# Patient Record
Sex: MALE | Race: BLACK OR AFRICAN AMERICAN | NOT HISPANIC OR LATINO | Employment: OTHER | URBAN - METROPOLITAN AREA
[De-identification: names, ages, dates, MRNs, and addresses within clinical notes are randomized per-mention and may not be internally consistent; named-entity substitution may affect disease eponyms.]

---

## 2017-11-30 ENCOUNTER — AMBULATORY - HEALTHEAST (OUTPATIENT)
Dept: CARDIOLOGY | Facility: CLINIC | Age: 47
End: 2017-11-30

## 2018-01-09 ENCOUNTER — OFFICE VISIT - HEALTHEAST (OUTPATIENT)
Dept: CARDIOLOGY | Facility: CLINIC | Age: 48
End: 2018-01-09

## 2018-01-09 DIAGNOSIS — R94.39 CARDIOVASCULAR STRESS TEST ABNORMAL: ICD-10-CM

## 2018-01-09 DIAGNOSIS — I25.10 CORONARY ARTERY DISEASE INVOLVING NATIVE CORONARY ARTERY WITHOUT ANGINA PECTORIS: ICD-10-CM

## 2018-01-09 ASSESSMENT — MIFFLIN-ST. JEOR: SCORE: 1718.99

## 2018-11-29 ENCOUNTER — COMMUNICATION - HEALTHEAST (OUTPATIENT)
Dept: ADMINISTRATIVE | Facility: CLINIC | Age: 48
End: 2018-11-29

## 2019-01-01 ENCOUNTER — TRANSFERRED RECORDS (OUTPATIENT)
Dept: MULTI SPECIALTY CLINIC | Facility: CLINIC | Age: 49
End: 2019-01-01

## 2019-07-31 ENCOUNTER — OFFICE VISIT - HEALTHEAST (OUTPATIENT)
Dept: FAMILY MEDICINE | Facility: CLINIC | Age: 49
End: 2019-07-31

## 2019-07-31 DIAGNOSIS — R80.9 PROTEINURIA, UNSPECIFIED TYPE: ICD-10-CM

## 2019-07-31 DIAGNOSIS — M54.9 BACK PAIN: ICD-10-CM

## 2019-07-31 LAB
ALBUMIN UR-MCNC: ABNORMAL MG/DL
ANION GAP SERPL CALCULATED.3IONS-SCNC: 11 MMOL/L (ref 5–18)
APPEARANCE UR: CLEAR
BACTERIA #/AREA URNS HPF: ABNORMAL HPF
BILIRUB UR QL STRIP: NEGATIVE
BUN SERPL-MCNC: 14 MG/DL (ref 8–22)
CHLORIDE BLD-SCNC: 101 MMOL/L (ref 98–107)
CO2 SERPL-SCNC: 26 MMOL/L (ref 22–31)
COLOR UR AUTO: YELLOW
CREAT SERPL-MCNC: 0.9 MG/DL (ref 0.8–1.5)
GLUCOSE BLD-MCNC: 206 MG/DL (ref 70–125)
GLUCOSE UR STRIP-MCNC: NEGATIVE MG/DL
HGB UR QL STRIP: NEGATIVE
KETONES UR STRIP-MCNC: NEGATIVE MG/DL
LEUKOCYTE ESTERASE UR QL STRIP: NEGATIVE
NITRATE UR QL: NEGATIVE
PH UR STRIP: 5 [PH] (ref 5–8)
POTASSIUM BLD-SCNC: 4.3 MMOL/L (ref 3.5–5.5)
RBC #/AREA URNS AUTO: ABNORMAL HPF
SODIUM SERPL-SCNC: 138 MMOL/L (ref 136–145)
SP GR UR STRIP: 1.02 (ref 1–1.03)
SQUAMOUS #/AREA URNS AUTO: ABNORMAL LPF
UROBILINOGEN UR STRIP-ACNC: ABNORMAL
WBC #/AREA URNS AUTO: ABNORMAL HPF

## 2019-07-31 RX ORDER — CYCLOBENZAPRINE HCL 5 MG
5-10 TABLET ORAL 3 TIMES DAILY PRN
Qty: 30 TABLET | Refills: 0 | Status: ON HOLD | OUTPATIENT
Start: 2019-07-31 | End: 2021-09-02

## 2019-07-31 RX ORDER — GLUCOSAMINE HCL/CHONDROITIN SU 500-400 MG
CAPSULE ORAL
Status: ON HOLD | COMMUNITY
Start: 2019-07-25 | End: 2021-09-02

## 2019-07-31 RX ORDER — ALLOPURINOL 100 MG/1
100 TABLET ORAL DAILY
Refills: 3 | Status: ON HOLD | COMMUNITY
Start: 2019-07-13 | End: 2021-09-22

## 2019-07-31 RX ORDER — AMLODIPINE BESYLATE 5 MG/1
5 TABLET ORAL
Status: ON HOLD | COMMUNITY
Start: 2019-01-18 | End: 2021-09-22

## 2020-09-24 ENCOUNTER — AMBULATORY - HEALTHEAST (OUTPATIENT)
Dept: NURSING | Facility: CLINIC | Age: 50
End: 2020-09-24

## 2021-01-01 ENCOUNTER — TRANSFERRED RECORDS (OUTPATIENT)
Dept: MULTI SPECIALTY CLINIC | Facility: CLINIC | Age: 51
End: 2021-01-01
Payer: COMMERCIAL

## 2021-01-01 LAB — RETINOPATHY: NORMAL

## 2021-02-06 ENCOUNTER — TRANSFERRED RECORDS (OUTPATIENT)
Dept: HEALTH INFORMATION MANAGEMENT | Facility: CLINIC | Age: 51
End: 2021-02-06
Payer: COMMERCIAL

## 2021-02-06 LAB — RETINOPATHY: NEGATIVE

## 2021-05-31 VITALS — HEIGHT: 69 IN | BODY MASS INDEX: 28.85 KG/M2 | WEIGHT: 194.8 LBS

## 2021-05-31 NOTE — PROGRESS NOTES
Assessment/Plan:         Jerry was seen today for back pain.    Diagnoses and all orders for this visit:    Back pain: His pain is throughout his back, but appears to be musculoskeletal (positional, exam showing diffuse spasm). No red flag symptoms. Will start with a muscle relaxer and close follow-up with his PCP. Discussed concerning symptoms for which to return. He may also take tylenol (no ibuprofen).  -     Urinalysis-UC if Indicated  -     ISTAT CHEM 7 ([facility] CLINIC ONLY)  -     cyclobenzaprine (FLEXERIL) 5 MG tablet; Take 1-2 tablets (5-10 mg total) by mouth 3 (three) times a day as needed for muscle spasms.    Proteinuria, unspecified type: the level of proteinuria he has is significant but appears to be chronic for him. He will follow-up with his PCP for this.  -     ISTAT CHEM 7 ([facility] CLINIC ONLY)                Plan of care was discussed with the patient and/or guardian. They verbalize understanding of the treatment options and plan of care.    Brooklynn Chang       Subjective:        Jerry Bustamante is a 49 y.o. male who presents for back/flank pain.  Has been present for 1 week. States the pain is in his kidneys - radiates into his back. He is very worried about his kidneys. He has chronic protein in his urine and diabetes and has been told his kidneys could start causing him problems. This is the first time he has had pain that feels like it's in his kidneys.  He is also having low back pain - has had that intermittently before. Thinks that is from the pain in the kidneys as well (hunching).   Hurts when he stands for too long. Better when he lays down.  He is not taking any medications for this.   No fever, chills, hematuria, urinary urgency or frequency.   His diabetes has been well controlled.     He does not smoke.            Objective:       Blood pressure 142/89, pulse 76, temperature 98  F (36.7  C), temperature source Oral, resp. rate 16, weight 195 lb (88.5 kg), SpO2 95 %.   Gen: well  appearing, no distress.  MSK: he has pain throughout the paraspinous muscles of the spine. - significant spasm. Negative straight leg raise. No CVA tenderness.   Abdomen: soft, nontender, not distended, no guarding/rebound, normal bowel sounds.     Results for orders placed or performed in visit on 07/31/19   Urinalysis-UC if Indicated   Result Value Ref Range    Color, UA Yellow Colorless, Yellow, Straw, Light Yellow    Clarity, UA Clear Clear    Glucose, UA Negative Negative    Bilirubin, UA Negative Negative    Ketones, UA Negative Negative    Specific Gravity, UA 1.020 1.005 - 1.030    Blood, UA Negative Negative    pH, UA 5.0 5.0 - 8.0    Protein,  mg/dL (!) Negative mg/dL    Urobilinogen, UA 0.2 E.U./dL 0.2 E.U./dL, 1.0 E.U./dL    Nitrite, UA Negative Negative    Leukocytes, UA Negative Negative    Bacteria, UA None Seen None Seen hpf    RBC, UA None Seen None Seen, 0-2 hpf    WBC, UA None Seen None Seen, 0-5 hpf    Squam Epithel, UA None Seen None Seen, 0-5 lpf   ISTAT CHEM 7 ([facility] CLINIC ONLY)   Result Value Ref Range    Sodium 138 136 - 145 mmol/L    Potassium 4.3 3.5 - 5.5 mmol/L    CO2 26 22 - 31 mmol/L    Chloride 101 98 - 107 mmol/L    Anion Gap, Calculation 11 5 - 18 mmol/L    Glucose 206 (H) 70 - 125 mg/dL    BUN 14 8 - 22 mg/dL    Creatinine 0.90 0.80 - 1.50 mg/dL

## 2021-06-03 VITALS — WEIGHT: 195 LBS | BODY MASS INDEX: 28.8 KG/M2

## 2021-06-16 PROBLEM — I25.10 CORONARY ARTERY DISEASE INVOLVING NATIVE CORONARY ARTERY WITHOUT ANGINA PECTORIS: Status: ACTIVE | Noted: 2018-01-09

## 2021-06-17 NOTE — PATIENT INSTRUCTIONS - HE
Patient Instructions by Brooklynn Chang MD at 7/31/2019  6:00 PM     Author: Brooklynn Chang MD Service: -- Author Type: Physician    Filed: 7/31/2019  7:22 PM Encounter Date: 7/31/2019 Status: Signed    : Brooklynn Chang MD (Physician)         Patient Education     Back Pain (Acute or Chronic)    Back pain is one of the most common problems. The good news is that most people feel better in 1 to 2 weeks, and most of the rest in 1 to 2 months. Most people can remain active.  People who have pain describe it differently--not everyone is the same.    The pain can be sharp, stabbing, shooting, aching, cramping or burning.    Movement, standing, bending, lifting, sitting, or walking may worsen pain.    It can be localized to one spot or area, or it can be more generalized.    It can spread or radiate upwards, to the front, or go down your arms or legs (sciatica).    It can cause muscle spasm.  Most of the time, mechanical problems with the muscles or spine cause the pain. Mechanical problems are usually caused by an injury to the muscles or ligaments. While illness can cause back pain, it is usually not caused by a serious illness. Mechanical problems include:     Physical activity such as sports, exercise, work, or normal activity    Overexertion, lifting, pushing, pulling incorrectly or too aggressively    Sudden twisting, bending, or stretching from an accident, or accidental movement    Poor posture    Stretching or moving wrong, without noticing pain at the time    Poor coordination, lack of regular exercise (check with your doctor about this)    Spinal disc disease or arthritis    Stress  Pain can also be related to pregnancy, or illness like appendicitis, bladder or kidney infections, pelvic infections, and many other things.  Acute back pain usually gets better in 1 to 2 weeks. Back pain related to disk disease, arthritis in the spinal joints or spinal stenosis (narrowing of the spinal canal)  can become chronic and last for months or years.  Unless you had a physical injury (for example, a car accident or fall) X-rays are usually not needed for the initial evaluation of back pain. If pain continues and does not respond to medical treatment, X-rays and other tests may be needed.  Home care  Try these home care recommendations:    When in bed, try to find a position of comfort. A firm mattress is best. Try lying flat on your back with pillows under your knees. You can also try lying on your side with your knees bent up towards your chest and a pillow between your knees.    At first, do not try to stretch out the sore spots. If there is a strain, it is not like the good soreness you get after exercising without an injury. In this case, stretching may make it worse.    Don't sit for long periods, as in a long car ride or during other travel. This puts more stress on the lower back than standing or walking.    During the first 24 to 72 hours after an acute injury or flare up of chronic back pain, apply an ice pack to the painful area for 20 minutes and then remove it for 20 minutes. Do this over a period of 60 to 90 minutes or several times a day. This will reduce swelling and pain. Wrap the ice pack in a thin towel or plastic to protect your skin.    You can start with ice, then switch to heat. Heat (hot shower, hot bath, or heating pad) reduces pain and works well for muscle spasms. Heat can be applied to the painful area for 20 minutes then remove it for 20 minutes. Do this over a period of 60 to 90 minutes or several times a day. Do not sleep on a heating pad. It can lead to skin burns or tissue damage.    You can alternate ice and heat therapy. Talk with your doctor about the best treatment for your back pain.    Therapeutic massage can help relax the back muscles without stretching them.    Be aware of safe lifting methods and do not lift anything without stretching first.  Medicines  Talk to your  doctor before using medicine, especially if you have other medical problems or are taking other medicines.    You may use over-the-counter medicine as directed on the bottle to control pain, unless another pain medicine was prescribed. If you have chronic conditions like diabetes, liver or kidney disease, stomach ulcers, or gastrointestinal bleeding, or are taking blood thinners, talk to your doctor before taking any medicine.    Be careful if you are given a prescription medicines, narcotics, or medicine for muscle spasms. They can cause drowsiness, affect your coordination, reflexes, and judgement. Do not drive or operate heavy machinery.  Follow-up care  Follow up with your healthcare provider, or as advised.   A radiologist will review any X-rays that were taken. Your provide will notify you of any new findings that may affect your care.  Call 911  Call 911 if any of the following occur:    Trouble breathing    Confusion    Very drowsy or trouble awakening    Fainting or loss of consciousness    Rapid or very slow heart rate    Loss of bowel or bladder control  When to seek medical advice  Call your healthcare provider right away if any of these occur:     Pain becomes worse or spreads to your legs    Weakness or numbness in one or both legs    Numbness in the groin or genital area  Date Last Reviewed: 7/1/2016 2000-2017 [employer] [address] [place] [address], [place] [address]. All rights reserved. This information is not intended as a substitute for professional medical care. Always follow your healthcare professional's instructions.

## 2021-06-26 NOTE — PROGRESS NOTES
Progress Notes by Edgar Jarrett MD at 1/9/2018  4:10 PM     Author: Edgar Jarrett MD Service: -- Author Type: Physician    Filed: 1/9/2018  4:35 PM Encounter Date: 1/9/2018 Status: Signed    : Edgar Jarrett MD (Physician)           Click to link to [facility] HEART CARE NOTE    Thank you, Dr. Villalpando ref. provider found, for asking us to see Jerry Bustamante at the [facility].      Assessment/Recommendations   Patient with known coronary artery disease and distant history of distal septal apical infarct.  He is feeling well, enjoys a good functional capacity, and repeat blood pressure is excellent.  He should recheck his fasting lipid panel in 3 months which would be March of this year.  I offered to do it here but he would prefer to get it done in his primary care clinic which is absolutely fine.  I have encouraged him to maintain an active lifestyle and exercise 5 times each week.    I have also asked him to see me back in 1 year but of course would see him sooner if questions or problems arise.    Thank you for allowing us to participate in his care.         History of Present Illness    Mr. Jerry Bustamante is a 48 y.o. male with known coronary artery disease who several years ago suffered myocardial infarction while in [place].  We did get the records while he was hospitalized at [facility] for atypical chest pain and he did have a discrete wall motion abnormality in 2010 which was mimicked on the stress echocardiogram that he had in the hospital at [facility] in November 2017.  He is felt well since his hospitalization and has not had any further chest discomfort.  He does get a squeezing sensation after he eats a big meal and lays down in bed at night but if he sits up it goes right away.  He works out intermittently at the [facility] and can go for a couple of hours and does not get any chest discomfort or unusual shortness of breath.  He denies  orthopnea, paroxysmal nocturnal dyspnea, peripheral edema, syncope, near syncope or palpitations.  He has quite significant hyperlipidemia and has been started on Lipitor for about 1 month at a dose of 40 mg each day.  He previously was intolerant to Crestor because it causes nausea.  He is also trying to work on his diet, some weight loss and exercise more vigorously.             Physical Examination Review of Systems   Vitals:    01/09/18 1633   BP: 128/84   Pulse:    Resp:      Body mass index is 28.77 kg/(m^2).  Wt Readings from Last 3 Encounters:   01/09/18 194 lb 12.8 oz (88.4 kg)   11/25/17 190 lb 6.4 oz (86.4 kg)     General Appearance:   Alert, cooperative and in no acute distress.   ENT/Mouth: Oral mucuos membranes pink and moist .      EYES:  No scleral icterus. No Xanthelasma.    Neck: JVP normal. No Hepatojugular reflux. Thyroid not visualized   Chest/Lungs:   Lungs are clear to auscultation, equal chest wall expansion    Cardiovascular:   S1, S2 without murmur ,clicks or rubs. Brachial, radial and posterior tibial pulses are intact and symetric. No carotid bruits noted   Abdomen:  Nontender. BS+. No bruits.      Extremities: No cyanosis, clubbing or edema   Skin: no xanthelasma, warm.    Psych: Appropriate affect.   Neurologic: normal gait, normal  bilateral, no tremors        General: WNL  Eyes: WNL  Ears/Nose/Throat: WNL  Lungs: Snoring  Heart: WNL  Stomach: WNL  Bladder: WNL  Muscle/Joints: WNL  Skin: WNL  Nervous System: WNL  Mental Health: WNL     Blood: WNL     Medical History  Surgical History Family History Social History   Past Medical History:   Diagnosis Date   ? Coronary artery disease    ? Diabetes mellitus, type II    ? Gout    ? High cholesterol    ? HLD (hyperlipidemia)    ? Hypertension     Past Surgical History:   Procedure Laterality Date   ? CORONARY ANGIOPLASTY WITH STENT PLACEMENT      Family History   Problem Relation Age of Onset   ? Hypertension Mother    ? Diabetes  Maternal Aunt    ? Diabetes Maternal Uncle    ? Cancer Neg Hx    ? Stroke Neg Hx     Social History     Social History   ? Marital status:      Spouse name: N/A   ? Number of children: N/A   ? Years of education: N/A     Occupational History   ? Not on file.     Social History Main Topics   ? Smoking status: Former Smoker     Packs/day: 1.50     Years: 13.00     Types: Cigarettes     Quit date: 11/23/2003   ? Smokeless tobacco: Never Used   ? Alcohol use No   ? Drug use: No   ? Sexual activity: Yes     Partners: Female     Other Topics Concern   ? Not on file     Social History Narrative          Medications  Allergies   Current Outpatient Prescriptions   Medication Sig Dispense Refill   ? aspirin 81 MG EC tablet Take 1 tablet (81 mg total) by mouth daily.  0   ? atorvastatin (LIPITOR) 40 MG tablet   0   ? glipiZIDE (GLUCOTROL XL) 10 MG 24 hr tablet Take 10 mg by mouth daily.     ? hydroCHLOROthiazide (HYDRODIURIL) 12.5 MG tablet Take 12.5 mg by mouth every evening.     ? insulin glargine (LANTUS) 100 unit/mL injection Inject 20 Units under the skin at bedtime.     ? lisinopril (PRINIVIL,ZESTRIL) 20 MG tablet Take 20 mg by mouth daily.     ? metFORMIN (GLUCOPHAGE) 500 MG tablet Take 1,000 mg by mouth 2 (two) times a day with meals.     ? metoprolol tartrate (LOPRESSOR) 50 MG tablet Take 50 mg by mouth 2 (two) times a day.     ? nitroglycerin (NITROSTAT) 0.3 MG SL tablet Place 1 tablet (0.3 mg total) under the tongue every 5 (five) minutes as needed for chest pain (Chest pressure). 20 tablet 0   ? atorvastatin (LIPITOR) 40 MG tablet Take 1 tablet (40 mg total) by mouth every evening. 30 tablet 0     No current facility-administered medications for this visit.       Allergies   Allergen Reactions   ? Crestor [Rosuvastatin] Nausea Only         Lab Results    Chemistry/lipid CBC Cardiac Enzymes/BNP/TSH/INR   Lab Results   Component Value Date    CHOL 259 (H) 11/23/2017    HDL 33 (L) 11/23/2017    LDLCALC 146 (H)  11/23/2017    TRIG 398 (H) 11/23/2017    CREATININE 0.99 11/23/2017    BUN 14 11/23/2017    K 4.1 11/23/2017     11/23/2017    CL 99 11/23/2017    CO2 26 11/23/2017    Lab Results   Component Value Date    WBC 8.6 11/23/2017    HGB 15.1 11/23/2017    HCT 42.7 11/23/2017    MCV 78 (L) 11/23/2017     11/25/2017    Lab Results   Component Value Date    TROPONINI 0.01 11/24/2017

## 2021-09-02 ENCOUNTER — HOSPITAL ENCOUNTER (INPATIENT)
Facility: CLINIC | Age: 51
LOS: 20 days | Discharge: HOME | End: 2021-09-22
Attending: INTERNAL MEDICINE | Admitting: INTERNAL MEDICINE
Payer: COMMERCIAL

## 2021-09-02 ENCOUNTER — APPOINTMENT (OUTPATIENT)
Dept: RADIOLOGY | Facility: HOSPITAL | Age: 51
End: 2021-09-02
Attending: EMERGENCY MEDICINE
Payer: COMMERCIAL

## 2021-09-02 ENCOUNTER — HOSPITAL ENCOUNTER (EMERGENCY)
Facility: HOSPITAL | Age: 51
Discharge: ANOTHER HOSPITAL | End: 2021-09-02
Attending: EMERGENCY MEDICINE | Admitting: EMERGENCY MEDICINE
Payer: COMMERCIAL

## 2021-09-02 VITALS
TEMPERATURE: 98 F | DIASTOLIC BLOOD PRESSURE: 75 MMHG | OXYGEN SATURATION: 95 % | HEART RATE: 67 BPM | WEIGHT: 191 LBS | RESPIRATION RATE: 16 BRPM | BODY MASS INDEX: 27.41 KG/M2 | SYSTOLIC BLOOD PRESSURE: 114 MMHG

## 2021-09-02 DIAGNOSIS — I25.10 CORONARY ARTERY DISEASE INVOLVING NATIVE CORONARY ARTERY WITHOUT ANGINA PECTORIS: ICD-10-CM

## 2021-09-02 DIAGNOSIS — I21.4 NSTEMI (NON-ST ELEVATED MYOCARDIAL INFARCTION) (H): Primary | ICD-10-CM

## 2021-09-02 DIAGNOSIS — I25.10 CAD (CORONARY ARTERY DISEASE): ICD-10-CM

## 2021-09-02 DIAGNOSIS — I48.3 TYPICAL ATRIAL FLUTTER (H): ICD-10-CM

## 2021-09-02 DIAGNOSIS — I25.10 CORONARY ARTERY DISEASE INVOLVING NATIVE CORONARY ARTERY OF NATIVE HEART WITHOUT ANGINA PECTORIS: ICD-10-CM

## 2021-09-02 DIAGNOSIS — I48.91 ATRIAL FIBRILLATION WITH RVR (H): ICD-10-CM

## 2021-09-02 DIAGNOSIS — I21.4 NSTEMI (NON-ST ELEVATED MYOCARDIAL INFARCTION) (H): ICD-10-CM

## 2021-09-02 DIAGNOSIS — Z95.1 S/P CORONARY ARTERY BYPASS GRAFT X 3: ICD-10-CM

## 2021-09-02 LAB
ACT BLD: 199 SECONDS (ref 74–150)
ALBUMIN SERPL-MCNC: 3.6 G/DL (ref 3.5–5)
ALBUMIN UR-MCNC: 30 MG/DL
ALP SERPL-CCNC: 118 U/L (ref 45–120)
ALT SERPL W P-5'-P-CCNC: 23 U/L (ref 0–45)
ANION GAP SERPL CALCULATED.3IONS-SCNC: 12 MMOL/L (ref 5–18)
APPEARANCE UR: CLEAR
AST SERPL W P-5'-P-CCNC: 29 U/L (ref 0–40)
ATRIAL RATE - MUSE: 68 BPM
BILIRUB DIRECT SERPL-MCNC: 0.1 MG/DL
BILIRUB SERPL-MCNC: 0.4 MG/DL (ref 0–1)
BILIRUB UR QL STRIP: NEGATIVE
BUN SERPL-MCNC: 17 MG/DL (ref 8–22)
CALCIUM SERPL-MCNC: 9.8 MG/DL (ref 8.5–10.5)
CHLORIDE BLD-SCNC: 98 MMOL/L (ref 98–107)
CO2 SERPL-SCNC: 25 MMOL/L (ref 22–31)
COLOR UR AUTO: ABNORMAL
CREAT SERPL-MCNC: 1.26 MG/DL (ref 0.7–1.3)
DIASTOLIC BLOOD PRESSURE - MUSE: NORMAL MMHG
ERYTHROCYTE [DISTWIDTH] IN BLOOD BY AUTOMATED COUNT: 13.8 % (ref 10–15)
ERYTHROCYTE [DISTWIDTH] IN BLOOD BY AUTOMATED COUNT: 13.9 % (ref 10–15)
GFR SERPL CREATININE-BSD FRML MDRD: 65 ML/MIN/1.73M2
GLUCOSE BLD-MCNC: 485 MG/DL (ref 70–125)
GLUCOSE BLDC GLUCOMTR-MCNC: 207 MG/DL (ref 70–99)
GLUCOSE BLDC GLUCOMTR-MCNC: 251 MG/DL (ref 70–99)
GLUCOSE BLDC GLUCOMTR-MCNC: 252 MG/DL (ref 70–99)
GLUCOSE BLDC GLUCOMTR-MCNC: 276 MG/DL (ref 70–99)
GLUCOSE BLDC GLUCOMTR-MCNC: 287 MG/DL (ref 70–99)
GLUCOSE UR STRIP-MCNC: 1000 MG/DL
HBA1C MFR BLD: 10.2 % (ref 0–5.6)
HCT VFR BLD AUTO: 37.5 % (ref 40–53)
HCT VFR BLD AUTO: 38.3 % (ref 40–53)
HGB BLD-MCNC: 12.7 G/DL (ref 13.3–17.7)
HGB BLD-MCNC: 12.8 G/DL (ref 13.3–17.7)
HGB UR QL STRIP: NEGATIVE
HOLD SPECIMEN: NORMAL
HYALINE CASTS: 1 /LPF
INTERPRETATION ECG - MUSE: NORMAL
KETONES UR STRIP-MCNC: NEGATIVE MG/DL
LEUKOCYTE ESTERASE UR QL STRIP: NEGATIVE
LIPASE SERPL-CCNC: 159 U/L (ref 0–52)
MCH RBC QN AUTO: 26 PG (ref 26.5–33)
MCH RBC QN AUTO: 26.6 PG (ref 26.5–33)
MCHC RBC AUTO-ENTMCNC: 33.2 G/DL (ref 31.5–36.5)
MCHC RBC AUTO-ENTMCNC: 34.1 G/DL (ref 31.5–36.5)
MCV RBC AUTO: 78 FL (ref 78–100)
MCV RBC AUTO: 78 FL (ref 78–100)
NITRATE UR QL: NEGATIVE
P AXIS - MUSE: 52 DEGREES
PH UR STRIP: 5.5 [PH] (ref 5–7)
PLATELET # BLD AUTO: 179 10E3/UL (ref 150–450)
PLATELET # BLD AUTO: 193 10E3/UL (ref 150–450)
POTASSIUM BLD-SCNC: 4.2 MMOL/L (ref 3.5–5)
PR INTERVAL - MUSE: 190 MS
PROT SERPL-MCNC: 7.8 G/DL (ref 6–8)
QRS DURATION - MUSE: 88 MS
QT - MUSE: 370 MS
QTC - MUSE: 393 MS
R AXIS - MUSE: -5 DEGREES
RBC # BLD AUTO: 4.81 10E6/UL (ref 4.4–5.9)
RBC # BLD AUTO: 4.89 10E6/UL (ref 4.4–5.9)
RBC URINE: <1 /HPF
SARS-COV-2 RNA RESP QL NAA+PROBE: NEGATIVE
SODIUM SERPL-SCNC: 135 MMOL/L (ref 136–145)
SP GR UR STRIP: 1.01 (ref 1–1.03)
SYSTOLIC BLOOD PRESSURE - MUSE: NORMAL MMHG
T AXIS - MUSE: 186 DEGREES
TROPONIN I SERPL-MCNC: 10.3 UG/L (ref 0–0.04)
TROPONIN I SERPL-MCNC: 12.29 UG/L (ref 0–0.04)
TROPONIN I SERPL-MCNC: 12.45 UG/L (ref 0–0.04)
TROPONIN I SERPL-MCNC: 2.15 NG/ML (ref 0–0.29)
UFH PPP CHRO-ACNC: 0.2 IU/ML
UFH PPP CHRO-ACNC: 0.6 IU/ML
UROBILINOGEN UR STRIP-MCNC: NORMAL MG/DL
VENTRICULAR RATE- MUSE: 68 BPM
WBC # BLD AUTO: 10.7 10E3/UL (ref 4–11)
WBC # BLD AUTO: 11.9 10E3/UL (ref 4–11)
WBC URINE: <1 /HPF

## 2021-09-02 PROCEDURE — 82310 ASSAY OF CALCIUM: CPT | Performed by: EMERGENCY MEDICINE

## 2021-09-02 PROCEDURE — 93005 ELECTROCARDIOGRAM TRACING: CPT | Performed by: EMERGENCY MEDICINE

## 2021-09-02 PROCEDURE — C9803 HOPD COVID-19 SPEC COLLECT: HCPCS

## 2021-09-02 PROCEDURE — 93005 ELECTROCARDIOGRAM TRACING: CPT

## 2021-09-02 PROCEDURE — 36415 COLL VENOUS BLD VENIPUNCTURE: CPT | Performed by: EMERGENCY MEDICINE

## 2021-09-02 PROCEDURE — 36415 COLL VENOUS BLD VENIPUNCTURE: CPT | Performed by: PHYSICIAN ASSISTANT

## 2021-09-02 PROCEDURE — 210N000002 HC R&B HEART CARE

## 2021-09-02 PROCEDURE — B2151ZZ FLUOROSCOPY OF LEFT HEART USING LOW OSMOLAR CONTRAST: ICD-10-PCS | Performed by: INTERNAL MEDICINE

## 2021-09-02 PROCEDURE — 250N000011 HC RX IP 250 OP 636: Performed by: INTERNAL MEDICINE

## 2021-09-02 PROCEDURE — 96376 TX/PRO/DX INJ SAME DRUG ADON: CPT

## 2021-09-02 PROCEDURE — 96365 THER/PROPH/DIAG IV INF INIT: CPT

## 2021-09-02 PROCEDURE — 96366 THER/PROPH/DIAG IV INF ADDON: CPT

## 2021-09-02 PROCEDURE — 83036 HEMOGLOBIN GLYCOSYLATED A1C: CPT | Performed by: PHYSICIAN ASSISTANT

## 2021-09-02 PROCEDURE — 250N000011 HC RX IP 250 OP 636: Performed by: EMERGENCY MEDICINE

## 2021-09-02 PROCEDURE — 83690 ASSAY OF LIPASE: CPT | Performed by: EMERGENCY MEDICINE

## 2021-09-02 PROCEDURE — 258N000003 HC RX IP 258 OP 636: Performed by: EMERGENCY MEDICINE

## 2021-09-02 PROCEDURE — 250N000013 HC RX MED GY IP 250 OP 250 PS 637: Performed by: PHYSICIAN ASSISTANT

## 2021-09-02 PROCEDURE — 250N000011 HC RX IP 250 OP 636: Performed by: STUDENT IN AN ORGANIZED HEALTH CARE EDUCATION/TRAINING PROGRAM

## 2021-09-02 PROCEDURE — 85520 HEPARIN ASSAY: CPT | Performed by: PHYSICIAN ASSISTANT

## 2021-09-02 PROCEDURE — 99221 1ST HOSP IP/OBS SF/LOW 40: CPT | Mod: 25 | Performed by: INTERNAL MEDICINE

## 2021-09-02 PROCEDURE — 250N000009 HC RX 250: Performed by: EMERGENCY MEDICINE

## 2021-09-02 PROCEDURE — 71045 X-RAY EXAM CHEST 1 VIEW: CPT

## 2021-09-02 PROCEDURE — 81003 URINALYSIS AUTO W/O SCOPE: CPT | Performed by: PHYSICIAN ASSISTANT

## 2021-09-02 PROCEDURE — 84484 ASSAY OF TROPONIN QUANT: CPT | Performed by: PHYSICIAN ASSISTANT

## 2021-09-02 PROCEDURE — 85027 COMPLETE CBC AUTOMATED: CPT | Performed by: PHYSICIAN ASSISTANT

## 2021-09-02 PROCEDURE — 250N000009 HC RX 250: Performed by: INTERNAL MEDICINE

## 2021-09-02 PROCEDURE — 272N000001 HC OR GENERAL SUPPLY STERILE: Performed by: INTERNAL MEDICINE

## 2021-09-02 PROCEDURE — 99223 1ST HOSP IP/OBS HIGH 75: CPT | Mod: AI | Performed by: PHYSICIAN ASSISTANT

## 2021-09-02 PROCEDURE — 99152 MOD SED SAME PHYS/QHP 5/>YRS: CPT | Performed by: INTERNAL MEDICINE

## 2021-09-02 PROCEDURE — 82248 BILIRUBIN DIRECT: CPT | Performed by: EMERGENCY MEDICINE

## 2021-09-02 PROCEDURE — B2111ZZ FLUOROSCOPY OF MULTIPLE CORONARY ARTERIES USING LOW OSMOLAR CONTRAST: ICD-10-PCS | Performed by: INTERNAL MEDICINE

## 2021-09-02 PROCEDURE — 93458 L HRT ARTERY/VENTRICLE ANGIO: CPT | Performed by: INTERNAL MEDICINE

## 2021-09-02 PROCEDURE — 85347 COAGULATION TIME ACTIVATED: CPT

## 2021-09-02 PROCEDURE — 250N000013 HC RX MED GY IP 250 OP 250 PS 637: Performed by: EMERGENCY MEDICINE

## 2021-09-02 PROCEDURE — 99152 MOD SED SAME PHYS/QHP 5/>YRS: CPT | Mod: GC | Performed by: INTERNAL MEDICINE

## 2021-09-02 PROCEDURE — 4A023N7 MEASUREMENT OF CARDIAC SAMPLING AND PRESSURE, LEFT HEART, PERCUTANEOUS APPROACH: ICD-10-PCS | Performed by: INTERNAL MEDICINE

## 2021-09-02 PROCEDURE — 250N000013 HC RX MED GY IP 250 OP 250 PS 637: Performed by: INTERNAL MEDICINE

## 2021-09-02 PROCEDURE — 85520 HEPARIN ASSAY: CPT | Performed by: INTERNAL MEDICINE

## 2021-09-02 PROCEDURE — 250N000012 HC RX MED GY IP 250 OP 636 PS 637: Performed by: PHYSICIAN ASSISTANT

## 2021-09-02 PROCEDURE — 84484 ASSAY OF TROPONIN QUANT: CPT | Performed by: EMERGENCY MEDICINE

## 2021-09-02 PROCEDURE — 85014 HEMATOCRIT: CPT | Performed by: EMERGENCY MEDICINE

## 2021-09-02 PROCEDURE — 93458 L HRT ARTERY/VENTRICLE ANGIO: CPT | Mod: 26 | Performed by: INTERNAL MEDICINE

## 2021-09-02 PROCEDURE — 99291 CRITICAL CARE FIRST HOUR: CPT | Mod: 25

## 2021-09-02 PROCEDURE — 87635 SARS-COV-2 COVID-19 AMP PRB: CPT | Performed by: EMERGENCY MEDICINE

## 2021-09-02 PROCEDURE — 99153 MOD SED SAME PHYS/QHP EA: CPT | Performed by: INTERNAL MEDICINE

## 2021-09-02 RX ORDER — ATROPINE SULFATE 0.1 MG/ML
0.5 INJECTION INTRAVENOUS
Status: ACTIVE | OUTPATIENT
Start: 2021-09-02 | End: 2021-09-03

## 2021-09-02 RX ORDER — FENTANYL CITRATE 50 UG/ML
INJECTION, SOLUTION INTRAMUSCULAR; INTRAVENOUS
Status: DISCONTINUED | OUTPATIENT
Start: 2021-09-02 | End: 2021-09-02 | Stop reason: HOSPADM

## 2021-09-02 RX ORDER — IOPAMIDOL 755 MG/ML
INJECTION, SOLUTION INTRAVASCULAR
Status: DISCONTINUED | OUTPATIENT
Start: 2021-09-02 | End: 2021-09-02 | Stop reason: HOSPADM

## 2021-09-02 RX ORDER — NALOXONE HYDROCHLORIDE 0.4 MG/ML
0.2 INJECTION, SOLUTION INTRAMUSCULAR; INTRAVENOUS; SUBCUTANEOUS
Status: DISCONTINUED | OUTPATIENT
Start: 2021-09-02 | End: 2021-09-08

## 2021-09-02 RX ORDER — LORAZEPAM 2 MG/ML
0.5 INJECTION INTRAMUSCULAR
Status: DISCONTINUED | OUTPATIENT
Start: 2021-09-02 | End: 2021-09-02 | Stop reason: HOSPADM

## 2021-09-02 RX ORDER — MAGNESIUM HYDROXIDE/ALUMINUM HYDROXICE/SIMETHICONE 120; 1200; 1200 MG/30ML; MG/30ML; MG/30ML
30 SUSPENSION ORAL EVERY 4 HOURS PRN
Status: DISCONTINUED | OUTPATIENT
Start: 2021-09-02 | End: 2021-09-08

## 2021-09-02 RX ORDER — HEPARIN SODIUM 10000 [USP'U]/100ML
0-5000 INJECTION, SOLUTION INTRAVENOUS CONTINUOUS
Status: DISCONTINUED | OUTPATIENT
Start: 2021-09-02 | End: 2021-09-08

## 2021-09-02 RX ORDER — NITROGLYCERIN 0.4 MG/1
0.4 TABLET SUBLINGUAL EVERY 5 MIN PRN
Status: DISCONTINUED | OUTPATIENT
Start: 2021-09-02 | End: 2021-09-08

## 2021-09-02 RX ORDER — AMLODIPINE BESYLATE 5 MG/1
5 TABLET ORAL DAILY
Status: DISCONTINUED | OUTPATIENT
Start: 2021-09-02 | End: 2021-09-07

## 2021-09-02 RX ORDER — ATORVASTATIN CALCIUM 40 MG/1
40 TABLET, FILM COATED ORAL EVERY EVENING
Status: DISCONTINUED | OUTPATIENT
Start: 2021-09-02 | End: 2021-09-02

## 2021-09-02 RX ORDER — ASPIRIN 81 MG/1
162 TABLET, CHEWABLE ORAL ONCE
Status: COMPLETED | OUTPATIENT
Start: 2021-09-02 | End: 2021-09-02

## 2021-09-02 RX ORDER — OXYCODONE HYDROCHLORIDE 5 MG/1
5 TABLET ORAL EVERY 4 HOURS PRN
Status: DISCONTINUED | OUTPATIENT
Start: 2021-09-02 | End: 2021-09-02

## 2021-09-02 RX ORDER — NALOXONE HYDROCHLORIDE 0.4 MG/ML
0.4 INJECTION, SOLUTION INTRAMUSCULAR; INTRAVENOUS; SUBCUTANEOUS
Status: DISCONTINUED | OUTPATIENT
Start: 2021-09-02 | End: 2021-09-08

## 2021-09-02 RX ORDER — ACETAMINOPHEN 325 MG/1
650 TABLET ORAL EVERY 4 HOURS PRN
Status: DISCONTINUED | OUTPATIENT
Start: 2021-09-02 | End: 2021-09-02

## 2021-09-02 RX ORDER — NALOXONE HYDROCHLORIDE 0.4 MG/ML
0.4 INJECTION, SOLUTION INTRAMUSCULAR; INTRAVENOUS; SUBCUTANEOUS
Status: DISCONTINUED | OUTPATIENT
Start: 2021-09-02 | End: 2021-09-02

## 2021-09-02 RX ORDER — POLYETHYLENE GLYCOL 3350 17 G/17G
17 POWDER, FOR SOLUTION ORAL DAILY PRN
Status: DISCONTINUED | OUTPATIENT
Start: 2021-09-02 | End: 2021-09-08

## 2021-09-02 RX ORDER — ACETAMINOPHEN 325 MG/1
650 TABLET ORAL EVERY 4 HOURS PRN
Status: DISCONTINUED | OUTPATIENT
Start: 2021-09-02 | End: 2021-09-08

## 2021-09-02 RX ORDER — FLUMAZENIL 0.1 MG/ML
0.2 INJECTION, SOLUTION INTRAVENOUS
Status: ACTIVE | OUTPATIENT
Start: 2021-09-02 | End: 2021-09-03

## 2021-09-02 RX ORDER — NALOXONE HYDROCHLORIDE 0.4 MG/ML
0.2 INJECTION, SOLUTION INTRAMUSCULAR; INTRAVENOUS; SUBCUTANEOUS
Status: DISCONTINUED | OUTPATIENT
Start: 2021-09-02 | End: 2021-09-02

## 2021-09-02 RX ORDER — HEPARIN SODIUM 10000 [USP'U]/100ML
0-5000 INJECTION, SOLUTION INTRAVENOUS CONTINUOUS
Status: DISCONTINUED | OUTPATIENT
Start: 2021-09-02 | End: 2021-09-02

## 2021-09-02 RX ORDER — LORAZEPAM 0.5 MG/1
0.5 TABLET ORAL
Status: DISCONTINUED | OUTPATIENT
Start: 2021-09-02 | End: 2021-09-02 | Stop reason: HOSPADM

## 2021-09-02 RX ORDER — NITROGLYCERIN 80 MG/1
1 PATCH TRANSDERMAL DAILY
Status: DISCONTINUED | OUTPATIENT
Start: 2021-09-02 | End: 2021-09-08

## 2021-09-02 RX ORDER — SODIUM CHLORIDE 9 MG/ML
75 INJECTION, SOLUTION INTRAVENOUS CONTINUOUS
Status: ACTIVE | OUTPATIENT
Start: 2021-09-02 | End: 2021-09-02

## 2021-09-02 RX ORDER — ROSUVASTATIN CALCIUM 10 MG/1
10 TABLET, COATED ORAL DAILY
Status: ON HOLD | COMMUNITY
End: 2021-09-22

## 2021-09-02 RX ORDER — LOSARTAN POTASSIUM 50 MG/1
50 TABLET ORAL 2 TIMES DAILY
Status: DISCONTINUED | OUTPATIENT
Start: 2021-09-02 | End: 2021-09-03

## 2021-09-02 RX ORDER — LIDOCAINE 40 MG/G
CREAM TOPICAL
Status: DISCONTINUED | OUTPATIENT
Start: 2021-09-02 | End: 2021-09-02

## 2021-09-02 RX ORDER — NICOTINE POLACRILEX 4 MG
15-30 LOZENGE BUCCAL
Status: DISCONTINUED | OUTPATIENT
Start: 2021-09-02 | End: 2021-09-08

## 2021-09-02 RX ORDER — LOSARTAN POTASSIUM 50 MG/1
50 TABLET ORAL 2 TIMES DAILY
Status: ON HOLD | COMMUNITY
End: 2021-09-22

## 2021-09-02 RX ORDER — METOPROLOL TARTRATE 50 MG
50 TABLET ORAL 2 TIMES DAILY
Status: DISCONTINUED | OUTPATIENT
Start: 2021-09-02 | End: 2021-09-08

## 2021-09-02 RX ORDER — OXYCODONE HYDROCHLORIDE 5 MG/1
5 TABLET ORAL EVERY 4 HOURS PRN
Status: DISCONTINUED | OUTPATIENT
Start: 2021-09-02 | End: 2021-09-08

## 2021-09-02 RX ORDER — ALLOPURINOL 100 MG/1
100 TABLET ORAL DAILY
Status: DISCONTINUED | OUTPATIENT
Start: 2021-09-02 | End: 2021-09-22 | Stop reason: HOSPADM

## 2021-09-02 RX ORDER — LIDOCAINE 40 MG/G
CREAM TOPICAL
Status: DISCONTINUED | OUTPATIENT
Start: 2021-09-02 | End: 2021-09-08

## 2021-09-02 RX ORDER — ROSUVASTATIN CALCIUM 10 MG/1
10 TABLET, COATED ORAL DAILY
Status: DISCONTINUED | OUTPATIENT
Start: 2021-09-02 | End: 2021-09-08

## 2021-09-02 RX ORDER — ACETAMINOPHEN 650 MG/1
650 SUPPOSITORY RECTAL EVERY 4 HOURS PRN
Status: DISCONTINUED | OUTPATIENT
Start: 2021-09-02 | End: 2021-09-08

## 2021-09-02 RX ORDER — HEPARIN SODIUM 10000 [USP'U]/100ML
0-5000 INJECTION, SOLUTION INTRAVENOUS CONTINUOUS
Status: DISCONTINUED | OUTPATIENT
Start: 2021-09-02 | End: 2021-09-02 | Stop reason: HOSPADM

## 2021-09-02 RX ORDER — ASPIRIN 325 MG
325 TABLET ORAL ONCE
Status: DISCONTINUED | OUTPATIENT
Start: 2021-09-02 | End: 2021-09-02 | Stop reason: HOSPADM

## 2021-09-02 RX ORDER — ONDANSETRON 2 MG/ML
4 INJECTION INTRAMUSCULAR; INTRAVENOUS EVERY 6 HOURS PRN
Status: DISCONTINUED | OUTPATIENT
Start: 2021-09-02 | End: 2021-09-08

## 2021-09-02 RX ORDER — BISACODYL 10 MG
10 SUPPOSITORY, RECTAL RECTAL DAILY PRN
Status: DISCONTINUED | OUTPATIENT
Start: 2021-09-02 | End: 2021-09-08

## 2021-09-02 RX ORDER — HYDROMORPHONE HCL IN WATER/PF 6 MG/30 ML
0.2 PATIENT CONTROLLED ANALGESIA SYRINGE INTRAVENOUS
Status: DISCONTINUED | OUTPATIENT
Start: 2021-09-02 | End: 2021-09-08

## 2021-09-02 RX ORDER — SODIUM CHLORIDE 9 MG/ML
INJECTION, SOLUTION INTRAVENOUS CONTINUOUS
Status: DISCONTINUED | OUTPATIENT
Start: 2021-09-02 | End: 2021-09-02 | Stop reason: HOSPADM

## 2021-09-02 RX ORDER — ASPIRIN 81 MG/1
243 TABLET, CHEWABLE ORAL ONCE
Status: DISCONTINUED | OUTPATIENT
Start: 2021-09-02 | End: 2021-09-02 | Stop reason: HOSPADM

## 2021-09-02 RX ORDER — OXYCODONE HYDROCHLORIDE 5 MG/1
10 TABLET ORAL EVERY 4 HOURS PRN
Status: DISCONTINUED | OUTPATIENT
Start: 2021-09-02 | End: 2021-09-08

## 2021-09-02 RX ORDER — NITROGLYCERIN 5 MG/ML
VIAL (ML) INTRAVENOUS
Status: DISCONTINUED | OUTPATIENT
Start: 2021-09-02 | End: 2021-09-02 | Stop reason: HOSPADM

## 2021-09-02 RX ORDER — AMOXICILLIN 250 MG
1 CAPSULE ORAL 2 TIMES DAILY PRN
Status: DISCONTINUED | OUTPATIENT
Start: 2021-09-02 | End: 2021-09-08

## 2021-09-02 RX ORDER — ASPIRIN 81 MG/1
81 TABLET ORAL DAILY
Status: DISCONTINUED | OUTPATIENT
Start: 2021-09-03 | End: 2021-09-08

## 2021-09-02 RX ORDER — AMOXICILLIN 250 MG
2 CAPSULE ORAL 2 TIMES DAILY PRN
Status: DISCONTINUED | OUTPATIENT
Start: 2021-09-02 | End: 2021-09-08

## 2021-09-02 RX ORDER — LISINOPRIL 20 MG/1
20 TABLET ORAL DAILY
Status: DISCONTINUED | OUTPATIENT
Start: 2021-09-02 | End: 2021-09-02

## 2021-09-02 RX ORDER — POTASSIUM CHLORIDE 1500 MG/1
20 TABLET, EXTENDED RELEASE ORAL
Status: DISCONTINUED | OUTPATIENT
Start: 2021-09-02 | End: 2021-09-02 | Stop reason: HOSPADM

## 2021-09-02 RX ORDER — FENTANYL CITRATE 50 UG/ML
25 INJECTION, SOLUTION INTRAMUSCULAR; INTRAVENOUS
Status: DISCONTINUED | OUTPATIENT
Start: 2021-09-02 | End: 2021-09-03

## 2021-09-02 RX ORDER — ONDANSETRON 4 MG/1
4 TABLET, ORALLY DISINTEGRATING ORAL EVERY 6 HOURS PRN
Status: DISCONTINUED | OUTPATIENT
Start: 2021-09-02 | End: 2021-09-08

## 2021-09-02 RX ORDER — DEXTROSE MONOHYDRATE 25 G/50ML
25-50 INJECTION, SOLUTION INTRAVENOUS
Status: DISCONTINUED | OUTPATIENT
Start: 2021-09-02 | End: 2021-09-08

## 2021-09-02 RX ADMIN — LIDOCAINE HYDROCHLORIDE 30 ML: 20 SOLUTION ORAL; TOPICAL at 01:42

## 2021-09-02 RX ADMIN — INSULIN GLARGINE 20 UNITS: 100 INJECTION, SOLUTION SUBCUTANEOUS at 22:15

## 2021-09-02 RX ADMIN — SODIUM CHLORIDE 1000 ML: 9 INJECTION, SOLUTION INTRAVENOUS at 03:34

## 2021-09-02 RX ADMIN — AMLODIPINE BESYLATE 5 MG: 5 TABLET ORAL at 11:22

## 2021-09-02 RX ADMIN — METOPROLOL TARTRATE 50 MG: 50 TABLET, FILM COATED ORAL at 20:14

## 2021-09-02 RX ADMIN — INSULIN ASPART 3 UNITS: 100 INJECTION, SOLUTION INTRAVENOUS; SUBCUTANEOUS at 19:02

## 2021-09-02 RX ADMIN — HEPARIN SODIUM 1200 UNITS/HR: 10000 INJECTION, SOLUTION INTRAVENOUS at 18:53

## 2021-09-02 RX ADMIN — LOSARTAN POTASSIUM 50 MG: 50 TABLET, FILM COATED ORAL at 11:53

## 2021-09-02 RX ADMIN — METOPROLOL TARTRATE 50 MG: 50 TABLET, FILM COATED ORAL at 11:22

## 2021-09-02 RX ADMIN — LOSARTAN POTASSIUM 50 MG: 50 TABLET, FILM COATED ORAL at 20:14

## 2021-09-02 RX ADMIN — HEPARIN SODIUM 1050 UNITS/HR: 10000 INJECTION, SOLUTION INTRAVENOUS at 03:36

## 2021-09-02 RX ADMIN — NITROGLYCERIN 0.4 MG: 0.4 TABLET SUBLINGUAL at 19:11

## 2021-09-02 RX ADMIN — ALLOPURINOL 100 MG: 100 TABLET ORAL at 11:22

## 2021-09-02 RX ADMIN — INSULIN ASPART 2 UNITS: 100 INJECTION, SOLUTION INTRAVENOUS; SUBCUTANEOUS at 16:50

## 2021-09-02 RX ADMIN — ASPIRIN 162 MG: 81 TABLET, CHEWABLE ORAL at 01:42

## 2021-09-02 RX ADMIN — ROSUVASTATIN CALCIUM 10 MG: 10 TABLET, FILM COATED ORAL at 20:14

## 2021-09-02 RX ADMIN — NITROGLYCERIN 1 PATCH: 0.4 PATCH TRANSDERMAL at 20:14

## 2021-09-02 RX ADMIN — INSULIN ASPART 3 UNITS: 100 INJECTION, SOLUTION INTRAVENOUS; SUBCUTANEOUS at 22:15

## 2021-09-02 ASSESSMENT — ACTIVITIES OF DAILY LIVING (ADL)
ADLS_ACUITY_SCORE: 14
ADLS_ACUITY_SCORE: 16
ADLS_ACUITY_SCORE: 14

## 2021-09-02 NOTE — CONSULTS
[facility]  Cardiology Consultation     Date of Admission:  9/2/2021    Assessment & Plan   Jerry Bustamante is a 51 year old male with    1.  NSTEMI  2.  History of CAD s/p PCI of mid LAD in 2011 also noted to have 50 to 60% stenosis of RCA.  3.  Mild ischemic cardiomyopathy with ejection fraction of 45%-known anterolateral and apical septal hypokinesis  4.  Diabetes mellitus.     Recommendations:  1.  We will perform coronary angiogram.  Suspect that his RCA lesion has progressed and is likely the culprit vessel. Consent obtained.  2.  Echocardiogram is pending and will follow up on the results.  3. Continue all current meds.     Waylon Herrjan    Code Status    Full Code    Reason for Consult   Reason for consult: Chest pain    Primary Care Physician   [facility]    Chief Complaint   Chest pain    History of Present Illness   Jerry Bustamante is a 51 year old male with past medical history of coronary artery disease status stenting of mid LAD in 2011 who presents to the hospital after being transferred for management of NSTEMI.  The patient had an abnormal stress test in 2011 and subsequently underwent PCI of calcified mid LAD with 1 stent.  RCA was noted to have 50 to 60% stenosis.  A transthoracic echocardiogram performed around that time reports distal septal hypokinesis which was also noted on the stress test performed in 2017.    The patient now presents with complaints of substernal, pressure-like chest pain and was noted to have elevated troponin and T wave inversion in lateral leads.  Creatinine was 1.26 and hemoglobin was 12.7.    Patient Active Problem List   Diagnosis     Cardiovascular stress test abnormal     Coronary artery disease involving native coronary artery without angina pectoris     NSTEMI (non-ST elevated myocardial infarction) (H)       Past Medical History   I have reviewed this patient's medical history and updated it with pertinent information if  needed.   Past Medical History:   Diagnosis Date     Coronary artery disease      Diabetes mellitus, type II (H)      Gout      High cholesterol      HLD (hyperlipidemia)      Hypertension        Past Surgical History   I have reviewed this patient's surgical history and updated it with pertinent information if needed.  Past Surgical History:   Procedure Laterality Date     ANGIOPLASTY         Prior to Admission Medications   Prior to Admission Medications   Prescriptions Last Dose Informant Patient Reported? Taking?   allopurinol (ZYLOPRIM) 100 MG tablet   Yes No   Sig: [ALLOPURINOL (ZYLOPRIM) 100 MG TABLET] Take 100 mg by mouth daily.   amLODIPine (NORVASC) 5 MG tablet   Yes No   Sig: [AMLODIPINE (NORVASC) 5 MG TABLET] Take 5 mg by mouth.   aspirin 81 MG EC tablet   No No   Sig: [ASPIRIN 81 MG EC TABLET] Take 1 tablet (81 mg total) by mouth daily.   atorvastatin (LIPITOR) 40 MG tablet   No No   Sig: [ATORVASTATIN (LIPITOR) 40 MG TABLET] Take 1 tablet (40 mg total) by mouth every evening.   blood glucose meter (GLUCOMETER)   Yes No   Sig: [BLOOD GLUCOSE METER (GLUCOMETER)] Dispense meter, test strips, lancets covered by pt ins. E11.65 NIDDM type II, uncontrolled - Test 1 time/day   blood glucose test (CONTOUR NEXT TEST STRIPS) strips   Yes No   Sig: [BLOOD GLUCOSE TEST (CONTOUR NEXT TEST STRIPS) STRIPS] USE 1 STRIP TO CHECK GLUCOSE ONCE DAILY.  Send brand covered by insurance.   cyclobenzaprine (FLEXERIL) 5 MG tablet   No No   Sig: [CYCLOBENZAPRINE (FLEXERIL) 5 MG TABLET] Take 1-2 tablets (5-10 mg total) by mouth 3 (three) times a day as needed for muscle spasms.   glipiZIDE (GLUCOTROL XL) 10 MG 24 hr tablet   Yes No   Sig: [GLIPIZIDE (GLUCOTROL XL) 10 MG 24 HR TABLET] Take 10 mg by mouth daily.   hydroCHLOROthiazide (HYDRODIURIL) 12.5 MG tablet   Yes No   Sig: [HYDROCHLOROTHIAZIDE (HYDRODIURIL) 12.5 MG TABLET] Take 12.5 mg by mouth every evening.   insulin glargine (LANTUS) 100 unit/mL injection   Yes No   Sig:  "Inject 30 Units Subcutaneous At Bedtime    lisinopril (PRINIVIL,ZESTRIL) 20 MG tablet   Yes No   Sig: [LISINOPRIL (PRINIVIL,ZESTRIL) 20 MG TABLET] Take 20 mg by mouth daily.   metFORMIN (GLUCOPHAGE) 500 MG tablet   Yes No   Sig: [METFORMIN (GLUCOPHAGE) 500 MG TABLET] Take 1,000 mg by mouth 2 (two) times a day with meals.   metoprolol tartrate (LOPRESSOR) 50 MG tablet   Yes No   Sig: [METOPROLOL TARTRATE (LOPRESSOR) 50 MG TABLET] Take 50 mg by mouth 2 (two) times a day.   nitroglycerin (NITROSTAT) 0.3 MG SL tablet   No No   Sig: [NITROGLYCERIN (NITROSTAT) 0.3 MG SL TABLET] Place 1 tablet (0.3 mg total) under the tongue every 5 (five) minutes as needed for chest pain (Chest pressure).   pen needle, diabetic (BD ULTRA-FINE MICHELE PEN NEEDLE) 32 gauge x 5/32\" Ndle   Yes No   Sig: [PEN NEEDLE, DIABETIC (BD ULTRA-FINE MICHELE PEN NEEDLE) 32 GAUGE X 5/32\" NDLE] Use to inject insulin at home once daily.      Facility-Administered Medications: None     Current Facility-Administered Medications   Medication Dose Route Frequency     allopurinol  100 mg Oral Daily     amLODIPine  5 mg Oral Daily     [START ON 9/3/2021] aspirin  81 mg Oral Daily     atorvastatin  40 mg Oral QPM     famotidine  20 mg Intravenous Q12H     insulin aspart  1-6 Units Subcutaneous Q4H     insulin glargine  20 Units Subcutaneous At Bedtime     lisinopril  20 mg Oral Daily     metoprolol tartrate  50 mg Oral BID     sodium chloride (PF)  3 mL Intracatheter Q8H     sodium chloride (PF)  3 mL Intracatheter Q8H     Current Facility-Administered Medications   Medication Last Rate     heparin       - MEDICATION INSTRUCTIONS -       - MEDICATION INSTRUCTIONS -       Allergies   Allergies   Allergen Reactions     Crestor [Rosuvastatin] Nausea     Simvastatin Itching       Social History    reports that he quit smoking about 17 years ago. His smoking use included cigarettes. He has a 19.50 pack-year smoking history. He has never used smokeless tobacco. He reports " that he does not drink alcohol and does not use drugs.    Family History   Family History   Problem Relation Age of Onset     Hypertension Mother      Diabetes Maternal Aunt      Diabetes Maternal Uncle      Cancer No family hx of      Cerebrovascular Disease No family hx of        Review of Systems   The comprehensive 10 point Review of Systems is negative other than noted in the HPI or here.     Physical Exam                      Vital Signs with Ranges  Temp:  [98  F (36.7  C)] 98  F (36.7  C)  Pulse:  [66-73] 67  Resp:  [16] 16  BP: (108-132)/(64-86) 114/75  SpO2:  [95 %-98 %] 95 %  0 lbs 0 oz    Constitutional: Alert, no apparent distress,    Lungs: Normal bilateral breath sounds   Cardiovascular: Regular rate and rhythm, normal S1 and S2, and no murmur   Lymphm node  Neck No enlargement  No jugular vein extension or carotid bruit   Skin: Normal   Extremity: No edema       Data   Results for orders placed or performed during the hospital encounter of 09/02/21 (from the past 24 hour(s))   CBC with platelets   Result Value Ref Range    WBC Count 10.7 4.0 - 11.0 10e3/uL    RBC Count 4.81 4.40 - 5.90 10e6/uL    Hemoglobin 12.8 (L) 13.3 - 17.7 g/dL    Hematocrit 37.5 (L) 40.0 - 53.0 %    MCV 78 78 - 100 fL    MCH 26.6 26.5 - 33.0 pg    MCHC 34.1 31.5 - 36.5 g/dL    RDW 13.9 10.0 - 15.0 %    Platelet Count 179 150 - 450 10e3/uL

## 2021-09-02 NOTE — PHARMACY-ADMISSION MEDICATION HISTORY
Pharmacy Medication History  Admission medication history interview status for the 9/2/2021 admission is complete. See EPIC admission navigator for prior to admission medications     Location of Interview: Patient room  Medication history sources: Patient and Surescripts    Significant changes made to the medication list:  Removed: Aspirin, atorvastatin, cyclobenzaprine, hydrochlorothiazide, lisinopril, nitroglycerin  Added: Losartan, rosuvastatin    In the past week, patient estimated taking medication this percent of the time: greater than 90%    Additional medication history information:   Patient confirms using Lantus 30 Units every evening    Medication reconciliation completed by provider prior to medication history? Yes    Time spent in this activity: 15 minutes    Prior to Admission medications    Medication Sig Last Dose Taking? Auth Provider   allopurinol (ZYLOPRIM) 100 MG tablet [ALLOPURINOL (ZYLOPRIM) 100 MG TABLET] Take 100 mg by mouth daily. 9/1/2021 at PM Yes Provider, Historical   amLODIPine (NORVASC) 5 MG tablet [AMLODIPINE (NORVASC) 5 MG TABLET] Take 5 mg by mouth. 9/1/2021 at PM Yes Provider, Historical   glipiZIDE (GLUCOTROL XL) 10 MG 24 hr tablet [GLIPIZIDE (GLUCOTROL XL) 10 MG 24 HR TABLET] Take 10 mg by mouth daily. 9/1/2021 at AM Yes Provider, Historical   insulin glargine (LANTUS) 100 unit/mL injection Inject 30 Units Subcutaneous At Bedtime  9/1/2021 at PM Yes Provider, Historical   losartan (COZAAR) 50 MG tablet Take 50 mg by mouth 2 times daily 9/1/2021 at PM Yes Unknown, Entered By History   metFORMIN (GLUCOPHAGE) 500 MG tablet [METFORMIN (GLUCOPHAGE) 500 MG TABLET] Take 1,000 mg by mouth 2 (two) times a day with meals. 9/1/2021 at PM Yes Provider, Historical   metoprolol tartrate (LOPRESSOR) 50 MG tablet [METOPROLOL TARTRATE (LOPRESSOR) 50 MG TABLET] Take 50 mg by mouth 2 (two) times a day. 9/1/2021 at PM Yes Provider, Historical   rosuvastatin (CRESTOR) 10 MG tablet Take 10 mg by mouth  daily 9/1/2021 at PM Yes Unknown, Entered By History       The information provided in this note is only as accurate as the sources available at the time of update(s)

## 2021-09-02 NOTE — H&P
[facility]    History and Physical - Hospitalist Service       Date of Admission:  9/2/2021  PRIMARY CARE PROVIDER:    Medicine, [facility]    Assessment & Plan   Jerry Bustamante is a 51 year old male admitted on 9/2/2021.    Past medical history significant for Past medical history significant for CAD s/p stenting, HTN, HLP, DM2, Gout, and back pain who was directly admitted to [facility] NSTEMI.       Patient presented to [facility] ED with a 1 week history of left sided chest pain.  He described it as burning and pressure like discomfort that is provoked after eating.  At first it was intermittent but has since become more constant.       Work-up in the ED included an EKG that showed a sinus rhythm with nonspecific findings (diffuse T-wave inversions in the anterolateral precordial leads) which is unchanged from previous EKG.  CMP revealed a sodium of 135 and glucose of 485 otherwise within normal limits.  Lipase was mildly elevated at 159.  Troponin I was elevated at 2.15.  CBC with platelets showed a WBC of 11.9, HGB of 12.7, Hematocrit of 38.3, MCH of 26 otherwise unremarkable.  COVID-19 PCR negative.  1 view chest Xray negative.  Patient received IV Fluids, GI Cocktail,  mg.  Heparin infusion started.       NSTEMI  - Cardiology consult requested.    - Heparin infusion.    - Telemetry.    - Echo.    - Trend troponin.    - Resumed on ASA 81 mg/d.    - PRN SL Nitroglycerin available.    *ADDENDUM: Patient no longer taking ASA PTA.  Will continue at this time and should continue at discharge.       Epigastric pain/burning  - IV Pepcid BID.       CAD s/p stenting ([id number] in [place])  HTN  HLP  - Continue management of NSTEMI as above.    - Resumed on PTA atorvastatin 40 mg/d.    - Resumed on PTA amlodipine 5 mg/d, lisinopril 20 mg/d, metoprolol 50 mg BID.  Hold parameters in place.    - Hold PTA hydrochlorothiazide 12.5 mg at bedtime.  Resume in 24 hours or at  discharge.    - Check Lipid panel.    *ADDENDUM: Per pharmacy reconciliation; patient is no longer on lisinopril or atorvastatin.  Instead he is taking Losartan 50 mg BID and rosuvastatin 10 mg at bedtime.  Will resume these and discontinue lisinopril and atorvastatin.        Uncontrolled DM2  Hyperglycemia  A1c of 9.6% from 8/2021.    - Hold PTA metformin 1000 mg BID and Glipizide 10 mg/d.     - Resumed on PTA Lantus but reduced dose (PTA 30 units) 20 units at bedtime.    - Medium intensity sliding scale insulin.    - Glucose checks every 4 hours while NPO and once diet advance start QID and at 2AM.    - Hypoglycemic protocol in place.       Pseudohyponatremia  Corrected sodium level (due to hyperglycemia) is 144.  No interventions.       Gout  - Resumed on PTA allopurinol 100 mg/d.       Back pain  - PRN analgesics available.       Dysuria  Patient described burning pain with urination that improves if he drinks more water.  IT sounds like it has been present for months.    - UA.      History of latent TB  Patient has completed 9 month treatment in 2015.     COVID-19 screening  Negative PCR.      Clinically Significant Risk Factors Present on Admission              # Platelet Defect: home medication list includes an antiplatelet medication           Diet: NPO per Anesthesia Guidelines for Procedure/Surgery Except for: Meds    DVT Prophylaxis: Heparin drip.    Christina Catheter: Not present  Code Status: Full Code         Disposition Plan   Expected discharge: 2-3 days; recommended to prior living arrangement once cardiac work-up/interventions completed.  Entered: Bentley Christie PA-C 09/02/2021, 9:52 AM     The patient's care was discussed with the Bedside Nurse and Patient.    The patient has been discussed with Dr. Brown, who agrees with the assessment and plan at this time.    Bentley Christie PA-C  [facility]  Securely message with the Vocera Web Console (learn more  here)  Text page via [facility] Paging/Directory    ______________________________________________________________________    Chief Complaint   Direct admit due to NSTEMI.      History is obtained from the patient and EMR.      History of Present Illness   Jerry Bustamante is a 51 year old male with a past medical history significant for Past medical history significant for CAD s/p stenting, HTN, HLP, DM2, Gout, and back pain who was directly admitted to [facility].       Patient presented to [facility] ED with a 1 week history of left sided chest pain.  He described it as burning and pressure like discomfort that is provoked after eating.  At first it was intermittent but has since become more constant.       Work-up in the ED included an EKG that showed a sinus rhythm with nonspecific findings (diffuse T-wave inversions in the anterolateral precordial leads) which is unchanged from previous EKG.  CMP revealed a sodium of 135 and glucose of 485 otherwise within normal limits.  Lipase was mildly elevated at 159.  Troponin I was elevated at 2.15.  CBC with platelets showed a WBC of 11.9, HGB of 12.7, Hematocrit of 38.3, MCH of 26 otherwise unremarkable.  COVID-19 PCR negative. 1 view chest Xray negative.  Patient received IV Fluids, GI Cocktail,  mg. Heparin infusion started.      Patient was seen in his hospital room.  We reviewed his medical and surgical history as well as some of his medications.  He indicated that his Lantus dose is 30 units at bedtime and that changed this last month.      He indicated he has had some increased fatigue and weakness recently.  He has aches and pains and stated he has occasional gout flares and arthritis.  He mentioned he use to workout on a treadmill every other day but stopped this last month due to knee pain.  He has had intermittent discomfort with urinating and said it burns sometimes but seems to resolve if he increases the amount of water he is drinking.       When talking about his chest pain.  He indicted it is a pressure like pain throughout his chest with some burning sensation.  It began earlier this week and was intermittent.  It occasionally radiated into his back and lateral ribs.  Yesterday, when pulling/pushing a cart the pain began but never resolved.  It remained present all day yesterday and worsened during the night to the point he could not sleep.  Because of the constant pain he presented to the ED.      We discussed his elevated troponin and elevated glucose levels.  We reviewed overall plan for the day.       Review of Systems    The 10 point Review of Systems is negative other than noted in the HPI.      Past Medical History    I have reviewed this patient's medical history and updated it with pertinent information if needed.   Past Medical History:   Diagnosis Date     Coronary artery disease      Diabetes mellitus, type II (H)      Gout      High cholesterol      HLD (hyperlipidemia)      Hypertension    CAD s/p stenting, HTN, HLP, DM2, Gout, and back pain    Past Surgical History   I have reviewed this patient's surgical history and updated it with pertinent information if needed.  Past Surgical History:   Procedure Laterality Date     ANGIOPLASTY         Social History   I have reviewed this patient's social history and updated it with pertinent information if needed.  Patient resides in an apartment with his wife and 6 kids.  He is a former smoker who quit in .  He does not consume alcohol or use illicit drugs.  He does not use a cane/walker or CPAP.  Patient indicated that he was previously running/walking on a treadmill every other day but then stopped this last month due to knee pain with activity.    Social History     Tobacco Use     Smoking status: Former Smoker     Packs/day: 1.50     Years: 13.00     Pack years: 19.50     Types: Cigarettes     Quit date: 2003     Years since quittin.7     Smokeless tobacco: Never Used    Substance Use Topics     Alcohol use: No     Drug use: No        Family History   I have reviewed this patient's family history and updated it with pertinent information if needed.   Family History   Problem Relation Age of Onset     Hypertension Mother      Diabetes Maternal Aunt      Diabetes Maternal Uncle      Cancer No family hx of      Cerebrovascular Disease No family hx of        Prior to Admission Medications   Prior to Admission Medications   Prescriptions Last Dose Informant Patient Reported? Taking?   allopurinol (ZYLOPRIM) 100 MG tablet   Yes No   Sig: [ALLOPURINOL (ZYLOPRIM) 100 MG TABLET] Take 100 mg by mouth daily.   amLODIPine (NORVASC) 5 MG tablet   Yes No   Sig: [AMLODIPINE (NORVASC) 5 MG TABLET] Take 5 mg by mouth.   aspirin 81 MG EC tablet   No No   Sig: [ASPIRIN 81 MG EC TABLET] Take 1 tablet (81 mg total) by mouth daily.   atorvastatin (LIPITOR) 40 MG tablet   No No   Sig: [ATORVASTATIN (LIPITOR) 40 MG TABLET] Take 1 tablet (40 mg total) by mouth every evening.   blood glucose meter (GLUCOMETER)   Yes No   Sig: [BLOOD GLUCOSE METER (GLUCOMETER)] Dispense meter, test strips, lancets covered by pt ins. E11.65 NIDDM type II, uncontrolled - Test 1 time/day   blood glucose test (CONTOUR NEXT TEST STRIPS) strips   Yes No   Sig: [BLOOD GLUCOSE TEST (CONTOUR NEXT TEST STRIPS) STRIPS] USE 1 STRIP TO CHECK GLUCOSE ONCE DAILY.  Send brand covered by insurance.   cyclobenzaprine (FLEXERIL) 5 MG tablet   No No   Sig: [CYCLOBENZAPRINE (FLEXERIL) 5 MG TABLET] Take 1-2 tablets (5-10 mg total) by mouth 3 (three) times a day as needed for muscle spasms.   glipiZIDE (GLUCOTROL XL) 10 MG 24 hr tablet   Yes No   Sig: [GLIPIZIDE (GLUCOTROL XL) 10 MG 24 HR TABLET] Take 10 mg by mouth daily.   hydroCHLOROthiazide (HYDRODIURIL) 12.5 MG tablet   Yes No   Sig: [HYDROCHLOROTHIAZIDE (HYDRODIURIL) 12.5 MG TABLET] Take 12.5 mg by mouth every evening.   insulin glargine (LANTUS) 100 unit/mL injection   Yes No   Sig: Inject  "30 Units Subcutaneous At Bedtime    lisinopril (PRINIVIL,ZESTRIL) 20 MG tablet   Yes No   Sig: [LISINOPRIL (PRINIVIL,ZESTRIL) 20 MG TABLET] Take 20 mg by mouth daily.   metFORMIN (GLUCOPHAGE) 500 MG tablet   Yes No   Sig: [METFORMIN (GLUCOPHAGE) 500 MG TABLET] Take 1,000 mg by mouth 2 (two) times a day with meals.   metoprolol tartrate (LOPRESSOR) 50 MG tablet   Yes No   Sig: [METOPROLOL TARTRATE (LOPRESSOR) 50 MG TABLET] Take 50 mg by mouth 2 (two) times a day.   nitroglycerin (NITROSTAT) 0.3 MG SL tablet   No No   Sig: [NITROGLYCERIN (NITROSTAT) 0.3 MG SL TABLET] Place 1 tablet (0.3 mg total) under the tongue every 5 (five) minutes as needed for chest pain (Chest pressure).   pen needle, diabetic (BD ULTRA-FINE MICHELE PEN NEEDLE) 32 gauge x 5/32\" Ndle   Yes No   Sig: [PEN NEEDLE, DIABETIC (BD ULTRA-FINE MICHELE PEN NEEDLE) 32 GAUGE X 5/32\" NDLE] Use to inject insulin at home once daily.      Facility-Administered Medications: None     Allergies   Allergies   Allergen Reactions     Crestor [Rosuvastatin] Nausea     Simvastatin Itching       Physical Exam   Temp: 98  F (36.7  C) Temp src: Oral BP: 119/76 Pulse: 64   Resp: 16 SpO2: 96 % O2 Device: None (Room air)    Weight: 192 lbs 12.8 oz    Constitutional: Awake, alert, cooperative, no apparent distress.    ENT: Normocephalic, without obvious abnormality, atraumatic, oral pharynx with moist mucus membranes, tonsils without erythema or exudates.  Eyes pupils are equal, round and reactive to light; extra occular movements intact.  Normal sclera.    Neck: Supple, symmetrical, trachea midline, no adenopathy.  Pulmonary: No increased work of breathing, good air exchange, clear to auscultation bilaterally, no crackles or wheezing.  Cardiovascular: Regular rate and rhythm, normal S1 and S2, no S3 or S4, and no murmur noted.  GI: Normal bowel sounds, soft, non-distended, non-tender.    Skin/Integumen: Clear.  Neuro: CN II-XII grossly intact.  Upper and lower extremities " strength, coordination and sensation intact bilaterally.    Psych:  Alert and oriented x 3. Normal affect.  Extremities: No lower extremity edema noted, and calves are non-tender to palpation bilaterally. Dorsal pedal pulses palpable.        Data   Data reviewed today: I reviewed all medications, new labs and imaging results over the last 24 hours. I personally reviewed no images or EKG's today.    Recent Labs   Lab 09/02/21  1000 09/02/21  0952 09/02/21  0134   WBC 10.7  --  11.9*   HGB 12.8*  --  12.7*   MCV 78  --  78     --  193   NA  --   --  135*   POTASSIUM  --   --  4.2   CHLORIDE  --   --  98   CO2  --   --  25   BUN  --   --  17   CR  --   --  1.26   ANIONGAP  --   --  12   TORRES  --   --  9.8   GLC  --  252* 485*   ALBUMIN  --   --  3.6   PROTTOTAL  --   --  7.8   BILITOTAL  --   --  0.4   ALKPHOS  --   --  118   ALT  --   --  23   AST  --   --  29   LIPASE  --   --  159*     Recent Results (from the past 24 hour(s))   XR Chest Port 1 View    Narrative    EXAM: XR CHEST PORT 1 VIEW  LOCATION: [facility]  DATE/TIME: 9/2/2021 1:34 AM    INDICATION: pain  COMPARISON: 01/17/2019      Impression    IMPRESSION: Stable cardiomediastinal silhouette. Slight interstitial prominence. No focal consolidation or pleural effusion.

## 2021-09-02 NOTE — ED TRIAGE NOTES
Pt comes in by self. Left sided chest pain for a week. HX of stints. States that earlier in the week the pain was just coming and going but now it is constant.

## 2021-09-02 NOTE — ED PROVIDER NOTES
EMERGENCY DEPARTMENT ENCOUNTER      FINAL IMPRESSION    1. NSTEMI (non-ST elevated myocardial infarction) (H)        MEDICAL DECISION MAKING    51-year-old gentleman presenting to the ED with a burning chest discomfort in the setting of known cardiac disease but unable to specify similarity to prior cardiac events.  Vitals reviewed and arrival are reassuring.  Examination without any cardiopulmonary finding though some mild epigastric and right upper quadrant discomfort to palpation.  Etiology more concerning for GERD gastritis dyspepsia peptic ulcers or hepatobiliary pancreatic etiology.  Additional the lesser suspicion for acute cardiogenic cause.  Do not suspect dissection or aneurysm nor PE.     EKG on arrival is unchanged and reassuring.     Lab studies severe hyperglycemia without anion gap to suggest DKA.  Given fluids and will give insulin supplementation.  Did receive a call from lab notifying of critical troponin elevated.  Given the patient's known CAD and with troponin elevation concern for an acute NSTEMI.  Heparin commenced.     Chest x-ray noted unremarkable.     Patient given aspirin, GI cocktail with symptom resolution.  Nitro was held due to soft blood pressure.     Patient will require hospitalization. Pt was updated with all results and discussed plan and amenable as above. Pt discussed with the admitting provider and is transferred by medics in stable condition.     Due to contagious pathogen concern full protective equipment was utilized through the duration of the interaction including N95  mask, eye shield, hair cover, gown and gloves.     MEDICATIONS GIVEN IN THE EMERGENCY:  Medications   0.9% sodium chloride BOLUS (has no administration in time range)   aspirin (ASA) chewable tablet 162 mg (162 mg Oral Given 9/2/21 0142)   lidocaine (XYLOCAINE) 2 % 15 mL, alum & mag hydroxide-simethicone (MAALOX) 15 mL GI Cocktail (30 mLs Oral Given 9/2/21 0142)       NEW PRESCRIPTIONS STARTED AT TODAY'S ER  VISIT     Review of your medicines      UNREVIEWED medicines. Ask your doctor about these medicines      Dose / Directions   allopurinol 100 MG tablet  Commonly known as: ZYLOPRIM      Dose: 100 mg  [ALLOPURINOL (ZYLOPRIM) 100 MG TABLET] Take 100 mg by mouth daily.  Refills: 3     amLODIPine 5 MG tablet  Commonly known as: NORVASC      Dose: 5 mg  [AMLODIPINE (NORVASC) 5 MG TABLET] Take 5 mg by mouth.  Refills: 0     aspirin 81 MG EC tablet  Commonly known as: ASA  Used for: Chest pain      Dose: 81 mg  [ASPIRIN 81 MG EC TABLET] Take 1 tablet (81 mg total) by mouth daily.  Refills: 0     atorvastatin 40 MG tablet  Commonly known as: LIPITOR  Used for: Chest pain      Dose: 40 mg  [ATORVASTATIN (LIPITOR) 40 MG TABLET] Take 1 tablet (40 mg total) by mouth every evening.  Quantity: 30 tablet  Refills: 0     cyclobenzaprine 5 MG tablet  Commonly known as: FLEXERIL  Used for: Back pain      Dose: 5-10 mg  [CYCLOBENZAPRINE (FLEXERIL) 5 MG TABLET] Take 1-2 tablets (5-10 mg total) by mouth 3 (three) times a day as needed for muscle spasms.  Quantity: 30 tablet  Refills: 0     glipiZIDE 10 MG 24 hr tablet  Commonly known as: GLUCOTROL XL      Dose: 10 mg  [GLIPIZIDE (GLUCOTROL XL) 10 MG 24 HR TABLET] Take 10 mg by mouth daily.  Refills: 0     hydrochlorothiazide 12.5 MG tablet  Commonly known as: HYDRODIURIL      Dose: 12.5 mg  [HYDROCHLOROTHIAZIDE (HYDRODIURIL) 12.5 MG TABLET] Take 12.5 mg by mouth every evening.  Refills: 0     insulin glargine 100 UNIT/ML vial  Commonly known as: LANTUS VIAL      Dose: 20 Units  [INSULIN GLARGINE (LANTUS) 100 UNIT/ML INJECTION] Inject 20 Units under the skin at bedtime.  Refills: 0     lisinopril 20 MG tablet  Commonly known as: ZESTRIL      Dose: 20 mg  [LISINOPRIL (PRINIVIL,ZESTRIL) 20 MG TABLET] Take 20 mg by mouth daily.  Refills: 0     metFORMIN 500 MG tablet  Commonly known as: GLUCOPHAGE      Dose: 1,000 mg  [METFORMIN (GLUCOPHAGE) 500 MG TABLET] Take 1,000 mg by mouth 2 (two)  "times a day with meals.  Refills: 0     metoprolol tartrate 50 MG tablet  Commonly known as: LOPRESSOR      Dose: 50 mg  [METOPROLOL TARTRATE (LOPRESSOR) 50 MG TABLET] Take 50 mg by mouth 2 (two) times a day.  Refills: 0     nitroGLYcerin 0.3 MG sublingual tablet  Commonly known as: NITROSTAT  Used for: Chest pain      Dose: 0.3 mg  [NITROGLYCERIN (NITROSTAT) 0.3 MG SL TABLET] Place 1 tablet (0.3 mg total) under the tongue every 5 (five) minutes as needed for chest pain (Chest pressure).  Quantity: 20 tablet  Refills: 0        CONTINUE these medicines which have NOT CHANGED      Dose / Directions   blood glucose monitoring meter device kit  Commonly known as: NO BRAND SPECIFIED      [BLOOD GLUCOSE METER (GLUCOMETER)] Dispense meter, test strips, lancets covered by pt ins. E11.65 NIDDM type II, uncontrolled - Test 1 time/day  Refills: 0     BLOOD GLUCOSE TEST STRIPS Strp      [BLOOD GLUCOSE TEST (CONTOUR NEXT TEST STRIPS) STRIPS] USE 1 STRIP TO CHECK GLUCOSE ONCE DAILY.  Send brand covered by insurance.  Refills: 0     insulin pen needle 32G X 4 MM miscellaneous  Commonly known as: 32G X 4 MM      [PEN NEEDLE, DIABETIC (BD ULTRA-FINE MICHELE PEN NEEDLE) 32 GAUGE X 5/32\" NDLE] Use to inject insulin at home once daily.  Refills: 0                CHIEF COMPLAINT    Chief Complaint   Patient presents with     Chest Pain         HPI    Jerry Bustamante is a 51 year old male with pertinent past medical history for CAD s/p stenting, hyperlipidemia, hypertension, and DM2 who presents to this Emergency Department for evaluation of chest pain.    Patient reports 1 week of intermittent left-sided chest pain while pointing to the epigastric region, which has since become constant. Pain feels like burning and pressure and is provoked by eating. Currently has the epigastric pain, although is unsure if it feels similar to previous cardiac pains. Took tylenol at home prior to arrival. Denies frequent ibuprofen use but does eat spicy " meals very often. No alcohol use. Denies associated shortness of breath, nausea, or diaphoresis. Also reports myalgias but otherwise denies nausea, vomiting, diarrhea, constipation, cough, congestion, fever, chills, or any other complaints at this time.    This document serves as a record of services performed by Dr. Juan Miller. It was created on his behalf by Vickie Dc, trained medical scribe. The creation of this record is based on the scribes personal observations and the providers statements to him/her. This document has been checked and approved by the attending provider.    RELEVANT HISTORY, MEDICATIONS, & ALLERGIES   Past medical history, surgical history, family history, medications, and allergies reviewed and pertinent noted in HPI. See end of note for comprehensive list.    REVIEW OF SYSTEMS    Constitutional:  No fever or chills, no weakness  Respiratory: No shortness of breath, no wheeze, no cough  Cardiovascular:  No palpitations, no syncope. Reports chest pain (located in the epigastric region).  GI:  No nausea, no vomiting, no diarrhea, no constipation. Positive for epigastric abdominal pain (reported as chest pain).  : No dysuria, No hematuria, No frequency.  Musculoskeletal:  No new swelling, no loss of function. Reports myalgias.  Skin:  No rash.  Neurologic:  No headache, no focal weakness , no sensory changes    All other systems reviewed and are negative.    PHYSICAL EXAM    VITALS SIGNS: /70   Pulse 71   Temp 98  F (36.7  C) (Oral)   Resp 16   Wt 86.6 kg (191 lb)   SpO2 96%   BMI 27.41 kg/m    Constitutional:  Awake, Alert, Cooperative.  HENT: Normocephalic, Atraumatic, Bilateral external ears normal, Oropharynx moist, Nose normal.   Neck: Normal range of motion, No tenderness, Supple, No meningismus, No stridor.   Eyes: PERRL, EOMI, Conjunctiva normal, No discharge.   Respiratory: Normal breath sounds, No respiratory distress, No wheezing, No chest tenderness.    Cardiovascular: Normal heart rate, Normal rhythm, No murmurs, No rubs, No gallops.   GI: Soft, minimal epigastric and RUQ tenderness, No guarding, No CVA tenderness.   Musculoskeletal: Neurovascularly intact distally, No edema, No tenderness  Integument: Warm, Dry, No erythema, No rash.   Neurologic: Alert & oriented x 3, Normal motor function, Normal sensory function, No focal deficits noted.     LABS  Labs Ordered and Resulted from Time of ED Arrival Up to the Time of Departure from the ED   CBC WITH PLATELETS - Abnormal; Notable for the following components:       Result Value    WBC Count 11.9 (*)     Hemoglobin 12.7 (*)     Hematocrit 38.3 (*)     MCH 26.0 (*)     All other components within normal limits   BASIC METABOLIC PANEL - Abnormal; Notable for the following components:    Sodium 135 (*)     Glucose 485 (*)     All other components within normal limits   LIPASE - Abnormal; Notable for the following components:    Lipase 159 (*)     All other components within normal limits   TROPONIN I - Abnormal; Notable for the following components:    Troponin I 2.15 (*)     All other components within normal limits   HEPATIC FUNCTION PANEL - Normal   EXTRA BLUE TOP TUBE   EXTRA RED TOP TUBE   EXTRA PURPLE TOP TUBE   COVID-19 VIRUS (CORONAVIRUS) BY PCR   EXTRA TUBE    Narrative:     The following orders were created for panel order [place] [facility].                  Procedure                               Abnormality         Status                                     ---------                               -----------         ------                                     Extra Blue Top Tube[[id number]]                              Final result                                                 Please view results for these tests on the individual orders.   EXTRA TUBE    Narrative:     The following orders were created for panel order [place] [facility].                  Procedure                               Abnormality          Status                                     ---------                               -----------         ------                                     Extra Red Top Tube[[id number]]                                                                          Extra Purple Top Tube[[id number]]                                                                                         Please view results for these tests on the individual orders.         EKG    Sinus rhythm rate of 68, QRS 88, QTc 393.  Nonspecific findings no active acute ischemic evidence.  Diffuse T wave inversions with anterolateral precordial leads unchanged by comparative EKG from November 2017    RADIOLOGY    Please see official radiology report.  XR Chest Port 1 View   Final Result   IMPRESSION: Stable cardiomediastinal silhouette. Slight interstitial prominence. No focal consolidation or pleural effusion.            All laboratories, imaging and EKG's were personally reviewed and interpreted by myself prior to disposition decision.     PROCEDURES    None    Comprehensive outline of EPIC chart Hx  PAST MEDICAL HISTORY    Past Medical History:   Diagnosis Date     Coronary artery disease      Diabetes mellitus, type II (H)      Gout      High cholesterol      HLD (hyperlipidemia)      Hypertension      Past Surgical History:   Procedure Laterality Date     ANGIOPLASTY         CURRENT MEDICATIONS      Current Facility-Administered Medications:      0.9% sodium chloride BOLUS, 1,000 mL, Intravenous, Once, Juan Miller MD    Current Outpatient Medications:      allopurinol (ZYLOPRIM) 100 MG tablet, [ALLOPURINOL (ZYLOPRIM) 100 MG TABLET] Take 100 mg by mouth daily., Disp: , Rfl: 3     amLODIPine (NORVASC) 5 MG tablet, [AMLODIPINE (NORVASC) 5 MG TABLET] Take 5 mg by mouth., Disp: , Rfl:      aspirin 81 MG EC tablet, [ASPIRIN 81 MG EC TABLET] Take 1 tablet (81 mg total) by mouth daily., Disp: , Rfl: 0     atorvastatin (LIPITOR) 40 MG tablet, [ATORVASTATIN  "(LIPITOR) 40 MG TABLET] Take 1 tablet (40 mg total) by mouth every evening., Disp: 30 tablet, Rfl: 0     blood glucose meter (GLUCOMETER), [BLOOD GLUCOSE METER (GLUCOMETER)] Dispense meter, test strips, lancets covered by pt ins. E11.65 NIDDM type II, uncontrolled - Test 1 time/day, Disp: , Rfl:      blood glucose test (CONTOUR NEXT TEST STRIPS) strips, [BLOOD GLUCOSE TEST (CONTOUR NEXT TEST STRIPS) STRIPS] USE 1 STRIP TO CHECK GLUCOSE ONCE DAILY.  Send brand covered by insurance., Disp: , Rfl:      cyclobenzaprine (FLEXERIL) 5 MG tablet, [CYCLOBENZAPRINE (FLEXERIL) 5 MG TABLET] Take 1-2 tablets (5-10 mg total) by mouth 3 (three) times a day as needed for muscle spasms., Disp: 30 tablet, Rfl: 0     glipiZIDE (GLUCOTROL XL) 10 MG 24 hr tablet, [GLIPIZIDE (GLUCOTROL XL) 10 MG 24 HR TABLET] Take 10 mg by mouth daily., Disp: , Rfl:      hydroCHLOROthiazide (HYDRODIURIL) 12.5 MG tablet, [HYDROCHLOROTHIAZIDE (HYDRODIURIL) 12.5 MG TABLET] Take 12.5 mg by mouth every evening., Disp: , Rfl:      insulin glargine (LANTUS) 100 unit/mL injection, [INSULIN GLARGINE (LANTUS) 100 UNIT/ML INJECTION] Inject 20 Units under the skin at bedtime., Disp: , Rfl:      lisinopril (PRINIVIL,ZESTRIL) 20 MG tablet, [LISINOPRIL (PRINIVIL,ZESTRIL) 20 MG TABLET] Take 20 mg by mouth daily., Disp: , Rfl:      metFORMIN (GLUCOPHAGE) 500 MG tablet, [METFORMIN (GLUCOPHAGE) 500 MG TABLET] Take 1,000 mg by mouth 2 (two) times a day with meals., Disp: , Rfl:      metoprolol tartrate (LOPRESSOR) 50 MG tablet, [METOPROLOL TARTRATE (LOPRESSOR) 50 MG TABLET] Take 50 mg by mouth 2 (two) times a day., Disp: , Rfl:      nitroglycerin (NITROSTAT) 0.3 MG SL tablet, [NITROGLYCERIN (NITROSTAT) 0.3 MG SL TABLET] Place 1 tablet (0.3 mg total) under the tongue every 5 (five) minutes as needed for chest pain (Chest pressure)., Disp: 20 tablet, Rfl: 0     pen needle, diabetic (BD ULTRA-FINE MICHELE PEN NEEDLE) 32 gauge x 5/32\" Ndle, [PEN NEEDLE, DIABETIC (BD ULTRA-FINE " "MICHELE PEN NEEDLE) 32 GAUGE X \" NDLE] Use to inject insulin at home once daily., Disp: , Rfl:     ALLERGIES    Allergies   Allergen Reactions     Crestor [Rosuvastatin] Nausea     Simvastatin Itching       FAMILY HISTORY    Family History   Problem Relation Age of Onset     Hypertension Mother      Diabetes Maternal Aunt      Diabetes Maternal Uncle      Cancer No family hx of      Cerebrovascular Disease No family hx of        SOCIAL HISTORY    Social History     Socioeconomic History     Marital status:      Spouse name: Not on file     Number of children: Not on file     Years of education: Not on file     Highest education level: Not on file   Occupational History     Not on file   Tobacco Use     Smoking status: Former Smoker     Packs/day: 1.50     Years: 13.00     Pack years: 19.50     Types: Cigarettes     Quit date: 2003     Years since quittin.7     Smokeless tobacco: Never Used   Substance and Sexual Activity     Alcohol use: No     Drug use: No     Sexual activity: Yes     Partners: Female   Other Topics Concern     Not on file   Social History Narrative     Not on file     Social Determinants of Health     Financial Resource Strain:      Difficulty of Paying Living Expenses:    Food Insecurity:      Worried About Running Out of Food in the Last Year:      Ran Out of Food in the Last Year:    Transportation Needs:      Lack of Transportation (Medical):      Lack of Transportation (Non-Medical):    Physical Activity:      Days of Exercise per Week:      Minutes of Exercise per Session:    Stress:      Feeling of Stress :    Social Connections:      Frequency of Communication with Friends and Family:      Frequency of Social Gatherings with Friends and Family:      Attends Mu-ism Services:      Active Member of Clubs or Organizations:      Attends Club or Organization Meetings:      Marital Status:    Intimate Partner Violence:      Fear of Current or Ex-Partner:      Emotionally " Abused:      Physically Abused:      Sexually Abused:        This document serves as a record of services performed by Dr. Juan Miller. It was created on his behalf by Vickie Dc, trained medical scribe. The creation of this record is based on the scribes personal observations and the providers statements to him/her.     I Dr. Juan Miller, personally performed the services described in this documentation as scribed by the above named individual in my presence, and it is both accurate and complete.      Juan Miller MD  09/02/21 0306

## 2021-09-02 NOTE — CONSULTS
Consult Date: 09/02/2021    REFERRING PHYSICIAN:  Cardiologists are Dr. Waylon Castro and Dr. Yfn Warner.    REASON FOR CONSULTATION:  Evaluation for coronary artery bypass grafting.    HISTORY OF PRESENT ILLNESS:  Mr. Bustamante is a very pleasant 51-year-old gentleman with past medical history significant for coronary artery disease status post PCI with stent to the mid LAD in 2011, who was admitted to the hospital with chest pain and was diagnosed with a non-ST elevation MI.  He has a history of mild cardiomyopathy with EF of around 45-50% from old echocardiogram.  He underwent a coronary angiogram today by Dr. Warner that demonstrated severe 3-vessel coronary artery disease.  We were asked to evaluate patient for coronary artery bypass grafting.    PAST MEDICAL HISTORY:  Significant for coronary artery disease, type 2 diabetes, uncontrolled with a hemoglobin A1c greater than 10, gout, hyperlipidemia and hypertension.    PAST SURGICAL HISTORY:  Unremarkable.    ALLERGIES:  SIMVASTATIN, and CRESTOR.    MEDICATIONS:  Reviewed in Epic chart.    FAMILY HISTORY:  Significant for premature coronary artery disease in his uncle, diabetes and a stroke and hypertension.    SOCIAL HISTORY:  He quit smoking about 17 years ago.  He does not drink alcohol.    REVIEW OF SYSTEMS:  As per HPI.  All other 10-point review of systems are completed and were otherwise negative unless stated above.    PHYSICAL EXAMINATION:    VITAL SIGNS:  Stable.  GENERAL:  He appears well, in no acute distress.  HEENT:  Within normal limits.  NECK:  Supple, no lymphadenopathy.  CARDIOVASCULAR:  Regular rate and rhythm, normal S1, S2.  No murmurs.  LUNGS:  Clear bilaterally.  ABDOMEN:  Soft, nontender, nondistended.  EXTREMITIES:  Negative for edema, cyanosis or clubbing.  NEUROLOGIC:  A and O x3.  No focal deficits.    PERTINENT LABORATORY STUDIES:  Coronary angiogram was performed earlier today by Dr. Warner demonstrates severe 3-vessel coronary  artery disease with 85% proximal RCA disease, 80% ostial to mid LAD disease and 80% distal circumflex disease, EF around 35-40% on the LV gram.  He also has a 70% distal LAD lesion as well.  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    ASSESSMENT AND PLAN:  Mr. Iqbal is a very pleasant 51-year-old gentleman with severe 3-vessel coronary artery disease with ischemic cardiomyopathy with EF of 30-40%.  We will await the echocardiogram that is ordered for today.  My recommendation is, coronary artery bypass grafting.  Given his age, I would like to consider using arterial grafts, as many arterial grafts as possible.  We will get a left radial ultrasound to see if the radial artery would be a good conduit to use.  We will also get lower extremity vein mapping study.  I went over the angiographic findings with the patient and the reason for recommending coronary artery bypass grafting.  I went over the risks and benefits of the operation and the potential complications associated with the surgery.  These complications include but are not strictly limited to infection, bleeding, stroke, postop, MI, postop arrhythmias, pulmonary or renal complications.  I think overall he is an excellent surgical candidate and I quoted an operative mortality risk of around 1%.  The patient understands and agrees to proceed.  We will tentatively plan for coronary bypass grafting with endoscopic vein harvest, possible endoscopic left radial artery harvest early next week.  Exact OR date will be dependent on the OR availability.  Thank you very much for this referral.    Antelmo Mccullough MD        D: 2021   T: 2021   MT: [id number]    Name:     ALLY IQBAL  MRN:      [id number]        Account:      [id number]   :      1970           Consult Date: 2021     Document: [id number]

## 2021-09-02 NOTE — PLAN OF CARE
Pt remains NPO in case he has an Angiogram today.Heparin drip @1200u/hr. UA sent. Denies chest pain.Trop peak 10.3 so far. To Angiogram.

## 2021-09-03 ENCOUNTER — APPOINTMENT (OUTPATIENT)
Dept: CT IMAGING | Facility: CLINIC | Age: 51
End: 2021-09-03
Attending: SURGERY
Payer: COMMERCIAL

## 2021-09-03 ENCOUNTER — APPOINTMENT (OUTPATIENT)
Dept: ULTRASOUND IMAGING | Facility: CLINIC | Age: 51
End: 2021-09-03
Attending: SURGERY
Payer: COMMERCIAL

## 2021-09-03 ENCOUNTER — PREP FOR PROCEDURE (OUTPATIENT)
Dept: CARDIOLOGY | Facility: CLINIC | Age: 51
End: 2021-09-03

## 2021-09-03 ENCOUNTER — APPOINTMENT (OUTPATIENT)
Dept: CARDIOLOGY | Facility: CLINIC | Age: 51
End: 2021-09-03
Attending: PHYSICIAN ASSISTANT
Payer: COMMERCIAL

## 2021-09-03 DIAGNOSIS — I25.10 CAD (CORONARY ARTERY DISEASE): Primary | ICD-10-CM

## 2021-09-03 LAB
ABO/RH(D): NORMAL
ANION GAP SERPL CALCULATED.3IONS-SCNC: 5 MMOL/L (ref 3–14)
ANTIBODY SCREEN: NEGATIVE
BUN SERPL-MCNC: 14 MG/DL (ref 7–30)
CALCIUM SERPL-MCNC: 9.2 MG/DL (ref 8.5–10.1)
CATH EF ESTIMATED: 35 %
CHLORIDE BLD-SCNC: 104 MMOL/L (ref 94–109)
CHOLEST SERPL-MCNC: 129 MG/DL
CO2 SERPL-SCNC: 28 MMOL/L (ref 20–32)
CREAT SERPL-MCNC: 0.94 MG/DL (ref 0.66–1.25)
ERYTHROCYTE [DISTWIDTH] IN BLOOD BY AUTOMATED COUNT: 14 % (ref 10–15)
FASTING STATUS PATIENT QL REPORTED: NO
GFR SERPL CREATININE-BSD FRML MDRD: >90 ML/MIN/1.73M2
GLUCOSE BLD-MCNC: 159 MG/DL (ref 70–99)
GLUCOSE BLDC GLUCOMTR-MCNC: 163 MG/DL (ref 70–99)
GLUCOSE BLDC GLUCOMTR-MCNC: 238 MG/DL (ref 70–99)
GLUCOSE BLDC GLUCOMTR-MCNC: 271 MG/DL (ref 70–99)
GLUCOSE BLDC GLUCOMTR-MCNC: 280 MG/DL (ref 70–99)
GLUCOSE BLDC GLUCOMTR-MCNC: 296 MG/DL (ref 70–99)
HCT VFR BLD AUTO: 37.9 % (ref 40–53)
HDLC SERPL-MCNC: 50 MG/DL
HGB BLD-MCNC: 12.4 G/DL (ref 13.3–17.7)
LDLC SERPL CALC-MCNC: 46 MG/DL
LVEF ECHO: NORMAL
MCH RBC QN AUTO: 25.9 PG (ref 26.5–33)
MCHC RBC AUTO-ENTMCNC: 32.7 G/DL (ref 31.5–36.5)
MCV RBC AUTO: 79 FL (ref 78–100)
NONHDLC SERPL-MCNC: 79 MG/DL
PLATELET # BLD AUTO: 193 10E3/UL (ref 150–450)
POTASSIUM BLD-SCNC: 3.6 MMOL/L (ref 3.4–5.3)
RBC # BLD AUTO: 4.78 10E6/UL (ref 4.4–5.9)
SODIUM SERPL-SCNC: 137 MMOL/L (ref 133–144)
SPECIMEN EXPIRATION DATE: NORMAL
TRIGL SERPL-MCNC: 166 MG/DL
UFH PPP CHRO-ACNC: 0.19 IU/ML
UFH PPP CHRO-ACNC: 0.21 IU/ML
UFH PPP CHRO-ACNC: 0.45 IU/ML
WBC # BLD AUTO: 10.4 10E3/UL (ref 4–11)

## 2021-09-03 PROCEDURE — 93880 EXTRACRANIAL BILAT STUDY: CPT

## 2021-09-03 PROCEDURE — 999N000208 ECHOCARDIOGRAM COMPLETE

## 2021-09-03 PROCEDURE — 87640 STAPH A DNA AMP PROBE: CPT | Performed by: SURGERY

## 2021-09-03 PROCEDURE — 85027 COMPLETE CBC AUTOMATED: CPT | Performed by: PHYSICIAN ASSISTANT

## 2021-09-03 PROCEDURE — 36415 COLL VENOUS BLD VENIPUNCTURE: CPT | Performed by: PHYSICIAN ASSISTANT

## 2021-09-03 PROCEDURE — 85520 HEPARIN ASSAY: CPT | Performed by: SURGERY

## 2021-09-03 PROCEDURE — 250N000013 HC RX MED GY IP 250 OP 250 PS 637: Performed by: PHYSICIAN ASSISTANT

## 2021-09-03 PROCEDURE — 250N000009 HC RX 250: Performed by: PHYSICIAN ASSISTANT

## 2021-09-03 PROCEDURE — 93306 TTE W/DOPPLER COMPLETE: CPT | Mod: 26 | Performed by: INTERNAL MEDICINE

## 2021-09-03 PROCEDURE — 250N000012 HC RX MED GY IP 250 OP 636 PS 637: Performed by: PHYSICIAN ASSISTANT

## 2021-09-03 PROCEDURE — 85520 HEPARIN ASSAY: CPT | Performed by: PHYSICIAN ASSISTANT

## 2021-09-03 PROCEDURE — 255N000002 HC RX 255 OP 636: Performed by: INTERNAL MEDICINE

## 2021-09-03 PROCEDURE — 36415 COLL VENOUS BLD VENIPUNCTURE: CPT | Performed by: SURGERY

## 2021-09-03 PROCEDURE — C8929 TTE W OR WO FOL WCON,DOPPLER: HCPCS

## 2021-09-03 PROCEDURE — 99232 SBSQ HOSP IP/OBS MODERATE 35: CPT | Performed by: PHYSICIAN ASSISTANT

## 2021-09-03 PROCEDURE — 80061 LIPID PANEL: CPT | Performed by: PHYSICIAN ASSISTANT

## 2021-09-03 PROCEDURE — 36415 COLL VENOUS BLD VENIPUNCTURE: CPT | Performed by: INTERNAL MEDICINE

## 2021-09-03 PROCEDURE — 250N000011 HC RX IP 250 OP 636: Performed by: STUDENT IN AN ORGANIZED HEALTH CARE EDUCATION/TRAINING PROGRAM

## 2021-09-03 PROCEDURE — 80048 BASIC METABOLIC PNL TOTAL CA: CPT | Performed by: PHYSICIAN ASSISTANT

## 2021-09-03 PROCEDURE — 93970 EXTREMITY STUDY: CPT

## 2021-09-03 PROCEDURE — 85520 HEPARIN ASSAY: CPT | Performed by: INTERNAL MEDICINE

## 2021-09-03 PROCEDURE — 99233 SBSQ HOSP IP/OBS HIGH 50: CPT | Mod: 25 | Performed by: INTERNAL MEDICINE

## 2021-09-03 PROCEDURE — 93931 UPPER EXTREMITY STUDY: CPT | Mod: LT

## 2021-09-03 PROCEDURE — 86900 BLOOD TYPING SEROLOGIC ABO: CPT | Performed by: SURGERY

## 2021-09-03 PROCEDURE — 210N000002 HC R&B HEART CARE

## 2021-09-03 PROCEDURE — 71250 CT THORAX DX C-: CPT

## 2021-09-03 PROCEDURE — 87641 MR-STAPH DNA AMP PROBE: CPT | Performed by: SURGERY

## 2021-09-03 RX ORDER — LOSARTAN POTASSIUM 25 MG/1
25 TABLET ORAL 2 TIMES DAILY
Status: DISCONTINUED | OUTPATIENT
Start: 2021-09-03 | End: 2021-09-08

## 2021-09-03 RX ADMIN — ALLOPURINOL 100 MG: 100 TABLET ORAL at 08:58

## 2021-09-03 RX ADMIN — ACETAMINOPHEN 650 MG: 325 TABLET, FILM COATED ORAL at 20:09

## 2021-09-03 RX ADMIN — FAMOTIDINE 20 MG: 10 INJECTION, SOLUTION INTRAVENOUS at 22:48

## 2021-09-03 RX ADMIN — ACETAMINOPHEN 650 MG: 325 TABLET, FILM COATED ORAL at 00:52

## 2021-09-03 RX ADMIN — ROSUVASTATIN CALCIUM 10 MG: 10 TABLET, FILM COATED ORAL at 20:09

## 2021-09-03 RX ADMIN — INSULIN ASPART 1 UNITS: 100 INJECTION, SOLUTION INTRAVENOUS; SUBCUTANEOUS at 08:58

## 2021-09-03 RX ADMIN — INSULIN ASPART 2 UNITS: 100 INJECTION, SOLUTION INTRAVENOUS; SUBCUTANEOUS at 02:38

## 2021-09-03 RX ADMIN — HEPARIN SODIUM 1350 UNITS/HR: 10000 INJECTION, SOLUTION INTRAVENOUS at 11:25

## 2021-09-03 RX ADMIN — METOPROLOL TARTRATE 50 MG: 50 TABLET, FILM COATED ORAL at 20:09

## 2021-09-03 RX ADMIN — LOSARTAN POTASSIUM 25 MG: 25 TABLET, FILM COATED ORAL at 20:09

## 2021-09-03 RX ADMIN — HUMAN ALBUMIN MICROSPHERES AND PERFLUTREN 9 ML: 10; .22 INJECTION, SOLUTION INTRAVENOUS at 10:19

## 2021-09-03 RX ADMIN — INSULIN GLARGINE 25 UNITS: 100 INJECTION, SOLUTION SUBCUTANEOUS at 22:37

## 2021-09-03 RX ADMIN — ASPIRIN 81 MG: 81 TABLET, COATED ORAL at 08:58

## 2021-09-03 RX ADMIN — METOPROLOL TARTRATE 50 MG: 50 TABLET, FILM COATED ORAL at 09:03

## 2021-09-03 ASSESSMENT — ACTIVITIES OF DAILY LIVING (ADL)
ADLS_ACUITY_SCORE: 16
ADLS_ACUITY_SCORE: 16
ADLS_ACUITY_SCORE: 14
ADLS_ACUITY_SCORE: 16
ADLS_ACUITY_SCORE: 14
ADLS_ACUITY_SCORE: 14

## 2021-09-03 NOTE — PLAN OF CARE
Jerry was admitted for NSTEMI. A&OX4,calm and cooperative. VSS on RA. Tmax: 99.5.  Reported a headache around 0000. Tylenol given x1 for head.  Up IND in the room.  R Groin site: WDL, CDI. CMS intact.  Heparin Gtt: increased to 1350 unit(s)/hr. Bolus of 2650u  given per protocol. Up IND. Tele: NSR.  BGM: 238, sliding scale insulin as ordered given. Plans for CABG next week.

## 2021-09-03 NOTE — PROVIDER NOTIFICATION
MD Notification    Notified Person: MD    Notified Person Name: Carlos    Notification Date/Time: 9/2/2021 19:26    Notification Interaction: Page     Purpose of Notification: 350-4 FA...JONATAN MERCEDES. Pt c/o CP at 19:05. He stated it was the same type of pain that brought him in. Rated 4/10. EKG done. 2L O2 applied. Hep gtt @ 1200. SL NTG given x1. Pt's pain relieved with 1 SL Nitro. Maricruz *[id number]    Orders Received: Nitroglycerin patch ordered     Comments:

## 2021-09-03 NOTE — PLAN OF CARE
[id number]: A&Ox4. VSS on RA. Reports headache intermittently; declines want for Tylenol. Up independently. R Groin site: WDL and CDI. CMS intact. Heparin infusing at 1500 units/hr; received bolus for subtherapeutic level [0.21]. Next redraw at 2315. Tele: SR. Cardiac diet; , 280 and 271. Nitroglycerin patch free period; new application scheduled for tonight. Denies CP.  Echo done; see results. Pre-op imaging [US and CT] done. MRSA swab sent. Tentative plan for CABG on Wednesday, 9/8.

## 2021-09-03 NOTE — PROGRESS NOTES
[facility]  Cardiology Progress Note  Date of Service: 09/03/2021  Primary Cardiologist: Dr. Edgar Jarrett [facility]    Assessment & Plan    Jerry Bustamante is a 51 year old male with past medical history significant for CAD s/p stenting, HTN, HLP, DM2, Gout, and back pain who was directly admitted to [facility] that began 1 wk prior with initial neg w/u at ongoing pain and found to have NSTEMI here.      Assessment:  1.  NSTEMI, trop peak 12.4, echo pending, previously EF was normal- but LV gram appears to be 30-40%     - angiogram 9/2 shows 85% prox RCA, 85% OM2, 70% D3, 80% ostial to mid LAD, 80% distal circ   - on heparin gtt, some pain 9/2, nitro patch placed none overnight or this a.m.    2.  Hypertension, well controlled.  Some meds held due to parameters.     3.  Hyperlipidemia,on Crestor 10 with HDL 50, LDL 46, total cholesterol 129.    4.  Type 2 diabetes.  Would be a good candidate for SGLT2 inhibitors.     Plan:   1. Echo pending.    2. Decrease losartan to 25 mg twice daily.  3. Continue Nitropatch, if patient is having breakthrough pain, please let us know will likely need emergent cath versus nitro drip.  4. Continue heparin drip.  5. CABG this day, appreciate CV surgery.    Eunice Hoyt PA-C  [facility]  Pager: [phone]     Interval History   Patient had chest pain after his procedure but none overnight.  Nitro drip is in place.  He has a mild headache.  He understands surgery and has no significant questions about that.  Denies shortness of breath, orthopnea or PND.    Clinically Significant Risk Factors Present on Admission      DMII, HTN, HLD gout       Physical Exam   Temp: 98.3  F (36.8  C) Temp src: Oral BP: 100/59 Pulse: 66   Resp: 16 SpO2: 97 % O2 Device: None (Room air)    Vitals:    09/02/21 1005 09/03/21 0426   Weight: 87.5 kg (192 lb 12.8 oz) 87.6 kg (193 lb 2 oz)     Well-developed well-nourished gentleman in no acute distress.  Normocephalic  atraumatic.  Heart is regular in rate and rhythm, I do not appreciate murmur rub or gallop.  Lungs are clear without wheezes rales or rhonchi.  Extremities without peripheral edema.  I do not appreciate JVP at 30 degrees.    Medications     heparin 1,350 Units/hr (09/03/21 0108)     - MEDICATION INSTRUCTIONS -       - MEDICATION INSTRUCTIONS -         allopurinol  100 mg Oral Daily     amLODIPine  5 mg Oral Daily     aspirin  81 mg Oral Daily     famotidine  20 mg Intravenous Q12H     insulin aspart  1-6 Units Subcutaneous Q4H     insulin glargine  20 Units Subcutaneous At Bedtime     losartan  50 mg Oral BID     metoprolol tartrate  50 mg Oral BID     nitroGLYcerin  1 patch Transdermal Daily     nitroGLYcerin   Transdermal Q8H     rosuvastatin  10 mg Oral Daily     sodium chloride (PF)  3 mL Intracatheter Q8H       Data   Last 24 hours labs reviewed     Tele: Sinus.

## 2021-09-03 NOTE — PROGRESS NOTES
[facility]    Hospitalist Progress Note    Assessment & Plan   Jerry Bustamante is a 51 year old male admitted on 9/2/2021.    Past medical history significant for Past medical history significant for CAD s/p stenting, HTN, HLP, DM2, Gout, and back pain who was directly admitted to [facility] NSTEMI.       Patient presented to [facility] ED with a 1 week history of left sided chest pain.  He described it as burning and pressure like discomfort that is provoked after eating.  At first it was intermittent but has since become more constant.       Work-up in the ED included an EKG that showed a sinus rhythm with nonspecific findings (diffuse T-wave inversions in the anterolateral precordial leads) which is unchanged from previous EKG.  CMP revealed a sodium of 135 and glucose of 485 otherwise within normal limits.  Lipase was mildly elevated at 159.  Troponin I was elevated at 2.15.  CBC with platelets showed a WBC of 11.9, HGB of 12.7, Hematocrit of 38.3, MCH of 26 otherwise unremarkable.  COVID-19 PCR negative.  1 view chest Xray negative.  Patient received IV Fluids, GI Cocktail,  mg.  Heparin infusion started.       NSTEMI  CAD s/p stenting ([id number] in [place]); severe 3 vessel disease (new)  HTN  HLP  *Troponin: 2.15-->10.3-->12.451-->12.292.    *ECHO: LV systolic function mild-moderately reduced (EF 40-45%) with apical, distal septal wall severe hypokinesia, mild inferior wall hypokinesia.  RV systolic function normal.  Mild MR.  Ascending aorta mildly dilated.   - Cardiology consult appreciated:   --Coronary angiogram with severe 3-vessel disease.  - CV surgery consult appreciated:   --Plan for CABG.       --Chest CT w/o contrast with diffuse bronchiectasis with mild ground-glass opacities and septal thickening bilaterally (non-specific and may represent edema or other etiologies).    - Heparin infusion.    - Telemetry.    - Resumed on ASA 81 mg/d.    - Nitroglycerin  patch.    - Resumed on PTA rosuvastatin 10 mg at bedtime    - Resumed on PTA amlodipine 5 mg/d, Losartan 25 mg BID (reduced from 50 mg BID on 9/3), metoprolol 50 mg BID.  Hold parameters in place.    - Hold PTA hydrochlorothiazide 12.5 mg at bedtime.  Resume in 24 hours or at discharge.    - Check Lipid panel.       Ischemic CM  Previous EF of 45% with known anterolateral and apical septal hypokinesis.    *Angiogram revealed an EF of 30-40%.    - Hold PTA hydrochlorothiazide.    - Daily weights.    - I's and O's.    - Continued on PTA metoprolol and losartan as above.      Uncontrolled DM2  Hyperglycemia  A1c of 9.6% from 8/2021.  Rechecked A1c 9/2 and at 10.2%.  - Hold PTA metformin 1000 mg BID and Glipizide 10 mg/d.     - Resumed on PTA Lantus but reduced dose (PTA 30 units) 20 units at bedtime.     --Increase Lantus to 25 units 9/3 at bedtime.    - Medium intensity sliding scale insulin.    - Glucose checks QID and at 2AM.    - Hypoglycemic protocol in place.       Pseudohyponatremia (Resolved)  Corrected sodium level (due to hyperglycemia) is 144.  No interventions.       Epigastric pain/burning  - IV Pepcid BID.      Gout  - Resumed on PTA allopurinol 100 mg/d.       Back pain  - PRN analgesics available.       Dysuria  Patient described burning pain with urination that improves if he drinks more water.  It sounds like it has been present for months.   *UA negative for infection.       History of latent TB  Patient has completed 9 month treatment in 2015.     COVID-19 screening  Negative PCR.        Clinically Significant Risk Factors Present on Admission                     Diet: Low Saturated Fat Na <2400 mg     DVT Prophylaxis: Heparin drip   Christina Catheter: Not present  Code Status: Full Code     Disposition Plan    Expected discharge: 09/10/2021   recommended to TBD; likely home once CABG completed.   Entered: Bentley Christie PA-C 09/03/2021, 12:13 PM        The patient's care was discussed with  the Patient.      The patient has been discussed with Dr. Hercules, who agrees with the assessment and plan at this time.    Bentley Christie PA-C  [facility]  Securely message with the [employer] Web Console (learn more here)  Text page via [facility] Paging/Directory      Interval History   Patient was resting in bed upon arrival.  He denied fever, chest pain, shortness of breath or abdominal pain.  We discussed the chest pain he had last night which he said resolved with the patch.  We reviewed his angiogram results and talked about the narrowing at the arteries.  We also briefly discussed what all entails a CABG and that it is bypassing the narrowed artery.      -Data reviewed today: I reviewed all new labs and imaging results over the last 24 hours. I personally reviewed no images or EKG's today.    Physical Exam   Temp: 98.3  F (36.8  C) Temp src: Oral BP: (!) 112/17 Pulse: 67   Resp: 16 SpO2: 97 % O2 Device: None (Room air)      Vitals:    09/02/21 1005   Weight: 87.5 kg (192 lb 12.8 oz)     Vital Signs with Ranges  Temp:  [98  F (36.7  C)-99.5  F (37.5  C)] 99.5  F (37.5  C)  Pulse:  [63-79] 75  Resp:  [16] 16  BP: (102-131)/(62-82) 110/67  SpO2:  [93 %-97 %] 95 %  I/O last 3 completed shifts:  In: 840 [P.O.:840]  Out: 250 [Urine:250]      Constitutional: Awake, alert, cooperative, NAD.    ENT: NCAT, oral pharynx with moist mucus membranes, tonsils without erythema or exudates.  Neck: Supple, symmetrical, trachea midline, no adenopathy.  Pulmonary: No increased work of breathing, good air exchange, CTA bilaterally, no crackles or wheezing.  Cardiovascular: R/R/R, normal S1 and S2, no S3 or S4, and no murmur noted.  GI: Active bowel sounds, soft, non-distended, non-tender.  Skin/Integumen: Clear.  Neuro: CN II-XII grossly intact.  Psych:  Alert and oriented x 3. Normal affect.  Extremities: No lower extremity edema noted, and calves are non-TTP bilaterally.       Medications      heparin 1,350 Units/hr (09/03/21 1125)     - MEDICATION INSTRUCTIONS -       - MEDICATION INSTRUCTIONS -         allopurinol  100 mg Oral Daily     amLODIPine  5 mg Oral Daily     aspirin  81 mg Oral Daily     famotidine  20 mg Intravenous Q12H     insulin aspart  1-7 Units Subcutaneous TID AC     insulin aspart  1-5 Units Subcutaneous At Bedtime     insulin glargine  20 Units Subcutaneous At Bedtime     losartan  25 mg Oral BID     metoprolol tartrate  50 mg Oral BID     nitroGLYcerin  1 patch Transdermal Daily     nitroGLYcerin   Transdermal Q8H     rosuvastatin  10 mg Oral Daily     sodium chloride (PF)  10 mL Intravenous Once     sodium chloride (PF)  3 mL Intracatheter Q8H       Data   Recent Labs   Lab 09/03/21  0818 09/03/21  0206 09/02/21  1803 09/02/21  1408 09/02/21  1000 09/02/21  0959 09/02/21  0134   WBC 10.4  --   --   --  10.7  --  11.9*   HGB 12.4*  --   --   --  12.8*  --  12.7*   MCV 79  --   --   --  78  --  78     --   --   --  179  --  193     --   --   --   --   --  135*   POTASSIUM 3.6  --   --   --   --   --  4.2   CHLORIDE 104  --   --   --   --   --  98   CO2 28  --   --   --   --   --  25   BUN 14  --   --   --   --   --  17   CR 0.94  --   --   --   --   --  1.26   ANIONGAP 5  --   --   --   --   --  12   TORRES 9.2  --   --   --   --   --  9.8   *  163* 238*  --   --   --   --  485*   ALBUMIN  --   --   --   --   --   --  3.6   PROTTOTAL  --   --   --   --   --   --  7.8   BILITOTAL  --   --   --   --   --   --  0.4   ALKPHOS  --   --   --   --   --   --  118   ALT  --   --   --   --   --   --  23   AST  --   --   --   --   --   --  29   LIPASE  --   --   --   --   --   --  159*   TROPONIN  --   --  12.292* 12.451*  --  10.300*  --        Recent Results (from the past 24 hour(s))   Cardiac Catheterization   Result Value    Cath EF Estimated 35    Narrative      The ejection fraction is 30-40% by visual estimate.    Left ventricular filling pressures are moderately  "elevated.    Prox RCA lesion is 85% stenosed.    2nd Mrg lesion is 85% stenosed.    3rd Diag lesion is 70% stenosed.    Ost LAD to Mid LAD lesion is 80% stenosed.    Dist Cx lesion is 80% stenosed.     Three Vessel CAD.  Ischemic Cardiomyopathy with LAD Wall motion abnormalities and EF of   35-40%  REC CVS consult for CABG  OM2  Distal Lcx  If possible mid/distal Lad after the \"rv branch\" but before the apical lad   lesion  D2 if large enough  \"RV Branch\" off of the Lad (this will back feed the prox/mid Lad)  RCA  NOTE- he may need hybrid procedure with stent to proximal Lad after cabg   (this will revascularize the septal system--there are serial severe   lesions in the Lad system)     Echocardiogram Complete   Result Value    LVEF  40-45%    Narrative    [id number]  [id number]  [id number]  [id number]^KAREL^JENI^ADILENE     [facility]  Echocardiography Laboratory  [facility] [place] [address]     Name: ALLY IQBAL  MRN: [id number]  : 1970  Study Date: 2021 09:12 AM  Age: 51 yrs  Gender: Male  Patient Location: [facility]  Reason For Study: Chest Pain  Ordering Physician: JENI VINSON  Referring Physician: ANN SPEARS  Performed By: Heather Edge     BSA: 2.1 m2  Height: 70 in  Weight: 192 lb  HR: 78  BP: 103/64 mmHg  ______________________________________________________________________________  Procedure  Complete Portable Echo Adult. Optison (NDC #[id number]) given intravenously.  ______________________________________________________________________________  Interpretation Summary     Left ventricular systolic function is mild to moderately reduced.The visual  ejection fraction is 40-45%.There is apical, distal septal wall severe  hypokinesia, there is mild inferior wall hypokinesia.  The right ventricular systolic function is normal.  There is mild (1+) mitral regurgitation.  The ascending aorta is mildly " dilated.  ______________________________________________________________________________  Left Ventricle  The left ventricle is normal in size. There is mild concentric left  ventricular hypertrophy. Diastolic Doppler findings (E/E' ratio and/or other  parameters) suggest left ventricular filling pressures are indeterminate. Left  ventricular systolic function is mild to moderately reduced. The visual  ejection fraction is 40-45%. there is apical, distal septal wall severe  hypokinesia, there is mild inferior wall hypokinesia.     Right Ventricle  The right ventricle is normal size. The right ventricular systolic function is  normal.     Atria  Normal left atrial size. Right atrial size is normal. There is no color  Doppler evidence of an atrial shunt.     Mitral Valve  There is mild (1+) mitral regurgitation.     Tricuspid Valve  There is trace tricuspid regurgitation. Right ventricular systolic pressure  could not be approximated due to inadequate tricuspid regurgitation.     Aortic Valve  The aortic valve is trileaflet. No aortic regurgitation is present. No aortic  stenosis is present.     Pulmonic Valve  There is no pulmonic valvular stenosis.     Vessels  The aortic root is normal size. The ascending aorta is Mildly dilated. 3.9 cm.  Inferior vena cava not well visualized for estimation of right atrial  pressure.     Pericardium  There is no pericardial effusion.     ______________________________________________________________________________  MMode/2D Measurements & Calculations  IVSd: 1.3 cm  LVIDd: 4.9 cm  LVIDs: 3.5 cm  LVPWd: 1.2 cm  FS: 28.4 %  LV mass(C)d: 244.1 grams  LV mass(C)dI: 119.0 grams/m2     Ao root diam: 3.6 cm  LA dimension: 4.2 cm  asc Aorta Diam: 3.9 cm  LA/Ao: 1.2  LA Volume (BP): 60.0 ml  LA Volume Index (BP): 29.3 ml/m2  RWT: 0.50     Doppler Measurements & Calculations  MV E max conor: 104.0 cm/sec  MV A max conor: 61.1 cm/sec  MV E/A: 1.7  MV dec time: 0.19 sec     PA acc time: 0.07  sec  E/E' av.2  Lateral E/e': 8.8  Medial E/e': 13.6     ______________________________________________________________________________  Report approved by: Kenyetta Villasenor 2021 11:14 AM         US Upper Extremity Arterial Duplex Left    Narrative    US UPPER EXTREMITY ARTERIAL DUPLEX LEFT  9/3/2021 11:01 AM     HISTORY: Coronary artery disease. Preoperative evaluation prior to  coronary artery bypass graft surgery. Peripheral vascular disease.    COMPARISON: None    FINDINGS: Color Doppler and spectral waveform analysis performed.    The left brachial, ulnar, and radial arteries are patent without  definite evidence for a significant stenosis. Multiphasic waveforms  are noted throughout. Diameters of the left radial artery range  between 2.0 to 1.7 mm.   CT Chest w/o Contrast    Narrative    CT CHEST WITHOUT CONTRAST September 3, 2021 11:10 AM     HISTORY: Chest pain or shortness of breath, pleurisy or effusion  suspected.    TECHNIQUE: CT scan of the chest was performed without IV contrast.  Multiplanar reformats were obtained. Dose reduction techniques were  used.  CONTRAST: None.    COMPARISON: None.    FINDINGS:     LUNGS AND PLEURA: There is moderate bronchiectasis throughout both  lungs. There are mild diffuse ground-glass opacities and mild smooth  interlobular septal thickening in both lungs. No focal infiltrates. No  pleural effusion.    MEDIASTINUM/AXILLAE: No lymphadenopathy. Moderate coronary artery  calcification with probable stent in the LAD. No significant thoracic  aortic calcification.    UPPER ABDOMEN: Normal.      Impression    IMPRESSION: There is diffuse bronchiectasis with mild ground-glass  opacities and septal thickening throughout both lungs. Findings are  nonspecific, and may represent edema or other etiologies including  atypical pneumonia and hypersensitivity pneumonitis.   US Lower Extremity Venous Mapping Bilateral    Narrative    US LOWER EXTREMITY VENOUS MAPPING  BILATERAL    DATE OF PROCEDURE: 9/3/2021 11:15 AM     CLINICAL HISTORY/INDICATION:  pre- op cabg. Coronary artery disease.     FINDINGS/    Impression    IMPRESSION:   1. Right great saphenous vein in the thigh ranges from 2.5 mm to 5.2  mm.  2. Right great saphenous vein at and below the knee ranges from 1.0 mm  to 2.6 mm.  3. Left great saphenous vein in the thigh ranges from 2.8 mm to 5.4  mm.  4. Left great saphenous vein at and below the knee ranges from 1.2 mm  to 2.8 mm.    AWA ZAMORA DO         SYSTEM ID:  [id number]

## 2021-09-03 NOTE — PLAN OF CARE
[id number] A&O x 4. Patient currently denies pain. VSS, on RA. Up with SBA. Tele: SR. Heparin restarted at 1900 -  1200 units/hr. Xa rescheduled for 0100. Pt had angio today, R groin site WDL. Uncontrolled DM - Novolog and Lantus given. Plan for CABG workup. Pt resting comfortably. Continue to Monitor.     Pt c/o CP - relieved with 1 SL nitroglycerin tablet. Nitroglycerin patch in place on R chest.

## 2021-09-04 LAB
ATRIAL RATE - MUSE: 81 BPM
BASOPHILS # BLD AUTO: 0.1 10E3/UL (ref 0–0.2)
BASOPHILS NFR BLD AUTO: 1 %
DIASTOLIC BLOOD PRESSURE - MUSE: NORMAL MMHG
EOSINOPHIL # BLD AUTO: 0.5 10E3/UL (ref 0–0.7)
EOSINOPHIL NFR BLD AUTO: 4 %
ERYTHROCYTE [DISTWIDTH] IN BLOOD BY AUTOMATED COUNT: 13.8 % (ref 10–15)
GLUCOSE BLDC GLUCOMTR-MCNC: 260 MG/DL (ref 70–99)
GLUCOSE BLDC GLUCOMTR-MCNC: 275 MG/DL (ref 70–99)
GLUCOSE BLDC GLUCOMTR-MCNC: 296 MG/DL (ref 70–99)
GLUCOSE BLDC GLUCOMTR-MCNC: 327 MG/DL (ref 70–99)
GLUCOSE BLDC GLUCOMTR-MCNC: 372 MG/DL (ref 70–99)
HCT VFR BLD AUTO: 38 % (ref 40–53)
HGB BLD-MCNC: 12.7 G/DL (ref 13.3–17.7)
IMM GRANULOCYTES # BLD: 0.1 10E3/UL
IMM GRANULOCYTES NFR BLD: 1 %
INTERPRETATION ECG - MUSE: NORMAL
LYMPHOCYTES # BLD AUTO: 2.4 10E3/UL (ref 0.8–5.3)
LYMPHOCYTES NFR BLD AUTO: 22 %
MCH RBC QN AUTO: 26.2 PG (ref 26.5–33)
MCHC RBC AUTO-ENTMCNC: 33.4 G/DL (ref 31.5–36.5)
MCV RBC AUTO: 79 FL (ref 78–100)
MONOCYTES # BLD AUTO: 0.9 10E3/UL (ref 0–1.3)
MONOCYTES NFR BLD AUTO: 8 %
MRSA DNA SPEC QL NAA+PROBE: NEGATIVE
NEUTROPHILS # BLD AUTO: 7 10E3/UL (ref 1.6–8.3)
NEUTROPHILS NFR BLD AUTO: 64 %
NRBC # BLD AUTO: 0 10E3/UL
NRBC BLD AUTO-RTO: 0 /100
P AXIS - MUSE: 50 DEGREES
PLATELET # BLD AUTO: 223 10E3/UL (ref 150–450)
PR INTERVAL - MUSE: 174 MS
QRS DURATION - MUSE: 84 MS
QT - MUSE: 354 MS
QTC - MUSE: 411 MS
R AXIS - MUSE: -13 DEGREES
RBC # BLD AUTO: 4.84 10E6/UL (ref 4.4–5.9)
SA TARGET DNA: NEGATIVE
SYSTOLIC BLOOD PRESSURE - MUSE: NORMAL MMHG
T AXIS - MUSE: 108 DEGREES
UFH PPP CHRO-ACNC: 0.3 IU/ML
UFH PPP CHRO-ACNC: 0.38 IU/ML
UFH PPP CHRO-ACNC: 0.62 IU/ML
VENTRICULAR RATE- MUSE: 81 BPM
WBC # BLD AUTO: 10.8 10E3/UL (ref 4–11)

## 2021-09-04 PROCEDURE — 250N000012 HC RX MED GY IP 250 OP 636 PS 637: Performed by: INTERNAL MEDICINE

## 2021-09-04 PROCEDURE — 250N000011 HC RX IP 250 OP 636: Performed by: STUDENT IN AN ORGANIZED HEALTH CARE EDUCATION/TRAINING PROGRAM

## 2021-09-04 PROCEDURE — 250N000013 HC RX MED GY IP 250 OP 250 PS 637: Performed by: INTERNAL MEDICINE

## 2021-09-04 PROCEDURE — 99232 SBSQ HOSP IP/OBS MODERATE 35: CPT | Performed by: INTERNAL MEDICINE

## 2021-09-04 PROCEDURE — 85025 COMPLETE CBC W/AUTO DIFF WBC: CPT | Performed by: INTERNAL MEDICINE

## 2021-09-04 PROCEDURE — 36415 COLL VENOUS BLD VENIPUNCTURE: CPT | Performed by: INTERNAL MEDICINE

## 2021-09-04 PROCEDURE — 250N000013 HC RX MED GY IP 250 OP 250 PS 637: Performed by: PHYSICIAN ASSISTANT

## 2021-09-04 PROCEDURE — 36415 COLL VENOUS BLD VENIPUNCTURE: CPT | Performed by: SURGERY

## 2021-09-04 PROCEDURE — 210N000002 HC R&B HEART CARE

## 2021-09-04 PROCEDURE — 85520 HEPARIN ASSAY: CPT | Performed by: SURGERY

## 2021-09-04 RX ORDER — FAMOTIDINE 20 MG/1
20 TABLET, FILM COATED ORAL 2 TIMES DAILY
Status: DISCONTINUED | OUTPATIENT
Start: 2021-09-04 | End: 2021-09-04

## 2021-09-04 RX ORDER — FAMOTIDINE 20 MG/1
20 TABLET, FILM COATED ORAL 2 TIMES DAILY PRN
Status: DISCONTINUED | OUTPATIENT
Start: 2021-09-04 | End: 2021-09-08

## 2021-09-04 RX ADMIN — LOSARTAN POTASSIUM 25 MG: 25 TABLET, FILM COATED ORAL at 20:35

## 2021-09-04 RX ADMIN — ASPIRIN 81 MG: 81 TABLET, COATED ORAL at 08:26

## 2021-09-04 RX ADMIN — INSULIN GLARGINE 30 UNITS: 100 INJECTION, SOLUTION SUBCUTANEOUS at 22:07

## 2021-09-04 RX ADMIN — HEPARIN SODIUM 1300 UNITS/HR: 10000 INJECTION, SOLUTION INTRAVENOUS at 22:22

## 2021-09-04 RX ADMIN — HEPARIN SODIUM 1500 UNITS/HR: 10000 INJECTION, SOLUTION INTRAVENOUS at 02:32

## 2021-09-04 RX ADMIN — NITROGLYCERIN 1 PATCH: 0.4 PATCH TRANSDERMAL at 18:42

## 2021-09-04 RX ADMIN — METOPROLOL TARTRATE 50 MG: 50 TABLET, FILM COATED ORAL at 08:26

## 2021-09-04 RX ADMIN — LOSARTAN POTASSIUM 25 MG: 25 TABLET, FILM COATED ORAL at 08:26

## 2021-09-04 RX ADMIN — ALLOPURINOL 100 MG: 100 TABLET ORAL at 08:24

## 2021-09-04 RX ADMIN — METOPROLOL TARTRATE 50 MG: 50 TABLET, FILM COATED ORAL at 20:35

## 2021-09-04 RX ADMIN — AMLODIPINE BESYLATE 5 MG: 5 TABLET ORAL at 08:26

## 2021-09-04 RX ADMIN — ROSUVASTATIN CALCIUM 10 MG: 10 TABLET, FILM COATED ORAL at 20:35

## 2021-09-04 ASSESSMENT — ACTIVITIES OF DAILY LIVING (ADL)
ADLS_ACUITY_SCORE: 14

## 2021-09-04 NOTE — PROGRESS NOTES
CV Surgery    Chart reviewed. Surgery on Wednesday with Dr. Davis. Will see patient tomorrow to do further education/answer questions.    Gracie Miller PA-C

## 2021-09-04 NOTE — PLAN OF CARE
Neuro: A&Ox4  Cardiac: Tele reads SR. No complaints of chest pain  Respiratory: pt on RA. Lungs clear and equal  Activity: Up SBA  GI/: Bowel sounds audible and active. Up to bathroom  Musculoskeletal: Generalized weakness  Skin: CDI, scattered bruising  Pain: Denied throughout shift  Drips: Heparin at 1500, recheck done at 2315 pending results  Drains/Tubes: PIVx1  Other/Plan: Plan for CABG 9/8

## 2021-09-04 NOTE — PROVIDER NOTIFICATION
MD Notification    Notified Person: MD    Notified Person Name: Dr. ELMO Olivares    Notification Date/Time: Today; 1052    Notification Interaction: Text page    Purpose of Notification:    FYI, my only concern with this pt is that his temperature has been >37 since last margaret.  Looks like a CBC every 3 days and had one yesterday morning.       :

## 2021-09-04 NOTE — PLAN OF CARE
Pt is A&OX4,VSS on RA, denies any pain nor SOB,up ind, amb to bathroom and voiding O.K.Heparin drip was  cont@1500uits/hr, last hep10A around midnight was within goal range, same rate maintained,based on last result with 0600 draw, heparin was to be paused for 60 mins and decrease by 200units, paused at 0708, restart at 0808 @rate of 1300units/hr, next hep 10a timed for around 2pm.Tolerating low fat Na+ diet,not very compliant with his diabetic diet, eating lots of cookies/crackers/tea, blood sugars on high side.Rt groin site C/D/I, tele-SR w PVCs.Plan is CABG on the 9/8.

## 2021-09-04 NOTE — PROGRESS NOTES
[facility]    Medicine Progress Note - Hospitalist Service       Date of Admission:  9/2/2021    Assessment & Plan           Past medical history significant for Past medical history significant for CAD s/p stenting, HTN, HLP, DM2, Gout, and back pain who was directly admitted to [facility] NSTEMI.       Patient presented to [facility] ED with a 1 week history of left sided chest pain.  He described it as burning and pressure like discomfort that is provoked after eating.  At first it was intermittent but has since become more constant.       Work-up in the ED included an EKG that showed a sinus rhythm with nonspecific findings (diffuse T-wave inversions in the anterolateral precordial leads) which is unchanged from previous EKG.  CMP revealed a sodium of 135 and glucose of 485 otherwise within normal limits.  Lipase was mildly elevated at 159.  Troponin I was elevated at 2.15.  CBC with platelets showed a WBC of 11.9, HGB of 12.7, Hematocrit of 38.3, MCH of 26 otherwise unremarkable.  COVID-19 PCR negative.  1 view chest Xray negative.  Patient received IV Fluids, GI Cocktail,  mg.  Heparin infusion started.       NSTEMI  CAD s/p stenting ([id number] in [place]); severe 3 vessel disease (new)  Ischemic cardiomyopathy  HTN  HLP  *Troponin: 2.15-->10.3-->12.451-->12.292.    *ECHO: LV systolic function mild-moderately reduced (EF 40-45%) with apical, distal septal wall severe hypokinesia, mild inferior wall hypokinesia.  RV systolic function normal.  Mild MR.  Ascending aorta mildly dilated.   - Cardiology following, appreciate input               --Coronary angiogram 9/2/2021 with severe 3-vessel disease.  - CV surgery consult appreciated:               --Plan for CABG on 9/8.                   --Chest CT w/o contrast with diffuse bronchiectasis with mild ground-glass opacities and septal thickening bilaterally (non-specific and may represent edema or other etiologies).     -Continue Heparin infusion.    -Continue to monitor telemetry.    -Lipid panel with total cholesterol 129 HDL 50 and LDL 46  - Resumed on ASA 81 mg/d, rosuvastatin, continue losartan 25 mg twice daily, metoprolol 50 mg twice daily with holding parameters.    -Continue PTA amlodipine with holding parameters  -Blood pressure over the last 24 hours stable  - Nitroglycerin patch.    -Continue daily weight, strict I's and O's     Uncontrolled DM2  Hyperglycemia  A1c of 9.6% from 8/2021.  Rechecked A1c 9/2 and at 10.2%.  - Hold PTA metformin 1000 mg BID and Glipizide 10 mg/d.    -PTA on Lantus 30 units, currently on Lantus 25 units  -Blood sugar over the last 24 hours 163-372  -We will increase Lantus to 30 units  -I will also add premeal insulin 1 units for 15 g carb and change sliding scale insulin to high intensity  -Continue with glucose checks QID and at 2AM.    - Hypoglycemic protocol in place.       Pseudohyponatremia (Resolved)  Corrected sodium level (due to hyperglycemia) is 144.  No interventions.       Epigastric pain/burning  -Pepcid BID.  Improved  - patient does not want to take Pepcid scheduled, changed as needed     Gout  -Continue on PTA allopurinol 100 mg/d.       Back pain  - PRN analgesics available.       Dysuria  Patient described burning pain with urination that improves if he drinks more water.  It sounds like it has been present for months.   *UA negative for infection.        History of latent TB  Patient has completed 9 month treatment in 2015.        Diet: Low Saturated Fat Na <2400 mg    DVT Prophylaxis: Continue on heparin drip  Christina Catheter: Not present  Central Lines: None  Code Status: Full Code      Disposition Plan   Expected discharge: Post CABG     The patient's care was discussed with the Bedside Nurse and Patient.    Mckenzie Olivares MD  Hospitalist Service  [facility]  Securely message with the Vocera Web Console (learn more here)  Text page via [email or web address]  Paging/Directory      Clinically Significant Risk Factors Present on Admission   ____________________________________________________________________    Interval History   Patient without any new complaints.  No new nursing concerns.  Denies any chest pain or heartburn today.    Data reviewed today: I reviewed all medications, new labs and imaging results over the last 24 hours.    Physical Exam   Vital Signs: Temp: 98.7  F (37.1  C) Temp src: Oral BP: 108/66 Pulse: 65   Resp: 18 SpO2: 96 % O2 Device: None (Room air)    Weight: 189 lbs 13.06 oz  Exam:  Constitutional: Awake, alert and no distress. Appears comfortable  Head: Normocephalic. No masses, lesions, tenderness or abnormalities  ENT: ENT exam normal, no neck nodes or sinus tenderness  Cardiovascular: RRR.  No murmurs, no rubs or JVD  Respiratory: Normal WOB,b/l equal air entry, no wheezes or crackles   Gastrointestinal: Abdomen soft, non-tender. BS normal. No masses, organomegaly  : Deferred  Extremities : No edema , no clubbing or cyanosis    Neurologic: Cranial nerves II-XII grossly intact , power symmetrical, Reflexes normal and symmetric. Sensation grossly WNL.      Data   Recent Labs   Lab 09/04/21  1208 09/04/21  1111 09/04/21  0800 09/04/21  0217 09/03/21  0818 09/02/21  1803 09/02/21  1408 09/02/21  1000 09/02/21  0959 09/02/21  0134 09/02/21  0134   WBC  --  10.8  --   --  10.4  --   --  10.7  --   --  11.9*   HGB  --  12.7*  --   --  12.4*  --   --  12.8*  --   --  12.7*   MCV  --  79  --   --  79  --   --  78  --   --  78   PLT  --  223  --   --  193  --   --  179  --   --  193   NA  --   --   --   --  137  --   --   --   --   --  135*   POTASSIUM  --   --   --   --  3.6  --   --   --   --   --  4.2   CHLORIDE  --   --   --   --  104  --   --   --   --   --  98   CO2  --   --   --   --  28  --   --   --   --   --  25   BUN  --   --   --   --  14  --   --   --   --   --  17   CR  --   --   --   --  0.94  --   --   --   --   --  1.26   ANIONGAP   --   --   --   --  5  --   --   --   --   --  12   TORRES  --   --   --   --  9.2  --   --   --   --   --  9.8   *  --  260* 296* 159*  163*  --   --   --   --    < > 485*   ALBUMIN  --   --   --   --   --   --   --   --   --   --  3.6   PROTTOTAL  --   --   --   --   --   --   --   --   --   --  7.8   BILITOTAL  --   --   --   --   --   --   --   --   --   --  0.4   ALKPHOS  --   --   --   --   --   --   --   --   --   --  118   ALT  --   --   --   --   --   --   --   --   --   --  23   AST  --   --   --   --   --   --   --   --   --   --  29   LIPASE  --   --   --   --   --   --   --   --   --   --  159*   TROPONIN  --   --   --   --   --  12.292* 12.451*  --  10.300*  --   --     < > = values in this interval not displayed.     No results found for this or any previous visit (from the past 24 hour(s)).  Medications     heparin 1,300 Units/hr (09/04/21 2178)     - MEDICATION INSTRUCTIONS -       - MEDICATION INSTRUCTIONS -         allopurinol  100 mg Oral Daily     amLODIPine  5 mg Oral Daily     aspirin  81 mg Oral Daily     insulin aspart   Subcutaneous TID w/meals     insulin aspart  1-10 Units Subcutaneous TID AC     insulin aspart  1-7 Units Subcutaneous At Bedtime     insulin glargine  30 Units Subcutaneous At Bedtime     losartan  25 mg Oral BID     metoprolol tartrate  50 mg Oral BID     nitroGLYcerin  1 patch Transdermal Daily     nitroGLYcerin   Transdermal Q8H     rosuvastatin  10 mg Oral Daily     sodium chloride (PF)  10 mL Intravenous Once     sodium chloride (PF)  3 mL Intracatheter Q8H

## 2021-09-04 NOTE — PROGRESS NOTES
[facility]  Cardiology Progress Note  Date of Service: 09/04/2021  Primary Cardiologist: Dr. Edgar Jarrett [facility]    Assessment & Plan    Jerry Bustamante is a 51 year old male with past medical history significant for CAD s/p stenting, HTN, HLP, DM2, Gout, and back pain who was directly admitted to [facility] that began 1 wk prior with initial neg w/u at ongoing pain and found to have NSTEMI here.      Assessment:  1.  NSTEMI, trop peak 12.4, echo pending, previously EF was normal- but LV gram appears to be 30-40%     - angiogram 9/2 shows 85% prox RCA, 85% OM2, 70% D3, 80% ostial to mid LAD, 80% distal circ   - on heparin gtt, some pain 9/2, nitro patch placed none overnight or this a.m.    2.  Hypertension, well controlled.  Some meds held due to parameters.     3.  Hyperlipidemia,on Crestor 10 with HDL 50, LDL 46, total cholesterol 129.    4.  Type 2 diabetes.  Would be a good candidate for SGLT2 inhibitors.     Plan:   1. Echo with LVEF 40-45%, mild MR  2. Decreased losartan to 25 mg twice daily.  3. Continue Nitropatch,  4. Continue heparin drip.  5. CABG planned, appreciate CV surgery.    Interval History   Brief period of chest pain overnight that resolved spontaneously.  Otherwise feeling well this morning.     Clinically Significant Risk Factors Present on Admission      DMII, HTN, HLD gout       Physical Exam   Temp: 99.5  F (37.5  C) Temp src: Oral BP: 117/84 Pulse: 68   Resp: 18 SpO2: 93 % O2 Device: None (Room air)    Vitals:    09/02/21 1005 09/03/21 0426 09/04/21 0537   Weight: 87.5 kg (192 lb 12.8 oz) 87.6 kg (193 lb 2 oz) 86.1 kg (189 lb 13.1 oz)     Well-developed well-nourished gentleman in no acute distress.    Normocephalic atraumatic.    Heart is regular in rate and rhythm, I do not appreciate murmur rub or gallop.    Lungs are clear without wheezes rales or rhonchi.    Extremities without peripheral edema.  I do not appreciate JVP at 30  degrees.    Medications     heparin 1,300 Units/hr (09/04/21 8857)     - MEDICATION INSTRUCTIONS -       - MEDICATION INSTRUCTIONS -         allopurinol  100 mg Oral Daily     amLODIPine  5 mg Oral Daily     aspirin  81 mg Oral Daily     famotidine  20 mg Intravenous Q12H     insulin aspart  1-7 Units Subcutaneous TID AC     insulin aspart  1-5 Units Subcutaneous At Bedtime     insulin glargine  25 Units Subcutaneous At Bedtime     losartan  25 mg Oral BID     metoprolol tartrate  50 mg Oral BID     nitroGLYcerin  1 patch Transdermal Daily     nitroGLYcerin   Transdermal Q8H     rosuvastatin  10 mg Oral Daily     sodium chloride (PF)  10 mL Intravenous Once     sodium chloride (PF)  3 mL Intracatheter Q8H       Data   Last 24 hours labs reviewed     Tele: Sinus.

## 2021-09-05 LAB
ANION GAP SERPL CALCULATED.3IONS-SCNC: 5 MMOL/L (ref 3–14)
BUN SERPL-MCNC: 16 MG/DL (ref 7–30)
CALCIUM SERPL-MCNC: 8.9 MG/DL (ref 8.5–10.1)
CHLORIDE BLD-SCNC: 100 MMOL/L (ref 94–109)
CO2 SERPL-SCNC: 27 MMOL/L (ref 20–32)
CREAT SERPL-MCNC: 1.03 MG/DL (ref 0.66–1.25)
ERYTHROCYTE [DISTWIDTH] IN BLOOD BY AUTOMATED COUNT: 13.7 % (ref 10–15)
GFR SERPL CREATININE-BSD FRML MDRD: 83 ML/MIN/1.73M2
GLUCOSE BLD-MCNC: 375 MG/DL (ref 70–99)
GLUCOSE BLDC GLUCOMTR-MCNC: 193 MG/DL (ref 70–99)
GLUCOSE BLDC GLUCOMTR-MCNC: 196 MG/DL (ref 70–99)
GLUCOSE BLDC GLUCOMTR-MCNC: 206 MG/DL (ref 70–99)
GLUCOSE BLDC GLUCOMTR-MCNC: 281 MG/DL (ref 70–99)
HCT VFR BLD AUTO: 34.2 % (ref 40–53)
HGB BLD-MCNC: 11.5 G/DL (ref 13.3–17.7)
MCH RBC QN AUTO: 26.5 PG (ref 26.5–33)
MCHC RBC AUTO-ENTMCNC: 33.6 G/DL (ref 31.5–36.5)
MCV RBC AUTO: 79 FL (ref 78–100)
PLATELET # BLD AUTO: 202 10E3/UL (ref 150–450)
POTASSIUM BLD-SCNC: 4.3 MMOL/L (ref 3.4–5.3)
RBC # BLD AUTO: 4.34 10E6/UL (ref 4.4–5.9)
SODIUM SERPL-SCNC: 132 MMOL/L (ref 133–144)
UFH PPP CHRO-ACNC: 0.25 IU/ML
WBC # BLD AUTO: 9.9 10E3/UL (ref 4–11)

## 2021-09-05 PROCEDURE — 250N000013 HC RX MED GY IP 250 OP 250 PS 637: Performed by: PHYSICIAN ASSISTANT

## 2021-09-05 PROCEDURE — 36415 COLL VENOUS BLD VENIPUNCTURE: CPT | Performed by: PHYSICIAN ASSISTANT

## 2021-09-05 PROCEDURE — 250N000011 HC RX IP 250 OP 636: Performed by: STUDENT IN AN ORGANIZED HEALTH CARE EDUCATION/TRAINING PROGRAM

## 2021-09-05 PROCEDURE — 85027 COMPLETE CBC AUTOMATED: CPT | Performed by: PHYSICIAN ASSISTANT

## 2021-09-05 PROCEDURE — 250N000013 HC RX MED GY IP 250 OP 250 PS 637: Performed by: INTERNAL MEDICINE

## 2021-09-05 PROCEDURE — 99232 SBSQ HOSP IP/OBS MODERATE 35: CPT | Performed by: INTERNAL MEDICINE

## 2021-09-05 PROCEDURE — 210N000002 HC R&B HEART CARE

## 2021-09-05 PROCEDURE — 85520 HEPARIN ASSAY: CPT | Performed by: INTERNAL MEDICINE

## 2021-09-05 PROCEDURE — 36415 COLL VENOUS BLD VENIPUNCTURE: CPT | Performed by: INTERNAL MEDICINE

## 2021-09-05 PROCEDURE — 250N000012 HC RX MED GY IP 250 OP 636 PS 637: Performed by: INTERNAL MEDICINE

## 2021-09-05 PROCEDURE — 250N000011 HC RX IP 250 OP 636: Performed by: PHYSICIAN ASSISTANT

## 2021-09-05 PROCEDURE — 999N000157 HC STATISTIC RCP TIME EA 10 MIN

## 2021-09-05 PROCEDURE — 80048 BASIC METABOLIC PNL TOTAL CA: CPT | Performed by: INTERNAL MEDICINE

## 2021-09-05 RX ADMIN — LOSARTAN POTASSIUM 25 MG: 25 TABLET, FILM COATED ORAL at 20:06

## 2021-09-05 RX ADMIN — ASPIRIN 81 MG: 81 TABLET, COATED ORAL at 08:26

## 2021-09-05 RX ADMIN — NITROGLYCERIN 1 PATCH: 0.4 PATCH TRANSDERMAL at 20:02

## 2021-09-05 RX ADMIN — ROSUVASTATIN CALCIUM 10 MG: 10 TABLET, FILM COATED ORAL at 20:06

## 2021-09-05 RX ADMIN — LOSARTAN POTASSIUM 25 MG: 25 TABLET, FILM COATED ORAL at 08:27

## 2021-09-05 RX ADMIN — AMLODIPINE BESYLATE 5 MG: 5 TABLET ORAL at 08:27

## 2021-09-05 RX ADMIN — ONDANSETRON 4 MG: 4 TABLET, ORALLY DISINTEGRATING ORAL at 02:29

## 2021-09-05 RX ADMIN — METOPROLOL TARTRATE 50 MG: 50 TABLET, FILM COATED ORAL at 08:26

## 2021-09-05 RX ADMIN — INSULIN GLARGINE 30 UNITS: 100 INJECTION, SOLUTION SUBCUTANEOUS at 22:02

## 2021-09-05 RX ADMIN — HEPARIN SODIUM 1300 UNITS/HR: 10000 INJECTION, SOLUTION INTRAVENOUS at 18:58

## 2021-09-05 RX ADMIN — METOPROLOL TARTRATE 50 MG: 50 TABLET, FILM COATED ORAL at 20:06

## 2021-09-05 RX ADMIN — ALLOPURINOL 100 MG: 100 TABLET ORAL at 08:27

## 2021-09-05 ASSESSMENT — ACTIVITIES OF DAILY LIVING (ADL)
ADLS_ACUITY_SCORE: 14

## 2021-09-05 NOTE — PLAN OF CARE
VSS. Monitor remains Sinus rhythm. Heparin drip continues at 1300 unit(s)/hr. Pt. Complained of mild midsternal chest tightness after eating dinner. Requested NTG patch. Relief of chest tightness obtained. Continue to monitor. Plan for CABG on Wednesday.

## 2021-09-05 NOTE — PROGRESS NOTES
CV Surgery    Chart reviewed. CP free on heparin gtt. Pre-op orders in place. Pre-op teaching tomorrow am at 0700.    Surgery Wednesday with Dr. Mccormick.    Gracie Miller PA-C

## 2021-09-05 NOTE — PLAN OF CARE
A&O x4. Pt denied pain. VSS, on RA. Up w/ IND. Tele: SR. BG elevated, sliding scale and carb count given. Hep recheck in am. Plan for addition of lantus tomorrow w/ breakfast. CABG on 9/8. Continue to monitor.

## 2021-09-05 NOTE — PROGRESS NOTES
[facility]    Medicine Progress Note - Hospitalist Service       Date of Admission:  9/2/2021    Assessment & Plan           Past medical history significant for Past medical history significant for CAD s/p stenting, HTN, HLP, DM2, Gout, and back pain who was directly admitted to [facility] NSTEMI.       Patient presented to [facility] ED with a 1 week history of left sided chest pain.  He described it as burning and pressure like discomfort that is provoked after eating.  At first it was intermittent but has later became more constant.         NSTEMI  CAD s/p stenting ([id number] in [place]); severe 3 vessel disease (new)  Ischemic cardiomyopathy  HTN  HLP  *Troponin: 2.15-->10.3-->12.451-->12.292.    *ECHO: LV systolic function mild-moderately reduced (EF 40-45%) with apical, distal septal wall severe hypokinesia, mild inferior wall hypokinesia.  RV systolic function normal.  Mild MR.  Ascending aorta mildly dilated.   - Cardiology following, appreciate input               --Coronary angiogram 9/2/2021 with severe 3-vessel disease.  - CV surgery consult appreciated:               --Plan for CABG on 9/8.                   --Preoperative work-up through CV surgery  -Continue Heparin infusion.    -Continue to monitor telemetry.    -Lipid panel with total cholesterol 129 HDL 50 and LDL 46  - Resumed on ASA 81 mg/d, rosuvastatin, continue losartan 25 mg twice daily, metoprolol 50 mg twice daily with holding parameters.    -Continue PTA amlodipine with holding parameters  -Blood pressure over the last 24 hours stable  - Nitroglycerin patch as needed.    -Continue daily weight, strict I's and O's     Uncontrolled DM2  Hyperglycemia  A1c of 9.6% from 8/2021.  Rechecked A1c 9/2 and at 10.2%.  - Hold PTA metformin 1000 mg BID and Glipizide 10 mg/d.    -PTA on Lantus 30 units at night, currently on Lantus 30 units with Premeal insulin and high-intensity sliding scale insulin  -Blood sugar over  the last 24 hours 196-327  -Will add 10 units of Lantus in the morning it and sent to 30 units at night  -Continue with glucose checks QID and at 2AM.    - Hypoglycemic protocol in place.       Pseudohyponatremia (Resolved)  Corrected sodium level (due to hyperglycemia) is 144.  No interventions.       Epigastric pain/burning  -Pepcid BID.  Improved  - patient does not want to take Pepcid scheduled, so changed to as needed     Gout  -Continue on PTA allopurinol 100 mg/d.       Back pain  - PRN analgesics available.       Dysuria  Patient described burning pain with urination that improves if he drinks more water.  It sounds like it has been present for months.   *UA negative for infection.        History of latent TB  Patient has completed 9 month treatment in 2015.        Diet: Low Saturated Fat Na <2400 mg    DVT Prophylaxis: Continue on heparin drip  Christina Catheter: Not present  Central Lines: None  Code Status: Full Code      Disposition Plan   Expected discharge: Post CABG     The patient's care was discussed with the Bedside Nurse and Patient.    Mckenzie Olivares MD  Hospitalist Service  [facility]  Securely message with the Vocera Web Console (learn more here)  Text page via [email or web address] Paging/Directory      Clinically Significant Risk Factors Present on Admission   ____________________________________________________________________    Interval History   Patient denies any chest pain or shortness of breath or dizziness.  No new complaints.  No new nursing concerns      Data reviewed today: I reviewed all medications, new labs and imaging results over the last 24 hours.    Physical Exam   Vital Signs: Temp: 98.3  F (36.8  C) Temp src: Oral BP: 109/76 Pulse: 62   Resp: 16 SpO2: 93 % O2 Device: None (Room air)    Weight: 192 lbs 3.2 oz  Exam:  Constitutional: Awake, alert and no distress. Appears comfortable  Head: Normocephalic. No masses, lesions, tenderness or abnormalities  ENT: ENT exam  normal, no neck nodes or sinus tenderness  Cardiovascular: RRR.  No murmurs, no rubs or JVD  Respiratory: Normal WOB,b/l equal air entry, no wheezes or crackles   Gastrointestinal: Abdomen soft, non-tender. BS normal. No masses, organomegaly  : Deferred  Extremities : No edema , no clubbing or cyanosis    Neurologic: Cranial nerves II-XII grossly intact , power symmetrical, Reflexes normal and symmetric. Sensation grossly WNL.      Data   Recent Labs   Lab 09/05/21  1217 09/05/21  0847 09/05/21  0538 09/05/21  0207 09/04/21  1111 09/03/21  0818 09/02/21  1803 09/02/21  1408 09/02/21  1000 09/02/21  0959 09/02/21  0952 09/02/21  0134   WBC  --   --  9.9  --  10.8 10.4  --   --   --   --    < > 11.9*   HGB  --   --  11.5*  --  12.7* 12.4*  --   --   --   --    < > 12.7*   MCV  --   --  79  --  79 79  --   --   --   --    < > 78   PLT  --   --  202  --  223 193  --   --   --   --    < > 193   NA  --  132*  --   --   --  137  --   --   --   --   --  135*   POTASSIUM  --  4.3  --   --   --  3.6  --   --   --   --   --  4.2   CHLORIDE  --  100  --   --   --  104  --   --   --   --   --  98   CO2  --  27  --   --   --  28  --   --   --   --   --  25   BUN  --  16  --   --   --  14  --   --   --   --   --  17   CR  --  1.03  --   --   --  0.94  --   --   --   --   --  1.26   ANIONGAP  --  5  --   --   --  5  --   --   --   --   --  12   TORRES  --  8.9  --   --   --  9.2  --   --   --   --   --  9.8   * 375*  --  196*  --  159*  163*  --   --    < >  --   --  485*   ALBUMIN  --   --   --   --   --   --   --   --   --   --   --  3.6   PROTTOTAL  --   --   --   --   --   --   --   --   --   --   --  7.8   BILITOTAL  --   --   --   --   --   --   --   --   --   --   --  0.4   ALKPHOS  --   --   --   --   --   --   --   --   --   --   --  118   ALT  --   --   --   --   --   --   --   --   --   --   --  23   AST  --   --   --   --   --   --   --   --   --   --   --  29   LIPASE  --   --   --   --   --   --   --   --   --    --   --  159*   TROPONIN  --   --   --   --   --   --  12.292* 12.451*  --  10.300*  --   --     < > = values in this interval not displayed.     No results found for this or any previous visit (from the past 24 hour(s)).  Medications     heparin 1,300 Units/hr (09/04/21 2222)     - MEDICATION INSTRUCTIONS -       - MEDICATION INSTRUCTIONS -         allopurinol  100 mg Oral Daily     amLODIPine  5 mg Oral Daily     aspirin  81 mg Oral Daily     insulin aspart   Subcutaneous TID w/meals     insulin aspart  1-10 Units Subcutaneous TID AC     insulin aspart  1-7 Units Subcutaneous At Bedtime     insulin glargine  30 Units Subcutaneous At Bedtime     losartan  25 mg Oral BID     metoprolol tartrate  50 mg Oral BID     nitroGLYcerin  1 patch Transdermal Daily     nitroGLYcerin   Transdermal Q8H     rosuvastatin  10 mg Oral Daily     sodium chloride (PF)  10 mL Intravenous Once     sodium chloride (PF)  3 mL Intracatheter Q8H

## 2021-09-05 NOTE — PROGRESS NOTES
[facility]  Cardiology Progress Note  Date of Service: 09/05/2021  Primary Cardiologist: Dr. Edgar Jarrett [facility]    Assessment & Plan    Jerry Bustamante is a 51 year old male with past medical history significant for CAD s/p stenting, HTN, HLP, DM2, Gout, and back pain who was directly admitted to [facility] that began 1 wk prior with initial neg w/u at ongoing pain and found to have NSTEMI here.      Assessment:  1.  NSTEMI, trop peak 12.4, echo pending, previously EF was normal- but LV gram appears to be 30-40%     - angiogram 9/2 shows 85% prox RCA, 85% OM2, 70% D3, 80% ostial to mid LAD, 80% distal circ   - on heparin gtt, some pain 9/2, nitro patch placed none overnight or this a.m.    2.  Hypertension, well controlled.  Some meds held due to parameters.     3.  Hyperlipidemia,on Crestor 10 with HDL 50, LDL 46, total cholesterol 129.    4.  Type 2 diabetes.  Would be a good candidate for SGLT2 inhibitors.     Plan:   1. Echo with LVEF 40-45%, mild MR, no changes in plan today   2. Decreased losartan to 25 mg twice daily.  3. Continue Nitropatch,  4. Continue heparin drip.  5. CABG planned, appreciate CV surgery.    Interval History   Brief period of chest pain overnight that resolved spontaneously.  Otherwise feeling well this morning.     Clinically Significant Risk Factors Present on Admission      DMII, HTN, HLD gout       Physical Exam   Temp: 98.3  F (36.8  C) Temp src: Oral BP: 109/76 Pulse: 62   Resp: 16 SpO2: 93 % O2 Device: None (Room air)    Vitals:    09/03/21 0426 09/04/21 0537 09/05/21 0259   Weight: 87.6 kg (193 lb 2 oz) 86.1 kg (189 lb 13.1 oz) 87.2 kg (192 lb 3.2 oz)     Well-developed well-nourished gentleman in no acute distress.    Normocephalic atraumatic.    Heart is regular in rate and rhythm, I do not appreciate murmur rub or gallop.    Lungs are clear without wheezes rales or rhonchi.    Extremities without peripheral edema.  I do not appreciate  JVP at 30 degrees.    Medications     heparin 1,300 Units/hr (09/04/21 8227)     - MEDICATION INSTRUCTIONS -       - MEDICATION INSTRUCTIONS -         allopurinol  100 mg Oral Daily     amLODIPine  5 mg Oral Daily     aspirin  81 mg Oral Daily     insulin aspart   Subcutaneous TID w/meals     insulin aspart  1-10 Units Subcutaneous TID AC     insulin aspart  1-7 Units Subcutaneous At Bedtime     insulin glargine  30 Units Subcutaneous At Bedtime     losartan  25 mg Oral BID     metoprolol tartrate  50 mg Oral BID     nitroGLYcerin  1 patch Transdermal Daily     nitroGLYcerin   Transdermal Q8H     rosuvastatin  10 mg Oral Daily     sodium chloride (PF)  10 mL Intravenous Once     sodium chloride (PF)  3 mL Intracatheter Q8H       Data   Last 24 hours labs reviewed     Tele: Sinus.

## 2021-09-05 NOTE — PROGRESS NOTES
RESPIRATORY IS INSTRUCT    PT received instructions for the IS.   Patient reached 2000 ml on the IS.   They had proper technique and showed no adverse signs or symptoms.  RT will continue to monitor and follow.         9/5/2021  Brooklynn Wilhelm, RT

## 2021-09-05 NOTE — PLAN OF CARE
Neuro: A&Ox4  Cardiac: Tele reads SR. No complaints of chest pain overnight  Respiratory: Pt on RA. Lungs clear and equal  Activity: Up independently  GI/: Bowel sounds audible and active. Experiencing acid reflux pain, repositioning and some pain meds given  Musculoskeletal: Strong in all extremities  Skin: CDI, scattered bruising  Pain: Complained of abdominal pain overnight  Drips: Heparin at 1300. Next Xa recheck 9/6 AM  Drains/Tubes: PIV x1  Other/Plan: CABG on Wednesday     Male

## 2021-09-06 LAB
GLUCOSE BLDC GLUCOMTR-MCNC: 200 MG/DL (ref 70–99)
GLUCOSE BLDC GLUCOMTR-MCNC: 249 MG/DL (ref 70–99)
GLUCOSE BLDC GLUCOMTR-MCNC: 326 MG/DL (ref 70–99)
GLUCOSE BLDC GLUCOMTR-MCNC: 330 MG/DL (ref 70–99)
UFH PPP CHRO-ACNC: 0.58 IU/ML
UFH PPP CHRO-ACNC: <0.1 IU/ML
UFH PPP CHRO-ACNC: <0.1 IU/ML

## 2021-09-06 PROCEDURE — 36415 COLL VENOUS BLD VENIPUNCTURE: CPT | Performed by: INTERNAL MEDICINE

## 2021-09-06 PROCEDURE — 250N000011 HC RX IP 250 OP 636: Performed by: STUDENT IN AN ORGANIZED HEALTH CARE EDUCATION/TRAINING PROGRAM

## 2021-09-06 PROCEDURE — 250N000013 HC RX MED GY IP 250 OP 250 PS 637: Performed by: INTERNAL MEDICINE

## 2021-09-06 PROCEDURE — 85520 HEPARIN ASSAY: CPT | Performed by: INTERNAL MEDICINE

## 2021-09-06 PROCEDURE — 210N000002 HC R&B HEART CARE

## 2021-09-06 PROCEDURE — 99232 SBSQ HOSP IP/OBS MODERATE 35: CPT | Performed by: INTERNAL MEDICINE

## 2021-09-06 PROCEDURE — 250N000011 HC RX IP 250 OP 636: Performed by: PHYSICIAN ASSISTANT

## 2021-09-06 PROCEDURE — 250N000013 HC RX MED GY IP 250 OP 250 PS 637: Performed by: PHYSICIAN ASSISTANT

## 2021-09-06 PROCEDURE — 250N000012 HC RX MED GY IP 250 OP 636 PS 637: Performed by: INTERNAL MEDICINE

## 2021-09-06 RX ORDER — NITROGLYCERIN 20 MG/100ML
10-200 INJECTION INTRAVENOUS CONTINUOUS
Status: DISCONTINUED | OUTPATIENT
Start: 2021-09-06 | End: 2021-09-06

## 2021-09-06 RX ORDER — MORPHINE SULFATE 2 MG/ML
1 INJECTION, SOLUTION INTRAMUSCULAR; INTRAVENOUS
Status: DISCONTINUED | OUTPATIENT
Start: 2021-09-06 | End: 2021-09-08

## 2021-09-06 RX ADMIN — ASPIRIN 81 MG: 81 TABLET, COATED ORAL at 08:16

## 2021-09-06 RX ADMIN — LOSARTAN POTASSIUM 25 MG: 25 TABLET, FILM COATED ORAL at 08:16

## 2021-09-06 RX ADMIN — NITROGLYCERIN 0.4 MG: 0.4 TABLET SUBLINGUAL at 08:16

## 2021-09-06 RX ADMIN — MORPHINE SULFATE 1 MG: 2 INJECTION, SOLUTION INTRAMUSCULAR; INTRAVENOUS at 09:22

## 2021-09-06 RX ADMIN — LOSARTAN POTASSIUM 25 MG: 25 TABLET, FILM COATED ORAL at 20:01

## 2021-09-06 RX ADMIN — INSULIN GLARGINE 40 UNITS: 100 INJECTION, SOLUTION SUBCUTANEOUS at 21:45

## 2021-09-06 RX ADMIN — METOPROLOL TARTRATE 50 MG: 50 TABLET, FILM COATED ORAL at 20:01

## 2021-09-06 RX ADMIN — ROSUVASTATIN CALCIUM 10 MG: 10 TABLET, FILM COATED ORAL at 20:01

## 2021-09-06 RX ADMIN — METOPROLOL TARTRATE 50 MG: 50 TABLET, FILM COATED ORAL at 08:16

## 2021-09-06 RX ADMIN — INSULIN GLARGINE 10 UNITS: 100 INJECTION, SOLUTION SUBCUTANEOUS at 08:16

## 2021-09-06 RX ADMIN — ALLOPURINOL 100 MG: 100 TABLET ORAL at 08:16

## 2021-09-06 RX ADMIN — AMLODIPINE BESYLATE 5 MG: 5 TABLET ORAL at 08:16

## 2021-09-06 RX ADMIN — HEPARIN SODIUM 1600 UNITS/HR: 10000 INJECTION, SOLUTION INTRAVENOUS at 09:20

## 2021-09-06 RX ADMIN — NITROGLYCERIN 1 PATCH: 0.4 PATCH TRANSDERMAL at 20:02

## 2021-09-06 ASSESSMENT — ACTIVITIES OF DAILY LIVING (ADL)
ADLS_ACUITY_SCORE: 14

## 2021-09-06 NOTE — PLAN OF CARE
A&O x4. Pt reported cp this morning SL ngt given w short term response, IV morphine given w/ complete relief. Mild discomfort w/ excertion while walking in [place] but subsides with rest. VSS, on RA. Up w/ IND. Tele: SR. BG elevated, long acting, sliding scale and carb count given. Hep @ 19 w/ recheck @3389. Plan for CABG on 9/8. Continue to monitor.

## 2021-09-06 NOTE — PROGRESS NOTES
[facility]    Medicine Progress Note - Hospitalist Service       Date of Admission:  9/2/2021    Assessment & Plan           Past medical history significant for Past medical history significant for CAD s/p stenting, HTN, HLP, DM2, Gout, and back pain who was directly admitted to [facility] NSTEMI.       Patient presented to [facility] ED with a 1 week history of left sided chest pain.  He described it as burning and pressure like discomfort that is provoked after eating.  At first it was intermittent but has later became more constant.         NSTEMI  CAD s/p stenting ([id number] in [place]); severe 3 vessel disease (new)  Ischemic cardiomyopathy  HTN  HLP  *Troponin: 2.15-->10.3-->12.451-->12.292.    *ECHO: LV systolic function mild-moderately reduced (EF 40-45%) with apical, distal septal wall severe hypokinesia, mild inferior wall hypokinesia.  RV systolic function normal.  Mild MR.  Ascending aorta mildly dilated.   - Cardiology following, appreciate input               --Coronary angiogram 9/2/2021 with severe 3-vessel disease.  - CV surgery consult appreciated:               --Plan for CABG on 9/8 or earlier if needed.                   --Preoperative work-up through CV surgery  -Continue Heparin infusion.    -Continue to monitor telemetry.    -Lipid panel with total cholesterol 129 HDL 50 and LDL 46  - Resumed on ASA 81 mg/d, rosuvastatin, continue losartan 25 mg twice daily, metoprolol 50 mg twice daily with holding parameters.    -Continue PTA amlodipine with holding parameters  -Blood pressure over the last 24 hours stable  - Nitroglycerin patch in place, drip as needed as per CV surgery  -Continue daily weight, strict I's and O's     Uncontrolled DM2  Hyperglycemia  A1c of 9.6% from 8/2021.  Rechecked A1c 9/2 and at 10.2%.  - Hold PTA metformin 1000 mg BID and Glipizide 10 mg/d.    -PTA on Lantus 30 units at night, currently on Lantus 30 units at bedtime, 10 units in the  morning with Premeal insulin and high-intensity sliding scale insulin  -Blood sugar over the last 24 hours 193-375  -Will increase Lantus to 40 units at night  -Continue with glucose checks QID and at 2AM.    - Hypoglycemic protocol in place.       Pseudohyponatremia (Resolved)  Corrected sodium level (due to hyperglycemia) is 144.  No interventions.       Epigastric pain/burning  -Pepcid BID.  Improved  - patient does not want to take Pepcid scheduled, so changed to as needed     Gout  -Continue on PTA allopurinol 100 mg/d.       Back pain  - PRN analgesics available.       Dysuria  Patient described burning pain with urination that improves if he drinks more water.  It sounds like it has been present for months.   *UA negative for infection.        History of latent TB  Patient has completed 9 month treatment in 2015.        Diet: Low Saturated Fat Na <2400 mg    DVT Prophylaxis: Continue on heparin drip  Christina Catheter: Not present  Central Lines: None  Code Status: Full Code      Disposition Plan   Expected discharge: Post CABG     The patient's care was discussed with the Bedside Nurse and Patient.    Mckenzie Olivares MD  Hospitalist Service  [facility]  Securely message with the Vocera Web Console (learn more here)  Text page via [employer] Paging/Directory      Clinically Significant Risk Factors Present on Admission   ____________________________________________________________________    Interval History   Patient had some chest pain earlier this morning responding to nitro.  No other nursing concerns.      Data reviewed today: I reviewed all medications, new labs and imaging results over the last 24 hours.    Physical Exam   Vital Signs: Temp: 99.3  F (37.4  C) Temp src: Oral BP: 102/70 Pulse: 67   Resp: 18 SpO2: 96 % O2 Device: None (Room air)    Weight: 190 lbs 3.2 oz  Exam:  Constitutional: Awake, alert and no distress. Appears comfortable  Head: Normocephalic. No masses, lesions,  tenderness or abnormalities  ENT: ENT exam normal, no neck nodes or sinus tenderness  Cardiovascular: RRR.  No murmurs, no rubs or JVD  Respiratory: Normal WOB,b/l equal air entry, no wheezes or crackles   Gastrointestinal: Abdomen soft, non-tender. BS normal. No masses, organomegaly  : Deferred  Extremities : No edema , no clubbing or cyanosis    Neurologic: Cranial nerves II-XII grossly intact , power symmetrical, Reflexes normal and symmetric. Sensation grossly WNL.      Data   Recent Labs   Lab 09/06/21  1222 09/06/21  0738 09/05/21  2100 09/05/21  0847 09/05/21  0538 09/04/21  1111 09/03/21  0818 09/03/21  0818 09/02/21  1803 09/02/21  1408 09/02/21  1000 09/02/21  0959 09/02/21  0952 09/02/21  0134   WBC  --   --   --   --  9.9 10.8  --  10.4  --   --   --   --    < > 11.9*   HGB  --   --   --   --  11.5* 12.7*  --  12.4*  --   --   --   --    < > 12.7*   MCV  --   --   --   --  79 79  --  79  --   --   --   --    < > 78   PLT  --   --   --   --  202 223  --  193  --   --   --   --    < > 193   NA  --   --   --  132*  --   --   --  137  --   --   --   --   --  135*   POTASSIUM  --   --   --  4.3  --   --   --  3.6  --   --   --   --   --  4.2   CHLORIDE  --   --   --  100  --   --   --  104  --   --   --   --   --  98   CO2  --   --   --  27  --   --   --  28  --   --   --   --   --  25   BUN  --   --   --  16  --   --   --  14  --   --   --   --   --  17   CR  --   --   --  1.03  --   --   --  0.94  --   --   --   --   --  1.26   ANIONGAP  --   --   --  5  --   --   --  5  --   --   --   --   --  12   TORRES  --   --   --  8.9  --   --   --  9.2  --   --   --   --   --  9.8   * 200* 206* 375*  --   --    < > 159*  163*  --   --    < >  --   --  485*   ALBUMIN  --   --   --   --   --   --   --   --   --   --   --   --   --  3.6   PROTTOTAL  --   --   --   --   --   --   --   --   --   --   --   --   --  7.8   BILITOTAL  --   --   --   --   --   --   --   --   --   --   --   --   --  0.4   ALKPHOS  --    --   --   --   --   --   --   --   --   --   --   --   --  118   ALT  --   --   --   --   --   --   --   --   --   --   --   --   --  23   AST  --   --   --   --   --   --   --   --   --   --   --   --   --  29   LIPASE  --   --   --   --   --   --   --   --   --   --   --   --   --  159*   TROPONIN  --   --   --   --   --   --   --   --  12.292* 12.451*  --  10.300*  --   --     < > = values in this interval not displayed.     No results found for this or any previous visit (from the past 24 hour(s)).  Medications     heparin 1,600 Units/hr (09/06/21 0920)     - MEDICATION INSTRUCTIONS -       nitroGLYcerin 50 mg in D5W 250 mL Stopped (09/06/21 1055)     - MEDICATION INSTRUCTIONS -         allopurinol  100 mg Oral Daily     amLODIPine  5 mg Oral Daily     aspirin  81 mg Oral Daily     insulin aspart   Subcutaneous TID w/meals     insulin aspart  1-10 Units Subcutaneous TID AC     insulin aspart  1-7 Units Subcutaneous At Bedtime     insulin glargine  10 Units Subcutaneous QAM AC     insulin glargine  30 Units Subcutaneous At Bedtime     losartan  25 mg Oral BID     metoprolol tartrate  50 mg Oral BID     nitroGLYcerin  1 patch Transdermal Daily     nitroGLYcerin   Transdermal Q8H     rosuvastatin  10 mg Oral Daily     sodium chloride (PF)  10 mL Intravenous Once     sodium chloride (PF)  3 mL Intracatheter Q8H     [facility]    Medicine Progress Note - Hospitalist Service       Date of Admission:  9/2/2021    Assessment & Plan           Past medical history significant for Past medical history significant for CAD s/p stenting, HTN, HLP, DM2, Gout, and back pain who was directly admitted to [facility].       Patient presented to [facility] ED with a 1 week history of left sided chest pain.  He described it as burning and pressure like discomfort that is provoked after eating.  At first it was intermittent but has later became more constant.         NSTEMI  CAD s/p stenting  ([id number] in [place]); severe 3 vessel disease (new)  Ischemic cardiomyopathy  HTN  HLP  *Troponin: 2.15-->10.3-->12.451-->12.292.    *ECHO: LV systolic function mild-moderately reduced (EF 40-45%) with apical, distal septal wall severe hypokinesia, mild inferior wall hypokinesia.  RV systolic function normal.  Mild MR.  Ascending aorta mildly dilated.   - Cardiology following, appreciate input               --Coronary angiogram 9/2/2021 with severe 3-vessel disease.  - CV surgery consult appreciated:               --Plan for CABG on 9/8.                   --Preoperative work-up through CV surgery  -Continue Heparin infusion.    -Continue to monitor telemetry.    -Lipid panel with total cholesterol 129 HDL 50 and LDL 46  - Resumed on ASA 81 mg/d, rosuvastatin, continue losartan 25 mg twice daily, metoprolol 50 mg twice daily with holding parameters.    -Continue PTA amlodipine with holding parameters  -Blood pressure over the last 24 hours stable  - Nitroglycerin patch as needed.    -Continue daily weight, strict I's and O's     Uncontrolled DM2  Hyperglycemia  A1c of 9.6% from 8/2021.  Rechecked A1c 9/2 and at 10.2%.  - Hold PTA metformin 1000 mg BID and Glipizide 10 mg/d.    -PTA on Lantus 30 units at night, currently on Lantus 30 units with Premeal insulin and high-intensity sliding scale insulin  -Blood sugar over the last 24 hours 196-327  -Will add 10 units of Lantus in the morning it and sent to 30 units at night  -Continue with glucose checks QID and at 2AM.    - Hypoglycemic protocol in place.       Pseudohyponatremia (Resolved)  Corrected sodium level (due to hyperglycemia) is 144.  No interventions.       Epigastric pain/burning  -Pepcid BID.  Improved  - patient does not want to take Pepcid scheduled, so changed to as needed     Gout  -Continue on PTA allopurinol 100 mg/d.       Back pain  - PRN analgesics available.       Dysuria  Patient described burning pain with urination that improves if he  drinks more water.  It sounds like it has been present for months.   *UA negative for infection.        History of latent TB  Patient has completed 9 month treatment in 2015.        Diet: Low Saturated Fat Na <2400 mg    DVT Prophylaxis: Continue on heparin drip  Christina Catheter: Not present  Central Lines: None  Code Status: Full Code      Disposition Plan   Expected discharge: Post CABG     The patient's care was discussed with the Bedside Nurse and Patient.    Mckenzie Olivares MD  Hospitalist Service  [facility]  Securely message with the Vocera Web Console (learn more here)  Text page via [employer] Paging/Directory      Clinically Significant Risk Factors Present on Admission   ____________________________________________________________________    Interval History   Patient had episode of chest pain earlier this morning and needed nitro.  CV surgery aware and started patient on nitro drip.  No other nursing concerns.  Anticipating surgery on Wednesday.      Data reviewed today: I reviewed all medications, new labs and imaging results over the last 24 hours.    Physical Exam   Vital Signs: Temp: 99.3  F (37.4  C) Temp src: Oral BP: 102/70 Pulse: 67   Resp: 18 SpO2: 96 % O2 Device: None (Room air)    Weight: 190 lbs 3.2 oz  Exam:  Constitutional: Awake, alert and no distress. Appears comfortable  Head: Normocephalic. No masses, lesions, tenderness or abnormalities  ENT: ENT exam normal, no neck nodes or sinus tenderness  Cardiovascular: RRR.  No murmurs, no rubs or JVD  Respiratory: Normal WOB,b/l equal air entry, no wheezes or crackles   Gastrointestinal: Abdomen soft, non-tender. BS normal. No masses, organomegaly  : Deferred  Extremities : No edema , no clubbing or cyanosis    Neurologic: Cranial nerves II-XII grossly intact , power symmetrical, Reflexes normal and symmetric. Sensation grossly WNL.      Data   Recent Labs   Lab 09/06/21  1222 09/06/21  0738 09/05/21  2100 09/05/21  0847  09/05/21  0538 09/04/21  1111 09/03/21  0818 09/03/21  0818 09/02/21  1803 09/02/21  1408 09/02/21  1000 09/02/21  0959 09/02/21  0952 09/02/21  0134   WBC  --   --   --   --  9.9 10.8  --  10.4  --   --   --   --    < > 11.9*   HGB  --   --   --   --  11.5* 12.7*  --  12.4*  --   --   --   --    < > 12.7*   MCV  --   --   --   --  79 79  --  79  --   --   --   --    < > 78   PLT  --   --   --   --  202 223  --  193  --   --   --   --    < > 193   NA  --   --   --  132*  --   --   --  137  --   --   --   --   --  135*   POTASSIUM  --   --   --  4.3  --   --   --  3.6  --   --   --   --   --  4.2   CHLORIDE  --   --   --  100  --   --   --  104  --   --   --   --   --  98   CO2  --   --   --  27  --   --   --  28  --   --   --   --   --  25   BUN  --   --   --  16  --   --   --  14  --   --   --   --   --  17   CR  --   --   --  1.03  --   --   --  0.94  --   --   --   --   --  1.26   ANIONGAP  --   --   --  5  --   --   --  5  --   --   --   --   --  12   TORRES  --   --   --  8.9  --   --   --  9.2  --   --   --   --   --  9.8   * 200* 206* 375*  --   --    < > 159*  163*  --   --    < >  --   --  485*   ALBUMIN  --   --   --   --   --   --   --   --   --   --   --   --   --  3.6   PROTTOTAL  --   --   --   --   --   --   --   --   --   --   --   --   --  7.8   BILITOTAL  --   --   --   --   --   --   --   --   --   --   --   --   --  0.4   ALKPHOS  --   --   --   --   --   --   --   --   --   --   --   --   --  118   ALT  --   --   --   --   --   --   --   --   --   --   --   --   --  23   AST  --   --   --   --   --   --   --   --   --   --   --   --   --  29   LIPASE  --   --   --   --   --   --   --   --   --   --   --   --   --  159*   TROPONIN  --   --   --   --   --   --   --   --  12.292* 12.451*  --  10.300*  --   --     < > = values in this interval not displayed.     No results found for this or any previous visit (from the past 24 hour(s)).  Medications     heparin 1,600 Units/hr (09/06/21 0920)      - MEDICATION INSTRUCTIONS -       nitroGLYcerin 50 mg in D5W 250 mL Stopped (09/06/21 7985)     - MEDICATION INSTRUCTIONS -         allopurinol  100 mg Oral Daily     amLODIPine  5 mg Oral Daily     aspirin  81 mg Oral Daily     insulin aspart   Subcutaneous TID w/meals     insulin aspart  1-10 Units Subcutaneous TID AC     insulin aspart  1-7 Units Subcutaneous At Bedtime     insulin glargine  10 Units Subcutaneous QAM AC     insulin glargine  30 Units Subcutaneous At Bedtime     losartan  25 mg Oral BID     metoprolol tartrate  50 mg Oral BID     nitroGLYcerin  1 patch Transdermal Daily     nitroGLYcerin   Transdermal Q8H     rosuvastatin  10 mg Oral Daily     sodium chloride (PF)  10 mL Intravenous Once     sodium chloride (PF)  3 mL Intracatheter Q8H

## 2021-09-06 NOTE — PLAN OF CARE
VSS on RA. Mild cp with over exertion, activity minimized with relief. New nitroglycerine patch. Refused SL nitroglycerine admin- pt states it is not severe enough. Received sliding scale coverage for BG. Ind in room. Tele SR. Hep gtt at 1300. Start lantus this morning. Awaiting cabg on 9/8/21

## 2021-09-06 NOTE — PROGRESS NOTES
CV Surgery    Patient seen and examined. Having CP, received SL nitroglycerin and nitroglycerin patch. Rates pain at 5, morphine available along with nitroglycerin gtt. Heparin gtt infusing currently.     Will continue to closely monitor. If CP continues to escalate after nitroglycerin gtt consider IABP.     Dr. Mccullough notified. Surgery planned for Wednesday with Dr. Mccullough at 0720 or sooner if needed.     Gracie Miller PA-C

## 2021-09-07 ENCOUNTER — ANESTHESIA EVENT (OUTPATIENT)
Dept: SURGERY | Facility: CLINIC | Age: 51
End: 2021-09-07
Payer: COMMERCIAL

## 2021-09-07 LAB
ABO/RH(D): NORMAL
ANTIBODY SCREEN: NEGATIVE
GLUCOSE BLDC GLUCOMTR-MCNC: 196 MG/DL (ref 70–99)
GLUCOSE BLDC GLUCOMTR-MCNC: 206 MG/DL (ref 70–99)
GLUCOSE BLDC GLUCOMTR-MCNC: 256 MG/DL (ref 70–99)
GLUCOSE BLDC GLUCOMTR-MCNC: 304 MG/DL (ref 70–99)
GLUCOSE BLDC GLUCOMTR-MCNC: 339 MG/DL (ref 70–99)
SPECIMEN EXPIRATION DATE: NORMAL
UFH PPP CHRO-ACNC: 0.36 IU/ML
UFH PPP CHRO-ACNC: 0.58 IU/ML

## 2021-09-07 PROCEDURE — 250N000013 HC RX MED GY IP 250 OP 250 PS 637: Performed by: PHYSICIAN ASSISTANT

## 2021-09-07 PROCEDURE — 250N000011 HC RX IP 250 OP 636: Performed by: STUDENT IN AN ORGANIZED HEALTH CARE EDUCATION/TRAINING PROGRAM

## 2021-09-07 PROCEDURE — 250N000013 HC RX MED GY IP 250 OP 250 PS 637: Performed by: INTERNAL MEDICINE

## 2021-09-07 PROCEDURE — 210N000002 HC R&B HEART CARE

## 2021-09-07 PROCEDURE — 99232 SBSQ HOSP IP/OBS MODERATE 35: CPT | Performed by: INTERNAL MEDICINE

## 2021-09-07 PROCEDURE — 85520 HEPARIN ASSAY: CPT | Performed by: INTERNAL MEDICINE

## 2021-09-07 PROCEDURE — 36415 COLL VENOUS BLD VENIPUNCTURE: CPT | Performed by: INTERNAL MEDICINE

## 2021-09-07 PROCEDURE — 250N000012 HC RX MED GY IP 250 OP 636 PS 637: Performed by: INTERNAL MEDICINE

## 2021-09-07 PROCEDURE — 36415 COLL VENOUS BLD VENIPUNCTURE: CPT | Performed by: SURGERY

## 2021-09-07 PROCEDURE — 86900 BLOOD TYPING SEROLOGIC ABO: CPT | Performed by: SURGERY

## 2021-09-07 PROCEDURE — 250N000011 HC RX IP 250 OP 636: Performed by: PHYSICIAN ASSISTANT

## 2021-09-07 RX ADMIN — INSULIN GLARGINE 20 UNITS: 100 INJECTION, SOLUTION SUBCUTANEOUS at 22:35

## 2021-09-07 RX ADMIN — LOSARTAN POTASSIUM 25 MG: 25 TABLET, FILM COATED ORAL at 22:29

## 2021-09-07 RX ADMIN — HEPARIN SODIUM 1500 UNITS/HR: 10000 INJECTION, SOLUTION INTRAVENOUS at 22:26

## 2021-09-07 RX ADMIN — NITROGLYCERIN 1 PATCH: 0.4 PATCH TRANSDERMAL at 22:29

## 2021-09-07 RX ADMIN — LOSARTAN POTASSIUM 25 MG: 25 TABLET, FILM COATED ORAL at 09:52

## 2021-09-07 RX ADMIN — METOPROLOL TARTRATE 50 MG: 50 TABLET, FILM COATED ORAL at 09:52

## 2021-09-07 RX ADMIN — ALLOPURINOL 100 MG: 100 TABLET ORAL at 09:52

## 2021-09-07 RX ADMIN — HEPARIN SODIUM 1700 UNITS/HR: 10000 INJECTION, SOLUTION INTRAVENOUS at 02:52

## 2021-09-07 RX ADMIN — MORPHINE SULFATE 1 MG: 2 INJECTION, SOLUTION INTRAMUSCULAR; INTRAVENOUS at 08:22

## 2021-09-07 RX ADMIN — METOPROLOL TARTRATE 50 MG: 50 TABLET, FILM COATED ORAL at 22:29

## 2021-09-07 RX ADMIN — INSULIN GLARGINE 25 UNITS: 100 INJECTION, SOLUTION SUBCUTANEOUS at 09:55

## 2021-09-07 RX ADMIN — ASPIRIN 81 MG: 81 TABLET, COATED ORAL at 09:52

## 2021-09-07 RX ADMIN — ROSUVASTATIN CALCIUM 10 MG: 10 TABLET, FILM COATED ORAL at 22:28

## 2021-09-07 ASSESSMENT — ACTIVITIES OF DAILY LIVING (ADL)
ADLS_ACUITY_SCORE: 14

## 2021-09-07 ASSESSMENT — LIFESTYLE VARIABLES: TOBACCO_USE: 1

## 2021-09-07 NOTE — PROGRESS NOTES
[facility]    Medicine Progress Note - Hospitalist Service       Date of Admission:  9/2/2021    Assessment & Plan           Past medical history significant for Past medical history significant for CAD s/p stenting, HTN, HLP, DM2, Gout, and back pain who was directly admitted to [facility] NSTEMI.       Patient presented to [facility] ED with a 1 week history of left sided chest pain.  He described it as burning and pressure like discomfort that is provoked after eating.  At first it was intermittent but has later became more constant.         NSTEMI  CAD s/p stenting ([id number] in [place]); severe 3 vessel disease (new)  Ischemic cardiomyopathy  HTN  HLP  *Troponin: 2.15-->10.3-->12.451-->12.292.    *ECHO: LV systolic function mild-moderately reduced (EF 40-45%) with apical, distal septal wall severe hypokinesia, mild inferior wall hypokinesia.  RV systolic function normal.  Mild MR.  Ascending aorta mildly dilated.   - Cardiology following, appreciate input               --Coronary angiogram 9/2/2021 with severe 3-vessel disease.  - CV surgery consult appreciated:               --Plan for CABG tomorrow               --Preoperative work-up through CV surgery  -Continue Heparin infusion.    -Continue to monitor telemetry.    -Lipid panel with total cholesterol 129 HDL 50 and LDL 46  - Resumed on ASA 81 mg/d, rosuvastatin, continue losartan 25 mg twice daily, metoprolol 50 mg twice daily with holding parameters.    -Blood pressure soft, so will hold off on PTA amlodipine  - Nitroglycerin patch   -As needed morphine.  If chest pain not controlled order available for nitro drip    -Continue daily weight, strict I's and O's     Uncontrolled DM2  Hyperglycemia  A1c of 9.6% from 8/2021.  Rechecked A1c 9/2 and at 10.2%.  - Hold PTA metformin 1000 mg BID and Glipizide 10 mg/d.    -PTA on Lantus 30 units at night, currently on Lantus 40 units at night and 10 units in the morning with  Premeal insulin and high-intensity sliding scale insulin  -Blood sugar over the last 24 hours 206-339, however per nursing blood sugar checks this morning was postprandial  -Blood sugar still in the high range so will increase morning dose of Lantus to 25 units, patient anticipated to be n.p.o. after midnight for surgery tomorrow so we will decrease evening Lantus from 40-20 to prevent hypoglycemic episodes while n.p.o.  -Continue with glucose checks QID and at 2AM.    - Hypoglycemic protocol in place.       Pseudohyponatremia (Resolved)  Corrected sodium level (due to hyperglycemia) is 144.  No interventions.       Epigastric pain/burning  -Pepcid BID as needed.  Improved    Gout  -Continue on PTA allopurinol 100 mg/d.       Back pain  - PRN analgesics available.       Dysuria  Patient described burning pain with urination that improves if he drinks more water.  It sounds like it has been present for months.   *UA negative for infection.        History of latent TB  Patient has completed 9 month treatment in 2015.        Diet: Low Saturated Fat Na <2400 mg    DVT Prophylaxis: Continue on heparin drip  Christina Catheter: Not present  Central Lines: None  Code Status: Full Code      Disposition Plan   Expected discharge: Post CABG     The patient's care was discussed with the Bedside Nurse and Patient.    Mckenzie Olivares MD  Hospitalist Service  [facility]  Securely message with the Vocera Web Console (learn more here)  Text page via [employer] Paging/Directory      Clinically Significant Risk Factors Present on Admission   ____________________________________________________________________    Interval History   Patient does have intermittent chest pain relieved with morphine and nitroglycerin, otherwise without any new complaints.  No new nursing concerns.  Anticipating surgery for tomorrow.  Data reviewed today: I reviewed all medications, new labs and imaging results over the last 24  hours.    Physical Exam   Vital Signs: Temp: 98.2  F (36.8  C) Temp src: Oral BP: 100/63 Pulse: 70   Resp: 18 SpO2: 93 % O2 Device: None (Room air)    Weight: 190 lbs 3.2 oz  Exam:  Constitutional: Awake, alert and no distress. Appears comfortable  Head: Normocephalic. No masses, lesions, tenderness or abnormalities  ENT: ENT exam normal, no neck nodes or sinus tenderness  Cardiovascular: RRR.  No murmurs, no rubs or JVD  Respiratory: Normal WOB,b/l equal air entry, no wheezes or crackles   Gastrointestinal: Abdomen soft, non-tender. BS normal. No masses, organomegaly  : Deferred  Extremities : No edema , no clubbing or cyanosis    Neurologic: Cranial nerves II-XII grossly intact , power symmetrical, Reflexes normal and symmetric. Sensation grossly WNL.      Data   Recent Labs   Lab 09/07/21  0834 09/07/21  0225 09/06/21  2140 09/05/21  0847 09/05/21  0538 09/04/21  1111 09/03/21  0818 09/03/21  0818 09/02/21  1803 09/02/21  1408 09/02/21  1000 09/02/21  0959 09/02/21  0952 09/02/21  0134   WBC  --   --   --   --  9.9 10.8  --  10.4  --   --   --   --    < > 11.9*   HGB  --   --   --   --  11.5* 12.7*  --  12.4*  --   --   --   --    < > 12.7*   MCV  --   --   --   --  79 79  --  79  --   --   --   --    < > 78   PLT  --   --   --   --  202 223  --  193  --   --   --   --    < > 193   NA  --   --   --  132*  --   --   --  137  --   --   --   --   --  135*   POTASSIUM  --   --   --  4.3  --   --   --  3.6  --   --   --   --   --  4.2   CHLORIDE  --   --   --  100  --   --   --  104  --   --   --   --   --  98   CO2  --   --   --  27  --   --   --  28  --   --   --   --   --  25   BUN  --   --   --  16  --   --   --  14  --   --   --   --   --  17   CR  --   --   --  1.03  --   --   --  0.94  --   --   --   --   --  1.26   ANIONGAP  --   --   --  5  --   --   --  5  --   --   --   --   --  12   TORRES  --   --   --  8.9  --   --   --  9.2  --   --   --   --   --  9.8   * 206* 326* 375*  --   --    < > 159*   163*  --   --    < >  --   --  485*   ALBUMIN  --   --   --   --   --   --   --   --   --   --   --   --   --  3.6   PROTTOTAL  --   --   --   --   --   --   --   --   --   --   --   --   --  7.8   BILITOTAL  --   --   --   --   --   --   --   --   --   --   --   --   --  0.4   ALKPHOS  --   --   --   --   --   --   --   --   --   --   --   --   --  118   ALT  --   --   --   --   --   --   --   --   --   --   --   --   --  23   AST  --   --   --   --   --   --   --   --   --   --   --   --   --  29   LIPASE  --   --   --   --   --   --   --   --   --   --   --   --   --  159*   TROPONIN  --   --   --   --   --   --   --   --  12.292* 12.451*  --  10.300*  --   --     < > = values in this interval not displayed.     No results found for this or any previous visit (from the past 24 hour(s)).  Medications     heparin Stopped (09/07/21 0941)     - MEDICATION INSTRUCTIONS -       nitroGLYcerin 50 mg in D5W 250 mL Stopped (09/06/21 1055)     - MEDICATION INSTRUCTIONS -         allopurinol  100 mg Oral Daily     aspirin  81 mg Oral Daily     insulin aspart   Subcutaneous TID w/meals     insulin aspart  1-10 Units Subcutaneous TID AC     insulin aspart  1-7 Units Subcutaneous At Bedtime     insulin glargine  20 Units Subcutaneous At Bedtime     [Held by provider] insulin glargine  25 Units Subcutaneous QAM AC     losartan  25 mg Oral BID     metoprolol tartrate  50 mg Oral BID     nitroGLYcerin  1 patch Transdermal Daily     nitroGLYcerin   Transdermal Q8H     rosuvastatin  10 mg Oral Daily     sodium chloride (PF)  10 mL Intravenous Once     sodium chloride (PF)  3 mL Intracatheter Q8H

## 2021-09-07 NOTE — ANESTHESIA PREPROCEDURE EVALUATION
Anesthesia Pre-Procedure Evaluation    Patient: Jerry Bustamante   MRN: [id number] : 1970        Preoperative Diagnosis: CAD (coronary artery disease) [I25.10]   Procedure : Procedure(s):  CORONARY ARTERY BYPASS GRAFT (CABG)     Past Medical History:   Diagnosis Date     Coronary artery disease      Diabetes mellitus, type II (H)      Gout      High cholesterol      HLD (hyperlipidemia)      Hypertension       Past Surgical History:   Procedure Laterality Date     ANGIOPLASTY       CV HEART CATHETERIZATION WITH POSSIBLE INTERVENTION N/A 2021    Procedure: Heart Catheterization with Possible Intervention;  Surgeon: Paul Warner MD;  Location:  HEART CARDIAC CATH LAB     CV LEFT VENTRICULOGRAM N/A 2021    Procedure: Left Ventriculogram;  Surgeon: Paul Warner MD;  Location:  HEART CARDIAC CATH LAB      Allergies   Allergen Reactions     Crestor [Rosuvastatin] Nausea     Simvastatin Itching      Social History     Tobacco Use     Smoking status: Former Smoker     Packs/day: 1.50     Years: 13.00     Pack years: 19.50     Types: Cigarettes     Quit date: 2003     Years since quittin.8     Smokeless tobacco: Never Used   Substance Use Topics     Alcohol use: No      Wt Readings from Last 1 Encounters:   21 86.3 kg (190 lb 3.2 oz)        Anesthesia Evaluation            ROS/MED HX  ENT/Pulmonary:     (+) tobacco use, Past use,  (-) sleep apnea   Neurologic:  - neg neurologic ROS     Cardiovascular:     (+) Dyslipidemia hypertension--CAD angina-with excertion. past MI -stent- in [facility]. Drug Eluting Stent. Previous cardiac testing   Echo: Date: 9/3/21 Results:  Interpretation Summary     Left ventricular systolic function is mild to moderately reduced.The visual  ejection fraction is 40-45%.There is apical, distal septal wall severe  hypokinesia, there is mild inferior wall hypokinesia.  The right ventricular systolic function is normal.  There is mild (1+) mitral  regurgitation.  The ascending aorta is mildly dilated.  Stress Test: Date: Results:    ECG Reviewed: Date: Results:    Cath: Date: 9/21 Results:    The ejection fraction is 30-40% by visual estimate.    Left ventricular filling pressures are moderately elevated.    Prox RCA lesion is 85% stenosed.    2nd Mrg lesion is 85% stenosed.    3rd Diag lesion is 70% stenosed.    Ost LAD to Mid LAD lesion is 80% stenosed.    Dist Cx lesion is 80% stenosed. (-) CABG and murmur   METS/Exercise Tolerance:     Hematologic:     (+) anemia,     Musculoskeletal: Comment: Gout  (+) arthritis,     GI/Hepatic:    (-) GERD   Renal/Genitourinary:       Endo:     (+) type II DM, Last HgA1c: 9.6, date: 9/21, Using insulin, - not using insulin pump.     Psychiatric/Substance Use:  - neg psychiatric ROS     Infectious Disease:       Malignancy:    (-) malignancy   Other:            Physical Exam    Airway        Mallampati: II   TM distance: > 3 FB   Neck ROM: full   Mouth opening: > 3 cm    Respiratory Devices and Support         Dental  no notable dental history         Cardiovascular   cardiovascular exam normal       Rhythm and rate: regular (-) no murmur    Pulmonary   pulmonary exam normal        breath sounds clear to auscultation           OUTSIDE LABS:  CBC:   Lab Results   Component Value Date    WBC 9.9 09/05/2021    WBC 10.8 09/04/2021    HGB 11.5 (L) 09/05/2021    HGB 12.7 (L) 09/04/2021    HCT 34.2 (L) 09/05/2021    HCT 38.0 (L) 09/04/2021     09/05/2021     09/04/2021     BMP:   Lab Results   Component Value Date     (L) 09/05/2021     09/03/2021    POTASSIUM 4.3 09/05/2021    POTASSIUM 3.6 09/03/2021    CHLORIDE 100 09/05/2021    CHLORIDE 104 09/03/2021    CO2 27 09/05/2021    CO2 28 09/03/2021    BUN 16 09/05/2021    BUN 14 09/03/2021    CR 1.03 09/05/2021    CR 0.94 09/03/2021     (H) 09/07/2021     (H) 09/07/2021     COAGS: No results found for: PTT, INR, FIBR  POC: No results found  for: BGM, HCG, HCGS  HEPATIC:   Lab Results   Component Value Date    ALBUMIN 3.6 09/02/2021    PROTTOTAL 7.8 09/02/2021    ALT 23 09/02/2021    AST 29 09/02/2021    ALKPHOS 118 09/02/2021    BILITOTAL 0.4 09/02/2021     OTHER:   Lab Results   Component Value Date    A1C 10.2 (H) 09/02/2021    TORRES 8.9 09/05/2021    LIPASE 159 (H) 09/02/2021       Anesthesia Plan    ASA Status:  3   NPO Status:  NPO Appropriate    Anesthesia Type: General.     - Airway: ETT   Induction: Intravenous.   Maintenance: Balanced.   Techniques and Equipment:     - Lines/Monitors: Arterial Line, Central Line, HILLARY, PAC            HILLARY Absolute Contra-indication: NONE            HILLARY Relative Contra-indication: NONE     - Blood: Blood in Room     Consents    Anesthesia Plan(s) and associated risks, benefits, and realistic alternatives discussed. Questions answered and patient/representative(s) expressed understanding.     - Discussed with:  Patient      - Extended Intubation/Ventilatory Support Discussed: Yes.      - Patient is DNR/DNI Status: No    Use of blood products discussed: Yes.     - Discussed with: Patient.     - Consented: consented to blood products            Reason for refusal: other.     Postoperative Care    Pain management: Multi-modal analgesia.   PONV prophylaxis: Ondansetron (or other 5HT-3)     Comments:    Patient is counseled on the anesthesia plan and relevant anesthesia procedures (vascular lines/blocks/HILLARY/airway devices) including all risks and benefits. All patient questions were answered.     Induction with Fentanyl/Versed/Rocuronium.  LORENA  MAC central line  Epinephrine/phenylephrine gtts to separate from bypass.             Yoni Vargas MD

## 2021-09-07 NOTE — PLAN OF CARE
VSS on RA. Pt reporting chest pain last evening when he walked in halls, but states that when he slowed down his pace of walking the pain resolved. No complaints of pain since. Denies SOB. Heparin infusing at 1900 units/hour. Plan for CABG on 9/8.

## 2021-09-07 NOTE — PROGRESS NOTES
[facility]  Cardiology Progress Note  Date of Service: 09/07/2021  Primary Cardiologist: Dr. Hernández [facility]; Dr. Castro at [facility]    Assessment & Plan   Jerry Bustamante is a 51 year old male with past medical history significant for CAD s/p stenting many years ago, HTN, HLP, DM2, who was directly admitted to [facility] that began 1 wk prior to admission found to have NSTEMI here.      Assessment:  1.  NSTEMI, trop peak 12.4  angiogram 9/2 shows 85% prox RCA, 85% OM2, 70% D3, 80% ostial to mid LAD, 80% distal circ  2.  Hypertension, well controlled.  3.  Hyperlipidemia,on Crestor 10 with HDL 50, LDL 46, total cholesterol 129.  4.  Type 2 diabetes.       Plan:   1. Echo with LVEF 40-45%, mild MR  2. CABG planned for tomorrow, appreciate CV surgery.  3. Aggressive medical Rx for CAD  4. Would be a good candidate for SGLT2 inhibitors in the future  5. After CAB, would recommend CV surgery to discharge on DAPT if no contraindications then.  6. Per interventional notes, possible consideration of hybrid LAD revascularization following surgery. Depending on CABG results, can be done as outpatient if asymptomatic.   7. Please have patient follow up in 1-2 weeks with cardiology teams ([facility] or Dr. Castro) as well after discharge.     We are available for assistance, please call us with any questions.     Interval History   No overnight symptoms.    Clinically Significant Risk Factors Present on Admission       DMII, HTN, HLD gout       Physical Exam   Temp: 98.2  F (36.8  C) Temp src: Oral BP: 100/63 Pulse: 70   Resp: 18 SpO2: 93 % O2 Device: None (Room air)    Vitals:    09/05/21 0259 09/06/21 0640 09/07/21 0558   Weight: 87.2 kg (192 lb 3.2 oz) 86.3 kg (190 lb 3.2 oz) 86.3 kg (190 lb 3.2 oz)     Well-developed well-nourished gentleman in no acute distress.    Heart is regular in rate and rhythm normal jVP  Lungs are clear without wheezes rales or rhonchi.    Extremities without  peripheral edema.     Medications     heparin 1,700 Units/hr (09/07/21 0252)     - MEDICATION INSTRUCTIONS -       nitroGLYcerin 50 mg in D5W 250 mL Stopped (09/06/21 6765)     - MEDICATION INSTRUCTIONS -         allopurinol  100 mg Oral Daily     aspirin  81 mg Oral Daily     insulin aspart   Subcutaneous TID w/meals     insulin aspart  1-10 Units Subcutaneous TID AC     insulin aspart  1-7 Units Subcutaneous At Bedtime     insulin glargine  25 Units Subcutaneous QAM AC     insulin glargine  40 Units Subcutaneous At Bedtime     losartan  25 mg Oral BID     metoprolol tartrate  50 mg Oral BID     nitroGLYcerin  1 patch Transdermal Daily     nitroGLYcerin   Transdermal Q8H     rosuvastatin  10 mg Oral Daily     sodium chloride (PF)  10 mL Intravenous Once     sodium chloride (PF)  3 mL Intracatheter Q8H       Data   Last 24 hours labs reviewed

## 2021-09-08 ENCOUNTER — ANESTHESIA (OUTPATIENT)
Dept: SURGERY | Facility: CLINIC | Age: 51
End: 2021-09-08
Payer: COMMERCIAL

## 2021-09-08 ENCOUNTER — APPOINTMENT (OUTPATIENT)
Dept: GENERAL RADIOLOGY | Facility: CLINIC | Age: 51
End: 2021-09-08
Attending: INTERNAL MEDICINE
Payer: COMMERCIAL

## 2021-09-08 LAB
ALBUMIN SERPL-MCNC: 2.8 G/DL (ref 3.4–5)
ALBUMIN SERPL-MCNC: 3.5 G/DL (ref 3.4–5)
ALP SERPL-CCNC: 150 U/L (ref 40–150)
ALP SERPL-CCNC: 175 U/L (ref 40–150)
ALT SERPL W P-5'-P-CCNC: 47 U/L (ref 0–70)
ALT SERPL W P-5'-P-CCNC: 51 U/L (ref 0–70)
ANION GAP SERPL CALCULATED.3IONS-SCNC: 4 MMOL/L (ref 3–14)
ANION GAP SERPL CALCULATED.3IONS-SCNC: 6 MMOL/L (ref 3–14)
ANION GAP SERPL CALCULATED.3IONS-SCNC: 7 MMOL/L (ref 3–14)
APTT PPP: 52 SECONDS (ref 22–38)
APTT PPP: 56 SECONDS (ref 22–38)
AST SERPL W P-5'-P-CCNC: 50 U/L (ref 0–45)
AST SERPL W P-5'-P-CCNC: 51 U/L (ref 0–45)
BASE EXCESS BLDA CALC-SCNC: -0.7 MMOL/L (ref -9.6–2)
BASE EXCESS BLDA CALC-SCNC: -2.3 MMOL/L (ref -9.6–2)
BASE EXCESS BLDA CALC-SCNC: -2.5 MMOL/L (ref -9.6–2)
BASE EXCESS BLDA CALC-SCNC: -3.2 MMOL/L (ref -9.6–2)
BASE EXCESS BLDA CALC-SCNC: -3.2 MMOL/L (ref -9.6–2)
BASE EXCESS BLDA CALC-SCNC: -3.8 MMOL/L (ref -9.6–2)
BASE EXCESS BLDA CALC-SCNC: -4 MMOL/L (ref -9–1.8)
BASE EXCESS BLDA CALC-SCNC: -4.2 MMOL/L (ref -9–1.8)
BASE EXCESS BLDA CALC-SCNC: -5.2 MMOL/L (ref -9–1.8)
BASE EXCESS BLDA CALC-SCNC: -5.5 MMOL/L (ref -9–1.8)
BASE EXCESS BLDV CALC-SCNC: -0.7 MMOL/L (ref -8.1–1.9)
BASE EXCESS BLDV CALC-SCNC: -4.1 MMOL/L (ref -7.7–1.9)
BASE EXCESS BLDV CALC-SCNC: -5.3 MMOL/L (ref -7.7–1.9)
BILIRUB SERPL-MCNC: 0.6 MG/DL (ref 0.2–1.3)
BILIRUB SERPL-MCNC: 1 MG/DL (ref 0.2–1.3)
BLD PROD TYP BPU: NORMAL
BLD PROD TYP BPU: NORMAL
BLOOD COMPONENT TYPE: NORMAL
BLOOD COMPONENT TYPE: NORMAL
BUN SERPL-MCNC: 19 MG/DL (ref 7–30)
CA-I BLD-MCNC: 4.4 MG/DL (ref 4.4–5.2)
CA-I BLD-MCNC: 4.8 MG/DL (ref 4.4–5.2)
CA-I BLD-MCNC: 4.9 MG/DL (ref 4.4–5.2)
CA-I BLD-MCNC: 4.9 MG/DL (ref 4.4–5.2)
CA-I BLD-MCNC: 5 MG/DL (ref 4.4–5.2)
CALCIUM SERPL-MCNC: 8.6 MG/DL (ref 8.5–10.1)
CALCIUM SERPL-MCNC: 8.8 MG/DL (ref 8.5–10.1)
CALCIUM SERPL-MCNC: 9.4 MG/DL (ref 8.5–10.1)
CHLORIDE BLD-SCNC: 113 MMOL/L (ref 94–109)
CHLORIDE BLD-SCNC: 114 MMOL/L (ref 94–109)
CHLORIDE BLD-SCNC: 115 MMOL/L (ref 94–109)
CO2 SERPL-SCNC: 22 MMOL/L (ref 20–32)
CO2 SERPL-SCNC: 24 MMOL/L (ref 20–32)
CO2 SERPL-SCNC: 24 MMOL/L (ref 20–32)
CODING SYSTEM: NORMAL
CODING SYSTEM: NORMAL
CREAT SERPL-MCNC: 1.15 MG/DL (ref 0.66–1.25)
CREAT SERPL-MCNC: 1.16 MG/DL (ref 0.66–1.25)
CREAT SERPL-MCNC: 1.19 MG/DL (ref 0.66–1.25)
CROSSMATCH: NORMAL
CROSSMATCH: NORMAL
ERYTHROCYTE [DISTWIDTH] IN BLOOD BY AUTOMATED COUNT: 13.9 % (ref 10–15)
ERYTHROCYTE [DISTWIDTH] IN BLOOD BY AUTOMATED COUNT: 14.2 % (ref 10–15)
ERYTHROCYTE [DISTWIDTH] IN BLOOD BY AUTOMATED COUNT: 14.4 % (ref 10–15)
FIBRINOGEN PPP-MCNC: 516 MG/DL (ref 170–490)
GFR SERPL CREATININE-BSD FRML MDRD: 69 ML/MIN/1.73M2
GFR SERPL CREATININE-BSD FRML MDRD: 71 ML/MIN/1.73M2
GFR SERPL CREATININE-BSD FRML MDRD: 72 ML/MIN/1.73M2
GLUCOSE BLD-MCNC: 149 MG/DL (ref 70–99)
GLUCOSE BLD-MCNC: 157 MG/DL (ref 70–99)
GLUCOSE BLD-MCNC: 185 MG/DL (ref 70–99)
GLUCOSE BLD-MCNC: 196 MG/DL (ref 70–99)
GLUCOSE BLD-MCNC: 199 MG/DL (ref 70–99)
GLUCOSE BLD-MCNC: 204 MG/DL (ref 70–99)
GLUCOSE BLD-MCNC: 207 MG/DL (ref 70–99)
GLUCOSE BLD-MCNC: 215 MG/DL (ref 70–99)
GLUCOSE BLD-MCNC: 217 MG/DL (ref 70–99)
GLUCOSE BLD-MCNC: 228 MG/DL (ref 70–99)
GLUCOSE BLDC GLUCOMTR-MCNC: 136 MG/DL (ref 70–99)
GLUCOSE BLDC GLUCOMTR-MCNC: 137 MG/DL (ref 70–99)
GLUCOSE BLDC GLUCOMTR-MCNC: 137 MG/DL (ref 70–99)
GLUCOSE BLDC GLUCOMTR-MCNC: 138 MG/DL (ref 70–99)
GLUCOSE BLDC GLUCOMTR-MCNC: 139 MG/DL (ref 70–99)
GLUCOSE BLDC GLUCOMTR-MCNC: 152 MG/DL (ref 70–99)
GLUCOSE BLDC GLUCOMTR-MCNC: 152 MG/DL (ref 70–99)
HCO3 BLD-SCNC: 22 MMOL/L (ref 21–28)
HCO3 BLD-SCNC: 22 MMOL/L (ref 21–28)
HCO3 BLD-SCNC: 23 MMOL/L (ref 21–28)
HCO3 BLD-SCNC: 23 MMOL/L (ref 21–28)
HCO3 BLDA-SCNC: 23 MMOL/L (ref 21–28)
HCO3 BLDA-SCNC: 24 MMOL/L (ref 21–28)
HCO3 BLDA-SCNC: 25 MMOL/L (ref 21–28)
HCO3 BLDA-SCNC: 25 MMOL/L (ref 21–28)
HCO3 BLDV-SCNC: 24 MMOL/L (ref 21–28)
HCO3 BLDV-SCNC: 25 MMOL/L (ref 21–28)
HCO3 BLDV-SCNC: 27 MMOL/L (ref 21–28)
HCT VFR BLD AUTO: 31.4 % (ref 40–53)
HCT VFR BLD AUTO: 34.1 % (ref 40–53)
HCT VFR BLD AUTO: 36 % (ref 40–53)
HGB BLD-MCNC: 10 G/DL (ref 13.3–17.7)
HGB BLD-MCNC: 10.1 G/DL (ref 13.3–17.7)
HGB BLD-MCNC: 10.2 G/DL (ref 13.3–17.7)
HGB BLD-MCNC: 10.4 G/DL (ref 13.3–17.7)
HGB BLD-MCNC: 10.5 G/DL (ref 13.3–17.7)
HGB BLD-MCNC: 11 G/DL (ref 13.3–17.7)
HGB BLD-MCNC: 11.4 G/DL (ref 13.3–17.7)
HGB BLD-MCNC: 11.8 G/DL (ref 13.3–17.7)
HGB BLD-MCNC: 12.2 G/DL (ref 13.3–17.7)
HGB BLD-MCNC: 12.3 G/DL (ref 13.3–17.7)
INR PPP: 1.23 (ref 0.85–1.15)
INR PPP: 1.41 (ref 0.85–1.15)
ISSUE DATE AND TIME: NORMAL
ISSUE DATE AND TIME: NORMAL
LACTATE BLD-SCNC: 0.6 MMOL/L
LACTATE BLD-SCNC: 0.7 MMOL/L
LACTATE BLD-SCNC: 0.8 MMOL/L
LACTATE BLD-SCNC: 0.9 MMOL/L
LACTATE BLD-SCNC: 1 MMOL/L
LACTATE SERPL-SCNC: 3 MMOL/L (ref 0.7–2)
MAGNESIUM SERPL-MCNC: 2.4 MG/DL (ref 1.6–2.3)
MCH RBC QN AUTO: 25.8 PG (ref 26.5–33)
MCH RBC QN AUTO: 26.3 PG (ref 26.5–33)
MCH RBC QN AUTO: 26.3 PG (ref 26.5–33)
MCHC RBC AUTO-ENTMCNC: 32.2 G/DL (ref 31.5–36.5)
MCHC RBC AUTO-ENTMCNC: 32.3 G/DL (ref 31.5–36.5)
MCHC RBC AUTO-ENTMCNC: 32.8 G/DL (ref 31.5–36.5)
MCV RBC AUTO: 79 FL (ref 78–100)
MCV RBC AUTO: 82 FL (ref 78–100)
MCV RBC AUTO: 82 FL (ref 78–100)
O2/TOTAL GAS SETTING VFR VENT: 100 %
O2/TOTAL GAS SETTING VFR VENT: 40 %
O2/TOTAL GAS SETTING VFR VENT: 60 %
O2/TOTAL GAS SETTING VFR VENT: 80 %
OXYHGB MFR BLD: 84 % (ref 92–100)
OXYHGB MFR BLD: 90 % (ref 92–100)
OXYHGB MFR BLD: 92 % (ref 92–100)
OXYHGB MFR BLD: 98 % (ref 92–100)
OXYHGB MFR BLDV: 49 % (ref 70–75)
OXYHGB MFR BLDV: 55 % (ref 70–75)
PCO2 BLD: 46 MM HG (ref 35–45)
PCO2 BLD: 47 MM HG (ref 35–45)
PCO2 BLD: 52 MM HG (ref 35–45)
PCO2 BLD: 57 MM HG (ref 35–45)
PCO2 BLDA: 43 MM HG (ref 35–45)
PCO2 BLDA: 46 MM HG (ref 35–45)
PCO2 BLDA: 47 MM HG (ref 35–45)
PCO2 BLDA: 47 MM HG (ref 35–45)
PCO2 BLDA: 51 MM HG (ref 35–45)
PCO2 BLDA: 61 MM HG (ref 35–45)
PCO2 BLDV: 58 MM HG (ref 40–50)
PCO2 BLDV: 64 MM HG (ref 40–50)
PCO2 BLDV: 69 MM HG (ref 40–50)
PH BLD: 7.22 [PH] (ref 7.35–7.45)
PH BLD: 7.24 [PH] (ref 7.35–7.45)
PH BLD: 7.29 [PH] (ref 7.35–7.45)
PH BLD: 7.29 [PH] (ref 7.35–7.45)
PH BLDA: 7.22 [PH] (ref 7.35–7.45)
PH BLDA: 7.28 [PH] (ref 7.35–7.45)
PH BLDA: 7.3 [PH] (ref 7.35–7.45)
PH BLDA: 7.32 [PH] (ref 7.35–7.45)
PH BLDA: 7.32 [PH] (ref 7.35–7.45)
PH BLDA: 7.37 [PH] (ref 7.35–7.45)
PH BLDV: 7.16 [PH] (ref 7.32–7.43)
PH BLDV: 7.2 [PH] (ref 7.32–7.43)
PH BLDV: 7.28 [PH] (ref 7.32–7.43)
PHOSPHATE SERPL-MCNC: 4.5 MG/DL (ref 2.5–4.5)
PLATELET # BLD AUTO: 162 10E3/UL (ref 150–450)
PLATELET # BLD AUTO: 224 10E3/UL (ref 150–450)
PLATELET # BLD AUTO: 245 10E3/UL (ref 150–450)
PO2 BLD: 130 MM HG (ref 80–105)
PO2 BLD: 60 MM HG (ref 80–105)
PO2 BLD: 67 MM HG (ref 80–105)
PO2 BLD: 70 MM HG (ref 80–105)
PO2 BLDA: 352 MM HG (ref 80–105)
PO2 BLDA: 356 MM HG (ref 80–105)
PO2 BLDA: 364 MM HG (ref 80–105)
PO2 BLDA: 435 MM HG (ref 80–105)
PO2 BLDA: 75 MM HG (ref 80–105)
PO2 BLDA: 81 MM HG (ref 80–105)
PO2 BLDV: 33 MM HG (ref 25–47)
PO2 BLDV: 37 MM HG (ref 25–47)
PO2 BLDV: 49 MM HG (ref 25–47)
POTASSIUM BLD-SCNC: 3.1 MMOL/L (ref 3.4–5.3)
POTASSIUM BLD-SCNC: 3.5 MMOL/L (ref 3.4–5.3)
POTASSIUM BLD-SCNC: 3.7 MMOL/L (ref 3.4–5.3)
POTASSIUM BLD-SCNC: 4.1 MMOL/L (ref 3.5–5)
POTASSIUM BLD-SCNC: 5 MMOL/L (ref 3.5–5)
POTASSIUM BLD-SCNC: 5.1 MMOL/L (ref 3.5–5)
POTASSIUM BLD-SCNC: 5.3 MMOL/L (ref 3.5–5)
POTASSIUM BLD-SCNC: 5.4 MMOL/L (ref 3.5–5)
POTASSIUM BLD-SCNC: 5.5 MMOL/L (ref 3.5–5)
POTASSIUM BLD-SCNC: 5.7 MMOL/L (ref 3.5–5)
PROT SERPL-MCNC: 6.4 G/DL (ref 6.8–8.8)
PROT SERPL-MCNC: 6.9 G/DL (ref 6.8–8.8)
RBC # BLD AUTO: 3.84 10E6/UL (ref 4.4–5.9)
RBC # BLD AUTO: 4.18 10E6/UL (ref 4.4–5.9)
RBC # BLD AUTO: 4.57 10E6/UL (ref 4.4–5.9)
SODIUM BLD-SCNC: 138 MMOL/L (ref 133–144)
SODIUM BLD-SCNC: 138 MMOL/L (ref 133–144)
SODIUM BLD-SCNC: 139 MMOL/L (ref 133–144)
SODIUM BLD-SCNC: 140 MMOL/L (ref 133–144)
SODIUM BLD-SCNC: 140 MMOL/L (ref 133–144)
SODIUM SERPL-SCNC: 136 MMOL/L (ref 133–144)
SODIUM SERPL-SCNC: 143 MMOL/L (ref 133–144)
UFH PPP CHRO-ACNC: 0.45 IU/ML
UNIT ABO/RH: NORMAL
UNIT ABO/RH: NORMAL
UNIT NUMBER: NORMAL
UNIT NUMBER: NORMAL
UNIT STATUS: NORMAL
UNIT STATUS: NORMAL
UNIT TYPE ISBT: 7300
UNIT TYPE ISBT: 7300
WBC # BLD AUTO: 11.2 10E3/UL (ref 4–11)
WBC # BLD AUTO: 20.7 10E3/UL (ref 4–11)
WBC # BLD AUTO: 21.4 10E3/UL (ref 4–11)

## 2021-09-08 PROCEDURE — 93005 ELECTROCARDIOGRAM TRACING: CPT

## 2021-09-08 PROCEDURE — 84132 ASSAY OF SERUM POTASSIUM: CPT | Performed by: STUDENT IN AN ORGANIZED HEALTH CARE EDUCATION/TRAINING PROGRAM

## 2021-09-08 PROCEDURE — 36415 COLL VENOUS BLD VENIPUNCTURE: CPT | Performed by: ANESTHESIOLOGY

## 2021-09-08 PROCEDURE — 80069 RENAL FUNCTION PANEL: CPT | Performed by: ANESTHESIOLOGY

## 2021-09-08 PROCEDURE — 250N000009 HC RX 250: Performed by: SURGERY

## 2021-09-08 PROCEDURE — 33508 ENDOSCOPIC VEIN HARVEST: CPT | Mod: XU | Performed by: SURGERY

## 2021-09-08 PROCEDURE — 84450 TRANSFERASE (AST) (SGOT): CPT | Performed by: STUDENT IN AN ORGANIZED HEALTH CARE EDUCATION/TRAINING PROGRAM

## 2021-09-08 PROCEDURE — 258N000003 HC RX IP 258 OP 636: Performed by: ANESTHESIOLOGY

## 2021-09-08 PROCEDURE — 85014 HEMATOCRIT: CPT | Performed by: SURGERY

## 2021-09-08 PROCEDURE — 85730 THROMBOPLASTIN TIME PARTIAL: CPT | Performed by: STUDENT IN AN ORGANIZED HEALTH CARE EDUCATION/TRAINING PROGRAM

## 2021-09-08 PROCEDURE — 99207 PR NO CHARGE LOS: CPT | Performed by: INTERNAL MEDICINE

## 2021-09-08 PROCEDURE — 250N000011 HC RX IP 250 OP 636

## 2021-09-08 PROCEDURE — 36415 COLL VENOUS BLD VENIPUNCTURE: CPT | Performed by: INTERNAL MEDICINE

## 2021-09-08 PROCEDURE — 71045 X-RAY EXAM CHEST 1 VIEW: CPT

## 2021-09-08 PROCEDURE — 94003 VENT MGMT INPAT SUBQ DAY: CPT

## 2021-09-08 PROCEDURE — 200N000001 HC R&B ICU

## 2021-09-08 PROCEDURE — 82803 BLOOD GASES ANY COMBINATION: CPT | Performed by: SURGERY

## 2021-09-08 PROCEDURE — 250N000013 HC RX MED GY IP 250 OP 250 PS 637: Performed by: PHYSICIAN ASSISTANT

## 2021-09-08 PROCEDURE — 999N000157 HC STATISTIC RCP TIME EA 10 MIN

## 2021-09-08 PROCEDURE — 85610 PROTHROMBIN TIME: CPT | Performed by: STUDENT IN AN ORGANIZED HEALTH CARE EDUCATION/TRAINING PROGRAM

## 2021-09-08 PROCEDURE — 250N000011 HC RX IP 250 OP 636: Performed by: INTERNAL MEDICINE

## 2021-09-08 PROCEDURE — 85027 COMPLETE CBC AUTOMATED: CPT | Performed by: STUDENT IN AN ORGANIZED HEALTH CARE EDUCATION/TRAINING PROGRAM

## 2021-09-08 PROCEDURE — 250N000011 HC RX IP 250 OP 636: Performed by: SURGERY

## 2021-09-08 PROCEDURE — 250N000011 HC RX IP 250 OP 636: Performed by: STUDENT IN AN ORGANIZED HEALTH CARE EDUCATION/TRAINING PROGRAM

## 2021-09-08 PROCEDURE — 258N000003 HC RX IP 258 OP 636

## 2021-09-08 PROCEDURE — 410N000005 HC PER-PERFUSION, SH ONLY, 1ST 30 MIN: Performed by: SURGERY

## 2021-09-08 PROCEDURE — 85730 THROMBOPLASTIN TIME PARTIAL: CPT | Performed by: SURGERY

## 2021-09-08 PROCEDURE — 021009W BYPASS CORONARY ARTERY, ONE ARTERY FROM AORTA WITH AUTOLOGOUS VENOUS TISSUE, OPEN APPROACH: ICD-10-PCS | Performed by: SURGERY

## 2021-09-08 PROCEDURE — 250N000013 HC RX MED GY IP 250 OP 250 PS 637: Performed by: SURGERY

## 2021-09-08 PROCEDURE — 83735 ASSAY OF MAGNESIUM: CPT | Performed by: STUDENT IN AN ORGANIZED HEALTH CARE EDUCATION/TRAINING PROGRAM

## 2021-09-08 PROCEDURE — 999N000063 XR CHEST PORT 1 VIEW

## 2021-09-08 PROCEDURE — 86923 COMPATIBILITY TEST ELECTRIC: CPT | Performed by: INTERNAL MEDICINE

## 2021-09-08 PROCEDURE — 250N000024 HC ISOFLURANE, PER MIN: Performed by: SURGERY

## 2021-09-08 PROCEDURE — 02100Z9 BYPASS CORONARY ARTERY, ONE ARTERY FROM LEFT INTERNAL MAMMARY, OPEN APPROACH: ICD-10-PCS | Performed by: SURGERY

## 2021-09-08 PROCEDURE — 410N000006: Performed by: SURGERY

## 2021-09-08 PROCEDURE — P9016 RBC LEUKOCYTES REDUCED: HCPCS | Performed by: INTERNAL MEDICINE

## 2021-09-08 PROCEDURE — 33517 CABG ARTERY-VEIN SINGLE: CPT | Mod: GC | Performed by: SURGERY

## 2021-09-08 PROCEDURE — 258N000003 HC RX IP 258 OP 636: Performed by: NURSE ANESTHETIST, CERTIFIED REGISTERED

## 2021-09-08 PROCEDURE — 250N000013 HC RX MED GY IP 250 OP 250 PS 637: Performed by: STUDENT IN AN ORGANIZED HEALTH CARE EDUCATION/TRAINING PROGRAM

## 2021-09-08 PROCEDURE — 250N000011 HC RX IP 250 OP 636: Performed by: ANESTHESIOLOGY

## 2021-09-08 PROCEDURE — 5A1221Z PERFORMANCE OF CARDIAC OUTPUT, CONTINUOUS: ICD-10-PCS | Performed by: SURGERY

## 2021-09-08 PROCEDURE — 99291 CRITICAL CARE FIRST HOUR: CPT | Mod: 24 | Performed by: INTERNAL MEDICINE

## 2021-09-08 PROCEDURE — 33535 CABG ARTERIAL THREE: CPT | Mod: GC | Performed by: SURGERY

## 2021-09-08 PROCEDURE — 06BQ4ZZ EXCISION OF LEFT SAPHENOUS VEIN, PERCUTANEOUS ENDOSCOPIC APPROACH: ICD-10-PCS | Performed by: SURGERY

## 2021-09-08 PROCEDURE — 85384 FIBRINOGEN ACTIVITY: CPT | Performed by: SURGERY

## 2021-09-08 PROCEDURE — 03BC4ZZ EXCISION OF LEFT RADIAL ARTERY, PERCUTANEOUS ENDOSCOPIC APPROACH: ICD-10-PCS | Performed by: SURGERY

## 2021-09-08 PROCEDURE — P9041 ALBUMIN (HUMAN),5%, 50ML: HCPCS

## 2021-09-08 PROCEDURE — 84155 ASSAY OF PROTEIN SERUM: CPT | Performed by: STUDENT IN AN ORGANIZED HEALTH CARE EDUCATION/TRAINING PROGRAM

## 2021-09-08 PROCEDURE — 82805 BLOOD GASES W/O2 SATURATION: CPT | Performed by: STUDENT IN AN ORGANIZED HEALTH CARE EDUCATION/TRAINING PROGRAM

## 2021-09-08 PROCEDURE — 999N000141 HC STATISTIC PRE-PROCEDURE NURSING ASSESSMENT: Performed by: SURGERY

## 2021-09-08 PROCEDURE — 370N000017 HC ANESTHESIA TECHNICAL FEE, PER MIN: Performed by: SURGERY

## 2021-09-08 PROCEDURE — 82330 ASSAY OF CALCIUM: CPT | Performed by: STUDENT IN AN ORGANIZED HEALTH CARE EDUCATION/TRAINING PROGRAM

## 2021-09-08 PROCEDURE — 83605 ASSAY OF LACTIC ACID: CPT | Performed by: STUDENT IN AN ORGANIZED HEALTH CARE EDUCATION/TRAINING PROGRAM

## 2021-09-08 PROCEDURE — 84295 ASSAY OF SERUM SODIUM: CPT | Performed by: SURGERY

## 2021-09-08 PROCEDURE — 02100AW BYPASS CORONARY ARTERY, ONE ARTERY FROM AORTA WITH AUTOLOGOUS ARTERIAL TISSUE, OPEN APPROACH: ICD-10-PCS | Performed by: SURGERY

## 2021-09-08 PROCEDURE — 250N000012 HC RX MED GY IP 250 OP 636 PS 637: Performed by: STUDENT IN AN ORGANIZED HEALTH CARE EDUCATION/TRAINING PROGRAM

## 2021-09-08 PROCEDURE — 360N000079 HC SURGERY LEVEL 6, PER MIN: Performed by: SURGERY

## 2021-09-08 PROCEDURE — 250N000012 HC RX MED GY IP 250 OP 636 PS 637: Performed by: SURGERY

## 2021-09-08 PROCEDURE — 85520 HEPARIN ASSAY: CPT | Performed by: INTERNAL MEDICINE

## 2021-09-08 PROCEDURE — 02100Z8 BYPASS CORONARY ARTERY, ONE ARTERY FROM RIGHT INTERNAL MAMMARY, OPEN APPROACH: ICD-10-PCS | Performed by: SURGERY

## 2021-09-08 PROCEDURE — 250N000009 HC RX 250

## 2021-09-08 PROCEDURE — 85027 COMPLETE CBC AUTOMATED: CPT | Performed by: ANESTHESIOLOGY

## 2021-09-08 PROCEDURE — 82330 ASSAY OF CALCIUM: CPT | Performed by: SURGERY

## 2021-09-08 PROCEDURE — 258N000003 HC RX IP 258 OP 636: Performed by: STUDENT IN AN ORGANIZED HEALTH CARE EDUCATION/TRAINING PROGRAM

## 2021-09-08 PROCEDURE — 84100 ASSAY OF PHOSPHORUS: CPT | Performed by: STUDENT IN AN ORGANIZED HEALTH CARE EDUCATION/TRAINING PROGRAM

## 2021-09-08 PROCEDURE — 84295 ASSAY OF SERUM SODIUM: CPT | Performed by: ANESTHESIOLOGY

## 2021-09-08 PROCEDURE — 85610 PROTHROMBIN TIME: CPT | Performed by: SURGERY

## 2021-09-08 PROCEDURE — P9041 ALBUMIN (HUMAN),5%, 50ML: HCPCS | Performed by: STUDENT IN AN ORGANIZED HEALTH CARE EDUCATION/TRAINING PROGRAM

## 2021-09-08 PROCEDURE — 250N000009 HC RX 250: Performed by: NURSE ANESTHETIST, CERTIFIED REGISTERED

## 2021-09-08 PROCEDURE — 258N000003 HC RX IP 258 OP 636: Performed by: SURGERY

## 2021-09-08 PROCEDURE — 250N000011 HC RX IP 250 OP 636: Performed by: NURSE ANESTHETIST, CERTIFIED REGISTERED

## 2021-09-08 PROCEDURE — 37799 UNLISTED PX VASCULAR SURGERY: CPT | Mod: GC | Performed by: SURGERY

## 2021-09-08 PROCEDURE — 272N000001 HC OR GENERAL SUPPLY STERILE: Performed by: SURGERY

## 2021-09-08 RX ORDER — PROPOFOL 10 MG/ML
5-75 INJECTION, EMULSION INTRAVENOUS CONTINUOUS
Status: DISCONTINUED | OUTPATIENT
Start: 2021-09-08 | End: 2021-09-15

## 2021-09-08 RX ORDER — HEPARIN SODIUM 5000 [USP'U]/.5ML
5000 INJECTION, SOLUTION INTRAVENOUS; SUBCUTANEOUS EVERY 8 HOURS
Status: DISCONTINUED | OUTPATIENT
Start: 2021-09-09 | End: 2021-09-09

## 2021-09-08 RX ORDER — FENTANYL CITRATE 0.05 MG/ML
50 INJECTION, SOLUTION INTRAMUSCULAR; INTRAVENOUS
Status: DISCONTINUED | OUTPATIENT
Start: 2021-09-08 | End: 2021-09-08 | Stop reason: HOSPADM

## 2021-09-08 RX ORDER — SODIUM CHLORIDE 9 MG/ML
INJECTION, SOLUTION INTRAVENOUS CONTINUOUS PRN
Status: DISCONTINUED | OUTPATIENT
Start: 2021-09-08 | End: 2021-09-08

## 2021-09-08 RX ORDER — POTASSIUM CHLORIDE 29.8 MG/ML
20 INJECTION INTRAVENOUS ONCE
Status: COMPLETED | OUTPATIENT
Start: 2021-09-08 | End: 2021-09-08

## 2021-09-08 RX ORDER — HYDRALAZINE HYDROCHLORIDE 20 MG/ML
10 INJECTION INTRAMUSCULAR; INTRAVENOUS EVERY 30 MIN PRN
Status: DISCONTINUED | OUTPATIENT
Start: 2021-09-08 | End: 2021-09-22 | Stop reason: HOSPADM

## 2021-09-08 RX ORDER — ONDANSETRON 2 MG/ML
4 INJECTION INTRAMUSCULAR; INTRAVENOUS EVERY 6 HOURS PRN
Status: DISCONTINUED | OUTPATIENT
Start: 2021-09-08 | End: 2021-09-22 | Stop reason: HOSPADM

## 2021-09-08 RX ORDER — FUROSEMIDE 10 MG/ML
20 INJECTION INTRAMUSCULAR; INTRAVENOUS ONCE
Status: COMPLETED | OUTPATIENT
Start: 2021-09-09 | End: 2021-09-09

## 2021-09-08 RX ORDER — LIDOCAINE 40 MG/G
CREAM TOPICAL
Status: DISCONTINUED | OUTPATIENT
Start: 2021-09-08 | End: 2021-09-08 | Stop reason: HOSPADM

## 2021-09-08 RX ORDER — SODIUM CHLORIDE, SODIUM LACTATE, POTASSIUM CHLORIDE, CALCIUM CHLORIDE 600; 310; 30; 20 MG/100ML; MG/100ML; MG/100ML; MG/100ML
INJECTION, SOLUTION INTRAVENOUS CONTINUOUS
Status: DISCONTINUED | OUTPATIENT
Start: 2021-09-08 | End: 2021-09-08 | Stop reason: HOSPADM

## 2021-09-08 RX ORDER — PANTOPRAZOLE SODIUM 40 MG/1
40 TABLET, DELAYED RELEASE ORAL DAILY
Status: DISCONTINUED | OUTPATIENT
Start: 2021-09-08 | End: 2021-09-08

## 2021-09-08 RX ORDER — NALOXONE HYDROCHLORIDE 0.4 MG/ML
0.4 INJECTION, SOLUTION INTRAMUSCULAR; INTRAVENOUS; SUBCUTANEOUS
Status: DISCONTINUED | OUTPATIENT
Start: 2021-09-08 | End: 2021-09-22 | Stop reason: HOSPADM

## 2021-09-08 RX ORDER — CEFAZOLIN SODIUM 2 G/100ML
2 INJECTION, SOLUTION INTRAVENOUS SEE ADMIN INSTRUCTIONS
Status: DISCONTINUED | OUTPATIENT
Start: 2021-09-08 | End: 2021-09-08 | Stop reason: HOSPADM

## 2021-09-08 RX ORDER — ACETAMINOPHEN 325 MG/1
650 TABLET ORAL EVERY 4 HOURS PRN
Status: DISCONTINUED | OUTPATIENT
Start: 2021-09-11 | End: 2021-09-22 | Stop reason: HOSPADM

## 2021-09-08 RX ORDER — CHLORHEXIDINE GLUCONATE ORAL RINSE 1.2 MG/ML
10 SOLUTION DENTAL ONCE
Status: COMPLETED | OUTPATIENT
Start: 2021-09-08 | End: 2021-09-08

## 2021-09-08 RX ORDER — POLYETHYLENE GLYCOL 3350 17 G/17G
17 POWDER, FOR SOLUTION ORAL DAILY
Status: DISCONTINUED | OUTPATIENT
Start: 2021-09-09 | End: 2021-09-22 | Stop reason: HOSPADM

## 2021-09-08 RX ORDER — HYDROMORPHONE HCL IN WATER/PF 6 MG/30 ML
0.2 PATIENT CONTROLLED ANALGESIA SYRINGE INTRAVENOUS
Status: DISCONTINUED | OUTPATIENT
Start: 2021-09-08 | End: 2021-09-22 | Stop reason: HOSPADM

## 2021-09-08 RX ORDER — ASPIRIN 81 MG/1
81 TABLET, CHEWABLE ORAL
Status: COMPLETED | OUTPATIENT
Start: 2021-09-08 | End: 2021-09-08

## 2021-09-08 RX ORDER — SODIUM CHLORIDE, SODIUM LACTATE, POTASSIUM CHLORIDE, CALCIUM CHLORIDE 600; 310; 30; 20 MG/100ML; MG/100ML; MG/100ML; MG/100ML
INJECTION, SOLUTION INTRAVENOUS CONTINUOUS PRN
Status: DISCONTINUED | OUTPATIENT
Start: 2021-09-08 | End: 2021-09-08

## 2021-09-08 RX ORDER — FAMOTIDINE 20 MG/1
20 TABLET, FILM COATED ORAL
Status: COMPLETED | OUTPATIENT
Start: 2021-09-08 | End: 2021-09-08

## 2021-09-08 RX ORDER — PHENYLEPHRINE HCL IN 0.9% NACL 50MG/250ML
.1-4 PLASTIC BAG, INJECTION (ML) INTRAVENOUS CONTINUOUS PRN
Status: DISCONTINUED | OUTPATIENT
Start: 2021-09-08 | End: 2021-09-16

## 2021-09-08 RX ORDER — EPHEDRINE SULFATE 50 MG/ML
INJECTION, SOLUTION INTRAMUSCULAR; INTRAVENOUS; SUBCUTANEOUS PRN
Status: DISCONTINUED | OUTPATIENT
Start: 2021-09-08 | End: 2021-09-08

## 2021-09-08 RX ORDER — HEPARIN SODIUM 1000 [USP'U]/ML
INJECTION, SOLUTION INTRAVENOUS; SUBCUTANEOUS PRN
Status: DISCONTINUED | OUTPATIENT
Start: 2021-09-08 | End: 2021-09-08

## 2021-09-08 RX ORDER — ACETAMINOPHEN 325 MG/1
975 TABLET ORAL EVERY 8 HOURS
Status: COMPLETED | OUTPATIENT
Start: 2021-09-08 | End: 2021-09-11

## 2021-09-08 RX ORDER — MUPIROCIN 20 MG/G
0.5 OINTMENT TOPICAL 2 TIMES DAILY
Status: DISCONTINUED | OUTPATIENT
Start: 2021-09-08 | End: 2021-09-08 | Stop reason: CLARIF

## 2021-09-08 RX ORDER — CHLORHEXIDINE GLUCONATE ORAL RINSE 1.2 MG/ML
15 SOLUTION DENTAL EVERY 12 HOURS
Status: DISCONTINUED | OUTPATIENT
Start: 2021-09-08 | End: 2021-09-15

## 2021-09-08 RX ORDER — LIDOCAINE 40 MG/G
CREAM TOPICAL
Status: DISCONTINUED | OUTPATIENT
Start: 2021-09-08 | End: 2021-09-16

## 2021-09-08 RX ORDER — DEXTROSE MONOHYDRATE 25 G/50ML
25-50 INJECTION, SOLUTION INTRAVENOUS
Status: DISCONTINUED | OUTPATIENT
Start: 2021-09-08 | End: 2021-09-16

## 2021-09-08 RX ORDER — FENTANYL CITRATE 50 UG/ML
INJECTION, SOLUTION INTRAMUSCULAR; INTRAVENOUS PRN
Status: DISCONTINUED | OUTPATIENT
Start: 2021-09-08 | End: 2021-09-08

## 2021-09-08 RX ORDER — NALOXONE HYDROCHLORIDE 0.4 MG/ML
0.2 INJECTION, SOLUTION INTRAMUSCULAR; INTRAVENOUS; SUBCUTANEOUS
Status: DISCONTINUED | OUTPATIENT
Start: 2021-09-08 | End: 2021-09-22 | Stop reason: HOSPADM

## 2021-09-08 RX ORDER — ALBUMIN, HUMAN INJ 5% 5 %
500-1000 SOLUTION INTRAVENOUS
Status: COMPLETED | OUTPATIENT
Start: 2021-09-08 | End: 2021-09-08

## 2021-09-08 RX ORDER — ALBUMIN, HUMAN INJ 5% 5 %
SOLUTION INTRAVENOUS
Status: COMPLETED
Start: 2021-09-08 | End: 2021-09-08

## 2021-09-08 RX ORDER — EPINEPHRINE IN 0.9 % SOD CHLOR 5 MG/250ML
.01-.1 PLASTIC BAG, INJECTION (ML) INTRAVENOUS CONTINUOUS PRN
Status: DISCONTINUED | OUTPATIENT
Start: 2021-09-08 | End: 2021-09-16

## 2021-09-08 RX ORDER — CEFAZOLIN SODIUM 2 G/100ML
2 INJECTION, SOLUTION INTRAVENOUS EVERY 8 HOURS
Status: COMPLETED | OUTPATIENT
Start: 2021-09-08 | End: 2021-09-09

## 2021-09-08 RX ORDER — NICARDIPINE HYDROCHLORIDE 0.2 MG/ML
1-5 INJECTION INTRAVENOUS CONTINUOUS PRN
Status: DISCONTINUED | OUTPATIENT
Start: 2021-09-08 | End: 2021-09-12

## 2021-09-08 RX ORDER — CEFAZOLIN SODIUM 2 G/100ML
2 INJECTION, SOLUTION INTRAVENOUS
Status: COMPLETED | OUTPATIENT
Start: 2021-09-08 | End: 2021-09-08

## 2021-09-08 RX ORDER — ASPIRIN 81 MG/1
162 TABLET, CHEWABLE ORAL DAILY
Status: DISCONTINUED | OUTPATIENT
Start: 2021-09-09 | End: 2021-09-09

## 2021-09-08 RX ORDER — PROPOFOL 10 MG/ML
INJECTION, EMULSION INTRAVENOUS CONTINUOUS PRN
Status: DISCONTINUED | OUTPATIENT
Start: 2021-09-08 | End: 2021-09-08

## 2021-09-08 RX ORDER — ASPIRIN 81 MG/1
162 TABLET, CHEWABLE ORAL
Status: COMPLETED | OUTPATIENT
Start: 2021-09-08 | End: 2021-09-08

## 2021-09-08 RX ORDER — NICOTINE POLACRILEX 4 MG
15-30 LOZENGE BUCCAL
Status: DISCONTINUED | OUTPATIENT
Start: 2021-09-08 | End: 2021-09-16

## 2021-09-08 RX ORDER — DEXTROSE MONOHYDRATE, SODIUM CHLORIDE, AND POTASSIUM CHLORIDE 50; 1.49; 4.5 G/1000ML; G/1000ML; G/1000ML
INJECTION, SOLUTION INTRAVENOUS CONTINUOUS
Status: DISCONTINUED | OUTPATIENT
Start: 2021-09-08 | End: 2021-09-11

## 2021-09-08 RX ORDER — OXYCODONE HYDROCHLORIDE 5 MG/1
10 TABLET ORAL EVERY 4 HOURS PRN
Status: DISCONTINUED | OUTPATIENT
Start: 2021-09-08 | End: 2021-09-22 | Stop reason: HOSPADM

## 2021-09-08 RX ORDER — PAPAVERINE HYDROCHLORIDE 30 MG/ML
INJECTION INTRAMUSCULAR; INTRAVENOUS PRN
Status: DISCONTINUED | OUTPATIENT
Start: 2021-09-08 | End: 2021-09-08 | Stop reason: HOSPADM

## 2021-09-08 RX ORDER — METHOCARBAMOL 750 MG/1
750 TABLET, FILM COATED ORAL EVERY 6 HOURS PRN
Status: DISCONTINUED | OUTPATIENT
Start: 2021-09-08 | End: 2021-09-12

## 2021-09-08 RX ORDER — GABAPENTIN 100 MG/1
100 CAPSULE ORAL 3 TIMES DAILY
Status: DISCONTINUED | OUTPATIENT
Start: 2021-09-08 | End: 2021-09-12

## 2021-09-08 RX ORDER — OXYCODONE HYDROCHLORIDE 5 MG/1
5 TABLET ORAL EVERY 4 HOURS PRN
Status: DISCONTINUED | OUTPATIENT
Start: 2021-09-08 | End: 2021-09-22 | Stop reason: HOSPADM

## 2021-09-08 RX ORDER — HYDROMORPHONE HCL IN WATER/PF 6 MG/30 ML
0.4 PATIENT CONTROLLED ANALGESIA SYRINGE INTRAVENOUS
Status: DISCONTINUED | OUTPATIENT
Start: 2021-09-08 | End: 2021-09-22 | Stop reason: HOSPADM

## 2021-09-08 RX ORDER — AMOXICILLIN 250 MG
1 CAPSULE ORAL 2 TIMES DAILY
Status: DISCONTINUED | OUTPATIENT
Start: 2021-09-08 | End: 2021-09-22 | Stop reason: HOSPADM

## 2021-09-08 RX ORDER — BISACODYL 10 MG
10 SUPPOSITORY, RECTAL RECTAL DAILY PRN
Status: DISCONTINUED | OUTPATIENT
Start: 2021-09-08 | End: 2021-09-22 | Stop reason: HOSPADM

## 2021-09-08 RX ORDER — ONDANSETRON 4 MG/1
4 TABLET, ORALLY DISINTEGRATING ORAL EVERY 6 HOURS PRN
Status: DISCONTINUED | OUTPATIENT
Start: 2021-09-08 | End: 2021-09-22 | Stop reason: HOSPADM

## 2021-09-08 RX ORDER — ACETAMINOPHEN 325 MG/1
975 TABLET ORAL ONCE
Status: COMPLETED | OUTPATIENT
Start: 2021-09-08 | End: 2021-09-08

## 2021-09-08 RX ORDER — PROPOFOL 10 MG/ML
INJECTION, EMULSION INTRAVENOUS
Status: COMPLETED
Start: 2021-09-08 | End: 2021-09-08

## 2021-09-08 RX ORDER — CEFAZOLIN SODIUM 1 G/3ML
INJECTION, POWDER, FOR SOLUTION INTRAMUSCULAR; INTRAVENOUS PRN
Status: DISCONTINUED | OUTPATIENT
Start: 2021-09-08 | End: 2021-09-08

## 2021-09-08 RX ADMIN — Medication 5 MG: at 11:25

## 2021-09-08 RX ADMIN — FENTANYL CITRATE 100 MCG: 50 INJECTION, SOLUTION INTRAMUSCULAR; INTRAVENOUS at 08:40

## 2021-09-08 RX ADMIN — ROCURONIUM BROMIDE 10 MG: 10 INJECTION INTRAVENOUS at 12:50

## 2021-09-08 RX ADMIN — OXYCODONE HYDROCHLORIDE 5 MG: 5 TABLET ORAL at 17:00

## 2021-09-08 RX ADMIN — HYDROMORPHONE HYDROCHLORIDE 0.2 MG: 0.2 INJECTION, SOLUTION INTRAMUSCULAR; INTRAVENOUS; SUBCUTANEOUS at 17:50

## 2021-09-08 RX ADMIN — AMINOCAPROIC ACID 1 G/HR: 250 INJECTION, SOLUTION INTRAVENOUS at 08:56

## 2021-09-08 RX ADMIN — CHLORHEXIDINE GLUCONATE 15 ML: 1.2 SOLUTION ORAL at 20:37

## 2021-09-08 RX ADMIN — Medication 2.5 MG: at 11:23

## 2021-09-08 RX ADMIN — FENTANYL CITRATE 100 MCG: 50 INJECTION, SOLUTION INTRAMUSCULAR; INTRAVENOUS at 11:00

## 2021-09-08 RX ADMIN — ROCURONIUM BROMIDE 30 MG: 10 INJECTION INTRAVENOUS at 08:39

## 2021-09-08 RX ADMIN — AMINOCAPROIC ACID 5 G/HR: 250 INJECTION, SOLUTION INTRAVENOUS at 07:45

## 2021-09-08 RX ADMIN — MIDAZOLAM HYDROCHLORIDE 1 MG: 1 INJECTION, SOLUTION INTRAMUSCULAR; INTRAVENOUS at 12:50

## 2021-09-08 RX ADMIN — PHENYLEPHRINE HYDROCHLORIDE 200 MCG: 10 INJECTION INTRAVENOUS at 13:52

## 2021-09-08 RX ADMIN — PHENYLEPHRINE HYDROCHLORIDE 100 MCG: 10 INJECTION INTRAVENOUS at 13:56

## 2021-09-08 RX ADMIN — Medication 2.5 MG: at 11:13

## 2021-09-08 RX ADMIN — ACETAMINOPHEN 975 MG: 325 TABLET, FILM COATED ORAL at 20:37

## 2021-09-08 RX ADMIN — SENNOSIDES AND DOCUSATE SODIUM 1 TABLET: 8.6; 5 TABLET ORAL at 20:38

## 2021-09-08 RX ADMIN — PHENYLEPHRINE HYDROCHLORIDE 200 MCG: 10 INJECTION INTRAVENOUS at 13:47

## 2021-09-08 RX ADMIN — NICARDIPINE HYDROCHLORIDE 5 MG/HR: 0.2 INJECTION, SOLUTION INTRAVENOUS at 13:32

## 2021-09-08 RX ADMIN — FENTANYL CITRATE 250 MCG: 50 INJECTION, SOLUTION INTRAMUSCULAR; INTRAVENOUS at 07:38

## 2021-09-08 RX ADMIN — ALBUMIN HUMAN 250 ML: 0.05 INJECTION, SOLUTION INTRAVENOUS at 17:18

## 2021-09-08 RX ADMIN — MIDAZOLAM HYDROCHLORIDE 1 MG: 1 INJECTION, SOLUTION INTRAMUSCULAR; INTRAVENOUS at 07:28

## 2021-09-08 RX ADMIN — SODIUM CHLORIDE, POTASSIUM CHLORIDE, SODIUM LACTATE AND CALCIUM CHLORIDE: 600; 310; 30; 20 INJECTION, SOLUTION INTRAVENOUS at 07:28

## 2021-09-08 RX ADMIN — FENTANYL CITRATE 150 MCG: 50 INJECTION, SOLUTION INTRAMUSCULAR; INTRAVENOUS at 08:39

## 2021-09-08 RX ADMIN — FENTANYL CITRATE 100 MCG: 50 INJECTION, SOLUTION INTRAMUSCULAR; INTRAVENOUS at 09:01

## 2021-09-08 RX ADMIN — ROCURONIUM BROMIDE 50 MG: 10 INJECTION INTRAVENOUS at 07:38

## 2021-09-08 RX ADMIN — SODIUM CHLORIDE 4 UNITS/HR: 9 INJECTION, SOLUTION INTRAVENOUS at 22:35

## 2021-09-08 RX ADMIN — HUMAN INSULIN 2 UNITS/HR: 100 INJECTION, SOLUTION SUBCUTANEOUS at 10:50

## 2021-09-08 RX ADMIN — PROPOFOL 40 MCG/KG/MIN: 10 INJECTION, EMULSION INTRAVENOUS at 18:20

## 2021-09-08 RX ADMIN — POTASSIUM CHLORIDE, DEXTROSE MONOHYDRATE AND SODIUM CHLORIDE: 150; 5; 450 INJECTION, SOLUTION INTRAVENOUS at 16:05

## 2021-09-08 RX ADMIN — EPINEPHRINE 0.03 MCG/KG/MIN: 1 INJECTION INTRAMUSCULAR; INTRAVENOUS; SUBCUTANEOUS at 13:29

## 2021-09-08 RX ADMIN — HYDROMORPHONE HYDROCHLORIDE 0.4 MG: 0.2 INJECTION, SOLUTION INTRAMUSCULAR; INTRAVENOUS; SUBCUTANEOUS at 19:24

## 2021-09-08 RX ADMIN — PHENYLEPHRINE HYDROCHLORIDE 100 MCG: 10 INJECTION INTRAVENOUS at 11:34

## 2021-09-08 RX ADMIN — Medication 5 MG: at 10:35

## 2021-09-08 RX ADMIN — Medication 2.5 MG: at 09:29

## 2021-09-08 RX ADMIN — Medication 5 MG: at 09:20

## 2021-09-08 RX ADMIN — CEFAZOLIN SODIUM 2 G: 2 INJECTION, SOLUTION INTRAVENOUS at 20:28

## 2021-09-08 RX ADMIN — SODIUM CHLORIDE: 9 INJECTION, SOLUTION INTRAVENOUS at 07:28

## 2021-09-08 RX ADMIN — HYDROMORPHONE HYDROCHLORIDE 0.2 MG: 0.2 INJECTION, SOLUTION INTRAMUSCULAR; INTRAVENOUS; SUBCUTANEOUS at 16:50

## 2021-09-08 RX ADMIN — FENTANYL CITRATE 100 MCG: 50 INJECTION, SOLUTION INTRAMUSCULAR; INTRAVENOUS at 14:06

## 2021-09-08 RX ADMIN — SODIUM CHLORIDE, POTASSIUM CHLORIDE, SODIUM LACTATE AND CALCIUM CHLORIDE: 600; 310; 30; 20 INJECTION, SOLUTION INTRAVENOUS at 07:00

## 2021-09-08 RX ADMIN — FENTANYL CITRATE 50 MCG: 50 INJECTION, SOLUTION INTRAMUSCULAR; INTRAVENOUS at 06:56

## 2021-09-08 RX ADMIN — ROCURONIUM BROMIDE 20 MG: 10 INJECTION INTRAVENOUS at 10:01

## 2021-09-08 RX ADMIN — PROPOFOL 45 MCG/KG/MIN: 10 INJECTION, EMULSION INTRAVENOUS at 21:50

## 2021-09-08 RX ADMIN — PHENYLEPHRINE HYDROCHLORIDE 100 MCG: 10 INJECTION INTRAVENOUS at 14:26

## 2021-09-08 RX ADMIN — Medication 7.5 MG: at 13:56

## 2021-09-08 RX ADMIN — HEPARIN SODIUM 35000 UNITS: 1000 INJECTION INTRAVENOUS; SUBCUTANEOUS at 10:34

## 2021-09-08 RX ADMIN — PHENYLEPHRINE HYDROCHLORIDE 100 MCG: 10 INJECTION INTRAVENOUS at 14:29

## 2021-09-08 RX ADMIN — PHENYLEPHRINE HYDROCHLORIDE 200 MCG: 10 INJECTION INTRAVENOUS at 13:49

## 2021-09-08 RX ADMIN — ACETAMINOPHEN 1000 MG: 500 TABLET, FILM COATED ORAL at 06:13

## 2021-09-08 RX ADMIN — Medication 2.5 MG: at 10:08

## 2021-09-08 RX ADMIN — PHENYLEPHRINE HYDROCHLORIDE 0.4 MCG/KG/MIN: 10 INJECTION INTRAVENOUS at 08:00

## 2021-09-08 RX ADMIN — SUGAMMADEX 200 MG: 100 INJECTION, SOLUTION INTRAVENOUS at 15:29

## 2021-09-08 RX ADMIN — PHENYLEPHRINE HYDROCHLORIDE 100 MCG: 10 INJECTION INTRAVENOUS at 07:55

## 2021-09-08 RX ADMIN — FAMOTIDINE 20 MG: 20 TABLET ORAL at 04:59

## 2021-09-08 RX ADMIN — Medication 5 MG: at 10:14

## 2021-09-08 RX ADMIN — POTASSIUM CHLORIDE 20 MEQ: 29.8 INJECTION, SOLUTION INTRAVENOUS at 18:57

## 2021-09-08 RX ADMIN — PHENYLEPHRINE HYDROCHLORIDE 200 MCG: 10 INJECTION INTRAVENOUS at 08:01

## 2021-09-08 RX ADMIN — SODIUM CHLORIDE 4 UNITS/HR: 9 INJECTION, SOLUTION INTRAVENOUS at 17:31

## 2021-09-08 RX ADMIN — MIDAZOLAM HYDROCHLORIDE 4 MG: 1 INJECTION, SOLUTION INTRAMUSCULAR; INTRAVENOUS at 07:38

## 2021-09-08 RX ADMIN — HEPARIN SODIUM 3000 UNITS: 1000 INJECTION INTRAVENOUS; SUBCUTANEOUS at 08:48

## 2021-09-08 RX ADMIN — FENTANYL CITRATE 50 MCG: 50 INJECTION, SOLUTION INTRAMUSCULAR; INTRAVENOUS at 12:50

## 2021-09-08 RX ADMIN — PHENYLEPHRINE HYDROCHLORIDE 0.9 MCG/KG/MIN: 10 INJECTION INTRAVENOUS at 23:47

## 2021-09-08 RX ADMIN — METOPROLOL TARTRATE 50 MG: 50 TABLET, FILM COATED ORAL at 05:00

## 2021-09-08 RX ADMIN — PHENYLEPHRINE HYDROCHLORIDE 100 MCG: 10 INJECTION INTRAVENOUS at 14:21

## 2021-09-08 RX ADMIN — Medication 2.5 MG: at 09:38

## 2021-09-08 RX ADMIN — CEFAZOLIN SODIUM 2 G: 2 INJECTION, SOLUTION INTRAVENOUS at 07:46

## 2021-09-08 RX ADMIN — ASPIRIN 81 MG CHEWABLE TABLET 162 MG: 81 TABLET CHEWABLE at 04:59

## 2021-09-08 RX ADMIN — FENTANYL CITRATE 100 MCG: 50 INJECTION, SOLUTION INTRAMUSCULAR; INTRAVENOUS at 10:01

## 2021-09-08 RX ADMIN — PROPOFOL 50 MCG/KG/MIN: 10 INJECTION, EMULSION INTRAVENOUS at 14:51

## 2021-09-08 RX ADMIN — Medication 2.5 MG: at 09:54

## 2021-09-08 RX ADMIN — PHENYLEPHRINE HYDROCHLORIDE 100 MCG: 10 INJECTION INTRAVENOUS at 07:50

## 2021-09-08 RX ADMIN — ROCURONIUM BROMIDE 10 MG: 10 INJECTION INTRAVENOUS at 14:05

## 2021-09-08 RX ADMIN — PHENYLEPHRINE HYDROCHLORIDE 100 MCG: 10 INJECTION INTRAVENOUS at 13:39

## 2021-09-08 RX ADMIN — OXYCODONE HYDROCHLORIDE 5 MG: 5 TABLET ORAL at 21:22

## 2021-09-08 RX ADMIN — CHLORHEXIDINE GLUCONATE 10 ML: 1.2 SOLUTION ORAL at 04:59

## 2021-09-08 RX ADMIN — CEFAZOLIN 2 G: 1 INJECTION, POWDER, FOR SOLUTION INTRAMUSCULAR; INTRAVENOUS at 11:48

## 2021-09-08 RX ADMIN — MIDAZOLAM HYDROCHLORIDE 2 MG: 1 INJECTION, SOLUTION INTRAMUSCULAR; INTRAVENOUS at 06:53

## 2021-09-08 RX ADMIN — PHENYLEPHRINE HYDROCHLORIDE 50 MCG: 10 INJECTION INTRAVENOUS at 09:54

## 2021-09-08 RX ADMIN — MIDAZOLAM HYDROCHLORIDE 1 MG: 1 INJECTION, SOLUTION INTRAMUSCULAR; INTRAVENOUS at 08:39

## 2021-09-08 RX ADMIN — ROCURONIUM BROMIDE 20 MG: 10 INJECTION INTRAVENOUS at 10:25

## 2021-09-08 RX ADMIN — GABAPENTIN 100 MG: 100 CAPSULE ORAL at 22:32

## 2021-09-08 RX ADMIN — SODIUM CHLORIDE: 9 INJECTION, SOLUTION INTRAVENOUS at 11:49

## 2021-09-08 RX ADMIN — Medication 5 MG: at 11:08

## 2021-09-08 RX ADMIN — MIDAZOLAM HYDROCHLORIDE 1 MG: 1 INJECTION, SOLUTION INTRAMUSCULAR; INTRAVENOUS at 13:49

## 2021-09-08 RX ADMIN — PHENYLEPHRINE HYDROCHLORIDE 50 MCG: 10 INJECTION INTRAVENOUS at 10:35

## 2021-09-08 RX ADMIN — ROCURONIUM BROMIDE 20 MG: 10 INJECTION INTRAVENOUS at 11:04

## 2021-09-08 RX ADMIN — ALBUMIN HUMAN 250 ML: 0.05 INJECTION, SOLUTION INTRAVENOUS at 16:50

## 2021-09-08 ASSESSMENT — ACTIVITIES OF DAILY LIVING (ADL)
ADLS_ACUITY_SCORE: 14
ADLS_ACUITY_SCORE: 14
ADLS_ACUITY_SCORE: 15
ADLS_ACUITY_SCORE: 15

## 2021-09-08 NOTE — ANESTHESIA POSTPROCEDURE EVALUATION
Patient: Jerry Bustamante    Procedure(s):  CORONARY ARTERY BYPASS GRAFTING X 4 WITH ENDOSCOPIC VEIN HARVESTING LIMA-LAD PREET-DISTAL RCA LEFT RADIAL-OM LEFT SV-ANOMOLOUS RV BRANCH ON PUMP/HILLARY    Diagnosis:CAD (coronary artery disease) [I25.10]  Diagnosis Additional Information: No value filed.    Anesthesia Type:  General    Note:  Disposition: ICU            ICU Sign Out: Anesthesiologist/ICU physician sign out WAS performed   Postop Pain Control: Uneventful            Sign Out: Well controlled pain   PONV: No   Neuro/Psych: Uneventful            Sign Out: PLANNED postop sedation   Airway/Respiratory:             Sign Out: AIRWAY IN SITU/Resp. Support               Airway in situ/Resp. Support: ETT                 Reason: Planned Pre-op   CV/Hemodynamics: Uneventful            Sign Out: Acceptable CV status   Other NRE: NONE   DID A NON-ROUTINE EVENT OCCUR? No    Event details/Postop Comments:  Report given to the critical care physician Dr Aguilera after patient delivered to ICU.             Last vitals:  Vitals Value Taken Time   /71 09/08/21 1526   Temp 36.3  C (97.34  F) 09/08/21 1538   Pulse 90 09/08/21 1539   Resp     SpO2 95 % 09/08/21 1539   Vitals shown include unvalidated device data.    Electronically Signed By: Yoni Vargas MD  September 8, 2021  3:40 PM

## 2021-09-08 NOTE — CONSULTS
[facility]  Critical Care Service  Progress Note  Date of Service (when I saw the patient): 09/08/2021  Main Plans for Today    Ventilation management   Extubation when hemodynamically stable   Assessment & Plan   Jerry Bustamante is a 51 year old male who was admitted on 9/2/2021. Post CABG x 4   Neuro  1. Encephalopathy, drug induced  2. Acute pain  3. Sedation  Plan:  -- Scheduled acetaminophen,   -- Propofol for sedation as needed until extubation  -- Delirium prevention as able    CV  1. S/p CABG x 4   2. HTN  3. Ischemic cardiomyopathy   Plan:  -- Per pathway  -- Cardiac meds per CV surgery    Resp:  1. Post operative ventilator management  2. Acute respiratory failure  Plan:  -- Current settings are:    Ventilation Mode: CMV/AC  (Continuous Mandatory Ventilation/ Assist Control)  Rate Set (breaths/minute): 16 breaths/min  Tidal Volume Set (mL): 500 mL  PEEP (cm H2O): 5 cmH2O  Oxygen Concentration (%): 60 %  Resp: 18      -- PST when hemodynamically stable    GI/Nutrition  1. No prior hx  Plan:  -- NPO  -- PPI    Renal  1. No prior hx.  Baseline creatinine appears to be 0.94  Plan:  --monitor function and electrolytes as needed with replacement per ICU protocols. - generally avoid nephrotoxic agents such as NSAID, IV contrast unless specifically required  -- adjust medications as needed for renal clearance  -- follow I/O's as appropriate.  -- maintain euvolemia    ID  Hx Latent TB  Plan:  Cefazolin ppx     Endocrine  1. Stress Hyperglycemia  Plan:  --  Insulin gtt per protocol if indicated  -- Keep BG  <180 for optimal healing    Heme:  1. Coagulopathy   Plan:  -- Monitor hemoglobin.  -- Transfuse to keep > 7.0      General cares:  DVT Prophylaxis: Heparin SQ and Pneumatic Compression Devices  GI Prophylaxis: PPI  Restraints: Restraints for medical healing needed: YES  Family update by me today: deferred  Current lines are required for patient management  Access:  Brennen Pisano  Spent on this Encounter   Billing:  I spent 30 minutes bedside and on the inpatient unit today managing the critical care of Jerry Bustamante in relation to the issues listed in this note.  Interval History   Post CAB x 4  Physical Exam   Temp: 97.5  F (36.4  C)   Temp  Min: 97.5  F (36.4  C)  Max: 97.5  F (36.4  C) BP: 123/71 Pulse: 96     SpO2: 93 %      Vitals:    09/05/21 0259 09/06/21 0640 09/07/21 0558   Weight: 87.2 kg (192 lb 3.2 oz) 86.3 kg (190 lb 3.2 oz) 86.3 kg (190 lb 3.2 oz)     I/O last 3 completed shifts:  In: 3266 [P.O.:740; I.V.:2180; Other:346]  Out: 555 [Urine:555]    GEN: Sedated and intubated   EYES: PERRL, Anicteric sclera.   HEENT:  Normocephalic, atraumatic, trachea midline, ETT secure  CV: RRR, no gallops, rubs, or murmurs  PULM/CHEST: Clear breath sounds bilaterally without rhonchi, crackles or wheeze, symmetric chest rise  GI: normal bowel sounds, soft, non-tender, no rebound tenderness or guarding, no masses  : krishna catheter in place, urine yellow and clear  EXTREMITIES: no  peripheral edema, moving all extremities, peripheral pulses intact  NEURO:Sedated   SKIN: No rashes, sores or ulcerations  Imaging personally reviewed:  Chest XR    Data   Recent Labs   Lab 09/08/21  1545 09/08/21  1541 09/08/21  1423 09/08/21  0444 09/05/21  0847 09/02/21  1828 09/02/21  1803 09/02/21  1408 09/02/21  1000 09/02/21  0959 09/02/21  0952 09/02/21  0134   WBC 20.7*  --  21.4* 11.2*  --    < >  --   --   --   --    < > 11.9*   HGB 11.0*  --  10.1* 11.8*  --    < >  --   --   --   --    < > 12.7*   MCV 82  --  82 79  --    < >  --   --   --   --    < > 78     --  162 245  --    < >  --   --   --   --    < > 193   INR 1.23*  --  1.41*  --   --   --   --   --   --   --   --   --      --  143 136 132*   < >  --   --   --   --   --  135*   POTASSIUM 3.5  3.5  --  3.1* 3.7 4.3   < >  --   --   --   --   --  4.2   CHLORIDE 115*  --  113*  --  100   < >  --   --   --   --   --  98   CO2 24  --   24  --  27   < >  --   --   --   --   --  25   BUN 19  --  19  --  16   < >  --   --   --   --   --  17   CR 1.16  --  1.19  --  1.03   < >  --   --   --   --   --  1.26   ANIONGAP 4  --  6  --  5   < >  --   --   --   --   --  12   TORRES 8.8  --  9.4  --  8.9   < >  --   --   --   --   --  9.8   * 152* 196*  --  375*   < >  --   --    < >  --   --  485*   ALBUMIN 2.8*  --   --   --   --   --   --   --   --   --   --  3.6   PROTTOTAL 6.4*  --   --   --   --   --   --   --   --   --   --  7.8   BILITOTAL 1.0  --   --   --   --   --   --   --   --   --   --  0.4   ALKPHOS 175*  --   --   --   --   --   --   --   --   --   --  118   ALT 51  --   --   --   --   --   --   --   --   --   --  23   AST 51*  --   --   --   --   --   --   --   --   --   --  29   TROPONIN  --   --   --   --   --   --  12.292* 12.451*  --  10.300*  --   --     < > = values in this interval not displayed.         Brennen Hutchinson, JIMBO  SICU Fellow, [facility]

## 2021-09-08 NOTE — PLAN OF CARE
Pt A x O x 4. No reports of pain. Oxygen saturation >90% on RA. SR on tele. Heparin infusing. ACHS blood sugar checks, received insulin per orders. Ambulates independently in room. Plan for CABG AM, first case. Pt educated on poc, cares, medications and safety. Will continue to monitor.

## 2021-09-08 NOTE — BRIEF OP NOTE
[facility]    Brief Operative Note    Pre-operative diagnosis: CAD (coronary artery disease) [I25.10]  Post-operative diagnosis Same as pre-operative diagnosis    Procedure: Procedure(s):  CORONARY ARTERY BYPASS GRAFTING X 4 WITH ENDOSCOPIC VEIN HARVESTING LIMA-LAD PREET-DISTAL RCA LEFT RADIAL-OM LEFT SV-ANOMOLOUS RV BRANCH ON PUMP/HILLARY  Surgeon: Surgeon(s) and Role:     * Antelmo Mccullough MD - Primary     * Cristo Larson PA-C - Assisting     * Matthew Dvei MD - Fellow - Assisting  Anesthesia: General   Estimated blood loss: 1000 ml  Drains: 2 meds chest tubes and 2 pleural consuelo drains  Specimens:   ID Type Source Tests Collected by Time Destination   A :  Blood Line, arterial CBC WITH PLATELETS, BASIC METABOLIC PANEL, FIBRINOGEN ACTIVITY, PARTIAL THROMBOPLASTIN TIME, INR Antelmo Mccullough MD 9/8/2021  2:23 PM      Findings:   see op note.  Complications: None.  Implants: * No implants in log *

## 2021-09-08 NOTE — PROVIDER NOTIFICATION
DATE:  9/8/2021   TIME OF RECEIPT FROM LAB: 1700   LAB TEST:  Venous Ph  LAB VALUE:  7.16  RESULTS GIVEN WITH READ-BACK TO (PROVIDER): Dr Reynoso, ICU  TIME LAB VALUE REPORTED TO PROVIDER:   2714    Order to Increase RR 28. Change made to vent with MD present. Will recheck gases in 1 hour.

## 2021-09-08 NOTE — PROVIDER NOTIFICATION
ECCHYMOSIS NOTED TO LEFT GROIN, GROIN IS SOFT.  NO HEMATOMA NOTED.  MEPILEX DRESSING TO COCCYX DRY AND INTACT.

## 2021-09-08 NOTE — ANESTHESIA PROCEDURE NOTES
Central Line/PA Catheter Placement  Pre-Procedure   Staff -        Anesthesiologist:  Yoni Vargas MD       Performed By: anesthesiologist       Location: pre-op       Pre-Anesthestic Checklist: patient identified, IV checked, site marked, risks and benefits discussed, informed consent, monitors and equipment checked, pre-op evaluation and at physician/surgeon's request  Timeout:       Correct Patient: Yes        Correct Procedure: Yes        Correct Site: Yes        Correct Position: Yes        Correct Laterality: Yes     Procedure   Procedure: central line       Laterality: right       Insertion Site: right, internal jugular.       Patient Position: Trendelenburg  Sterile Prep        All elements of maximal sterile barrier technique followed       Patient Prep/Sterile Barriers: draped, hand hygiene, gloves , hat , mask , draped, gown, sterile gel and probe cover       Skin prep: Chloraprep  Insertion/Injection        Local skin infiltrated with 3 mL of 1% lidocaine.        Technique: ultrasound guided and Seldinger Technique        1. Ultrasound was used to evaluate the access site.       2. Vein evaluated via ultrasound for patency/adequacy.       3. Using real-time ultrasound the needle/catheter was observed entering the artery/vein.       4. Permanent image was captured and entered into the patient's record.       5. The visualized structures were anatomically normal.       6. There were no apparent abnormal pathologic findings.       Introducer Type: 9 Fr, 2-lumen MAC        PA Catheter Type: pacing         Appropriate RV, RA and PA waveforms noted:  Yes            Withdrawn and Locked at cm: 45  Narrative         Secured by: suture       Tegaderm and Biopatch dressing used.       Complications: None apparent,        blood aspirated from all lumens,        All lumens flushed: Yes       Verification method: Ultrasound  Comments:  Ultrasound Interpretation central venous access    1. Ultrasound guidance  was used to evaluate potential access sites.  2. Ultrasound was also used to verify the patency of the vessel specified above.   3. Ultrasound was used to visualize the needle entering the vessel.   4. The visualized structures were anatomically normal.  5. There were no apparent abnormal pathological findings.  6. A permanent ultrasound image was saved in the patient's record.

## 2021-09-08 NOTE — ANESTHESIA PROCEDURE NOTES
Airway       Patient location during procedure: OR       Procedure Start/Stop Times: 9/8/2021 7:41 AM  Staff -        Anesthesiologist:  Yoni Vargas MD       CRNA: Tahira Beltran APRN CRNA       Performed By: CRNA  Consent for Airway        Urgency: elective  Indications and Patient Condition       Indications for airway management: bernadine-procedural       Induction type:intravenous       Mask difficulty assessment: 2 - vent by mask + OA or adjuvant +/- NMBA    Final Airway Details       Final airway type: endotracheal airway       Successful airway: ETT - single and Single subglottic suction  Endotracheal Airway Details        ETT size (mm): 8.0       Cuffed: yes       Successful intubation technique: direct laryngoscopy       DL Blade Type: MAC 3       Grade View of Cords: 1       Adjucts: stylet       Position: Right       Measured from: gums/teeth       Secured at (cm): 25       Bite block used: None    Post intubation assessment        Placement verified by: capnometry, equal breath sounds and chest rise        Number of attempts at approach: 1       Secured with: pink tape       Ease of procedure: easy       Dentition: Unchanged and Intact

## 2021-09-08 NOTE — PLAN OF CARE
A/O, VSS, O2sats 95% on RA. Tele SR. Given IV morphine once for 4/10 chest pain with relief, otherwise denies pain, SOB, or dizziness. Independent, ambulating in halls. Heparin at 1500 units/hr, 10A at 23:45. Plan for CABG tomorrow, pre-surgery handouts and videos provided.

## 2021-09-08 NOTE — ANESTHESIA PROCEDURE NOTES
Arterial Line Procedure Note  Pre-Procedure   Staff -        Anesthesiologist:  Yoni Vargas MD       Performed By: Anesthesiologist       Location: pre-op       Pre-Anesthestic Checklist: patient identified, IV checked, risks and benefits discussed, informed consent, monitors and equipment checked and pre-op evaluation  Timeout:       Correct Patient: Yes        Correct Procedure: Yes        Correct Site: Yes        Correct Position: Yes   Procedure   Procedure: arterial line       Diagnosis: Hemodynamic instability       Laterality: right       Insertion Site: radial.  Sterile Prep        All elements of maximal sterile barrier technique followed       Patient Prep/Sterile Barriers: hand hygiene, sterile gloves, hat, mask       Skin prep: Chloraprep  Insertion/Injection        Technique: Seldinger Technique and Scott's test completed        Catheter Type/Size: 20 gauge, 1.75 in/4.5 cm quick cath (integral wire)  Narrative         Secured by: other       Tegaderm dressing used.     Arterial waveform: Yes

## 2021-09-08 NOTE — PROGRESS NOTES
Hospitalist Chart Check    Underwent 4V CABG earlier today per Dr. Mccullough. Remains intubated in the immediate postop period. Ongoing cares per intensivist and CV surgery. Hospitalist service will follow peripherally.     Idania Lopez,   Internal Medicine - Hospitalist  9/8/2021  4:54 PM

## 2021-09-08 NOTE — ANESTHESIA CARE TRANSFER NOTE
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    Anesthesia Type:   General     Note:    Oropharynx: endotracheal tube in place  Level of Consciousness: iatrogenic sedation      Independent Airway: airway patency not satisfactory and stable  Dentition: dentition unchanged  Vital Signs Stable: post-procedure vital signs reviewed and stable  Report to RN Given: handoff report given  Patient transferred to: ICU  Comments: Patient connected to SpO2, EKG, and arterial blood pressure transport monitors and accompanied by CRNA, anesthesiologist, circulating RN, surgeon (fellow) to ICU room. Patient ventilated by anesthesiologist with ambu via ETT with O2 at 15 liters per minute during transport.     On arrival to ICU, endotracheal tube position unchanged, equal, bilateral breath sounds auscultated in ICU room, patient placed on ICU ventilator by respiratory therapist, teeth and oral mucosa intact and unchanged at handoff of care. At anesthesia handoff of care, clinical monitors and alarms on and functioning, report on patient's clinical status given to ICU RN, report on patient's clinical status given to intensivist, ICU staff questions answered.  ICU Handoff: Call for PAUSE to initiate/utilize ICU HANDOFF, Identified Patient, Identified Responsible Provider, Reviewed the Pertinent Medical History, Discussed Surgical Course, Reviewed Intra-OP Anesthesia Management and Issues during Anesthesia, Set Expectations for Post Procedure Period and Allowed Opportunity for Questions and Acknowledgement of Understanding      Vitals:  Vitals Value Taken Time   /71 09/08/21 1526   Temp 36.3  C (97.34  F) 09/08/21 1540   Pulse 87 09/08/21 1540   Resp     SpO2 94 % 09/08/21 1540    Vitals shown include unvalidated device data.    Electronically Signed By: SCOTT Sales CRNA  September 8, 2021  3:41 PM

## 2021-09-09 ENCOUNTER — APPOINTMENT (OUTPATIENT)
Dept: GENERAL RADIOLOGY | Facility: CLINIC | Age: 51
End: 2021-09-09
Attending: STUDENT IN AN ORGANIZED HEALTH CARE EDUCATION/TRAINING PROGRAM
Payer: COMMERCIAL

## 2021-09-09 LAB
BASE EXCESS BLDA CALC-SCNC: -1 MMOL/L (ref -9–1.8)
CA-I BLD-MCNC: 4.5 MG/DL (ref 4.4–5.2)
ERYTHROCYTE [DISTWIDTH] IN BLOOD BY AUTOMATED COUNT: 14.6 % (ref 10–15)
GLUCOSE BLDC GLUCOMTR-MCNC: 109 MG/DL (ref 70–99)
GLUCOSE BLDC GLUCOMTR-MCNC: 113 MG/DL (ref 70–99)
GLUCOSE BLDC GLUCOMTR-MCNC: 115 MG/DL (ref 70–99)
GLUCOSE BLDC GLUCOMTR-MCNC: 115 MG/DL (ref 70–99)
GLUCOSE BLDC GLUCOMTR-MCNC: 122 MG/DL (ref 70–99)
GLUCOSE BLDC GLUCOMTR-MCNC: 130 MG/DL (ref 70–99)
GLUCOSE BLDC GLUCOMTR-MCNC: 132 MG/DL (ref 70–99)
GLUCOSE BLDC GLUCOMTR-MCNC: 132 MG/DL (ref 70–99)
GLUCOSE BLDC GLUCOMTR-MCNC: 134 MG/DL (ref 70–99)
GLUCOSE BLDC GLUCOMTR-MCNC: 136 MG/DL (ref 70–99)
GLUCOSE BLDC GLUCOMTR-MCNC: 147 MG/DL (ref 70–99)
GLUCOSE BLDC GLUCOMTR-MCNC: 152 MG/DL (ref 70–99)
GLUCOSE BLDC GLUCOMTR-MCNC: 88 MG/DL (ref 70–99)
HCO3 BLD-SCNC: 24 MMOL/L (ref 21–28)
HCT VFR BLD AUTO: 30.7 % (ref 40–53)
HGB BLD-MCNC: 10 G/DL (ref 13.3–17.7)
LACTATE SERPL-SCNC: 1.5 MMOL/L (ref 0.7–2)
MAGNESIUM SERPL-MCNC: 1.9 MG/DL (ref 1.6–2.3)
MCH RBC QN AUTO: 26.3 PG (ref 26.5–33)
MCHC RBC AUTO-ENTMCNC: 32.6 G/DL (ref 31.5–36.5)
MCV RBC AUTO: 81 FL (ref 78–100)
O2/TOTAL GAS SETTING VFR VENT: 60 %
OXYHGB MFR BLD: 97 % (ref 92–100)
PCO2 BLD: 43 MM HG (ref 35–45)
PH BLD: 7.37 [PH] (ref 7.35–7.45)
PHOSPHATE SERPL-MCNC: 4.8 MG/DL (ref 2.5–4.5)
PLATELET # BLD AUTO: 213 10E3/UL (ref 150–450)
PO2 BLD: 103 MM HG (ref 80–105)
POTASSIUM BLD-SCNC: 4.1 MMOL/L (ref 3.4–5.3)
RBC # BLD AUTO: 3.8 10E6/UL (ref 4.4–5.9)
SARS-COV-2 RNA RESP QL NAA+PROBE: NEGATIVE
WBC # BLD AUTO: 14.4 10E3/UL (ref 4–11)

## 2021-09-09 PROCEDURE — 85027 COMPLETE CBC AUTOMATED: CPT | Performed by: STUDENT IN AN ORGANIZED HEALTH CARE EDUCATION/TRAINING PROGRAM

## 2021-09-09 PROCEDURE — 82330 ASSAY OF CALCIUM: CPT | Performed by: STUDENT IN AN ORGANIZED HEALTH CARE EDUCATION/TRAINING PROGRAM

## 2021-09-09 PROCEDURE — 250N000011 HC RX IP 250 OP 636: Performed by: STUDENT IN AN ORGANIZED HEALTH CARE EDUCATION/TRAINING PROGRAM

## 2021-09-09 PROCEDURE — 999N000157 HC STATISTIC RCP TIME EA 10 MIN

## 2021-09-09 PROCEDURE — 99207 PR NO CHARGE LOS: CPT | Performed by: INTERNAL MEDICINE

## 2021-09-09 PROCEDURE — C9113 INJ PANTOPRAZOLE SODIUM, VIA: HCPCS | Performed by: STUDENT IN AN ORGANIZED HEALTH CARE EDUCATION/TRAINING PROGRAM

## 2021-09-09 PROCEDURE — 200N000001 HC R&B ICU

## 2021-09-09 PROCEDURE — 83735 ASSAY OF MAGNESIUM: CPT | Performed by: STUDENT IN AN ORGANIZED HEALTH CARE EDUCATION/TRAINING PROGRAM

## 2021-09-09 PROCEDURE — 84100 ASSAY OF PHOSPHORUS: CPT | Performed by: STUDENT IN AN ORGANIZED HEALTH CARE EDUCATION/TRAINING PROGRAM

## 2021-09-09 PROCEDURE — 250N000011 HC RX IP 250 OP 636: Performed by: INTERNAL MEDICINE

## 2021-09-09 PROCEDURE — 258N000003 HC RX IP 258 OP 636: Performed by: STUDENT IN AN ORGANIZED HEALTH CARE EDUCATION/TRAINING PROGRAM

## 2021-09-09 PROCEDURE — 71045 X-RAY EXAM CHEST 1 VIEW: CPT

## 2021-09-09 PROCEDURE — 82805 BLOOD GASES W/O2 SATURATION: CPT | Performed by: STUDENT IN AN ORGANIZED HEALTH CARE EDUCATION/TRAINING PROGRAM

## 2021-09-09 PROCEDURE — 93005 ELECTROCARDIOGRAM TRACING: CPT

## 2021-09-09 PROCEDURE — 250N000009 HC RX 250: Performed by: STUDENT IN AN ORGANIZED HEALTH CARE EDUCATION/TRAINING PROGRAM

## 2021-09-09 PROCEDURE — 87635 SARS-COV-2 COVID-19 AMP PRB: CPT | Performed by: INTERNAL MEDICINE

## 2021-09-09 PROCEDURE — 250N000012 HC RX MED GY IP 250 OP 636 PS 637: Performed by: STUDENT IN AN ORGANIZED HEALTH CARE EDUCATION/TRAINING PROGRAM

## 2021-09-09 PROCEDURE — 83605 ASSAY OF LACTIC ACID: CPT | Performed by: STUDENT IN AN ORGANIZED HEALTH CARE EDUCATION/TRAINING PROGRAM

## 2021-09-09 PROCEDURE — 250N000013 HC RX MED GY IP 250 OP 250 PS 637: Performed by: PHYSICIAN ASSISTANT

## 2021-09-09 PROCEDURE — 250N000011 HC RX IP 250 OP 636: Performed by: PHYSICIAN ASSISTANT

## 2021-09-09 PROCEDURE — 258N000003 HC RX IP 258 OP 636: Performed by: SURGERY

## 2021-09-09 PROCEDURE — 250N000013 HC RX MED GY IP 250 OP 250 PS 637: Performed by: STUDENT IN AN ORGANIZED HEALTH CARE EDUCATION/TRAINING PROGRAM

## 2021-09-09 PROCEDURE — 84132 ASSAY OF SERUM POTASSIUM: CPT | Performed by: SURGERY

## 2021-09-09 PROCEDURE — 3E043XZ INTRODUCTION OF VASOPRESSOR INTO CENTRAL VEIN, PERCUTANEOUS APPROACH: ICD-10-PCS | Performed by: PHYSICIAN ASSISTANT

## 2021-09-09 PROCEDURE — 250N000009 HC RX 250: Performed by: INTERNAL MEDICINE

## 2021-09-09 PROCEDURE — 94003 VENT MGMT INPAT SUBQ DAY: CPT

## 2021-09-09 PROCEDURE — 99291 CRITICAL CARE FIRST HOUR: CPT | Mod: 24 | Performed by: INTERNAL MEDICINE

## 2021-09-09 RX ORDER — HEPARIN SODIUM 5000 [USP'U]/.5ML
5000 INJECTION, SOLUTION INTRAVENOUS; SUBCUTANEOUS EVERY 8 HOURS
Status: DISCONTINUED | OUTPATIENT
Start: 2021-09-09 | End: 2021-09-13

## 2021-09-09 RX ORDER — SODIUM CHLORIDE 9 MG/ML
INJECTION, SOLUTION INTRAVENOUS CONTINUOUS
Status: DISCONTINUED | OUTPATIENT
Start: 2021-09-09 | End: 2021-09-22 | Stop reason: HOSPADM

## 2021-09-09 RX ORDER — MAGNESIUM SULFATE HEPTAHYDRATE 40 MG/ML
2 INJECTION, SOLUTION INTRAVENOUS ONCE
Status: COMPLETED | OUTPATIENT
Start: 2021-09-09 | End: 2021-09-09

## 2021-09-09 RX ORDER — ASPIRIN 81 MG/1
324 TABLET, CHEWABLE ORAL DAILY
Status: DISCONTINUED | OUTPATIENT
Start: 2021-09-09 | End: 2021-09-20

## 2021-09-09 RX ORDER — SODIUM CHLORIDE, SODIUM LACTATE, POTASSIUM CHLORIDE, CALCIUM CHLORIDE 600; 310; 30; 20 MG/100ML; MG/100ML; MG/100ML; MG/100ML
INJECTION, SOLUTION INTRAVENOUS CONTINUOUS
Status: DISCONTINUED | OUTPATIENT
Start: 2021-09-09 | End: 2021-09-12

## 2021-09-09 RX ORDER — ROSUVASTATIN CALCIUM 10 MG/1
10 TABLET, COATED ORAL DAILY
Status: DISCONTINUED | OUTPATIENT
Start: 2021-09-09 | End: 2021-09-22 | Stop reason: HOSPADM

## 2021-09-09 RX ORDER — AMLODIPINE BESYLATE 2.5 MG/1
2.5 TABLET ORAL DAILY
Status: DISCONTINUED | OUTPATIENT
Start: 2021-09-09 | End: 2021-09-15

## 2021-09-09 RX ORDER — DEXMEDETOMIDINE HYDROCHLORIDE 4 UG/ML
.2-1.2 INJECTION, SOLUTION INTRAVENOUS CONTINUOUS
Status: DISCONTINUED | OUTPATIENT
Start: 2021-09-09 | End: 2021-09-12

## 2021-09-09 RX ADMIN — HYDROMORPHONE HYDROCHLORIDE 0.4 MG: 0.2 INJECTION, SOLUTION INTRAMUSCULAR; INTRAVENOUS; SUBCUTANEOUS at 15:05

## 2021-09-09 RX ADMIN — HYDROMORPHONE HYDROCHLORIDE 0.4 MG: 0.2 INJECTION, SOLUTION INTRAMUSCULAR; INTRAVENOUS; SUBCUTANEOUS at 07:37

## 2021-09-09 RX ADMIN — CEFAZOLIN SODIUM 2 G: 2 INJECTION, SOLUTION INTRAVENOUS at 04:32

## 2021-09-09 RX ADMIN — HYDROMORPHONE HYDROCHLORIDE 0.4 MG: 0.2 INJECTION, SOLUTION INTRAMUSCULAR; INTRAVENOUS; SUBCUTANEOUS at 04:32

## 2021-09-09 RX ADMIN — SODIUM CHLORIDE, POTASSIUM CHLORIDE, SODIUM LACTATE AND CALCIUM CHLORIDE: 600; 310; 30; 20 INJECTION, SOLUTION INTRAVENOUS at 21:38

## 2021-09-09 RX ADMIN — Medication 0.03 MCG/KG/MIN: at 14:24

## 2021-09-09 RX ADMIN — CHLORHEXIDINE GLUCONATE 15 ML: 1.2 SOLUTION ORAL at 19:49

## 2021-09-09 RX ADMIN — HEPARIN SODIUM 5000 UNITS: 5000 INJECTION INTRAVENOUS; SUBCUTANEOUS at 14:00

## 2021-09-09 RX ADMIN — PROPOFOL 45 MCG/KG/MIN: 10 INJECTION, EMULSION INTRAVENOUS at 01:45

## 2021-09-09 RX ADMIN — SODIUM CHLORIDE 2.5 MG/HR: 0.9 INJECTION, SOLUTION INTRAVENOUS at 01:10

## 2021-09-09 RX ADMIN — DEXMEDETOMIDINE HYDROCHLORIDE 0.2 MCG/KG/HR: 400 INJECTION INTRAVENOUS at 09:03

## 2021-09-09 RX ADMIN — CHLORHEXIDINE GLUCONATE 15 ML: 1.2 SOLUTION ORAL at 08:32

## 2021-09-09 RX ADMIN — GABAPENTIN 100 MG: 100 CAPSULE ORAL at 21:25

## 2021-09-09 RX ADMIN — ROSUVASTATIN CALCIUM 10 MG: 10 TABLET, FILM COATED ORAL at 09:33

## 2021-09-09 RX ADMIN — PHENYLEPHRINE HYDROCHLORIDE 0.5 MCG/KG/MIN: 10 INJECTION INTRAVENOUS at 11:25

## 2021-09-09 RX ADMIN — FUROSEMIDE 20 MG: 10 INJECTION, SOLUTION INTRAVENOUS at 00:15

## 2021-09-09 RX ADMIN — ACETAMINOPHEN 975 MG: 325 TABLET, FILM COATED ORAL at 12:48

## 2021-09-09 RX ADMIN — SENNOSIDES AND DOCUSATE SODIUM 1 TABLET: 8.6; 5 TABLET ORAL at 21:25

## 2021-09-09 RX ADMIN — ACETAMINOPHEN 975 MG: 325 TABLET, FILM COATED ORAL at 21:25

## 2021-09-09 RX ADMIN — HYDROMORPHONE HYDROCHLORIDE 0.4 MG: 0.2 INJECTION, SOLUTION INTRAMUSCULAR; INTRAVENOUS; SUBCUTANEOUS at 20:02

## 2021-09-09 RX ADMIN — SODIUM CHLORIDE 4 UNITS/HR: 9 INJECTION, SOLUTION INTRAVENOUS at 14:03

## 2021-09-09 RX ADMIN — SODIUM CHLORIDE: 9 INJECTION, SOLUTION INTRAVENOUS at 21:09

## 2021-09-09 RX ADMIN — MAGNESIUM SULFATE HEPTAHYDRATE 2 G: 40 INJECTION, SOLUTION INTRAVENOUS at 06:25

## 2021-09-09 RX ADMIN — GABAPENTIN 100 MG: 100 CAPSULE ORAL at 09:33

## 2021-09-09 RX ADMIN — AMLODIPINE BESYLATE 2.5 MG: 2.5 TABLET ORAL at 09:33

## 2021-09-09 RX ADMIN — PROPOFOL 20 MCG/KG/MIN: 10 INJECTION, EMULSION INTRAVENOUS at 14:24

## 2021-09-09 RX ADMIN — DEXMEDETOMIDINE HYDROCHLORIDE 1.2 MCG/KG/HR: 400 INJECTION INTRAVENOUS at 16:45

## 2021-09-09 RX ADMIN — ASPIRIN 81 MG CHEWABLE TABLET 324 MG: 81 TABLET CHEWABLE at 09:33

## 2021-09-09 RX ADMIN — CEFAZOLIN SODIUM 2 G: 2 INJECTION, SOLUTION INTRAVENOUS at 12:48

## 2021-09-09 RX ADMIN — ALLOPURINOL 100 MG: 100 TABLET ORAL at 09:33

## 2021-09-09 RX ADMIN — HEPARIN SODIUM 5000 UNITS: 5000 INJECTION INTRAVENOUS; SUBCUTANEOUS at 21:35

## 2021-09-09 RX ADMIN — PROPOFOL 45 MCG/KG/MIN: 10 INJECTION, EMULSION INTRAVENOUS at 05:38

## 2021-09-09 RX ADMIN — GABAPENTIN 100 MG: 100 CAPSULE ORAL at 16:30

## 2021-09-09 RX ADMIN — HYDROMORPHONE HYDROCHLORIDE 0.4 MG: 0.2 INJECTION, SOLUTION INTRAMUSCULAR; INTRAVENOUS; SUBCUTANEOUS at 00:25

## 2021-09-09 RX ADMIN — PANTOPRAZOLE SODIUM 40 MG: 40 INJECTION, POWDER, FOR SOLUTION INTRAVENOUS at 18:38

## 2021-09-09 RX ADMIN — SODIUM CHLORIDE 4 UNITS/HR: 9 INJECTION, SOLUTION INTRAVENOUS at 06:25

## 2021-09-09 RX ADMIN — SENNOSIDES AND DOCUSATE SODIUM 1 TABLET: 8.6; 5 TABLET ORAL at 09:33

## 2021-09-09 RX ADMIN — POTASSIUM CHLORIDE, DEXTROSE MONOHYDRATE AND SODIUM CHLORIDE 30 ML/HR: 150; 5; 450 INJECTION, SOLUTION INTRAVENOUS at 21:40

## 2021-09-09 RX ADMIN — DEXMEDETOMIDINE HYDROCHLORIDE 1.2 MCG/KG/HR: 400 INJECTION INTRAVENOUS at 12:54

## 2021-09-09 RX ADMIN — HYDROMORPHONE HYDROCHLORIDE 0.4 MG: 0.2 INJECTION, SOLUTION INTRAMUSCULAR; INTRAVENOUS; SUBCUTANEOUS at 13:21

## 2021-09-09 RX ADMIN — PROPOFOL 20 MCG/KG/MIN: 10 INJECTION, EMULSION INTRAVENOUS at 20:10

## 2021-09-09 RX ADMIN — DEXMEDETOMIDINE HYDROCHLORIDE 1.2 MCG/KG/HR: 400 INJECTION INTRAVENOUS at 20:36

## 2021-09-09 RX ADMIN — ACETAMINOPHEN 975 MG: 325 TABLET, FILM COATED ORAL at 04:28

## 2021-09-09 RX ADMIN — PROPOFOL 55 MCG/KG/MIN: 10 INJECTION, EMULSION INTRAVENOUS at 08:45

## 2021-09-09 RX ADMIN — POLYETHYLENE GLYCOL 3350 17 G: 17 POWDER, FOR SOLUTION ORAL at 09:33

## 2021-09-09 ASSESSMENT — ACTIVITIES OF DAILY LIVING (ADL)
ADLS_ACUITY_SCORE: 19

## 2021-09-09 NOTE — PROGRESS NOTES
[facility]  Cardiovascular and Thoracic Surgery Daily Note          Assessment and Plan:   POD#1 s/p coronary artery bypass grafting x 4 (LIMA to LAD, PREET to mid RCA, radial to OM, SVG to anomalous RV branch off the LAD) on 21 by Dr Mccullough  -[facility]: continues on epi 0.03 mcg/kg/min, nicardipine 2.5 mg/hr, will start amlodipine 2.5 mg daily (plans for prevention of radial artery spasm for 3 months), wean epi as able, restart rosuvastatin 10 mg, aspirin 325 mg, BB when able, -130s/70-80s, HR 80-90s, ok to start heparin subcutaneous tid  -Resp: Continues to be intubated, plans to wean to extubate  -Neuro:  Sedated, not following commands  -Renal: good UO,  ml/hr, Cr/BUN 1.15/, krishna in place while on gtt  Creatinine   Date Value Ref Range Status   2021 1.15 0.66 - 1.25 mg/dL Final   -GI:  Continue bowel regimen  -:  Krishna in place, continue with krishna  -Endo: Hgb A1C 9.6%, Continue Insulin gtt 48hrs, Consult placed to Hospitalist for glucose management  -FEN: replete lytes as needed, Na 143, K 4.1, Mg 1.9  -ID: Temp (24hrs), Av.1  F (37.8  C), Min:97.5  F (36.4  C), Max:101.8  F (38.8  C), low grade fevers, finishing perioperative antibiotics     WBC Count   Date Value Ref Range Status   2021 14.4 (H) 4.0 - 11.0 10e3/uL Final   ]  -Heme:   Hemoglobin   Date Value Ref Range Status   2021 10.0 (L) 13.3 - 17.7 g/dL Final   -Proph: PCD, heparin subcutaneous tid  -Dispo: Plan to wean to extubate. Switch to precedex. Wean epi as able. Start amlodipine 2.5 mg and rosuvastatin 10 mg. Leave chest tubes and krishna in place. Continue insulin gtt. Will ask hospitalist for assistance on DM management with Hgb A1C 9+. Continue to monitor.           Interval History:   Intubated and sedated          Medications:       acetaminophen  975 mg Oral Q8H     allopurinol  100 mg Oral Daily     amLODIPine  2.5 mg Oral Daily     aspirin  324 mg Oral or NG Tube Daily     ceFAZolin  2 g  Intravenous Q8H     chlorhexidine  15 mL Mouth/Throat Q12H     gabapentin  100 mg Oral TID     heparin ANTICOAGULANT  5,000 Units Subcutaneous Q8H     pantoprazole  40 mg Per Feeding Tube QAM AC    Or     pantoprazole (PROTONIX) IV  40 mg Intravenous QAM AC     polyethylene glycol  17 g Oral Daily     rosuvastatin  10 mg Oral Daily     senna-docusate  1 tablet Oral BID     sodium chloride (PF)  3 mL Intracatheter Q8H     [START ON 9/11/2021] acetaminophen, bisacodyl, glucose **OR** dextrose **OR** glucagon, EPINEPHrine, hydrALAZINE, HYDROmorphone **OR** HYDROmorphone, lidocaine 4%, lidocaine (buffered or not buffered), magnesium hydroxide, methocarbamol, naloxone **OR** naloxone **OR** naloxone **OR** naloxone, niCARdipine, ondansetron **OR** ondansetron, oxyCODONE **OR** oxyCODONE, phenylephrine, BETA BLOCKER NOT PRESCRIBED, sodium chloride (PF)          Physical Exam:   Vitals were reviewed  Blood pressure 114/74, pulse 95, temperature 99.3  F (37.4  C), temperature source Bladder, resp. rate 30, weight 89 kg (196 lb 3.4 oz), SpO2 97 %.  Gen: lying in bed, comfortable, +intubated    Lungs: CTA anteriorly    Cardiovascular: RRR, telemetry SR 90s, -edema LE, +dorsalis pedis pulses 1+ bilaterally    Abdomen: BS hypo, soft    Derm: sternal incision D/I, +wound VAC   L UE incisions D/I   L LE incisions D/I    CT: serous output, -leak    Carotid US (9/3/21) - less than 50% stenosis of bilateral ICAs    Echo (9/3/21) - EF 40-45%   Severe hypokinesia of apical and distal septal wall    Mild hypokinesia of inferior wall   Mild mitral regurg   Ascending aorta is mildly dilated (3.9 cm)   RV systolic function is normal   Trace tricuspid regurg   No pericardial effusion    +epi 0.03 mcg/kg/min  Nicardipine 2.5 mg/hr  PEEP 8  FiO2 50%  +krishna    Weight:   Vitals:    09/04/21 0537 09/05/21 0259 09/06/21 0640 09/07/21 0558   Weight: 86.1 kg (189 lb 13.1 oz) 87.2 kg (192 lb 3.2 oz) 86.3 kg (190 lb 3.2 oz) 86.3 kg (190 lb 3.2 oz)     09/09/21 0500   Weight: 89 kg (196 lb 3.4 oz)            Data:   Labs:   Lab Results   Component Value Date    WBC 14.4 09/09/2021     Lab Results   Component Value Date    RBC 3.80 09/09/2021     Lab Results   Component Value Date    HGB 10.0 09/09/2021     Lab Results   Component Value Date    HCT 30.7 09/09/2021     No components found for: MCT  Lab Results   Component Value Date    MCV 81 09/09/2021     Lab Results   Component Value Date    MCH 26.3 09/09/2021     Lab Results   Component Value Date    MCHC 32.6 09/09/2021     Lab Results   Component Value Date    RDW 14.6 09/09/2021     Lab Results   Component Value Date     09/09/2021       Last Basic Metabolic Panel:  Lab Results   Component Value Date     09/08/2021      Lab Results   Component Value Date    POTASSIUM 4.1 09/09/2021     Lab Results   Component Value Date    CHLORIDE 114 09/08/2021     Lab Results   Component Value Date    TORRES 8.6 09/08/2021     Lab Results   Component Value Date    CO2 22 09/08/2021     Lab Results   Component Value Date    BUN 19 09/08/2021     Lab Results   Component Value Date    CR 1.15 09/08/2021     Lab Results   Component Value Date     09/09/2021     09/08/2021     Pt seen with Dr Mccormick and plans made.    Cristo Larson PA-C  [phone]

## 2021-09-09 NOTE — PROGRESS NOTES
[facility] ICU RESPIRATORY NOTE        Date of Admission: 9/2/2021    Date of Intubation (most recent): 9/8/21    Reason for Mechanical Ventilation: cardiac surgery    Number of Days on Mechanical Ventilation: 2    Met Criteria for Spontaneous Breathing Trial: No    Reason for No Spontaneous Breathing Trial: weaning parameters not met    Significant Events Today: none    /81 (BP Location: Right arm)   Pulse 100   Temp (!) 101.1  F (38.4  C)   Resp 30   Wt 86.3 kg (190 lb 3.2 oz)   SpO2 100%   BMI 27.29 kg/m        ABG Results:   Recent Labs   Lab 09/08/21 2217 09/08/21 2004 09/08/21 1815 09/08/21  1645 09/08/21  1547   PH 7.29* 7.29* 7.24*  --  7.22*   PCO2 47* 46* 52*  --  57*   PO2 130* 70* 67*  --  60*   HCO3 23 22 22  --  23   O2PER 40 60 60 60 60     Venous Blood Gas  Recent Labs   Lab 09/08/21 2217 09/08/21 2004 09/08/21 1815 09/08/21 1645 09/08/21  1544 09/08/21  1221 09/08/21  1149   PHV  --   --   --  7.16* 7.20*  --  7.28*   PCO2V  --   --   --  69* 64*  --  58*   PO2V  --   --   --  37 33  --  49*   HCO3V  --   --   --  24 25  --  27   MEERA  --   --   --  -5.3 -4.1  --  -0.7   O2PER 40 60 60 60 60   < > 80.0    < > = values in this interval not displayed.       Current Vent Settings: Ventilation Mode: CMV/AC  (Continuous Mandatory Ventilation/ Assist Control)  Rate Set (breaths/minute): 30 breaths/min  Tidal Volume Set (mL): 500 mL  PEEP (cm H2O): 5 cmH2O  Oxygen Concentration (%): 60 %  Resp: 30      Plan: assess V/Q, Vent settings and liberation.    MARIIA ZUNIGA, RT

## 2021-09-09 NOTE — PROVIDER NOTIFICATION
Dr. Amaral notified of current ABG 7.29/46/70.   Will increase peep to 10 and rate to 30. ABG to follow.

## 2021-09-09 NOTE — PROVIDER NOTIFICATION
Dr Devi updated on pt status. Given that pt is on a Peep of 10 he may continue to be on vent overnight and extubate in the AM. Will continue to follow ABG and wean as able.

## 2021-09-09 NOTE — OP NOTE
Procedure Date: 09/08/2021    REFERRING CARDIOLOGIST:  Dr. Waylon Castro and Paul Warner MD    PREOPERATIVE DIAGNOSIS:  Severe 3-vessel artery disease.    POSTOPERATIVE DIAGNOSIS:  Severe 3-vessel artery disease.    SURGEON:  Antelmo Mccullough MD    ASSISTANT:  Matthew Devi MD and Cristo RAYMUNDO.    NAME OF OPERATION:  Coronary artery bypass grafting x 4 with left internal mammary artery to the distal left anterior descending, right internal mammary artery to the mid right coronary artery, left radial artery to the obtuse marginal artery, reverse saphenous vein graft to the anomalous RV branch off the LAD, endoscopic vein harvest from the left lower extremity, endoscopic left radial artery harvest, intraoperative HILLARY.    ANESTHESIA:  General endotracheal.    INDICATIONS FOR PROCEDURE:  Mr. Bustamante is a very pleasant 51-year-old gentleman with a history of severe coronary artery disease, status post PCI and stent to the mid LAD in 2011.  He was admitted to the hospital with chest pain and was diagnosed with a non-ST elevation MI.  He had a mildly depressed LV systolic function with EF around 45% to 50%.  A coronary angiogram demonstrated severe 3-vessel coronary artery disease.  He was taken to the operating room today for coronary bypass grafting.    OPERATIVE FINDINGS:  The patient had a LVEF around 40% to 45%.  The left internal mammary artery was an excellent quality conduit with excellent flow, measuring 2.5 mm plus in diameter.  The right internal mammary artery was rather small, about 1.5 mm in diameter, but the flow was still good.  The left radial artery was a good quality conduit measuring roughly 2.5 mm in diameter.  The left greater saphenous vein was of acceptable quality and it was roughly 3-4 mm in diameter.  The mid RCA had mild disease and the probe size was 1.5 mm.  It was quite thick-walled, but no heavy plaque.  The obtuse marginal artery was partly intramyocardial and had mild disease.  The  probe size was 1.5 mm.  The distal circumflex marginal artery was examined, but was too small for bypass grafting.  The third diagonal artery as a possible target was examined, but was also too small for bypass grafting.  The distal LAD (proximal to the apical disease) had mild to moderate disease and the probe size was 1.5 mm.  The anomalous RV branches off the LAD had minimal disease and the probe size was 1.5 mm as well.    DESCRIPTION OF PROCEDURE:  After informed consent was obtained, the patient was brought down to the operating room and was placed on the OR table in supine position.  Intravenous and intra-arterial lines were begun.  While monitoring his blood pressure and EKG tracing, he was anesthetized and intubated using a single lumen endotracheal tube.  His entire chest, abdomen, both groins and legs were prepped down to the toes using multiple layers of DuraPrep.  He was draped in a sterile field.  A median sternotomy was performed and the left internal mammary artery was taken down.  The left radial artery was harvested endoscopically as well.  The right internal mammary artery was harvested next in a skeletonized fashion.  The left greater saphenous vein was harvested endoscopically in the meantime.  Prior to clipping mammary arteries distally, the patient was fully heparinized.  The sternal edges were retracted laterally, and the pericardium was opened to suspend the heart in a pericardial cradle.  The ascending aorta and the right atrial appendage were cannulated.  A retrograde cardioplegia catheter was placed in the coronary sinus without difficulty.  An antegrade needle/aortic root vent was placed in the ascending aorta as well.  After appropriate ACT level was achieved, cardiopulmonary bypass was established and the patient was kept normothermic during the entire operation.  The aorta was then crossclamped and antegrade cold blood cardioplegia was given to arrest the heart.  The patient went into  good diastolic arrest without any LV distention.  Following this, intermittent retrograde cardioplegia doses were given on average of every 15 minutes for myocardial protection while the aorta was crossclamped.    The first coronary vessel grafted was the mid right coronary artery.  Conduit used was the PREET graft.  This was done in an in situ fashion in an end-to-side fashion manner using running 8-0 Prolene.  Next, the obtuse marginal artery was grafted.  Conduit used was the left radial artery.  This anastomosis was also performed in an end-to-side fashion using running 7-0 Prolene.  Next, the LIMA to the LAD anastomosis was performed.  This was done in an end-to-side fashion using running 7-0 Prolene.  This anastomosis was protected by tacking the LIMA pedicle down to the epicardium using interrupted 6-0 Prolene x 2.  Finally, the anomalous RV branch off the LAD was grafted.  A saphenous vein was used as a conduit.  This anastomosis was performed in an end-to-side fashion using running 7-0 Prolene.  Retrograde hot shot was given and the aortic crossclamp was removed.  Aortic cross-clamp time was 102 minutes.  A partial clamp was placed on the mid ascending aorta.  Two 4 mm aortotomies were created to perform the proximal vein anastomoses and the proximal radial artery anastomosis in an end-to-side fashion using running 6-0 Prolene.  The partial clamp was removed.  The patient was weaned off cardiopulmonary bypass with low-dose epinephrine and Jon-Synephrine drips.  Nicardipine drip was started for arterial graft vasospasm prevention.  Total cardiopulmonary bypass time was 142 minutes.  Once the patient remained stable off bypass, venous cannula was removed and protamine was given.  The aortic cannula was subsequently removed as well.  Temporary ventricular pacer wires placed in the RV muscle.  A 32-Hebrew angled and straight chest tube was placed in the mediastinum.  A 24-Hebrew José drain was placed in the  right pleural space and also in the left pleural space.  These were all brought out percutaneously below the sternotomy incision and secured to the skin using 2-0 Ethibond.  Both pleural spaces were widely opened.  The mediastinum was irrigated with antibiotic saline and hemostasis was achieved.  The sternum was reapproximated using multiple interrupted single and double wires.  The incision was closed in layers of running Vicryl suture.  Skin was closed using 3-0 Vicryl and also sealed using Dermabond.  A wound VAC was placed topically.    There were no intraoperative complications and the patient tolerated the operation well.  No blood products were given intraoperatively.  All sponge counts, needle counts and instrument counts were correct x 2 at the end of the operation.    ESTIMATED BLOOD LOSS:  Unknown.    SPECIMEN REMOVED:  None.    The patient was brought to the ICU in hemodynamically stable condition and remained intubated.    Atnelmo Mccullough MD        D: 2021   T: 2021   MT: ADRIÁN    Name:     ALLY IQBAL  MRN:      [id number]        Account:        [id number]   :      1970           Procedure Date: 2021     Document: [id number]

## 2021-09-09 NOTE — PROGRESS NOTES
ABG at 2004 shows 7.29/46/70.    Plan: Increased RR to 30 and PEEP to 10.    Recheck ABG in 1hrs.    Dick Amaral MD.  Pulmonary and Critical Care.  09/08/2021  8:51PM       Repeat ABG shows 7.23/46/130.  The PaO2 was after increasing FiO2 for 100% for cares.  - Will check CXR again. CXR done earlier in the day shows new left lung infiltrates with prominent madalyn bilateral. Suspect this is pulm edema.  Echo with EF of 40 -45% prior to surgery.  Pt is on PEEP of 10 and RR of 30.    Dick Amaral MD.  09/08/2021  11:08 PM       The CXR from ~11:30PM was reviewed and it is improved. The left lung shows increased vascular markings c/w rt. Suspect that this is pulm edema.    - Will try lasix 20mg IV x1 due to ongoing O2 need.    Dick Amaral MD.  09/08/2021  11:48 PM

## 2021-09-09 NOTE — CONSULTS
CARDIAC SURGERY NUTRITION CONSULT    Received standing order to assess and educate patient.    Will follow and complete assessment once patient is extubated and/or is transferred to medical unit.    Patient will receive nutrition education during the Outpatient Cardiac Rehab Program (nutrition classes/dietitian counseling).    Mahsa Smith, LOWELL, LD, [facility]

## 2021-09-09 NOTE — PLAN OF CARE
VSS.  Patient on EPI and levo drips to maintain BP parameters.  Patient remains intubated, PEEEp and FiO2 weaned to 5/30%.  Patient is sedated on propofol and precedex, getting PRN dilaudid for pain.  Multiple attempts to lighten sedation and do PST, but each time patient became very agitated, thrashing in bed, RR in 40s and not following any commands.  Patient uop adequate.  Chest tubes x 4 with minimal serosanguinous drainage. Patient's spouse ans son updated on POC, questions answered.

## 2021-09-09 NOTE — PROGRESS NOTES
[facility]   CRITICAL CARE NOTE     Jerry Bustamante MRN: [id number]  1970  Date of Admission:9/2/2021          Problem List:   1. Acute resp failure post CABG, with respiratory acidosis (corrected  2. multivessel CAD post CABG x 4      24-Hour Goals   1. Wean off ventilator and extuabte  2. Hemodynamic monitoring       Overnight Events   Respiratory acidosis, frothy secretions from ETT concerning of pulmonary edema.        Lines/Tubes/Draines/Devices   .  Peripheral IV 09/01/21 Anterior;Right (Active)   Site Assessment WDL 09/09/21 0800   Line Status Saline locked 09/09/21 0800   Phlebitis Scale 0-->no symptoms 09/09/21 0800   Infiltration Scale 0 09/09/21 0800   Number of days: 8       Peripheral IV 09/08/21 Right Hand (Active)   Site Assessment [facility] 09/09/21 0800   Line Status Saline locked 09/09/21 0800   Dressing Intervention New dressing  09/09/21 0000   Phlebitis Scale 0-->no symptoms 09/09/21 0800   Infiltration Scale 0 09/09/21 0800   Number of days: 1       Introducer 09/08/21 Internal jugular Right (Active)   Specific Qualities [place] in place 09/09/21 0800   (Retired) Dressing Status Clean, dry, intact 09/09/21 0800   Number of days: 1       Arterial Line 09/08/21 (Active)   Site Assessment WDL 09/09/21 0800   Line Status Positional 09/09/21 0800   Art Line Waveform Dampened 09/09/21 0800   Art Line Interventions Zeroed and calibrated;Leveled 09/09/21 0800   Color/Movement/Sensation Capillary refill less than 3 sec 09/09/21 0800   Line Necessity Yes, meets criteria 09/09/21 0800   Dressing Type Transparent 09/09/21 0800   Dressing Status Clean, dry, intact 09/09/21 0800   Number of days: 1       Right Groin Interventional Procedure Access (Active)   Site Assessment Ecchymotic 09/09/21 0800   Hemostasis management Unchanged 09/09/21 0000   Femoral Bruit not present 09/09/21 0800   CMS Right Extremity WDL 09/09/21 0800   Dorsalis Pulse - Right Leg Present with doppler 09/09/21 0800    Popliteal Pulse - Right Leg Present with doppler 09/09/21 0000   Posterior Tibial Pulse - Right Leg Present with doppler 09/09/21 0800   Number of days: 7       Pulmonary Artery Catheter - Single Lumen 09/08/21 0707 Right internal jugular vein pacing catheter and thermodilution paceport catheter (Active)   Dressing dressing dry and intact 09/09/21 0800   Securement secured with sutures 09/09/21 0800   PA Catheter Markings (cm) 45 09/09/21 0800   Phlebitis 0-->no symptoms 09/09/21 0800   Infiltration 0-->no symptoms 09/09/21 0800   Waveform normal 09/09/21 0800   Number of days: 1       ETT Cuffed Single;Single Subglottic Suction 8 mm (Active)   Secured at (cm) 25 cm 09/09/21 0340   Measured from Teeth/Gums 09/09/21 0340   Position Center 09/09/21 0400   Secured by Commercial tube oliver 09/09/21 0340   Bite Block None Present 09/09/21 0340   Site Appearance Clean;Dry 09/09/21 0340   Cuff Assessment Minimal leak technique 09/09/21 0340   Safety Measures Manual resuscitator at bedside 09/09/21 0340   Number of days: 1       Chest Tube 1 Left Pleural 24 [place] (Active)   Site Assessment WDL 09/09/21 0800   Suction -20 cm H2O 09/09/21 0800   Chest Tube Airleak No 09/09/21 0800   Drainage Description Serosanguinous 09/09/21 0800   Dressing Status Normal: Clean, Dry & Intact 09/09/21 0800   Dressing Change Due 09/10/21 09/09/21 0600   Dressing Intervention Gauze 09/09/21 0800   Patency Intervention Tip/Tilt 09/09/21 0800   Chest Tube Clamps at Bedside present 09/09/21 0800   Container Amount 290 09/09/21 0800   Output (ml) 20 ml 09/09/21 0800   Number of days: 1       Chest Tube 2 Mediastinal 32 [place] (Active)   Site Assessment WDL 09/09/21 0800   Suction -20 cm H2O 09/09/21 0800   Chest Tube Airleak No 09/09/21 0800   Drainage Description Serosanguinous 09/09/21 0800   Dressing Status Normal: Clean, Dry & Intact 09/09/21 0800   Dressing Change Due 09/10/21 09/09/21 0600   Dressing Intervention Gauze 09/09/21 0800    Patency Intervention Tip/Tilt 09/09/21 0800   Chest Tube Clamps at Bedside present 09/09/21 0800   Container Amount 320 09/09/21 0800   Output (ml) 60 ml 09/09/21 0800   Number of days: 1       Chest Tube 3 Mediastinal 32 [place] (Active)   Site Assessment [facility] 09/09/21 0800   Suction -20 cm H2O 09/09/21 0800   Chest Tube Airleak No 09/09/21 0800   Drainage Description Serosanguinous 09/09/21 0800   Dressing Status Normal: Clean, Dry & Intact 09/09/21 0800   Dressing Intervention Gauze 09/09/21 0600   Patency Intervention Tip/Tilt 09/09/21 0800   Chest Tube Clamps at Bedside present 09/09/21 0800   Number of days: 1       Chest Tube 4 Right Pleural 24 [place] (Active)   Site Assessment [facility] 09/09/21 0800   Suction -20 cm H2O 09/09/21 0800   Chest Tube Airleak No 09/09/21 0800   Drainage Description Serosanguinous 09/09/21 0800   Dressing Status Normal: Clean, Dry & Intact 09/09/21 0800   Dressing Intervention Gauze 09/09/21 0800   Patency Intervention Tip/Tilt 09/09/21 0800   Chest Tube Clamps at Bedside present 09/09/21 0800   Number of days: 1       Negative Pressure Wound Therapy Chest (Active)   Wound Type Surgical 09/09/21 0800   Unit Type 1 09/08/21 2000   Dressing Pieces Applied (# of Each Type) Black foam 09/09/21 0800   Cycle Continuous 09/09/21 0800   Target Pressure (mmHg) 125 09/09/21 0800   Number of days: 1       NG/OG/NJ Tube Orogastric 18 fr Center mouth (Active)   Site Description [facility] 09/09/21 0800   Status Clamped 09/09/21 0800   Drainage Appearance Other (comment) 09/08/21 1605   Placement Confirmation [place] unchanged 09/09/21 0800   [place] (cm marking) at nare/mouth 55 cm 09/09/21 0800   Intake (ml) 40 ml 09/09/21 0000   Flush/Free Water (mL) 40 mL 09/09/21 0000   Number of days: 1       Urethral Catheter Latex;Temperature probe;Straight-tip 16 fr (Active)   Tube Description UTV 09/09/21 0600   Catheter Care Catheter wipes 09/09/21 0500   Collection Container Standard 09/09/21 0800    Securement Method Securing device (Describe) 09/09/21 0800   Rationale for Continued Use Strict 1-2 Hour I&O 09/09/21 0800   Urine Output 200 mL 09/09/21 0800   Number of days: 1       Incision/Surgical Site 09/08/21 Chest (Active)   Incision Assessment UTV 09/09/21 0400   Jammie-Incision Assessment Maceration 09/08/21 2000   Closure SIVA 09/09/21 0400   Incision Drainage Amount None 09/09/21 0400   Drainage Description UTV 09/09/21 0400   Incision Care Therapy - negative pressure 09/09/21 0400   Dressing Intervention Clean, dry, intact 09/09/21 0400   Number of days: 1       Incision/Surgical Site 09/08/21 Left Leg (Active)   Incision Assessment WDL 09/09/21 0400   Jammie-Incision Assessment UTV 09/08/21 2000   Closure Approximated;Liquid bandage 09/09/21 0400   Incision Drainage Amount None 09/09/21 0400   Incision Care Wound cleanser 09/09/21 0400   Dressing Intervention Open to air / No Dressing 09/09/21 0400   Number of days: 1       Incision/Surgical Site 09/08/21 Left Wrist (Active)   Incision Assessment WDL 09/09/21 0400   Jammie-Incision Assessment UTV 09/08/21 2000   Closure Approximated;Liquid bandage 09/09/21 0400   Incision Drainage Amount None 09/09/21 0400   Incision Care Wound cleanser 09/09/21 0400   Dressing Intervention Open to air / No Dressing 09/09/21 0400   Number of days: 1          ICU Prophylaxis:   1. DVT: Hep Subq/ LMWH/mechanical  2. VAP: HOB 30 degrees, chlorhexidine rinse  3. Stress Ulcer: PPI/H2 blocker  4. Restraints: Nonviolent soft two point restraints required and necessary for patient safety and continued cares and good effect as patient continues to pull at necessary lines, tubes despite education and distraction. Will readdress daily.   5. IV Access - central access required and necessary for continued patient cares  6. Feeding - NPO      Medications:       acetaminophen  975 mg Oral Q8H     allopurinol  100 mg Oral Daily     aspirin  162 mg Oral or NG Tube Daily     ceFAZolin  2 g  Intravenous Q8H     chlorhexidine  15 mL Mouth/Throat Q12H     gabapentin  100 mg Oral TID     heparin ANTICOAGULANT  5,000 Units Subcutaneous Q8H     pantoprazole  40 mg Per Feeding Tube QAM AC    Or     pantoprazole (PROTONIX) IV  40 mg Intravenous QAM AC     polyethylene glycol  17 g Oral Daily     senna-docusate  1 tablet Oral BID     sodium chloride (PF)  3 mL Intracatheter Q8H     [START ON 9/11/2021] acetaminophen, bisacodyl, glucose **OR** dextrose **OR** glucagon, EPINEPHrine, hydrALAZINE, HYDROmorphone **OR** HYDROmorphone, lidocaine 4%, lidocaine (buffered or not buffered), magnesium hydroxide, methocarbamol, naloxone **OR** naloxone **OR** naloxone **OR** naloxone, niCARdipine, ondansetron **OR** ondansetron, oxyCODONE **OR** oxyCODONE, phenylephrine, BETA BLOCKER NOT PRESCRIBED, sodium chloride (PF)      Review of Systems:   Unable to obtain due to critical illness            Physical Exam:   Temp:  [97.5  F (36.4  C)-101.8  F (38.8  C)] 99.3  F (37.4  C)  Pulse:  [] 95  Resp:  [30] 30  BP: (114-133)/(71-88) 114/74  MAP:  [63 mmHg-115 mmHg] 74 mmHg  Arterial Line BP: ()/() 75/69  FiO2 (%):  [50 %] 50 %  SpO2:  [89 %-100 %] 97 %    Intake/Output Summary (Last 24 hours) at 9/9/2021 0835  Last data filed at 9/9/2021 0800  Gross per 24 hour   Intake 5041.31 ml   Output 3570 ml   Net 1471.31 ml     Wt Readings from Last 4 Encounters:   09/09/21 89 kg (196 lb 3.4 oz)   09/02/21 86.6 kg (191 lb)   07/31/19 88.5 kg (195 lb)   01/09/18 88.4 kg (194 lb 12.8 oz)     Arterial Line BP: ()/() 75/69  MAP:  [63 mmHg-115 mmHg] 74 mmHg  BP - Mean:  [] 83  CVP:  [10 mmHg-31 mmHg] 13 mmHg  SVO2:  [60 %-69 %] 60 %  Ventilation Mode: CMV/AC  (Continuous Mandatory Ventilation/ Assist Control)  FiO2 (%): 50 %  Rate Set (breaths/minute): 30 breaths/min  Tidal Volume Set (mL): 500 mL  PEEP (cm H2O): 5 cmH2O  Oxygen Concentration (%): 50 %  Resp: 30    Recent Labs   Lab 09/09/21  0442  09/08/21 2217 09/08/21 2004 09/08/21  1815   PH 7.37 7.29* 7.29* 7.24*   PCO2 43 47* 46* 52*   PO2 103 130* 70* 67*   HCO3 24 23 22 22   O2PER 60 40 60 60       GEN: no acute distress   HEENT: head ncat, sclera anicteric, OP patent, trachea midline   PULM: unlabored synchronous with vent, clear anteriorly    CV/COR: RRR S1S2 no gallop,  No rub, no murmur  ABD: soft nontender, hypoactive bowel sounds, no mass  EXT:  No Edema   warm  NEURO: grossly intact  SKIN: no obvious rash      Assessment and plan :     Neurology/Psychiatry/Pain/Sedation:   #Encephalopathy, drug induced.  Sedated with propofol, agitation on weaning off  Plan:  -Add precedex and wean off propofol.    Cardiovascular/Hemodynamics:   #post CABG x 4 , on nicardipine drip,     Pulmonary/Ventilator Management:   #Acute respiratory failure with hypercapnia, Corrected with hyperventilation, last ABG acceptable.  Plan:   Wean off ventilator and SBT when hemodynamically stable.    GI/Nutrition:   NPO. PPI    Renal/Fluids/Electrolytes:   No issues, good UOP.    Intake/Output Summary (Last 24 hours) at 9/9/2021 0839  Last data filed at 9/9/2021 0800  Gross per 24 hour   Intake 5041.31 ml   Output 3570 ml   Net 1471.31 ml     Infectious Disease:   # Hx of latent TB, low grade fever.  Cefazolin ppx     Endocrine/Hematology/Oncology:   Type 2 DM, uncontrolled.  ICU protocol to keep glucose < 180    Disposition/Code Status/Other  1. Disposition: ICU  2. Code: Full      I have personally reviewed the daily labs, imaging studies, cultures and discussed the case with referring physician and consulting physicians.     This patient is critically ill and I have provided 30 minutes of critical care time (excluding procedures) on September 9, 2021.     JIMBO Jones  SICU Fellow, N      ROUTINE ICU LABS (Last four results)  CMP  Recent Labs   Lab 09/09/21  0803 09/09/21  0620 09/09/21  0445 09/09/21  0443 09/09/21  0221 09/08/21  1829 09/08/21  1545  09/08/21  1423 09/08/21  1321 09/08/21  0444 09/05/21  1217 09/05/21  0847   NA  --   --   --   --   --  143 143 143 140  --    < > 132*   POTASSIUM  --   --  4.1  --   --  3.5 3.5  3.5 3.1* 5.1*  --    < > 4.3   CHLORIDE  --   --   --   --   --  114* 115* 113*  --   --   --  100   CO2  --   --   --   --   --  22 24 24  --   --   --  27   ANIONGAP  --   --   --   --   --  7 4 6  --   --   --  5   * 152*  --  122* 136* 149* 157* 196* 204*   < >   < > 375*   BUN  --   --   --   --   --  19 19 19  --   --   --  16   CR  --   --   --   --   --  1.15 1.16 1.19  --   --   --  1.03   GFRESTIMATED  --   --   --   --   --  72 71 69  --   --   --  83   TORRES  --   --   --   --   --  8.6 8.8 9.4  --   --   --  8.9   MAG  --   --  1.9  --   --   --  2.4*  --   --   --   --   --    PHOS  --   --  4.8*  --   --   --  4.5  --   --   --   --   --    PROTTOTAL  --   --   --   --   --  6.9 6.4*  --   --   --   --   --    ALBUMIN  --   --   --   --   --  3.5 2.8*  --   --   --   --   --    BILITOTAL  --   --   --   --   --  0.6 1.0  --   --   --   --   --    ALKPHOS  --   --   --   --   --  150 175*  --   --   --   --   --    AST  --   --   --   --   --  50* 51*  --   --   --   --   --    ALT  --   --   --   --   --  47 51  --   --   --   --   --     < > = values in this interval not displayed.     CBC  Recent Labs   Lab 09/09/21  0445 09/08/21  1545 09/08/21  1423 09/08/21  1321 09/08/21  0444   WBC 14.4* 20.7* 21.4*  --  11.2*   RBC 3.80* 4.18* 3.84*  --  4.57   HGB 10.0* 11.0* 10.1* 10.2* 11.8*   HCT 30.7* 34.1* 31.4*  --  36.0*   MCV 81 82 82  --  79   MCH 26.3* 26.3* 26.3*  --  25.8*   MCHC 32.6 32.3 32.2  --  32.8   RDW 14.6 14.4 14.2  --  13.9    224 162  --  245     INR  Recent Labs   Lab 09/08/21  1545 09/08/21  1423   INR 1.23* 1.41*     Arterial Blood Gas  Recent Labs   Lab 09/09/21 0446 09/08/21  2217 09/08/21  2004 09/08/21  1815   PH 7.37 7.29* 7.29* 7.24*   PCO2 43 47* 46* 52*   PO2 103 130* 70* 67*   HCO3  24 23 22 22   O2PER 60 40 60 60       All cultures:  No results for input(s): CULT in the last 168 hours.  Recent Results (from the past 24 hour(s))   XR Chest Port 1 View    Narrative    CHEST ONE VIEW PORTABLE   9/8/2021 3:54 PM     HISTORY: ET tube positioning and CVTS postop surgery.    COMPARISON: None.      Impression    IMPRESSION: Portable chest. Endotracheal tube terminates approximately  2 cm above the john. Nasogastric tube extends below the left  hemidiaphragm, presumably in the stomach. Mediastinal drains and chest  tubes are noted. Right IJ [place] catheter appears in good position  with the tip likely in the main pulmonary artery. Heart is mildly  enlarged. Airspace opacity noted in the mid left lung, possibly  postoperative. Patient is status post CABG. No pneumothorax or  significant pleural fluid.    ZULAY GALLEGOS MD         SYSTEM ID:  [id number]   XR Chest Port 1 View    Narrative    EXAM: XR CHEST PORT 1 VIEW  LOCATION: [facility]  DATE/TIME: 9/8/2021 11:25 PM    INDICATION: Worsening hypoxia, post CABG  COMPARISON: 9/8/2021.    FINDINGS: ET tube 3.5 cm above the john. NG tube passes into the stomach. Right IJ pulmonary artery catheter in the pulmonary outflow tract. Sternal wires and mediastinal clips. Mediastinal drains and bilateral chest tubes. No pneumothorax. The heart   size is normal. Probable left pleural effusion. Improving infiltrate in the left mid lung.      Impression    IMPRESSION: No pneumothorax. Improving left lung infiltrate. Probable left pleural effusion.   XR Chest Port 1 View    Narrative    EXAM: XR CHEST PORT 1 VIEW  LOCATION: [facility]  DATE/TIME: 9/9/2021 5:52 AM    INDICATION: Post Op CVTS Surgery  COMPARISON: 09/08/2021.      Impression    IMPRESSION: Endotracheal tube in satisfactory position terminating 4.7 cm above the john. Enteric tube tip in the stomach. Right internal jugular venous [place] catheter  tip in the main pulmonary artery. Poststernotomy and coronary artery bypass   grafting. Mediastinal and bilateral pleural drains unchanged. Stable cardiac silhouette. Normal pulmonary vascularity. Mild streaky atelectasis of the right midlung. No pneumothorax.

## 2021-09-09 NOTE — PLAN OF CARE
Pt remained intubated over night. ABG -acidotic but improved this AM.   Pt moves all 4 extremities but not to command.   Pericardial rub noted. Pt was tachycardic for most of the shift in the low 100's but is now NSR at 87.   Pacer is capped.   OG in place minimal bowel sounds. Not passing gas.   Mag replaced this shift.   Son updated via phone this AM.

## 2021-09-09 NOTE — PROGRESS NOTES
Pt from OR, stable with Nicardipine, Jon, Epi and Propofol infusing.  500ml Albumin given.   ABG/VBG acidodic, unable to start wean before end of shift. Pericardial Rub present.   On-coming RN to recheck. RT/MD aware. Adequate UO, CT outputs WLN.   Wound vac in place. V-wires connected to Pacer, on standby.   Rare PVC noted out of OR, but no longer by end of shift. Replaced K.   Restraints in place until awake and following.   Family updated and at bedside throughout evening.

## 2021-09-09 NOTE — PROGRESS NOTES
Hospitalist Chart Check    POD #1 4V CABG. Remains intubated. Continue cares per intensivist and CV surgery. Hospitalist service to continue following peripherally.     Idania Lopez,   Internal Medicine - Hospitalist  9/9/2021  7:42 AM

## 2021-09-10 ENCOUNTER — APPOINTMENT (OUTPATIENT)
Dept: CT IMAGING | Facility: CLINIC | Age: 51
End: 2021-09-10
Attending: INTERNAL MEDICINE
Payer: COMMERCIAL

## 2021-09-10 ENCOUNTER — APPOINTMENT (OUTPATIENT)
Dept: GENERAL RADIOLOGY | Facility: CLINIC | Age: 51
End: 2021-09-10
Attending: INTERNAL MEDICINE
Payer: COMMERCIAL

## 2021-09-10 LAB
ANION GAP SERPL CALCULATED.3IONS-SCNC: 1 MMOL/L (ref 3–14)
ATRIAL RATE - MUSE: 96 BPM
BUN SERPL-MCNC: 13 MG/DL (ref 7–30)
CALCIUM SERPL-MCNC: 8.3 MG/DL (ref 8.5–10.1)
CHLORIDE BLD-SCNC: 112 MMOL/L (ref 94–109)
CO2 SERPL-SCNC: 25 MMOL/L (ref 20–32)
CREAT SERPL-MCNC: 0.98 MG/DL (ref 0.66–1.25)
DIASTOLIC BLOOD PRESSURE - MUSE: NORMAL MMHG
ERYTHROCYTE [DISTWIDTH] IN BLOOD BY AUTOMATED COUNT: 14.8 % (ref 10–15)
GFR SERPL CREATININE-BSD FRML MDRD: 88 ML/MIN/1.73M2
GLUCOSE BLD-MCNC: 142 MG/DL (ref 70–99)
GLUCOSE BLDC GLUCOMTR-MCNC: 102 MG/DL (ref 70–99)
GLUCOSE BLDC GLUCOMTR-MCNC: 104 MG/DL (ref 70–99)
GLUCOSE BLDC GLUCOMTR-MCNC: 105 MG/DL (ref 70–99)
GLUCOSE BLDC GLUCOMTR-MCNC: 118 MG/DL (ref 70–99)
GLUCOSE BLDC GLUCOMTR-MCNC: 119 MG/DL (ref 70–99)
GLUCOSE BLDC GLUCOMTR-MCNC: 120 MG/DL (ref 70–99)
GLUCOSE BLDC GLUCOMTR-MCNC: 126 MG/DL (ref 70–99)
GLUCOSE BLDC GLUCOMTR-MCNC: 126 MG/DL (ref 70–99)
GLUCOSE BLDC GLUCOMTR-MCNC: 128 MG/DL (ref 70–99)
GLUCOSE BLDC GLUCOMTR-MCNC: 163 MG/DL (ref 70–99)
GLUCOSE BLDC GLUCOMTR-MCNC: 165 MG/DL (ref 70–99)
GLUCOSE BLDC GLUCOMTR-MCNC: 91 MG/DL (ref 70–99)
HCT VFR BLD AUTO: 27.4 % (ref 40–53)
HGB BLD-MCNC: 8.8 G/DL (ref 13.3–17.7)
INTERPRETATION ECG - MUSE: NORMAL
MAGNESIUM SERPL-MCNC: 2.1 MG/DL (ref 1.6–2.3)
MCH RBC QN AUTO: 25.9 PG (ref 26.5–33)
MCHC RBC AUTO-ENTMCNC: 32.1 G/DL (ref 31.5–36.5)
MCV RBC AUTO: 81 FL (ref 78–100)
P AXIS - MUSE: 40 DEGREES
PHOSPHATE SERPL-MCNC: 2.3 MG/DL (ref 2.5–4.5)
PLATELET # BLD AUTO: 185 10E3/UL (ref 150–450)
POTASSIUM BLD-SCNC: 4.1 MMOL/L (ref 3.4–5.3)
POTASSIUM BLD-SCNC: 4.5 MMOL/L (ref 3.4–5.3)
PR INTERVAL - MUSE: 156 MS
QRS DURATION - MUSE: 98 MS
QT - MUSE: 406 MS
QTC - MUSE: 512 MS
R AXIS - MUSE: -19 DEGREES
RBC # BLD AUTO: 3.4 10E6/UL (ref 4.4–5.9)
SODIUM SERPL-SCNC: 138 MMOL/L (ref 133–144)
SYSTOLIC BLOOD PRESSURE - MUSE: NORMAL MMHG
T AXIS - MUSE: 115 DEGREES
VENTRICULAR RATE- MUSE: 96 BPM
WBC # BLD AUTO: 14.7 10E3/UL (ref 4–11)

## 2021-09-10 PROCEDURE — 250N000011 HC RX IP 250 OP 636: Performed by: STUDENT IN AN ORGANIZED HEALTH CARE EDUCATION/TRAINING PROGRAM

## 2021-09-10 PROCEDURE — 250N000012 HC RX MED GY IP 250 OP 636 PS 637: Performed by: STUDENT IN AN ORGANIZED HEALTH CARE EDUCATION/TRAINING PROGRAM

## 2021-09-10 PROCEDURE — 250N000009 HC RX 250: Performed by: INTERNAL MEDICINE

## 2021-09-10 PROCEDURE — 258N000003 HC RX IP 258 OP 636: Performed by: SURGERY

## 2021-09-10 PROCEDURE — 71045 X-RAY EXAM CHEST 1 VIEW: CPT

## 2021-09-10 PROCEDURE — 999N000157 HC STATISTIC RCP TIME EA 10 MIN

## 2021-09-10 PROCEDURE — 250N000013 HC RX MED GY IP 250 OP 250 PS 637: Performed by: STUDENT IN AN ORGANIZED HEALTH CARE EDUCATION/TRAINING PROGRAM

## 2021-09-10 PROCEDURE — 250N000011 HC RX IP 250 OP 636: Performed by: PHYSICIAN ASSISTANT

## 2021-09-10 PROCEDURE — 250N000013 HC RX MED GY IP 250 OP 250 PS 637: Performed by: PHYSICIAN ASSISTANT

## 2021-09-10 PROCEDURE — 94003 VENT MGMT INPAT SUBQ DAY: CPT

## 2021-09-10 PROCEDURE — 83735 ASSAY OF MAGNESIUM: CPT | Performed by: STUDENT IN AN ORGANIZED HEALTH CARE EDUCATION/TRAINING PROGRAM

## 2021-09-10 PROCEDURE — 250N000011 HC RX IP 250 OP 636: Performed by: INTERNAL MEDICINE

## 2021-09-10 PROCEDURE — 85027 COMPLETE CBC AUTOMATED: CPT | Performed by: PHYSICIAN ASSISTANT

## 2021-09-10 PROCEDURE — 999N000185 HC STATISTIC TRANSPORT TIME EA 15 MIN

## 2021-09-10 PROCEDURE — 258N000003 HC RX IP 258 OP 636: Performed by: INTERNAL MEDICINE

## 2021-09-10 PROCEDURE — 250N000009 HC RX 250: Performed by: SURGERY

## 2021-09-10 PROCEDURE — 258N000003 HC RX IP 258 OP 636: Performed by: STUDENT IN AN ORGANIZED HEALTH CARE EDUCATION/TRAINING PROGRAM

## 2021-09-10 PROCEDURE — 80048 BASIC METABOLIC PNL TOTAL CA: CPT | Performed by: STUDENT IN AN ORGANIZED HEALTH CARE EDUCATION/TRAINING PROGRAM

## 2021-09-10 PROCEDURE — C9113 INJ PANTOPRAZOLE SODIUM, VIA: HCPCS | Performed by: STUDENT IN AN ORGANIZED HEALTH CARE EDUCATION/TRAINING PROGRAM

## 2021-09-10 PROCEDURE — 70450 CT HEAD/BRAIN W/O DYE: CPT

## 2021-09-10 PROCEDURE — 84132 ASSAY OF SERUM POTASSIUM: CPT | Performed by: PHYSICIAN ASSISTANT

## 2021-09-10 PROCEDURE — 84100 ASSAY OF PHOSPHORUS: CPT | Performed by: STUDENT IN AN ORGANIZED HEALTH CARE EDUCATION/TRAINING PROGRAM

## 2021-09-10 PROCEDURE — 99207 PR NO CHARGE LOS: CPT | Performed by: INTERNAL MEDICINE

## 2021-09-10 PROCEDURE — 200N000001 HC R&B ICU

## 2021-09-10 RX ORDER — LORAZEPAM 2 MG/ML
INJECTION INTRAMUSCULAR
Status: DISCONTINUED
Start: 2021-09-10 | End: 2021-09-10 | Stop reason: WASHOUT

## 2021-09-10 RX ADMIN — PHENYLEPHRINE HYDROCHLORIDE 0.25 MCG/KG/MIN: 10 INJECTION INTRAVENOUS at 20:16

## 2021-09-10 RX ADMIN — GABAPENTIN 100 MG: 100 CAPSULE ORAL at 08:19

## 2021-09-10 RX ADMIN — ROSUVASTATIN CALCIUM 10 MG: 10 TABLET, FILM COATED ORAL at 08:19

## 2021-09-10 RX ADMIN — SENNOSIDES AND DOCUSATE SODIUM 1 TABLET: 8.6; 5 TABLET ORAL at 20:21

## 2021-09-10 RX ADMIN — PROPOFOL 50 MCG/KG/MIN: 10 INJECTION, EMULSION INTRAVENOUS at 10:28

## 2021-09-10 RX ADMIN — POLYETHYLENE GLYCOL 3350 17 G: 17 POWDER, FOR SOLUTION ORAL at 08:19

## 2021-09-10 RX ADMIN — CHLORHEXIDINE GLUCONATE 15 ML: 1.2 SOLUTION ORAL at 20:20

## 2021-09-10 RX ADMIN — VALPROATE SODIUM 500 MG: 100 INJECTION, SOLUTION INTRAVENOUS at 18:55

## 2021-09-10 RX ADMIN — DEXMEDETOMIDINE HYDROCHLORIDE 1.2 MCG/KG/HR: 400 INJECTION INTRAVENOUS at 14:46

## 2021-09-10 RX ADMIN — DEXMEDETOMIDINE HYDROCHLORIDE 1.2 MCG/KG/HR: 400 INJECTION INTRAVENOUS at 00:13

## 2021-09-10 RX ADMIN — DEXMEDETOMIDINE HYDROCHLORIDE 1.2 MCG/KG/HR: 400 INJECTION INTRAVENOUS at 22:20

## 2021-09-10 RX ADMIN — HEPARIN SODIUM 5000 UNITS: 5000 INJECTION INTRAVENOUS; SUBCUTANEOUS at 22:21

## 2021-09-10 RX ADMIN — CHLORHEXIDINE GLUCONATE 15 ML: 1.2 SOLUTION ORAL at 08:17

## 2021-09-10 RX ADMIN — PROPOFOL 40 MCG/KG/MIN: 10 INJECTION, EMULSION INTRAVENOUS at 14:46

## 2021-09-10 RX ADMIN — ALLOPURINOL 100 MG: 100 TABLET ORAL at 08:19

## 2021-09-10 RX ADMIN — HYDROMORPHONE HYDROCHLORIDE 0.2 MG: 0.2 INJECTION, SOLUTION INTRAMUSCULAR; INTRAVENOUS; SUBCUTANEOUS at 02:34

## 2021-09-10 RX ADMIN — ASPIRIN 81 MG CHEWABLE TABLET 324 MG: 81 TABLET CHEWABLE at 08:19

## 2021-09-10 RX ADMIN — PROPOFOL 30 MCG/KG/MIN: 10 INJECTION, EMULSION INTRAVENOUS at 05:22

## 2021-09-10 RX ADMIN — ACETAMINOPHEN 975 MG: 325 TABLET, FILM COATED ORAL at 13:53

## 2021-09-10 RX ADMIN — DEXMEDETOMIDINE HYDROCHLORIDE 1.2 MCG/KG/HR: 400 INJECTION INTRAVENOUS at 03:57

## 2021-09-10 RX ADMIN — HEPARIN SODIUM 5000 UNITS: 5000 INJECTION INTRAVENOUS; SUBCUTANEOUS at 13:54

## 2021-09-10 RX ADMIN — PANTOPRAZOLE SODIUM 40 MG: 40 INJECTION, POWDER, FOR SOLUTION INTRAVENOUS at 07:01

## 2021-09-10 RX ADMIN — ACETAMINOPHEN 975 MG: 325 TABLET, FILM COATED ORAL at 05:04

## 2021-09-10 RX ADMIN — AMLODIPINE BESYLATE 2.5 MG: 2.5 TABLET ORAL at 08:19

## 2021-09-10 RX ADMIN — SODIUM PHOSPHATE, MONOBASIC, MONOHYDRATE 15 MMOL: 276; 142 INJECTION, SOLUTION INTRAVENOUS at 08:26

## 2021-09-10 RX ADMIN — GABAPENTIN 100 MG: 100 CAPSULE ORAL at 22:21

## 2021-09-10 RX ADMIN — ACETAMINOPHEN 975 MG: 325 TABLET, FILM COATED ORAL at 20:20

## 2021-09-10 RX ADMIN — SENNOSIDES AND DOCUSATE SODIUM 1 TABLET: 8.6; 5 TABLET ORAL at 08:19

## 2021-09-10 RX ADMIN — HEPARIN SODIUM 5000 UNITS: 5000 INJECTION INTRAVENOUS; SUBCUTANEOUS at 05:36

## 2021-09-10 RX ADMIN — DEXMEDETOMIDINE HYDROCHLORIDE 1.2 MCG/KG/HR: 400 INJECTION INTRAVENOUS at 18:29

## 2021-09-10 RX ADMIN — DEXMEDETOMIDINE HYDROCHLORIDE 1.2 MCG/KG/HR: 400 INJECTION INTRAVENOUS at 07:43

## 2021-09-10 RX ADMIN — PROPOFOL 35 MCG/KG/MIN: 10 INJECTION, EMULSION INTRAVENOUS at 20:07

## 2021-09-10 RX ADMIN — DEXMEDETOMIDINE HYDROCHLORIDE 1.2 MCG/KG/HR: 400 INJECTION INTRAVENOUS at 11:19

## 2021-09-10 RX ADMIN — HYDROMORPHONE HYDROCHLORIDE 0.4 MG: 0.2 INJECTION, SOLUTION INTRAMUSCULAR; INTRAVENOUS; SUBCUTANEOUS at 09:42

## 2021-09-10 RX ADMIN — GABAPENTIN 100 MG: 100 CAPSULE ORAL at 16:58

## 2021-09-10 RX ADMIN — HYDROMORPHONE HYDROCHLORIDE 0.4 MG: 0.2 INJECTION, SOLUTION INTRAMUSCULAR; INTRAVENOUS; SUBCUTANEOUS at 04:03

## 2021-09-10 RX ADMIN — SODIUM CHLORIDE 1.5 UNITS/HR: 9 INJECTION, SOLUTION INTRAVENOUS at 05:49

## 2021-09-10 RX ADMIN — HYDROMORPHONE HYDROCHLORIDE 0.2 MG: 0.2 INJECTION, SOLUTION INTRAMUSCULAR; INTRAVENOUS; SUBCUTANEOUS at 00:04

## 2021-09-10 ASSESSMENT — ACTIVITIES OF DAILY LIVING (ADL)
ADLS_ACUITY_SCORE: 19

## 2021-09-10 NOTE — PROGRESS NOTES
Patient was transported to CT on the transport vent. Pt tolerated the transport well.  9/10/2021  Ella Cullen, RT

## 2021-09-10 NOTE — PROGRESS NOTES
[facility] ICU RESPIRATORY NOTE        Date of Admission: 9/2/2021    Date of Intubation (most recent):  9/8/21    Reason for Mechanical Ventilation:  Heart surgery    Number of Days on Mechanical Ventilation: 2    Met Criteria for Spontaneous Breathing Trial: Yes but pt became very agitated when sedation was lightened    Significant Events Today:  None    ABG Results:   Recent Labs   Lab 09/09/21  0446 09/08/21  2217 09/08/21 2004 09/08/21  1815   PH 7.37 7.29* 7.29* 7.24*   PCO2 43 47* 46* 52*   PO2 103 130* 70* 67*   HCO3 24 23 22 22   O2PER 60 40 60 60       Current Vent Settings: Ventilation Mode: CMV/AC  (Continuous Mandatory Ventilation/ Assist Control)  FiO2 (%): 30 %  Rate Set (breaths/minute): 24 breaths/min  Tidal Volume Set (mL): 500 mL  PEEP (cm H2O): 5 cmH2O  Oxygen Concentration (%): 30 %  Resp: 24      Plan:  Pt to remain on full vent support overight    Luis Rob, RT

## 2021-09-10 NOTE — PROGRESS NOTES
Spontaneous Breathing Trial    Criteria met for SBT (Y/N):Yes    Settings:    PS:5  PEEP:5  FiO2:30%    Spontaneous breathing trial start time:0930    Measured Parameters:    RR:40  Tidal volume:450 ml  RSBI:89    Patient tolerance:Pt became more agitated and RR increased into the 40's.  at bedside. Pt was placed back onCMV settings.        Spontaneous breathing trial end time:0940    Plan:Will cont full vent support for now and will assess for another PS trial in the morning.  9/10/2021  Ella Cullen, RT

## 2021-09-10 NOTE — PLAN OF CARE
VSS.  Patient remains on low dose EPI and sadaf drips to maintain BP parameters.  Patient remains intubated, attempted PST, but patient became extremely agitated,not following, tachypneic, and increased WOB. Patient sedated on propofol and precedex drips.  UOP adequate.  Patient's wife and daughter updated on POC, questions answered.

## 2021-09-10 NOTE — PROGRESS NOTES
Patient seen and care plan discussed with Dr. 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 Antelmo Mccullough  -CVS: HR: 80s-100s. SBP: 80s-100s. Pre op EF: 40-45%. Remains on Epi/Jon-- weaning as able. ASA, BB when off pressors, statin. Amlodipine 2.5mg daily to prevent radial artery spasm for 3 months. PTA losartan on hold. Pacer wires capped. Chest tubes with too much output to remove.   -Resp: Intubated, per nursing staff-- becomes extremely agitated when sedation lightened. Intensivist following for ventilator management  -Neuro:  Sedated-- extremely agitated when sedation lightened, Intensivist plan for CT  Head today.   -Renal: good UOP. Up about 1kg from preoperative weight. Cr: 0.98. Will continue to monitor.   -GI:  Advance diet once extubated. Continue bowel regimen  -:  Christina in place d/t limited mobility and need for accurate I&Os.   -Endo: pre op a1c: 10.2. Continue insulin gtt. Hospitalist to assist with glucose management once transferred out of ICU  -FEN: replace electrolytes as needed. Na: 138. K: 4.5.  Orders Placed This Encounter      NPO for Medical/Clinical Reasons Except for: Meds      Advance Diet as Tolerated: Clear Liquid Diet      Advance Diet as Tolerated: Clear Liquid Diet; Low Saturated Fat Na <2400mg Diet      Advance Diet as Tolerated: Clear Liquid Diet      Advance Diet as Tolerated: Full Liquid Diet; Low Saturated Fat Na <2400mg Diet    -ID: Temp (24hrs), Av.9  F (38.3  C), Min:99.9  F (37.7   C), Max:101.5  F (38.6  C)  WBC: 14.7. Completed perioperative abx. Leukocytosis likely related to stress from surgery  -Heme: Hgb: 8.8. plt: 185. Acute blood loss anemia and thrombocytopenia related to surgery. Will continue to monitor  -Proph: PCD, ASA, statin, PPI, sub q heparin  -Dispo: Continue ICU cares. Ventilator management per intensivist. Planning on CT head today d/t agitation. Chest tubes and krishna to remain in place. Continue insulin gtt. Will ask hospitalist to follow upon transfer from ICU.           Interval History:   Intubated/sedated.         Medications:       acetaminophen  975 mg Oral Q8H     allopurinol  100 mg Oral Daily     amLODIPine  2.5 mg Oral Daily     aspirin  324 mg Oral or NG Tube Daily     chlorhexidine  15 mL Mouth/Throat Q12H     gabapentin  100 mg Oral TID     heparin ANTICOAGULANT  5,000 Units Subcutaneous Q8H     pantoprazole  40 mg Per Feeding Tube QAM AC    Or     pantoprazole (PROTONIX) IV  40 mg Intravenous QAM AC     polyethylene glycol  17 g Oral Daily     rosuvastatin  10 mg Oral Daily     senna-docusate  1 tablet Oral BID     sodium chloride (PF)  3 mL Intracatheter Q8H     sodium phosphate  15 mmol Intravenous Once     [START ON 9/11/2021] acetaminophen, bisacodyl, glucose **OR** dextrose **OR** glucagon, EPINEPHrine, hydrALAZINE, HYDROmorphone **OR** HYDROmorphone, lidocaine 4%, lidocaine (buffered or not buffered), magnesium hydroxide, methocarbamol, naloxone **OR** naloxone **OR** naloxone **OR** naloxone, niCARdipine, ondansetron **OR** ondansetron, oxyCODONE **OR** oxyCODONE, phenylephrine, BETA BLOCKER NOT PRESCRIBED, sodium chloride (PF)          Physical Exam:   Vitals were reviewed  Blood pressure 95/59, pulse 85, temperature (!) 101.1  F (38.4  C), resp. rate 24, weight 88.6 kg (195 lb 5.2 oz), SpO2 100 %.  Rhythm: NSR    Lungs: diminished bases    Cardiovascular: RRR normal s1 and s2    Abdomen: soft NTND    Extremeties: minimal edema    Incision:  CDI    CT: to suction    Weight:   Vitals:    09/05/21 0259 09/06/21 0640 09/07/21 0558 09/09/21 0500   Weight: 87.2 kg (192 lb 3.2 oz) 86.3 kg (190 lb 3.2 oz) 86.3 kg (190 lb 3.2 oz) 89 kg (196 lb 3.4 oz)    09/10/21 0430   Weight: 88.6 kg (195 lb 5.2 oz)            Data:   Labs:   Lab Results   Component Value Date    WBC 14.7 09/10/2021     Lab Results   Component Value Date    RBC 3.40 09/10/2021     Lab Results   Component Value Date    HGB 8.8 09/10/2021     Lab Results   Component Value Date    HCT 27.4 09/10/2021     No components found for: MCT  Lab Results   Component Value Date    MCV 81 09/10/2021     Lab Results   Component Value Date    MCH 25.9 09/10/2021     Lab Results   Component Value Date    MCHC 32.1 09/10/2021     Lab Results   Component Value Date    RDW 14.8 09/10/2021     Lab Results   Component Value Date     09/10/2021       Last Basic Metabolic Panel:  Lab Results   Component Value Date     09/10/2021      Lab Results   Component Value Date    POTASSIUM 4.5 09/10/2021     Lab Results   Component Value Date    CHLORIDE 112 09/10/2021     Lab Results   Component Value Date    TORRES 8.3 09/10/2021     Lab Results   Component Value Date    CO2 25 09/10/2021     Lab Results   Component Value Date    BUN 13 09/10/2021     Lab Results   Component Value Date    CR 0.98 09/10/2021     Lab Results   Component Value Date     09/10/2021     09/10/2021       Aaliyah Muse PA-C  CV Surgery  Pager # [phone]

## 2021-09-10 NOTE — PROGRESS NOTES
Hospitalist Chart Check    POD #2 4V CABG. Patient remains intubated. Ongoing cares per intensivist and CV surgery. Hospitalist service will continue following peripherally, will assist with ongoing BG management once extubated and taking po.     Idania Lopez,   Internal Medicine - Hospitalist  9/10/2021  8:07 AM

## 2021-09-10 NOTE — PLAN OF CARE
Pt becomes extremely agitated with  any cares unless given add'l propofol bolus and dilaudid bumps. Attempted to wean epi and sadaf gtts with pt cont to req minimal pressors. Minimal CT output Cont to monitor.

## 2021-09-10 NOTE — PROGRESS NOTES
Intensivist Note     Post CABG Day # 2    Still intubated and ventilated, agitation and not following commands off sedation, moves all limbs purposelessly, with increased WOB on pressure support. Coarse breathing sounds on auscultation, no air leak in chest tubes.    Still on epi and Phenylphrine ggt.     Labs reviewed showing mild anemia Hb 8.8. Electrolytes normal    Plan is to resume propofol ggt and to obtain chest XR and CT head to r/o stroke.

## 2021-09-11 ENCOUNTER — APPOINTMENT (OUTPATIENT)
Dept: GENERAL RADIOLOGY | Facility: CLINIC | Age: 51
End: 2021-09-11
Attending: NURSE PRACTITIONER
Payer: COMMERCIAL

## 2021-09-11 ENCOUNTER — HOSPITAL ENCOUNTER (INPATIENT)
Dept: NEUROLOGY | Facility: CLINIC | Age: 51
End: 2021-09-11
Attending: STUDENT IN AN ORGANIZED HEALTH CARE EDUCATION/TRAINING PROGRAM | Admitting: INTERNAL MEDICINE
Payer: COMMERCIAL

## 2021-09-11 LAB
ALBUMIN SERPL-MCNC: 1.9 G/DL (ref 3.4–5)
ALBUMIN UR-MCNC: 50 MG/DL
ALP SERPL-CCNC: 218 U/L (ref 40–150)
ALT SERPL W P-5'-P-CCNC: 18 U/L (ref 0–70)
AMMONIA PLAS-SCNC: 28 UMOL/L (ref 10–50)
ANION GAP SERPL CALCULATED.3IONS-SCNC: 6 MMOL/L (ref 3–14)
APPEARANCE UR: CLEAR
AST SERPL W P-5'-P-CCNC: 17 U/L (ref 0–45)
BASE EXCESS BLDA CALC-SCNC: -2.2 MMOL/L (ref -9–1.8)
BILIRUB DIRECT SERPL-MCNC: 0.2 MG/DL (ref 0–0.2)
BILIRUB SERPL-MCNC: 0.5 MG/DL (ref 0.2–1.3)
BILIRUB UR QL STRIP: ABNORMAL
BUN SERPL-MCNC: 11 MG/DL (ref 7–30)
CALCIUM SERPL-MCNC: 8.4 MG/DL (ref 8.5–10.1)
CHLORIDE BLD-SCNC: 111 MMOL/L (ref 94–109)
CO2 SERPL-SCNC: 25 MMOL/L (ref 20–32)
COLOR UR AUTO: YELLOW
CORTIS SERPL-MCNC: 20.6 UG/DL (ref 4–22)
CREAT SERPL-MCNC: 0.93 MG/DL (ref 0.66–1.25)
ERYTHROCYTE [DISTWIDTH] IN BLOOD BY AUTOMATED COUNT: 14.7 % (ref 10–15)
GFR SERPL CREATININE-BSD FRML MDRD: >90 ML/MIN/1.73M2
GLUCOSE BLD-MCNC: 159 MG/DL (ref 70–99)
GLUCOSE BLDC GLUCOMTR-MCNC: 101 MG/DL (ref 70–99)
GLUCOSE BLDC GLUCOMTR-MCNC: 103 MG/DL (ref 70–99)
GLUCOSE BLDC GLUCOMTR-MCNC: 112 MG/DL (ref 70–99)
GLUCOSE BLDC GLUCOMTR-MCNC: 116 MG/DL (ref 70–99)
GLUCOSE BLDC GLUCOMTR-MCNC: 119 MG/DL (ref 70–99)
GLUCOSE BLDC GLUCOMTR-MCNC: 128 MG/DL (ref 70–99)
GLUCOSE BLDC GLUCOMTR-MCNC: 139 MG/DL (ref 70–99)
GLUCOSE BLDC GLUCOMTR-MCNC: 150 MG/DL (ref 70–99)
GLUCOSE BLDC GLUCOMTR-MCNC: 151 MG/DL (ref 70–99)
GLUCOSE UR STRIP-MCNC: NEGATIVE MG/DL
HCO3 BLD-SCNC: 22 MMOL/L (ref 21–28)
HCT VFR BLD AUTO: 27.5 % (ref 40–53)
HGB BLD-MCNC: 8.8 G/DL (ref 13.3–17.7)
HGB UR QL STRIP: NEGATIVE
HYALINE CASTS: 3 /LPF
KETONES UR STRIP-MCNC: 60 MG/DL
LEUKOCYTE ESTERASE UR QL STRIP: NEGATIVE
MAGNESIUM SERPL-MCNC: 2 MG/DL (ref 1.6–2.3)
MCH RBC QN AUTO: 26.2 PG (ref 26.5–33)
MCHC RBC AUTO-ENTMCNC: 32 G/DL (ref 31.5–36.5)
MCV RBC AUTO: 82 FL (ref 78–100)
MUCOUS THREADS #/AREA URNS LPF: PRESENT /LPF
NITRATE UR QL: NEGATIVE
O2/TOTAL GAS SETTING VFR VENT: 30 %
PCO2 BLD: 34 MM HG (ref 35–45)
PH BLD: 7.42 [PH] (ref 7.35–7.45)
PH UR STRIP: 6 [PH] (ref 5–7)
PHOSPHATE SERPL-MCNC: 3.7 MG/DL (ref 2.5–4.5)
PLATELET # BLD AUTO: 222 10E3/UL (ref 150–450)
PO2 BLD: 88 MM HG (ref 80–105)
POTASSIUM BLD-SCNC: 4.1 MMOL/L (ref 3.4–5.3)
PROT SERPL-MCNC: 6.4 G/DL (ref 6.8–8.8)
RBC # BLD AUTO: 3.36 10E6/UL (ref 4.4–5.9)
RBC URINE: 3 /HPF
SODIUM SERPL-SCNC: 142 MMOL/L (ref 133–144)
SP GR UR STRIP: 1.03 (ref 1–1.03)
TSH SERPL DL<=0.005 MIU/L-ACNC: 0.74 MU/L (ref 0.4–4)
UROBILINOGEN UR STRIP-MCNC: NORMAL MG/DL
WBC # BLD AUTO: 12.6 10E3/UL (ref 4–11)
WBC URINE: 2 /HPF

## 2021-09-11 PROCEDURE — 83735 ASSAY OF MAGNESIUM: CPT | Performed by: SURGERY

## 2021-09-11 PROCEDURE — 250N000013 HC RX MED GY IP 250 OP 250 PS 637: Performed by: NURSE PRACTITIONER

## 2021-09-11 PROCEDURE — 250N000013 HC RX MED GY IP 250 OP 250 PS 637: Performed by: STUDENT IN AN ORGANIZED HEALTH CARE EDUCATION/TRAINING PROGRAM

## 2021-09-11 PROCEDURE — 87040 BLOOD CULTURE FOR BACTERIA: CPT | Performed by: NURSE PRACTITIONER

## 2021-09-11 PROCEDURE — 999N000065 XR ABDOMEN PORT 1 VIEWS

## 2021-09-11 PROCEDURE — 80048 BASIC METABOLIC PNL TOTAL CA: CPT | Performed by: PHYSICIAN ASSISTANT

## 2021-09-11 PROCEDURE — 03HY32Z INSERTION OF MONITORING DEVICE INTO UPPER ARTERY, PERCUTANEOUS APPROACH: ICD-10-PCS | Performed by: NURSE PRACTITIONER

## 2021-09-11 PROCEDURE — 258N000003 HC RX IP 258 OP 636: Performed by: NURSE PRACTITIONER

## 2021-09-11 PROCEDURE — 94003 VENT MGMT INPAT SUBQ DAY: CPT

## 2021-09-11 PROCEDURE — 250N000012 HC RX MED GY IP 250 OP 636 PS 637: Performed by: STUDENT IN AN ORGANIZED HEALTH CARE EDUCATION/TRAINING PROGRAM

## 2021-09-11 PROCEDURE — 99207 PR NO CHARGE LOS: CPT | Performed by: HOSPITALIST

## 2021-09-11 PROCEDURE — 200N000001 HC R&B ICU

## 2021-09-11 PROCEDURE — 258N000003 HC RX IP 258 OP 636: Performed by: INTERNAL MEDICINE

## 2021-09-11 PROCEDURE — 250N000009 HC RX 250: Performed by: INTERNAL MEDICINE

## 2021-09-11 PROCEDURE — 999N000065 XR CHEST PORT 1 VIEW

## 2021-09-11 PROCEDURE — 84443 ASSAY THYROID STIM HORMONE: CPT | Performed by: NURSE PRACTITIONER

## 2021-09-11 PROCEDURE — 250N000011 HC RX IP 250 OP 636: Performed by: SURGERY

## 2021-09-11 PROCEDURE — 95720 EEG PHY/QHP EA INCR W/VEEG: CPT | Performed by: PSYCHIATRY & NEUROLOGY

## 2021-09-11 PROCEDURE — 36620 INSERTION CATHETER ARTERY: CPT | Performed by: NURSE PRACTITIONER

## 2021-09-11 PROCEDURE — 250N000013 HC RX MED GY IP 250 OP 250 PS 637: Performed by: PHYSICIAN ASSISTANT

## 2021-09-11 PROCEDURE — 84100 ASSAY OF PHOSPHORUS: CPT | Performed by: SURGERY

## 2021-09-11 PROCEDURE — 87205 SMEAR GRAM STAIN: CPT | Performed by: NURSE PRACTITIONER

## 2021-09-11 PROCEDURE — 250N000011 HC RX IP 250 OP 636: Performed by: NURSE PRACTITIONER

## 2021-09-11 PROCEDURE — 250N000011 HC RX IP 250 OP 636: Performed by: INTERNAL MEDICINE

## 2021-09-11 PROCEDURE — 5A1945Z RESPIRATORY VENTILATION, 24-96 CONSECUTIVE HOURS: ICD-10-PCS | Performed by: PHYSICIAN ASSISTANT

## 2021-09-11 PROCEDURE — 250N000011 HC RX IP 250 OP 636: Performed by: PHYSICIAN ASSISTANT

## 2021-09-11 PROCEDURE — 250N000011 HC RX IP 250 OP 636: Performed by: STUDENT IN AN ORGANIZED HEALTH CARE EDUCATION/TRAINING PROGRAM

## 2021-09-11 PROCEDURE — 85027 COMPLETE CBC AUTOMATED: CPT | Performed by: PHYSICIAN ASSISTANT

## 2021-09-11 PROCEDURE — 82533 TOTAL CORTISOL: CPT | Performed by: NURSE PRACTITIONER

## 2021-09-11 PROCEDURE — 999N000157 HC STATISTIC RCP TIME EA 10 MIN

## 2021-09-11 PROCEDURE — 36556 INSERT NON-TUNNEL CV CATH: CPT | Performed by: NURSE PRACTITIONER

## 2021-09-11 PROCEDURE — 02HV33Z INSERTION OF INFUSION DEVICE INTO SUPERIOR VENA CAVA, PERCUTANEOUS APPROACH: ICD-10-PCS | Performed by: NURSE PRACTITIONER

## 2021-09-11 PROCEDURE — 82140 ASSAY OF AMMONIA: CPT | Performed by: NURSE PRACTITIONER

## 2021-09-11 PROCEDURE — 99291 CRITICAL CARE FIRST HOUR: CPT | Mod: 25 | Performed by: STUDENT IN AN ORGANIZED HEALTH CARE EDUCATION/TRAINING PROGRAM

## 2021-09-11 PROCEDURE — 95714 VEEG EA 12-26 HR UNMNTR: CPT

## 2021-09-11 PROCEDURE — 82803 BLOOD GASES ANY COMBINATION: CPT | Performed by: NURSE PRACTITIONER

## 2021-09-11 PROCEDURE — 99291 CRITICAL CARE FIRST HOUR: CPT | Mod: 24 | Performed by: NURSE PRACTITIONER

## 2021-09-11 PROCEDURE — 81001 URINALYSIS AUTO W/SCOPE: CPT | Performed by: NURSE PRACTITIONER

## 2021-09-11 PROCEDURE — 258N000003 HC RX IP 258 OP 636: Performed by: STUDENT IN AN ORGANIZED HEALTH CARE EDUCATION/TRAINING PROGRAM

## 2021-09-11 PROCEDURE — 999N000009 HC STATISTIC AIRWAY CARE

## 2021-09-11 PROCEDURE — 93005 ELECTROCARDIOGRAM TRACING: CPT

## 2021-09-11 PROCEDURE — C9113 INJ PANTOPRAZOLE SODIUM, VIA: HCPCS | Performed by: STUDENT IN AN ORGANIZED HEALTH CARE EDUCATION/TRAINING PROGRAM

## 2021-09-11 PROCEDURE — P9041 ALBUMIN (HUMAN),5%, 50ML: HCPCS | Performed by: PHYSICIAN ASSISTANT

## 2021-09-11 PROCEDURE — 82248 BILIRUBIN DIRECT: CPT | Performed by: NURSE PRACTITIONER

## 2021-09-11 RX ORDER — LANOLIN ALCOHOL/MO/W.PET/CERES
100 CREAM (GRAM) TOPICAL DAILY
Status: DISCONTINUED | OUTPATIENT
Start: 2021-09-19 | End: 2021-09-22 | Stop reason: HOSPADM

## 2021-09-11 RX ORDER — QUETIAPINE FUMARATE 50 MG/1
50 TABLET, FILM COATED ORAL 2 TIMES DAILY
Status: DISCONTINUED | OUTPATIENT
Start: 2021-09-11 | End: 2021-09-15

## 2021-09-11 RX ORDER — MAGNESIUM SULFATE HEPTAHYDRATE 40 MG/ML
2 INJECTION, SOLUTION INTRAVENOUS ONCE
Status: COMPLETED | OUTPATIENT
Start: 2021-09-11 | End: 2021-09-11

## 2021-09-11 RX ORDER — ALBUMIN, HUMAN INJ 5% 5 %
500 SOLUTION INTRAVENOUS ONCE
Status: COMPLETED | OUTPATIENT
Start: 2021-09-11 | End: 2021-09-11

## 2021-09-11 RX ORDER — DEXTROSE MONOHYDRATE 100 MG/ML
INJECTION, SOLUTION INTRAVENOUS CONTINUOUS PRN
Status: DISCONTINUED | OUTPATIENT
Start: 2021-09-11 | End: 2021-09-18

## 2021-09-11 RX ORDER — GUAIFENESIN 600 MG/1
15 TABLET, EXTENDED RELEASE ORAL DAILY
Status: DISCONTINUED | OUTPATIENT
Start: 2021-09-11 | End: 2021-09-15 | Stop reason: CLARIF

## 2021-09-11 RX ADMIN — ASPIRIN 81 MG CHEWABLE TABLET 324 MG: 81 TABLET CHEWABLE at 08:53

## 2021-09-11 RX ADMIN — HYDROMORPHONE HYDROCHLORIDE 0.4 MG: 0.2 INJECTION, SOLUTION INTRAMUSCULAR; INTRAVENOUS; SUBCUTANEOUS at 14:28

## 2021-09-11 RX ADMIN — Medication 0.02 MCG/KG/MIN: at 06:13

## 2021-09-11 RX ADMIN — CHLORHEXIDINE GLUCONATE 15 ML: 1.2 SOLUTION ORAL at 20:44

## 2021-09-11 RX ADMIN — SENNOSIDES AND DOCUSATE SODIUM 1 TABLET: 8.6; 5 TABLET ORAL at 08:52

## 2021-09-11 RX ADMIN — ACETAMINOPHEN 650 MG: 325 TABLET, FILM COATED ORAL at 08:52

## 2021-09-11 RX ADMIN — ALLOPURINOL 100 MG: 100 TABLET ORAL at 08:52

## 2021-09-11 RX ADMIN — HEPARIN SODIUM 5000 UNITS: 5000 INJECTION INTRAVENOUS; SUBCUTANEOUS at 06:09

## 2021-09-11 RX ADMIN — PROPOFOL 30 MCG/KG/MIN: 10 INJECTION, EMULSION INTRAVENOUS at 01:39

## 2021-09-11 RX ADMIN — GABAPENTIN 100 MG: 100 CAPSULE ORAL at 17:29

## 2021-09-11 RX ADMIN — PROPOFOL 30 MCG/KG/MIN: 10 INJECTION, EMULSION INTRAVENOUS at 17:29

## 2021-09-11 RX ADMIN — QUETIAPINE FUMARATE 50 MG: 50 TABLET ORAL at 20:44

## 2021-09-11 RX ADMIN — POLYETHYLENE GLYCOL 3350 17 G: 17 POWDER, FOR SOLUTION ORAL at 08:53

## 2021-09-11 RX ADMIN — DEXMEDETOMIDINE HYDROCHLORIDE 1.2 MCG/KG/HR: 400 INJECTION INTRAVENOUS at 17:30

## 2021-09-11 RX ADMIN — DEXMEDETOMIDINE HYDROCHLORIDE 1.2 MCG/KG/HR: 400 INJECTION INTRAVENOUS at 01:39

## 2021-09-11 RX ADMIN — ACETAMINOPHEN 975 MG: 325 TABLET, FILM COATED ORAL at 13:56

## 2021-09-11 RX ADMIN — ACETAMINOPHEN 975 MG: 325 TABLET, FILM COATED ORAL at 06:09

## 2021-09-11 RX ADMIN — HEPARIN SODIUM 5000 UNITS: 5000 INJECTION INTRAVENOUS; SUBCUTANEOUS at 13:57

## 2021-09-11 RX ADMIN — ALBUMIN HUMAN 500 ML: 0.05 INJECTION, SOLUTION INTRAVENOUS at 17:29

## 2021-09-11 RX ADMIN — HYDROMORPHONE HYDROCHLORIDE 0.2 MG: 0.2 INJECTION, SOLUTION INTRAMUSCULAR; INTRAVENOUS; SUBCUTANEOUS at 21:16

## 2021-09-11 RX ADMIN — VALPROATE SODIUM 500 MG: 100 INJECTION, SOLUTION INTRAVENOUS at 06:09

## 2021-09-11 RX ADMIN — PROPOFOL 30 MCG/KG/MIN: 10 INJECTION, EMULSION INTRAVENOUS at 22:37

## 2021-09-11 RX ADMIN — SENNOSIDES AND DOCUSATE SODIUM 1 TABLET: 8.6; 5 TABLET ORAL at 20:44

## 2021-09-11 RX ADMIN — CHLORHEXIDINE GLUCONATE 15 ML: 1.2 SOLUTION ORAL at 08:53

## 2021-09-11 RX ADMIN — SODIUM CHLORIDE 1 UNITS/HR: 9 INJECTION, SOLUTION INTRAVENOUS at 01:39

## 2021-09-11 RX ADMIN — GABAPENTIN 100 MG: 100 CAPSULE ORAL at 08:52

## 2021-09-11 RX ADMIN — POTASSIUM CHLORIDE, DEXTROSE MONOHYDRATE AND SODIUM CHLORIDE: 150; 5; 450 INJECTION, SOLUTION INTRAVENOUS at 10:39

## 2021-09-11 RX ADMIN — THIAMINE HYDROCHLORIDE 500 MG: 100 INJECTION, SOLUTION INTRAMUSCULAR; INTRAVENOUS at 17:28

## 2021-09-11 RX ADMIN — ROSUVASTATIN CALCIUM 10 MG: 10 TABLET, FILM COATED ORAL at 08:52

## 2021-09-11 RX ADMIN — SODIUM CHLORIDE 1 UNITS/HR: 9 INJECTION, SOLUTION INTRAVENOUS at 20:24

## 2021-09-11 RX ADMIN — MULTIVIT AND MINERALS-FERROUS GLUCONATE 9 MG IRON/15 ML ORAL LIQUID 15 ML: at 17:50

## 2021-09-11 RX ADMIN — DEXMEDETOMIDINE HYDROCHLORIDE 1.2 MCG/KG/HR: 400 INJECTION INTRAVENOUS at 05:38

## 2021-09-11 RX ADMIN — QUETIAPINE FUMARATE 50 MG: 50 TABLET ORAL at 09:22

## 2021-09-11 RX ADMIN — HEPARIN SODIUM 5000 UNITS: 5000 INJECTION INTRAVENOUS; SUBCUTANEOUS at 22:08

## 2021-09-11 RX ADMIN — PHENYLEPHRINE HYDROCHLORIDE 0.7 MCG/KG/MIN: 10 INJECTION INTRAVENOUS at 20:26

## 2021-09-11 RX ADMIN — PANTOPRAZOLE SODIUM 40 MG: 40 INJECTION, POWDER, FOR SOLUTION INTRAVENOUS at 08:52

## 2021-09-11 RX ADMIN — GABAPENTIN 100 MG: 100 CAPSULE ORAL at 22:08

## 2021-09-11 RX ADMIN — AMLODIPINE BESYLATE 2.5 MG: 2.5 TABLET ORAL at 08:52

## 2021-09-11 RX ADMIN — MAGNESIUM SULFATE HEPTAHYDRATE 2 G: 40 INJECTION, SOLUTION INTRAVENOUS at 08:53

## 2021-09-11 RX ADMIN — PROPOFOL 30 MCG/KG/MIN: 10 INJECTION, EMULSION INTRAVENOUS at 08:13

## 2021-09-11 RX ADMIN — THIAMINE HYDROCHLORIDE 500 MG: 100 INJECTION, SOLUTION INTRAMUSCULAR; INTRAVENOUS at 22:06

## 2021-09-11 RX ADMIN — ACETAMINOPHEN 650 MG: 325 TABLET, FILM COATED ORAL at 22:08

## 2021-09-11 RX ADMIN — DEXMEDETOMIDINE HYDROCHLORIDE 1.2 MCG/KG/HR: 400 INJECTION INTRAVENOUS at 09:17

## 2021-09-11 ASSESSMENT — ACTIVITIES OF DAILY LIVING (ADL)
ADLS_ACUITY_SCORE: 19
ADLS_ACUITY_SCORE: 19
ADLS_ACUITY_SCORE: 21
ADLS_ACUITY_SCORE: 23
ADLS_ACUITY_SCORE: 19
ADLS_ACUITY_SCORE: 19

## 2021-09-11 NOTE — CONSULTS
"CLINICAL NUTRITION SERVICES  -  ASSESSMENT NOTE      Recommendations Ordered by Registered Dietitian (RD): Osmolite 1.5 at 10 mL/hr to provide:  360 kcal (4 kcal/kg), 15 g protein (0.2 g/kg), 49 g CHO, no fiber, 183 mL H2O  Flushes 60 mL every 4 hours   Certavite daily    Future/Additional Recommendations: Recommend eventual goal of Osmolite 1.5 at 65 mL/hr to provide:  2340 kcal (27 kcal/kg), 98 g protein (1.12 g/kg), 318 g CHO, no fiber, 1189 mL H2O   Malnutrition: % Weight Loss:  None noted  % Intake:  </= 50% for >/= 5 days (severe malnutrition)(will meet 9/12)  Subcutaneous Fat Loss:  None observed  Muscle Loss:  None observed  Fluid Retention:  None noted    Malnutrition Diagnosis: Patient does not meet two of the above criteria necessary for diagnosing malnutrition        REASON FOR ASSESSMENT  Jerry Bustamante is a 51 year old male seen by Registered Dietitian for Provider Order - Registered Dietitian to Assess and Order TF per Medical Nutrition Therapy Protocol    Patient admitted with NSTEMI, multivessel CAD now s/p CABG x 4 (9/8).      NUTRITION HISTORY  - Information obtained from [facility] as patient intubated and unable to provide.  - Patient was here for about 1 week prior to surgery and during that timeframe intake was noted to be good (100% of meals).  RN note on 9/4 stated patient not very compliant with his diabetic diet - eating lots of cookies/crackers/tea - blood sugars on the high side.      CURRENT NUTRITION ORDERS  Diet Order:     NPO (day #4)  Asked to see patient for TF initiation     Current Intake/Tolerance:  N/A      NUTRITION FOCUSED PHYSICAL ASSESSMENT FOR DIAGNOSING MALNUTRITION)  Yes               Observed:    No nutrition-related physical findings observed    Obtained from Chart/Interdisciplinary Team:  None     ANTHROPOMETRICS  Height: 5'10\"  Weight: 87.5 kg (9/2)  Body mass index is 27.7 kg/m   Weight Status:  Overweight BMI 25-29.9  IBW: 75.5 kg   % IBW: 116%  Weight History:   Wt " Readings from Last 10 Encounters:   09/11/21 87.7 kg (193 lb 5.5 oz)   09/02/21 86.6 kg (191 lb)   07/31/19 88.5 kg (195 lb)   01/09/18 88.4 kg (194 lb 12.8 oz)     Weight has been fairly stable     LABS  Labs reviewed    MEDICATIONS  Pressor x 2  Insulin drip   Propofol off       ASSESSED NUTRITION NEEDS PER APPROVED PRACTICE GUIDELINES:    Dosing Weight 87.5 kg   Estimated Energy Needs: 0729-1849 kcals (25-30 Kcal/Kg)  Justification: maintenance  Estimated Protein Needs: 105-130 grams protein (1.2-1.5 g pro/Kg)  Justification: post-op and hypercatabolism with acute illness  Estimated Fluid Needs: 6467-7321 mL (1 mL/Kcal)  Justification: maintenance    MALNUTRITION:  % Weight Loss:  None noted  % Intake:  </= 50% for >/= 5 days (severe malnutrition)(will meet 9/12)  Subcutaneous Fat Loss:  None observed  Muscle Loss:  None observed  Fluid Retention:  None noted    Malnutrition Diagnosis: Patient does not meet two of the above criteria necessary for diagnosing malnutrition    NUTRITION DIAGNOSIS:  Inadequate protein-energy intake related to NPO on vent with plans to start TF today as evidenced by meeting 0% needs      NUTRITION INTERVENTIONS  Recommendations / Nutrition Prescription  Osmolite 1.5 at 10 mL/hr to provide:  360 kcal (4 kcal/kg), 15 g protein (0.2 g/kg), 49 g CHO, no fiber, 183 mL H2O  Flushes 60 mL every 4 hours   Certavite daily     Recommend eventual goal of Osmolite 1.5 at 65 mL/hr to provide:  2340 kcal (27 kcal/kg), 98 g protein (1.12 g/kg), 318 g CHO, no fiber, 1189 mL H2O    Implementation  Nutrition education: Not appropriate at this time due to patient condition  EN Composition, EN Schedule, Feeding Tube Flush and Multivitamin/Mineral:  Trophic TF ordered as above     Nutrition Goals  Patient will meet nutritional needs within the next 48 hours     MONITORING AND EVALUATION:  Progress towards goals will be monitored and evaluated per protocol and Practice Guidelines    Heidi Wild RD, LD,  CNSC   Clinical Dietitian - [facility]

## 2021-09-11 NOTE — PLAN OF CARE
8-1200: Pt tolerated PS for 3 hrs. Weaning off sedation. Not following commands. CT 2 to 1 x 2. Wound vac in place, CDI. Christina in place with renea output. Epi and phenylephrine titration to keep MAP < 65. Called CV surg to make aware of BP changes. Family at the bedside very frustrated that they have not heard from a DR since surgery. CV surg made aware family would like to speak with them as well.

## 2021-09-11 NOTE — PROGRESS NOTES
[facility] ICU RESPIRATORY NOTE        Date of Admission: 9/2/2021    Date of Intubation (most recent): 9/8/21    Reason for Mechanical Ventilation: Post Op    Number of Days on Mechanical Ventilation: 3    Met Criteria for Spontaneous Breathing Trial: Yes     Reason for No Spontaneous Breathing Trial: NA    Significant Events Today: SBT done x2 /Not following command     ABG Results:   Recent Labs   Lab 09/09/21  0446 09/08/21  2217 09/08/21 2004 09/08/21  1815   PH 7.37 7.29* 7.29* 7.24*   PCO2 43 47* 46* 52*   PO2 103 130* 70* 67*   HCO3 24 23 22 22   O2PER 60 40 60 60       Current Vent Settings: Ventilation Mode: CMV/AC  (Continuous Mandatory Ventilation/ Assist Control)  FiO2 (%): 30 %  Rate Set (breaths/minute): 24 breaths/min  Tidal Volume Set (mL): 500 mL  PEEP (cm H2O): 5 cmH2O  Pressure Support (cm H2O): 5 cmH2O  Oxygen Concentration (%): 30 %  Resp: 24      Skin Assessment: done     Plan: cont SBT as able    Ambar Mccormick, RT

## 2021-09-11 NOTE — PROCEDURES
Name of Procedure: Arterial Line Placement for the purpose of hemodynamic monitoring    Date of Procedure: 9/11/2021    Description of Procedure  Consent for the procedure was obtained from the patient after its risks and benefits were thoroughly explained. The patient was placed in the supine position. The puncture site was then prepped and draped in the usual sterile fashion. 2 ml of 1% lidocaine was utilized for local anesthesia.   Under direct ultrasound guidance, a 20-gauge needle was then advanced through the patient's skin and subcutaneous tissue and entered into the patient's right radial artery. Strong pulsatile blood flow was immediately observed coming from the needle. A guidewire was advanced through the needle without resistance. The needle was subsequently removed. Small longitudinal incision was made at the entry site and the arterial catheter was advanced. The guidewire was then removed and the catheter was attached to a transducer. It was subsequently sutured into place. A biopatch and sterile tegaderm dressing was applied. There were not any immediate complications and the patient tolerated the procedure well.    Number of attempts: 1    Findings: A good waveform was observed on the monitor and the arterial line blood pressure readings matched those obtained via the blood pressure cuff.    Chaparrita De La Mater

## 2021-09-11 NOTE — PROGRESS NOTES
Pt been on sedation vacation/pressure supporting since 1200.  ~1430 pt become  tachypneic/tachycardic/HTN/extremely restless and labored breathing. MD at bedside. Sedation restarted. EKG obtained for sinus tachycardia. Family at bedside being updated with NP. Family requesting interpretor and needs detailed update from CV surgery team. Paged CV team and awaiting call back.

## 2021-09-11 NOTE — PROCEDURES
Name of Procedure:  Right IJ Central Line Rewire    Date of Procedure: 9/11/2021    Description of Procedure  The patient was placed in the supine position with head in neutral position. The bed was positioned in slight Trendelenburg. The existing MAC line catheter was prepped and sutures were removed. A guidewire was advanced through MAC line hub and the line was withdrawn while maintaining control of the wire. A triple lumen catheter was inserted over the guidewire while  maintaning constant control of the guidewire proximally during the insertion and distally while inserting the new catheter. The guidewire was subsequently removed. Adequate blood return was present in all three ports. There was persistent oozing at the site of insertion, a purse string suture was applied.   A Biopatch was placed at the skin entry site and the triple-lumen catheter was secured in place using a 3-0 silk suture at the 17 cm, after which a sterile tegaderm dressing was applied. There were not any immediate complications. EBL < 15.     A post-procedure chest x-ray confirmed adequate placement    Chaparrita De La Mater CNP

## 2021-09-11 NOTE — PROGRESS NOTES
-[facility]   CRITICAL CARE NOTE     Jerry Bustamante MRN: [id number]  1970  Date of Admission:9/2/2021  Date of service 9/11/2021          Problem List:   1. Acute resp failure post CABG, with respiratory acidosis (corrected  2. multivessel CAD post CABG x 4   3. Encephalopathy     24-Hour Goals     - Encephalopathy workup  - PST  - Wean vasopresors  - Sedation vacation  - Add seroquel  - Start TF  - Start Thiamine  - Neurology consult      Overnight Events   Respiratory acidosis, frothy secretions from ETT concerning of pulmonary edema.        Lines/Tubes/Draines/Devices   .  Peripheral IV 09/01/21 Anterior;Right (Active)   Site Assessment [facility] 09/11/21 0800   Line Status Saline locked 09/11/21 0800   Phlebitis Scale 0-->no symptoms 09/11/21 0800   Infiltration Scale 0 09/11/21 0800   Number of days: 10       Peripheral IV 09/08/21 Right Hand (Active)   Site Assessment [facility] 09/11/21 0800   Line Status Saline locked 09/11/21 0800   Dressing Intervention New dressing  09/09/21 0000   Phlebitis Scale 0-->no symptoms 09/11/21 0400   Infiltration Scale 0 09/11/21 0400   Infiltration Site Treatment Method  None 09/11/21 0400   Number of days: 3       Introducer 09/08/21 Internal jugular Right (Active)   Specific Qualities Other (Comment) 09/11/21 0400   (Retired) Dressing Status Clean, dry, intact 09/10/21 2000   Dressing Intervention New dressing 09/10/21 0800   Number of days: 3       Right Groin Interventional Procedure Access (Active)   Site Assessment Unchanged 09/11/21 1200   Hemostasis management Unchanged 09/11/21 1200   Femoral Bruit not present 09/10/21 0800   CMS Right Extremity WDL 09/11/21 0400   Dorsalis Pulse - Right Leg Present with doppler 09/11/21 1200   Popliteal Pulse - Right Leg Present with doppler 09/11/21 1200   Posterior Tibial Pulse - Right Leg Present with doppler 09/10/21 2000   Number of days: 9       ETT Cuffed Single;Single Subglottic Suction 8 mm (Active)   Secured  at (cm) 25 cm 09/11/21 1131   Measured from Lips 09/11/21 1131   Position Left 09/11/21 0813   Secured by Commercial tube oliver 09/11/21 0234   Bite Block None Present 09/11/21 0234   Site Appearance Clean;Dry 09/11/21 0234   Cuff Assessment Minimal occluding volume 09/11/21 0730   Safety Measures Manual resuscitator at bedside 09/11/21 0730   Number of days: 3       Chest Tube 1 Left Mediastinal 24 [place] (Active)   Site Assessment WDL 09/11/21 0800   Suction -20 cm H2O 09/11/21 0800   Chest Tube Airleak No 09/11/21 0800   Drainage Description Serosanguinous 09/11/21 0800   Dressing Status Normal: Clean, Dry & Intact 09/11/21 0800   Dressing Change Due 09/10/21 09/09/21 0600   Dressing Intervention Gauze 09/11/21 0800   Patency Intervention Tip/Tilt 09/11/21 0800   Chest Tube Clamps at Bedside present 09/10/21 2000   Container Amount 640 09/11/21 1000   Output (ml) 20 ml 09/11/21 0600   Number of days: 3       Chest Tube 2 Pleural 32 [place] (Active)   Site Assessment WDL 09/11/21 0800   Suction -20 cm H2O 09/11/21 0800   Chest Tube Airleak No 09/11/21 0800   Drainage Description Serosanguinous 09/11/21 0800   Dressing Status Normal: Clean, Dry & Intact 09/11/21 0800   Dressing Change Due 09/10/21 09/09/21 0600   Dressing Intervention Gauze 09/11/21 0800   Patency Intervention Tip/Tilt 09/11/21 0800   Chest Tube Clamps at Bedside present 09/10/21 2000   Container Amount 850 09/11/21 1000   Output (ml) 20 ml 09/11/21 1000   Number of days: 3       Chest Tube 3 Mediastinal 32 [place] (Active)   Site Assessment WDL 09/11/21 0800   Suction -20 cm H2O 09/11/21 0800   Chest Tube Airleak No 09/11/21 0800   Drainage Description Serosanguinous 09/11/21 0800   Dressing Status Normal: Clean, Dry & Intact 09/11/21 0800   Dressing Intervention Gauze 09/11/21 0800   Patency Intervention Tip/Tilt 09/11/21 0800   Chest Tube Clamps at Bedside present 09/10/21 2000   Number of days: 3       Chest Tube 4 Right Pleural 24 [place]  (Active)   Site Assessment WDL 09/11/21 0800   Suction -20 cm H2O 09/11/21 0800   Chest Tube Airleak No 09/11/21 0800   Drainage Description Serosanguinous 09/11/21 0800   Dressing Status Normal: Clean, Dry & Intact 09/11/21 0400   Dressing Intervention Gauze 09/11/21 0400   Patency Intervention Tip/Tilt 09/11/21 0400   Chest Tube Clamps at Bedside present 09/10/21 2000   Number of days: 3       Negative Pressure Wound Therapy Chest (Active)   Wound Type Surgical 09/11/21 0800   Unit Type 1 09/08/21 2000   Dressing Pieces Applied (# of Each Type) Black foam 09/11/21 0800   Cycle Continuous 09/11/21 0800   Target Pressure (mmHg) 125 09/10/21 2000   (Retired) Dressing Status Clean, dry, intact 09/11/21 0800   [place] changed? No 09/10/21 2000   Output (ml) 0 ml 09/10/21 2000   Number of days: 3       NG/OG/NJ Tube Orogastric 18 fr Center mouth (Active)   Site Description WDL 09/10/21 2000   Status Clamped 09/11/21 1000   Drainage Appearance Clear 09/10/21 0000   Placement Confirmation [place] unchanged 09/11/21 0400   [place] (cm marking) at nare/mouth 55 cm 09/10/21 2000   Intake (ml) 100 ml 09/09/21 1000   Flush/Free Water (mL) 20 mL 09/10/21 1200   Residual (mL) 20 mL 09/10/21 1200   Container Amount 150 mL 09/11/21 0600   Output (ml) 0 ml 09/11/21 0600   Number of days: 3       Urethral Catheter Latex;Temperature probe;Straight-tip 16 fr (Active)   Tube Description UTV 09/11/21 1000   Catheter Care Catheter wipes 09/11/21 1000   Collection Container Standard 09/11/21 1000   Securement Method Securing device (Describe) 09/11/21 1000   Rationale for Continued Use Strict 1-2 Hour I&O 09/11/21 1000   Urine Output 250 mL 09/11/21 1200   Number of days: 3       Incision/Surgical Site 09/08/21 Chest (Active)   Incision Assessment WDL 09/11/21 0400   Jammie-Incision Assessment Maceration 09/08/21 2000   Closure Other (Comment) 09/11/21 0400   Incision Drainage Amount None 09/11/21 0400   Drainage Description UTV  09/11/21 0400   Incision Care Therapy - negative pressure 09/11/21 0400   Dressing Intervention Clean, dry, intact 09/11/21 0400   Number of days: 3       Incision/Surgical Site 09/08/21 Left Leg (Active)   Incision Assessment WDL 09/11/21 0400   Jammie-Incision Assessment UTV 09/10/21 2000   Closure Liquid bandage 09/11/21 0400   Incision Drainage Amount None 09/11/21 0400   Incision Care Wound cleanser 09/11/21 0400   Dressing Intervention Open to air / No Dressing 09/11/21 0400   Number of days: 3       Incision/Surgical Site 09/08/21 Left Wrist (Active)   Incision Assessment WDL 09/11/21 0400   Jammie-Incision Assessment UTV 09/10/21 2000   Closure Liquid bandage 09/11/21 0400   Incision Drainage Amount None 09/11/21 0400   Incision Care Wound cleanser 09/09/21 0400   Dressing Intervention Open to air / No Dressing 09/10/21 1600   Number of days: 3          ICU Prophylaxis:   1. DVT: Hep Subq/mechanical  2. VAP: HOB 30 degrees, chlorhexidine rinse  3. Stress Ulcer: PPI/H2 blocker  4. Restraints: Nonviolent soft two point restraints required and necessary for patient safety and continued cares and good effect as patient continues to pull at necessary lines, tubes despite education and distraction. Will readdress daily.   5. IV Access - central access required and necessary for continued patient cares  6. Feeding - NPO      Medications:       acetaminophen  975 mg Oral Q8H     allopurinol  100 mg Oral Daily     amLODIPine  2.5 mg Oral Daily     aspirin  324 mg Oral or NG Tube Daily     chlorhexidine  15 mL Mouth/Throat Q12H     gabapentin  100 mg Oral TID     heparin ANTICOAGULANT  5,000 Units Subcutaneous Q8H     pantoprazole  40 mg Per Feeding Tube QAM AC    Or     pantoprazole (PROTONIX) IV  40 mg Intravenous QAM AC     polyethylene glycol  17 g Oral Daily     QUEtiapine  50 mg Oral or Feeding Tube BID     rosuvastatin  10 mg Oral Daily     senna-docusate  1 tablet Oral BID     sodium chloride (PF)  3 mL  Intracatheter Q8H     acetaminophen, bisacodyl, glucose **OR** dextrose **OR** glucagon, EPINEPHrine, hydrALAZINE, HYDROmorphone **OR** HYDROmorphone, lidocaine 4%, lidocaine (buffered or not buffered), magnesium hydroxide, methocarbamol, naloxone **OR** naloxone **OR** naloxone **OR** naloxone, niCARdipine, ondansetron **OR** ondansetron, oxyCODONE **OR** oxyCODONE, phenylephrine, BETA BLOCKER NOT PRESCRIBED, sodium chloride (PF)      Review of Systems:   Unable to obtain due to critical illness            Physical Exam:   Temp:  [99.7  F (37.6  C)-100.9  F (38.3  C)] 100.4  F (38  C)  Pulse:  [76-95] 93  BP: ()/(50-81) 105/66  FiO2 (%):  [30 %] 30 %  SpO2:  [92 %-99 %] 97 %    Intake/Output Summary (Last 24 hours) at 9/9/2021 0835  Last data filed at 9/9/2021 0800  Gross per 24 hour   Intake 5041.31 ml   Output 3570 ml   Net 1471.31 ml     Wt Readings from Last 4 Encounters:   09/11/21 87.7 kg (193 lb 5.5 oz)   09/02/21 86.6 kg (191 lb)   07/31/19 88.5 kg (195 lb)   01/09/18 88.4 kg (194 lb 12.8 oz)     BP - Mean:  [59-93] 78  Ventilation Mode: (S) CPAP/PS  (Continuous positive airway pressure with Pressure Support)  FiO2 (%): 30 %  Rate Set (breaths/minute): 24 breaths/min  Tidal Volume Set (mL): 500 mL  PEEP (cm H2O): 5 cmH2O  Pressure Support (cm H2O): 5 cmH2O  Oxygen Concentration (%): 30 %  Resp: 25    Recent Labs   Lab 09/09/21  0446 09/08/21 2217 09/08/21 2004 09/08/21  1815   PH 7.37 7.29* 7.29* 7.24*   PCO2 43 47* 46* 52*   PO2 103 130* 70* 67*   HCO3 24 23 22 22   O2PER 60 40 60 60       GEN: no acute distress   HEENT: head ncat, sclera anicteric, OP patent, trachea midline   PULM: unlabored synchronous with vent, clear anteriorly    CV/COR: RRR S1S2 no gallop,  No rub, no murmur  ABD: soft nontender, hypoactive bowel sounds, no mass  EXT:  No Edema   warm  NEURO: eye opening to noxious stimulus, minimal withdrawal with four extremities.   SKIN: no obvious rash      Assessment and plan :              Neurology/Psychiatry/Pain/Sedation:   #Encephalopathy  #Agitated delirium  -Events reviewed. Reportedly having bouts of agitation when sedation is reduced.   - Current sedatives: Propofol, Precedex infusions. Goal RAAS 0 to -1. Two doses of valproic acid given yesterday evening as sedation adjuvants.   - Trial of Seroquel 50 mg BID. Favor reducing propofol first.   - Daily sedation vacation- full sedation interruption.   - CT head 9/10 negative for intracranial hemorrhage  - Encephalopathy workup: add CMP, ammonia, TSH with reflex, random cortisol, surveillance cultures.   - Will start empiric thiamine  - Neurology consult-  Planning on MRI brain and EEG    Cardiovascular/Hemodynamics:   #post CABG x 4  #Shock  - Currently on Epinephrine and Phenylephrine infusions to keep MAP > 65. Defer management to CV surgery but would favor weaning off Epinephrine first.     Pulmonary/Ventilator Management:   #Acute respiratory failure with hypercapnia  Plan:   - Daily PST  - Encephalopathy remains barrier to extubation    GI/Nutrition:   #Protein calorie malnutrition in the setting of acute illness  - Check placement of OG  - Start TF  - Bowel regimen      Renal/Fluids/Electrolytes:   No issues, good UOP.    Intake/Output Summary (Last 24 hours) at 9/9/2021 0839  Last data filed at 9/9/2021 0800  Gross per 24 hour   Intake 5041.31 ml   Output 3570 ml   Net 1471.31 ml     Infectious Disease:   # Hx of latent TB  Cefazolin ppx   Persistent leukocytosis-> downtrending. T max 100.6  Given persistent encephalopathy, will send surveillance cultures.       Endocrine/Hematology/Oncology:   Type 2 DM, uncontrolled.  ICU protocol to keep glucose < 180  Daily Aspirin  Subcutaneous heparin    Disposition/Code Status/Other  1. Disposition: ICU  2. Code: Full      I have personally reviewed the daily labs, imaging studies, cultures and discussed the case with referring physician and consulting physicians.     This patient is  critically ill and I have provided 30 minutes of critical care time (excluding procedures) on September 9, 2021.     Chaparrita Mojica      ROUTINE ICU LABS (Last four results)  CMP  Recent Labs   Lab 09/11/21  1244 09/11/21  1021 09/11/21  0634 09/11/21  0431 09/10/21  0730 09/10/21  0458 09/09/21  0445 09/08/21  1829 09/08/21  1545   NA  --   --   --  142 138  --   --  143 143   POTASSIUM  --   --   --  4.1 4.5 4.1 4.1 3.5 3.5  3.5   CHLORIDE  --   --   --  111* 112*  --   --  114* 115*   CO2  --   --   --  25 25  --   --  22 24   ANIONGAP  --   --   --  6 1*  --   --  7 4   * 103* 139* 159* 142*  --   --  149* 157*   BUN  --   --   --  11 13  --   --  19 19   CR  --   --   --  0.93 0.98  --   --  1.15 1.16   GFRESTIMATED  --   --   --  >90 88  --   --  72 71   TORRES  --   --   --  8.4* 8.3*  --   --  8.6 8.8   MAG  --   --   --  2.0  --  2.1 1.9  --  2.4*   PHOS  --   --   --  3.7  --  2.3* 4.8*  --  4.5   PROTTOTAL  --   --   --   --   --   --   --  6.9 6.4*   ALBUMIN  --   --   --   --   --   --   --  3.5 2.8*   BILITOTAL  --   --   --   --   --   --   --  0.6 1.0   ALKPHOS  --   --   --   --   --   --   --  150 175*   AST  --   --   --   --   --   --   --  50* 51*   ALT  --   --   --   --   --   --   --  47 51     CBC  Recent Labs   Lab 09/11/21  0431 09/10/21  0730 09/09/21  0445 09/08/21  1545   WBC 12.6* 14.7* 14.4* 20.7*   RBC 3.36* 3.40* 3.80* 4.18*   HGB 8.8* 8.8* 10.0* 11.0*   HCT 27.5* 27.4* 30.7* 34.1*   MCV 82 81 81 82   MCH 26.2* 25.9* 26.3* 26.3*   MCHC 32.0 32.1 32.6 32.3   RDW 14.7 14.8 14.6 14.4    185 213 224     INR  Recent Labs   Lab 09/08/21  1545 09/08/21  1423   INR 1.23* 1.41*     Arterial Blood Gas  Recent Labs   Lab 09/09/21  0446 09/08/21  2217 09/08/21 2004 09/08/21  1815   PH 7.37 7.29* 7.29* 7.24*   PCO2 43 47* 46* 52*   PO2 103 130* 70* 67*   HCO3 24 23 22 22   O2PER 60 40 60 60       All cultures:  No results for input(s): CULT in the last 168 hours.  Recent Results  (from the past 24 hour(s))   XR Chest Port 1 View    Narrative    CHEST ONE VIEW PORTABLE   9/8/2021 3:54 PM     HISTORY: ET tube positioning and CVTS postop surgery.    COMPARISON: None.      Impression    IMPRESSION: Portable chest. Endotracheal tube terminates approximately  2 cm above the john. Nasogastric tube extends below the left  hemidiaphragm, presumably in the stomach. Mediastinal drains and chest  tubes are noted. Right IJ [place] catheter appears in good position  with the tip likely in the main pulmonary artery. Heart is mildly  enlarged. Airspace opacity noted in the mid left lung, possibly  postoperative. Patient is status post CABG. No pneumothorax or  significant pleural fluid.    ZULAY GALLEGOS MD         SYSTEM ID:  [id number]   XR Chest Port 1 View    Narrative    EXAM: XR CHEST PORT 1 VIEW  LOCATION: [facility]  DATE/TIME: 9/8/2021 11:25 PM    INDICATION: Worsening hypoxia, post CABG  COMPARISON: 9/8/2021.    FINDINGS: ET tube 3.5 cm above the john. NG tube passes into the stomach. Right IJ pulmonary artery catheter in the pulmonary outflow tract. Sternal wires and mediastinal clips. Mediastinal drains and bilateral chest tubes. No pneumothorax. The heart   size is normal. Probable left pleural effusion. Improving infiltrate in the left mid lung.      Impression    IMPRESSION: No pneumothorax. Improving left lung infiltrate. Probable left pleural effusion.   XR Chest Port 1 View    Narrative    EXAM: XR CHEST PORT 1 VIEW  LOCATION: [facility]  DATE/TIME: 9/9/2021 5:52 AM    INDICATION: Post Op CVTS Surgery  COMPARISON: 09/08/2021.      Impression    IMPRESSION: Endotracheal tube in satisfactory position terminating 4.7 cm above the john. Enteric tube tip in the stomach. Right internal jugular venous [place] catheter tip in the main pulmonary artery. Poststernotomy and coronary artery bypass   grafting. Mediastinal and bilateral pleural  drains unchanged. Stable cardiac silhouette. Normal pulmonary vascularity. Mild streaky atelectasis of the right midlung. No pneumothorax.

## 2021-09-11 NOTE — PROGRESS NOTES
Hospitalist Chart Check     POD #3 4V CABG. Patient remains intubated and on pressor support. Ongoing cares per intensivist and CV surgery. Hospitalist service will continue following peripherally, will assist with ongoing BG management once extubated and taking po.      Chelsea Costa,   Internal Medicine - Hospitalist  9/11/2021  3:13PM

## 2021-09-11 NOTE — PROGRESS NOTES
[facility] ICU RESPIRATORY NOTE        Date of Admission: 9/2/2021    Date of Intubation (most recent): 9/8/21    Reason for Mechanical Ventilation: Cardiac Surgery    Number of Days on Mechanical Ventilation: 3    Met Criteria for Spontaneous Breathing Trial: No    Reason for No Spontaneous Breathing Trial: Per MD    Significant Events Today: None    ABG Results:   Recent Labs   Lab 09/09/21  0446 09/08/21  2217 09/08/21 2004 09/08/21  1815   PH 7.37 7.29* 7.29* 7.24*   PCO2 43 47* 46* 52*   PO2 103 130* 70* 67*   HCO3 24 23 22 22   O2PER 60 40 60 60       Current Vent Settings: Ventilation Mode: CMV/AC  (Continuous Mandatory Ventilation/ Assist Control)  FiO2 (%): 30 %  Rate Set (breaths/minute): 24 breaths/min  Tidal Volume Set (mL): 500 mL  PEEP (cm H2O): 5 cmH2O  Pressure Support (cm H2O): 5 cmH2O  Oxygen Concentration (%): 30 %  Resp: 25      Skin Assessment: Intact    Plan: Continue to monitor and assess respiratory status while in ICU.  Continue to maintain ETT patency.  Continue to wean from mechanical ventilation and oxygen as tolerated per protocol.  Assess skin integrity at least once per shift.    Russell Lara, RT

## 2021-09-11 NOTE — CONSULTS
[facility] [place]    Stroke/Neurocritical Care Consult Note    Reason for Consult:  agitation    Chief Complaint: No chief complaint on file.       HPI  Jerry Bustamante is a 51 year old male hx CAD s/p stenting, HTN, HLD, DMII and gout who is admitted for NSTEMI now s/p 4 vessel CAB on 9/8, for whom Neuro Critical Care is consulted for evaluation of agitation when sedation is decreased. Head CT is negative for explanatory etiology. He is currently on propofol and precedex infusions with intermittent opioid doses. He also received 2 doses of VPA for agitation without clear response. He has no known history of neurologic or psychiatric illness.    Impression  Agitated delirium- etiology likely multifactorial- ICU delirium, poor response to anesthesia/sedative infusions, however it is reasonable to rule out seizure or intracranial pathology not assessed well by CT (embolic shower, anoxic injury, small watershed infarcts, etc.) In the meantime, symptomatic management with delirium precautions, gradually deescalation of sedation, and non-sedative agents (neuroleptics) is advised    Recommendations  Cerebell EEG now  vEEG in the am  MRI brain w/ and w/o contrast when possible (pacer wires removed)  Agree with low dose seroquel  Agree with toxic metabolic workup as you are already doing    Thank you for this consult. We will continue to follow.      Time Spent on this Encounter   Jerry was seen and evaluated by me on 09/11/21.  He was in critical condition as the result of mechanical ventilation, encephalopathy.    His condition is now Stable.      The acute issues managed by me today include encephalopathy   Supportive interventions provided and/or ordered by me include sedation management    Total Critical Care time spent by me, excluding procedures, was 40 minutes.    MD Deidra Koch MD  Vascular Neurology  To page me or covering stroke neurology team member, click here:  "AMCOM  Choose \"On Call\" tab at top, then search dropdown box for \"Neurology Adult\", select location, press Enter, then look for stroke/neuro ICU/Telestroke.    _____________________________________________________    Clinically Significant Risk Factors Present on Admission                  Past Medical History   Past Medical History:   Diagnosis Date     Coronary artery disease      Diabetes mellitus, type II (H)      Gout      High cholesterol      HLD (hyperlipidemia)      Hypertension      Past Surgical History   Past Surgical History:   Procedure Laterality Date     ANGIOPLASTY       BYPASS GRAFT ARTERY CORONARY N/A 9/8/2021    Procedure: CORONARY ARTERY BYPASS GRAFTING X 4 WITH ENDOSCOPIC VEIN HARVESTING LIMA-LAD PREET-DISTAL RCA LEFT RADIAL-OM LEFT SV-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 Authorizing Provider   allopurinol (ZYLOPRIM) 100 MG tablet [ALLOPURINOL (ZYLOPRIM) 100 MG TABLET] Take 100 mg by mouth daily. 7/13/19  Yes Provider, Historical   amLODIPine (NORVASC) 5 MG tablet [AMLODIPINE (NORVASC) 5 MG TABLET] Take 5 mg by mouth. 1/18/19  Yes Provider, Historical   glipiZIDE (GLUCOTROL XL) 10 MG 24 hr tablet [GLIPIZIDE (GLUCOTROL XL) 10 MG 24 HR TABLET] Take 10 mg by mouth daily. 11/23/17  Yes Provider, Historical   insulin glargine (LANTUS) 100 unit/mL injection Inject 30 Units Subcutaneous At Bedtime  11/23/17  Yes Provider, Historical   losartan (COZAAR) 50 MG tablet Take 50 mg by mouth 2 times daily   Yes " Unknown, Entered By History   metFORMIN (GLUCOPHAGE) 500 MG tablet [METFORMIN (GLUCOPHAGE) 500 MG TABLET] Take 1,000 mg by mouth 2 (two) times a day with meals. 11/23/17  Yes Provider, Historical   metoprolol tartrate (LOPRESSOR) 50 MG tablet [METOPROLOL TARTRATE (LOPRESSOR) 50 MG TABLET] Take 50 mg by mouth 2 (two) times a day. 11/23/17  Yes Provider, Historical   rosuvastatin (CRESTOR) 10 MG tablet Take 10 mg by mouth daily   Yes Unknown, Entered By History       Scheduled Meds    amiodarone         allopurinol  100 mg Oral Daily     amLODIPine  2.5 mg Oral Daily     aspirin  324 mg Oral or NG Tube Daily     chlorhexidine  15 mL Mouth/Throat Q12H     gabapentin  100 mg Oral TID     heparin ANTICOAGULANT  5,000 Units Subcutaneous Q8H     multivitamins w/minerals  15 mL Per Feeding Tube Daily     pantoprazole  40 mg Per Feeding Tube QAM AC    Or     pantoprazole (PROTONIX) IV  40 mg Intravenous QAM AC     polyethylene glycol  17 g Oral Daily     QUEtiapine  50 mg Oral or Feeding Tube BID     rosuvastatin  10 mg Oral Daily     senna-docusate  1 tablet Oral BID     sodium chloride (PF)  3 mL Intracatheter Q8H     thiamine  500 mg Intravenous TID    Followed by     [START ON 9/14/2021] thiamine  250 mg Intravenous Daily    Followed by     [START ON 9/19/2021] thiamine  100 mg Oral Daily       Infusion Meds    amiodarone       amiodarone       dexmedetomidine Stopped (09/11/21 1158)     dextrose       EPINEPHrine 0.01 mcg/kg/min (09/11/21 1402)     insulin regular 1 Units/hr (09/11/21 1249)     lactated ringers 10 mL/hr at 09/10/21 2022     niCARdipine Stopped (09/09/21 1000)     phenylephrine 0.5 mcg/kg/min (09/11/21 1411)     propofol (DIPRIVAN) infusion Stopped (09/11/21 1147)     BETA BLOCKER NOT PRESCRIBED       sodium chloride 10 mL/hr at 09/10/21 1200       PRN Meds  acetaminophen, bisacodyl, dextrose, glucose **OR** dextrose **OR** glucagon, EPINEPHrine, hydrALAZINE, HYDROmorphone **OR** HYDROmorphone, lidocaine  4%, lidocaine (buffered or not buffered), magnesium hydroxide, methocarbamol, naloxone **OR** naloxone **OR** naloxone **OR** naloxone, niCARdipine, ondansetron **OR** ondansetron, oxyCODONE **OR** oxyCODONE, phenylephrine, BETA BLOCKER NOT PRESCRIBED, sodium chloride (PF)    Allergies   Allergies   Allergen Reactions     Simvastatin Itching     Family History   Family History   Problem Relation Age of Onset     Hypertension Mother      Diabetes Maternal Aunt      Diabetes Maternal Uncle      Cancer No family hx of      Cerebrovascular Disease No family hx of      Social History   Social History     Tobacco Use     Smoking status: Former Smoker     Packs/day: 1.50     Years: 13.00     Pack years: 19.50     Types: Cigarettes     Quit date: 2003     Years since quittin.8     Smokeless tobacco: Never Used   Substance Use Topics     Alcohol use: No     Drug use: No       Review of Systems   Review of systems not obtained due to patient factors - intubation       PHYSICAL EXAMINATION   Temp:  [99.7  F (37.6  C)-100.9  F (38.3  C)] 100.6  F (38.1  C)  Pulse:  [] 147  Resp:  [28] 28  BP: ()/(50-94) 99/82  FiO2 (%):  [30 %] 30 %  SpO2:  [91 %-99 %] 94 %    General Exam  General:  patient lying in bed without any acute distress    HEENT:  normocephalic/atraumatic, intubated  Cardio:  RRR  Pulmonary:  on mechanical ventilation  Abdomen:  soft, non-tender, non-distended  Extremities:  no edema, peripheral pulses palpable  Skin:  intact, warm/dry     Neuro Exam  Mental Status:  examined on propofol 20 and precedex 1.2, eyes closed, does not open to voice, does not follow commands  Cranial Nerves:   pupils 3, briskly reactive, cough intact, breathes over ventilator set rate  Motor:  normal muscle tone and bulk, no abnormal movements, does not move limbs  Reflexes:  2+ and symmetric throughout, no clonus, toes mute  Sensory:   does not respond to noxious stimuli in 4/4   Coordination:   not  tested  Station/Gait:   not tested    Imaging  I personally reviewed all imaging; relevant findings per HPI.    Labs Data   CBC  Recent Labs   Lab 09/11/21  0431 09/10/21  0730 09/09/21  0445   WBC 12.6* 14.7* 14.4*   RBC 3.36* 3.40* 3.80*   HGB 8.8* 8.8* 10.0*   HCT 27.5* 27.4* 30.7*    185 213     Basic Metabolic Panel   Recent Labs   Lab 09/11/21  1501 09/11/21  1244 09/11/21  1021 09/11/21  0431 09/10/21  0730 09/10/21  0458 09/08/21 2002 09/08/21  1829   NA  --   --   --  142 138  --   --  143   POTASSIUM  --   --   --  4.1 4.5 4.1   < > 3.5   CHLORIDE  --   --   --  111* 112*  --   --  114*   CO2  --   --   --  25 25  --   --  22   BUN  --   --   --  11 13  --   --  19   CR  --   --   --  0.93 0.98  --   --  1.15   * 101* 103* 159* 142*  --   --  149*   TORRES  --   --   --  8.4* 8.3*  --   --  8.6    < > = values in this interval not displayed.     Liver Panel  Recent Labs   Lab 09/08/21  1829 09/08/21  1545   PROTTOTAL 6.9 6.4*   ALBUMIN 3.5 2.8*   BILITOTAL 0.6 1.0   ALKPHOS 150 175*   AST 50* 51*   ALT 47 51     INR    Recent Labs   Lab Test 09/08/21  1545 09/08/21  1423   INR 1.23* 1.41*      Lipid Profile    Recent Labs   Lab Test 09/03/21  0818 11/23/17  1234   CHOL 129 259*   HDL 50 33*   LDL 46 146*   TRIG 166* 398*     A1C    Recent Labs   Lab Test 09/02/21  1000 11/23/17  1300   A1C 10.2* 6.9*     Troponin I  No results for input(s): TROPONIN in the last 168 hours.

## 2021-09-11 NOTE — PROGRESS NOTES
[facility] and care plan 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 Antelmo Mccullough  -CVS: HR: 70s-80s. SBP: 80s-110s. Pre op EF: 40-45%. Remains on Epi/Jon-- weaning as able. ASA, BB when off pressors, statin. Amlodipine 2.5mg daily to prevent radial artery spasm for 3 months. PTA losartan on hold. Pacer wires capped and removed today. Chest tubes with too much output to remove today  -Resp: Intubated-- patient becomes extremely agitated when sedation lightened, intensivist following for ventilator management-- will discuss extubation plan  -Neuro:  Sedated-- becomes agitated with sedation lightened. CT head yesterday with no acute changes. Started on seroquel. Given Valproate per intensivist. Neurology consulted. Plan for MRI  -Renal: good UOP. Close to preoperative weight. Cr: 0.93. Will continue to monitor.   -GI: Swallow eval once extubated and advance diet if appropriate. No BM. Continue bowel regimen   -:  Christina in place d/t limited mobility and need for accurate I&Os.   -Endo: pre op a1c: 10.2. Hx of DMII-- PTA glipizide, metformin on hold. Continue insulin gtt. Hospitalist to assist with post operative glycemic control once transferred.   -FEN: replace electrolytes as needed. Na: 142. K: 4.1  Orders Placed This Encounter      NPO for Medical/Clinical Reasons Except for: Meds      Advance Diet as Tolerated: Clear Liquid Diet      Advance Diet as Tolerated: Clear Liquid Diet; Low Saturated Fat Na  <2400mg Diet      Advance Diet as Tolerated: Clear Liquid Diet      Advance Diet as Tolerated: Full Liquid Diet; Low Saturated Fat Na <2400mg Diet    -ID: Temp (24hrs), Av.5  F (38.1  C), Min:99.7  F (37.6  C), Max:100.9  F (38.3  C)  WBC: 12.6. Completed perioperative abx. Leukocytosis likely related to stress from surgery  -Heme: Hgb: 8.8. plt: 222. Acute blood loss anemia and thrombocytopenia related to surgery. Will continue to monitor.   -Proph: PCD, ASA, statin, PPI, sub q heparin  -Dispo: Continue ICU cares. Ventilator management per intensivist-- hoping for extubation today. Chest tubes and krishna to remain in place. Wean pressors as able.           Interval History:   Intubated and sedated. Does not follow commands and becomes acutely agitated with weaning sedation, tachycardic and tachypneic    Long discussion with patient family today regarding status.          Medications:       acetaminophen  975 mg Oral Q8H     allopurinol  100 mg Oral Daily     amLODIPine  2.5 mg Oral Daily     aspirin  324 mg Oral or NG Tube Daily     chlorhexidine  15 mL Mouth/Throat Q12H     gabapentin  100 mg Oral TID     heparin ANTICOAGULANT  5,000 Units Subcutaneous Q8H     pantoprazole  40 mg Per Feeding Tube QAM AC    Or     pantoprazole (PROTONIX) IV  40 mg Intravenous QAM AC     polyethylene glycol  17 g Oral Daily     QUEtiapine  50 mg Oral or Feeding Tube BID     rosuvastatin  10 mg Oral Daily     senna-docusate  1 tablet Oral BID     sodium chloride (PF)  3 mL Intracatheter Q8H     acetaminophen, bisacodyl, glucose **OR** dextrose **OR** glucagon, EPINEPHrine, hydrALAZINE, HYDROmorphone **OR** HYDROmorphone, lidocaine 4%, lidocaine (buffered or not buffered), magnesium hydroxide, methocarbamol, naloxone **OR** naloxone **OR** naloxone **OR** naloxone, niCARdipine, ondansetron **OR** ondansetron, oxyCODONE **OR** oxyCODONE, phenylephrine, BETA BLOCKER NOT PRESCRIBED, sodium chloride (PF)          Physical Exam:    Vitals were reviewed  Blood pressure 98/60, pulse 91, temperature 100.4  F (38  C), resp. rate 25, weight 87.7 kg (193 lb 5.5 oz), SpO2 97 %.  Rhythm: NSR    Lungs: coarse    Cardiovascular: RRR normal s1 and s2    Abdomen: soft NTND    Extremeties: minimal edema    Incision: CDI     CT: to suction    Weight:   Vitals:    09/06/21 0640 09/07/21 0558 09/09/21 0500 09/10/21 0430   Weight: 86.3 kg (190 lb 3.2 oz) 86.3 kg (190 lb 3.2 oz) 89 kg (196 lb 3.4 oz) 88.6 kg (195 lb 5.2 oz)    09/11/21 0100   Weight: 87.7 kg (193 lb 5.5 oz)            Data:   Labs:   Lab Results   Component Value Date    WBC 12.6 09/11/2021     Lab Results   Component Value Date    RBC 3.36 09/11/2021     Lab Results   Component Value Date    HGB 8.8 09/11/2021     Lab Results   Component Value Date    HCT 27.5 09/11/2021     No components found for: MCT  Lab Results   Component Value Date    MCV 82 09/11/2021     Lab Results   Component Value Date    MCH 26.2 09/11/2021     Lab Results   Component Value Date    MCHC 32.0 09/11/2021     Lab Results   Component Value Date    RDW 14.7 09/11/2021     Lab Results   Component Value Date     09/11/2021       Last Basic Metabolic Panel:  Lab Results   Component Value Date     09/11/2021      Lab Results   Component Value Date    POTASSIUM 4.1 09/11/2021     Lab Results   Component Value Date    CHLORIDE 111 09/11/2021     Lab Results   Component Value Date    TORRES 8.4 09/11/2021     Lab Results   Component Value Date    CO2 25 09/11/2021     Lab Results   Component Value Date    BUN 11 09/11/2021     Lab Results   Component Value Date    CR 0.93 09/11/2021     Lab Results   Component Value Date     09/11/2021     09/11/2021       CXR: 9/10/21    IMPRESSION: Endotracheal tube tip 4.3 cm from the john. Chest tubes  remain in place. Mulhall-Apolinar catheter removed, right IJ sheath remains  in place. No pneumothorax. Question some minimal left lower  lobe  atelectasis.       Aaliyah Muse PA-C  CV Surgery  Pager # [phone]

## 2021-09-12 ENCOUNTER — APPOINTMENT (OUTPATIENT)
Dept: MRI IMAGING | Facility: CLINIC | Age: 51
End: 2021-09-12
Attending: PHYSICIAN ASSISTANT
Payer: COMMERCIAL

## 2021-09-12 ENCOUNTER — HOSPITAL ENCOUNTER (INPATIENT)
Dept: NEUROLOGY | Facility: CLINIC | Age: 51
End: 2021-09-12
Attending: STUDENT IN AN ORGANIZED HEALTH CARE EDUCATION/TRAINING PROGRAM | Admitting: INTERNAL MEDICINE
Payer: COMMERCIAL

## 2021-09-12 ENCOUNTER — APPOINTMENT (OUTPATIENT)
Dept: GENERAL RADIOLOGY | Facility: CLINIC | Age: 51
End: 2021-09-12
Attending: SURGERY
Payer: COMMERCIAL

## 2021-09-12 LAB
ANION GAP SERPL CALCULATED.3IONS-SCNC: 6 MMOL/L (ref 3–14)
BUN SERPL-MCNC: 9 MG/DL (ref 7–30)
CALCIUM SERPL-MCNC: 8.2 MG/DL (ref 8.5–10.1)
CHLORIDE BLD-SCNC: 111 MMOL/L (ref 94–109)
CO2 SERPL-SCNC: 24 MMOL/L (ref 20–32)
CREAT SERPL-MCNC: 0.82 MG/DL (ref 0.66–1.25)
ERYTHROCYTE [DISTWIDTH] IN BLOOD BY AUTOMATED COUNT: 14.6 % (ref 10–15)
GFR SERPL CREATININE-BSD FRML MDRD: >90 ML/MIN/1.73M2
GLUCOSE BLD-MCNC: 99 MG/DL (ref 70–99)
GLUCOSE BLDC GLUCOMTR-MCNC: 102 MG/DL (ref 70–99)
GLUCOSE BLDC GLUCOMTR-MCNC: 122 MG/DL (ref 70–99)
GLUCOSE BLDC GLUCOMTR-MCNC: 135 MG/DL (ref 70–99)
GLUCOSE BLDC GLUCOMTR-MCNC: 139 MG/DL (ref 70–99)
GLUCOSE BLDC GLUCOMTR-MCNC: 141 MG/DL (ref 70–99)
GLUCOSE BLDC GLUCOMTR-MCNC: 143 MG/DL (ref 70–99)
GLUCOSE BLDC GLUCOMTR-MCNC: 154 MG/DL (ref 70–99)
GLUCOSE BLDC GLUCOMTR-MCNC: 155 MG/DL (ref 70–99)
GLUCOSE BLDC GLUCOMTR-MCNC: 169 MG/DL (ref 70–99)
GLUCOSE BLDC GLUCOMTR-MCNC: 196 MG/DL (ref 70–99)
GLUCOSE BLDC GLUCOMTR-MCNC: 82 MG/DL (ref 70–99)
HCT VFR BLD AUTO: 24.2 % (ref 40–53)
HGB BLD-MCNC: 7.7 G/DL (ref 13.3–17.7)
MAGNESIUM SERPL-MCNC: 2 MG/DL (ref 1.6–2.3)
MCH RBC QN AUTO: 25.7 PG (ref 26.5–33)
MCHC RBC AUTO-ENTMCNC: 31.8 G/DL (ref 31.5–36.5)
MCV RBC AUTO: 81 FL (ref 78–100)
PHOSPHATE SERPL-MCNC: 3.7 MG/DL (ref 2.5–4.5)
PLATELET # BLD AUTO: 244 10E3/UL (ref 150–450)
POTASSIUM BLD-SCNC: 3.7 MMOL/L (ref 3.4–5.3)
RBC # BLD AUTO: 3 10E6/UL (ref 4.4–5.9)
SODIUM SERPL-SCNC: 141 MMOL/L (ref 133–144)
WBC # BLD AUTO: 7.6 10E3/UL (ref 4–11)

## 2021-09-12 PROCEDURE — 250N000013 HC RX MED GY IP 250 OP 250 PS 637: Performed by: STUDENT IN AN ORGANIZED HEALTH CARE EDUCATION/TRAINING PROGRAM

## 2021-09-12 PROCEDURE — 250N000011 HC RX IP 250 OP 636

## 2021-09-12 PROCEDURE — 999N000065 XR ABDOMEN PORT 1 VIEWS

## 2021-09-12 PROCEDURE — 250N000011 HC RX IP 250 OP 636: Performed by: NURSE PRACTITIONER

## 2021-09-12 PROCEDURE — 250N000013 HC RX MED GY IP 250 OP 250 PS 637: Performed by: INTERNAL MEDICINE

## 2021-09-12 PROCEDURE — 83735 ASSAY OF MAGNESIUM: CPT | Performed by: SURGERY

## 2021-09-12 PROCEDURE — 250N000012 HC RX MED GY IP 250 OP 636 PS 637: Performed by: STUDENT IN AN ORGANIZED HEALTH CARE EDUCATION/TRAINING PROGRAM

## 2021-09-12 PROCEDURE — 999N000157 HC STATISTIC RCP TIME EA 10 MIN

## 2021-09-12 PROCEDURE — 250N000011 HC RX IP 250 OP 636: Performed by: STUDENT IN AN ORGANIZED HEALTH CARE EDUCATION/TRAINING PROGRAM

## 2021-09-12 PROCEDURE — C9113 INJ PANTOPRAZOLE SODIUM, VIA: HCPCS | Performed by: STUDENT IN AN ORGANIZED HEALTH CARE EDUCATION/TRAINING PROGRAM

## 2021-09-12 PROCEDURE — 95720 EEG PHY/QHP EA INCR W/VEEG: CPT | Performed by: PSYCHIATRY & NEUROLOGY

## 2021-09-12 PROCEDURE — 93005 ELECTROCARDIOGRAM TRACING: CPT

## 2021-09-12 PROCEDURE — 84100 ASSAY OF PHOSPHORUS: CPT | Performed by: SURGERY

## 2021-09-12 PROCEDURE — 250N000009 HC RX 250: Performed by: INTERNAL MEDICINE

## 2021-09-12 PROCEDURE — 250N000013 HC RX MED GY IP 250 OP 250 PS 637: Performed by: PHYSICIAN ASSISTANT

## 2021-09-12 PROCEDURE — 258N000003 HC RX IP 258 OP 636: Performed by: NURSE PRACTITIONER

## 2021-09-12 PROCEDURE — 95714 VEEG EA 12-26 HR UNMNTR: CPT

## 2021-09-12 PROCEDURE — 255N000002 HC RX 255 OP 636: Performed by: SURGERY

## 2021-09-12 PROCEDURE — 99291 CRITICAL CARE FIRST HOUR: CPT | Mod: 25 | Performed by: STUDENT IN AN ORGANIZED HEALTH CARE EDUCATION/TRAINING PROGRAM

## 2021-09-12 PROCEDURE — A9585 GADOBUTROL INJECTION: HCPCS | Performed by: SURGERY

## 2021-09-12 PROCEDURE — 258N000003 HC RX IP 258 OP 636: Performed by: STUDENT IN AN ORGANIZED HEALTH CARE EDUCATION/TRAINING PROGRAM

## 2021-09-12 PROCEDURE — 85027 COMPLETE CBC AUTOMATED: CPT | Performed by: NURSE PRACTITIONER

## 2021-09-12 PROCEDURE — 80048 BASIC METABOLIC PNL TOTAL CA: CPT | Performed by: NURSE PRACTITIONER

## 2021-09-12 PROCEDURE — 99207 PR NO CHARGE LOS: CPT | Performed by: HOSPITALIST

## 2021-09-12 PROCEDURE — 250N000011 HC RX IP 250 OP 636: Performed by: INTERNAL MEDICINE

## 2021-09-12 PROCEDURE — 250N000013 HC RX MED GY IP 250 OP 250 PS 637: Performed by: NURSE PRACTITIONER

## 2021-09-12 PROCEDURE — 99291 CRITICAL CARE FIRST HOUR: CPT | Mod: 24 | Performed by: INTERNAL MEDICINE

## 2021-09-12 PROCEDURE — 94003 VENT MGMT INPAT SUBQ DAY: CPT

## 2021-09-12 PROCEDURE — 258N000003 HC RX IP 258 OP 636: Performed by: INTERNAL MEDICINE

## 2021-09-12 PROCEDURE — 258N000003 HC RX IP 258 OP 636: Performed by: SURGERY

## 2021-09-12 PROCEDURE — 70553 MRI BRAIN STEM W/O & W/DYE: CPT

## 2021-09-12 PROCEDURE — 200N000001 HC R&B ICU

## 2021-09-12 PROCEDURE — 250N000011 HC RX IP 250 OP 636: Performed by: PHYSICIAN ASSISTANT

## 2021-09-12 RX ORDER — METOPROLOL TARTRATE 1 MG/ML
5 INJECTION, SOLUTION INTRAVENOUS EVERY 6 HOURS
Status: DISCONTINUED | OUTPATIENT
Start: 2021-09-12 | End: 2021-09-12

## 2021-09-12 RX ORDER — GADOBUTROL 604.72 MG/ML
9 INJECTION INTRAVENOUS ONCE
Status: COMPLETED | OUTPATIENT
Start: 2021-09-12 | End: 2021-09-12

## 2021-09-12 RX ORDER — POTASSIUM CHLORIDE 1.5 G/1.58G
20 POWDER, FOR SOLUTION ORAL ONCE
Status: COMPLETED | OUTPATIENT
Start: 2021-09-12 | End: 2021-09-12

## 2021-09-12 RX ORDER — METOPROLOL TARTRATE 1 MG/ML
5 INJECTION, SOLUTION INTRAVENOUS EVERY 5 MIN PRN
Status: DISCONTINUED | OUTPATIENT
Start: 2021-09-12 | End: 2021-09-16

## 2021-09-12 RX ORDER — MAGNESIUM SULFATE HEPTAHYDRATE 40 MG/ML
2 INJECTION, SOLUTION INTRAVENOUS ONCE
Status: COMPLETED | OUTPATIENT
Start: 2021-09-12 | End: 2021-09-12

## 2021-09-12 RX ADMIN — ACETAMINOPHEN 650 MG: 325 TABLET, FILM COATED ORAL at 05:31

## 2021-09-12 RX ADMIN — HEPARIN SODIUM 5000 UNITS: 5000 INJECTION INTRAVENOUS; SUBCUTANEOUS at 13:36

## 2021-09-12 RX ADMIN — CHLORHEXIDINE GLUCONATE 15 ML: 1.2 SOLUTION ORAL at 07:38

## 2021-09-12 RX ADMIN — SENNOSIDES AND DOCUSATE SODIUM 1 TABLET: 8.6; 5 TABLET ORAL at 08:14

## 2021-09-12 RX ADMIN — MIDAZOLAM HYDROCHLORIDE 2 MG: 1 INJECTION, SOLUTION INTRAMUSCULAR; INTRAVENOUS at 12:10

## 2021-09-12 RX ADMIN — METOPROLOL TARTRATE 12.5 MG: 25 TABLET, FILM COATED ORAL at 15:53

## 2021-09-12 RX ADMIN — OXYCODONE HYDROCHLORIDE 5 MG: 5 TABLET ORAL at 19:43

## 2021-09-12 RX ADMIN — PROPOFOL 50 MCG/KG/MIN: 10 INJECTION, EMULSION INTRAVENOUS at 12:00

## 2021-09-12 RX ADMIN — METOPROLOL TARTRATE 5 MG: 5 INJECTION INTRAVENOUS at 23:28

## 2021-09-12 RX ADMIN — AMLODIPINE BESYLATE 2.5 MG: 2.5 TABLET ORAL at 08:09

## 2021-09-12 RX ADMIN — ROSUVASTATIN CALCIUM 10 MG: 10 TABLET, FILM COATED ORAL at 08:14

## 2021-09-12 RX ADMIN — THIAMINE HYDROCHLORIDE 500 MG: 100 INJECTION, SOLUTION INTRAMUSCULAR; INTRAVENOUS at 15:53

## 2021-09-12 RX ADMIN — METOPROLOL TARTRATE 5 MG: 5 INJECTION INTRAVENOUS at 23:48

## 2021-09-12 RX ADMIN — ALLOPURINOL 100 MG: 100 TABLET ORAL at 08:09

## 2021-09-12 RX ADMIN — PROPOFOL 30 MCG/KG/MIN: 10 INJECTION, EMULSION INTRAVENOUS at 07:37

## 2021-09-12 RX ADMIN — DEXMEDETOMIDINE HYDROCHLORIDE 0.4 MCG/KG/HR: 400 INJECTION INTRAVENOUS at 01:28

## 2021-09-12 RX ADMIN — ACETAMINOPHEN 650 MG: 325 TABLET, FILM COATED ORAL at 20:53

## 2021-09-12 RX ADMIN — HEPARIN SODIUM 5000 UNITS: 5000 INJECTION INTRAVENOUS; SUBCUTANEOUS at 21:00

## 2021-09-12 RX ADMIN — QUETIAPINE FUMARATE 50 MG: 50 TABLET ORAL at 20:53

## 2021-09-12 RX ADMIN — THIAMINE HYDROCHLORIDE 500 MG: 100 INJECTION, SOLUTION INTRAMUSCULAR; INTRAVENOUS at 21:00

## 2021-09-12 RX ADMIN — HYDROMORPHONE HYDROCHLORIDE 0.2 MG: 0.2 INJECTION, SOLUTION INTRAMUSCULAR; INTRAVENOUS; SUBCUTANEOUS at 08:43

## 2021-09-12 RX ADMIN — PROPOFOL 40 MCG/KG/MIN: 10 INJECTION, EMULSION INTRAVENOUS at 13:11

## 2021-09-12 RX ADMIN — HYDROMORPHONE HYDROCHLORIDE 0.4 MG: 0.2 INJECTION, SOLUTION INTRAMUSCULAR; INTRAVENOUS; SUBCUTANEOUS at 13:42

## 2021-09-12 RX ADMIN — MIDAZOLAM HYDROCHLORIDE 2 MG: 1 INJECTION, SOLUTION INTRAMUSCULAR; INTRAVENOUS at 13:34

## 2021-09-12 RX ADMIN — QUETIAPINE FUMARATE 50 MG: 50 TABLET ORAL at 08:14

## 2021-09-12 RX ADMIN — ACETAMINOPHEN 650 MG: 325 TABLET, FILM COATED ORAL at 14:52

## 2021-09-12 RX ADMIN — POLYETHYLENE GLYCOL 3350 17 G: 17 POWDER, FOR SOLUTION ORAL at 08:14

## 2021-09-12 RX ADMIN — SODIUM CHLORIDE 2 UNITS/HR: 9 INJECTION, SOLUTION INTRAVENOUS at 04:16

## 2021-09-12 RX ADMIN — MAGNESIUM SULFATE HEPTAHYDRATE 2 G: 40 INJECTION, SOLUTION INTRAVENOUS at 05:20

## 2021-09-12 RX ADMIN — PHENYLEPHRINE HYDROCHLORIDE 0.8 MCG/KG/MIN: 10 INJECTION INTRAVENOUS at 09:44

## 2021-09-12 RX ADMIN — SENNOSIDES AND DOCUSATE SODIUM 1 TABLET: 8.6; 5 TABLET ORAL at 20:53

## 2021-09-12 RX ADMIN — PANTOPRAZOLE SODIUM 40 MG: 40 INJECTION, POWDER, FOR SOLUTION INTRAVENOUS at 06:50

## 2021-09-12 RX ADMIN — SODIUM CHLORIDE: 9 INJECTION, SOLUTION INTRAVENOUS at 05:47

## 2021-09-12 RX ADMIN — GADOBUTROL 9 ML: 604.72 INJECTION INTRAVENOUS at 11:54

## 2021-09-12 RX ADMIN — HEPARIN SODIUM 5000 UNITS: 5000 INJECTION INTRAVENOUS; SUBCUTANEOUS at 05:47

## 2021-09-12 RX ADMIN — MULTIVIT AND MINERALS-FERROUS GLUCONATE 9 MG IRON/15 ML ORAL LIQUID 15 ML: at 08:09

## 2021-09-12 RX ADMIN — CHLORHEXIDINE GLUCONATE 15 ML: 1.2 SOLUTION ORAL at 19:43

## 2021-09-12 RX ADMIN — THIAMINE HYDROCHLORIDE 500 MG: 100 INJECTION, SOLUTION INTRAMUSCULAR; INTRAVENOUS at 08:03

## 2021-09-12 RX ADMIN — HYDROMORPHONE HYDROCHLORIDE 0.4 MG: 0.2 INJECTION, SOLUTION INTRAMUSCULAR; INTRAVENOUS; SUBCUTANEOUS at 22:58

## 2021-09-12 RX ADMIN — HYDROMORPHONE HYDROCHLORIDE 0.2 MG: 0.2 INJECTION, SOLUTION INTRAMUSCULAR; INTRAVENOUS; SUBCUTANEOUS at 16:18

## 2021-09-12 RX ADMIN — ASPIRIN 81 MG CHEWABLE TABLET 324 MG: 81 TABLET CHEWABLE at 08:06

## 2021-09-12 RX ADMIN — PROPOFOL 40 MCG/KG/MIN: 10 INJECTION, EMULSION INTRAVENOUS at 19:43

## 2021-09-12 RX ADMIN — POTASSIUM CHLORIDE 20 MEQ: 1.5 POWDER, FOR SOLUTION ORAL at 05:26

## 2021-09-12 RX ADMIN — PROPOFOL 40 MCG/KG/MIN: 10 INJECTION, EMULSION INTRAVENOUS at 03:31

## 2021-09-12 RX ADMIN — SODIUM CHLORIDE: 9 INJECTION, SOLUTION INTRAVENOUS at 21:00

## 2021-09-12 RX ADMIN — GABAPENTIN 100 MG: 100 CAPSULE ORAL at 08:09

## 2021-09-12 RX ADMIN — SODIUM CHLORIDE 6 UNITS/HR: 9 INJECTION, SOLUTION INTRAVENOUS at 15:27

## 2021-09-12 ASSESSMENT — ACTIVITIES OF DAILY LIVING (ADL)
ADLS_ACUITY_SCORE: 23

## 2021-09-12 NOTE — PROGRESS NOTES
[facility] EEG #  [id number]  DAY 1 started at 18:18 on 09/11/21, and ended at 15:00 on 09/12/21.   Ordered by Dr. Deidra Figueredo.

## 2021-09-12 NOTE — PROGRESS NOTES
[facility] RESPIRATORY NOTE        Date of Admission: 9/2/2021    Date of Intubation (most recent): 9/2/21    Reason for Mechanical Ventilation: Post op    Number of Days on Mechanical Ventilation: 4    Met Criteria for Spontaneous Breathing Trial: Yes     Reason for No Spontaneous Breathing Trial: NA    Significant Events Today: MRI done    ABG Results:   Recent Labs   Lab 09/11/21  1815 09/09/21  0446 09/08/21  2217 09/08/21 2004   PH 7.42 7.37 7.29* 7.29*   PCO2 34* 43 47* 46*   PO2 88 103 130* 70*   HCO3 22 24 23 22   O2PER 30 60 40 60       Current Vent Settings: Ventilation Mode: CPAP/PS  (Continuous positive airway pressure with Pressure Support)  FiO2 (%): 30 %  Rate Set (breaths/minute): 24 breaths/min  Tidal Volume Set (mL): 500 mL  PEEP (cm H2O): 5 cmH2O  Pressure Support (cm H2O): 5 cmH2O  Oxygen Concentration (%): 30 %  Resp: 17      Skin Assessment: done    Plan: cont SBT    Ambar Mccormick RT

## 2021-09-12 NOTE — PROGRESS NOTES
INITIAL REPORT of Video-EEG Monitoring              DATE OF RECORDIN2021         I reviewed the first 1 hour of video-EEG monitoring of Jerry Bustamante.         The EEG during sedated coma was abnormal due to generalized delta-theta slowing, with limited faster alpha activities bilaterally, and with periods of increased 1-Hz rhythmic delta activities that rarely feature triphasic elements.  No interictal epileptiform abnormalities and no electrographic seizures were observed.         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  This recording excludes non-convulsive status epilepticus as a possible cause of this encephalopathy.    Screening for intermittent seizures will require a longer period of recording.   Edgar Villeda M.D.

## 2021-09-12 NOTE — PROGRESS NOTES
"      [facility]    Stroke Progress Note    Interval EventsAgitated with stim overnight, on Propofol and Dex, overnight cerebel EEG consistent with encephalopathy    HPI Summary  50yo man w hx of CAD s/p stenting, HTN, HLD, DM2, here for NSTEMI s/p 4 vessel CABG 9/8, consulted for agitation with weaning of sedation in ICU, managed with Propofol and Precedex, unresponsive to VPA. No know psych or neuro illness. Patient is febrile although infectious martinez negative to date, ammonia normal.    Overnight cerebel EEG shows no epileptic activity, consistent with encephalopathy. CTH shows no large infarct or bleed. MRI brain negative for acute pathology.    Impression   ICU delirium post CABG. Agree with Seroquel, attempts to gradualy wean of sedation. EEG and MRI negative for seizure or stroke.    Plan  -video EEG shows encephalopathy can discontinue jaycee  -Continue working up tox metabolic causes  -Agree with Quetiapine 50mg bid, monitor for QT prolongation  -Limit Hydromorphone and other central meds to minimal needed    Patient Follow-up    Neurology signing off, please call with any quesitons    The Stroke Staff is Dr. Figueredo.    Moustapha Patel MD  Vascular Neurology Fellow  To page me or covering stroke neurology team member, click here: AMCOM   Choose \"On Call\" tab at top, then search dropdown box for \"Neurology Adult\", select location, press Enter, then look for stroke/neuro ICU/telestroke.    ______________________________________________________    Clinically Significant Risk Factors Present on Admission                  Medications   Scheduled Meds    allopurinol  100 mg Oral Daily     amLODIPine  2.5 mg Oral Daily     aspirin  324 mg Oral or NG Tube Daily     chlorhexidine  15 mL Mouth/Throat Q12H     gabapentin  100 mg Oral TID     heparin ANTICOAGULANT  5,000 Units Subcutaneous Q8H     multivitamins w/minerals  15 mL Per Feeding Tube Daily     pantoprazole  40 mg Per Feeding Tube QAM AC    Or "     pantoprazole (PROTONIX) IV  40 mg Intravenous QAM AC     polyethylene glycol  17 g Oral Daily     QUEtiapine  50 mg Oral or Feeding Tube BID     rosuvastatin  10 mg Oral Daily     senna-docusate  1 tablet Oral BID     sodium chloride (PF)  3 mL Intracatheter Q8H     thiamine  500 mg Intravenous TID    Followed by     [START ON 9/14/2021] thiamine  250 mg Intravenous Daily    Followed by     [START ON 9/19/2021] thiamine  100 mg Oral Daily       Infusion Meds    amiodarone       dexmedetomidine 0.2 mcg/kg/hr (09/12/21 0734)     dextrose       EPINEPHrine 0.02 mcg/kg/min (09/12/21 0830)     insulin regular 4 Units/hr (09/12/21 0835)     lactated ringers 10 mL/hr at 09/12/21 0752     niCARdipine Stopped (09/09/21 1000)     phenylephrine 0.8 mcg/kg/min (09/12/21 0717)     propofol (DIPRIVAN) infusion 35 mcg/kg/min (09/12/21 0842)     BETA BLOCKER NOT PRESCRIBED       sodium chloride 20 mL/hr at 09/12/21 0752       PRN Meds  acetaminophen, bisacodyl, dextrose, glucose **OR** dextrose **OR** glucagon, EPINEPHrine, hydrALAZINE, HYDROmorphone **OR** HYDROmorphone, lidocaine 4%, lidocaine (buffered or not buffered), magnesium hydroxide, methocarbamol, naloxone **OR** naloxone **OR** naloxone **OR** naloxone, niCARdipine, ondansetron **OR** ondansetron, oxyCODONE **OR** oxyCODONE, phenylephrine, BETA BLOCKER NOT PRESCRIBED, sodium chloride (PF)       PHYSICAL EXAMINATION  Temp:  [99.5  F (37.5  C)-101.7  F (38.7  C)] 100.8  F (38.2  C)  Pulse:  [] 110  Resp:  [24-36] 24  BP: ()/(50-94) 110/55  MAP:  [59 mmHg-112 mmHg] 76 mmHg  Arterial Line BP: ()/(42-82) 116/56  FiO2 (%):  [0 %-30 %] 30 %  SpO2:  [90 %-100 %] 97 %      Deferred, patient in MRI    Imaging  I personally reviewed all imaging; relevant findings per HPI.     Lab Results Data   CBC  Recent Labs   Lab 09/12/21  0412 09/11/21  0431 09/10/21  0730   WBC 7.6 12.6* 14.7*   RBC 3.00* 3.36* 3.40*   HGB 7.7* 8.8* 8.8*   HCT 24.2* 27.5* 27.4*   PLT  244 222 185     Basic Metabolic Panel    Recent Labs   Lab 09/12/21  0834 09/12/21  0609 09/12/21  0412 09/11/21  0431 09/10/21  0730   NA  --   --  141 142 138   POTASSIUM  --   --  3.7 4.1 4.5   CHLORIDE  --   --  111* 111* 112*   CO2  --   --  24 25 25   BUN  --   --  9 11 13   CR  --   --  0.82 0.93 0.98   * 135* 99 159* 142*   TORRES  --   --  8.2* 8.4* 8.3*     Liver Panel  Recent Labs   Lab 09/11/21  1534 09/08/21  1829 09/08/21  1545   PROTTOTAL 6.4* 6.9 6.4*   ALBUMIN 1.9* 3.5 2.8*   BILITOTAL 0.5 0.6 1.0   ALKPHOS 218* 150 175*   AST 17 50* 51*   ALT 18 47 51     INR    Recent Labs   Lab Test 09/08/21  1545 09/08/21  1423   INR 1.23* 1.41*      Lipid Profile    Recent Labs   Lab Test 09/03/21  0818 11/23/17  1234   CHOL 129 259*   HDL 50 33*   LDL 46 146*   TRIG 166* 398*     A1C    Recent Labs   Lab Test 09/02/21  1000 11/23/17  1300   A1C 10.2* 6.9*     Troponin I  No results for input(s): TROPONIN in the last 168 hours.

## 2021-09-12 NOTE — PROGRESS NOTES
[facility] RESPIRATORY NOTE        Date of Admission: 9/2/2021    Date of Intubation (most recent):     Reason for Mechanical Ventilation: 9/8/21    Number of Days on Mechanical Ventilation: Cardiac surgery    Met Criteria for Spontaneous Breathing Trial: No    Reason for No Spontaneous Breathing Trial: Per MD    Significant Events Today: None    ABG Results:   Recent Labs   Lab 09/11/21  1815 09/09/21  0446 09/08/21  2217 09/08/21 2004   PH 7.42 7.37 7.29* 7.29*   PCO2 34* 43 47* 46*   PO2 88 103 130* 70*   HCO3 22 24 23 22   O2PER 30 60 40 60       Current Vent Settings: Ventilation Mode: CMV/AC  (Continuous Mandatory Ventilation/ Assist Control)  FiO2 (%): 30 %  Rate Set (breaths/minute): 24 breaths/min  Tidal Volume Set (mL): 500 mL  PEEP (cm H2O): 5 cmH2O  Pressure Support (cm H2O): 5 cmH2O  Oxygen Concentration (%): 30 %  Resp: 24      Skin Assessment: Intact    Plan: Will continue to follow.    Amparo Stanley

## 2021-09-12 NOTE — PROGRESS NOTES
Patient seen and discussed with Dr. Mccormick.   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 Antelmo Mccullough  -CVS: HR: 80s-100s. SBP: 100s-120s. Remains on Epi and sadaf-- weaning as able. ASA, BB when off pressors, statin. Amlodipine 2.5mg daily to prevent radial artery spasm for 3 months. PTA losartan on hold. Pacer wires removed yesterday. Chest tubes with too much output to remove today.   -Resp: Intubated-- patient become extremely agitated when sedation is lightened. Intensivist following for ventilator management  -Neuro:  Sedated-- agitated with lightening sedation. CT  Head on POD#2 with no acute changes. Treated with seroquel and valproate per intensivist team. Neurology consulted-- short EEG with evidence of seizure, vEEG now, plan for MRI today. Appreciate recommendations  -Renal: good UOP. Close to preoperative weight. Cr: 0.82. Will continue to monitor.  -GI:  Started gastric feeds yesterday, plan for post pyloric nasal kiofeed today. Will need swallow eval once extubated  -:  Christina in place d/t limited mobility and need for accurate I&Os.   -Endo: pre op a1c: 10.2. Hx of DMII, PTA glipizide, metformin on hold. Continue insulin gtt. Hospitalist to assist with post operative glycemic control once transferred.   -FEN: replace electrolytes as needed. Na: 141. K: 3.7.  Orders Placed This Encounter      NPO for Medical/Clinical Reasons Except for: Meds      Advance Diet as Tolerated: Clear Liquid Diet      Advance Diet as  Tolerated: Clear Liquid Diet; Low Saturated Fat Na <2400mg Diet      Advance Diet as Tolerated: Clear Liquid Diet      Advance Diet as Tolerated: Full Liquid Diet; Low Saturated Fat Na <2400mg Diet    -ID: Temp (24hrs), Av.6  F (38.1  C), Min:99.5  F (37.5  C), Max:101.7  F (38.7  C)  WBC: 7.6 Completed perioperative abx.   -Heme: Hgb: 7.7. Acute blood loss anemia and thrombocytopenia related to surgery  -Proph: PCD, ASA, statin, PPI, sub q heparin  -Dispo: Continue ICU cares. Ventilator management per intensivist. Neurology following-- MRI today. Chest tubes and krishna to remain in place. Post pyloric feeding tube today for tube feeds. Wean pressors as able. Family updated per Dr. Mccormick.          Interval History:   Intubated and sedated. No acute events overnight. First hour of v-EEG no evidence of seizure.          Medications:       allopurinol  100 mg Oral Daily     amLODIPine  2.5 mg Oral Daily     aspirin  324 mg Oral or NG Tube Daily     chlorhexidine  15 mL Mouth/Throat Q12H     gabapentin  100 mg Oral TID     heparin ANTICOAGULANT  5,000 Units Subcutaneous Q8H     multivitamins w/minerals  15 mL Per Feeding Tube Daily     pantoprazole  40 mg Per Feeding Tube QAM AC    Or     pantoprazole (PROTONIX) IV  40 mg Intravenous QAM AC     polyethylene glycol  17 g Oral Daily     QUEtiapine  50 mg Oral or Feeding Tube BID     rosuvastatin  10 mg Oral Daily     senna-docusate  1 tablet Oral BID     sodium chloride (PF)  3 mL Intracatheter Q8H     thiamine  500 mg Intravenous TID    Followed by     [START ON 2021] thiamine  250 mg Intravenous Daily    Followed by     [START ON 2021] thiamine  100 mg Oral Daily     acetaminophen, bisacodyl, dextrose, glucose **OR** dextrose **OR** glucagon, EPINEPHrine, hydrALAZINE, HYDROmorphone **OR** HYDROmorphone, lidocaine 4%, lidocaine (buffered or not buffered), magnesium hydroxide, methocarbamol, naloxone **OR** naloxone **OR** naloxone **OR** naloxone,  niCARdipine, ondansetron **OR** ondansetron, oxyCODONE **OR** oxyCODONE, phenylephrine, BETA BLOCKER NOT PRESCRIBED, sodium chloride (PF)          Physical Exam:   Vitals were reviewed  Blood pressure 110/55, pulse 110, temperature (!) 100.8  F (38.2  C), resp. rate 24, weight 87.5 kg (192 lb 14.4 oz), SpO2 97 %.  Rhythm: NSR/sinus tach    Lungs: coarse    Cardiovascular: RRR normal s1 and s2    Abdomen: soft NTND    Extremeties: minimal edema    Incision: CDI    CT: to suction    Weight:   Vitals:    09/07/21 0558 09/09/21 0500 09/10/21 0430 09/11/21 0100   Weight: 86.3 kg (190 lb 3.2 oz) 89 kg (196 lb 3.4 oz) 88.6 kg (195 lb 5.2 oz) 87.7 kg (193 lb 5.5 oz)    09/12/21 0200   Weight: 87.5 kg (192 lb 14.4 oz)            Data:   Labs:   Lab Results   Component Value Date    WBC 7.6 09/12/2021     Lab Results   Component Value Date    RBC 3.00 09/12/2021     Lab Results   Component Value Date    HGB 7.7 09/12/2021     Lab Results   Component Value Date    HCT 24.2 09/12/2021     No components found for: MCT  Lab Results   Component Value Date    MCV 81 09/12/2021     Lab Results   Component Value Date    MCH 25.7 09/12/2021     Lab Results   Component Value Date    MCHC 31.8 09/12/2021     Lab Results   Component Value Date    RDW 14.6 09/12/2021     Lab Results   Component Value Date     09/12/2021       Last Basic Metabolic Panel:  Lab Results   Component Value Date     09/12/2021      Lab Results   Component Value Date    POTASSIUM 3.7 09/12/2021     Lab Results   Component Value Date    CHLORIDE 111 09/12/2021     Lab Results   Component Value Date    TORRES 8.2 09/12/2021     Lab Results   Component Value Date    CO2 24 09/12/2021     Lab Results   Component Value Date    BUN 9 09/12/2021     Lab Results   Component Value Date    CR 0.82 09/12/2021     Lab Results   Component Value Date     09/12/2021    GLC 99 09/12/2021       CXR: 9/11/21    Impression: Portable chest. Right IJ central venous  line terminates in  the distal SVC in good position. Endotracheal tube, nasogastric tube,  pleural and mediastinal drains are all unchanged. Lungs are grossly  clear. No definite pneumothorax or pleural effusions on this supine  view. Heart remains enlarged following CABG    Aaliyah Muse PA-C  CV Surgery  Pager # [phone]

## 2021-09-12 NOTE — PROVIDER NOTIFICATION
Notified CV surg about HTN and HR in low 110's. Will start beta blockers for now and closely monitor pt BP. Pt been off pressors since 9/12 1300.     Also notified about unsuccessful attempt in placing Keofeed . Ok to continue to trickle feed via OG for now. Will monitor.

## 2021-09-12 NOTE — PROGRESS NOTES
INTERIM REPORT of Video-EEG Monitoring              DATE OF RECORDIN2021         I reviewed the ongoing video-EEG monitoring of Jerry Bustamante.         The EEG during sedated coma was abnormal due to generalized delta-theta slowing, with brief runs of faster theta-alpha activities bilaterally, and with periods of increased 1-2 Hz rhythmic delta activities that at times feature triphasic elements.  No interictal epileptiform abnormalities and no electrographic seizures have been observed.         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  This recording excludes non-convulsive status epilepticus as a possible cause of this encephalopathy.    Screening for intermittent seizures will require a longer period of recording.   Edgar Villeda M.D.

## 2021-09-12 NOTE — PROGRESS NOTES
Hospitalist Chart Check     POD #4 4V CABG. Chart reviewed. Patient remains intubated and on pressor support. Ongoing cares per intensivist and CV surgery. Hospitalist service will continue following peripherally, will assist with ongoing BG management once extubated and taking po.      Chelsea Costa,   Internal Medicine - Hospitalist  9/12/2021  1:37PM

## 2021-09-12 NOTE — PROGRESS NOTES
[facility]   CRITICAL CARE NOTE     Jerry Bustamante MRN: [id number]  1970  Date of Admission:9/2/2021  Date of service 9/11/2021          Problem List:   1. Acute resp failure post CABG, with respiratory acidosis (corrected  2. multivessel CAD post CABG x 4   3. Encephalopathy, unclear  4. Post operative shock, presumed cardiogenic        24-Hour Goals   1. FUP MRI results  2. Wean pressors  3. PS as tolerated      Overnight Events     - remains encephalopathic, neuro consulted EEG started and MRI ordered  - mental status precludes extubation though note frothy secretions on PS         Lines/Tubes/Draines/Devices   .  Peripheral IV 09/01/21 Anterior;Right (Active)   Site Assessment WDL 09/12/21 0400   Line Status Saline locked 09/12/21 0400   Dressing Intervention New dressing  09/11/21 1800   Phlebitis Scale 0-->no symptoms 09/12/21 0000   Infiltration Scale 0 09/12/21 0000   Infiltration Site Treatment Method  None 09/12/21 0000   If infiltrated, was a vesicant infusing? No 09/12/21 0000   Number of days: 11       Peripheral IV 09/08/21 Right Hand (Active)   Site Assessment WDL 09/12/21 0400   Line Status Saline locked 09/12/21 0400   Dressing Intervention New dressing  09/09/21 0000   Phlebitis Scale 0-->no symptoms 09/12/21 0000   Infiltration Scale 0 09/12/21 0000   Infiltration Site Treatment Method  None 09/12/21 0000   If infiltrated, was a vesicant infusing? No 09/12/21 0000   Number of days: 4       Arterial Line 09/11/21 Radial (Active)   Site Assessment WDL 09/12/21 0400   Line Status Pulsatile blood flow 09/12/21 0400   Art Line Waveform Appropriate 09/12/21 0400   Art Line Interventions Leveled;Flushed per protocol 09/12/21 0400   Color/Movement/Sensation Capillary refill less than 3 sec 09/12/21 0400   Line Necessity Yes, meets criteria 09/12/21 0400   Dressing Type Transparent 09/12/21 0400   Dressing Status Clean, dry, intact 09/12/21 0400   Dressing Intervention Dressing  changed/new dressing 09/11/21 1800   Number of days: 1       CVC TRIPLE LUMEN Right Internal jugular (Active)   Site Assessment WDL 09/12/21 0400   Dressing Type Transparent 09/12/21 0400   Dressing Status clean;dry;intact 09/12/21 0400   Dressing Intervention new dressing 09/11/21 1800   Blue - Status infusing 09/12/21 0400   Brown - Status infusing 09/12/21 0400   White - Status infusing 09/12/21 0400   Number of days: 1       Right Groin Interventional Procedure Access (Active)   Site Assessment Unchanged 09/12/21 0800   Hemostasis management Unchanged 09/12/21 0400   Femoral Bruit not present 09/10/21 0800   CMS Right Extremity WDL 09/11/21 0400   Dorsalis Pulse - Right Leg Present with doppler 09/11/21 1200   Popliteal Pulse - Right Leg Present with doppler 09/11/21 1200   Posterior Tibial Pulse - Right Leg Present with doppler 09/10/21 2000   Number of days: 10       ETT Cuffed Single;Single Subglottic Suction 8 mm (Active)   Secured at (cm) 25 cm 09/12/21 1112   Measured from Lips 09/12/21 1112   Position Right 09/12/21 1400   Secured by Commercial tube oliver 09/12/21 0400   Bite Block None Present 09/12/21 0330   Site Appearance Clean;Dry 09/12/21 0330   Cuff Assessment Minimal occluding volume 09/12/21 1112   Safety Measures Manual resuscitator at bedside 09/12/21 1112   Number of days: 4       Chest Tube 1 Left Mediastinal 24 Maltese (Active)   Site Assessment WDL X 09/12/21 0800   Suction -20 cm H2O 09/12/21 0800   Chest Tube Airleak No 09/12/21 0800   Drainage Description Serous 09/12/21 0800   Dressing Status Normal: Clean, Dry & Intact 09/12/21 0800   Dressing Change Due 09/10/21 09/09/21 0600   Dressing Intervention Gauze 09/12/21 0800   Patency Intervention Tip/Tilt 09/12/21 0800   Chest Tube Clamps at Bedside present 09/12/21 0800   Container Amount 700 09/12/21 0600   Output (ml) 10 ml 09/12/21 0600   Number of days: 4       Chest Tube 2 Pleural 32 Maltese (Active)   Site Assessment WDL X 09/12/21  0800   Suction -20 cm H2O 09/12/21 0800   Chest Tube Airleak No 09/12/21 0800   Drainage Description Serous 09/12/21 0800   Dressing Status Normal: Clean, Dry & Intact 09/12/21 0800   Dressing Change Due 09/10/21 09/09/21 0600   Dressing Intervention Gauze 09/12/21 0800   Patency Intervention Tip/Tilt 09/12/21 0800   Chest Tube Clamps at Bedside present 09/12/21 0800   Container Amount 1060 09/12/21 0600   Output (ml) 10 ml 09/12/21 0600   Number of days: 4       Chest Tube 3 Mediastinal 32 Citizen of [place] (Active)   Site Assessment WDL X 09/12/21 0800   Suction -20 cm H2O 09/12/21 0800   Chest Tube Airleak No 09/12/21 0800   Drainage Description Serous 09/12/21 0800   Dressing Status Normal: Clean, Dry & Intact 09/12/21 0800   Dressing Intervention Gauze 09/12/21 0800   Patency Intervention Tip/Tilt 09/12/21 0800   Chest Tube Clamps at Bedside present 09/12/21 0800   Number of days: 4       Chest Tube 4 Right Pleural 24 Citizen of [place] (Active)   Site Assessment WDL X 09/12/21 0800   Suction -20 cm H2O 09/12/21 0800   Chest Tube Airleak No 09/12/21 0800   Drainage Description Serous 09/12/21 0800   Dressing Status Normal: Clean, Dry & Intact 09/12/21 0800   Dressing Intervention Gauze 09/12/21 0800   Patency Intervention Tip/Tilt 09/12/21 0800   Chest Tube Clamps at Bedside present 09/12/21 0800   Number of days: 4       Negative Pressure Wound Therapy Chest (Active)   Wound Type Surgical 09/12/21 0800   Unit Type 1 09/12/21 0400   Dressing Pieces Applied (# of Each Type) Black foam 09/11/21 1200   Cycle Continuous 09/12/21 0800   Target Pressure (mmHg) 125 09/12/21 0800   (Retired) Dressing Status Clean, dry, intact 09/12/21 0800   Cannister changed? No 09/12/21 0400   Output (ml) 0 ml 09/12/21 0600   Number of days: 4       NG/OG/NJ Tube Orogastric 18 fr Center mouth (Active)   Site Description WDL 09/12/21 0800   Status Enteral Feedings 09/12/21 0800   Drainage Appearance Clear 09/10/21 0000   Placement Confirmation [place]  unchanged 09/12/21 0400   Leslie (cm marking) at nare/mouth 55 cm 09/12/21 0400   Intake (ml) 150 ml 09/12/21 0800   Flush/Free Water (mL) 60 mL 09/12/21 0800   Residual (mL) 10 mL 09/12/21 0400   Container Amount 150 mL 09/11/21 0600   Output (ml) 0 ml 09/11/21 0600   Number of days: 4       Urethral Catheter Latex;Temperature probe;Straight-tip 16 fr (Active)   Tube Description UT 09/11/21 1000   Catheter Care Done;Catheter wipes 09/12/21 0200   Collection Container Standard 09/12/21 0400   Securement Method Securing device (Describe) 09/12/21 0400   Rationale for Continued Use Strict 1-2 Hour I&O 09/12/21 0800   Urine Output 220 mL 09/12/21 1400   Number of days: 4       Incision/Surgical Site 09/08/21 Chest (Active)   Incision Assessment UTV 09/12/21 0800   Jammie-Incision Assessment UTV 09/12/21 0800   Closure SIVA 09/12/21 0800   Incision Drainage Amount None 09/12/21 0800   Drainage Description UT 09/11/21 0400   Incision Care Therapy - negative pressure 09/12/21 0800   Dressing Intervention Clean, dry, intact 09/12/21 0800   Number of days: 4       Incision/Surgical Site 09/08/21 Left Leg (Active)   Incision Assessment WDL except 09/12/21 0800   Jammie-Incision Assessment Ecchymosis 09/12/21 0800   Closure Liquid bandage 09/12/21 0800   Incision Drainage Amount None 09/12/21 0200   Incision Care Wound cleanser 09/12/21 0200   Dressing Intervention Open to air / No Dressing 09/12/21 0800   Number of days: 4       Incision/Surgical Site 09/08/21 Left Wrist (Active)   Incision Assessment WDL except 09/12/21 0800   Jammie-Incision Assessment Ecchymosis 09/12/21 0800   Closure Liquid bandage 09/12/21 0800   Incision Drainage Amount None 09/12/21 0200   Incision Care Wound cleanser 09/12/21 0200   Dressing Intervention Open to air / No Dressing 09/12/21 0800   Number of days: 4          ICU Prophylaxis:   1. DVT: Hep Subq/mechanical  2. VAP: HOB 30 degrees, chlorhexidine rinse  3. Stress Ulcer: PPI/H2 blocker  4.  Restraints: Nonviolent soft two point restraints required and necessary for patient safety and continued cares and good effect as patient continues to pull at necessary lines, tubes despite education and distraction. Will readdress daily.   5. IV Access - central access required and necessary for continued patient cares  6. Feeding - NPO      Medications:       allopurinol  100 mg Oral Daily     amLODIPine  2.5 mg Oral Daily     aspirin  324 mg Oral or NG Tube Daily     chlorhexidine  15 mL Mouth/Throat Q12H     heparin ANTICOAGULANT  5,000 Units Subcutaneous Q8H     multivitamins w/minerals  15 mL Per Feeding Tube Daily     pantoprazole  40 mg Per Feeding Tube QAM AC    Or     pantoprazole (PROTONIX) IV  40 mg Intravenous QAM AC     polyethylene glycol  17 g Oral Daily     QUEtiapine  50 mg Oral or Feeding Tube BID     rosuvastatin  10 mg Oral Daily     senna-docusate  1 tablet Oral BID     sodium chloride (PF)  3 mL Intracatheter Q8H     thiamine  500 mg Intravenous TID    Followed by     [START ON 9/14/2021] thiamine  250 mg Intravenous Daily    Followed by     [START ON 9/19/2021] thiamine  100 mg Oral Daily     acetaminophen, bisacodyl, dextrose, glucose **OR** dextrose **OR** glucagon, EPINEPHrine, hydrALAZINE, HYDROmorphone **OR** HYDROmorphone, lidocaine 4%, lidocaine (buffered or not buffered), magnesium hydroxide, naloxone **OR** naloxone **OR** naloxone **OR** naloxone, niCARdipine, ondansetron **OR** ondansetron, oxyCODONE **OR** oxyCODONE, phenylephrine, BETA BLOCKER NOT PRESCRIBED, sodium chloride (PF)      Review of Systems:   Unable to obtain due to critical illness            Physical Exam:   Temp:  [96.4  F (35.8  C)-101.7  F (38.7  C)] 100.9  F (38.3  C)  Pulse:  [] 111  Resp:  [24-36] 24  BP: ()/(54-82) 116/68  Cuff Mean (mmHg):  [77-82] 77  MAP:  [59 mmHg-112 mmHg] 85 mmHg  Arterial Line BP: ()/(42-82) 132/63  FiO2 (%):  [0 %-30 %] 30 %  SpO2:  [90 %-100 %] 98  %      Intake/Output Summary (Last 24 hours) at 9/12/2021 1437  Last data filed at 9/12/2021 1400  Gross per 24 hour   Intake 3296.9 ml   Output 1800 ml   Net 1496.9 ml       Wt Readings from Last 4 Encounters:   09/12/21 87.5 kg (192 lb 14.4 oz)   09/02/21 86.6 kg (191 lb)   07/31/19 88.5 kg (195 lb)   01/09/18 88.4 kg (194 lb 12.8 oz)     Arterial Line BP: ()/(42-82) 132/63  MAP:  [59 mmHg-112 mmHg] 85 mmHg  BP - Mean:  [62-88] 74  Ventilation Mode: CMV/AC  (Continuous Mandatory Ventilation/ Assist Control)  FiO2 (%): 30 %  Rate Set (breaths/minute): 24 breaths/min  Tidal Volume Set (mL): 500 mL  PEEP (cm H2O): 5 cmH2O  Pressure Support (cm H2O): 5 cmH2O  Oxygen Concentration (%): 30 %  Resp: 24    Recent Labs   Lab 09/11/21  1815 09/09/21  0446 09/08/21  2217 09/08/21 2004   PH 7.42 7.37 7.29* 7.29*   PCO2 34* 43 47* 46*   PO2 88 103 130* 70*   HCO3 22 24 23 22   O2PER 30 60 40 60       GEN: intubated sedate no acute distress   HEENT: head ncat, sclera anicteric, OP patent, trachea midline   PULM: unlabored synchronous with vent, clear anteriorly    CV/COR: RRR S1S2 no gallop,  No rub, no murmur  ABD: soft nontender, hypoactive bowel sounds, no mass  EXT:  No Edema   warm  NEURO: eye opening to noxious stimulus, minimal withdrawal with four extremities.   SKIN: no obvious rash      Assessment and plan :     Neurology/Psychiatry/Pain/Sedation:   #Encephalopathy, metabolic, workup thus far unrevealing   #Agitated delirium Reportedly having bouts of agitation when sedation is reduced.   - Current sedatives: Propofol, Precedex infusions. Goal RAAS 0 to -1. Two doses of valproic acid given 9/10as sedation adjuvants.   - Trial of Seroquel 50 mg BID. Favor reducing propofol first.   - Daily sedation vacation- full sedation interruption.   - CT head 9/10 negative for intracranial hemorrhage, MRI pending today   - continue  empiric thiamine  - Neurology consult-  FUP  MRI brain and  EEG    Cardiovascular/Hemodynamics:   #post CABG x 4  #Shock, still pressor dependent   - wean as tolerated, goal MAP >65     Pulmonary/Ventilator Management:   #Acute respiratory failure with hypercapnia ; - Encephalopathy remains barrier to extubation  Plan:   - Daily PST  - intermittent diuresis    GI/Nutrition:   #Protein calorie malnutrition in the setting of acute illness  - Check placement of OG  - enteral feeds   - Bowel regimen      Renal/Fluids/Electrolytes:   No issues, good UOP.      Intake/Output Summary (Last 24 hours) at 9/12/2021 1443  Last data filed at 9/12/2021 1400  Gross per 24 hour   Intake 3296.9 ml   Output 1800 ml   Net 1496.9 ml         Infectious Disease:   # Hx of latent TB  Cefazolin ppx   Persistent leukocytosis->resolved   FUP surveillance cultures     Endocrine/Hematology/Oncology:   Type 2 DM, uncontrolled.  ICU protocol to keep glucose < 180  Daily Aspirin  Subcutaneous heparin    Disposition/Code Status/Other  1. Disposition: ICU  2. Code: Full      I have personally reviewed the daily labs, imaging studies, cultures and discussed the case with referring physician and consulting physicians.     This patient is critically ill and I have provided 30 minutes of critical care time   Warren Issa MD        ROUTINE ICU LABS (Last four results)  CMP  Recent Labs   Lab 09/12/21  1355 09/12/21  1107 09/12/21  0834 09/12/21  0609 09/12/21  0412 09/11/21  1534 09/11/21  0431 09/10/21  0730 09/10/21  0458 09/09/21  0445 09/08/21  1829 09/08/21  1545   NA  --   --   --   --  141  --  142 138  --   --  143 143   POTASSIUM  --   --   --   --  3.7  --  4.1 4.5 4.1 4.1 3.5 3.5  3.5   CHLORIDE  --   --   --   --  111*  --  111* 112*  --   --  114* 115*   CO2  --   --   --   --  24  --  25 25  --   --  22 24   ANIONGAP  --   --   --   --  6  --  6 1*  --   --  7 4   * 122* 143* 135* 99  --  159* 142*  --   --  149* 157*   BUN  --   --   --   --  9  --  11 13  --   --  19 19   CR   --   --   --   --  0.82  --  0.93 0.98  --   --  1.15 1.16   GFRESTIMATED  --   --   --   --  >90  --  >90 88  --   --  72 71   TORRES  --   --   --   --  8.2*  --  8.4* 8.3*  --   --  8.6 8.8   MAG  --   --   --   --  2.0  --  2.0  --  2.1 1.9  --  2.4*   PHOS  --   --   --   --  3.7  --  3.7  --  2.3* 4.8*  --  4.5   PROTTOTAL  --   --   --   --   --  6.4*  --   --   --   --  6.9 6.4*   ALBUMIN  --   --   --   --   --  1.9*  --   --   --   --  3.5 2.8*   BILITOTAL  --   --   --   --   --  0.5  --   --   --   --  0.6 1.0   ALKPHOS  --   --   --   --   --  218*  --   --   --   --  150 175*   AST  --   --   --   --   --  17  --   --   --   --  50* 51*   ALT  --   --   --   --   --  18  --   --   --   --  47 51     CBC  Recent Labs   Lab 09/12/21  0412 09/11/21  0431 09/10/21  0730 09/09/21  0445   WBC 7.6 12.6* 14.7* 14.4*   RBC 3.00* 3.36* 3.40* 3.80*   HGB 7.7* 8.8* 8.8* 10.0*   HCT 24.2* 27.5* 27.4* 30.7*   MCV 81 82 81 81   MCH 25.7* 26.2* 25.9* 26.3*   MCHC 31.8 32.0 32.1 32.6   RDW 14.6 14.7 14.8 14.6    222 185 213     INR  Recent Labs   Lab 09/08/21  1545 09/08/21  1423   INR 1.23* 1.41*     Arterial Blood Gas  Recent Labs   Lab 09/11/21  1815 09/09/21  0446 09/08/21  2217 09/08/21 2004   PH 7.42 7.37 7.29* 7.29*   PCO2 34* 43 47* 46*   PO2 88 103 130* 70*   HCO3 22 24 23 22   O2PER 30 60 40 60       All cultures:  No results for input(s): CULT in the last 168 hours.  Recent Results (from the past 24 hour(s))   XR Chest Port 1 View    Narrative    CHEST ONE VIEW PORTABLE   9/8/2021 3:54 PM     HISTORY: ET tube positioning and CVTS postop surgery.    COMPARISON: None.      Impression    IMPRESSION: Portable chest. Endotracheal tube terminates approximately  2 cm above the john. Nasogastric tube extends below the left  hemidiaphragm, presumably in the stomach. Mediastinal drains and chest  tubes are noted. Right IJ Bertram-Apolinar catheter appears in good position  with the tip likely in the main pulmonary  artery. Heart is mildly  enlarged. Airspace opacity noted in the mid left lung, possibly  postoperative. Patient is status post CABG. No pneumothorax or  significant pleural fluid.    ZULAY GALLEGOS MD         SYSTEM ID:  [id number]   XR Chest Port 1 View    Narrative    EXAM: XR CHEST PORT 1 VIEW  LOCATION: [facility]  DATE/TIME: 9/8/2021 11:25 PM    INDICATION: Worsening hypoxia, post CABG  COMPARISON: 9/8/2021.    FINDINGS: ET tube 3.5 cm above the john. NG tube passes into the stomach. Right IJ pulmonary artery catheter in the pulmonary outflow tract. Sternal wires and mediastinal clips. Mediastinal drains and bilateral chest tubes. No pneumothorax. The heart   size is normal. Probable left pleural effusion. Improving infiltrate in the left mid lung.      Impression    IMPRESSION: No pneumothorax. Improving left lung infiltrate. Probable left pleural effusion.   XR Chest Port 1 View    Narrative    EXAM: XR CHEST PORT 1 VIEW  LOCATION: [facility]  DATE/TIME: 9/9/2021 5:52 AM    INDICATION: Post Op CVTS Surgery  COMPARISON: 09/08/2021.      Impression    IMPRESSION: Endotracheal tube in satisfactory position terminating 4.7 cm above the john. Enteric tube tip in the stomach. Right internal jugular venous [place]-Apolinar catheter tip in the main pulmonary artery. Poststernotomy and coronary artery bypass   grafting. Mediastinal and bilateral pleural drains unchanged. Stable cardiac silhouette. Normal pulmonary vascularity. Mild streaky atelectasis of the right midlung. No pneumothorax.

## 2021-09-12 NOTE — PLAN OF CARE
Neuro: Pt becomes agitated with stimulation. Excessive coughing, tachypnea and hypertension. Pt does not follow. Purposeful movements in BLE. PEERLa, sclera edema. Remains on dex, & propofol. Received Tylenol for tmax 100.9  CV: SR to ST. Bp labile with agitation. Remains on Epi & Jon. Chest tube output minimal. Mg & K replaced.   Pulm: CMV 30%/500/24/5. Coarse to Dim LS. Thick creamy secretions through ett & orally.   GI/: Hypoactive BS. Tolerating TF. Passing gas. Good urine output. Remains on insulin gtt.   Plan: Plan to wean sedation if able. MRI? Son updated this morning.

## 2021-09-12 NOTE — PLAN OF CARE
End of Shift Nursing Summary    Neuro: low grade fever ( pan culture sent) doesn't follow. Open eyes to voice but does not close to command. encephalopathic. Sedation vacation done for 2-hrs. Become restless, tachypneic, tachycardic. Restarted sedation. PAWAN clark.  CV:  still on epi/sadaf gtt to keep MAP >65 and SBP less than 140. Albumin given to help with BP/UOP. Epicardial wires removed at bedside. Weak pulses in all extremities. Generalized traces edema. Went into brief self limiting Afib end of sedation vacation. A new guadalupe and CVC was placed today.  Pulm: PS for ~3 hrs. Minimal vent settings. Small secretions from ETT.   GI/:on insulin gtt for glycemic control. TF started. Ok UOP. Hypoactive bowel sounds  Skin: ecchymotic harvest sites otherwise skin intact. Cold extremities despite low grade fever.  Family: updated throughout my shift. Family were frustrated with pt condition and lack of communication form treatment team. Writer notified several MD's to updated family at bedside while utilizing Jabber to help with communication. CV surgery, neurology and intensivist all provided detailed update to wife and son today.    POC: EEG started, poss MRI, rest overnight and will try to wean sedation again in am.     *See EPIC flowsheet for full nursing assessment findings

## 2021-09-12 NOTE — PROGRESS NOTES
[facility] RESPIRATORY NOTE        Date of Admission: 9/2/2021    Date of Intubation (most recent): 9/8/21    Reason for Mechanical Ventilation: Post Op    Number of Days on Mechanical Ventilation: 4    Met Criteria for Spontaneous Breathing Trial: Yes    Reason for No Spontaneous Breathing Trial: NA    Significant Events Today: none    ABG Results:   Recent Labs   Lab 09/11/21  1815 09/09/21  0446 09/08/21  2217 09/08/21 2004   PH 7.42 7.37 7.29* 7.29*   PCO2 34* 43 47* 46*   PO2 88 103 130* 70*   HCO3 22 24 23 22   O2PER 30 60 40 60       Current Vent Settings: Ventilation Mode: CMV/AC  (Continuous Mandatory Ventilation/ Assist Control)  FiO2 (%): 30 %  Rate Set (breaths/minute): 24 breaths/min  Tidal Volume Set (mL): 500 mL  PEEP (cm H2O): 5 cmH2O  Pressure Support (cm H2O): 5 cmH2O  Oxygen Concentration (%): 30 %  Resp: 24      Skin Assessment: intact    Plan: Continue to follow. Wean if possible    Amparo Stanley

## 2021-09-12 NOTE — PLAN OF CARE
End of Shift Nursing Summary    Neuro: no change in neuro status. Open eyes to voice this evening. PAWAN Toth. Does not follow. febrile. Tylenol given and ice packs applied.   CV: epi and sadaf off. Low dose of metoprolol given. Pulses are palpable. Traces of generalized edema.   Pulm: P/S on 5/5 since ~1440. Minimal vent settings. Small yellow creamy secretions. Diminished lung sounds.  GI/: still on insulin gtt. No BM yet, bowel regimen given. Trickle TF. Good UOP  Skin: ecchymotic   Family: updated at bedside and over phone.  POC: MRI done today was unremarkable. Continue weaning and PS trial as tolerated.      *See EPIC flowsheet for full nursing assessment findings

## 2021-09-13 ENCOUNTER — TRANSFERRED RECORDS (OUTPATIENT)
Dept: HEALTH INFORMATION MANAGEMENT | Facility: CLINIC | Age: 51
End: 2021-09-13

## 2021-09-13 ENCOUNTER — APPOINTMENT (OUTPATIENT)
Dept: GENERAL RADIOLOGY | Facility: CLINIC | Age: 51
End: 2021-09-13
Attending: PHYSICIAN ASSISTANT
Payer: COMMERCIAL

## 2021-09-13 LAB
ALBUMIN SERPL-MCNC: 1.9 G/DL (ref 3.4–5)
ALP SERPL-CCNC: 272 U/L (ref 40–150)
ALT SERPL W P-5'-P-CCNC: 18 U/L (ref 0–70)
ANION GAP SERPL CALCULATED.3IONS-SCNC: 14 MMOL/L (ref 3–14)
AST SERPL W P-5'-P-CCNC: 16 U/L (ref 0–45)
ATRIAL RATE - MUSE: 95 BPM
BASOPHILS # BLD AUTO: 0 10E3/UL (ref 0–0.2)
BASOPHILS NFR BLD AUTO: 0 %
BILIRUB SERPL-MCNC: 0.5 MG/DL (ref 0.2–1.3)
BUN SERPL-MCNC: 10 MG/DL (ref 7–30)
CALCIUM SERPL-MCNC: 8 MG/DL (ref 8.5–10.1)
CHLORIDE BLD-SCNC: 108 MMOL/L (ref 94–109)
CO2 SERPL-SCNC: 18 MMOL/L (ref 20–32)
CREAT SERPL-MCNC: 0.95 MG/DL (ref 0.66–1.25)
DIASTOLIC BLOOD PRESSURE - MUSE: NORMAL MMHG
EOSINOPHIL # BLD AUTO: 0.1 10E3/UL (ref 0–0.7)
EOSINOPHIL NFR BLD AUTO: 2 %
ERYTHROCYTE [DISTWIDTH] IN BLOOD BY AUTOMATED COUNT: 15.2 % (ref 10–15)
ERYTHROCYTE [DISTWIDTH] IN BLOOD BY AUTOMATED COUNT: 15.4 % (ref 10–15)
GFR SERPL CREATININE-BSD FRML MDRD: >90 ML/MIN/1.73M2
GLUCOSE BLD-MCNC: 215 MG/DL (ref 70–99)
GLUCOSE BLDC GLUCOMTR-MCNC: 104 MG/DL (ref 70–99)
GLUCOSE BLDC GLUCOMTR-MCNC: 122 MG/DL (ref 70–99)
GLUCOSE BLDC GLUCOMTR-MCNC: 126 MG/DL (ref 70–99)
GLUCOSE BLDC GLUCOMTR-MCNC: 127 MG/DL (ref 70–99)
GLUCOSE BLDC GLUCOMTR-MCNC: 127 MG/DL (ref 70–99)
GLUCOSE BLDC GLUCOMTR-MCNC: 129 MG/DL (ref 70–99)
GLUCOSE BLDC GLUCOMTR-MCNC: 148 MG/DL (ref 70–99)
GLUCOSE BLDC GLUCOMTR-MCNC: 150 MG/DL (ref 70–99)
GLUCOSE BLDC GLUCOMTR-MCNC: 165 MG/DL (ref 70–99)
GLUCOSE BLDC GLUCOMTR-MCNC: 170 MG/DL (ref 70–99)
GLUCOSE BLDC GLUCOMTR-MCNC: 183 MG/DL (ref 70–99)
GLUCOSE BLDC GLUCOMTR-MCNC: 193 MG/DL (ref 70–99)
GLUCOSE BLDC GLUCOMTR-MCNC: 196 MG/DL (ref 70–99)
GLUCOSE BLDC GLUCOMTR-MCNC: 211 MG/DL (ref 70–99)
HCT VFR BLD AUTO: 24.3 % (ref 40–53)
HCT VFR BLD AUTO: 24.5 % (ref 40–53)
HGB BLD-MCNC: 7.7 G/DL (ref 13.3–17.7)
HGB BLD-MCNC: 7.8 G/DL (ref 13.3–17.7)
IMM GRANULOCYTES # BLD: 0.1 10E3/UL
IMM GRANULOCYTES NFR BLD: 1 %
INTERPRETATION ECG - MUSE: NORMAL
LYMPHOCYTES # BLD AUTO: 1.1 10E3/UL (ref 0.8–5.3)
LYMPHOCYTES NFR BLD AUTO: 15 %
MAGNESIUM SERPL-MCNC: 2.1 MG/DL (ref 1.6–2.3)
MCH RBC QN AUTO: 25.8 PG (ref 26.5–33)
MCH RBC QN AUTO: 25.8 PG (ref 26.5–33)
MCHC RBC AUTO-ENTMCNC: 31.4 G/DL (ref 31.5–36.5)
MCHC RBC AUTO-ENTMCNC: 32.1 G/DL (ref 31.5–36.5)
MCV RBC AUTO: 81 FL (ref 78–100)
MCV RBC AUTO: 82 FL (ref 78–100)
MONOCYTES # BLD AUTO: 1 10E3/UL (ref 0–1.3)
MONOCYTES NFR BLD AUTO: 13 %
NEUTROPHILS # BLD AUTO: 5.1 10E3/UL (ref 1.6–8.3)
NEUTROPHILS NFR BLD AUTO: 69 %
NRBC # BLD AUTO: 0 10E3/UL
NRBC BLD AUTO-RTO: 0 /100
P AXIS - MUSE: 35 DEGREES
PHOSPHATE SERPL-MCNC: 4.5 MG/DL (ref 2.5–4.5)
PLATELET # BLD AUTO: 239 10E3/UL (ref 150–450)
PLATELET # BLD AUTO: 264 10E3/UL (ref 150–450)
POTASSIUM BLD-SCNC: 4.3 MMOL/L (ref 3.4–5.3)
PR INTERVAL - MUSE: 112 MS
PROT SERPL-MCNC: 6.5 G/DL (ref 6.8–8.8)
QRS DURATION - MUSE: 76 MS
QT - MUSE: 346 MS
QTC - MUSE: 434 MS
R AXIS - MUSE: -34 DEGREES
RBC # BLD AUTO: 2.98 10E6/UL (ref 4.4–5.9)
RBC # BLD AUTO: 3.02 10E6/UL (ref 4.4–5.9)
SODIUM SERPL-SCNC: 140 MMOL/L (ref 133–144)
SYSTOLIC BLOOD PRESSURE - MUSE: NORMAL MMHG
T AXIS - MUSE: 9 DEGREES
UFH PPP CHRO-ACNC: 0.22 IU/ML
UFH PPP CHRO-ACNC: 0.28 IU/ML
VENTRICULAR RATE- MUSE: 95 BPM
WBC # BLD AUTO: 6.4 10E3/UL (ref 4–11)
WBC # BLD AUTO: 7.4 10E3/UL (ref 4–11)

## 2021-09-13 PROCEDURE — 250N000011 HC RX IP 250 OP 636: Performed by: INTERNAL MEDICINE

## 2021-09-13 PROCEDURE — 250N000011 HC RX IP 250 OP 636: Performed by: NURSE PRACTITIONER

## 2021-09-13 PROCEDURE — 250N000013 HC RX MED GY IP 250 OP 250 PS 637: Performed by: STUDENT IN AN ORGANIZED HEALTH CARE EDUCATION/TRAINING PROGRAM

## 2021-09-13 PROCEDURE — 999N000009 HC STATISTIC AIRWAY CARE

## 2021-09-13 PROCEDURE — 85025 COMPLETE CBC W/AUTO DIFF WBC: CPT | Performed by: INTERNAL MEDICINE

## 2021-09-13 PROCEDURE — 94003 VENT MGMT INPAT SUBQ DAY: CPT

## 2021-09-13 PROCEDURE — 92960 CARDIOVERSION ELECTRIC EXT: CPT | Mod: 59 | Performed by: INTERNAL MEDICINE

## 2021-09-13 PROCEDURE — 5A2204Z RESTORATION OF CARDIAC RHYTHM, SINGLE: ICD-10-PCS | Performed by: INTERNAL MEDICINE

## 2021-09-13 PROCEDURE — 250N000011 HC RX IP 250 OP 636: Performed by: PHYSICIAN ASSISTANT

## 2021-09-13 PROCEDURE — 200N000001 HC R&B ICU

## 2021-09-13 PROCEDURE — 999N000157 HC STATISTIC RCP TIME EA 10 MIN

## 2021-09-13 PROCEDURE — 85014 HEMATOCRIT: CPT | Performed by: PHYSICIAN ASSISTANT

## 2021-09-13 PROCEDURE — 250N000013 HC RX MED GY IP 250 OP 250 PS 637: Performed by: PHYSICIAN ASSISTANT

## 2021-09-13 PROCEDURE — 250N000012 HC RX MED GY IP 250 OP 636 PS 637: Performed by: STUDENT IN AN ORGANIZED HEALTH CARE EDUCATION/TRAINING PROGRAM

## 2021-09-13 PROCEDURE — 250N000009 HC RX 250: Performed by: PHYSICIAN ASSISTANT

## 2021-09-13 PROCEDURE — 250N000013 HC RX MED GY IP 250 OP 250 PS 637: Performed by: NURSE PRACTITIONER

## 2021-09-13 PROCEDURE — 0DHA3UZ INSERTION OF FEEDING DEVICE INTO JEJUNUM, PERCUTANEOUS APPROACH: ICD-10-PCS | Performed by: RADIOLOGY

## 2021-09-13 PROCEDURE — 85520 HEPARIN ASSAY: CPT | Performed by: SURGERY

## 2021-09-13 PROCEDURE — 74340 X-RAY GUIDE FOR GI TUBE: CPT

## 2021-09-13 PROCEDURE — 250N000011 HC RX IP 250 OP 636: Performed by: STUDENT IN AN ORGANIZED HEALTH CARE EDUCATION/TRAINING PROGRAM

## 2021-09-13 PROCEDURE — 258N000003 HC RX IP 258 OP 636: Performed by: NURSE PRACTITIONER

## 2021-09-13 PROCEDURE — 80053 COMPREHEN METABOLIC PANEL: CPT | Performed by: PHYSICIAN ASSISTANT

## 2021-09-13 PROCEDURE — 83735 ASSAY OF MAGNESIUM: CPT | Performed by: NURSE PRACTITIONER

## 2021-09-13 PROCEDURE — 44500 INTRO GASTROINTESTINAL TUBE: CPT

## 2021-09-13 PROCEDURE — 258N000003 HC RX IP 258 OP 636: Performed by: STUDENT IN AN ORGANIZED HEALTH CARE EDUCATION/TRAINING PROGRAM

## 2021-09-13 PROCEDURE — 99291 CRITICAL CARE FIRST HOUR: CPT | Mod: 24 | Performed by: STUDENT IN AN ORGANIZED HEALTH CARE EDUCATION/TRAINING PROGRAM

## 2021-09-13 PROCEDURE — 84100 ASSAY OF PHOSPHORUS: CPT | Performed by: NURSE PRACTITIONER

## 2021-09-13 PROCEDURE — 99207 PR NO CHARGE LOS: CPT | Performed by: HOSPITALIST

## 2021-09-13 PROCEDURE — 99291 CRITICAL CARE FIRST HOUR: CPT | Mod: GC | Performed by: PSYCHIATRY & NEUROLOGY

## 2021-09-13 PROCEDURE — 258N000003 HC RX IP 258 OP 636: Performed by: INTERNAL MEDICINE

## 2021-09-13 PROCEDURE — 85520 HEPARIN ASSAY: CPT | Performed by: PHYSICIAN ASSISTANT

## 2021-09-13 PROCEDURE — 250N000009 HC RX 250: Performed by: STUDENT IN AN ORGANIZED HEALTH CARE EDUCATION/TRAINING PROGRAM

## 2021-09-13 PROCEDURE — 99222 1ST HOSP IP/OBS MODERATE 55: CPT | Mod: 25 | Performed by: INTERNAL MEDICINE

## 2021-09-13 RX ORDER — HEPARIN SODIUM 10000 [USP'U]/100ML
0-5000 INJECTION, SOLUTION INTRAVENOUS CONTINUOUS
Status: DISCONTINUED | OUTPATIENT
Start: 2021-09-13 | End: 2021-09-16

## 2021-09-13 RX ORDER — LIDOCAINE HYDROCHLORIDE 20 MG/ML
10 JELLY TOPICAL ONCE
Status: COMPLETED | OUTPATIENT
Start: 2021-09-13 | End: 2021-09-13

## 2021-09-13 RX ORDER — AMINO AC/PROTEIN HYDR/WHEY PRO 10G-100/30
1 LIQUID (ML) ORAL 3 TIMES DAILY
Status: DISCONTINUED | OUTPATIENT
Start: 2021-09-13 | End: 2021-09-15 | Stop reason: CLARIF

## 2021-09-13 RX ADMIN — LIDOCAINE HYDROCHLORIDE 6 ML: 20 JELLY TOPICAL at 14:44

## 2021-09-13 RX ADMIN — OXYCODONE HYDROCHLORIDE 5 MG: 5 TABLET ORAL at 18:10

## 2021-09-13 RX ADMIN — SODIUM CHLORIDE 2 UNITS/HR: 9 INJECTION, SOLUTION INTRAVENOUS at 15:39

## 2021-09-13 RX ADMIN — ROSUVASTATIN CALCIUM 10 MG: 10 TABLET, FILM COATED ORAL at 08:26

## 2021-09-13 RX ADMIN — THIAMINE HYDROCHLORIDE 500 MG: 100 INJECTION, SOLUTION INTRAMUSCULAR; INTRAVENOUS at 08:28

## 2021-09-13 RX ADMIN — CHLORHEXIDINE GLUCONATE 15 ML: 1.2 SOLUTION ORAL at 20:31

## 2021-09-13 RX ADMIN — CHLORHEXIDINE GLUCONATE 15 ML: 1.2 SOLUTION ORAL at 08:14

## 2021-09-13 RX ADMIN — PROPOFOL 40 MCG/KG/MIN: 10 INJECTION, EMULSION INTRAVENOUS at 13:34

## 2021-09-13 RX ADMIN — SODIUM CHLORIDE 6 UNITS/HR: 9 INJECTION, SOLUTION INTRAVENOUS at 07:00

## 2021-09-13 RX ADMIN — SENNOSIDES AND DOCUSATE SODIUM 1 TABLET: 8.6; 5 TABLET ORAL at 08:26

## 2021-09-13 RX ADMIN — HEPARIN SODIUM 5000 UNITS: 5000 INJECTION INTRAVENOUS; SUBCUTANEOUS at 05:49

## 2021-09-13 RX ADMIN — POLYETHYLENE GLYCOL 3350 17 G: 17 POWDER, FOR SOLUTION ORAL at 08:26

## 2021-09-13 RX ADMIN — METOPROLOL TARTRATE 12.5 MG: 25 TABLET, FILM COATED ORAL at 20:32

## 2021-09-13 RX ADMIN — AMIODARONE HYDROCHLORIDE 150 MG: 1.5 INJECTION, SOLUTION INTRAVENOUS at 00:34

## 2021-09-13 RX ADMIN — HYDROMORPHONE HYDROCHLORIDE 0.4 MG: 0.2 INJECTION, SOLUTION INTRAMUSCULAR; INTRAVENOUS; SUBCUTANEOUS at 14:14

## 2021-09-13 RX ADMIN — Medication 1 PACKET: at 18:03

## 2021-09-13 RX ADMIN — PROPOFOL 30 MCG/KG/MIN: 10 INJECTION, EMULSION INTRAVENOUS at 07:00

## 2021-09-13 RX ADMIN — HEPARIN SODIUM 1050 UNITS/HR: 10000 INJECTION, SOLUTION INTRAVENOUS at 09:10

## 2021-09-13 RX ADMIN — PROPOFOL 40 MCG/KG/MIN: 10 INJECTION, EMULSION INTRAVENOUS at 21:34

## 2021-09-13 RX ADMIN — HYDROMORPHONE HYDROCHLORIDE 0.4 MG: 0.2 INJECTION, SOLUTION INTRAMUSCULAR; INTRAVENOUS; SUBCUTANEOUS at 05:48

## 2021-09-13 RX ADMIN — SODIUM CHLORIDE 4 UNITS/HR: 9 INJECTION, SOLUTION INTRAVENOUS at 02:42

## 2021-09-13 RX ADMIN — PROPOFOL 40 MCG/KG/MIN: 10 INJECTION, EMULSION INTRAVENOUS at 17:00

## 2021-09-13 RX ADMIN — MAGNESIUM HYDROXIDE 30 ML: 400 SUSPENSION ORAL at 04:35

## 2021-09-13 RX ADMIN — MULTIVIT AND MINERALS-FERROUS GLUCONATE 9 MG IRON/15 ML ORAL LIQUID 15 ML: at 08:28

## 2021-09-13 RX ADMIN — HYDRALAZINE HYDROCHLORIDE 10 MG: 20 INJECTION INTRAMUSCULAR; INTRAVENOUS at 17:27

## 2021-09-13 RX ADMIN — ACETAMINOPHEN 650 MG: 325 TABLET, FILM COATED ORAL at 20:31

## 2021-09-13 RX ADMIN — PROPOFOL 30 MCG/KG/MIN: 10 INJECTION, EMULSION INTRAVENOUS at 02:26

## 2021-09-13 RX ADMIN — AMLODIPINE BESYLATE 2.5 MG: 2.5 TABLET ORAL at 08:26

## 2021-09-13 RX ADMIN — SODIUM CHLORIDE 4 UNITS/HR: 9 INJECTION, SOLUTION INTRAVENOUS at 23:50

## 2021-09-13 RX ADMIN — QUETIAPINE FUMARATE 50 MG: 50 TABLET ORAL at 08:26

## 2021-09-13 RX ADMIN — AMIODARONE HYDROCHLORIDE 1 MG/MIN: 50 INJECTION, SOLUTION INTRAVENOUS at 00:45

## 2021-09-13 RX ADMIN — QUETIAPINE FUMARATE 50 MG: 50 TABLET ORAL at 21:34

## 2021-09-13 RX ADMIN — Medication 1 PACKET: at 21:34

## 2021-09-13 RX ADMIN — SENNOSIDES AND DOCUSATE SODIUM 1 TABLET: 8.6; 5 TABLET ORAL at 20:31

## 2021-09-13 RX ADMIN — Medication 40 MG: at 06:32

## 2021-09-13 RX ADMIN — METOPROLOL TARTRATE 12.5 MG: 25 TABLET, FILM COATED ORAL at 08:26

## 2021-09-13 RX ADMIN — ALLOPURINOL 100 MG: 100 TABLET ORAL at 08:25

## 2021-09-13 RX ADMIN — AMIODARONE HYDROCHLORIDE 150 MG: 1.5 INJECTION, SOLUTION INTRAVENOUS at 11:43

## 2021-09-13 RX ADMIN — VALPROATE SODIUM 500 MG: 100 INJECTION, SOLUTION INTRAVENOUS at 16:46

## 2021-09-13 RX ADMIN — ASPIRIN 81 MG CHEWABLE TABLET 324 MG: 81 TABLET CHEWABLE at 08:25

## 2021-09-13 RX ADMIN — ACETAMINOPHEN 650 MG: 325 TABLET, FILM COATED ORAL at 05:49

## 2021-09-13 RX ADMIN — AMIODARONE HYDROCHLORIDE 0.5 MG/MIN: 50 INJECTION, SOLUTION INTRAVENOUS at 06:30

## 2021-09-13 ASSESSMENT — ACTIVITIES OF DAILY LIVING (ADL)
ADLS_ACUITY_SCORE: 21
ADLS_ACUITY_SCORE: 21
ADLS_ACUITY_SCORE: 23
ADLS_ACUITY_SCORE: 23
ADLS_ACUITY_SCORE: 21
ADLS_ACUITY_SCORE: 23

## 2021-09-13 NOTE — PROGRESS NOTES
CARDIAC ELECTROPHYSIOLOGY CONSULTATION  September 13, 2021      REQUESTING PROVIDER:  Aaliyah Muse PA-C     REASON FOR CONSULTATION:  Atrial flutter with RVR after CABG.      HISTORY OF PRESENT ILLNESS:    51-year-old male with history of CAD and remote PCI, hypertension, dyslipidemia, type 2 diabetes mellitus admitted last week with non-STEMI.  Peak troponin 12.4.  Coronary angiography showed significant disease in proximal RCA, OM 2, D3, ostial LAD and distal circumflex.  LVEF 40 to 45%.  CABG was recommended.    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 Mccullough.  Procedure complicated by hypotension requiring pressors, encephalopathy and prolonged intubation.    Last night, at approximately 11 PM, the patient developed 2:1 atrial flutter.  I have reviewed his twelve-lead ECG which appears consistent with typical counterclockwise CTI flutter.  The patient was placed on IV heparin and amiodarone.  This morning, he remains in atrial flutter with 2:1 AV conduction despite IV amiodarone.  Heart rate is around 150 bpm.  BP is stable at the moment.  The patient remains intubated in the ICU.    Family and social history were reviewed by me, see HPI.  The patient is a non-smoker and nondrinker.  I met his son and wife in the ICU today.      DIAGNOSTIC STUDIES:  Labs: Hematocrit 24%, sodium 140, potassium 4.3, creatinine 0.95, calcium 8.0, TSH 0.74  12-lead ECG: Typical atrial flutter with 2:1 conduction  Echocardiogram: Performed on 09/02/2021 (before CABG) shows EF 40-45% with apical, distal septal and mild inferior wall mild to severe hypokinesis.  Normal RV, mild MR.      IMPRESSION:  1. Typical atrial flutter with RVR.  Common arrhythmia after CABG.  Unfortunately the rate remains fast despite intravenous amiodarone.  We will repeat a 150 mg amiodarone bolus.  If unable to better control rate, I would recommend  "cardioversion later today.  Of note, the patient is already on IV heparin and the arrhythmia started approximately 12 hours ago.  2. Ischemic cardiomyopathy with CABG on 9/8/2021.  LVEF 40-45%.  3. DM type II.  4. History of hypertension, though hypotensive after CABG.  He has not had a post CABG echocardiogram.      RECOMMENDATIONS:    1. If the patient does not convert after the new amiodarone bolus, I would recommend cardioversion.  Tube feedings will be stopped now in anticipation of possible cardioversion later today.  I went over the cardioversion procedure with the patient's son and wife.  They had many questions.  After discussion, they have consented to proceed.  I explained that the alternative, \"waiting it out\" and treating medically, may be detrimental to his cardiac function and may lead to pulmonary edema over the next 24 hours.  Because of soft BP we are quite limited with the amount of medications we can use to control the rate.  2. Continue IV heparin and amiodarone.  3. Consider repeat limited echocardiogram tomorrow to reassess LV function.  It would not be that useful today as LV function could be underestimated given the significant tachycardia.    I appreciate the opportunity to be part of this patient's care.  Please feel free to call me with any questions (pager #[phone]).      Jabier Hilton MD, [facility]        PHYSICAL EXAM:  Vitals: BP 98/81   Pulse (!) 151   Temp 99.3  F (37.4  C)   Resp 30   Wt 89.2 kg (196 lb 10.4 oz)   SpO2 96%   BMI 28.22 kg/m      Intake/Output Summary (Last 24 hours) at 9/13/2021 1420  Last data filed at 9/13/2021 1400  Gross per 24 hour   Intake 2196.83 ml   Output 1785 ml   Net 411.83 ml     Vitals:    09/09/21 0500 09/10/21 0430 09/11/21 0100 09/12/21 0200   Weight: 89 kg (196 lb 3.4 oz) 88.6 kg (195 lb 5.2 oz) 87.7 kg (193 lb 5.5 oz) 87.5 kg (192 lb 14.4 oz)    09/13/21 0400   Weight: 89.2 kg (196 lb 10.4 oz)     Constitutional: Intubated, sedated in " the ICU.  Head: Normocephalic and atraumatic.   Skin:  Normal color and texture.  No rashes, lesions or eruptions.  Eyes:  no jaundice, EOMI.  ENT:  Supple, normal JVP.  No obvious thyroid enlargement.  Chest/Lungs: Bilateral mechanical breath sounds.  Cardiac:  Regular tachycardic rhythm, normal S1 and S2.    Abdomen:  Normal bowel sounds. Abdomen is soft.    Extremities:  Radial pulses 2+ bilaterally.  DP and PT pulses palpable bilaterally.  No lower extremity edema is present.   Neurological:  CN 2-12 grossly intact.  Strength in UE is normal & symmetric.    Back:  No CVA tenderness.     REVIEW OF SYSTEMS:  Review of system completed and negative with the exception of what was described in the HPI section.     CURRENT MEDICATIONS:    allopurinol  100 mg Oral Daily     amLODIPine  2.5 mg Oral Daily     aspirin  324 mg Oral or NG Tube Daily     chlorhexidine  15 mL Mouth/Throat Q12H     metoprolol tartrate  12.5 mg Oral BID     multivitamins w/minerals  15 mL Per Feeding Tube Daily     pantoprazole  40 mg Per Feeding Tube QAM AC    Or     pantoprazole (PROTONIX) IV  40 mg Intravenous QAM AC     polyethylene glycol  17 g Oral Daily     QUEtiapine  50 mg Oral or Feeding Tube BID     rosuvastatin  10 mg Oral Daily     senna-docusate  1 tablet Oral BID     sodium chloride (PF)  3 mL Intracatheter Q8H     [START ON 9/14/2021] thiamine  250 mg Intravenous Daily    Followed by     [START ON 9/19/2021] thiamine  100 mg Oral Daily     valproate (DEPACON) in NS intermittent infusion  500 mg Intravenous Q12H       ALLERGIES     Allergies   Allergen Reactions     Simvastatin Itching       PAST MEDICAL HISTORY:  Past Medical History:   Diagnosis Date     Coronary artery disease      Diabetes mellitus, type II (H)      Gout      High cholesterol      HLD (hyperlipidemia)      Hypertension        PAST SURGICAL HISTORY:  Past Surgical History:   Procedure Laterality Date     ANGIOPLASTY       BYPASS GRAFT ARTERY CORONARY N/A  2021    Procedure: CORONARY ARTERY BYPASS GRAFTING X 4 WITH ENDOSCOPIC VEIN HARVESTING LIMA-LAD PREET-DISTAL RCA LEFT RADIAL-OM LEFT SV-ANOMOLOUS RV BRANCH ON PUMP/HILLARY;  Surgeon: Antelmo Mccullough MD;  Location:  OR     CV HEART CATHETERIZATION WITH POSSIBLE INTERVENTION N/A 2021    Procedure: Heart Catheterization with Possible Intervention;  Surgeon: Paul Warner MD;  Location:  HEART CARDIAC CATH LAB     CV LEFT VENTRICULOGRAM N/A 2021    Procedure: Left Ventriculogram;  Surgeon: Paul Warner MD;  Location:  HEART CARDIAC CATH LAB       FAMILY HISTORY:  Family History   Problem Relation Age of Onset     Hypertension Mother      Diabetes Maternal Aunt      Diabetes Maternal Uncle      Cancer No family hx of      Cerebrovascular Disease No family hx of        SOCIAL HISTORY:  Social History     Socioeconomic History     Marital status:      Spouse name: Not on file     Number of children: Not on file     Years of education: Not on file     Highest education level: Not on file   Occupational History     Not on file   Tobacco Use     Smoking status: Former Smoker     Packs/day: 1.50     Years: 13.00     Pack years: 19.50     Types: Cigarettes     Quit date: 2003     Years since quittin.8     Smokeless tobacco: Never Used   Substance and Sexual Activity     Alcohol use: No     Drug use: No     Sexual activity: Yes     Partners: Female   Other Topics Concern     Not on file   Social History Narrative     Not on file     Social Determinants of Health     Financial Resource Strain:      Difficulty of Paying Living Expenses:    Food Insecurity:      Worried About Running Out of Food in the Last Year:      Ran Out of Food in the Last Year:    Transportation Needs:      Lack of Transportation (Medical):      Lack of Transportation (Non-Medical):    Physical Activity:      Days of Exercise per Week:      Minutes of Exercise per Session:    Stress:      Feeling of Stress :     Social Connections:      Frequency of Communication with Friends and Family:      Frequency of Social Gatherings with Friends and Family:      Attends Oriental orthodox Services:      Active Member of Clubs or Organizations:      Attends Club or Organization Meetings:      Marital Status:    Intimate Partner Violence:      Fear of Current or Ex-Partner:      Emotionally Abused:      Physically Abused:      Sexually Abused:          Recent Lab Results:  Recent Labs   Lab 09/13/21  1348 09/13/21  1212 09/13/21  1032 09/13/21  0918 09/13/21  0005 09/12/21  0412 09/11/21  1534 09/11/21  0431 09/08/21  1728 09/08/21  1545 09/08/21  1423   WBC  --   --   --  6.4 7.4 7.6  --  12.6*   < > 20.7* 21.4*   HGB  --   --   --  7.8* 7.7* 7.7*  --  8.8*   < > 11.0* 10.1*   MCV  --   --   --  81 82 81  --  82   < > 82 82   PLT  --   --   --  264 239 244  --  222   < > 224 162   INR  --   --   --   --   --   --   --   --   --  1.23* 1.41*   NA  --   --   --   --  140 141  --  142   < > 143 143   POTASSIUM  --   --   --   --  4.3 3.7  --  4.1   < > 3.5  3.5 3.1*   CHLORIDE  --   --   --   --  108 111*  --  111*   < > 115* 113*   CO2  --   --   --   --  18* 24  --  25   < > 24 24   BUN  --   --   --   --  10 9  --  11   < > 19 19   CR  --   --   --   --  0.95 0.82  --  0.93   < > 1.16 1.19   ANIONGAP  --   --   --   --  14 6  --  6   < > 4 6   TORRES  --   --   --   --  8.0* 8.2*  --  8.4*   < > 8.8 9.4   * 127* 104*  --  215* 99  --  159*   < > 157* 196*   ALBUMIN  --   --   --   --  1.9*  --  1.9*  --    < > 2.8*  --    PROTTOTAL  --   --   --   --  6.5*  --  6.4*  --    < > 6.4*  --    BILITOTAL  --   --   --   --  0.5  --  0.5  --    < > 1.0  --    ALKPHOS  --   --   --   --  272*  --  218*  --    < > 175*  --    ALT  --   --   --   --  18  --  18  --    < > 51  --    AST  --   --   --   --  16  --  17  --    < > 51*  --     < > = values in this interval not displayed.

## 2021-09-13 NOTE — PLAN OF CARE
Neuro: Pt becomes agitated with stimulation. Excessive coughing, tachypnea and hypertension. Pt does not follow. Purposeful movements in all extremities. PEERLa, sclera edema. Remains on propofol. Received Tylenol for tmax 101.4.  CV: ST, new a flutter since 2300 - on amiodarone. Chest tube output minimal.   Pulm: PS since 0500. Coarse to Dim LS. Thick creamy secretions through ett & orally.   GI/: Hypoactive BS. Tolerating TF. Given bowel meds. Good urine output. Remains on insulin gtt.   Plan: Plan to wean sedation if able. Unable to advance suzy feed, to try again today. Son updated last night.

## 2021-09-13 NOTE — PROGRESS NOTES
Patient developed rapid regular narrow complex rhythm to 150 with nursing cares.  Not related to agitation, had previously weaned off pressors.  Ventilator status unchanged.         120/60  RR 28  Sedated  ETT ok   Lungs clear  H-rapid RR  Hands and feet are warm    EKG:  A flutter 2:1      A: acute atrial flutter in POD 4 CABG.  BP ok   P: metoprolol iv 5 mg once now.   Later, either amiodarone or DCCV.      -------------------------------------------------------  12:03 AM  Updated Note  No effect of 5 mg iv metoprolol x  2  On HR.  Bp 95/60   P: amiodarone 150 plus drip.   Check mg and K. Which were ok this morning     -------------------------------------------------------  4:54 AM  Updated Note  Remains in HR of 150 on amiodarone.   Might benefit from DCCV in AM with cardiology.

## 2021-09-13 NOTE — PROGRESS NOTES
Patient seen and discussed with Dr. Mccullough  [facility]  Cardiovascular and Thoracic Surgery Daily Note          Assessment and Plan:   POD#4 s/p Coronary artery bypass grafting x 4 with left internal mammary artery to the distal left anterior descending, right internal mammary artery to the mid right coronary artery, left radial artery to the obtuse marginal artery, reverse saphenous vein graft to the anomalous RV branch off the LAD, endoscopic vein harvest from the left lower extremity, endoscopic left radial artery harvest, intraoperative HILLARY by Dr. Antelmo Mccullough  -CVS: HR: 100s-150s. SBP: 100s-120s. Weaned off pressors. Went into atrial flutter overnight-- treated with amiodarone, consult placed to EP. ASA, BB resumed with hold parameters, statin. Amlodipine 2.5mg daily to prevent radial artery spasm for 3 months. PTA losartan on hold. Pacer wires removed yesterday. Chest tubes with too much output to remove today.   -Resp: Intubated-- patient become extremely agitated when sedation is lightened. Intensivist following for ventilator management  -Neuro:  Sedated-- agitated with lightening sedation. CT  Head on POD#2 with no acute changes. Treated with seroquel and valproate per intensivist team. Neurology consulted-- EEG and MRI negative. Tolerating less sedation better today.   -Renal: good UOP. Close to preoperative weight. Cr: 0.95. Will continue to monitor.  -GI: Unable to pass kiofeed post pyloric at bedside yesterday. Will plan for radiology procedure today.   -:  Christina in place d/t limited mobility and need for accurate I&Os.   -Endo: pre op a1c: 10.2. Hx of DMII, PTA glipizide, metformin on hold. Continue insulin gtt. Hospitalist to assist with post operative glycemic control once transferred.   -FEN: replace electrolytes as needed. Na: 140. K: 4.3.  Orders Placed This Encounter      NPO for Medical/Clinical Reasons Except for: Meds      Advance Diet as Tolerated: Clear Liquid Diet       Advance Diet as Tolerated: Clear Liquid Diet; Low Saturated Fat Na <2400mg Diet      Advance Diet as Tolerated: Clear Liquid Diet      Advance Diet as Tolerated: Full Liquid Diet; Low Saturated Fat Na <2400mg Diet    -ID: Temp (24hrs), Av.6  F (38.1  C), Min:99.5  F (37.5  C), Max:101.7  F (38.7  C)  WBC: 7.4 Completed perioperative abx.   -Heme: Hgb: 7.7. Acute blood loss anemia and thrombocytopenia related to surgery  -Proph: PCD, ASA, statin, PPI, sub q heparin  -Dispo: Continue ICU cares. Ventilator management per intensivist. Plan for radiology placed feeding tube post pyloric and EP cardioversion today.           Interval History:   Intubated and sedated. went into a flutter overnight treated with amiodarone. Unable to place post pyloric feeding tube yesterday. Per nursing staff, patient is tolerating lightening of sedation a little better today         Medications:       allopurinol  100 mg Oral Daily     amLODIPine  2.5 mg Oral Daily     aspirin  324 mg Oral or NG Tube Daily     chlorhexidine  15 mL Mouth/Throat Q12H     metoprolol tartrate  12.5 mg Oral BID     multivitamins w/minerals  15 mL Per Feeding Tube Daily     pantoprazole  40 mg Per Feeding Tube QAM AC    Or     pantoprazole (PROTONIX) IV  40 mg Intravenous QAM AC     polyethylene glycol  17 g Oral Daily     protein modular  1 packet Per Feeding Tube TID     QUEtiapine  50 mg Oral or Feeding Tube BID     rosuvastatin  10 mg Oral Daily     senna-docusate  1 tablet Oral BID     sodium chloride (PF)  3 mL Intracatheter Q8H     [START ON 2021] thiamine  250 mg Intravenous Daily    Followed by     [START ON 2021] thiamine  100 mg Oral Daily     valproate (DEPACON) in NS intermittent infusion  500 mg Intravenous Q12H     acetaminophen, bisacodyl, dextrose, glucose **OR** dextrose **OR** glucagon, EPINEPHrine, hydrALAZINE, HYDROmorphone **OR** HYDROmorphone, lidocaine 4%, lidocaine (buffered or not buffered), magnesium hydroxide,  metoprolol, naloxone **OR** naloxone **OR** naloxone **OR** naloxone, ondansetron **OR** ondansetron, oxyCODONE **OR** oxyCODONE, phenylephrine, BETA BLOCKER NOT PRESCRIBED, sodium chloride (PF)          Physical Exam:   Vitals were reviewed  Blood pressure 98/81, pulse (!) 151, temperature 100  F (37.8  C), resp. rate 30, weight 89.2 kg (196 lb 10.4 oz), SpO2 94 %.  Rhythm: NSR/sinus tach    Lungs: coarse    Cardiovascular: RRR normal s1 and s2    Abdomen: soft NTND    Extremeties: minimal edema    Incision: CDI    CT: to suction    Weight:   Vitals:    09/09/21 0500 09/10/21 0430 09/11/21 0100 09/12/21 0200   Weight: 89 kg (196 lb 3.4 oz) 88.6 kg (195 lb 5.2 oz) 87.7 kg (193 lb 5.5 oz) 87.5 kg (192 lb 14.4 oz)    09/13/21 0400   Weight: 89.2 kg (196 lb 10.4 oz)            Data:   Labs:   Lab Results   Component Value Date    WBC 7.6 09/12/2021     Lab Results   Component Value Date    RBC 3.00 09/12/2021     Lab Results   Component Value Date    HGB 7.7 09/12/2021     Lab Results   Component Value Date    HCT 24.2 09/12/2021     No components found for: MCT  Lab Results   Component Value Date    MCV 81 09/12/2021     Lab Results   Component Value Date    MCH 25.7 09/12/2021     Lab Results   Component Value Date    MCHC 31.8 09/12/2021     Lab Results   Component Value Date    RDW 14.6 09/12/2021     Lab Results   Component Value Date     09/12/2021       Last Basic Metabolic Panel:  Lab Results   Component Value Date     09/12/2021      Lab Results   Component Value Date    POTASSIUM 3.7 09/12/2021     Lab Results   Component Value Date    CHLORIDE 111 09/12/2021     Lab Results   Component Value Date    TORRES 8.2 09/12/2021     Lab Results   Component Value Date    CO2 24 09/12/2021     Lab Results   Component Value Date    BUN 9 09/12/2021     Lab Results   Component Value Date    CR 0.82 09/12/2021     Lab Results   Component Value Date     09/12/2021    GLC 99 09/12/2021       CXR:  9/11/21    Impression: Portable chest. Right IJ central venous line terminates in  the distal SVC in good position. Endotracheal tube, nasogastric tube,  pleural and mediastinal drains are all unchanged. Lungs are grossly  clear. No definite pneumothorax or pleural effusions on this supine  view. Heart remains enlarged following CABG    Aaliyah Muse PA-C  CV Surgery  Pager # [phone]

## 2021-09-13 NOTE — PROGRESS NOTES
CARDIOVERSION    Indication:  atrial flutter since 11 PM, rapid VR (150 bpm) despite iv amiodarone    Anesthesia:  patient intubated in ICU.  Propofol bolus given to ensure deep sedation.    PROCEDURE:  AP Zoll patches placed in chest.  First 150 J synchronized biphasic shock converted atrial flutter to atrial fibrillation.  Second 200 J synchronized biphasic shock converted atrial fibrillation to sinus rhythm.  The patient tolerated well.    Impression:  -Successful DC cardioversion of atrial flutter with RVR.    Plan:  -Continue IV amiodarone and heparin.

## 2021-09-13 NOTE — PROGRESS NOTES
Clinical Nutrition Brief Note    Chart reviewed and discussed patient during rounds   Refer to previous RD assessment note dated 9/11 for more details     TF Osmolite 1.5 was started at 10 mL/hr on 9/11 while on 2 pressors    Labs reviewed: K/Mg/Phos WNL  Recent Labs   Lab 09/13/21  1348 09/13/21  1212 09/13/21  1032 09/13/21  0744 09/13/21  0605 09/13/21  0510   * 127* 104* 129* 150* 165*     Medications reviewed: pressors weaned off yesterday, Propofol at 20.7 mL/hr (546 kcal/day), miralax, senokot, insulin gtt    PLAN:  Once FT placement confirmed in XR, resume TF at 20 mL/hr and continue to advance by 15 mL every 12 hrs to goal rate of 50 mL/hr     Osmolite 1.5 Miguel @ goal of 50ml/hr (1200ml/day) will provide: 1800 kcals, 75 g PRO, 914 ml free H20, 244 g CHO, and 0 g fiber daily.  Add Prosource TID =  120 kcal, 33 gm protein   TOTAL (TF + Prosource + Propofol) = 2466 kcal (28 kcal/kg) and 108 gm protein (1.2 gm/kg)      Will continue to follow patient per protocol     Kylee Fung RD, LD  Clinical Dietitian   Unit pager [phone]

## 2021-09-13 NOTE — PROGRESS NOTES
Hospitalist Chart Check     POD #5 4V CABG. Chart reviewed. Patient remains intubated. Ongoing cares per intensivist and CV surgery. Hospitalist service will continue following peripherally, will assist with ongoing BG management once extubated and taking po.      Chelsea Costa,   Internal Medicine - Hospitalist  9/13/2021  1:47PM

## 2021-09-13 NOTE — PROGRESS NOTES
[facility]   CRITICAL CARE NOTE     Jerry Bustamante MRN: [id number]  1970  Date of Admission:9/2/2021          Problem List:   1. Acute resp failure post CABG, with respiratory acidosis (corrected  2. multivessel CAD post CABG x 4   3. Encephalopathy, unclear, MRI: no acute changes, EEG is   4. Post operative shock, presumed cardiogenic   5. Atrial flutter not converted on amio ggt     24-Hour Goals   1. Sedation vacation once A flutter is converted, EP cardiology consulted.       Overnight Events   A Flutter without hypotension. Metoprolol then amio drip started, not converted.        Lines/Tubes/Draines/Devices   .  Peripheral IV 09/01/21 Anterior;Right (Active)   Site Assessment [facility] 09/13/21 0800   Line Status Saline locked 09/13/21 0800   Dressing Intervention New dressing  09/11/21 1800   Phlebitis Scale 0-->no symptoms 09/13/21 0800   Infiltration Scale 0 09/13/21 0800   Infiltration Site Treatment Method  None 09/12/21 0000   If infiltrated, was a vesicant infusing? No 09/12/21 0000   Number of days: 12       Peripheral IV 09/08/21 Right Hand (Active)   Site Assessment [facility] 09/13/21 0800   Line Status Saline locked 09/13/21 0800   Dressing Intervention New dressing  09/09/21 0000   Phlebitis Scale 0-->no symptoms 09/13/21 0800   Infiltration Scale 0 09/13/21 0800   Infiltration Site Treatment Method  None 09/12/21 0000   If infiltrated, was a vesicant infusing? No 09/12/21 0000   Number of days: 5       Arterial Line 09/11/21 Radial (Active)   Site Assessment [facility] 09/13/21 0800   Line Status Pulsatile blood flow 09/13/21 0800   Art Line Waveform Appropriate 09/13/21 0800   Art Line Interventions Zeroed and calibrated;Leveled 09/13/21 0800   Color/Movement/Sensation Capillary refill less than 3 sec;Cool fingers/toes 09/13/21 0800   Line Necessity Yes, meets criteria 09/13/21 0800   Dressing Type Transparent 09/13/21 0800   Dressing Status Clean, dry, intact 09/13/21 0800   Dressing  Intervention Dressing changed/new dressing 09/11/21 1800   Number of days: 2       CVC TRIPLE LUMEN Right Internal jugular (Active)   Site Assessment [facility] 09/13/21 0800   Dressing Type Chlorhexidine sponge;Transparent 09/13/21 0800   Dressing Status clean;dry;intact 09/12/21 2000   Dressing Intervention new dressing 09/11/21 1800   Blue - Status infusing 09/13/21 0800   Brown - Status infusing 09/13/21 0800   White - Status saline locked 09/13/21 0800   Number of days: 2       Right Groin Interventional Procedure Access (Active)   Site Assessment [facility] 09/13/21 0800   Hemostasis management Unchanged 09/13/21 0800   Femoral Bruit not present 09/13/21 0800   CMS Right Extremity WD 09/13/21 0800   Dorsalis Pulse - Right Leg Normal 09/13/21 0800   Popliteal Pulse - Right Leg Present with doppler 09/11/21 1200   Posterior Tibial Pulse - Right Leg Normal 09/13/21 0800   Number of days: 11       ETT Cuffed Single;Single Subglottic Suction 8 mm (Active)   Secured at (cm) 25 cm 09/13/21 0810   Measured from Lips 09/13/21 0810   Position Right 09/13/21 0600   Secured by Commercial tube oliver 09/13/21 0810   Bite Block None Present 09/13/21 0810   Site Appearance Clean;Dry 09/13/21 0810   Cuff Assessment Minimal occluding volume 09/13/21 0810   Safety Measures Manual resuscitator at bedside 09/13/21 0810   Number of days: 5       Chest Tube 1 Left Mediastinal 24 Gambian (Active)   Site Assessment [facility] 09/13/21 0800   Suction -20 cm H2O 09/13/21 0800   Chest Tube Airleak No 09/13/21 0800   Drainage Description Serous 09/13/21 0800   Dressing Status Normal: Clean, Dry & Intact 09/13/21 0800   Dressing Change Due 09/14/21 09/13/21 0000   Dressing Intervention Gauze 09/13/21 0800   Patency Intervention Tip/Tilt 09/13/21 0800   Chest Tube Clamps at Bedside present 09/13/21 0800   Container Amount 740 09/13/21 0600   Output (ml) 0 ml 09/13/21 0800   Number of days: 5       Chest Tube 2 Pleural 32 Gambian (Active)   Site Assessment [facility]  09/13/21 0800   Suction -20 cm H2O 09/13/21 0800   Chest Tube Airleak No 09/13/21 0800   Drainage Description Serous 09/13/21 0800   Dressing Status Normal: Clean, Dry & Intact 09/13/21 0800   Dressing Change Due 09/14/21 09/13/21 0000   Dressing Intervention Gauze 09/13/21 0800   Patency Intervention Tip/Tilt 09/13/21 0800   Chest Tube Clamps at Bedside present 09/13/21 0800   Container Amount 1160 09/13/21 0600   Output (ml) 0 ml 09/13/21 0800   Number of days: 5       Chest Tube 3 Mediastinal 32 Icelandic (Active)   Site Assessment [facility] 09/13/21 0800   Suction -20 cm H2O 09/13/21 0800   Chest Tube Airleak No 09/13/21 0800   Drainage Description Serous 09/13/21 0800   Dressing Status Normal: Clean, Dry & Intact 09/13/21 0800   Dressing Change Due 09/14/21 09/13/21 0000   Dressing Intervention Gauze 09/13/21 0800   Patency Intervention Tip/Tilt 09/13/21 0800   Chest Tube Clamps at Bedside present 09/13/21 0800   Number of days: 5       Chest Tube 4 Right Pleural 24 Icelandic (Active)   Site Assessment [facility] 09/13/21 0800   Suction -20 cm H2O 09/13/21 0800   Chest Tube Airleak No 09/13/21 0800   Drainage Description Serous 09/13/21 0800   Dressing Status Normal: Clean, Dry & Intact 09/13/21 0800   Dressing Change Due 09/14/21 09/13/21 0000   Dressing Intervention Gauze 09/13/21 0800   Patency Intervention Tip/Tilt 09/13/21 0800   Chest Tube Clamps at Bedside present 09/13/21 0800   Number of days: 5       Negative Pressure Wound Therapy Chest (Active)   Wound Type Surgical 09/13/21 0800   Unit Type 1 09/13/21 0400   Dressing Pieces Applied (# of Each Type) Black foam 09/13/21 0800   Cycle Continuous 09/13/21 0800   Target Pressure (mmHg) 125 09/13/21 0800   (Retired) Dressing Status Clean, dry, intact 09/13/21 0800   Cannister changed? No 09/13/21 0800   Output (ml) 0 ml 09/13/21 0800   Number of days: 5       NG/OG/NJ Tube Orogastric 18 fr Center mouth (Active)   Site Description WDL 09/13/21 0800   Status Enteral Feedings  09/13/21 0800   Drainage Appearance Clear 09/10/21 0000   Placement Confirmation [place] unchanged 09/13/21 0800   [place] (cm marking) at nare/mouth 55 cm 09/13/21 0800   Intake (ml) 120 ml 09/13/21 0800   Flush/Free Water (mL) 60 mL 09/13/21 0800   Residual (mL) 5 mL 09/13/21 0800   Container Amount 150 mL 09/11/21 0600   Output (ml) 0 ml 09/13/21 0600   Number of days: 5       Urethral Catheter Latex;Temperature probe;Straight-tip 16 fr (Active)   Tube Description Positional 09/13/21 0400   Catheter Care Done;Catheter wipes 09/13/21 0400   Collection Container Standard 09/13/21 0800   Securement Method Securing device (Describe) 09/13/21 0800   Rationale for Continued Use Strict 1-2 Hour I&O 09/13/21 0800   Urine Output 100 mL 09/13/21 0800   Number of days: 5       Incision/Surgical Site 09/08/21 Chest (Active)   Incision Assessment UTV 09/13/21 0400   Jammie-Incision Assessment UTV 09/13/21 0400   Closure SIVA 09/13/21 0400   Incision Drainage Amount None 09/13/21 0400   Drainage Description UTV 09/11/21 0400   Incision Care Therapy - negative pressure 09/13/21 0400   Dressing Intervention Clean, dry, intact 09/13/21 0400   Number of days: 5       Incision/Surgical Site 09/08/21 Left Leg (Active)   Incision Assessment WDL except 09/13/21 0400   Jammie-Incision Assessment Ecchymosis 09/13/21 0400   Closure Liquid bandage 09/13/21 0400   Incision Drainage Amount None 09/13/21 0400   Incision Care Soap and water 09/13/21 0400   Dressing Intervention Open to air / No Dressing 09/13/21 0400   Number of days: 5       Incision/Surgical Site 09/08/21 Left Wrist (Active)   Incision Assessment WDL except 09/13/21 0400   Jammie-Incision Assessment Ecchymosis 09/13/21 0400   Closure Liquid bandage 09/13/21 0400   Incision Drainage Amount None 09/13/21 0400   Incision Care Soap and water 09/13/21 0400   Dressing Intervention Open to air / No Dressing 09/13/21 0400   Number of days: 5          ICU Prophylaxis:   1. DVT: Hep  ggt/mechanical  2. VAP: HOB 30 degrees, chlorhexidine rinse  3. Stress Ulcer: PPI  4. Restraints: Nonviolent soft two point restraints required and necessary for patient safety and continued cares and good effect as patient continues to pull at necessary lines, tubes despite education and distraction. Will readdress daily.   5. IV Access - central access required and necessary for continued patient cares  6. Feeding - Enteral feeding       Medications:       allopurinol  100 mg Oral Daily     amLODIPine  2.5 mg Oral Daily     aspirin  324 mg Oral or NG Tube Daily     chlorhexidine  15 mL Mouth/Throat Q12H     metoprolol tartrate  12.5 mg Oral BID     multivitamins w/minerals  15 mL Per Feeding Tube Daily     pantoprazole  40 mg Per Feeding Tube QAM AC    Or     pantoprazole (PROTONIX) IV  40 mg Intravenous QAM AC     polyethylene glycol  17 g Oral Daily     QUEtiapine  50 mg Oral or Feeding Tube BID     rosuvastatin  10 mg Oral Daily     senna-docusate  1 tablet Oral BID     sodium chloride (PF)  3 mL Intracatheter Q8H     [START ON 9/14/2021] thiamine  250 mg Intravenous Daily    Followed by     [START ON 9/19/2021] thiamine  100 mg Oral Daily     acetaminophen, bisacodyl, dextrose, glucose **OR** dextrose **OR** glucagon, EPINEPHrine, hydrALAZINE, HYDROmorphone **OR** HYDROmorphone, lidocaine 4%, lidocaine (buffered or not buffered), magnesium hydroxide, metoprolol, naloxone **OR** naloxone **OR** naloxone **OR** naloxone, ondansetron **OR** ondansetron, oxyCODONE **OR** oxyCODONE, phenylephrine, BETA BLOCKER NOT PRESCRIBED, sodium chloride (PF)      Review of Systems:   Unable to obtain due to critical illness            Physical Exam:   Temp:  [96.4  F (35.8  C)-101.7  F (38.7  C)] 99.5  F (37.5  C)  Pulse:  [] 152  Resp:  [17-30] 30  BP: (116-129)/(68-81) 120/81  Cuff Mean (mmHg):  [77-82] 77  MAP:  [70 mmHg-97 mmHg] 70 mmHg  Arterial Line BP: ()/(55-82) 92/58  FiO2 (%):  [0 %-30 %] 30 %  SpO2:  [93  %-100 %] 98 %    Intake/Output Summary (Last 24 hours) at 9/13/2021 1029  Last data filed at 9/13/2021 0800  Gross per 24 hour   Intake 2098.6 ml   Output 2015 ml   Net 83.6 ml     Wt Readings from Last 4 Encounters:   09/13/21 89.2 kg (196 lb 10.4 oz)   09/02/21 86.6 kg (191 lb)   07/31/19 88.5 kg (195 lb)   01/09/18 88.4 kg (194 lb 12.8 oz)     Arterial Line BP: ()/(55-82) 92/58  MAP:  [70 mmHg-97 mmHg] 70 mmHg  BP - Mean:  [87] 87  Ventilation Mode: CPAP/PS  (Continuous positive airway pressure with Pressure Support)  FiO2 (%): 30 %  Rate Set (breaths/minute): 24 breaths/min  Tidal Volume Set (mL): 500 mL  PEEP (cm H2O): 5 cmH2O  Pressure Support (cm H2O): 5 cmH2O  Oxygen Concentration (%): 30 %  Resp: 30    Recent Labs   Lab 09/11/21  1815 09/09/21  0446 09/08/21  2217 09/08/21 2004   PH 7.42 7.37 7.29* 7.29*   PCO2 34* 43 47* 46*   PO2 88 103 130* 70*   HCO3 22 24 23 22   O2PER 30 60 40 60       GEN: Sedated, not aroused to voice.    HEENT: head ncat, sclera anicteric, OP patent, trachea midline   PULM: unlabored synchronous with vent, clear anteriorly    CV/COR: A flutter.   ABD: soft nontender, hypoactive bowel sounds, no mass  EXT: -ve Edema   warm  NEURO: Sedated, not examined.   SKIN: no obvious rash      Assessment and plan :     Neurology/Psychiatry/Pain/Sedation:   #Encephalopathy, metabolic, MRI and EEG virtually normal, Neurocritical care recommends no further investigations at the moment.   #Agitated delirium Reportedly having bouts of agitation when sedation is reduced.   - Current sedatives: Propofol ggt Goal RAAS 0 to -1. Two doses of valproic acid given 9/10 as sedation adjuvants. Will repeat today.   - Trial of Seroquel 50 mg BID. Favor reducing propofol first.   - Sedation vacation once rate / rhythm controlled   - CT head 9/10 negative for intracranial hemorrhage, MRI negative   - continue empiric thiamine      Cardiovascular/Hemodynamics:   #post CABG x 4  #Shock, off pressors  now  goal MAP >65      Pulmonary/Ventilator Management:   #Acute respiratory failure with hypercapnia ; - Encephalopathy remains barrier to extubation  Plan:   - Keep on pressure support as tolerated.  - intermittent diuresis     GI/Nutrition:   #Protein calorie malnutrition in the setting of acute illness  - enteral feeds   - Bowel regimen   - for NJ insertion in IR     Renal/Fluids/Electrolytes:   No issues, good UOP.       Intake/Output Summary (Last 24 hours) at 9/13/2021 1241  Last data filed at 9/13/2021 1200  Gross per 24 hour   Intake 2393.95 ml   Output 2035 ml   Net 358.95 ml         Infectious Disease:   # Hx of latent TB  Cefazolin ppx   Persistent leukocytosis->resolved   Cultures negative so far    Endocrine/Hematology/Oncology:   Type 2 DM, uncontrolled.  ICU protocol to keep glucose < 180  Daily Aspirin  Subcutaneous heparin     Disposition/Code Status/Other  1. Disposition: ICU  2. Code: Full    I have personally reviewed the daily labs, imaging studies, cultures and discussed the case with referring physician and consulting physicians.     This patient is critically ill and I have provided 30 minutes of critical care time (excluding procedures) on September 13, 2021.     JIMBO Jones  SICU Fellow, [facility]    ROUTINE ICU LABS (Last four results)  CMP  Recent Labs   Lab 09/13/21  0744 09/13/21  0605 09/13/21  0510 09/13/21  0352 09/13/21  0005 09/12/21  0412 09/11/21  1534 09/11/21  0431 09/10/21  0730 09/10/21  0458 09/08/21 2002 09/08/21  1829 09/08/21  1545   NA  --   --   --   --  140 141  --  142 138  --   --  143 143   POTASSIUM  --   --   --   --  4.3 3.7  --  4.1 4.5 4.1   < > 3.5 3.5  3.5   CHLORIDE  --   --   --   --  108 111*  --  111* 112*  --   --  114* 115*   CO2  --   --   --   --  18* 24  --  25 25  --   --  22 24   ANIONGAP  --   --   --   --  14 6  --  6 1*  --   --  7 4   * 150* 165* 183* 215* 99  --  159* 142*  --   --  149* 157*   BUN  --   --   --   --  10 9  --  11  13  --   --  19 19   CR  --   --   --   --  0.95 0.82  --  0.93 0.98  --   --  1.15 1.16   GFRESTIMATED  --   --   --   --  >90 >90  --  >90 88  --   --  72 71   TORRES  --   --   --   --  8.0* 8.2*  --  8.4* 8.3*  --   --  8.6 8.8   MAG  --   --   --   --  2.1 2.0  --  2.0  --  2.1   < >  --  2.4*   PHOS  --   --   --   --  4.5 3.7  --  3.7  --  2.3*   < >  --  4.5   PROTTOTAL  --   --   --   --  6.5*  --  6.4*  --   --   --   --  6.9 6.4*   ALBUMIN  --   --   --   --  1.9*  --  1.9*  --   --   --   --  3.5 2.8*   BILITOTAL  --   --   --   --  0.5  --  0.5  --   --   --   --  0.6 1.0   ALKPHOS  --   --   --   --  272*  --  218*  --   --   --   --  150 175*   AST  --   --   --   --  16  --  17  --   --   --   --  50* 51*   ALT  --   --   --   --  18  --  18  --   --   --   --  47 51    < > = values in this interval not displayed.     CBC  Recent Labs   Lab 09/13/21  0918 09/13/21  0005 09/12/21  0412 09/11/21  0431   WBC 6.4 7.4 7.6 12.6*   RBC 3.02* 2.98* 3.00* 3.36*   HGB 7.8* 7.7* 7.7* 8.8*   HCT 24.3* 24.5* 24.2* 27.5*   MCV 81 82 81 82   MCH 25.8* 25.8* 25.7* 26.2*   MCHC 32.1 31.4* 31.8 32.0   RDW 15.2* 15.4* 14.6 14.7    239 244 222     INR  Recent Labs   Lab 09/08/21  1545 09/08/21  1423   INR 1.23* 1.41*     Arterial Blood Gas  Recent Labs   Lab 09/11/21  1815 09/09/21  0446 09/08/21  2217 09/08/21 2004   PH 7.42 7.37 7.29* 7.29*   PCO2 34* 43 47* 46*   PO2 88 103 130* 70*   HCO3 22 24 23 22   O2PER 30 60 40 60       All cultures:  No results for input(s): CULT in the last 168 hours.  Recent Results (from the past 24 hour(s))   XR Abdomen Port 1 View    Narrative    ABDOMEN ONE VIEW PORTABLE  9/12/2021 10:48 AM     HISTORY: Keofeed placement verification.    COMPARISON: None.      Impression    IMPRESSION: The tip of the feeding tube and a nasogastric tube are  both projected over the stomach. The feeding tube catheter is coiled  and kinked across the gastroesophageal junction. Multiple  surgical  drains are noted in the chest.     FLORENCE CORBETT MD         SYSTEM ID:  [id number]   MR Brain w/o & w Contrast    Narrative    MRI BRAIN WITHOUT AND WITH CONTRAST  9/12/2021 12:43 PM     HISTORY:  Mental status change, unknown cause.    TECHNIQUE:  Multiplanar, multisequence MRI of the brain without and  with 9 mL Gadavist.     COMPARISON: 10 September 2021 head CT     FINDINGS: There is no evidence of acute infarct, hemorrhage, mass, or  herniation. Mild, chronic small vessel vascular changes in hemispheric  white matter. Mild, generalized atrophy. Normal ventricle size.  Brainstem and cerebellum unremarkable.     There is no abnormal intracranial enhancement.     Small amount of fluid in the right maxillary sinus. Membrane  thickening in the maxillary sinuses bilaterally. Small retention  cyst-right maxillary sinus. Ethmoid membrane thickening bilaterally.  Minimal mastoid fluid or thickening bilaterally. Endotracheal tube and  fluid in the posterior nasopharynx noted. The major arterial T2 flow  voids at the base of the brain appear patent.       Impression    IMPRESSION:  Negative evidence for acute intracranial process.      JORI GUERRA MD         SYSTEM ID:  [id number]

## 2021-09-13 NOTE — PROGRESS NOTES
EEG CLINICAL NEUROPHYSIOLOGY PRELIMINARY REPORT    EEG through approx 9 AM today reviewed. Propofol 30 to 40 micrograms/kg/min throughout. Largely continuous runs of irregular delta and theta, sometimes with triphasic features. EEG is reactive with diffuse alpha following situmulation. No epileptiform discharges or seizures. Several single generalized jerks noted, one was marked at 705 PM 9/12, a series were induced following suction at 331 AM 9/13. EEG during these showed arousal. There was no seizure discharge associated with these.    Study continues consistent with moderate diffuse encephalopathy. EEG is reactive. Jerks marked by staff are not epileptic seizures. Epileptiform discharges or seizures are not noted during other portions of the recording either.    Jmaes Leo MD  Contact information for physicians covering Epilepsy and EEG is available on [facility] under Neurology Adult/[facility]/Staff Epilepsy and EEG

## 2021-09-13 NOTE — PROVIDER NOTIFICATION
MD Notification    Notified Person Name: Dr. Celeste, Dr. Angel    Notification Date/Time: 9/12 2300    Purpose of Notification: pt in new rapid a flutter, rate 150s. Previously ~ 110 HR sinus tach. BP normotensive.    Orders Received: 5mg metoprolol IV x1 with no improvement. Another 5mg metoprolol IV x1 with no improvement. Amiodarone bolus and infusion, CTM.

## 2021-09-13 NOTE — PROGRESS NOTES
Brief Critical Care Note    Propofol weaned off after successful cardioversion. Pt on PST 5/5 w/ appropriate SpO2. However, pt became hypertensive (SBP > 200) and tachypneic. Restarted propofol gtt. Pt already on quetiapine. Started on valproate today. Will monitor on increased medication for agitated delirium and retrial PST in the AM.     Jaylen Valerio MD  Pulmonary Disease and Critical Care Medicine  [facility]

## 2021-09-13 NOTE — PROGRESS NOTES
[facility] ICU RESPIRATORY NOTE        Date of Admission: 9/2/2021    Date of Intubation (most recent):9/8/21    Reason for Mechanical Ventilation: Airway protection    Number of Days on Mechanical Ventilation:6    Met Criteria for Spontaneous Breathing Trial:Yes. Pt has been on PS 5/5 since 0500 this morning. Plan for pt to be on PS as tolerated per MD. No plan to extubate at this time due to neuro status.    Significant Events Today:None    ABG Results:   Recent Labs   Lab 09/11/21  1815 09/09/21  0446 09/08/21  2217 09/08/21 2004   PH 7.42 7.37 7.29* 7.29*   PCO2 34* 43 47* 46*   PO2 88 103 130* 70*   HCO3 22 24 23 22   O2PER 30 60 40 60       Current Vent Settings: Ventilation Mode: CPAP/PS  (Continuous positive airway pressure with Pressure Support)  FiO2 (%): 30 %  Rate Set (breaths/minute): 24 breaths/min  Tidal Volume Set (mL): 500 mL  PEEP (cm H2O): 5 cmH2O  Pressure Support (cm H2O): 5 cmH2O  Oxygen Concentration (%): 30 %  Resp: 30      Skin Assessment:Intact    Plan:Plan to cont PS as tolerated.    Ella Cullen, RT

## 2021-09-13 NOTE — PROGRESS NOTES
"      [facility]    Stroke Progress Note    Interval EventsPatient sedated with Propofol, final EEG read - no epileptic acitvity    HPI Summary  50yo man w hx of CAD s/p stenting, HTN, HLD, DM2, here for NSTEMI s/p 4 vessel CABG 9/8, consulted for agitation with weaning of sedation in ICU, managed with Propofol and Precedex, unresponsive to VPA. No know psych or neuro illness. Patient is febrile although infectious martinez negative to date, ammonia normal.    Overnight cerebel EEG shows no epileptic activity, consistent with encephalopathy. CTH shows no large infarct or bleed. MRI brain negative for acute pathology.    Impression   ICU delirium post CABG. Agree with Seroquel, attempts to gradualy wean of sedation. EEG and MRI negative for seizure or stroke.    Plan  -vEEGs topped  -Continue working up tox metabolic causes  -Agree with Quetiapine 50mg bid, monitor for QT prolongation  -Limit Hydromorphone and other central meds to minimal needed    Patient Follow-up    Neurology signing off, please call with any quesitons    The Stroke Staff is Dr. Victoriano Damon.    Moustapha Patel MD  Vascular Neurology Fellow  To page me or covering stroke neurology team member, click here: AMCOM   Choose \"On Call\" tab at top, then search dropdown box for \"Neurology Adult\", select location, press Enter, then look for stroke/neuro ICU/telestroke.    ______________________________________________________    Clinically Significant Risk Factors Present on Admission                  Medications   Scheduled Meds    allopurinol  100 mg Oral Daily     amLODIPine  2.5 mg Oral Daily     aspirin  324 mg Oral or NG Tube Daily     chlorhexidine  15 mL Mouth/Throat Q12H     metoprolol tartrate  12.5 mg Oral BID     multivitamins w/minerals  15 mL Per Feeding Tube Daily     pantoprazole  40 mg Per Feeding Tube QAM AC    Or     pantoprazole (PROTONIX) IV  40 mg Intravenous QAM AC     polyethylene glycol  17 g Oral Daily     " QUEtiapine  50 mg Oral or Feeding Tube BID     rosuvastatin  10 mg Oral Daily     senna-docusate  1 tablet Oral BID     sodium chloride (PF)  3 mL Intracatheter Q8H     [START ON 9/14/2021] thiamine  250 mg Intravenous Daily    Followed by     [START ON 9/19/2021] thiamine  100 mg Oral Daily     valproate (DEPACON) in NS intermittent infusion  500 mg Intravenous Q12H       Infusion Meds    amiodarone 0.5 mg/min (09/13/21 0630)     dextrose       EPINEPHrine Stopped (09/12/21 1145)     heparin 1,050 Units/hr (09/13/21 0910)     insulin regular 2 Units/hr (09/13/21 1039)     phenylephrine Stopped (09/12/21 1400)     propofol (DIPRIVAN) infusion 40 mcg/kg/min (09/13/21 1200)     BETA BLOCKER NOT PRESCRIBED       sodium chloride 10 mL/hr at 09/12/21 2100       PRN Meds  acetaminophen, bisacodyl, dextrose, glucose **OR** dextrose **OR** glucagon, EPINEPHrine, hydrALAZINE, HYDROmorphone **OR** HYDROmorphone, lidocaine 4%, lidocaine (buffered or not buffered), magnesium hydroxide, metoprolol, naloxone **OR** naloxone **OR** naloxone **OR** naloxone, ondansetron **OR** ondansetron, oxyCODONE **OR** oxyCODONE, phenylephrine, BETA BLOCKER NOT PRESCRIBED, sodium chloride (PF)       PHYSICAL EXAMINATION  Temp:  [99  F (37.2  C)-101.7  F (38.7  C)] 99  F (37.2  C)  Pulse:  [106-152] 151  Resp:  [17-30] 30  BP: (112-120)/(80-81) 112/80  MAP:  [68 mmHg-100 mmHg] 81 mmHg  Arterial Line BP: ()/(55-90) 84/78  FiO2 (%):  [0 %-30 %] 30 %  SpO2:  [93 %-100 %] 97 %      Intuabted sedated, Propofol off for exam  Opens eyes, does not attend to surrounding MARIO, cough intact  At least 1/5 all extremities, localizes to nox stim    Imaging  I personally reviewed all imaging; relevant findings per HPI.     Lab Results Data   CBC  Recent Labs   Lab 09/13/21  0918 09/13/21  0005 09/12/21  0412   WBC 6.4 7.4 7.6   RBC 3.02* 2.98* 3.00*   HGB 7.8* 7.7* 7.7*   HCT 24.3* 24.5* 24.2*    239 244     Basic Metabolic Panel    Recent Labs    Lab 09/13/21  1212 09/13/21  1032 09/13/21  0744 09/13/21  0005 09/12/21  0412 09/11/21  0431   NA  --   --   --  140 141 142   POTASSIUM  --   --   --  4.3 3.7 4.1   CHLORIDE  --   --   --  108 111* 111*   CO2  --   --   --  18* 24 25   BUN  --   --   --  10 9 11   CR  --   --   --  0.95 0.82 0.93   * 104* 129* 215* 99 159*   TORRES  --   --   --  8.0* 8.2* 8.4*     Liver Panel  Recent Labs   Lab 09/13/21  0005 09/11/21  1534 09/08/21  1829   PROTTOTAL 6.5* 6.4* 6.9   ALBUMIN 1.9* 1.9* 3.5   BILITOTAL 0.5 0.5 0.6   ALKPHOS 272* 218* 150   AST 16 17 50*   ALT 18 18 47     INR    Recent Labs   Lab Test 09/08/21  1545 09/08/21  1423   INR 1.23* 1.41*      Lipid Profile    Recent Labs   Lab Test 09/03/21  0818 11/23/17  1234   CHOL 129 259*   HDL 50 33*   LDL 46 146*   TRIG 166* 398*     A1C    Recent Labs   Lab Test 09/02/21  1000 11/23/17  1300   A1C 10.2* 6.9*     Troponin I  No results for input(s): TROPONIN in the last 168 hours.

## 2021-09-13 NOTE — PROGRESS NOTES
[facility] RESPIRATORY NOTE        Date of Admission: 9/2/2021    Date of Intubation (most recent): 9/2/21    Reason for Mechanical Ventilation: Post Op    Number of Days on Mechanical Ventilation: 5    Met Criteria for Spontaneous Breathing Trial: Yes    Significant Events Today: None    ABG Results:   Recent Labs   Lab 09/11/21  1815 09/09/21  0446 09/08/21  2217 09/08/21 2004   PH 7.42 7.37 7.29* 7.29*   PCO2 34* 43 47* 46*   PO2 88 103 130* 70*   HCO3 22 24 23 22   O2PER 30 60 40 60       Current Vent Settings: Ventilation Mode: CMV/AC  (Continuous Mandatory Ventilation/ Assist Control)  FiO2 (%): 30 %  Rate Set (breaths/minute): 24 breaths/min  Tidal Volume Set (mL): 500 mL  PEEP (cm H2O): 5 cmH2O  Pressure Support (cm H2O): 5 cmH2O  Oxygen Concentration (%): 30 %  Resp: 30      Skin Assessment: Clean and dry    Plan: Continue SBT    Amparo Stanley

## 2021-09-13 NOTE — PLAN OF CARE
CV-Patient in was in atrial flutter since 23:00.  Received additional amio bolus x1 and cardioverted this afternoon to NSR.  Amio drip continues at 0.5mg/min.    Neuro-Patient sedated on propofol drip, prn dilaudid for pain.  Patient getting scheduled seroquel.  Continuous EEG stopped.  After procedures, sedation was stopped.  Valproate was given x1.  Patient became anxious, tachypneic, hypertensive.  Propofol drip was restarted.  Resp- Patient on CPAP/PS 5/5 30% most of the day.  Attempted to wean to extubate, but when sedation was off, patient failed PST.  Patient currently back on full vent support.  GI/- Patient had keofeed placed in radiology.  TF was restarted after procedures.  Bowel meds given, no BM this shift.  UOP adequate.    Patient and family updated on POC, questions answered.

## 2021-09-14 ENCOUNTER — APPOINTMENT (OUTPATIENT)
Dept: CARDIOLOGY | Facility: CLINIC | Age: 51
End: 2021-09-14
Attending: NURSE PRACTITIONER
Payer: COMMERCIAL

## 2021-09-14 ENCOUNTER — APPOINTMENT (OUTPATIENT)
Dept: GENERAL RADIOLOGY | Facility: CLINIC | Age: 51
End: 2021-09-14
Attending: PHYSICIAN ASSISTANT
Payer: COMMERCIAL

## 2021-09-14 LAB
ALBUMIN SERPL-MCNC: 1.8 G/DL (ref 3.4–5)
ALP SERPL-CCNC: 262 U/L (ref 40–150)
ALT SERPL W P-5'-P-CCNC: 15 U/L (ref 0–70)
ANION GAP SERPL CALCULATED.3IONS-SCNC: 6 MMOL/L (ref 3–14)
AST SERPL W P-5'-P-CCNC: 16 U/L (ref 0–45)
ATRIAL RATE - MUSE: 151 BPM
ATRIAL RATE - MUSE: 310 BPM
BACTERIA SPT CULT: NORMAL
BI-PLANE LVEF ECHO: NORMAL
BILIRUB SERPL-MCNC: 0.3 MG/DL (ref 0.2–1.3)
BUN SERPL-MCNC: 12 MG/DL (ref 7–30)
CALCIUM SERPL-MCNC: 8.5 MG/DL (ref 8.5–10.1)
CHLORIDE BLD-SCNC: 110 MMOL/L (ref 94–109)
CO2 SERPL-SCNC: 28 MMOL/L (ref 20–32)
CREAT SERPL-MCNC: 0.83 MG/DL (ref 0.66–1.25)
DIASTOLIC BLOOD PRESSURE - MUSE: NORMAL MMHG
DIASTOLIC BLOOD PRESSURE - MUSE: NORMAL MMHG
ERYTHROCYTE [DISTWIDTH] IN BLOOD BY AUTOMATED COUNT: 15.5 % (ref 10–15)
GFR SERPL CREATININE-BSD FRML MDRD: >90 ML/MIN/1.73M2
GLUCOSE BLD-MCNC: 137 MG/DL (ref 70–99)
GLUCOSE BLDC GLUCOMTR-MCNC: 116 MG/DL (ref 70–99)
GLUCOSE BLDC GLUCOMTR-MCNC: 123 MG/DL (ref 70–99)
GLUCOSE BLDC GLUCOMTR-MCNC: 128 MG/DL (ref 70–99)
GLUCOSE BLDC GLUCOMTR-MCNC: 132 MG/DL (ref 70–99)
GLUCOSE BLDC GLUCOMTR-MCNC: 133 MG/DL (ref 70–99)
GLUCOSE BLDC GLUCOMTR-MCNC: 136 MG/DL (ref 70–99)
GLUCOSE BLDC GLUCOMTR-MCNC: 137 MG/DL (ref 70–99)
GLUCOSE BLDC GLUCOMTR-MCNC: 138 MG/DL (ref 70–99)
GLUCOSE BLDC GLUCOMTR-MCNC: 146 MG/DL (ref 70–99)
GLUCOSE BLDC GLUCOMTR-MCNC: 202 MG/DL (ref 70–99)
GLUCOSE BLDC GLUCOMTR-MCNC: 203 MG/DL (ref 70–99)
GRAM STAIN RESULT: NORMAL
GRAM STAIN RESULT: NORMAL
HCT VFR BLD AUTO: 24.4 % (ref 40–53)
HGB BLD-MCNC: 7.8 G/DL (ref 13.3–17.7)
INTERPRETATION ECG - MUSE: NORMAL
INTERPRETATION ECG - MUSE: NORMAL
LVEF ECHO: NORMAL
MAGNESIUM SERPL-MCNC: 2.4 MG/DL (ref 1.6–2.3)
MCH RBC QN AUTO: 25.7 PG (ref 26.5–33)
MCHC RBC AUTO-ENTMCNC: 32 G/DL (ref 31.5–36.5)
MCV RBC AUTO: 81 FL (ref 78–100)
P AXIS - MUSE: -80 DEGREES
P AXIS - MUSE: NORMAL DEGREES
PHOSPHATE SERPL-MCNC: 2.8 MG/DL (ref 2.5–4.5)
PLATELET # BLD AUTO: 260 10E3/UL (ref 150–450)
POTASSIUM BLD-SCNC: 3.4 MMOL/L (ref 3.4–5.3)
POTASSIUM BLD-SCNC: 3.5 MMOL/L (ref 3.4–5.3)
PR INTERVAL - MUSE: 136 MS
PR INTERVAL - MUSE: NORMAL MS
PROT SERPL-MCNC: 6.4 G/DL (ref 6.8–8.8)
QRS DURATION - MUSE: 74 MS
QRS DURATION - MUSE: 80 MS
QT - MUSE: 250 MS
QT - MUSE: 304 MS
QTC - MUSE: 401 MS
QTC - MUSE: 481 MS
R AXIS - MUSE: -37 DEGREES
R AXIS - MUSE: -38 DEGREES
RBC # BLD AUTO: 3.03 10E6/UL (ref 4.4–5.9)
SODIUM SERPL-SCNC: 144 MMOL/L (ref 133–144)
SYSTOLIC BLOOD PRESSURE - MUSE: NORMAL MMHG
SYSTOLIC BLOOD PRESSURE - MUSE: NORMAL MMHG
T AXIS - MUSE: 103 DEGREES
T AXIS - MUSE: 215 DEGREES
UFH PPP CHRO-ACNC: 0.17 IU/ML
UFH PPP CHRO-ACNC: 0.23 IU/ML
UFH PPP CHRO-ACNC: 0.29 IU/ML
VENTRICULAR RATE- MUSE: 151 BPM
VENTRICULAR RATE- MUSE: 155 BPM
WBC # BLD AUTO: 8.9 10E3/UL (ref 4–11)

## 2021-09-14 PROCEDURE — 71045 X-RAY EXAM CHEST 1 VIEW: CPT

## 2021-09-14 PROCEDURE — 250N000013 HC RX MED GY IP 250 OP 250 PS 637: Performed by: PHYSICIAN ASSISTANT

## 2021-09-14 PROCEDURE — 250N000013 HC RX MED GY IP 250 OP 250 PS 637: Performed by: NURSE PRACTITIONER

## 2021-09-14 PROCEDURE — 250N000011 HC RX IP 250 OP 636: Performed by: NURSE PRACTITIONER

## 2021-09-14 PROCEDURE — 250N000011 HC RX IP 250 OP 636: Performed by: PHYSICIAN ASSISTANT

## 2021-09-14 PROCEDURE — 82040 ASSAY OF SERUM ALBUMIN: CPT | Performed by: PHYSICIAN ASSISTANT

## 2021-09-14 PROCEDURE — 85520 HEPARIN ASSAY: CPT | Performed by: STUDENT IN AN ORGANIZED HEALTH CARE EDUCATION/TRAINING PROGRAM

## 2021-09-14 PROCEDURE — 258N000003 HC RX IP 258 OP 636: Performed by: STUDENT IN AN ORGANIZED HEALTH CARE EDUCATION/TRAINING PROGRAM

## 2021-09-14 PROCEDURE — 200N000001 HC R&B ICU

## 2021-09-14 PROCEDURE — 250N000011 HC RX IP 250 OP 636: Performed by: INTERNAL MEDICINE

## 2021-09-14 PROCEDURE — 258N000003 HC RX IP 258 OP 636: Performed by: INTERNAL MEDICINE

## 2021-09-14 PROCEDURE — 250N000011 HC RX IP 250 OP 636: Performed by: STUDENT IN AN ORGANIZED HEALTH CARE EDUCATION/TRAINING PROGRAM

## 2021-09-14 PROCEDURE — 94003 VENT MGMT INPAT SUBQ DAY: CPT

## 2021-09-14 PROCEDURE — 255N000002 HC RX 255 OP 636: Performed by: SURGERY

## 2021-09-14 PROCEDURE — 93308 TTE F-UP OR LMTD: CPT | Mod: 26 | Performed by: INTERNAL MEDICINE

## 2021-09-14 PROCEDURE — 93325 DOPPLER ECHO COLOR FLOW MAPG: CPT | Mod: 26 | Performed by: INTERNAL MEDICINE

## 2021-09-14 PROCEDURE — 85018 HEMOGLOBIN: CPT | Performed by: PHYSICIAN ASSISTANT

## 2021-09-14 PROCEDURE — 83735 ASSAY OF MAGNESIUM: CPT | Performed by: INTERNAL MEDICINE

## 2021-09-14 PROCEDURE — C8924 2D TTE W OR W/O FOL W/CON,FU: HCPCS

## 2021-09-14 PROCEDURE — 999N000157 HC STATISTIC RCP TIME EA 10 MIN

## 2021-09-14 PROCEDURE — 250N000013 HC RX MED GY IP 250 OP 250 PS 637: Performed by: INTERNAL MEDICINE

## 2021-09-14 PROCEDURE — 250N000013 HC RX MED GY IP 250 OP 250 PS 637: Performed by: STUDENT IN AN ORGANIZED HEALTH CARE EDUCATION/TRAINING PROGRAM

## 2021-09-14 PROCEDURE — 250N000009 HC RX 250: Performed by: STUDENT IN AN ORGANIZED HEALTH CARE EDUCATION/TRAINING PROGRAM

## 2021-09-14 PROCEDURE — 85520 HEPARIN ASSAY: CPT | Performed by: INTERNAL MEDICINE

## 2021-09-14 PROCEDURE — 93321 DOPPLER ECHO F-UP/LMTD STD: CPT | Mod: 26 | Performed by: INTERNAL MEDICINE

## 2021-09-14 PROCEDURE — 99232 SBSQ HOSP IP/OBS MODERATE 35: CPT | Mod: 25 | Performed by: INTERNAL MEDICINE

## 2021-09-14 PROCEDURE — 84132 ASSAY OF SERUM POTASSIUM: CPT | Performed by: INTERNAL MEDICINE

## 2021-09-14 PROCEDURE — 84100 ASSAY OF PHOSPHORUS: CPT | Performed by: INTERNAL MEDICINE

## 2021-09-14 PROCEDURE — 99291 CRITICAL CARE FIRST HOUR: CPT | Mod: 24 | Performed by: STUDENT IN AN ORGANIZED HEALTH CARE EDUCATION/TRAINING PROGRAM

## 2021-09-14 PROCEDURE — 258N000003 HC RX IP 258 OP 636: Performed by: NURSE PRACTITIONER

## 2021-09-14 PROCEDURE — 250N000012 HC RX MED GY IP 250 OP 636 PS 637: Performed by: STUDENT IN AN ORGANIZED HEALTH CARE EDUCATION/TRAINING PROGRAM

## 2021-09-14 RX ORDER — DEXMEDETOMIDINE HYDROCHLORIDE 4 UG/ML
0.2-0.7 INJECTION, SOLUTION INTRAVENOUS CONTINUOUS
Status: DISCONTINUED | OUTPATIENT
Start: 2021-09-14 | End: 2021-09-16

## 2021-09-14 RX ORDER — POTASSIUM CHLORIDE 1.5 G/1.58G
40 POWDER, FOR SOLUTION ORAL ONCE
Status: COMPLETED | OUTPATIENT
Start: 2021-09-14 | End: 2021-09-14

## 2021-09-14 RX ORDER — AMIODARONE HYDROCHLORIDE 200 MG/1
200 TABLET ORAL 2 TIMES DAILY
Status: DISCONTINUED | OUTPATIENT
Start: 2021-09-14 | End: 2021-09-22 | Stop reason: HOSPADM

## 2021-09-14 RX ORDER — FUROSEMIDE 10 MG/ML
40 INJECTION INTRAMUSCULAR; INTRAVENOUS ONCE
Status: COMPLETED | OUTPATIENT
Start: 2021-09-14 | End: 2021-09-14

## 2021-09-14 RX ORDER — HALOPERIDOL 5 MG/ML
2 INJECTION INTRAMUSCULAR EVERY 6 HOURS PRN
Status: DISCONTINUED | OUTPATIENT
Start: 2021-09-14 | End: 2021-09-16

## 2021-09-14 RX ORDER — AMIODARONE HYDROCHLORIDE 200 MG/1
200 TABLET ORAL DAILY
Status: DISCONTINUED | OUTPATIENT
Start: 2021-09-28 | End: 2021-09-22 | Stop reason: HOSPADM

## 2021-09-14 RX ADMIN — FUROSEMIDE 40 MG: 10 INJECTION, SOLUTION INTRAVENOUS at 18:44

## 2021-09-14 RX ADMIN — METOPROLOL TARTRATE 12.5 MG: 25 TABLET, FILM COATED ORAL at 08:37

## 2021-09-14 RX ADMIN — MULTIVIT AND MINERALS-FERROUS GLUCONATE 9 MG IRON/15 ML ORAL LIQUID 15 ML: at 08:37

## 2021-09-14 RX ADMIN — QUETIAPINE FUMARATE 50 MG: 50 TABLET ORAL at 08:37

## 2021-09-14 RX ADMIN — HYDRALAZINE HYDROCHLORIDE 10 MG: 20 INJECTION INTRAMUSCULAR; INTRAVENOUS at 20:17

## 2021-09-14 RX ADMIN — AMIODARONE HYDROCHLORIDE 0.5 MG/MIN: 50 INJECTION, SOLUTION INTRAVENOUS at 02:58

## 2021-09-14 RX ADMIN — HYDRALAZINE HYDROCHLORIDE 10 MG: 20 INJECTION INTRAMUSCULAR; INTRAVENOUS at 18:44

## 2021-09-14 RX ADMIN — HEPARIN SODIUM 150 UNITS/HR: 10000 INJECTION, SOLUTION INTRAVENOUS at 05:54

## 2021-09-14 RX ADMIN — QUETIAPINE FUMARATE 50 MG: 50 TABLET ORAL at 20:17

## 2021-09-14 RX ADMIN — Medication 40 MG: at 06:30

## 2021-09-14 RX ADMIN — CHLORHEXIDINE GLUCONATE 15 ML: 1.2 SOLUTION ORAL at 08:38

## 2021-09-14 RX ADMIN — PROPOFOL 40 MCG/KG/MIN: 10 INJECTION, EMULSION INTRAVENOUS at 03:03

## 2021-09-14 RX ADMIN — HYDRALAZINE HYDROCHLORIDE 10 MG: 20 INJECTION INTRAMUSCULAR; INTRAVENOUS at 04:10

## 2021-09-14 RX ADMIN — ASPIRIN 81 MG CHEWABLE TABLET 324 MG: 81 TABLET CHEWABLE at 08:40

## 2021-09-14 RX ADMIN — HYDRALAZINE HYDROCHLORIDE 10 MG: 20 INJECTION INTRAMUSCULAR; INTRAVENOUS at 16:08

## 2021-09-14 RX ADMIN — VALPROATE SODIUM 500 MG: 100 INJECTION, SOLUTION INTRAVENOUS at 02:23

## 2021-09-14 RX ADMIN — Medication 1 PACKET: at 16:00

## 2021-09-14 RX ADMIN — HUMAN ALBUMIN MICROSPHERES AND PERFLUTREN 9 ML: 10; .22 INJECTION, SOLUTION INTRAVENOUS at 09:33

## 2021-09-14 RX ADMIN — SODIUM CHLORIDE 4 UNITS/HR: 9 INJECTION, SOLUTION INTRAVENOUS at 21:49

## 2021-09-14 RX ADMIN — ROSUVASTATIN CALCIUM 10 MG: 10 TABLET, FILM COATED ORAL at 08:38

## 2021-09-14 RX ADMIN — ACETAMINOPHEN 650 MG: 325 TABLET, FILM COATED ORAL at 20:17

## 2021-09-14 RX ADMIN — POTASSIUM CHLORIDE 40 MEQ: 1.5 POWDER, FOR SOLUTION ORAL at 05:43

## 2021-09-14 RX ADMIN — DEXMEDETOMIDINE HYDROCHLORIDE 0.3 MCG/KG/HR: 400 INJECTION INTRAVENOUS at 09:56

## 2021-09-14 RX ADMIN — Medication 1 PACKET: at 21:50

## 2021-09-14 RX ADMIN — THIAMINE HYDROCHLORIDE 250 MG: 100 INJECTION, SOLUTION INTRAMUSCULAR; INTRAVENOUS at 10:17

## 2021-09-14 RX ADMIN — POLYETHYLENE GLYCOL 3350 17 G: 17 POWDER, FOR SOLUTION ORAL at 08:37

## 2021-09-14 RX ADMIN — OXYCODONE HYDROCHLORIDE 5 MG: 5 TABLET ORAL at 20:17

## 2021-09-14 RX ADMIN — Medication 1 PACKET: at 10:18

## 2021-09-14 RX ADMIN — METOPROLOL TARTRATE 12.5 MG: 25 TABLET, FILM COATED ORAL at 20:17

## 2021-09-14 RX ADMIN — SODIUM CHLORIDE 7 UNITS/HR: 9 INJECTION, SOLUTION INTRAVENOUS at 09:28

## 2021-09-14 RX ADMIN — OXYCODONE HYDROCHLORIDE 5 MG: 5 TABLET ORAL at 00:24

## 2021-09-14 RX ADMIN — AMIODARONE HYDROCHLORIDE 200 MG: 200 TABLET ORAL at 12:31

## 2021-09-14 RX ADMIN — SODIUM CHLORIDE 4 UNITS/HR: 9 INJECTION, SOLUTION INTRAVENOUS at 12:47

## 2021-09-14 RX ADMIN — ACETAMINOPHEN 650 MG: 325 TABLET, FILM COATED ORAL at 08:37

## 2021-09-14 RX ADMIN — SENNOSIDES AND DOCUSATE SODIUM 1 TABLET: 8.6; 5 TABLET ORAL at 08:37

## 2021-09-14 RX ADMIN — PROPOFOL 40 MCG/KG/MIN: 10 INJECTION, EMULSION INTRAVENOUS at 06:04

## 2021-09-14 RX ADMIN — ALLOPURINOL 100 MG: 100 TABLET ORAL at 08:38

## 2021-09-14 RX ADMIN — FUROSEMIDE 40 MG: 10 INJECTION, SOLUTION INTRAVENOUS at 12:31

## 2021-09-14 RX ADMIN — AMLODIPINE BESYLATE 2.5 MG: 2.5 TABLET ORAL at 08:38

## 2021-09-14 ASSESSMENT — ACTIVITIES OF DAILY LIVING (ADL)
ADLS_ACUITY_SCORE: 21

## 2021-09-14 NOTE — PROGRESS NOTES
[facility]  EP Cardiology Progress Note  Date of Service: 09/14/2021      Jerry Bustamante is a 51 year old male with past medical history significant for coronary artery disease s/p remote PCI, HTN, type 2 DM, NSTEMI admitted on 9/2/2021 with NSTEMI.  He underwent a coronary angiography that showed significant disease in proximal RCA, OM 2, D3, ostial LAD and distal circumflex and LVEF 40 to 45%.  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 Mccullough    Interval History  Telemetry shows sinus rhythm  bpm with intermittent PVCs  Pt remains intubated.       Assessment  Typical Atrial flutter    2:1 typical flutter on 9/12/21    For anticoagulation, heparin infusion    Amiodarone infusion.  When bag is empty will transition to oral amiodarone.     S/p DCCV x2 on 9/13/21.  First 150 J synchronized biphasic shock converted atrial flutter to atrial fibrillation.  Second 200 J synchronized biphasic shock converted atrial fibrillation to sinus rhythm    Telemetry today shows sinus rhythm with rates in the 90s with intermittent PVCs    Metoprolol tartrate 12.5 mg BID     Amiodarone monitoring    TSH=0.74 (9/2021)     ALT/AST=15/16 (9/2021)     Ischemic cardiomyopathy    CABG x 4 on 9/8/2021    LVEF 40-45%.      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 Mccullough    Rosuvastatin 10 mg daily     Plan  1. Limited ECHO today  2. When amiodarone bag is empty change to amiodarone oral 200 mg twice daily for 14 days, then 200 mg daily for 28 days.    3. Follow up with EP DONTA in 6 weeks.         SCOTT Peoples CNP  P Heart  Text Page  (M-F 7:30 am - 4:00 pm)    Physical Exam   Temp: 99.1  F (37.3  C) Temp src: Bladder BP: (!) 148/75 Pulse: 91    Resp: 21 SpO2: 98 % O2 Device: Mechanical Ventilator    Vitals:    09/11/21 0100 09/12/21 0200 09/13/21 0400   Weight: 87.7 kg (193 lb 5.5 oz) 87.5 kg (192 lb 14.4 oz) 89.2 kg (196 lb 10.4 oz)       GEN:  In general, this is a well nourished male.  intubated  NECK: Supple, no asymmetry,  C/V:  Regular rate and rhythm  RESP: intubated coarse lung sounds  GI: Abdomen soft  EXTREM: no LE edema. No cyanosis or clubbing.  NEURO: intubated  PSYCH: intubated and sedated  SKIN: Warm and dry.    Medications     amiodarone 0.5 mg/min (09/14/21 0800)     dexmedetomidine       dextrose       EPINEPHrine Stopped (09/12/21 1145)     heparin 1,350 Units/hr (09/14/21 0800)     insulin regular 1.5 Units/hr (09/14/21 0800)     phenylephrine Stopped (09/12/21 1400)     propofol (DIPRIVAN) infusion 40 mcg/kg/min (09/14/21 0800)     BETA BLOCKER NOT PRESCRIBED       sodium chloride 10 mL/hr at 09/13/21 1935       allopurinol  100 mg Oral Daily     amLODIPine  2.5 mg Oral Daily     aspirin  324 mg Oral or NG Tube Daily     chlorhexidine  15 mL Mouth/Throat Q12H     metoprolol tartrate  12.5 mg Oral BID     multivitamins w/minerals  15 mL Per Feeding Tube Daily     pantoprazole  40 mg Per Feeding Tube QAM AC    Or     pantoprazole (PROTONIX) IV  40 mg Intravenous QAM AC     polyethylene glycol  17 g Oral Daily     protein modular  1 packet Per Feeding Tube TID     QUEtiapine  50 mg Oral or Feeding Tube BID     rosuvastatin  10 mg Oral Daily     senna-docusate  1 tablet Oral BID     sodium chloride (PF)  3 mL Intracatheter Q8H     thiamine  250 mg Intravenous Daily    Followed by     [START ON 9/19/2021] thiamine  100 mg Oral Daily       Data   Most Recent 3 CBC's:Recent Labs   Lab Test 09/14/21  0344 09/13/21  0918 09/13/21  0005   WBC 8.9 6.4 7.4   HGB 7.8* 7.8* 7.7*   MCV 81 81 82    264 239     Most Recent 3 BMP's:Recent Labs   Lab Test 09/14/21  0611 09/14/21  0344 09/14/21  0343 09/13/21  0005 09/12/21  0412   NA  --  144  --   140 141   POTASSIUM  --  3.4  --  4.3 3.7   CHLORIDE  --  110*  --  108 111*   CO2  --  28  --  18* 24   BUN  --  12  --  10 9   CR  --  0.83  --  0.95 0.82   ANIONGAP  --  6  --  14 6   TORRES  --  8.5  --  8.0* 8.2*   * 137* 128* 215* 99

## 2021-09-14 NOTE — PROGRESS NOTES
[facility]  Cardiovascular and Thoracic Surgery Daily Note          Assessment and Plan:   POD#5 s/p Coronary artery bypass grafting x 4 with left internal mammary artery to the distal left anterior descending, right internal mammary artery to the mid right coronary artery, left radial artery to the obtuse marginal artery, reverse saphenous vein graft to the anomalous RV branch off the LAD, endoscopic vein harvest from the left lower extremity, endoscopic left radial artery harvest, intraoperative HILL[place] by Dr. Antelmo Mccullough  -CVS: HR: 80s-90s. SBP: 100s-140s. Went into atrial flutter overnight POD#3-- treated with amiodarone and cardioverted by EP on POD#4. ASA, BB with hold parameters, statin. Amlodipine 2.5mg daily to prevent radial artery spasm for 3 months. PTA losartan on hold. Pacer wires removed. Will consider chest tube removal today or tomorrow. Family would like the patient to rest now after being extubated. On heparin gtt per EP recommendations, will transition to NOAC when able. Transitioning to PO amiodarone  -Resp: Extubated this am to 9L NC at 1150. Wean oxygen as able. Encourage IS, cough, deep breathing and ambulation  -Neuro:  Sedated with precedex. CT head on POD#2 with no acute changes. Treated with seroquel and valproate per intensivist team. Neurology consulted-- EEG and MRI negative.   -Renal: good UOP. Up about 2kg from preoperative weight. Cr: 0.83. Will continue to monitor  -GI:  Tube feeds via kiofeed. Swallow evaluation when appropriate. Advance diet as tolerated   -:  Christina in place d/t limited mobility and need for accurate I&Os.   -Endo: pre op A1c:10.2. Hx of DMII, PTA glipizide, metformin on hold.   -FEN: replace electrolytes as needed. Na: 144. K: 3.4  Orders Placed This Encounter      NPO for Medical/Clinical Reasons Except for: Meds      Advance Diet as Tolerated: Clear Liquid Diet      Advance Diet as Tolerated: Clear Liquid Diet; Low Saturated Fat Na <2400mg  Diet      Advance Diet as Tolerated: Clear Liquid Diet      Advance Diet as Tolerated: Full Liquid Diet; Low Saturated Fat Na <2400mg Diet    -ID: Temp (24hrs), Av.5  F (37.5  C), Min:98.6  F (37  C), Max:100  F (37.8  C)  WBC: 8.9. Completed perioperative abx.   -Heme: Hgb: 7.8. plt: 260. Acute blood loss anemia and thrombocytopenia related to surgery  -Proph: PCD, ASA, statin, PPI, sub q heparin  -Dispo: Continue ICU cares. Wean oxygen as able. Continue to encourage IS, cough, deep breathing, ambulation.           Interval History:   Extubated this morning. Sedated with precedex. Family at bedside.           Medications:       allopurinol  100 mg Oral Daily     amiodarone  200 mg Oral or FT or NG tube BID    Followed by     [START ON 2021] amiodarone  200 mg Oral or FT or NG tube Daily     amLODIPine  2.5 mg Oral Daily     aspirin  324 mg Oral or NG Tube Daily     chlorhexidine  15 mL Mouth/Throat Q12H     metoprolol tartrate  12.5 mg Oral BID     multivitamins w/minerals  15 mL Per Feeding Tube Daily     pantoprazole  40 mg Per Feeding Tube QAM AC    Or     pantoprazole (PROTONIX) IV  40 mg Intravenous QAM AC     polyethylene glycol  17 g Oral Daily     protein modular  1 packet Per Feeding Tube TID     QUEtiapine  50 mg Oral or Feeding Tube BID     rosuvastatin  10 mg Oral Daily     senna-docusate  1 tablet Oral BID     sodium chloride (PF)  3 mL Intracatheter Q8H     thiamine  250 mg Intravenous Daily    Followed by     [START ON 2021] thiamine  100 mg Oral Daily     acetaminophen, bisacodyl, dextrose, glucose **OR** dextrose **OR** glucagon, EPINEPHrine, hydrALAZINE, HYDROmorphone **OR** HYDROmorphone, lidocaine 4%, lidocaine (buffered or not buffered), magnesium hydroxide, metoprolol, naloxone **OR** naloxone **OR** naloxone **OR** naloxone, ondansetron **OR** ondansetron, oxyCODONE **OR** oxyCODONE, phenylephrine, BETA BLOCKER NOT PRESCRIBED, sodium chloride (PF)          Physical Exam:    Vitals were reviewed  Blood pressure 113/63, pulse 86, temperature 99.3  F (37.4  C), resp. rate 13, weight 89.2 kg (196 lb 10.4 oz), SpO2 96 %.  Rhythm: NSR    Lungs: coarse    Cardiovascular: RRR normal s1 and s2    Abdomen: soft NTND    Extremeties: minimal edema    Incision: CDI    CT: to suction    Weight:   Vitals:    09/09/21 0500 09/10/21 0430 09/11/21 0100 09/12/21 0200   Weight: 89 kg (196 lb 3.4 oz) 88.6 kg (195 lb 5.2 oz) 87.7 kg (193 lb 5.5 oz) 87.5 kg (192 lb 14.4 oz)    09/13/21 0400   Weight: 89.2 kg (196 lb 10.4 oz)            Data:   Labs:   Lab Results   Component Value Date    WBC 8.9 09/14/2021     Lab Results   Component Value Date    RBC 3.03 09/14/2021     Lab Results   Component Value Date    HGB 7.8 09/14/2021     Lab Results   Component Value Date    HCT 24.4 09/14/2021     No components found for: MCT  Lab Results   Component Value Date    MCV 81 09/14/2021     Lab Results   Component Value Date    MCH 25.7 09/14/2021     Lab Results   Component Value Date    MCHC 32.0 09/14/2021     Lab Results   Component Value Date    RDW 15.5 09/14/2021     Lab Results   Component Value Date     09/14/2021       Last Basic Metabolic Panel:  Lab Results   Component Value Date     09/14/2021      Lab Results   Component Value Date    POTASSIUM 3.5 09/14/2021     Lab Results   Component Value Date    CHLORIDE 110 09/14/2021     Lab Results   Component Value Date    TORRES 8.5 09/14/2021     Lab Results   Component Value Date    CO2 28 09/14/2021     Lab Results   Component Value Date    BUN 12 09/14/2021     Lab Results   Component Value Date    CR 0.83 09/14/2021     Lab Results   Component Value Date     09/14/2021     09/14/2021       CXR: 9/14/21    IMPRESSION: Endotracheal tube remains in good position above the  john. Enteric tube courses below the diaphragm. Right IJ CVC is in  the SVC. Bilateral chest and mediastinal tubes are stable. No  pneumothorax. There is  persistent left lower lobe atelectasis. Lungs  are otherwise grossly clear. Little overall change from previous.     MD Aaliyah RODRIGES PA-C  CV Surgery  Pager # [phone]

## 2021-09-14 NOTE — PROGRESS NOTES
Patient tolerated extubation well. Patient placed on 9 lpm oxymask. VSS. Will continue to monitor.

## 2021-09-14 NOTE — PROGRESS NOTES
[facility]   CRITICAL CARE NOTE     Jerry Bustamante MRN: [id number]  1970  Date of Admission:9/2/2021          Problem List:   1. Acute resp failure post CABG, with respiratory acidosis (corrected)  2. multivessel CAD post CABG x 4   3. Encephalopathy, unclear, MRI/EEG: no acute changes.   4. Post operative shock, presumed cardiogenic   5. Atrial flutter post shock cardioversion      24-Hour Goals   Wean off propofol and bridge with precedex.       Overnight Events   Cardioverted in the afternoon, trial of weaning off propofol failed due to severe agitation / delirium.        Lines/Tubes/Draines/Devices   .  Peripheral IV 09/01/21 Anterior;Right (Active)   Site Assessment [facility] 09/14/21 0800   Line Status Infusing 09/14/21 0800   Dressing Intervention New dressing  09/14/21 0400   Phlebitis Scale 0-->no symptoms 09/14/21 0800   Infiltration Scale 0 09/14/21 0800   Infiltration Site Treatment Method  None 09/12/21 0000   If infiltrated, was a vesicant infusing? No 09/12/21 0000   Number of days: 13       Peripheral IV 09/08/21 Right Hand (Active)   Site Assessment [facility] 09/14/21 0800   Line Status Saline locked 09/14/21 0800   Dressing Intervention New dressing  09/09/21 0000   Phlebitis Scale 0-->no symptoms 09/14/21 0600   Infiltration Scale 0 09/14/21 0600   Infiltration Site Treatment Method  None 09/12/21 0000   If infiltrated, was a vesicant infusing? No 09/12/21 0000   Number of days: 6       Arterial Line 09/11/21 Radial (Active)   Site Assessment [facility] 09/14/21 0800   Line Status Pulsatile blood flow;Positional 09/14/21 0800   Art Line Waveform Appropriate 09/14/21 0800   Art Line Interventions Leveled;Zeroed and calibrated;Connections checked and tightened 09/14/21 0800   Color/Movement/Sensation Capillary refill less than 3 sec 09/14/21 0800   Line Necessity Yes, meets criteria 09/14/21 0800   Dressing Type Transparent 09/14/21 0800   Dressing Status Clean, dry, intact 09/14/21 0800    Dressing Intervention Dressing changed/new dressing 09/11/21 1800   Number of days: 3       CVC TRIPLE LUMEN Right Internal jugular (Active)   Site Assessment [facility] 09/14/21 0800   Dressing Type Chlorhexidine sponge;Transparent 09/14/21 0800   Dressing Status clean;dry;intact 09/14/21 0800   Dressing Intervention new dressing 09/13/21 1600   Line Necessity yes, meets criteria 09/13/21 2000   Blue - Status infusing 09/14/21 0800   Brown - Status infusing 09/14/21 0800   White - Status infusing 09/14/21 0800   Number of days: 3       Right Groin Interventional Procedure Access (Active)   Site Assessment [facility] 09/14/21 0800   Hemostasis management Unchanged 09/14/21 0800   Femoral Bruit not present 09/13/21 0800   CMS Right Extremity WD 09/13/21 0800   Dorsalis Pulse - Right Leg Normal 09/13/21 0800   Popliteal Pulse - Right Leg Present with doppler 09/11/21 1200   Posterior Tibial Pulse - Right Leg Normal 09/13/21 0800   Number of days: 12       ETT Cuffed Single;Single Subglottic Suction 8 mm (Active)   Secured at (cm) 25 cm 09/14/21 0825   Measured from Lips 09/14/21 0825   Position Left 09/14/21 1000   Secured by Commercial tube oliver 09/14/21 0825   Bite Block None Present 09/14/21 0825   Site Appearance Clean;Dry 09/14/21 0825   Cuff Assessment Minimal occluding volume 09/13/21 2004   Safety Measures Manual resuscitator at bedside 09/14/21 0825   Number of days: 6       Chest Tube 1 Left Mediastinal 24 English (Active)   Site Assessment [facility] 09/14/21 0800   Suction -20 cm H2O 09/14/21 0800   Chest Tube Airleak No 09/14/21 0800   Drainage Description Serous 09/14/21 0800   Dressing Status Normal: Clean, Dry & Intact 09/14/21 0800   Dressing Change Due 09/14/21 09/13/21 0000   Dressing Intervention Gauze 09/14/21 0800   Patency Intervention Tip/Tilt 09/14/21 0800   Chest Tube Clamps at Bedside present 09/14/21 0800   Container Amount 790 09/14/21 0800   Output (ml) 0 ml 09/14/21 0800   Number of days: 6       Chest  Tube 2 Pleural 32 Bahraini (Active)   Site Assessment [facility] 09/14/21 0800   Suction -20 cm H2O 09/14/21 0800   Chest Tube Airleak No 09/14/21 0800   Drainage Description Serous 09/14/21 0800   Dressing Status Normal: Clean, Dry & Intact 09/14/21 0800   Dressing Change Due 09/14/21 09/13/21 0000   Dressing Intervention Gauze 09/14/21 0800   Patency Intervention Tip/Tilt 09/14/21 0800   Chest Tube Clamps at Bedside present 09/14/21 0800   Container Amount 1400 09/14/21 0800   Output (ml) 0 ml 09/14/21 0800   Number of days: 6       Chest Tube 3 Mediastinal 32 [place]raini (Active)   Site Assessment [facility] 09/14/21 0800   Suction -20 cm H2O 09/14/21 0800   Chest Tube Airleak No 09/14/21 0800   Drainage Description Serous 09/14/21 0800   Dressing Status Normal: Clean, Dry & Intact 09/14/21 0800   Dressing Change Due 09/14/21 09/13/21 0000   Dressing Intervention Gauze 09/14/21 0800   Patency Intervention Tip/Tilt 09/14/21 0800   Chest Tube Clamps at Bedside present 09/14/21 0800   Number of days: 6       Chest Tube 4 Right Pleural 24 Bahraini (Active)   Site Assessment [facility] 09/14/21 0800   Suction -20 cm H2O 09/14/21 0800   Chest Tube Airleak No 09/14/21 0800   Drainage Description Serous 09/14/21 0800   Dressing Status Normal: Clean, Dry & Intact 09/14/21 0800   Dressing Change Due 09/14/21 09/13/21 0000   Dressing Intervention Gauze 09/14/21 0800   Patency Intervention Tip/Tilt 09/14/21 0800   Chest Tube Clamps at Bedside present 09/14/21 0800   Number of days: 6       Negative Pressure Wound Therapy Chest (Active)   Wound Type Surgical 09/14/21 0800   Unit Type 1 09/14/21 0400   Dressing Pieces Applied (# of Each Type) Black foam 09/14/21 0800   Cycle Continuous 09/14/21 0800   Target Pressure (mmHg) 125 09/14/21 0800   (Retired) Dressing Status Clean, dry, intact 09/14/21 0800   Cann[place] changed? No 09/14/21 0800   Output (ml) 0 ml 09/14/21 0800   Number of days: 6       NG/OG/NJ Tube Nasogastric 10 fr Right nostril (Active)    Site Description [facility] 09/14/21 0800   Status Enteral Feedings 09/14/21 0800   Placement Confirmation [place] unchanged 09/14/21 0800   [place] (cm marking) at nare/mouth 109 cm 09/14/21 0800   Intake (ml) 60 ml 09/14/21 0800   Flush/Free Water (mL) 60 mL 09/14/21 0800   Residual (mL) 0 mL 09/14/21 0600   Number of days: 1       Urethral Catheter Latex;Temperature probe;Straight-tip 16 fr (Active)   Tube Description Positional 09/14/21 0800   Catheter Care Done;Catheter wipes 09/14/21 0400   Collection Container Standard;Patent 09/14/21 0800   Securement Method Securing device (Describe) 09/14/21 0800   Rationale for Continued Use Strict 1-2 Hour I&O 09/14/21 0800   Urine Output 110 mL 09/14/21 0800   Number of days: 6       Incision/Surgical Site 09/08/21 Chest (Active)   Incision Assessment UTV 09/14/21 0400   Jammie-Incision Assessment UTV 09/13/21 2000   Closure SIVA 09/14/21 0400   Incision Drainage Amount None 09/14/21 0400   Drainage Description UTV 09/11/21 0400   Incision Care Therapy - negative pressure 09/14/21 0400   Dressing Intervention Clean, dry, intact 09/14/21 0400   Number of days: 6       Incision/Surgical Site 09/08/21 Left Leg (Active)   Incision Assessment WDL except 09/14/21 0800   Jammie-Incision Assessment Ecchymosis 09/14/21 0800   Closure Approximated;Liquid bandage 09/14/21 0800   Incision Drainage Amount None 09/14/21 0800   Incision Care Soap and water 09/13/21 0400   Dressing Intervention Open to air / No Dressing 09/14/21 0800   Number of days: 6       Incision/Surgical Site 09/08/21 Left Wrist (Active)   Incision Assessment WDL except 09/14/21 0800   Jammie-Incision Assessment Ecchymosis 09/14/21 0800   Closure Approximated;Liquid bandage 09/14/21 0800   Incision Drainage Amount None 09/14/21 0800   Incision Care Soap and water 09/13/21 0400   Dressing Intervention Open to air / No Dressing 09/14/21 0800   Number of days: 6          ICU Prophylaxis:   1. DVT: Hep ggt/mechanical  2. VAP:  HOB 30 degrees, chlorhexidine rinse  3. Stress Ulcer: PPI  4. Restraints: Nonviolent soft two point restraints required and necessary for patient safety and continued cares and good effect as patient continues to pull at necessary lines, tubes despite education and distraction. Will readdress daily.   5. IV Access - central access required and necessary for continued patient cares  6. Feeding - Enteral feeding       Medications:       allopurinol  100 mg Oral Daily     amiodarone  200 mg Oral or FT or NG tube BID    Followed by     [START ON 9/28/2021] amiodarone  200 mg Oral or FT or NG tube Daily     amLODIPine  2.5 mg Oral Daily     aspirin  324 mg Oral or NG Tube Daily     chlorhexidine  15 mL Mouth/Throat Q12H     metoprolol tartrate  12.5 mg Oral BID     multivitamins w/minerals  15 mL Per Feeding Tube Daily     pantoprazole  40 mg Per Feeding Tube QAM AC    Or     pantoprazole (PROTONIX) IV  40 mg Intravenous QAM AC     polyethylene glycol  17 g Oral Daily     protein modular  1 packet Per Feeding Tube TID     QUEtiapine  50 mg Oral or Feeding Tube BID     rosuvastatin  10 mg Oral Daily     senna-docusate  1 tablet Oral BID     sodium chloride (PF)  3 mL Intracatheter Q8H     thiamine  250 mg Intravenous Daily    Followed by     [START ON 9/19/2021] thiamine  100 mg Oral Daily     acetaminophen, bisacodyl, dextrose, glucose **OR** dextrose **OR** glucagon, EPINEPHrine, hydrALAZINE, HYDROmorphone **OR** HYDROmorphone, lidocaine 4%, lidocaine (buffered or not buffered), magnesium hydroxide, metoprolol, naloxone **OR** naloxone **OR** naloxone **OR** naloxone, ondansetron **OR** ondansetron, oxyCODONE **OR** oxyCODONE, phenylephrine, BETA BLOCKER NOT PRESCRIBED, sodium chloride (PF)      Review of Systems:   Unable to obtain due to critical illness            Physical Exam:   Temp:  [98.6  F (37  C)-100  F (37.8  C)] 99.3  F (37.4  C)  Pulse:  [] 85  Resp:  [10-29] 15  BP: ()/(56-81) 114/60  MAP:   [61 mmHg-100 mmHg] 69 mmHg  Arterial Line BP: ()/(48-90) 109/52  FiO2 (%):  [0 %-30 %] 30 %  SpO2:  [86 %-100 %] 97 %    Intake/Output Summary (Last 24 hours) at 9/14/2021 1017  Last data filed at 9/14/2021 1000  Gross per 24 hour   Intake 2613.77 ml   Output 1445 ml   Net 1168.77 ml     Wt Readings from Last 4 Encounters:   09/13/21 89.2 kg (196 lb 10.4 oz)   09/02/21 86.6 kg (191 lb)   07/31/19 88.5 kg (195 lb)   01/09/18 88.4 kg (194 lb 12.8 oz)     Arterial Line BP: ()/(48-90) 109/52  MAP:  [61 mmHg-100 mmHg] 69 mmHg  BP - Mean:  [73-92] 77  Ventilation Mode: CPAP/PS  (Continuous positive airway pressure with Pressure Support)  FiO2 (%): 30 %  Rate Set (breaths/minute): 24 breaths/min  Tidal Volume Set (mL): 500 mL  PEEP (cm H2O): 5 cmH2O  Pressure Support (cm H2O): 5 cmH2O  Oxygen Concentration (%): 30 %  Resp: 15    Recent Labs   Lab 09/11/21  1815 09/09/21  0446 09/08/21  2217 09/08/21 2004   PH 7.42 7.37 7.29* 7.29*   PCO2 34* 43 47* 46*   PO2 88 103 130* 70*   HCO3 22 24 23 22   O2PER 30 60 40 60       GEN: no acute distress   HEENT: head ncat, sclera anicteric, OP patent, trachea midline   PULM: unlabored synchronous with vent, clear anteriorly    CV/COR: RRR S1S2 no gallop,  No rub, no murmur  ABD: soft nontender, hypoactive bowel sounds, no mass  EXT:  -ve Edema   warm  NEURO: grossly intact  SKIN: no obvious rash      Assessment and plan :     Neurology/Psychiatry/Pain/Sedation:   #Encephalopathy, metabolic, MRI and EEG virtually normal, Neurocritical care recommends no further investigations at the moment.   #Agitated delirium Reportedly having bouts of agitation when sedation is reduced.   - Current sedatives: Propofol ggt Goal RAAS 0 to -1. Two doses of valproic acid given 9/10 and 9/13 as sedation adjuvants.   - Trial of Seroquel 50 mg BID. Favor reducing propofol first.   - add precedex and wean off propofol for SAT  - CT head 9/10 negative for intracranial hemorrhage, MRI negative    - continue empiric thiamine        Cardiovascular/Hemodynamics:   #post CABG x 4  #A - flutter - sock cardioverted yesterday   goal MAP >65      Pulmonary/Ventilator Management:   #Acute respiratory failure with hypercapnia ; - Encephalopathy remains barrier to extubation  Plan:   - Keep on pressure support as tolerated.  - intermittent diuresis     GI/Nutrition:   #Protein calorie malnutrition in the setting of acute illness  - NJ in place enteral feeds   - Bowel regimen       Renal/Fluids/Electrolytes:   No issues, good UOP.        Intake/Output Summary (Last 24 hours) at 9/13/2021 1241  Last data filed at 9/13/2021 1200  Gross per 24 hour   Intake 2393.95 ml   Output 2035 ml   Net 358.95 ml            Infectious Disease:   # Hx of latent TB  Cefazolin ppx   Persistent leukocytosis->resolved   Cultures negative so far     Endocrine/Hematology/Oncology:   Type 2 DM, uncontrolled.  ICU protocol to keep glucose < 180  Daily Aspirin  Subcutaneous heparin     Disposition/Code Status/Other  1. Disposition: ICU  2. Code: Full     I have personally reviewed the daily labs, imaging studies, cultures and discussed the case with referring physician and consulting physicians.      This patient is critically ill and I have provided 30 minutes of critical care time (excluding procedures) on September 14, 2021.      JIMBO Jones  SICU Fellow, N    ROUTINE ICU LABS (Last four results)  CMP  Recent Labs   Lab 09/14/21  0912 09/14/21  0611 09/14/21  0344 09/14/21  0343 09/13/21  0005 09/12/21  0412 09/11/21  1534 09/11/21  0431 09/08/21 2002 09/08/21  1829   NA  --   --  144  --  140 141  --  142   < > 143   POTASSIUM  --   --  3.4  --  4.3 3.7  --  4.1   < > 3.5   CHLORIDE  --   --  110*  --  108 111*  --  111*   < > 114*   CO2  --   --  28  --  18* 24  --  25   < > 22   ANIONGAP  --   --  6  --  14 6  --  6   < > 7   * 123* 137* 128* 215* 99  --  159*   < > 149*   BUN  --   --  12  --  10 9  --  11   < > 19    CR  --   --  0.83  --  0.95 0.82  --  0.93   < > 1.15   GFRESTIMATED  --   --  >90  --  >90 >90  --  >90   < > 72   TORRES  --   --  8.5  --  8.0* 8.2*  --  8.4*   < > 8.6   MAG  --   --  2.4*  --  2.1 2.0  --  2.0   < >  --    PHOS  --   --  2.8  --  4.5 3.7  --  3.7   < >  --    PROTTOTAL  --   --  6.4*  --  6.5*  --  6.4*  --   --  6.9   ALBUMIN  --   --  1.8*  --  1.9*  --  1.9*  --   --  3.5   BILITOTAL  --   --  0.3  --  0.5  --  0.5  --   --  0.6   ALKPHOS  --   --  262*  --  272*  --  218*  --   --  150   AST  --   --  16  --  16  --  17  --   --  50*   ALT  --   --  15  --  18  --  18  --   --  47    < > = values in this interval not displayed.     CBC  Recent Labs   Lab 09/14/21  0344 09/13/21  0918 09/13/21  0005 09/12/21  0412   WBC 8.9 6.4 7.4 7.6   RBC 3.03* 3.02* 2.98* 3.00*   HGB 7.8* 7.8* 7.7* 7.7*   HCT 24.4* 24.3* 24.5* 24.2*   MCV 81 81 82 81   MCH 25.7* 25.8* 25.8* 25.7*   MCHC 32.0 32.1 31.4* 31.8   RDW 15.5* 15.2* 15.4* 14.6    264 239 244     INR  Recent Labs   Lab 09/08/21  1545 09/08/21  1423   INR 1.23* 1.41*     Arterial Blood Gas  Recent Labs   Lab 09/11/21  1815 09/09/21  0446 09/08/21  2217 09/08/21 2004   PH 7.42 7.37 7.29* 7.29*   PCO2 34* 43 47* 46*   PO2 88 103 130* 70*   HCO3 22 24 23 22   O2PER 30 60 40 60       All cultures:  No results for input(s): CULT in the last 168 hours.  Recent Results (from the past 24 hour(s))   XR Feeding Tube Placement    Narrative    XR FEEDING TUBE PLACEMENT 9/13/2021 3:25 PM    COMPARISON: None    HISTORY: encephalopathy s/p CABG, requires post pyloric feeding tube,  unable to obtain at bedside    CONTRAST: 5 mL Omnipaque    MEDICATIONS: 5 mL viscous lidocaine    FLUOROSCOPY TIME: 6.6 minutes    Feeding tube length to tip of the nose: 107 cm    Number of images: 2    PROCEDURE: The patient was placed in the supine position on the  fluoroscopy table. The right nostril was anesthetized with viscous  topical lidocaine. The feeding tube was  also lubricated with viscous  lidocaine. The feeding tube was passed through the right nostril,  through the esophagus into the stomach. Using fluoroscopic guidance,  the feeding tube was advanced into the proximal jejunum.    There are were no immediate complications. The patient tolerated the  procedure extremely well.      Impression    IMPRESSION: Successful, uncomplicated, fluoroscopically guided feeding  tube placement.    CARINA ALFONSO MD         SYSTEM ID:  [id number]

## 2021-09-14 NOTE — PROGRESS NOTES
SPIRITUAL HEALTH SERVICES  SPIRITUAL ASSESSMENT Progress Note  [facility] ICU     REFERRAL SOURCE: Initial Visit with Family given Lengthy ICU Stay    I shared a brief visit with patient's cousin in the [place] as he was leaving to go to lunch. I introduced myself and shared I could stop by another time when family is available so he could go eat. He welcomed me stopping by another time.     PLAN: I will try to check in with family again in the coming days.     Emma Her  Associate    Phone: [phone]  Pager: [phone]

## 2021-09-14 NOTE — PLAN OF CARE
Neuro: Pt becomes agitated with stimulation. Excessive coughing, tachypnea and hypertension. Pt does not follow. Purposeful movements in all extremities. PEERLa, sclera edema. Remains on propofol. Received Tylenol and oxy for pain.  CV: ST, continue amiodarone and heparin gtts. Chest tube output minimal.   Pulm: PS since 2000. Coarse to Dim LS. Thick creamy secretions through ett & orally.   GI/: BS active. Given bowel meds, one medium liquid BM. suzy feed rate to be increased to goal at 1800. Good urine output. Remains on insulin gtt.

## 2021-09-14 NOTE — PROGRESS NOTES
Hospitalist Chart Check     Given patient was extubated today and there is chance that he may require intubation if delirium not well controlled, hospital will continue to follow peripherally today. If stable on 9/15 and ICU no longer managing, hospitalist will assume care.      Summary:  Directly admitted from [facility] on 9/2/21 for NSTEMI and 1 week history of let sided chest pain. Underwent echocardiogram with some wall motion abnormalities, Coronary Angio then showed severe 3 vessel disease. He underwent 4V CABG on 9/8/21. Post procedure, he remained intubated until 9/14/21 due to delirium, agitation. He was assessed by neurology and underwent EEG. He also had post operative atrial flutter with RVR that was resistant to BB and amiodarone, but was successfully cardioverted on 9/13/21.     Brief Problem List:    NSTEMI  CAD s/p stenting (6515-4502 in [place]); severe 3 vessel disease s/p CABx4  Ischemic cardiomyopathy  HTN  HLP  *Troponin: 2.15-->10.3-->12.451-->12.292.    *ECHO: LV systolic function mild-moderately reduced (EF 40-45%) with apical, distal septal wall severe hypokinesia, mild inferior wall hypokinesia.  RV systolic function normal.  Mild MR.  Ascending aorta mildly dilated.   - Cardiology following, appreciate input               --Coronary angiogram 9/2/2021 with severe 3-vessel disease.  - Underwent CABG 9/8/21: CABG X 4 WITH ENDOSCOPIC VEIN HARVESTING LIMA-LAD PREET-DISTAL RCA LEFT RADIAL-OM LEFT SV-ANOMOLOUS RV BRANCH  - CVS managing post operatively    Atrial Flutter s/p cardioversion  2:1 A flutter 9/12/21. Trialed on beta blocker and amiodarone without conversion  - DCCV x2 on 9.13. First shock transitioned to atrial fib, then second shock converted to sinus rhythm.  - follow up EP ODNTA in 6 weeks  - plan amiodarone 200mg twice daily for 14 days, then 200mg daily for 28 days     Delirium  Agitation  Acute hypoxic respiratory failure  Initially limited extubation. Underwent EEG which was  negative for epileptiform discharges.   - seroquel 50mg BID  - weaning O2    Uncontrolled DM2  Hyperglycemia  A1c of 9.6% from 8/2021.  Rechecked A1c 9/2 and at 10.2%.  - Hold PTA metformin 1000 mg BID and Glipizide 10 mg/d.    -PTA on Lantus 30 units at night  - has been on insulin gtt while in ICU     Gout  PTA allopurinol 100 mg/d.       History of latent TB  Patient has completed 9 month treatment in 2015.         Chelsea Costa DO  Internal Medicine - Hospitalist  9/14/2021  4:10 PM

## 2021-09-14 NOTE — PROGRESS NOTES
[facility] ICU RESPIRATORY NOTE        Date of Admission: 9/2/2021    Date of Intubation (most recent): 9/8/21    Reason for Mechanical Ventilation: Airway protection    Number of Days on Mechanical Ventilation: 7    Met Criteria for Spontaneous Breathing Trial: Yes - The pt  Still on Pressure support of 5/5 and 30%.     Significant Events Today: None    ABG Results:   Recent Labs   Lab 09/11/21  1815 09/09/21  0446 09/08/21  2217 09/08/21 2004   PH 7.42 7.37 7.29* 7.29*   PCO2 34* 43 47* 46*   PO2 88 103 130* 70*   HCO3 22 24 23 22   O2PER 30 60 40 60       Current Vent Settings: Ventilation Mode: CPAP/PS  (Continuous positive airway pressure with Pressure Support)  FiO2 (%): 30 %  Rate Set (breaths/minute): 24 breaths/min  Tidal Volume Set (mL): 500 mL  PEEP (cm H2O): 5 cmH2O  Pressure Support (cm H2O): 5 cmH2O  Oxygen Concentration (%): 30 %  Resp: 12    Plan: Will continue on full ventilatory support and wean as tolerated.    Tonia Fowler, RT

## 2021-09-15 ENCOUNTER — APPOINTMENT (OUTPATIENT)
Dept: SPEECH THERAPY | Facility: CLINIC | Age: 51
End: 2021-09-15
Attending: PHYSICIAN ASSISTANT
Payer: COMMERCIAL

## 2021-09-15 ENCOUNTER — APPOINTMENT (OUTPATIENT)
Dept: PHYSICAL THERAPY | Facility: CLINIC | Age: 51
End: 2021-09-15
Attending: STUDENT IN AN ORGANIZED HEALTH CARE EDUCATION/TRAINING PROGRAM
Payer: COMMERCIAL

## 2021-09-15 LAB
ANION GAP SERPL CALCULATED.3IONS-SCNC: 4 MMOL/L (ref 3–14)
BUN SERPL-MCNC: 13 MG/DL (ref 7–30)
CALCIUM SERPL-MCNC: 8.2 MG/DL (ref 8.5–10.1)
CHLORIDE BLD-SCNC: 110 MMOL/L (ref 94–109)
CO2 SERPL-SCNC: 29 MMOL/L (ref 20–32)
CREAT SERPL-MCNC: 1 MG/DL (ref 0.66–1.25)
ERYTHROCYTE [DISTWIDTH] IN BLOOD BY AUTOMATED COUNT: 15.3 % (ref 10–15)
GFR SERPL CREATININE-BSD FRML MDRD: 86 ML/MIN/1.73M2
GLUCOSE BLD-MCNC: 173 MG/DL (ref 70–99)
GLUCOSE BLDC GLUCOMTR-MCNC: 134 MG/DL (ref 70–99)
GLUCOSE BLDC GLUCOMTR-MCNC: 152 MG/DL (ref 70–99)
GLUCOSE BLDC GLUCOMTR-MCNC: 170 MG/DL (ref 70–99)
GLUCOSE BLDC GLUCOMTR-MCNC: 175 MG/DL (ref 70–99)
GLUCOSE BLDC GLUCOMTR-MCNC: 185 MG/DL (ref 70–99)
GLUCOSE BLDC GLUCOMTR-MCNC: 193 MG/DL (ref 70–99)
GLUCOSE BLDC GLUCOMTR-MCNC: 319 MG/DL (ref 70–99)
GLUCOSE BLDC GLUCOMTR-MCNC: 346 MG/DL (ref 70–99)
GLUCOSE BLDC GLUCOMTR-MCNC: 372 MG/DL (ref 70–99)
HCT VFR BLD AUTO: 26.5 % (ref 40–53)
HGB BLD-MCNC: 8.5 G/DL (ref 13.3–17.7)
MAGNESIUM SERPL-MCNC: 2.2 MG/DL (ref 1.6–2.3)
MCH RBC QN AUTO: 25.6 PG (ref 26.5–33)
MCHC RBC AUTO-ENTMCNC: 32.1 G/DL (ref 31.5–36.5)
MCV RBC AUTO: 80 FL (ref 78–100)
PHOSPHATE SERPL-MCNC: 1.9 MG/DL (ref 2.5–4.5)
PLATELET # BLD AUTO: 374 10E3/UL (ref 150–450)
POTASSIUM BLD-SCNC: 3.2 MMOL/L (ref 3.4–5.3)
POTASSIUM BLD-SCNC: 3.8 MMOL/L (ref 3.4–5.3)
PROCALCITONIN SERPL-MCNC: 0.43 NG/ML
RBC # BLD AUTO: 3.32 10E6/UL (ref 4.4–5.9)
SODIUM SERPL-SCNC: 143 MMOL/L (ref 133–144)
UFH PPP CHRO-ACNC: 0.1 IU/ML
UFH PPP CHRO-ACNC: 0.35 IU/ML
UFH PPP CHRO-ACNC: 0.63 IU/ML
WBC # BLD AUTO: 13.8 10E3/UL (ref 4–11)

## 2021-09-15 PROCEDURE — 84100 ASSAY OF PHOSPHORUS: CPT | Performed by: INTERNAL MEDICINE

## 2021-09-15 PROCEDURE — 97530 THERAPEUTIC ACTIVITIES: CPT | Mod: GP

## 2021-09-15 PROCEDURE — 250N000012 HC RX MED GY IP 250 OP 636 PS 637: Performed by: STUDENT IN AN ORGANIZED HEALTH CARE EDUCATION/TRAINING PROGRAM

## 2021-09-15 PROCEDURE — 250N000013 HC RX MED GY IP 250 OP 250 PS 637: Performed by: STUDENT IN AN ORGANIZED HEALTH CARE EDUCATION/TRAINING PROGRAM

## 2021-09-15 PROCEDURE — 250N000013 HC RX MED GY IP 250 OP 250 PS 637: Performed by: PHYSICIAN ASSISTANT

## 2021-09-15 PROCEDURE — 84132 ASSAY OF SERUM POTASSIUM: CPT | Performed by: SURGERY

## 2021-09-15 PROCEDURE — 92526 ORAL FUNCTION THERAPY: CPT | Mod: GN

## 2021-09-15 PROCEDURE — 250N000009 HC RX 250: Performed by: INTERNAL MEDICINE

## 2021-09-15 PROCEDURE — 97161 PT EVAL LOW COMPLEX 20 MIN: CPT | Mod: GP

## 2021-09-15 PROCEDURE — 250N000013 HC RX MED GY IP 250 OP 250 PS 637: Performed by: INTERNAL MEDICINE

## 2021-09-15 PROCEDURE — 200N000001 HC R&B ICU

## 2021-09-15 PROCEDURE — 250N000011 HC RX IP 250 OP 636: Performed by: STUDENT IN AN ORGANIZED HEALTH CARE EDUCATION/TRAINING PROGRAM

## 2021-09-15 PROCEDURE — 250N000012 HC RX MED GY IP 250 OP 636 PS 637: Performed by: INTERNAL MEDICINE

## 2021-09-15 PROCEDURE — 250N000013 HC RX MED GY IP 250 OP 250 PS 637: Performed by: NURSE PRACTITIONER

## 2021-09-15 PROCEDURE — 97110 THERAPEUTIC EXERCISES: CPT | Mod: GP

## 2021-09-15 PROCEDURE — 92610 EVALUATE SWALLOWING FUNCTION: CPT | Mod: GN

## 2021-09-15 PROCEDURE — 99233 SBSQ HOSP IP/OBS HIGH 50: CPT | Performed by: INTERNAL MEDICINE

## 2021-09-15 PROCEDURE — 85520 HEPARIN ASSAY: CPT | Performed by: INTERNAL MEDICINE

## 2021-09-15 PROCEDURE — 250N000013 HC RX MED GY IP 250 OP 250 PS 637: Performed by: SURGERY

## 2021-09-15 PROCEDURE — 85027 COMPLETE CBC AUTOMATED: CPT | Performed by: PHYSICIAN ASSISTANT

## 2021-09-15 PROCEDURE — 84145 PROCALCITONIN (PCT): CPT | Performed by: PHYSICIAN ASSISTANT

## 2021-09-15 PROCEDURE — 80048 BASIC METABOLIC PNL TOTAL CA: CPT | Performed by: PHYSICIAN ASSISTANT

## 2021-09-15 PROCEDURE — 250N000011 HC RX IP 250 OP 636: Performed by: PHYSICIAN ASSISTANT

## 2021-09-15 PROCEDURE — 250N000009 HC RX 250: Performed by: STUDENT IN AN ORGANIZED HEALTH CARE EDUCATION/TRAINING PROGRAM

## 2021-09-15 PROCEDURE — 258N000003 HC RX IP 258 OP 636: Performed by: STUDENT IN AN ORGANIZED HEALTH CARE EDUCATION/TRAINING PROGRAM

## 2021-09-15 PROCEDURE — 85520 HEPARIN ASSAY: CPT | Performed by: SURGERY

## 2021-09-15 PROCEDURE — 250N000011 HC RX IP 250 OP 636: Performed by: NURSE PRACTITIONER

## 2021-09-15 PROCEDURE — 258N000003 HC RX IP 258 OP 636: Performed by: NURSE PRACTITIONER

## 2021-09-15 PROCEDURE — 83735 ASSAY OF MAGNESIUM: CPT | Performed by: INTERNAL MEDICINE

## 2021-09-15 RX ORDER — AMLODIPINE BESYLATE 5 MG/1
5 TABLET ORAL DAILY
Status: DISCONTINUED | OUTPATIENT
Start: 2021-09-16 | End: 2021-09-22 | Stop reason: HOSPADM

## 2021-09-15 RX ORDER — NICOTINE POLACRILEX 4 MG
15-30 LOZENGE BUCCAL
Status: DISCONTINUED | OUTPATIENT
Start: 2021-09-15 | End: 2021-09-15

## 2021-09-15 RX ORDER — POTASSIUM CHLORIDE 1.5 G/1.58G
40 POWDER, FOR SOLUTION ORAL ONCE
Status: COMPLETED | OUTPATIENT
Start: 2021-09-15 | End: 2021-09-15

## 2021-09-15 RX ORDER — FUROSEMIDE 10 MG/ML
40 INJECTION INTRAMUSCULAR; INTRAVENOUS ONCE
Status: COMPLETED | OUTPATIENT
Start: 2021-09-15 | End: 2021-09-15

## 2021-09-15 RX ORDER — QUETIAPINE FUMARATE 25 MG/1
50 TABLET, FILM COATED ORAL 2 TIMES DAILY PRN
Status: DISCONTINUED | OUTPATIENT
Start: 2021-09-15 | End: 2021-09-22 | Stop reason: HOSPADM

## 2021-09-15 RX ORDER — DEXTROSE MONOHYDRATE 25 G/50ML
25-50 INJECTION, SOLUTION INTRAVENOUS
Status: DISCONTINUED | OUTPATIENT
Start: 2021-09-15 | End: 2021-09-15

## 2021-09-15 RX ORDER — METOPROLOL TARTRATE 25 MG/1
25 TABLET, FILM COATED ORAL 2 TIMES DAILY
Status: DISCONTINUED | OUTPATIENT
Start: 2021-09-15 | End: 2021-09-16

## 2021-09-15 RX ORDER — AMLODIPINE BESYLATE 2.5 MG/1
2.5 TABLET ORAL ONCE
Status: COMPLETED | OUTPATIENT
Start: 2021-09-15 | End: 2021-09-15

## 2021-09-15 RX ADMIN — ASPIRIN 81 MG CHEWABLE TABLET 324 MG: 81 TABLET CHEWABLE at 09:53

## 2021-09-15 RX ADMIN — ROSUVASTATIN CALCIUM 10 MG: 10 TABLET, FILM COATED ORAL at 09:52

## 2021-09-15 RX ADMIN — POTASSIUM & SODIUM PHOSPHATES POWDER PACK 280-160-250 MG 2 PACKET: 280-160-250 PACK at 21:04

## 2021-09-15 RX ADMIN — POTASSIUM & SODIUM PHOSPHATES POWDER PACK 280-160-250 MG 2 PACKET: 280-160-250 PACK at 09:52

## 2021-09-15 RX ADMIN — AMIODARONE HYDROCHLORIDE 200 MG: 200 TABLET ORAL at 09:52

## 2021-09-15 RX ADMIN — AMIODARONE HYDROCHLORIDE 200 MG: 200 TABLET ORAL at 20:40

## 2021-09-15 RX ADMIN — HEPARIN SODIUM 1750 UNITS/HR: 10000 INJECTION, SOLUTION INTRAVENOUS at 18:19

## 2021-09-15 RX ADMIN — THIAMINE HYDROCHLORIDE 250 MG: 100 INJECTION, SOLUTION INTRAMUSCULAR; INTRAVENOUS at 09:56

## 2021-09-15 RX ADMIN — MULTIVIT AND MINERALS-FERROUS GLUCONATE 9 MG IRON/15 ML ORAL LIQUID 15 ML: at 09:56

## 2021-09-15 RX ADMIN — METOPROLOL TARTRATE 25 MG: 25 TABLET, FILM COATED ORAL at 20:40

## 2021-09-15 RX ADMIN — HYDRALAZINE HYDROCHLORIDE 10 MG: 20 INJECTION INTRAMUSCULAR; INTRAVENOUS at 04:05

## 2021-09-15 RX ADMIN — AMLODIPINE BESYLATE 2.5 MG: 2.5 TABLET ORAL at 11:56

## 2021-09-15 RX ADMIN — ALLOPURINOL 100 MG: 100 TABLET ORAL at 09:52

## 2021-09-15 RX ADMIN — Medication 1 PACKET: at 09:53

## 2021-09-15 RX ADMIN — METOPROLOL TARTRATE 5 MG: 5 INJECTION INTRAVENOUS at 18:19

## 2021-09-15 RX ADMIN — INSULIN GLARGINE 30 UNITS: 100 INJECTION, SOLUTION SUBCUTANEOUS at 11:57

## 2021-09-15 RX ADMIN — SODIUM CHLORIDE 5 UNITS/HR: 9 INJECTION, SOLUTION INTRAVENOUS at 08:39

## 2021-09-15 RX ADMIN — QUETIAPINE FUMARATE 50 MG: 50 TABLET ORAL at 21:42

## 2021-09-15 RX ADMIN — SODIUM CHLORIDE 6 UNITS/HR: 9 INJECTION, SOLUTION INTRAVENOUS at 04:11

## 2021-09-15 RX ADMIN — FUROSEMIDE 40 MG: 10 INJECTION, SOLUTION INTRAVENOUS at 11:56

## 2021-09-15 RX ADMIN — METOPROLOL TARTRATE 12.5 MG: 25 TABLET, FILM COATED ORAL at 09:52

## 2021-09-15 RX ADMIN — Medication 40 MG: at 06:43

## 2021-09-15 RX ADMIN — HYDRALAZINE HYDROCHLORIDE 10 MG: 20 INJECTION INTRAMUSCULAR; INTRAVENOUS at 01:30

## 2021-09-15 RX ADMIN — POTASSIUM CHLORIDE 40 MEQ: 1.5 POWDER, FOR SOLUTION ORAL at 06:43

## 2021-09-15 RX ADMIN — HEPARIN SODIUM 1500 UNITS/HR: 10000 INJECTION, SOLUTION INTRAVENOUS at 02:10

## 2021-09-15 RX ADMIN — ACETAMINOPHEN 650 MG: 325 TABLET, FILM COATED ORAL at 04:58

## 2021-09-15 RX ADMIN — POTASSIUM & SODIUM PHOSPHATES POWDER PACK 280-160-250 MG 2 PACKET: 280-160-250 PACK at 17:17

## 2021-09-15 RX ADMIN — ACETAMINOPHEN 650 MG: 325 TABLET, FILM COATED ORAL at 11:56

## 2021-09-15 RX ADMIN — AMLODIPINE BESYLATE 2.5 MG: 2.5 TABLET ORAL at 09:52

## 2021-09-15 ASSESSMENT — ACTIVITIES OF DAILY LIVING (ADL)
ADLS_ACUITY_SCORE: 21

## 2021-09-15 NOTE — PROGRESS NOTES
[facility]    Medicine Progress Note - Hospitalist Service       Date of Admission:  9/2/2021    Assessment & Plan           50 y/o male with PMH of CAD s/p stenting,  HTN, HLP, DM type 2, on insulin and gout- directly admitted from [facility] on 9/2/21 for NSTEMI and 1 week history of let sided chest pain. Underwent echocardiogram with some wall motion abnormalities, Coronary Angio then showed severe 3 vessel disease. He underwent 4V CABG on 9/8/21. Post procedure, he remained intubated until 9/14/21 due to delirium, agitation. He was assessed by neurology and underwent EEG. He also had post operative atrial flutter with RVR that was resistant to BB and amiodarone, but was successfully cardioverted on 9/13/21    NSTEMI  CAD s/p stenting ([id number] in [place]); severe 3 vessel disease s/p CABx4 POD#7  Ischemic cardiomyopathy  HTN  HLP  [PTA on Norvasc 5 mg daily, Losartan 50 mg po BID, Lopressor 50 mg BID and Crestor 10 mg po daily]  *Troponin: 2.15-->10.3-->12.451-->12.292.    *ECHO: LV systolic function mild-moderately reduced (EF 40-45%) with apical, distal septal wall severe hypokinesia, mild inferior wall hypokinesia.  RV systolic function normal.  Mild MR.  Ascending aorta mildly dilated.   - Cardiology following, appreciate input               --Coronary angiogram 9/2/2021 with severe 3-vessel disease.  - Underwent CABG 9/8/21: CABG X 4 WITH ENDOSCOPIC VEIN HARVESTING LIMA-LAD PREET-DISTAL RCA LEFT RADIAL-OM LEFT SV-ANOMOLOUS RV BRANCH  - extubated last night, current;y on O2 5 liters via NC; off precedex drip; up in the chair  - echo 9/14- EF 45-50%; there is anterior, septal, and apical wall akinesis. There is also hypokineiss of the inferior wall.  Mildly decreased right ventricular systolic function; there is mild to moderate (1-2+) mitral regurgitation  - CVS managing post operatively  - currently on  mg po daily, Lopressor 25 mg po BID, Norvasc 2.5 mg po daily, Crestor  10 mg po daily; PTA Losartan on hold  - BP on higher side  - Aggressive IS  - cardiac rehab  - currently on TF as per nutritionist  - SLP      Atrial Flutter s/p cardioversion  2:1 A flutter 9/12/21. Trialed on beta blocker and amiodarone without conversion  - DCCV x2 on 9.13. First shock transitioned to atrial fib, then second shock converted to sinus rhythm.  - follow up EP DONTA in 6 weeks  - plan amiodarone 200mg twice daily for 14 days, then 200mg daily for 28 days  - Tele- NSR     Delirium  Agitation  Acute toxic encephalopathy- improving  - as per prior notes- he was agitated with lightening sedation. CT  Head on POD#2 with no acute changes. Treated with seroquel and valproate per intensivist team. Neurology consulted-- EEG and MRI negative.   - started on Seroquel 50mg BID  - this am, he is calm , cooperative, follows commands  - will change Seroquel to 50 mg po BID prn for agitation    Acute hypoxic respiratory failure  - initially delayed extubation as he was agitated with lightening sedation  - extubated on 9/14, currently on O2 5 liters  - noted some productive cough of clear sputum  - T nax 101.7, WBCs 8.9--13.2  - monitor for  signs of PNA  - aggressive IS  - weaning O2     Uncontrolled DM2  Hyperglycemia  A1c of 9.6% from 8/2021.  Rechecked A1c 9/2 and at 10.2%.  [ PTA on Lantus 30 untts daily, metformin 1000 mg BID and Glipizide 10 mg/d]  - had been on Insulin drip after surgery  - currently on TF  - will switch to long acting insulin today- Lantus 30 units qam and stop Insulin drip  - will likely need further adjustments of lantus doses based on oral intake, BS  - ISS  - hold PTA oral antidiabetic meds     Hypokalemia  - K 3.2 this am  - K replacement protocol     Gout  PTA allopurinol 100 mg/d.       History of latent TB  Patient has completed 9 month treatment in 2015.        Diet: NPO for Medical/Clinical Reasons Except for: Meds  Advance Diet as Tolerated: Clear Liquid Diet  Advance Diet as  Tolerated: Clear Liquid Diet; Low Saturated Fat Na <2400mg Diet  Advance Diet as Tolerated: Clear Liquid Diet  Advance Diet as Tolerated: Full Liquid Diet; Low Saturated Fat Na <2400mg Diet  Adult Formula Drip Feeding: Continuous Osmolite 1.5; Other - Specify in Comment; Goal Rate: 50; mL/hr; Medication - Feeding Tube Flush Frequency: At least 15-30 mL water before and after medication administration and with tube clogging; Amount to ...    DVT Prophylaxis: Heparin infusion.  Christina Catheter: PRESENT, indication: Strict 1-2 Hour I&O  Central Lines: PRESENT  CVC TRIPLE LUMEN Right Internal jugular-Site Assessment: WDL  Code Status: Full Code      Disposition Plan   Expected discharge: 3-4 days  recommended to prior living arrangement oas per CVS.     The patient's care was discussed with the Bedside Nurse, Patient and Patient's Family.    Time Spent on this Encounter   I spent 35 minutes on the unit/floor managing the care of Jerry Bustamante. Over 50% of my time was spent on the following:   - Counseling the patient and/or family regarding: diagnostic results and medical compliance  - Coordination of care with the: nurse      MD Agnes Potter MD  Hospitalist Service  [facility]  Securely message with the Vocera Web Console (learn more here)  Text page via [employer] Paging/Directory      Clinically Significant Risk Factors Present on Admission                ______________________________________________________________________    Interval History   Doing better, extubated last night, now up in the chair  - calm, follows commands; oriented to self; states it's April 15, 2018  - denies chest pain, no SOB  - has some productive cough of clear sputum  - denies abd pain, no N/V      Data reviewed today: I reviewed all medications, new labs and imaging results over the last 24 hours. I personally reviewed the echo image(s) showing as above.    Physical Exam   Vital  Signs: Temp: (!) 100.6  F (38.1  C) Temp src: Bladder BP: (!) 161/72 Pulse: 93   Resp: 17 SpO2: 94 % O2 Device: Nasal cannula Oxygen Delivery: 5 LPM  Weight: 196 lbs 13.93 oz     General: - awake, alert, up in the chair, NAD  HEENT- normocephalic, atraumatic, PERRL,oral mucosa moist; NGT in place  RESP: bilateral air entry, some coarse breath sounds at bases, chest tube in place  CV- S1S2, RRR, no murmurs, no rubs  GI- abd- soft, nonT, nonD, BS present  EXTREMITIES- no leg swelling, no calf tenderness  NEURO:- awake, oriented to self, follows commands, cranial nerves II-XII grossly intact, some ataxia bilateral upper extremities   PSYCH- calm, cooperative    Data   Recent Labs   Lab 09/15/21  0835 09/15/21  0536 09/15/21  0506 09/14/21  1049 09/14/21  1045 09/14/21  0611 09/14/21  0344 09/13/21  1032 09/13/21  0918 09/13/21  0045 09/13/21  0005 09/08/21  1728 09/08/21  1545 09/08/21  1541 09/08/21  1423   WBC  --   --  13.8*  --   --   --  8.9  --  6.4   < > 7.4   < > 20.7*   < > 21.4*   HGB  --   --  8.5*  --   --   --  7.8*  --  7.8*   < > 7.7*   < > 11.0*   < > 10.1*   MCV  --   --  80  --   --   --  81  --  81   < > 82   < > 82   < > 82   PLT  --   --  374  --   --   --  260  --  264   < > 239   < > 224   < > 162   INR  --   --   --   --   --   --   --   --   --   --   --   --  1.23*  --  1.41*   NA  --   --  143  --   --   --  144  --   --   --  140   < > 143   < > 143   POTASSIUM  --   --  3.2*  --  3.5  --  3.4  --   --    < > 4.3   < > 3.5  3.5   < > 3.1*   CHLORIDE  --   --  110*  --   --   --  110*  --   --   --  108   < > 115*   < > 113*   CO2  --   --  29  --   --   --  28  --   --   --  18*   < > 24   < > 24   BUN  --   --  13  --   --   --  12  --   --   --  10   < > 19   < > 19   CR  --   --  1.00  --   --   --  0.83  --   --   --  0.95   < > 1.16   < > 1.19   ANIONGAP  --   --  4  --   --   --  6  --   --   --  14   < > 4   < > 6   TORRES  --   --  8.2*  --   --   --  8.5  --   --   --  8.0*   < > 8.8    < > 9.4   * 170* 173*   < >  --    < > 137*   < >  --    < > 215*   < > 157*   < > 196*   ALBUMIN  --   --   --   --   --   --  1.8*  --   --   --  1.9*   < > 2.8*  --   --    PROTTOTAL  --   --   --   --   --   --  6.4*  --   --   --  6.5*   < > 6.4*  --   --    BILITOTAL  --   --   --   --   --   --  0.3  --   --   --  0.5   < > 1.0  --   --    ALKPHOS  --   --   --   --   --   --  262*  --   --   --  272*   < > 175*  --   --    ALT  --   --   --   --   --   --  15  --   --   --  18   < > 51  --   --    AST  --   --   --   --   --   --  16  --   --   --  16   < > 51*  --   --     < > = values in this interval not displayed.     Recent Results (from the past 24 hour(s))   XR Chest Port 1 View    Narrative    CHEST PORTABLE ONE VIEW September 14, 2021 10:43 AM       INDICATION: Status post coronary artery bypass graft.     COMPARISON: 9/11/2021.       Impression    IMPRESSION: Endotracheal tube remains in good position above the  john. Enteric tube courses below the diaphragm. Right IJ CVC is in  the SVC. Bilateral chest and mediastinal tubes are stable. No  pneumothorax. There is persistent left lower lobe atelectasis. Lungs  are otherwise grossly clear. Little overall change from previous.    CECILIO SIDHU MD         SYSTEM ID:  [id number]     Medications     dexmedetomidine Stopped (09/14/21 1600)     dextrose       EPINEPHrine Stopped (09/12/21 1145)     heparin 1,750 Units/hr (09/15/21 0839)     insulin regular 5 Units/hr (09/15/21 0839)     phenylephrine Stopped (09/12/21 1400)     BETA BLOCKER NOT PRESCRIBED       sodium chloride 20 mL/hr at 09/14/21 1600       allopurinol  100 mg Oral Daily     amiodarone  200 mg Oral or FT or NG tube BID    Followed by     [START ON 9/28/2021] amiodarone  200 mg Oral or FT or NG tube Daily     amLODIPine  2.5 mg Oral Daily     aspirin  324 mg Oral or NG Tube Daily     insulin aspart  1-6 Units Subcutaneous Q4H     insulin glargine  30 Units Subcutaneous QAM AC      metoprolol tartrate  25 mg Oral BID     multivitamins w/minerals  15 mL Per Feeding Tube Daily     pantoprazole  40 mg Per Feeding Tube QAM AC    Or     pantoprazole (PROTONIX) IV  40 mg Intravenous QAM AC     polyethylene glycol  17 g Oral Daily     potassium & sodium phosphates  2 packet Oral or Feeding Tube TID     protein modular  1 packet Per Feeding Tube TID     rosuvastatin  10 mg Oral Daily     senna-docusate  1 tablet Oral BID     sodium chloride (PF)  3 mL Intracatheter Q8H     thiamine  250 mg Intravenous Daily    Followed by     [START ON 9/19/2021] thiamine  100 mg Oral Daily

## 2021-09-15 NOTE — PROGRESS NOTES
Patient seen and care plan discussed with Dr. Mccullough and Dr. Mccormick    [facility]  Cardiovascular and Thoracic Surgery Daily Note          Assessment and Plan:   POD#6 s/p Coronary artery bypass grafting x 4 with left internal mammary artery to the distal left anterior descending, right internal mammary artery to the mid right coronary artery, left radial artery to the obtuse marginal artery, reverse saphenous vein graft to the anomalous RV branch off the LAD, endoscopic vein harvest from the left lower extremity, endoscopic left radial artery harvest, intraoperative HILLARY by Dr. Antelmo Mccullough  -CVS: HR: 80s-100s. SBP: 100s-150s. Went into atrial flutter overnight POD #3-- treated with amiodarone and cardioverted by EP on POD#4. ASA, BB increased to 25mg BID today. Amlodipine increased to PTA dose 5mg-- will need to continue for at least 3 months to prevent radial artery spasm. PTA losartan on hold-- will consider resuming if HTN persists. Pacer wires removed. Chest tubes with too much output to remove. On heparin gtt per EP recommendations, will transition to NOAC when able. Transitioned to oral amiodarone. Wound vac in place   -Resp: Extubated on POD#5 at 1150. Saturating well on 5L-- wean as able. Continue to encourage IS, cough, deep breathing, ambulation.   -Neuro:  Weaned off sedation. Slight confusion this am but oriented upon revisit. Previous agitation/delirium preventing extubation-- CT head on POD#2 with no acute changes. Neurology consulted-- EEG and MRI negative  -Renal: good UOP. Up about 2kg from preoperative weight. Cr: 1.00. Will continue to monitor.   -GI:  Tube feeds via kio feed. SLP evaluation today. Advance diet when able.  -:  Christina in place d/t limited mobility and need for accurate I&Os.  -Endo: pre op a1c: 10.2. Hx of DMII, PTA glipizide, metformin on hold. Remains on insulin infusion, Hospitalist consulted to assist with post operative glycemic control. PTA allopurinol  resumed.  -FEN: replace electrolytes as needed. Na: 143. K: 3.2. Hypokalemia-- please replace  -ID: Temp (24hrs), Av.3  F (37.9  C), Min:98.6  F (37  C), Max:101.7  F (38.7  C)  WBC: 13.8<8.9. Completed perioperative abx. Leukocytosis likely related to stress from surgery and prolonged ventilation. Will obtain procal today. Monitor for signs of pneumonia  -Heme: hgb: 8.5. plt: 374. Acute blood loss anemia and thrombocytopenia related to surgery  -Proph: PCD, ASA, BB, statin, PPI, sub q heparin  -Dispo: Continue ICU cares today. Wean oxygen as able. Continue Chest tubes and krishna catheter today. SLP evaluation. Continue to encourage IS, cough, deep breathing, ambulation.           Interval History:   Extubated yesterday. Grossly intact with some occasional confusion. Saturating well on 5L. Pain controlled. Whispering d/t hoarse voice.          Medications:       allopurinol  100 mg Oral Daily     amiodarone  200 mg Oral or FT or NG tube BID    Followed by     [START ON 2021] amiodarone  200 mg Oral or FT or NG tube Daily     amLODIPine  2.5 mg Oral Daily     aspirin  324 mg Oral or NG Tube Daily     insulin aspart  1-6 Units Subcutaneous Q4H     insulin glargine  30 Units Subcutaneous QAM AC     metoprolol tartrate  25 mg Oral BID     multivitamins w/minerals  15 mL Per Feeding Tube Daily     pantoprazole  40 mg Per Feeding Tube QAM AC    Or     pantoprazole (PROTONIX) IV  40 mg Intravenous QAM AC     polyethylene glycol  17 g Oral Daily     potassium & sodium phosphates  2 packet Oral or Feeding Tube TID     protein modular  1 packet Per Feeding Tube TID     rosuvastatin  10 mg Oral Daily     senna-docusate  1 tablet Oral BID     sodium chloride (PF)  3 mL Intracatheter Q8H     thiamine  250 mg Intravenous Daily    Followed by     [START ON 2021] thiamine  100 mg Oral Daily     acetaminophen, bisacodyl, dextrose, glucose **OR** dextrose **OR** glucagon, EPINEPHrine, haloperidol lactate, hydrALAZINE,  HYDROmorphone **OR** HYDROmorphone, lidocaine 4%, lidocaine (buffered or not buffered), magnesium hydroxide, metoprolol, naloxone **OR** naloxone **OR** naloxone **OR** naloxone, ondansetron **OR** ondansetron, oxyCODONE **OR** oxyCODONE, phenylephrine, QUEtiapine, BETA BLOCKER NOT PRESCRIBED, sodium chloride (PF)          Physical Exam:   Vitals were reviewed  Blood pressure (!) 155/79, pulse 93, temperature (!) 100.6  F (38.1  C), temperature source Bladder, resp. rate 14, weight 89.3 kg (196 lb 13.9 oz), SpO2 94 %.  Rhythm: NSR    Lungs: diminished bases    Cardiovascular: RRR normal s1 and s2    Abdomen: soft NTND    Extremeties: minimal edema    Incision: wound vac in place    CT: to suction    Weight:   Vitals:    09/10/21 0430 09/11/21 0100 09/12/21 0200 09/13/21 0400   Weight: 88.6 kg (195 lb 5.2 oz) 87.7 kg (193 lb 5.5 oz) 87.5 kg (192 lb 14.4 oz) 89.2 kg (196 lb 10.4 oz)    09/15/21 0500   Weight: 89.3 kg (196 lb 13.9 oz)            Data:   Labs:   Lab Results   Component Value Date    WBC 13.8 09/15/2021     Lab Results   Component Value Date    RBC 3.32 09/15/2021     Lab Results   Component Value Date    HGB 8.5 09/15/2021     Lab Results   Component Value Date    HCT 26.5 09/15/2021     No components found for: MCT  Lab Results   Component Value Date    MCV 80 09/15/2021     Lab Results   Component Value Date    MCH 25.6 09/15/2021     Lab Results   Component Value Date    MCHC 32.1 09/15/2021     Lab Results   Component Value Date    RDW 15.3 09/15/2021     Lab Results   Component Value Date     09/15/2021       Last Basic Metabolic Panel:  Lab Results   Component Value Date     09/15/2021      Lab Results   Component Value Date    POTASSIUM 3.2 09/15/2021     Lab Results   Component Value Date    CHLORIDE 110 09/15/2021     Lab Results   Component Value Date    TORRES 8.2 09/15/2021     Lab Results   Component Value Date    CO2 29 09/15/2021     Lab Results   Component Value Date    BUN 13  09/15/2021     Lab Results   Component Value Date    CR 1.00 09/15/2021     Lab Results   Component Value Date     09/15/2021     09/15/2021       Aaliyah Muse PA-C  [facility]  Pager # [phone]

## 2021-09-15 NOTE — PROGRESS NOTES
[facility]   CRITICAL CARE NOTE     Jerry Bustamante MRN: [id number]  1970  Date of Admission:9/2/2021          Problem List:   1. Acute resp failure post CABG, with respiratory acidosis (corrected)  2. multivessel CAD post CABG x 4   3. Encephalopathy, unclear, MRI/EEG: no acute changes. (resolved)  4. Post operative shock, presumed cardiogenic   5. Atrial flutter post shock cardioversion   6. Extubated yesterday     24-Hour Goals   1.Monitor for arrhythmias   2. Keep on ICU for observation for one more day       Overnight Events   No major events during the night, Extubated yesterday morning.        Lines/Tubes/Draines/Devices   .  Peripheral IV 09/01/21 Anterior;Right (Active)   Site Assessment [facility] 09/15/21 0400   Line Status Saline locked 09/15/21 0400   Dressing Intervention New dressing  09/14/21 0400   Phlebitis Scale 0-->no symptoms 09/15/21 0400   Infiltration Scale 0 09/15/21 0400   Infiltration Site Treatment Method  None 09/12/21 0000   If infiltrated, was a vesicant infusing? No 09/12/21 0000   Number of days: 14       Peripheral IV 09/08/21 Right Hand (Active)   Site Assessment [facility] 09/15/21 0400   Line Status Saline locked 09/15/21 0400   Dressing Intervention New dressing  09/09/21 0000   Phlebitis Scale 0-->no symptoms 09/14/21 0600   Infiltration Scale 0 09/14/21 0600   Infiltration Site Treatment Method  None 09/12/21 0000   If infiltrated, was a vesicant infusing? No 09/12/21 0000   Number of days: 7       CVC TRIPLE LUMEN Right Internal jugular (Active)   Site Assessment [facility] 09/15/21 0400   Dressing Type Chlorhexidine sponge;Transparent 09/15/21 0400   Dressing Status clean;dry;intact 09/14/21 0800   Dressing Intervention new dressing 09/13/21 1600   Line Necessity yes, meets criteria 09/13/21 2000   Blue - Status infusing 09/15/21 0400   Brown - Status infusing 09/15/21 0400   White - Status infusing 09/15/21 0400   Number of days: 4       Right Groin Interventional  Procedure Access (Active)   Site Assessment [facility] 09/15/21 0000   Hemostasis management Unchanged 09/14/21 2000   Femoral Bruit not present 09/13/21 0800   CMS Right Extremity WDL 09/15/21 0000   Dorsalis Pulse - Right Leg Normal 09/13/21 0800   Popliteal Pulse - Right Leg Present with doppler 09/11/21 1200   Posterior Tibial Pulse - Right Leg Normal 09/13/21 0800   Number of days: 13       Chest Tube 1 Left Mediastinal 24 English (Active)   Site Assessment WDL 09/15/21 0800   Suction -20 cm H2O 09/15/21 0800   Chest Tube Airleak No 09/15/21 0800   Drainage Description Serous 09/15/21 0800   Dressing Status Normal: Clean, Dry & Intact 09/15/21 0800   Dressing Change Due 09/14/21 09/13/21 0000   Dressing Intervention Gauze 09/15/21 0800   Patency Intervention Tip/Tilt 09/15/21 0800   Chest Tube Clamps at Bedside present 09/14/21 2000   Container Amount 840 09/15/21 0600   Output (ml) 0 ml 09/15/21 0600   Number of days: 7       Chest Tube 2 Pleural 32 English (Active)   Site Assessment WDL 09/15/21 0800   Suction -20 cm H2O 09/15/21 0800   Chest Tube Airleak No 09/15/21 0800   Drainage Description Serous 09/15/21 0800   Dressing Status Normal: Clean, Dry & Intact 09/15/21 0800   Dressing Change Due 09/14/21 09/13/21 0000   Dressing Intervention Gauze 09/15/21 0800   Patency Intervention Tip/Tilt 09/15/21 0800   Chest Tube Clamps at Bedside present 09/14/21 2000   Container Amount 1660 09/15/21 0600   Output (ml) 40 ml 09/15/21 0600   Number of days: 7       Chest Tube 3 Mediastinal 32 English (Active)   Site Assessment [facility] 09/15/21 0800   Suction -20 cm H2O 09/15/21 0800   Chest Tube Airleak No 09/15/21 0800   Drainage Description Serous 09/15/21 0800   Dressing Status Normal: Clean, Dry & Intact 09/15/21 0800   Dressing Change Due 09/14/21 09/13/21 0000   Dressing Intervention Gauze 09/15/21 0800   Patency Intervention Tip/Tilt 09/15/21 0800   Chest Tube Clamps at Bedside present 09/14/21 2000   Number of days: 7        Chest Tube 4 Right Pleural 24 [place] (Active)   Site Assessment WDL 09/15/21 0800   Suction -20 cm H2O 09/15/21 0800   Chest Tube Airleak No 09/15/21 0800   Drainage Description Serous 09/15/21 0800   Dressing Status Normal: Clean, Dry & Intact 09/15/21 0800   Dressing Change Due 09/14/21 09/13/21 0000   Dressing Intervention Gauze 09/15/21 0800   Patency Intervention Tip/Tilt 09/15/21 0800   Chest Tube Clamps at Bedside present 09/14/21 2000   Number of days: 7       Negative Pressure Wound Therapy Chest (Active)   Wound Type Surgical 09/15/21 0800   Unit Type 1 09/14/21 0400   Dressing Pieces Applied (# of Each Type) Black foam 09/15/21 0800   Cycle Continuous 09/15/21 0800   Target Pressure (mmHg) 125 09/15/21 0800   (Retired) Dressing Status Clean, dry, intact 09/15/21 0800   [place] changed? No 09/15/21 0800   Output (ml) 0 ml 09/15/21 0800   Number of days: 7       NG/OG/NJ Tube Nasogastric 10 fr Right nostril (Active)   Site Description WDL 09/15/21 0800   Status Enteral Feedings 09/15/21 0800   Placement Confirmation [place] unchanged 09/15/21 0800   [place] (cm marking) at nare/mouth 109 cm 09/15/21 0800   Intake (ml) 60 ml 09/15/21 0800   Flush/Free Water (mL) 60 mL 09/15/21 0800   Residual (mL) 0 mL 09/14/21 0600   Number of days: 2       Urethral Catheter Latex;Temperature probe;Straight-tip 16 fr (Active)   Tube Description Positional 09/15/21 0800   Catheter Care Catheter wipes;Done 09/14/21 2000   Collection Container Standard;Patent 09/15/21 0800   Securement Method Securing device (Describe) 09/15/21 0800   Rationale for Continued Use Strict 1-2 Hour I&O 09/15/21 0800   Urine Output 70 mL 09/15/21 0800   Number of days: 7       Incision/Surgical Site 09/08/21 Chest (Active)   Incision Assessment UTV 09/15/21 0400   Jammie-Incision Assessment UTV 09/14/21 2000   Closure SIVA 09/15/21 0400   Incision Drainage Amount None 09/15/21 0400   Drainage Description UTV 09/15/21 0400   Incision Care Therapy  - negative pressure 09/15/21 0400   Dressing Intervention Clean, dry, intact 09/15/21 0400   Number of days: 7       Incision/Surgical Site 09/08/21 Left Leg (Active)   Incision Assessment WDL except 09/15/21 0400   Jammie-Incision Assessment Ecchymosis 09/14/21 2000   Closure Approximated;Liquid bandage 09/15/21 0400   Incision Drainage Amount None 09/14/21 0800   Incision Care Soap and water 09/13/21 0400   Dressing Intervention Open to air / No Dressing 09/15/21 0400   Number of days: 7       Incision/Surgical Site 09/08/21 Left Wrist (Active)   Incision Assessment WDL except 09/15/21 0400   Jammie-Incision Assessment Ecchymosis 09/14/21 2000   Closure Approximated;Liquid bandage 09/15/21 0400   Incision Drainage Amount None 09/15/21 0400   Incision Care Soap and water 09/13/21 0400   Dressing Intervention Open to air / No Dressing 09/15/21 0400   Number of days: 7          ICU Prophylaxis:   1. DVT: Hep ggt/mechanical  2. VAP: HOB 30 degrees, chlorhexidine rinse  3. Stress Ulcer: PPI  4. Restraints: Nonviolent soft two point restraints required and necessary for patient safety and continued cares and good effect as patient continues to pull at necessary lines, tubes despite education and distraction. Will readdress daily.   5. IV Access - central access required and necessary for continued patient cares  6. Feeding - Enteral feeding       Medications:       allopurinol  100 mg Oral Daily     amiodarone  200 mg Oral or FT or NG tube BID    Followed by     [START ON 9/28/2021] amiodarone  200 mg Oral or FT or NG tube Daily     [START ON 9/16/2021] amLODIPine  5 mg Oral Daily     aspirin  324 mg Oral or NG Tube Daily     insulin aspart  1-6 Units Subcutaneous Q4H     insulin glargine  30 Units Subcutaneous QAM AC     metoprolol tartrate  25 mg Oral BID     pantoprazole  40 mg Per Feeding Tube QAM AC    Or     pantoprazole (PROTONIX) IV  40 mg Intravenous QAM AC     polyethylene glycol  17 g Oral Daily     potassium &  sodium phosphates  2 packet Oral or Feeding Tube TID     rosuvastatin  10 mg Oral Daily     senna-docusate  1 tablet Oral BID     sodium chloride (PF)  3 mL Intracatheter Q8H     thiamine  250 mg Intravenous Daily    Followed by     [START ON 9/19/2021] thiamine  100 mg Oral Daily     acetaminophen, bisacodyl, dextrose, glucose **OR** dextrose **OR** glucagon, EPINEPHrine, haloperidol lactate, hydrALAZINE, HYDROmorphone **OR** HYDROmorphone, lidocaine 4%, lidocaine (buffered or not buffered), magnesium hydroxide, metoprolol, naloxone **OR** naloxone **OR** naloxone **OR** naloxone, ondansetron **OR** ondansetron, oxyCODONE **OR** oxyCODONE, phenylephrine, QUEtiapine, BETA BLOCKER NOT PRESCRIBED, sodium chloride (PF)      Review of Systems:   Unable to obtain due to critical illness            Physical Exam:   Temp:  [99.1  F (37.3  C)-101.7  F (38.7  C)] 99.9  F (37.7  C)  Pulse:  [] 84  Resp:  [14-30] 26  BP: (103-162)/(49-95) 142/70  MAP:  [143 mmHg] 143 mmHg  Arterial Line BP: (149)/(115) 149/115  SpO2:  [83 %-100 %] 100 %    Intake/Output Summary (Last 24 hours) at 9/15/2021 1327  Last data filed at 9/15/2021 1202  Gross per 24 hour   Intake 2637.87 ml   Output 2835 ml   Net -197.13 ml     Wt Readings from Last 4 Encounters:   09/15/21 89.3 kg (196 lb 13.9 oz)   09/02/21 86.6 kg (191 lb)   07/31/19 88.5 kg (195 lb)   01/09/18 88.4 kg (194 lb 12.8 oz)     Arterial Line BP: (149)/(115) 149/115  MAP:  [143 mmHg] 143 mmHg  BP - Mean:  [] 88  Ventilation Mode: CPAP/PS  (Continuous positive airway pressure with Pressure Support)  FiO2 (%): 30 %  PEEP (cm H2O): 5 cmH2O  Pressure Support (cm H2O): 5 cmH2O  Oxygen Concentration (%): 30 %  Resp: 26    Recent Labs   Lab 09/11/21  1815 09/09/21  0446 09/08/21  2217 09/08/21 2004   PH 7.42 7.37 7.29* 7.29*   PCO2 34* 43 47* 46*   PO2 88 103 130* 70*   HCO3 22 24 23 22   O2PER 30 60 40 60       GEN: no acute distress   HEENT: head ncat, sclera anicteric, OP  patent, trachea midline   PULM: clear anteriorly    CV/COR: RRR S1S2 no gallop,  No rub, no murmur  ABD: soft nontender, hypoactive bowel sounds, no mass  EXT:  Negative Edema  warm  NEURO: grossly intact  SKIN: no obvious rash      Assessment and plan :     Neurology/Psychiatry/Pain/Sedation:   #Encephalopathy, metabolic, MRI and EEG virtually normal, Neurocritical care recommends no further investigations at the moment.   #Agitated delirium Reportedly having bouts of agitation when sedation is reduced.   - off sedation conscious alert and oriented    - CT head 9/10 negative for intracranial hemorrhage, MRI negative   - continue empiric thiamine        Cardiovascular/Hemodynamics:   #post CABG x 4  #A - flutter - sock cardioverted yesterday   goal MAP >65      Pulmonary/Ventilator Management:   #Acute respiratory failure with hypercapnia ; -Extubated yesterday  Plan:   - Physiotherapy and cough exercise      GI/Nutrition:   #Protein calorie malnutrition in the setting of acute illness  - NJ in place enteral feeds   - Bowel regimen  - Speech therapy assessment      Renal/Fluids/Electrolytes:   No issues, good UOP.    Intake/Output Summary (Last 24 hours) at 9/15/2021 1333  Last data filed at 9/15/2021 1202  Gross per 24 hour   Intake 2637.87 ml   Output 2835 ml   Net -197.13 ml       Infectious Disease:   # Hx of latent TB  Cefazolin ppx   Persistent leukocytosis->resolved   Cultures negative so far     Endocrine/Hematology/Oncology:   Type 2 DM, uncontrolled.  ICU protocol to keep glucose < 180  Daily Aspirin  Subcutaneous heparin     Disposition/Code Status/Other  1. Disposition: ICU  2. Code: Full    I have personally reviewed the daily labs, imaging studies, cultures and discussed the case with referring physician and consulting physicians.     This patient is critically ill and I have provided 30 minutes of critical care time (excluding procedures) on September 15, 2021.   JIMBO Jones   SICU Fellow,  [facility]    ROUTINE ICU LABS (Last four results)  CMP  Recent Labs   Lab 09/15/21  1202 09/15/21  0835 09/15/21  0536 09/15/21  0506 09/14/21  1049 09/14/21  1045 09/14/21  0611 09/14/21  0344 09/13/21  0045 09/13/21  0005 09/12/21  0609 09/12/21  0412 09/11/21  1759 09/11/21  1534 09/08/21 2002 09/08/21  1829   NA  --   --   --  143  --   --   --  144  --  140  --  141  --   --    < > 143   POTASSIUM  --   --   --  3.2*  --  3.5  --  3.4  --  4.3   < > 3.7  --   --    < > 3.5   CHLORIDE  --   --   --  110*  --   --   --  110*  --  108  --  111*  --   --    < > 114*   CO2  --   --   --  29  --   --   --  28  --  18*  --  24  --   --    < > 22   ANIONGAP  --   --   --  4  --   --   --  6  --  14  --  6  --   --    < > 7   * 134* 170* 173*   < >  --    < > 137*   < > 215*   < > 99   < >  --    < > 149*   BUN  --   --   --  13  --   --   --  12  --  10  --  9  --   --    < > 19   CR  --   --   --  1.00  --   --   --  0.83  --  0.95  --  0.82  --   --    < > 1.15   GFRESTIMATED  --   --   --  86  --   --   --  >90  --  >90  --  >90  --   --    < > 72   TORRES  --   --   --  8.2*  --   --   --  8.5  --  8.0*  --  8.2*  --   --    < > 8.6   MAG  --   --   --  2.2  --   --   --  2.4*  --  2.1  --  2.0  --   --    < >  --    PHOS  --   --   --  1.9*  --   --   --  2.8  --  4.5  --  3.7  --   --    < >  --    PROTTOTAL  --   --   --   --   --   --   --  6.4*  --  6.5*  --   --   --  6.4*  --  6.9   ALBUMIN  --   --   --   --   --   --   --  1.8*  --  1.9*  --   --   --  1.9*  --  3.5   BILITOTAL  --   --   --   --   --   --   --  0.3  --  0.5  --   --   --  0.5  --  0.6   ALKPHOS  --   --   --   --   --   --   --  262*  --  272*  --   --   --  218*  --  150   AST  --   --   --   --   --   --   --  16  --  16  --   --   --  17  --  50*   ALT  --   --   --   --   --   --   --  15  --  18  --   --   --  18  --  47    < > = values in this interval not displayed.     CBC  Recent Labs   Lab 09/15/21  0506 09/14/21  0344  09/13/21  0918 09/13/21  0005   WBC 13.8* 8.9 6.4 7.4   RBC 3.32* 3.03* 3.02* 2.98*   HGB 8.5* 7.8* 7.8* 7.7*   HCT 26.5* 24.4* 24.3* 24.5*   MCV 80 81 81 82   MCH 25.6* 25.7* 25.8* 25.8*   MCHC 32.1 32.0 32.1 31.4*   RDW 15.3* 15.5* 15.2* 15.4*    260 264 239     INR  Recent Labs   Lab 09/08/21  1545 09/08/21  1423   INR 1.23* 1.41*     Arterial Blood Gas  Recent Labs   Lab 09/11/21  1815 09/09/21  0446 09/08/21  2217 09/08/21 2004   PH 7.42 7.37 7.29* 7.29*   PCO2 34* 43 47* 46*   PO2 88 103 130* 70*   HCO3 22 24 23 22   O2PER 30 60 40 60       All cultures:  No results for input(s): CULT in the last 168 hours.  No results found for this or any previous visit (from the past 24 hour(s)).

## 2021-09-15 NOTE — PROGRESS NOTES
CLINICAL NUTRITION SERVICES - REASSESSMENT NOTE      Recommendations Ordered by Registered Dietitian (RD): Increase TF goal to Osmolite 1.5 at 65 mL/hr to provide:  2340 kcal (27 kcal/kg), 98 g protein (1.12 g/kg and 93% needs), 318 g CHO, no fiber, 1189 mL H2O  Discontinue the ProSource and Certavite as no longer needed    Malnutrition: (9/11)  % Weight Loss:  None noted  % Intake:  </= 50% for >/= 5 days (severe malnutrition)(will meet 9/12)  Subcutaneous Fat Loss:  None observed  Muscle Loss:  None observed  Fluid Retention:  None noted     Malnutrition Diagnosis: Patient does not meet two of the above criteria necessary for diagnosing malnutrition       EVALUATION OF PROGRESS TOWARD GOALS   Diet:  NPO     Nutrition Support:  TF initiated on 9/11, advanced to goal last evening on 9/14, and continues as follows ~    Nutrition Support Enteral:  Type of Feeding Tube: NJ  Enteral Frequency:  Continuous  Enteral Regimen: Osmolite 1.5 at 50 mL/hr  Total Enteral Provisions: 1800 kcal, 75 g protein, 914 mL H2O, 244 g CHO, 0 g fiber   Free Water Flush: 60 mL every 4 hours  Also receiving ProSource TID = 120 kcal and 33 g protein  Total = 1920 kcal (22 kcal/kg 87% energy), 108 g protein (1.2 g/kg)      Intake/Tolerance:    K 3.2 (L), Mg normal, Phos 1.9 (L) - Getting K and Phos replacement   -185 with Lantus 30 units in am and Medium sliding scale insulin   BM x 1 on 9/14 - bowel meds on hold    mL     ASSESSED NUTRITION NEEDS:  Dosing Weight 87.5 kg   Estimated Energy Needs: 0451-1612 kcals (25-30 Kcal/Kg)  Justification: maintenance  Estimated Protein Needs: 105-130 grams protein (1.2-1.5 g pro/Kg)  Justification: post-op and hypercatabolism with acute illness      NEW FINDINGS:   9/14:  Extubated  Propofol off 9/14    Pressors off 9/13  Patient receiving MVI and Thiamine for supplementation     Previous Goals (9/11):   Patient will meet nutritional needs within the next 48 hours   Evaluation: Met but  falling slightly short of energy needs now that Propofol off     Previous Nutrition Diagnosis (9/11):   Inadequate protein-energy intake related to NPO on vent with plans to start TF today as evidenced by meeting 0% needs  Evaluation: Improving      CURRENT NUTRITION DIAGNOSIS  Inadequate energy intake related to TF at goal but Propofol now off as evidenced by meeting 87% energy needs     INTERVENTIONS  Recommendations / Nutrition Prescription  Increase TF goal to Osmolite 1.5 at 65 mL/hr to provide:  2340 kcal (27 kcal/kg), 98 g protein (1.12 g/kg and 93% needs), 318 g CHO, no fiber, 1189 mL H2O  Discontinue the ProSource and Certavite as no longer needed     Implementation  EN Composition:  Increased TF to goal as above   Medical Food Supplement, Multivitamin/Mineral:  Discontinued as above   Collaboration and Referral of Nutrition care:  Patient discussed today during interdisciplinary bedside rounds     Goals  Osmolite 1.5 at 65 mL/hr will meet % needs while NPO     MONITORING AND EVALUATION:  Progress towards goals will be monitored and evaluated per protocol and Practice Guidelines    Heidi Wild, RD, LD, CNSC   Clinical Dietitian - [facility]

## 2021-09-15 NOTE — PROGRESS NOTES
Heparin Anti-Xa level collected and ran during epic downtime.  Anti Xa level came back at 0.10.  Spoke with RANDOLPH Vinson RPH and stated increase heparin infusion to 1600 units/hour. Infusion adjusted per pharmacist and Heparin Anti-Xa level recheck ordered for 6 hours after result.  Will continue to monitor.

## 2021-09-15 NOTE — PLAN OF CARE
No noted neuro changes.  Pt oriented x2-3 and needed encouragement to interact with staff and follow commands.  Pt moving all extremities purposefully with generalized weakness noted. Pt SR-ST with blood pressure parameters of SBP less than 140 with scheduled medications and IV hydralazine given (see MAR) to maintain.  Pt on 5 L oxygen via nasal cannula with a productive cough throughout shift.  Pt's tube feeding infusing at goal rate with krishna catheter present.  Pt rating pain 0-5/10 with PRN medications given (see MAR) with relief, pt being repositioned for comfort and utilized ice.  Will continue to monitor.

## 2021-09-15 NOTE — PROGRESS NOTES
09/15/21 0924   Quick Adds   Type of Visit Initial PT Evaluation   Living Environment   People in home spouse   Current Living Arrangements apartment   Home Accessibility no concerns   Transportation Anticipated car, drives self   Living Environment Comments Pt reports he lives in first floor apartment, denies having any stairs.   Self-Care   Usual Activity Tolerance good   Current Activity Tolerance poor   Regular Exercise No   Equipment Currently Used at Home none   Activity/Exercise/Self-Care Comment Prior to admission pt was independent without use of assistive device, works as interpretter.   Disability/Function   Fall history within last six months no   General Information   Onset of Illness/Injury or Date of Surgery 09/02/21   Referring Physician Matthew Devi MD   Patient/Family Therapy Goals Statement (PT) To get better   Pertinent History of Current Problem (include personal factors and/or comorbidities that impact the POC) Pt is 51 year old male adm on 9/2/21 as a transfer from [facility] for further management of NSTEMI. Angiogram showed severe 3 vessel disease. Pt underwent CABGx4 on 9/8/21. Post-op complicated by delirium and agitation, a-fib with RVR, was successfully cardioverted on 9/13/21 and extubated on 9/14/21. PMH includes CAD s/p stenting, HTN, incr chol, DM Type 2, and gout.   Existing Precautions/Restrictions fall;sternal   Cognition   Orientation Status (Cognition) oriented to;person;place   Affect/Mental Status (Cognition) WFL   Follows Commands (Cognition) WFL   Cognitive Status Comments Pt answering orientation questions but also appearing confused at times, needing cues and redirection often throughout session.   Pain Assessment   Patient Currently in Pain No   Posture    Posture Forward head position;Protracted shoulders   Range of Motion (ROM)   ROM Comment B LE ROM WFL   Strength   Strength Comments Pt overall deconditioned, LE strength grossly 3/5, has difficulty using  functionally at times.   Bed Mobility   Comment (Bed Mobility) Mod assist of 2   Transfers   Transfer Safety Comments Mod to max assist of 2   Gait/Stairs (Locomotion)   Comment (Gait/Stairs) Mod to max assist of 2   Balance   Balance Comments Close supervision to min assist for sitting balance, assist of 2 for standing balance   Sensory Examination   Sensory Perception Comments Denies numbness/tingling   Clinical Impression   Criteria for Skilled Therapeutic Intervention yes, treatment indicated   PT Diagnosis (PT) Impaired mobility   Influenced by the following impairments Decreased strength, decreased balance, decreased activity tolerance   Functional limitations due to impairments Decreased ability to participate in daily tasks   Clinical Presentation Stable/Uncomplicated   Clinical Presentation Rationale Current presentation, [facility]   Clinical Decision Making (Complexity) low complexity   Therapy Frequency (PT) 2x/day   Predicted Duration of Therapy Intervention (days/wks) 7 days   Planned Therapy Interventions (PT) balance training;bed mobility training;gait training;home exercise program;patient/family education;stair training;strengthening;transfer training;progressive activity/exercise;risk factor education;home program guidelines   Risk & Benefits of therapy have been explained evaluation/treatment results reviewed;care plan/treatment goals reviewed;risks/benefits reviewed;current/potential barriers reviewed;participants voiced agreement with care plan;participants included;patient   PT Discharge Planning    PT Discharge Recommendation (DC Rec) Acute Rehab Center-Motivated patient will benefit from intensive, interdisciplinary therapy.  Anticipate will be able to tolerate 3 hours of therapy per day   PT Rationale for DC Rec Pt is well below baseline level of function, had complicated post-op course, at this time would benefit from continued inpatient rehab to optimize functional recovery prior to return home.  Depending on progress during hospital stay, pt may be able to return home with outpatient cardiac rehab. Will continue to follow and update discharge recommendations as needed.   Total Evaluation Time   Total Evaluation Time (Minutes) 10

## 2021-09-15 NOTE — PROGRESS NOTES
09/15/21 1551   General Information   Onset of Illness/Injury or Date of Surgery 09/08/21   Referring Physician Aaliyah Muse PA-C   Patient/Family Therapy Goal Statement (SLP) Wants to eat/drink    Pertinent History of Current Problem 50 y/o male with PMH of CAD s/p stenting,  HTN, HLP, DM type 2, on insulin and gout- directly admitted from [facility] on 9/2/21 for NSTEMI and 1 week history of let sided chest pain. Underwent echocardiogram with some wall motion abnormalities, Coronary Angio then showed severe 3 vessel disease. He underwent 4V CABG on 9/8/21. Post procedure, he remained intubated until 9/14/21 due to delirium, agitation. He was assessed by neurology and underwent EEG. He also had post operative atrial flutter with RVR that was resistant to BB and amiodarone, but was successfully cardioverted on 9/13/21   General Observations Pt is alert, agreeable and extremely eager for PO and to get out of the ICU. No dysphagia hx per pt and his son    Past History of Dysphagia None apparent   Type of Evaluation   Type of Evaluation Swallow Evaluation   Oral Motor   Oral Musculature generally intact   Structural Abnormalities none present   Mucosal Quality adequate   Dentition (Oral Motor)   Dentition (Oral Motor) adequate dentition   Facial Symmetry (Oral Motor)   Facial Symmetry (Oral Motor) WNL   Lip Function (Oral Motor)   Lip Range of Motion (Oral Motor) WNL   Tongue Function (Oral Motor)   Tongue ROM (Oral Motor) WNL   Cough/Swallow/Gag Reflex (Oral Motor)   Volitional Throat Clear/Cough (Oral Motor) impaired;reduced strength   Vocal Quality/Secretion Management (Oral Motor)   Vocal Quality (Oral Motor) hoarse;dysphonic   Secretion Management (Oral Motor) wet/gurgly vocal quality;oral suctioning required   General Swallowing Observations   Current Diet/Method of Nutritional Intake (General Swallowing Observations, NIS) NPO;nasogastric tube (NG)   Respiratory Support (General Swallowing Observations)  none   Comment, Secretions/Suctioning Pt is coughing up secretions and using oral suction to assist in clearing    Swallowing Evaluation Clinical swallow evaluation   Clinical Swallow Evaluation   Feeding Assistance minimal assistance required   Clinical Swallow Evaluation Textures Trialed thin liquids;mildly thick liquids;pureed   Clinical Swallow Eval: Thin Liquid Texture Trial   Mode of Presentation, Thin Liquids cup;spoon;fed by clinician   Volume of Liquid or Food Presented ice chips, teaspoon of thin, cup sip of thin   Oral Phase of Swallow Premature pharyngeal entry   Pharyngeal Phase of Swallow impaired;coughing/choking;reduction in laryngeal movement;repeated swallows;wet vocal quality after swallow;throat clearing   Diagnostic Statement Overt indications of aspiration with teaspoon and especially with cup sip. Throat clear with teaspoons, overt cough and wet quality after cup sip   Clinical Swallow Eval: Mildly Thick Liquids   Mode of Presentation cup;spoon;self-fed   Volume Presented 2 oz    Oral Phase premature pharyngeal entry   Pharyngeal Phase impaired;coughing/choking;repeated swallows;throat clearing   Diagnostic Statement Throat clearing with cup sip of mildly thick, wet quality after cup sip   Clinical Swallow Evaluation: Puree Solid Texture Trial   Mode of Presentation, Puree spoon;fed by clinician   Volume of Puree Presented 1 oz    Oral Phase, Puree Poor AP movement;Premature pharyngeal entry   Pharyngeal Phase, Puree impaired;repeated swallows;throat clearing   Diagnostic Statement Throat clear and reduced hyolaryngeal excrusion. Throat clearing difficult to determine if related to penetration/aspiration or secretion management    Esophageal Phase of Swallow   Patient reports or presents with symptoms of esophageal dysphagia No   Swallowing Recommendations   Diet Consistency Recommendations NPO;ice chips only   Supervision Level for Intake close supervision needed   Mode of Delivery  Recommendations bolus size, small;slow rate of intake   Swallowing Maneuver Recommendations effortful (hard) swallow;extra swallow   Monitoring/Assistance Required (Eating/Swallowing) stop eating activities when fatigue is present;monitor for cough or change in vocal quality with intake   Recommended Feeding/Eating Techniques (Swallow Eval) maintain upright sitting position for eating;maintain upright posture during/after eating for 30 minutes;minimize distractions during oral intake;provide oral hygiene prior to intake   Medication Administration Recommendations, Swallowing (SLP) Through feeding tube   Instrumental Assessment Recommendations VFSS (videofluroscopic swallowing study)  (Video may be needed in the future)   General Therapy Interventions   Planned Therapy Interventions Dysphagia Treatment   Dysphagia treatment Oropharyngeal exercise training;Modified diet education;Instruction of safe swallow strategies   SLP Therapy Assessment/Plan   Criteria for Skilled Therapeutic Interventions Met (SLP Eval) yes;treatment indicated   SLP Diagnosis Moderate-severe oropharyngeal dysphagia    Rehab Potential (SLP Eval) good, to achieve stated therapy goals   Therapy Frequency (SLP Eval) 5 times/wk   Predicted Duration of Therapy Intervention (SLP Eval) 2 weeks   Comment, Therapy Assessment/Plan (SLP) Pt seen for clinical swallow evaluation. Pt's vocal quality is hoarse, and suspect impaired airway protection. Pt intermittently clearing his throat regardless of PO intake secondary to secretion management. The frequency of his throat clearing increased with PO intake but was difficult to determine aspiration risk. Overt cough response was noted following small cup sips of thin and mildly thick liquids, much more concerning for aspiration. Suspect premature spillage and reduced hyolaryngeal excursion and likely impaired airway protection. Moderate-severe oropharyngeal dysphagia secondary to recent prolonged intubation and  impaired vocal quality.  Recommend pt remain NPO with tube feeding as primary source of nutrition/hydration/medication. Ice chips allowed, after oral cares and with supervision d/t pt's impulsivity. Initiate SLP services for dysphagia.    Therapy Plan Review/Discharge Plan (SLP)   Therapy Plan Review (SLP) evaluation/treatment results reviewed;care plan/treatment goals reviewed;risks/benefits reviewed;current/potential barriers reviewed;participants voiced agreement with care plan;participants included;patient;son   SLP Discharge Planning    SLP Discharge Recommendation (DC Rec) Transitional Care Facility   SLP Rationale for DC Rec Dysphagia, below baseline    SLP Brief overview of current status  Moderate-severe oropharyngeal dysphagia secondary to recent prolonged intubation and impaired vocal quality.  Recommend pt remain NPO with tube feeding as primary source of nutrition/hydration/medication. Ice chips allowed, after oral cares and with supervision d/t pt's impulsivity. Initiate SLP services for dysphagia.     Total Evaluation Time   Total Evaluation Time (Minutes) 8

## 2021-09-16 ENCOUNTER — APPOINTMENT (OUTPATIENT)
Dept: SPEECH THERAPY | Facility: CLINIC | Age: 51
End: 2021-09-16
Attending: INTERNAL MEDICINE
Payer: COMMERCIAL

## 2021-09-16 ENCOUNTER — APPOINTMENT (OUTPATIENT)
Dept: GENERAL RADIOLOGY | Facility: CLINIC | Age: 51
End: 2021-09-16
Attending: INTERNAL MEDICINE
Payer: COMMERCIAL

## 2021-09-16 ENCOUNTER — APPOINTMENT (OUTPATIENT)
Dept: PHYSICAL THERAPY | Facility: CLINIC | Age: 51
End: 2021-09-16
Attending: INTERNAL MEDICINE
Payer: COMMERCIAL

## 2021-09-16 ENCOUNTER — APPOINTMENT (OUTPATIENT)
Dept: GENERAL RADIOLOGY | Facility: CLINIC | Age: 51
End: 2021-09-16
Attending: PHYSICIAN ASSISTANT
Payer: COMMERCIAL

## 2021-09-16 LAB
ANION GAP SERPL CALCULATED.3IONS-SCNC: 4 MMOL/L (ref 3–14)
BACTERIA BLD CULT: NO GROWTH
BACTERIA BLD CULT: NO GROWTH
BUN SERPL-MCNC: 19 MG/DL (ref 7–30)
CALCIUM SERPL-MCNC: 8.5 MG/DL (ref 8.5–10.1)
CHLORIDE BLD-SCNC: 110 MMOL/L (ref 94–109)
CO2 SERPL-SCNC: 31 MMOL/L (ref 20–32)
CREAT SERPL-MCNC: 0.9 MG/DL (ref 0.66–1.25)
ERYTHROCYTE [DISTWIDTH] IN BLOOD BY AUTOMATED COUNT: 15.9 % (ref 10–15)
GFR SERPL CREATININE-BSD FRML MDRD: >90 ML/MIN/1.73M2
GLUCOSE BLD-MCNC: 389 MG/DL (ref 70–99)
GLUCOSE BLDC GLUCOMTR-MCNC: 143 MG/DL (ref 70–99)
GLUCOSE BLDC GLUCOMTR-MCNC: 167 MG/DL (ref 70–99)
GLUCOSE BLDC GLUCOMTR-MCNC: 186 MG/DL (ref 70–99)
GLUCOSE BLDC GLUCOMTR-MCNC: 206 MG/DL (ref 70–99)
GLUCOSE BLDC GLUCOMTR-MCNC: 243 MG/DL (ref 70–99)
GLUCOSE BLDC GLUCOMTR-MCNC: 317 MG/DL (ref 70–99)
GLUCOSE BLDC GLUCOMTR-MCNC: 354 MG/DL (ref 70–99)
GLUCOSE BLDC GLUCOMTR-MCNC: 359 MG/DL (ref 70–99)
GLUCOSE BLDC GLUCOMTR-MCNC: 371 MG/DL (ref 70–99)
GLUCOSE BLDC GLUCOMTR-MCNC: 385 MG/DL (ref 70–99)
HCT VFR BLD AUTO: 26.8 % (ref 40–53)
HGB BLD-MCNC: 8.2 G/DL (ref 13.3–17.7)
MAGNESIUM SERPL-MCNC: 2.5 MG/DL (ref 1.6–2.3)
MCH RBC QN AUTO: 25.5 PG (ref 26.5–33)
MCHC RBC AUTO-ENTMCNC: 30.6 G/DL (ref 31.5–36.5)
MCV RBC AUTO: 83 FL (ref 78–100)
PHOSPHATE SERPL-MCNC: 3.8 MG/DL (ref 2.5–4.5)
PLATELET # BLD AUTO: 387 10E3/UL (ref 150–450)
POTASSIUM BLD-SCNC: 3.9 MMOL/L (ref 3.4–5.3)
RBC # BLD AUTO: 3.22 10E6/UL (ref 4.4–5.9)
SODIUM SERPL-SCNC: 145 MMOL/L (ref 133–144)
UFH PPP CHRO-ACNC: 0.41 IU/ML
UFH PPP CHRO-ACNC: 0.56 IU/ML
UFH PPP CHRO-ACNC: >1.1 IU/ML
WBC # BLD AUTO: 12.5 10E3/UL (ref 4–11)

## 2021-09-16 PROCEDURE — 250N000011 HC RX IP 250 OP 636: Performed by: STUDENT IN AN ORGANIZED HEALTH CARE EDUCATION/TRAINING PROGRAM

## 2021-09-16 PROCEDURE — 250N000013 HC RX MED GY IP 250 OP 250 PS 637: Performed by: PHYSICIAN ASSISTANT

## 2021-09-16 PROCEDURE — 258N000003 HC RX IP 258 OP 636: Performed by: NURSE PRACTITIONER

## 2021-09-16 PROCEDURE — 36415 COLL VENOUS BLD VENIPUNCTURE: CPT | Performed by: SURGERY

## 2021-09-16 PROCEDURE — 250N000011 HC RX IP 250 OP 636: Performed by: INTERNAL MEDICINE

## 2021-09-16 PROCEDURE — 85520 HEPARIN ASSAY: CPT | Performed by: INTERNAL MEDICINE

## 2021-09-16 PROCEDURE — 258N000003 HC RX IP 258 OP 636: Performed by: INTERNAL MEDICINE

## 2021-09-16 PROCEDURE — 84100 ASSAY OF PHOSPHORUS: CPT | Performed by: INTERNAL MEDICINE

## 2021-09-16 PROCEDURE — 250N000013 HC RX MED GY IP 250 OP 250 PS 637: Performed by: STUDENT IN AN ORGANIZED HEALTH CARE EDUCATION/TRAINING PROGRAM

## 2021-09-16 PROCEDURE — 85520 HEPARIN ASSAY: CPT | Performed by: SURGERY

## 2021-09-16 PROCEDURE — 120N000001 HC R&B MED SURG/OB

## 2021-09-16 PROCEDURE — 74018 RADEX ABDOMEN 1 VIEW: CPT

## 2021-09-16 PROCEDURE — 250N000011 HC RX IP 250 OP 636: Performed by: PHYSICIAN ASSISTANT

## 2021-09-16 PROCEDURE — 999N000128 HC STATISTIC PERIPHERAL IV START W/O US GUIDANCE

## 2021-09-16 PROCEDURE — 99233 SBSQ HOSP IP/OBS HIGH 50: CPT | Performed by: INTERNAL MEDICINE

## 2021-09-16 PROCEDURE — 250N000013 HC RX MED GY IP 250 OP 250 PS 637: Performed by: NURSE PRACTITIONER

## 2021-09-16 PROCEDURE — 80048 BASIC METABOLIC PNL TOTAL CA: CPT | Performed by: PHYSICIAN ASSISTANT

## 2021-09-16 PROCEDURE — 71045 X-RAY EXAM CHEST 1 VIEW: CPT

## 2021-09-16 PROCEDURE — C9113 INJ PANTOPRAZOLE SODIUM, VIA: HCPCS | Performed by: STUDENT IN AN ORGANIZED HEALTH CARE EDUCATION/TRAINING PROGRAM

## 2021-09-16 PROCEDURE — 250N000012 HC RX MED GY IP 250 OP 636 PS 637: Performed by: INTERNAL MEDICINE

## 2021-09-16 PROCEDURE — 92526 ORAL FUNCTION THERAPY: CPT | Mod: GN

## 2021-09-16 PROCEDURE — 250N000009 HC RX 250: Performed by: PHYSICIAN ASSISTANT

## 2021-09-16 PROCEDURE — 250N000011 HC RX IP 250 OP 636: Performed by: NURSE PRACTITIONER

## 2021-09-16 PROCEDURE — 93005 ELECTROCARDIOGRAM TRACING: CPT

## 2021-09-16 PROCEDURE — 97530 THERAPEUTIC ACTIVITIES: CPT | Mod: GP

## 2021-09-16 PROCEDURE — U0003 INFECTIOUS AGENT DETECTION BY NUCLEIC ACID (DNA OR RNA); SEVERE ACUTE RESPIRATORY SYNDROME CORONAVIRUS 2 (SARS-COV-2) (CORONAVIRUS DISEASE [COVID-19]), AMPLIFIED PROBE TECHNIQUE, MAKING USE OF HIGH THROUGHPUT TECHNOLOGIES AS DESCRIBED BY CMS-2020-01-R: HCPCS | Performed by: INTERNAL MEDICINE

## 2021-09-16 PROCEDURE — 83735 ASSAY OF MAGNESIUM: CPT | Performed by: SURGERY

## 2021-09-16 PROCEDURE — 36415 COLL VENOUS BLD VENIPUNCTURE: CPT | Performed by: INTERNAL MEDICINE

## 2021-09-16 PROCEDURE — 36592 COLLECT BLOOD FROM PICC: CPT | Performed by: PHYSICIAN ASSISTANT

## 2021-09-16 PROCEDURE — 85027 COMPLETE CBC AUTOMATED: CPT | Performed by: PHYSICIAN ASSISTANT

## 2021-09-16 RX ORDER — METOPROLOL TARTRATE 1 MG/ML
5 INJECTION, SOLUTION INTRAVENOUS EVERY 5 MIN PRN
Status: DISCONTINUED | OUTPATIENT
Start: 2021-09-16 | End: 2021-09-22 | Stop reason: HOSPADM

## 2021-09-16 RX ORDER — FUROSEMIDE 10 MG/ML
40 INJECTION INTRAMUSCULAR; INTRAVENOUS 2 TIMES DAILY
Status: DISCONTINUED | OUTPATIENT
Start: 2021-09-16 | End: 2021-09-17

## 2021-09-16 RX ORDER — METOPROLOL TARTRATE 25 MG/1
25 TABLET, FILM COATED ORAL ONCE
Status: COMPLETED | OUTPATIENT
Start: 2021-09-16 | End: 2021-09-16

## 2021-09-16 RX ORDER — DIGOXIN 0.25 MG/ML
250 INJECTION INTRAMUSCULAR; INTRAVENOUS EVERY 4 HOURS
Status: COMPLETED | OUTPATIENT
Start: 2021-09-16 | End: 2021-09-16

## 2021-09-16 RX ORDER — LIDOCAINE 40 MG/G
CREAM TOPICAL
Status: DISCONTINUED | OUTPATIENT
Start: 2021-09-16 | End: 2021-09-22 | Stop reason: HOSPADM

## 2021-09-16 RX ORDER — NICOTINE POLACRILEX 4 MG
15-30 LOZENGE BUCCAL
Status: DISCONTINUED | OUTPATIENT
Start: 2021-09-16 | End: 2021-09-18

## 2021-09-16 RX ORDER — DEXTROSE MONOHYDRATE 100 MG/ML
INJECTION, SOLUTION INTRAVENOUS CONTINUOUS PRN
Status: DISCONTINUED | OUTPATIENT
Start: 2021-09-16 | End: 2021-09-18

## 2021-09-16 RX ORDER — NITROGLYCERIN 0.4 MG/1
0.4 TABLET SUBLINGUAL EVERY 5 MIN PRN
Status: DISCONTINUED | OUTPATIENT
Start: 2021-09-16 | End: 2021-09-22 | Stop reason: HOSPADM

## 2021-09-16 RX ORDER — HEPARIN SODIUM 10000 [USP'U]/100ML
0-5000 INJECTION, SOLUTION INTRAVENOUS CONTINUOUS
Status: DISPENSED | OUTPATIENT
Start: 2021-09-16 | End: 2021-09-17

## 2021-09-16 RX ORDER — METOPROLOL TARTRATE 50 MG
50 TABLET ORAL 2 TIMES DAILY
Status: DISCONTINUED | OUTPATIENT
Start: 2021-09-16 | End: 2021-09-22 | Stop reason: HOSPADM

## 2021-09-16 RX ORDER — DEXTROSE MONOHYDRATE 25 G/50ML
25-50 INJECTION, SOLUTION INTRAVENOUS
Status: DISCONTINUED | OUTPATIENT
Start: 2021-09-16 | End: 2021-09-18

## 2021-09-16 RX ADMIN — METOPROLOL TARTRATE 25 MG: 25 TABLET, FILM COATED ORAL at 12:42

## 2021-09-16 RX ADMIN — AMIODARONE HYDROCHLORIDE 200 MG: 200 TABLET ORAL at 07:53

## 2021-09-16 RX ADMIN — ASPIRIN 81 MG CHEWABLE TABLET 324 MG: 81 TABLET CHEWABLE at 07:52

## 2021-09-16 RX ADMIN — AMIODARONE HYDROCHLORIDE 200 MG: 200 TABLET ORAL at 21:03

## 2021-09-16 RX ADMIN — SODIUM CHLORIDE 1.5 UNITS/HR: 9 INJECTION, SOLUTION INTRAVENOUS at 21:03

## 2021-09-16 RX ADMIN — FUROSEMIDE 40 MG: 10 INJECTION, SOLUTION INTRAVENOUS at 07:52

## 2021-09-16 RX ADMIN — SODIUM CHLORIDE 0.5 UNITS/HR: 9 INJECTION, SOLUTION INTRAVENOUS at 18:36

## 2021-09-16 RX ADMIN — PANTOPRAZOLE SODIUM 40 MG: 40 INJECTION, POWDER, FOR SOLUTION INTRAVENOUS at 07:52

## 2021-09-16 RX ADMIN — SODIUM CHLORIDE 1 UNITS/HR: 9 INJECTION, SOLUTION INTRAVENOUS at 19:50

## 2021-09-16 RX ADMIN — INSULIN GLARGINE 20 UNITS: 100 INJECTION, SOLUTION SUBCUTANEOUS at 21:13

## 2021-09-16 RX ADMIN — INSULIN GLARGINE 30 UNITS: 100 INJECTION, SOLUTION SUBCUTANEOUS at 06:41

## 2021-09-16 RX ADMIN — SODIUM CHLORIDE 5 UNITS/HR: 9 INJECTION, SOLUTION INTRAVENOUS at 23:04

## 2021-09-16 RX ADMIN — Medication 40 MG: at 06:41

## 2021-09-16 RX ADMIN — DIGOXIN 250 MCG: 0.25 INJECTION INTRAMUSCULAR; INTRAVENOUS at 19:51

## 2021-09-16 RX ADMIN — FUROSEMIDE 40 MG: 10 INJECTION, SOLUTION INTRAVENOUS at 21:04

## 2021-09-16 RX ADMIN — HYDROMORPHONE HYDROCHLORIDE 0.4 MG: 0.2 INJECTION, SOLUTION INTRAMUSCULAR; INTRAVENOUS; SUBCUTANEOUS at 03:37

## 2021-09-16 RX ADMIN — SODIUM CHLORIDE 5 UNITS/HR: 9 INJECTION, SOLUTION INTRAVENOUS at 15:05

## 2021-09-16 RX ADMIN — AMLODIPINE BESYLATE 5 MG: 5 TABLET ORAL at 07:53

## 2021-09-16 RX ADMIN — METOPROLOL TARTRATE 5 MG: 5 INJECTION INTRAVENOUS at 18:05

## 2021-09-16 RX ADMIN — METOPROLOL TARTRATE 5 MG: 5 INJECTION INTRAVENOUS at 09:49

## 2021-09-16 RX ADMIN — THIAMINE HYDROCHLORIDE 250 MG: 100 INJECTION, SOLUTION INTRAMUSCULAR; INTRAVENOUS at 12:42

## 2021-09-16 RX ADMIN — METOPROLOL TARTRATE 25 MG: 25 TABLET, FILM COATED ORAL at 07:53

## 2021-09-16 RX ADMIN — INSULIN GLARGINE 15 UNITS: 100 INJECTION, SOLUTION SUBCUTANEOUS at 09:47

## 2021-09-16 RX ADMIN — METOPROLOL TARTRATE 5 MG: 5 INJECTION INTRAVENOUS at 08:50

## 2021-09-16 RX ADMIN — METOPROLOL TARTRATE 50 MG: 50 TABLET, FILM COATED ORAL at 21:04

## 2021-09-16 RX ADMIN — HEPARIN SODIUM 1350 UNITS/HR: 10000 INJECTION, SOLUTION INTRAVENOUS at 17:06

## 2021-09-16 RX ADMIN — DIGOXIN 250 MCG: 0.25 INJECTION INTRAMUSCULAR; INTRAVENOUS at 14:29

## 2021-09-16 RX ADMIN — HYDROMORPHONE HYDROCHLORIDE 0.4 MG: 0.2 INJECTION, SOLUTION INTRAMUSCULAR; INTRAVENOUS; SUBCUTANEOUS at 00:26

## 2021-09-16 RX ADMIN — SODIUM CHLORIDE 5 UNITS/HR: 9 INJECTION, SOLUTION INTRAVENOUS at 16:24

## 2021-09-16 RX ADMIN — HEPARIN SODIUM 1550 UNITS/HR: 10000 INJECTION, SOLUTION INTRAVENOUS at 14:06

## 2021-09-16 RX ADMIN — OXYCODONE HYDROCHLORIDE 5 MG: 5 TABLET ORAL at 12:45

## 2021-09-16 RX ADMIN — METOPROLOL TARTRATE 5 MG: 5 INJECTION INTRAVENOUS at 10:43

## 2021-09-16 RX ADMIN — OXYCODONE HYDROCHLORIDE 5 MG: 5 TABLET ORAL at 21:03

## 2021-09-16 RX ADMIN — ROSUVASTATIN CALCIUM 10 MG: 10 TABLET, FILM COATED ORAL at 07:53

## 2021-09-16 RX ADMIN — SODIUM CHLORIDE 2 UNITS/HR: 9 INJECTION, SOLUTION INTRAVENOUS at 22:09

## 2021-09-16 RX ADMIN — ALLOPURINOL 100 MG: 100 TABLET ORAL at 07:53

## 2021-09-16 RX ADMIN — SODIUM CHLORIDE 2.5 UNITS/HR: 9 INJECTION, SOLUTION INTRAVENOUS at 17:34

## 2021-09-16 RX ADMIN — OXYCODONE HYDROCHLORIDE 5 MG: 5 TABLET ORAL at 17:04

## 2021-09-16 ASSESSMENT — ACTIVITIES OF DAILY LIVING (ADL)
ADLS_ACUITY_SCORE: 13
ADLS_ACUITY_SCORE: 15
ADLS_ACUITY_SCORE: 23
ADLS_ACUITY_SCORE: 15
ADLS_ACUITY_SCORE: 23
ADLS_ACUITY_SCORE: 23

## 2021-09-16 NOTE — PROGRESS NOTES
BRIEF NUTRITION NOTE:    Monitoring TF adequacy and tolerance.  A full Nutrition Reassessment was completed 9/15.  See note for details.    NEW FINDINGS:  RN has asked me to re-evaluate TF goal given high BGs and loose stools.    BGM:  372, 346, 354, 317, 371, 385.  Insulin drip off and pt now on High Resistant sliding scale insulin + Lantus 40 Units in am and 20 Units at HS.   BM:  x3 yesterday, x2 today --> holding bowel meds Miralax and Senokot.    Patient may benefit from less CHO and a fiber containing TF.      INTERVENTIONS  Enteral Nutrition - Modify composition and rate --> Will change TF now to Promote with Fiber at 50 mL/hr = 1200 kcals (14 kcal/kg), 74 gm pro (0.9 gm/kg), 1000 mL, 166 gm CHO, 17 gm fiber.  After 12 hrs, increase TF to goal Promote with Fiber at 85 mL/hr = 2040 kcals (23 kcal/kg and 93% energy needs), 126 gm pro (1.4 gm/kg), 1695 mL H20, 29 gm fiber, 282 gm CHO.    Mahsa Smith, RD, LD, [facility]

## 2021-09-16 NOTE — PROGRESS NOTES
Paged for BG of 371. Patient recently taken off insulin drip and blood sugar immediately went up. Appears patient already got 30U of glargine this AM; will give 10 units of insulin in addition to sliding scale insulin dosage for now and recheck BG per protocol. Depending on response to this, will decide if patient needs insulin drip restarted.

## 2021-09-16 NOTE — PROGRESS NOTES
Neuro: A/OX4. Forgetful at times.  Pulm: Coarse LS. Fair cough with productive sputum  CV: A. Fib w/ RVR. CV surgery and EP aware. BP WNL, hypertensive with activities  : Voiding. Incontinent bladder at times.   GI: Watery stools. On TF. BS in 300s. TF formula changed. Will start insulin gtt  Extremities: Ax1 and GB for mobility.   Skin: havest sites GOYO. Mid chest covered with wound vac. Chest tubes patent with minimal output, new dressing applied.   Lines: PIVx2  Family: Son updated at bedside  Plan: Will transfer to [facility] [place], [place]. Will continue to support and monitor.

## 2021-09-16 NOTE — PROGRESS NOTES
Paged for blood sugars ongoing elevation though not worsening. I increased ISS to high dose scale as would anticipate the 12 PM Lantus dose may not yet have taken its full effect. We continue to monitor very closely overnight.

## 2021-09-16 NOTE — PROGRESS NOTES
Patient seen and care plan discussed with Dr. Mccullough and Dr. 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 Antelmo Mccullough  -CVS: HR: 90s-120s. SBP: 100s-150s. Went into atrial flutter overnight POD #3-- treated with amiodarone and cardioverted by EP on POD#4, recurrent afib today, IV metoprolol PRN.  mg daily, BB increased to 50mg BID today. Amlodipine increased to PTA dose 5mg-- will need to continue for at least 3 months to prevent radial artery spasm. PTA losartan on hold-- will consider resuming if HTN persists. Pacer wires removed. Chest tubes with too much output to remove. On heparin gtt per EP recommendations, will transition to NOAC when able. Transitioned to oral amiodarone with taper. Wound vac in place. Hypervolemic, Lasix 40 mg IV BID   -Resp: Extubated on POD#5 at 1150. Saturating well on 2-4-- wean as able. Continue to encourage IS, cough, deep breathing, ambulation.   -Neuro:  Delirium/confusion improving. Previous agitation/delirium preventing extubation-- CT head on POD#2 with no acute changes. Neurology consulted-- EEG and MRI negative, neuro signed off  -Renal: good UOP. Cr stable, diuresis as above.   -GI:  Tube feeds via NG. SLP following, remains NPO, advance diet per SLP recs.   -:  Christina in place d/t limited mobility and need for accurate I&Os.  -Endo: pre op a1c: 10.2. Hx of DMII, PTA glipizide, metformin on hold. Hyperglycemic with transition off insulin infusion, may need to resume insulin  infusion. Hospitalist consulted to assist with post operative glycemic control, appreciate recs. PTA allopurinol resumed.  -FEN: replace electrolytes as needed. Na: 145. K: 3.9.   -ID: Temp (24hrs), Av.3  F (37.9  C), Min:98.6  F (37  C), Max:101.7  F (38.7  C)  WBC: down slightly today, afebrile Completed perioperative abx. Leukocytosis likely related to stress from surgery and prolonged ventilation. Procal 09/15 intermediate. Monitor for signs of pneumonia  -Heme: hgb: 8.2. plt: 389. Acute blood loss anemia and thrombocytopenia related to surgery  -Proph: PCD, ASA, BB, statin, PPI, heparin infusion  -Dispo: Transfer to [facility]. Wean oxygen as able. Continue Chest tubes and krishna catheter today. Continue therapies. Continue to encourage IS, cough, deep breathing, ambulation.           Interval History:   States he feels better today. Eager to eat/drink. Pain controlled. Back in afib, mostly rate controlled, does get elevated with activity.          Medications:       allopurinol  100 mg Oral Daily     amiodarone  200 mg Oral or FT or NG tube BID    Followed by     [START ON 2021] amiodarone  200 mg Oral or FT or NG tube Daily     amLODIPine  5 mg Oral Daily     aspirin  324 mg Oral or NG Tube Daily     furosemide  40 mg Intravenous BID     insulin aspart  1-12 Units Subcutaneous Q4H     insulin glargine  20 Units Subcutaneous At Bedtime     [START ON 2021] insulin glargine  40 Units Subcutaneous QAM AC     metoprolol tartrate  25 mg Oral BID     pantoprazole  40 mg Per Feeding Tube QAM AC    Or     pantoprazole (PROTONIX) IV  40 mg Intravenous QAM AC     polyethylene glycol  17 g Oral Daily     rosuvastatin  10 mg Oral Daily     senna-docusate  1 tablet Oral BID     sodium chloride (PF)  3 mL Intracatheter Q8H     thiamine  250 mg Intravenous Daily    Followed by     [START ON 2021] thiamine  100 mg Oral Daily     acetaminophen, bisacodyl, dextrose, glucose **OR** dextrose **OR** glucagon,  EPINEPHrine, haloperidol lactate, hydrALAZINE, HYDROmorphone **OR** HYDROmorphone, lidocaine 4%, lidocaine (buffered or not buffered), magnesium hydroxide, metoprolol, naloxone **OR** naloxone **OR** naloxone **OR** naloxone, ondansetron **OR** ondansetron, oxyCODONE **OR** oxyCODONE, phenylephrine, QUEtiapine, BETA BLOCKER NOT PRESCRIBED, sodium chloride (PF)          Physical Exam:   Vitals were reviewed  Blood pressure 106/72, pulse 100, temperature 98.9  F (37.2  C), resp. rate 22, weight 89.7 kg (197 lb 12 oz), SpO2 94 %.  Rhythm: NSR    Lungs: diminished bases    Cardiovascular: irregular, irregular, normal s1 and s2, JVP elevated    Abdomen: soft NTND    Extremeties: minimal edema    Incision: wound vac in place    CT: to suction    Weight:   Vitals:    09/11/21 0100 09/12/21 0200 09/13/21 0400 09/15/21 0500   Weight: 87.7 kg (193 lb 5.5 oz) 87.5 kg (192 lb 14.4 oz) 89.2 kg (196 lb 10.4 oz) 89.3 kg (196 lb 13.9 oz)    09/16/21 0400   Weight: 89.7 kg (197 lb 12 oz)            Data:   Labs:   Lab Results   Component Value Date    WBC 13.8 09/15/2021     Lab Results   Component Value Date    RBC 3.32 09/15/2021     Lab Results   Component Value Date    HGB 8.5 09/15/2021     Lab Results   Component Value Date    HCT 26.5 09/15/2021     No components found for: MCT  Lab Results   Component Value Date    MCV 80 09/15/2021     Lab Results   Component Value Date    MCH 25.6 09/15/2021     Lab Results   Component Value Date    MCHC 32.1 09/15/2021     Lab Results   Component Value Date    RDW 15.3 09/15/2021     Lab Results   Component Value Date     09/15/2021       Last Basic Metabolic Panel:  Lab Results   Component Value Date     09/15/2021      Lab Results   Component Value Date    POTASSIUM 3.2 09/15/2021     Lab Results   Component Value Date    CHLORIDE 110 09/15/2021     Lab Results   Component Value Date    TORRES 8.2 09/15/2021     Lab Results   Component Value Date    CO2 29 09/15/2021     Lab  Results   Component Value Date    BUN 13 09/15/2021     Lab Results   Component Value Date    CR 1.00 09/15/2021     Lab Results   Component Value Date     09/15/2021     09/15/2021       Cherry Mclean PA-C  Cardiothoracic Surgery  Pager [phone]

## 2021-09-16 NOTE — PROGRESS NOTES
[facility]    Medicine Progress Note - Hospitalist Service       Date of Admission:  9/2/2021    Assessment & Plan             52 y/o male with PMH of CAD s/p stenting,  HTN, HLP, DM type 2, on insulin and gout- directly admitted from [facility] on 9/2/21 for NSTEMI and 1 week history of let sided chest pain. Underwent echocardiogram with some wall motion abnormalities, Coronary Angio then showed severe 3 vessel disease. He underwent 4V CABG on 9/8/21. Post procedure, he remained intubated until 9/14/21 due to delirium, agitation. He was assessed by neurology and underwent EEG. He also had post operative atrial flutter with RVR that was resistant to BB and amiodarone, but was successfully cardioverted on 9/13/21    NSTEMI  CAD s/p stenting ([id number] in [place]); severe 3 vessel disease s/p CABx4 POD#8  Ischemic cardiomyopathy  HTN  HLP  [PTA on Norvasc 5 mg daily, Losartan 50 mg po BID, Lopressor 50 mg BID and Crestor 10 mg po daily]  *Troponin: 2.15-->10.3-->12.451-->12.292.    *ECHO: LV systolic function mild-moderately reduced (EF 40-45%) with apical, distal septal wall severe hypokinesia, mild inferior wall hypokinesia.  RV systolic function normal.  Mild MR.  Ascending aorta mildly dilated.   - Cardiology following, appreciate input               --Coronary angiogram 9/2/2021 with severe 3-vessel disease.  - Underwent CABG 9/8/21: CABG X 4 WITH ENDOSCOPIC VEIN HARVESTING LIMA-LAD PREET-DISTAL RCA LEFT RADIAL-OM LEFT SV-ANOMOLOUS RV BRANCH  - extubated last night, current;y on O2 5 liters via NC; off precedex drip; up in the chair  - echo 9/14- EF 45-50%; there is anterior, septal, and apical wall akinesis. There is also hypokineiss of the inferior wall.  Mildly decreased right ventricular systolic function; there is mild to moderate (1-2+) mitral regurgitation  - CVS managing post operatively  - currently on  mg po daily, Lopressor 25 mg po BID, Norvasc 5 mg po daily, Crestor  10 mg po daily; PTA Losartan on hold  - BP on higher side  - Aggressive IS  - cardiac rehab  - currently on TF as per nutritionist  - SLP - rec npo for now     Atrial Flutter s/p cardioversion  2:1 A flutter 9/12/21. Trialed on beta blocker and amiodarone without conversion  - DCCV x2 on 9.13. First shock transitioned to atrial fib, then second shock converted to sinus rhythm.  - plan amiodarone 200mg twice daily for 14 days, then 200mg daily for 28 days  - in am- went back into Afib with RVR 's  - Metoprolol iv prn  - on heparin drip   - management as per CVS  - follow up EP DONTA in 6 weeks     Delirium  Agitation  Acute toxic encephalopathy- improving  - as per prior notes- he was agitated with lightening sedation. CT  Head on POD#2 with no acute changes. Treated with seroquel and valproate per intensivist team. Neurology consulted-- EEG and MRI negative.   - started on Seroquel 50mg BID  - now- he is calm , cooperative, follows commands  - changed Seroquel to 50 mg po BID prn for agitation    Acute hypoxic respiratory failure- improving  - initially delayed extubation as he was agitated with lightening sedation  - extubated on 9/14, currently on O2 5 liters  - noted some productive cough of clear sputum  - T nax 99.1, WBCs 8.9--13.2--12.5  - monitor for  signs of PNA  - aggressive IS  - currently on O2 2 liters NC  - weaning O2 as able     Uncontrolled DM2  Hyperglycemia  A1c of 9.6% from 8/2021.  Rechecked A1c 9/2 and at 10.2%.  [ PTA on Lantus 30 untts daily, metformin 1000 mg BID and Glipizide 10 mg/d]  - had been on Insulin drip after surgery  - currently on TF  - switched to long acting insulin today- Lantus 30 units qam yesterday and stop Insulin drip  - BS elevated   - will give another dose of Lantus 15 units this morning, increase Lantus to 40 units qam, add Lamtus 20 units at bedtime  - changed mod ISS to high intensity ISS  - will likely need further adjustments of lantus doses based on oral  intake, BS  - hold PTA oral antidiabetic meds     ADDENDUM: BS uncontrolled in high 300's despite getting a total Lantus 45 units in am; RN discussed with dietician who plans to change TF formula; if BS still not controlled- may need to be put back on Insulin drip.    ADDENDUM 2:35pm- RD changed the formula; discussed with Reyna EDOUARD- [facility]- given his persistently elevated BS, will start him on Insulin drip; will keep on Lantus 20 units BID and reassess insulin needs tomorrow.     Hypokalemia  - K 3.9 this am  - K replacement protocol     Gout  PTA allopurinol 100 mg/d.       History of latent TB  Patient has completed 9 month treatment in 2015.        Diet: NPO for Medical/Clinical Reasons Except for: Meds  Advance Diet as Tolerated: Clear Liquid Diet  Advance Diet as Tolerated: Clear Liquid Diet; Low Saturated Fat Na <2400mg Diet  Advance Diet as Tolerated: Clear Liquid Diet  Advance Diet as Tolerated: Full Liquid Diet; Low Saturated Fat Na <2400mg Diet  Adult Formula Drip Feeding: Continuous Osmolite 1.5; Other - Specify in Comment; Goal Rate: 65; mL/hr; Medication - Feeding Tube Flush Frequency: At least 15-30 mL water before and after medication administration and with tube clogging; Amount to ...    DVT Prophylaxis: Heparin infusion.  Christina Catheter: Not present  Central Lines: None  Code Status: Full Code      Disposition Plan   Expected discharge: 3-4 days  recommended to prior living arrangement oas per CVS.     The patient's care was discussed with the Bedside Nurse and Patient.    Time Spent on this Encounter   Time Spent on this Encounter   I spent 35 minutes on the unit/floor managing the care of Jerry Bustamante. Over 50% of my time was spent on the following:   - Counseling the patient and/or family regarding: counseling not performed today  - Coordination of care with the: nurse- discussed about diabetes management and recurrent Afib management      Agnes Moise MD  Hospitalist Service  M  [facility]  Securely message with the [employer] Web Console (learn more here)  Text page via [employer] Paging/Directory      Clinically Significant Risk Factors Present on Admission                ______________________________________________________________________    Interval History   Doing fine, reports some chest pain when coughing, no SOB, no palpitations  - reports less coughbetter, extubated last night, now up in the chair  - no N/V, no abd pain  - feels he is going to have a BM soon      Data reviewed today: I reviewed all medications, new labs and imaging results over the last 24 hours. I personally reviewed the Tele image(s) showing Afib with RVR.    Physical Exam   Vital Signs: Temp: 98.9  F (37.2  C) Temp src: Bladder BP: (!) 153/82 Pulse: 111   Resp: 17 SpO2: 95 % O2 Device: None (Room air) Oxygen Delivery: 2 LPM  Weight: 197 lbs 12.04 oz     General: - awake, alert, NAD  HEENT- normocephalic, atraumatic, PERRL,oral mucosa moist; NGT in place  RESP: bilateral air entry, some crackles at bases, chest tube in place  CV- S1S2, RRR, no murmurs, no rubs  GI- abd- soft, nonT, nonD, BS present  EXTREMITIES- no leg swelling, no calf tenderness  NEURO:- awake, oriented to self, follows commands, cranial nerves II-XII grossly intact, some ataxia bilateral upper extremities   PSYCH- calm, cooperative    Data   Recent Labs   Lab 09/16/21  0815 09/16/21  0402 09/16/21  0359 09/15/21  1330 09/15/21  1326 09/15/21  0536 09/15/21  0506 09/14/21  0611 09/14/21  0344 09/13/21  0045 09/13/21  0005   WBC  --   --  12.5*  --   --   --  13.8*  --  8.9   < > 7.4   HGB  --   --  8.2*  --   --   --  8.5*  --  7.8*   < > 7.7*   MCV  --   --  83  --   --   --  80  --  81   < > 82   PLT  --   --  387  --   --   --  374  --  260   < > 239   NA  --   --  145*  --   --   --  143  --  144   < > 140   POTASSIUM  --   --  3.9  --  3.8  --  3.2*   < > 3.4   < > 4.3   CHLORIDE  --   --  110*  --   --   --  110*  --   110*   < > 108   CO2  --   --  31  --   --   --  29  --  28   < > 18*   BUN  --   --  19  --   --   --  13  --  12   < > 10   CR  --   --  0.90  --   --   --  1.00  --  0.83   < > 0.95   ANIONGAP  --   --  4  --   --   --  4  --  6   < > 14   TORRES  --   --  8.5  --   --   --  8.2*  --  8.5   < > 8.0*   * 317* 389*   < >  --    < > 173*   < > 137*   < > 215*   ALBUMIN  --   --   --   --   --   --   --   --  1.8*  --  1.9*   PROTTOTAL  --   --   --   --   --   --   --   --  6.4*  --  6.5*   BILITOTAL  --   --   --   --   --   --   --   --  0.3  --  0.5   ALKPHOS  --   --   --   --   --   --   --   --  262*  --  272*   ALT  --   --   --   --   --   --   --   --  15  --  18   AST  --   --   --   --   --   --   --   --  16  --  16    < > = values in this interval not displayed.     No results found for this or any previous visit (from the past 24 hour(s)).  Medications     dexmedetomidine Stopped (09/14/21 1600)     dextrose       EPINEPHrine Stopped (09/12/21 1145)     heparin 1,550 Units/hr (09/15/21 2336)     phenylephrine Stopped (09/12/21 1400)     BETA BLOCKER NOT PRESCRIBED       sodium chloride Stopped (09/15/21 1400)       allopurinol  100 mg Oral Daily     amiodarone  200 mg Oral or FT or NG tube BID    Followed by     [START ON 9/28/2021] amiodarone  200 mg Oral or FT or NG tube Daily     amLODIPine  5 mg Oral Daily     aspirin  324 mg Oral or NG Tube Daily     furosemide  40 mg Intravenous BID     insulin aspart  1-12 Units Subcutaneous Q4H     insulin glargine  15 Units Subcutaneous Once     insulin glargine  20 Units Subcutaneous At Bedtime     [START ON 9/17/2021] insulin glargine  40 Units Subcutaneous QAM AC     metoprolol tartrate  25 mg Oral BID     pantoprazole  40 mg Per Feeding Tube QAM AC    Or     pantoprazole (PROTONIX) IV  40 mg Intravenous QAM AC     polyethylene glycol  17 g Oral Daily     rosuvastatin  10 mg Oral Daily     senna-docusate  1 tablet Oral BID     sodium chloride (PF)  3 mL  Intracatheter Q8H     thiamine  250 mg Intravenous Daily    Followed by     [START ON 9/19/2021] thiamine  100 mg Oral Daily

## 2021-09-16 NOTE — PROVIDER NOTIFICATION
MD Notification    Notified Person Name: Dr. Morrison    Notification Date/Time: 9/15 2010    Purpose of Notification: 364 A.F.    pt , given 5 units coverage per protocol. do you want to add night time lantus dose or restart inulin gtt?    *[id number] NEY Arias    Orders Received: give 10 additional units novolog now, recheck and then restart insulin gtt if needed

## 2021-09-16 NOTE — PLAN OF CARE
Pt alert, disoriented to time. ESCAMILLA. Able to make needs known. hoarse/whispered speech. -150's. 1-2+ edema. CTx4 remain. LS coarse, pt able to expectorate thick, tan sputum. +BM, good output from krishna. TF@65/hr. NPO w/ ice chips per SLP. Elevated BG's overnight, protocol increase to high resistance scale. Family involved in care. Will cont to monitor.

## 2021-09-16 NOTE — PROVIDER NOTIFICATION
MD Notification    Notified Person Name: Dr. Carreno    Notification Date/Time: 9/16 0005    Purpose of Notification: 364 A.F.    pts BG remains at 354, covered with 5 units per protocol. please advise, thanks!    *[id number] NEY Arias    Orders Received: avoid insulin gtt unless necessary. Admin 2 additional units of novolog and CTM.

## 2021-09-16 NOTE — PROVIDER NOTIFICATION
MD Notification    Notified Person Name: Dr. Morrison    Notification Date/Time: 9/15 2150    Purpose of Notification: 364 A.F.    pts BG remains at 349. do you want to restart gtt?    *[id number] NEY Arias    Orders Received: recheck at 9/16 0000

## 2021-09-16 NOTE — PLAN OF CARE
Pt alert, disoriented to time. ESCAMILLA. Able to make needs known. hoarse/whispered speech. -170's. 1-2+ edema. Lasix given, good urine output. CTx4 remain. LS coarse, pt able to expectorate thick, tan sputum. Small BMx5. TF@65/hr. NPO w/ ice chips per SLP. Family at bedside and involved in care. Will cont to monitor. Keren Fairchild RN 09/15/21 8:05 PM

## 2021-09-17 ENCOUNTER — APPOINTMENT (OUTPATIENT)
Dept: GENERAL RADIOLOGY | Facility: CLINIC | Age: 51
End: 2021-09-17
Attending: PHYSICIAN ASSISTANT
Payer: COMMERCIAL

## 2021-09-17 ENCOUNTER — APPOINTMENT (OUTPATIENT)
Dept: SPEECH THERAPY | Facility: CLINIC | Age: 51
End: 2021-09-17
Attending: INTERNAL MEDICINE
Payer: COMMERCIAL

## 2021-09-17 ENCOUNTER — APPOINTMENT (OUTPATIENT)
Dept: PHYSICAL THERAPY | Facility: CLINIC | Age: 51
End: 2021-09-17
Attending: INTERNAL MEDICINE
Payer: COMMERCIAL

## 2021-09-17 LAB
ANION GAP SERPL CALCULATED.3IONS-SCNC: 5 MMOL/L (ref 3–14)
BUN SERPL-MCNC: 24 MG/DL (ref 7–30)
CALCIUM SERPL-MCNC: 9 MG/DL (ref 8.5–10.1)
CHLORIDE BLD-SCNC: 110 MMOL/L (ref 94–109)
CO2 SERPL-SCNC: 31 MMOL/L (ref 20–32)
CREAT SERPL-MCNC: 0.98 MG/DL (ref 0.66–1.25)
ERYTHROCYTE [DISTWIDTH] IN BLOOD BY AUTOMATED COUNT: 15.9 % (ref 10–15)
GFR SERPL CREATININE-BSD FRML MDRD: 88 ML/MIN/1.73M2
GLUCOSE BLD-MCNC: 219 MG/DL (ref 70–99)
GLUCOSE BLDC GLUCOMTR-MCNC: 128 MG/DL (ref 70–99)
GLUCOSE BLDC GLUCOMTR-MCNC: 146 MG/DL (ref 70–99)
GLUCOSE BLDC GLUCOMTR-MCNC: 151 MG/DL (ref 70–99)
GLUCOSE BLDC GLUCOMTR-MCNC: 156 MG/DL (ref 70–99)
GLUCOSE BLDC GLUCOMTR-MCNC: 163 MG/DL (ref 70–99)
GLUCOSE BLDC GLUCOMTR-MCNC: 165 MG/DL (ref 70–99)
GLUCOSE BLDC GLUCOMTR-MCNC: 167 MG/DL (ref 70–99)
GLUCOSE BLDC GLUCOMTR-MCNC: 169 MG/DL (ref 70–99)
GLUCOSE BLDC GLUCOMTR-MCNC: 171 MG/DL (ref 70–99)
GLUCOSE BLDC GLUCOMTR-MCNC: 187 MG/DL (ref 70–99)
GLUCOSE BLDC GLUCOMTR-MCNC: 188 MG/DL (ref 70–99)
GLUCOSE BLDC GLUCOMTR-MCNC: 191 MG/DL (ref 70–99)
GLUCOSE BLDC GLUCOMTR-MCNC: 195 MG/DL (ref 70–99)
GLUCOSE BLDC GLUCOMTR-MCNC: 201 MG/DL (ref 70–99)
GLUCOSE BLDC GLUCOMTR-MCNC: 202 MG/DL (ref 70–99)
GLUCOSE BLDC GLUCOMTR-MCNC: 212 MG/DL (ref 70–99)
GLUCOSE BLDC GLUCOMTR-MCNC: 238 MG/DL (ref 70–99)
GLUCOSE BLDC GLUCOMTR-MCNC: 271 MG/DL (ref 70–99)
GLUCOSE BLDC GLUCOMTR-MCNC: 77 MG/DL (ref 70–99)
GLUCOSE BLDC GLUCOMTR-MCNC: 81 MG/DL (ref 70–99)
GLUCOSE BLDC GLUCOMTR-MCNC: 83 MG/DL (ref 70–99)
GLUCOSE BLDC GLUCOMTR-MCNC: 93 MG/DL (ref 70–99)
HCT VFR BLD AUTO: 29 % (ref 40–53)
HGB BLD-MCNC: 8.7 G/DL (ref 13.3–17.7)
MAGNESIUM SERPL-MCNC: 2.3 MG/DL (ref 1.6–2.3)
MCH RBC QN AUTO: 25 PG (ref 26.5–33)
MCHC RBC AUTO-ENTMCNC: 30 G/DL (ref 31.5–36.5)
MCV RBC AUTO: 83 FL (ref 78–100)
PHOSPHATE SERPL-MCNC: 3.9 MG/DL (ref 2.5–4.5)
PLATELET # BLD AUTO: 466 10E3/UL (ref 150–450)
POTASSIUM BLD-SCNC: 3.4 MMOL/L (ref 3.4–5.3)
RBC # BLD AUTO: 3.48 10E6/UL (ref 4.4–5.9)
SARS-COV-2 RNA RESP QL NAA+PROBE: NEGATIVE
SODIUM SERPL-SCNC: 146 MMOL/L (ref 133–144)
UFH PPP CHRO-ACNC: 0.11 IU/ML
UFH PPP CHRO-ACNC: 0.18 IU/ML
WBC # BLD AUTO: 15.9 10E3/UL (ref 4–11)

## 2021-09-17 PROCEDURE — C9113 INJ PANTOPRAZOLE SODIUM, VIA: HCPCS | Performed by: PHYSICIAN ASSISTANT

## 2021-09-17 PROCEDURE — 250N000013 HC RX MED GY IP 250 OP 250 PS 637: Performed by: PHYSICIAN ASSISTANT

## 2021-09-17 PROCEDURE — 250N000011 HC RX IP 250 OP 636: Performed by: PHYSICIAN ASSISTANT

## 2021-09-17 PROCEDURE — 250N000013 HC RX MED GY IP 250 OP 250 PS 637: Performed by: NURSE PRACTITIONER

## 2021-09-17 PROCEDURE — 92611 MOTION FLUOROSCOPY/SWALLOW: CPT | Mod: GN

## 2021-09-17 PROCEDURE — 92526 ORAL FUNCTION THERAPY: CPT | Mod: GN

## 2021-09-17 PROCEDURE — 85520 HEPARIN ASSAY: CPT | Performed by: INTERNAL MEDICINE

## 2021-09-17 PROCEDURE — 87040 BLOOD CULTURE FOR BACTERIA: CPT | Performed by: PHYSICIAN ASSISTANT

## 2021-09-17 PROCEDURE — 83735 ASSAY OF MAGNESIUM: CPT | Performed by: PHYSICIAN ASSISTANT

## 2021-09-17 PROCEDURE — 84100 ASSAY OF PHOSPHORUS: CPT | Performed by: PHYSICIAN ASSISTANT

## 2021-09-17 PROCEDURE — 99232 SBSQ HOSP IP/OBS MODERATE 35: CPT | Performed by: INTERNAL MEDICINE

## 2021-09-17 PROCEDURE — 258N000003 HC RX IP 258 OP 636: Performed by: INTERNAL MEDICINE

## 2021-09-17 PROCEDURE — 250N000009 HC RX 250: Performed by: PHYSICIAN ASSISTANT

## 2021-09-17 PROCEDURE — 120N000001 HC R&B MED SURG/OB

## 2021-09-17 PROCEDURE — 250N000013 HC RX MED GY IP 250 OP 250 PS 637: Performed by: SURGERY

## 2021-09-17 PROCEDURE — 74230 X-RAY XM SWLNG FUNCJ C+: CPT

## 2021-09-17 PROCEDURE — 99233 SBSQ HOSP IP/OBS HIGH 50: CPT | Performed by: INTERNAL MEDICINE

## 2021-09-17 PROCEDURE — 250N000013 HC RX MED GY IP 250 OP 250 PS 637: Performed by: INTERNAL MEDICINE

## 2021-09-17 PROCEDURE — 97530 THERAPEUTIC ACTIVITIES: CPT | Mod: GP | Performed by: PHYSICAL THERAPIST

## 2021-09-17 PROCEDURE — 80048 BASIC METABOLIC PNL TOTAL CA: CPT | Performed by: PHYSICIAN ASSISTANT

## 2021-09-17 PROCEDURE — 97110 THERAPEUTIC EXERCISES: CPT | Mod: GP | Performed by: PHYSICAL THERAPIST

## 2021-09-17 PROCEDURE — 85027 COMPLETE CBC AUTOMATED: CPT | Performed by: PHYSICIAN ASSISTANT

## 2021-09-17 PROCEDURE — 36415 COLL VENOUS BLD VENIPUNCTURE: CPT | Performed by: INTERNAL MEDICINE

## 2021-09-17 PROCEDURE — 258N000003 HC RX IP 258 OP 636: Performed by: PHYSICIAN ASSISTANT

## 2021-09-17 PROCEDURE — 999N000065 XR CHEST PORT 1 VIEW

## 2021-09-17 PROCEDURE — 250N000012 HC RX MED GY IP 250 OP 636 PS 637: Performed by: INTERNAL MEDICINE

## 2021-09-17 PROCEDURE — 87205 SMEAR GRAM STAIN: CPT | Performed by: PHYSICIAN ASSISTANT

## 2021-09-17 PROCEDURE — 36415 COLL VENOUS BLD VENIPUNCTURE: CPT | Performed by: PHYSICIAN ASSISTANT

## 2021-09-17 RX ORDER — CALCIUM CARBONATE 500 MG/1
1000 TABLET, CHEWABLE ORAL 3 TIMES DAILY PRN
Status: DISCONTINUED | OUTPATIENT
Start: 2021-09-17 | End: 2021-09-22 | Stop reason: HOSPADM

## 2021-09-17 RX ORDER — BARIUM SULFATE 400 MG/ML
SUSPENSION ORAL ONCE
Status: COMPLETED | OUTPATIENT
Start: 2021-09-17 | End: 2021-09-17

## 2021-09-17 RX ORDER — VANCOMYCIN HYDROCHLORIDE 1 G/200ML
1000 INJECTION, SOLUTION INTRAVENOUS EVERY 12 HOURS
Status: DISCONTINUED | OUTPATIENT
Start: 2021-09-17 | End: 2021-09-18

## 2021-09-17 RX ORDER — POTASSIUM CHLORIDE 1.5 G/1.58G
40 POWDER, FOR SOLUTION ORAL ONCE
Status: COMPLETED | OUTPATIENT
Start: 2021-09-17 | End: 2021-09-17

## 2021-09-17 RX ORDER — FUROSEMIDE 10 MG/ML
40 INJECTION INTRAMUSCULAR; INTRAVENOUS DAILY
Status: DISCONTINUED | OUTPATIENT
Start: 2021-09-18 | End: 2021-09-20

## 2021-09-17 RX ADMIN — POLYETHYLENE GLYCOL 3350 17 G: 17 POWDER, FOR SOLUTION ORAL at 08:54

## 2021-09-17 RX ADMIN — CALCIUM CARBONATE (ANTACID) CHEW TAB 500 MG 1000 MG: 500 CHEW TAB at 20:39

## 2021-09-17 RX ADMIN — POTASSIUM CHLORIDE 40 MEQ: 1.5 POWDER, FOR SOLUTION ORAL at 08:54

## 2021-09-17 RX ADMIN — SODIUM CHLORIDE 3 UNITS/HR: 9 INJECTION, SOLUTION INTRAVENOUS at 02:17

## 2021-09-17 RX ADMIN — BARIUM SULFATE 20 ML: 400 SUSPENSION ORAL at 10:17

## 2021-09-17 RX ADMIN — AMLODIPINE BESYLATE 5 MG: 5 TABLET ORAL at 08:42

## 2021-09-17 RX ADMIN — SODIUM CHLORIDE 7 UNITS/HR: 9 INJECTION, SOLUTION INTRAVENOUS at 18:50

## 2021-09-17 RX ADMIN — METOPROLOL TARTRATE 5 MG: 5 INJECTION INTRAVENOUS at 16:14

## 2021-09-17 RX ADMIN — SENNOSIDES AND DOCUSATE SODIUM 1 TABLET: 8.6; 5 TABLET ORAL at 21:04

## 2021-09-17 RX ADMIN — ACETAMINOPHEN 650 MG: 325 TABLET, FILM COATED ORAL at 22:26

## 2021-09-17 RX ADMIN — AMIODARONE HYDROCHLORIDE 200 MG: 200 TABLET ORAL at 06:59

## 2021-09-17 RX ADMIN — INSULIN GLARGINE 20 UNITS: 100 INJECTION, SOLUTION SUBCUTANEOUS at 09:32

## 2021-09-17 RX ADMIN — VANCOMYCIN HYDROCHLORIDE 1000 MG: 1 INJECTION, SOLUTION INTRAVENOUS at 23:08

## 2021-09-17 RX ADMIN — OXYCODONE HYDROCHLORIDE 5 MG: 5 TABLET ORAL at 06:33

## 2021-09-17 RX ADMIN — FUROSEMIDE 40 MG: 10 INJECTION, SOLUTION INTRAVENOUS at 08:57

## 2021-09-17 RX ADMIN — HEPARIN SODIUM 1000 UNITS/HR: 10000 INJECTION, SOLUTION INTRAVENOUS at 13:55

## 2021-09-17 RX ADMIN — GUAIFENESIN 10 ML: 100 SOLUTION ORAL at 19:58

## 2021-09-17 RX ADMIN — AMIODARONE HYDROCHLORIDE 200 MG: 200 TABLET ORAL at 21:04

## 2021-09-17 RX ADMIN — CEFEPIME HYDROCHLORIDE 2 G: 2 INJECTION, POWDER, FOR SOLUTION INTRAVENOUS at 18:31

## 2021-09-17 RX ADMIN — OXYCODONE HYDROCHLORIDE 10 MG: 5 TABLET ORAL at 18:50

## 2021-09-17 RX ADMIN — PANTOPRAZOLE SODIUM 40 MG: 40 INJECTION, POWDER, FOR SOLUTION INTRAVENOUS at 06:33

## 2021-09-17 RX ADMIN — ACETAMINOPHEN 650 MG: 325 TABLET, FILM COATED ORAL at 02:17

## 2021-09-17 RX ADMIN — ROSUVASTATIN CALCIUM 10 MG: 10 TABLET, FILM COATED ORAL at 08:42

## 2021-09-17 RX ADMIN — ASPIRIN 81 MG CHEWABLE TABLET 324 MG: 81 TABLET CHEWABLE at 08:42

## 2021-09-17 RX ADMIN — BARIUM SULFATE 5 ML: 400 SUSPENSION ORAL at 10:15

## 2021-09-17 RX ADMIN — SENNOSIDES AND DOCUSATE SODIUM 1 TABLET: 8.6; 5 TABLET ORAL at 08:42

## 2021-09-17 RX ADMIN — VANCOMYCIN HYDROCHLORIDE 2000 MG: 5 INJECTION, POWDER, LYOPHILIZED, FOR SOLUTION INTRAVENOUS at 11:36

## 2021-09-17 RX ADMIN — THIAMINE HYDROCHLORIDE 250 MG: 100 INJECTION, SOLUTION INTRAMUSCULAR; INTRAVENOUS at 08:37

## 2021-09-17 RX ADMIN — CEFEPIME HYDROCHLORIDE 2 G: 2 INJECTION, POWDER, FOR SOLUTION INTRAVENOUS at 10:52

## 2021-09-17 RX ADMIN — METOPROLOL TARTRATE 50 MG: 50 TABLET, FILM COATED ORAL at 06:59

## 2021-09-17 RX ADMIN — ALLOPURINOL 100 MG: 100 TABLET ORAL at 08:42

## 2021-09-17 RX ADMIN — APIXABAN 5 MG: 5 TABLET, FILM COATED ORAL at 21:04

## 2021-09-17 RX ADMIN — GUAIFENESIN 10 ML: 100 SOLUTION ORAL at 15:41

## 2021-09-17 RX ADMIN — OXYCODONE HYDROCHLORIDE 5 MG: 5 TABLET ORAL at 02:17

## 2021-09-17 RX ADMIN — METOPROLOL TARTRATE 50 MG: 50 TABLET, FILM COATED ORAL at 21:04

## 2021-09-17 RX ADMIN — ACETAMINOPHEN 650 MG: 325 TABLET, FILM COATED ORAL at 06:33

## 2021-09-17 ASSESSMENT — ACTIVITIES OF DAILY LIVING (ADL)
ADLS_ACUITY_SCORE: 9
ADLS_ACUITY_SCORE: 13
ADLS_ACUITY_SCORE: 7
ADLS_ACUITY_SCORE: 7
ADLS_ACUITY_SCORE: 13
ADLS_ACUITY_SCORE: 15

## 2021-09-17 NOTE — PLAN OF CARE
POD 9 from a CABGx4 for severe 3 vessel disease. A&Ox3, disoriented to day of the week but knows month. Lung sounds coarse and shallow; pt voice is hoarse and whispers.  Bowel sounds audible, passing flatus.      Pt tolerating puree diet with honey thickened liquids with spoon. NG tube running at goal rate of 85 ml/hr.      Tele a flutter.  VSS on RA. Chest tubes removed, dressings replaced and CDI;dressing over sternum removed, Approximated with liquid sutures, WNL.  Hemovac working well with no drainage collected.      Pt on insulin drip, heparin drip, abx.  Insulin titrated to algorithm 2, continue qH checks.    Up with A1 GBW. Denied pain, Pt scoring green on the Aggression Stop Light Tool. Expected discharge 3-4 days to prior living arrangement.

## 2021-09-17 NOTE — PLAN OF CARE
VSS on RA. A&Ox3, disoriented to time. Tele Afib CVR, RVR with activity. Pain managed with oxycodone & tylenol. LS coarse, WHITE. Frequent productive cough. +BS, +Gas. No BM overnight. NG with tube feedings infusing @ 50ml/hr, increased to goal rate of 85ml/hr @ 0600. Heparin gtt infusing at 850ml/hr. Hep 10a recheck @ 0630am. IV infiltrated in right hand, new IV placed via flying squad. Insulin gtt infusing, on algo 2. Hourly blood sugar checks. Neuros intact. CTx4 to 2 atriums intact. Pleural atrium changed due to air leak, resolved with new atrium. Up with assist of 1-2 with GB. NPO with meds, medications crushed & given through nasogastric tube.    0655: HR running 105-150s, given scheduled PO metoprolol & amiodarone early. Also lost IV access for insulin gtt, insulin gtt stopped @ 0700. Paged flying squad for new IV.

## 2021-09-17 NOTE — PROGRESS NOTES
09/17/21 1020   General Information   Onset of Illness/Injury or Date of Surgery 09/08/21   Referring Physician Aaliyah Muse PA-C   Patient/Family Therapy Goal Statement (SLP) Wants to eat/drink    Pertinent History of Current Problem 50 y/o male with PMH of CAD s/p stenting,  HTN, HLP, DM type 2, on insulin and gout- directly admitted from [facility] on 9/2/21 for NSTEMI and 1 week history of let sided chest pain. Underwent echocardiogram with some wall motion abnormalities, Coronary Angio then showed severe 3 vessel disease. He underwent 4V CABG on 9/8/21. Post procedure, he remained intubated until 9/14/21 due to delirium, agitation. He was assessed by neurology and underwent EEG. He also had post operative atrial flutter with RVR that was resistant to BB and amiodarone, but was successfully cardioverted on 9/13/21   General Observations Video swallow recommended to assess for aspiration    Type of Evaluation   Type of Evaluation Swallow Evaluation   VFSS Evaluation   Radiologist Dr. Fagan   Views Taken left lateral   Physical Location of Procedure [facility]   VFSS Textures Trialed mildly thick liquids;moderately thick liquids/liquidized;pureed   VFSS Eval: Mildly Thick Liquids   Mode of Presentation spoon;fed by clinician    frame - 5   Preparatory Phase Poor bolus control   Oral Phase Premature pharyngeal entry;Poor AP movement;Residue in oral cavity   Pharyngeal Phase Residue in valleculae;Residue in pyriform sinus  (Minimal)   Rosenbek's Penetration Aspiration Scale 8 - contrast passes glottis, visible subglottic residue remains, absent patient response (aspiration)   Response to Aspiration absent response, silent aspiration   Diagnostic Statement Silent aspiration with mildly thick by spoon. No additional strategies assessed d/t pt's impulsivity and suspect lack of carry over at this time    VFSS Eval: Moderately Thick Liquids   Mode of Presentation cup;spoon;fed by  clinician    Frames - 3 4 6 8 9 10   Oral Phase Premature pharyngeal entry;Residue in oral cavity;Poor AP movement   Pharyngeal Phase Delayed swallow reflex   Rosenbek's Penetration Aspiration Scale 8 - contrast passes glottis, visible subglottic residue remains, absent patient response (aspiration)   Response to Aspiration absent response, silent aspiration   Diagnostic Statement Tyler episode of silent aspiration with cup sips and very small amount of aspiration on the first swallow of the video. Pt otherwise consumed 5+ relatively large teaspoon trials without penetration or aspiration. No additional strategies assessed d/t pt's impulsivity and suspect lack of carry over at this time   VFSS Evaluation: Puree Solid Texture Trial   Mode of Presentation, Puree spoon;fed by clinician    Frames 7   Preparatory Phase WFL   Pharyngeal Phase, Puree WFL   Rosenbek's Penetration Aspiration Scale: Puree Food Trial Results 1 - no aspiration, contrast does not enter airway   Diagnostic Statement No penetration or aspiration, no pharyngeal residue   Esophageal Phase of Swallow   Patient reports or presents with symptoms of esophageal dysphagia No   Swallowing Recommendations   Diet Consistency Recommendations pureed (level 4);moderately thick liquids/liquidized (level 3)   Supervision Level for Intake 1:1 supervision needed   Mode of Delivery Recommendations bolus size, small;food moistened;no cups;no straws;slow rate of intake   Swallowing Maneuver Recommendations effortful (hard) swallow;extra swallow   Monitoring/Assistance Required (Eating/Swallowing) check mouth frequently for oral residue/pocketing;cue for finger/lingual sweep if oral pocketing present;stop eating activities when fatigue is present;monitor for cough or change in vocal quality with intake;optimize oral intake to minimize need for tube feeding   Recommended Feeding/Eating Techniques (Swallow Eval) maintain upright sitting position for eating;minimize  distractions during oral intake;maintain upright posture during/after eating for 30 minutes;provide assist with feeding;provide oral hygiene prior to intake   Medication Administration Recommendations, Swallowing (SLP) Crushed in puree, or through feeding tube    Instrumental Assessment Recommendations VFSS (videofluroscopic swallowing study)  (Repeat instrumental in 1-2 weeks )   General Therapy Interventions   Planned Therapy Interventions Dysphagia Treatment   Dysphagia treatment Oropharyngeal exercise training;Modified diet education;Instruction of safe swallow strategies;Compensatory strategies for swallowing   SLP Therapy Assessment/Plan   Criteria for Skilled Therapeutic Interventions Met (SLP Eval) yes;treatment indicated   SLP Diagnosis Moderate oropharyngeal dysphagia    Rehab Potential (SLP Eval) good, to achieve stated therapy goals   Therapy Frequency (SLP Eval) 5 times/wk   Predicted Duration of Therapy Intervention (SLP Eval) 2 weeks   Activity Limitations Related to Problem List (SLP) Vocal quality impairements    Comment, Therapy Assessment/Plan (SLP) Pt seen for video swallow evaluation. He was assessed with various textures/consistencies of barium under fluoroscopy. Pt was initially given moderately thick liquids by spoon which resulted in very small amount of aspiration on initial swallow and romel aspiration with a cup sip. However, when bolus size was controlled pt consumed moderately thick liquids by spoon x 5 without penetration or aspiration. Romel aspiration with mildly thick teaspoon. No penetration, aspiration or pharyngeal residue with puree. Deficits include mild premature spillage, delayed epilgottic inversion, and impaired airway protection. Moderate oropharyngeal dysphagia. Recommend puree (4) and moderately thick liquids (3) by SPOON ONLY. Aspiration occurs with cup sip. Pt must be seated upright, take small bites/sips, slow rate, and alternate solids and liquids. SLP to try solids  and advance at bedside as appropriate. Repeat video swallow in 1-2 weeks pending clinical presentation d/t silent aspiration risk. Pt would also likely benefit from ENT consult given significant in vocal deficits following prolonged intubation.    Therapy Plan Review/Discharge Plan (SLP)   Therapy Plan Review (SLP) evaluation/treatment results reviewed;care plan/treatment goals reviewed;risks/benefits reviewed;participants voiced agreement with care plan;participants included;patient   SLP Discharge Planning    SLP Discharge Recommendation (DC Rec) Transitional Care Facility   SLP Rationale for DC Rec Dysphagia, below baseline    SLP Brief overview of current status  Moderate oropharyngeal dysphagia. Recommend puree (4) and moderately thick liquids (3) by SPOON ONLY. Aspiration occurs with cup sip. Pt must be seated upright, take small bites/sips, slow rate, and alternate solids and liquids. SLP to try solids and advance at bedside as appropriate. Repeat video swallow in 1-2 weeks pending clinical presentation d/t silent aspiration risk. Pt would also likely benefit from ENT consult given significant in vocal deficits following prolonged intubation.     Total Evaluation Time   Total Evaluation Time (Minutes) 10

## 2021-09-17 NOTE — PROGRESS NOTES
Patient seen and care plan discussed with Dr. Mccullough and Dr. Mccormick    [facility]  Cardiovascular and Thoracic Surgery Daily Note          Assessment and Plan:   POD#8 s/p Coronary artery bypass grafting x 4 with left internal mammary artery to the distal left anterior descending, right internal mammary artery to the mid right coronary artery, left radial artery to the obtuse marginal artery, reverse saphenous vein graft to the anomalous RV branch off the LAD, endoscopic vein harvest from the left lower extremity, endoscopic left radial artery harvest, intraoperative HILL[place] by Dr. Antelmo Mccullough  -CVS: HR: 90s-120s. SBP: 100s-150s. Went into atrial flutter overnight POD #3-- treated with amiodarone and cardioverted by EP on POD#4, recurrent afib/flutter 09/16, IV metoprolol PRN.  mg daily, Lopressor 50mg BID. Amlodipine 5mg daily-- will need to continue for at least 3 months to prevent radial artery spasm. PTA losartan on hold-- will consider resuming if HTN persists. Pacer wires removed. Chest tubes removed 09/17. On heparin gtt per EP recommendations, will transition to NOAC tonight. Transitioned to oral amiodarone with taper. Wound vac removed 09/17. Hypervolemic, improving, decrease Lasix to 40 mg IV daily  -Resp: Extubated on POD#5 at 1150. Saturating well on RA. Continue to encourage IS, cough, deep breathing, ambulation. Concern for PNA- abx 09/17 as below  -Neuro:  Delirium/confusion improving. Previous agitation/delirium preventing extubation-- CT head on POD#2 with no acute changes. Neurology consulted-- EEG and MRI negative, neuro signed off  -Renal: good UOP. Cr stable, diuresis as above.   -GI:  Tube feeds via NG. SLP following, advanced to dysphagia diet, advance diet per SLP recs. Start calorie counts  -:  Christina removed  -Endo: pre op a1c: 10.2. Hx of DMII, PTA glipizide, metformin on hold. Hyperglycemic with transition off insulin infusion, insulin infusion resumed.  Hospitalist consulted to assist with post operative glycemic control, appreciate recs. PTA allopurinol resumed.  -FEN: replace electrolytes as needed. Na: 146. K: 3.9. Diuretics reduced   -ID: Temp (24hrs), Av.3  F (37.9  C), Min:98.6  F (37  C), Max:101.7  F (38.7  C)  WBC: up to 16, increased productive cough, afebrile. Completed perioperative abx. Procal 09/15 intermediate. CXR, UA, sputum culture, blood culture, start vanc and cefipime, concern for VAP  -Heme: hgb: 8.7. plt: 466. Acute blood loss anemia and thrombocytopenia related to surgery  -Proph: PCD, ASA, BB, statin, PPI, heparin infusion/apixaban  -Dispo: St 33. Continue therapies. Continue to encourage IS, cough, deep breathing, ambulation.           Interval History:   Increasing productive cough. WBC up to 15.9. Chest tube output decreased. Wants NG removed, states it is painful.          Medications:       allopurinol  100 mg Oral Daily     amiodarone  200 mg Oral or FT or NG tube BID    Followed by     [START ON 2021] amiodarone  200 mg Oral or FT or NG tube Daily     amLODIPine  5 mg Oral Daily     aspirin  324 mg Oral or NG Tube Daily     barium sulfate  10 mL Oral Once     barium sulfate  10 mL Oral Once     barium sulfate 40%   Oral Once     barium sulfate 40%   Oral Once     ceFEPIme (MAXIPIME) IV  2 g Intravenous Q8H     furosemide  40 mg Intravenous BID     insulin glargine  20 Units Subcutaneous BID     metoprolol tartrate  50 mg Oral BID     pantoprazole  40 mg Per Feeding Tube QAM AC    Or     pantoprazole (PROTONIX) IV  40 mg Intravenous QAM AC     polyethylene glycol  17 g Oral Daily     rosuvastatin  10 mg Oral Daily     senna-docusate  1 tablet Oral BID     sodium chloride (PF)  3 mL Intracatheter Q8H     sodium chloride (PF)  3 mL Intracatheter Q8H     thiamine  250 mg Intravenous Daily    Followed by     [START ON 2021] thiamine  100 mg Oral Daily     acetaminophen, bisacodyl, dextrose, dextrose, glucose **OR** dextrose  **OR** glucagon, hydrALAZINE, HYDROmorphone **OR** HYDROmorphone, lidocaine 4%, lidocaine (buffered or not buffered), magnesium hydroxide, metoprolol, naloxone **OR** naloxone **OR** naloxone **OR** naloxone, nitroGLYcerin, ondansetron **OR** ondansetron, oxyCODONE **OR** oxyCODONE, QUEtiapine, BETA BLOCKER NOT PRESCRIBED, sodium chloride (PF), sodium chloride (PF)          Physical Exam:   Vitals were reviewed  Blood pressure 138/78, pulse 102, temperature 97.9  F (36.6  C), temperature source Oral, resp. rate (!) 38, weight 81.4 kg (179 lb 7.3 oz), SpO2 96 %.  Rhythm: atrial flutter    Lungs: diminished bases, coarse on left    Cardiovascular: irregular, irregular, normal s1 and s2, JVP elevated    Abdomen: soft NTND    Extremeties: minimal edema    Incision: incision intact, no erythema or drainage    CT: to suction, serosang, no air leak    Weight:   Vitals:    09/12/21 0200 09/13/21 0400 09/15/21 0500 09/16/21 0400   Weight: 87.5 kg (192 lb 14.4 oz) 89.2 kg (196 lb 10.4 oz) 89.3 kg (196 lb 13.9 oz) 89.7 kg (197 lb 12 oz)    09/17/21 0316   Weight: 81.4 kg (179 lb 7.3 oz)            Data:   Labs:   Lab Results   Component Value Date    WBC 13.8 09/15/2021     Lab Results   Component Value Date    RBC 3.32 09/15/2021     Lab Results   Component Value Date    HGB 8.5 09/15/2021     Lab Results   Component Value Date    HCT 26.5 09/15/2021     No components found for: MCT  Lab Results   Component Value Date    MCV 80 09/15/2021     Lab Results   Component Value Date    MCH 25.6 09/15/2021     Lab Results   Component Value Date    MCHC 32.1 09/15/2021     Lab Results   Component Value Date    RDW 15.3 09/15/2021     Lab Results   Component Value Date     09/15/2021       Last Basic Metabolic Panel:  Lab Results   Component Value Date     09/15/2021      Lab Results   Component Value Date    POTASSIUM 3.2 09/15/2021     Lab Results   Component Value Date    CHLORIDE 110 09/15/2021     Lab Results    Component Value Date    TORRES 8.2 09/15/2021     Lab Results   Component Value Date    CO2 29 09/15/2021     Lab Results   Component Value Date    BUN 13 09/15/2021     Lab Results   Component Value Date    CR 1.00 09/15/2021     Lab Results   Component Value Date     09/15/2021     09/15/2021       Cherry Mclean PA-C  Cardiothoracic Surgery  Pager [phone]

## 2021-09-17 NOTE — PHARMACY-VANCOMYCIN DOSING SERVICE
Pharmacy Vancomycin Initial Note  Date of Service 2021  Patient's  1970  51 year old, male    Indication: [facility] Pneumonia    Current estimated CrCl = Estimated Creatinine Clearance: 100.4 mL/min (based on SCr of 0.98 mg/dL).    Creatinine for last 3 days  9/15/2021:  5:06 AM Creatinine 1.00 mg/dL  2021:  3:59 AM Creatinine 0.90 mg/dL  2021:  6:58 AM Creatinine 0.98 mg/dL    Recent Vancomycin Level(s) for last 3 days  No results found for requested labs within last 72 hours.      Vancomycin IV Administrations (past 72 hours)      No vancomycin orders with administrations in past 72 hours.                Nephrotoxins and other renal medications (From now, onward)    Start     Dose/Rate Route Frequency Ordered Stop    21 0900  furosemide (LASIX) injection 40 mg      40 mg  over 1-3 Minutes Intravenous 2 TIMES DAILY 21 0744            Contrast Orders - past 72 hours (72h ago, onward)    Start     Dose/Rate Route Frequency Ordered Stop    21 0930  barium sulfate 40% (VARIBAR THIN HONEY) oral suspension       Oral ONCE 21 0916 21 1017    21 0930  barium sulfate 40% (VARIBAR NECTAR) oral suspension       Oral ONCE 21 0916 21 1015    21 0930  barium sulfate (VARIBAR) 40 % pudding/paste 10 mL      10 mL Oral ONCE 21 0916 21 1018    21 0930  barium sulfate (VARIBAR THIN Liquid) 40 % oral suspension 4 g  Status:  Discontinued      10 mL Oral ONCE 21 0916 21 1022        Loading dose: 2000 mg at 11:00 2021.  Regimen: 1000 mg IV every 12 hours.  Start time: 10:23 on 2021  Exposure target: AUC24 (range)400-600 mg/L.hr   AUC24,ss: 510 mg/L.hr  Probability of AUC24 > 400: 74 %  Ctrough,ss: 16.2 mg/L  Probability of Ctrough,ss > 20: 33 %  Probability of nephrotoxicity (Lodise CHIDI ): 12 %     Plan:  1. Start vancomycin  1000 mg IV q12h  After 2gm loading dose.   2. Vancomycin monitoring  method: AUC  3. Vancomycin therapeutic monitoring goal: 400-600 mg*h/L  4. Pharmacy will check vancomycin levels as appropriate in 1-3 Days.    5. Serum creatinine levels will be ordered daily for the first week of therapy and at least twice weekly for subsequent weeks.      Brooklynn Walsh RPH

## 2021-09-17 NOTE — PROVIDER NOTIFICATION
Insulin pump- Pt titrated to algorithm #2, no insulin given for past 2 qh readings.  Would you like to continue insulin drip?  Also, are you wanting to continue IMC? Thanks.  Ce RN [id number]

## 2021-09-17 NOTE — PROGRESS NOTES
[facility]    Medicine Progress Note - Hospitalist Service       Date of Admission:  9/2/2021    Assessment & Plan             52 y/o male with PMH of CAD s/p stenting,  HTN, HLP, DM type 2, on insulin and gout- directly admitted from [facility] on 9/2/21 for NSTEMI and 1 week history of let sided chest pain. Underwent echocardiogram with some wall motion abnormalities, Coronary Angio then showed severe 3 vessel disease. He underwent 4V CABG on 9/8/21. Post procedure, he remained intubated until 9/14/21 due to delirium, agitation. He was assessed by neurology and underwent EEG. He also had post operative atrial flutter with RVR that was resistant to BB and amiodarone, but was successfully cardioverted on 9/13/21    NSTEMI  CAD s/p stenting ([id number] in [place]); severe 3 vessel disease s/p CABx4 POD#9  Ischemic cardiomyopathy  HTN  HLP  [PTA on Norvasc 5 mg daily, Losartan 50 mg po BID, Lopressor 50 mg BID and Crestor 10 mg po daily]  *Troponin: 2.15-->10.3-->12.451-->12.292.    *ECHO: LV systolic function mild-moderately reduced (EF 40-45%) with apical, distal septal wall severe hypokinesia, mild inferior wall hypokinesia.  RV systolic function normal.  Mild MR.  Ascending aorta mildly dilated.   - Cardiology following, appreciate input               --Coronary angiogram 9/2/2021 with severe 3-vessel disease.  - Underwent CABG 9/8/21: CABG X 4 WITH ENDOSCOPIC VEIN HARVESTING LIMA-LAD PREET-DISTAL RCA LEFT RADIAL-OM LEFT SV-ANOMOLOUS RV BRANCH  - extubated last night, current;y on O2 5 liters via NC; off precedex drip; up in the chair  - echo 9/14- EF 45-50%; there is anterior, septal, and apical wall akinesis. There is also hypokineiss of the inferior wall.  Mildly decreased right ventricular systolic function; there is mild to moderate (1-2+) mitral regurgitation  - CVS managing post operatively  - currently on  mg po daily, Lopressor 50 mg po BID, Norvasc 5 mg po daily, Crestor  10 mg po daily; PTA Losartan on hold  - BP on higher side  - Aggressive IS  - cardiac rehab  - was on TF as per nutritionist  - SLP- diet advanced to pureed diet with moderately thick liquids; if good oral intake, consider stopping tube feedings     Atrial Flutter s/p cardioversion  2:1 A flutter 9/12/21. Trialed on beta blocker and amiodarone without conversion  - DCCV x2 on 9.13. First shock transitioned to atrial fib, then second shock converted to sinus rhythm.  - plan amiodarone 200mg twice daily for 14 days, then 200mg daily for 28 days  - 9/16 am- went back into Afib with RVR 's  - Lopressor increased from 25mg BID to 50 mg po BID  - Digoxin ivX2 doses given  - currently in Aflutter HR in 80's  - Metoprolol iv prn  - on heparin drip   - management as per CVS/EP  - follow up EP DONTA in 6 weeks     Delirium  Agitation  Acute toxic encephalopathy- improved  - as per prior notes- he was agitated with lightening sedation. CT  Head on POD#2 with no acute changes. Treated with seroquel and valproate per intensivist team. Neurology consulted-- EEG and MRI negative.   - started on Seroquel 50mg BID-->changed to Seroquel 25 mg po BID prn  - now- he is calm , cooperative, follows commands  - mentation significantly improved in the last few days, now AAOX3    Acute hypoxic respiratory failure- improved  - initially delayed extubation as he was agitated with lightening sedation  - extubated on 9/14, currently on O2 5 liters  - noted some productive cough of clear sputum  - afebrile now, WBCs 8.9--13.2--12.5; procal 0.43 on 09/15  - monitor for  signs of PNA  - aggressive IS  - currently on O2 2 liters NC  - weaning O2 as able     Uncontrolled DM2  Hyperglycemia  A1c of 9.6% from 8/2021.  Rechecked A1c 9/2 and at 10.2%.  [ PTA on Lantus 30 untts daily, metformin 1000 mg BID and Glipizide 10 mg/d]  - had been on Insulin drip after surgery  - switched to long acting insulin 2 days ago; despite getting lantus 45 units  9/16 am- BS were in high 300's yesterday  - discussed with bedside RN and dietitian- his TF formula was changed  - also discussed with Reyna EDOUARD- [facility]- given his persistently elevated BS, started him on Insulin drip again; also started on Lantus 20 units BID  - --238  - he was started on diet as well today as per nutritionist; changed the diet to mod carb diet  - increase Lantus to 30 units BID; continue Insulin drip for today and reassess total insulin needs tomorrow  - he may have furtehr uncontrolled BS since he was started on po diet, also las ongoing TF running; eventually, if good po intake, TF can be weaned off  - hold PTA oral antidiabetic meds     Hypokalemia  - K 3.4 this am  - K replacement protocol     Gout  PTA allopurinol 100 mg/d.       History of latent TB  Patient has completed 9 month treatment in 2015.        Diet: NPO for Medical/Clinical Reasons Except for: Meds  Adult Formula Drip Feeding: Continuous Promote w fiber; Nasojejunal; Goal Rate: 85; mL/hr; Medication - Feeding Tube Flush Frequency: At least 15-30 mL water before and after medication administration and with tube clogging; Amount to Send (Nutrit...    DVT Prophylaxis: Heparin infusion.  Christina Catheter: Not present  Central Lines: None  Code Status: Full Code      Disposition Plan   Expected discharge: 3-4 days  recommended to prior living arrangement as per [facility].     The patient's care was discussed with the Bedside Nurse, Patient and Patient's Family- wife and son at bedside    Time Spent on this Encounter   Time Spent on this Encounter   I spent 35 minutes on the unit/floor managing the care of Jerry Bustamante. Over 50% of my time was spent on the following:   - Counseling the patient and/or family regarding: prognosis and medical compliance  - Coordination of care with the: nurse- discussed about diabetes management.      Agnes Moise MD  Hospitalist Service  [facility]  Securely message  with the [employer] Web Console (learn more here)  Text page via [facility] Paging/Directory      Clinically Significant Risk Factors Present on Admission                ______________________________________________________________________    Interval History   - states he feels better  - had video swallow today; diet advanced as per SLP  - up in the chair, denies chest pain, reports some SOB and some productive cough  - no N/V, no abd pain; as per RN- had a BM yesterday  - mentation improved significantly, AAOX3, 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 I personally reviewed the Tele image(s) showing Aflutter with rate 80-90's.    Physical Exam   Vital Signs: Temp: 97.9  F (36.6  C) Temp src: Oral BP: 138/78 Pulse: 102   Resp: (!) 38 SpO2: 96 % O2 Device: None (Room air)    Weight: 179 lbs 7.27 oz     General: - awake, alert, NAD  HEENT- normocephalic, atraumatic, PERRL, oral mucosa moist; NGT in place  RESP: bilateral air entry, some crackles at right base, diminished air entry left base, chest tube in place  CV- S1S2, RRR, no murmurs, no rubs  GI- abd- soft, nonT, nonD, BS present  EXTREMITIES- no leg swelling, no calf tenderness  NEURO:- awake, oriented to self, place and time (knows it's Sept 2021, does not know the day), follows commands, cranial nerves II-XII grossly intact  PSYCH- calm, cooperative    Data   Recent Labs   Lab 09/17/21  0936 09/17/21  0827 09/17/21  0700 09/17/21  0658 09/17/21  0602 09/16/21  0402 09/16/21  0359 09/15/21  1330 09/15/21  1326 09/15/21  0536 09/15/21  0506 09/14/21  0611 09/14/21  0344 09/13/21  0045 09/13/21  0005   WBC  --   --   --  15.9*  --   --  12.5*  --   --   --  13.8*   < > 8.9   < > 7.4   HGB  --   --   --  8.7*  --   --  8.2*  --   --   --  8.5*   < > 7.8*   < > 7.7*   MCV  --   --   --  83  --   --  83  --   --   --  80   < > 81   < > 82   PLT  --   --   --  466*  --   --   387  --   --   --  374   < > 260   < > 239   NA  --   --   --  146*  --   --  145*  --   --   --  143   < > 144   < > 140   POTASSIUM  --   --   --  3.4  --   --  3.9  --  3.8   < > 3.2*   < > 3.4   < > 4.3   CHLORIDE  --   --   --  110*  --   --  110*  --   --   --  110*   < > 110*   < > 108   CO2  --   --   --  31  --   --  31  --   --   --  29   < > 28   < > 18*   BUN  --   --   --  24  --   --  19  --   --   --  13   < > 12   < > 10   CR  --   --   --  0.98  --   --  0.90  --   --   --  1.00   < > 0.83   < > 0.95   ANIONGAP  --   --   --  5  --   --  4  --   --   --  4   < > 6   < > 14   TORRES  --   --   --  9.0  --   --  8.5  --   --   --  8.2*   < > 8.5   < > 8.0*   * 271* 201* 219*   < >   < > 389*   < >  --    < > 173*   < > 137*   < > 215*   ALBUMIN  --   --   --   --   --   --   --   --   --   --   --   --  1.8*  --  1.9*   PROTTOTAL  --   --   --   --   --   --   --   --   --   --   --   --  6.4*  --  6.5*   BILITOTAL  --   --   --   --   --   --   --   --   --   --   --   --  0.3  --  0.5   ALKPHOS  --   --   --   --   --   --   --   --   --   --   --   --  262*  --  272*   ALT  --   --   --   --   --   --   --   --   --   --   --   --  15  --  18   AST  --   --   --   --   --   --   --   --   --   --   --   --  16  --  16    < > = values in this interval not displayed.     Recent Results (from the past 24 hour(s))   XR Abdomen Port 1 View    Narrative    XR PORTABLE ABDOMEN ONE VIEW 9/16/2021 1:22 PM     HISTORY: Abdominal distention, rule out ileus, small-bowel  obstruction.      COMPARISON: Abdominal x-ray 9/13/2021.      Impression    IMPRESSION: Supine portable view of the abdomen is performed. Feeding  tube is present below the left hemidiaphragm with configuration  suggesting that the tip is in the proximal jejunum just past the  ligament of Treitz in good position. Monitoring and support devices  lower chest are unchanged. Scattered air noted in loops of small bowel  and colon without  obvious obstruction.    ZULAY GALLEGOS MD         SYSTEM ID:  [id number]     Medications     dextrose       dextrose       heparin 1,000 Units/hr (09/17/21 0758)     insulin regular 8.5 Units/hr (09/17/21 0936)     BETA BLOCKER NOT PRESCRIBED       sodium chloride Stopped (09/15/21 1400)       allopurinol  100 mg Oral Daily     amiodarone  200 mg Oral or FT or NG tube BID    Followed by     [START ON 9/28/2021] amiodarone  200 mg Oral or FT or NG tube Daily     amLODIPine  5 mg Oral Daily     aspirin  324 mg Oral or NG Tube Daily     barium sulfate  10 mL Oral Once     barium sulfate  10 mL Oral Once     barium sulfate 40%   Oral Once     barium sulfate 40%   Oral Once     ceFEPIme (MAXIPIME) IV  2 g Intravenous Q8H     furosemide  40 mg Intravenous BID     insulin glargine  20 Units Subcutaneous BID     metoprolol tartrate  50 mg Oral BID     pantoprazole  40 mg Per Feeding Tube QAM AC    Or     pantoprazole (PROTONIX) IV  40 mg Intravenous QAM AC     polyethylene glycol  17 g Oral Daily     rosuvastatin  10 mg Oral Daily     senna-docusate  1 tablet Oral BID     sodium chloride (PF)  3 mL Intracatheter Q8H     sodium chloride (PF)  3 mL Intracatheter Q8H     thiamine  250 mg Intravenous Daily    Followed by     [START ON 9/19/2021] thiamine  100 mg Oral Daily

## 2021-09-17 NOTE — PROGRESS NOTES
Cardiac Electrophysiology Progress Note          Assessment and Plan:   Recurrent mildly symptomatic AF/AFL post operatively, pod#8 [id number], rate is well controlled, <100 bpm at rest  -as long as rate is controlled no need to restore sinus rhythm at this time. Continue with amio po loading along with anticoagulation. Good chance of spontaneous conversion, otherwise CVN outpt in next 4-6 weeks, sooner if rate not controlled.                  Interval History:   doing well; no cp, sob, n/v/d, or abd pain.              Review of Systems:   As per subjective, otherwise 5 systems reviewed and negative.           Physical Exam:   Blood pressure 131/77, pulse 84, temperature 98.6  F (37  C), temperature source Oral, resp. rate 20, weight 81.4 kg (179 lb 7.3 oz), SpO2 98 %.          Intake/Output Summary (Last 24 hours) at 9/17/2021 1745  Last data filed at 9/17/2021 0943  Gross per 24 hour   Intake 240 ml   Output 1465 ml   Net -1225 ml       Constitutional:   NAD   Skin:   Warm and dry   Head:   Nontraumatic   Neck:   Supple, symmetrical, trachea midline, no adenopathy, thyroid symmetric, not enlarged and no tenderness, skin normal   Lungs:   normal   Cardiovascular:   irregularly irregular rhythm no murmur noted   Abdomen:   Benign   Extremities and Back:   Symmetric, no curvature, spinous processes are non-tender on palpation, paraspinous muscles are non-tender on palpation, no costal vertebral tenderness   Neurological:   Grossly nonfocal            Medications:       allopurinol  100 mg Oral Daily     amiodarone  200 mg Oral or FT or NG tube BID    Followed by     [START ON 9/28/2021] amiodarone  200 mg Oral or FT or NG tube Daily     amLODIPine  5 mg Oral Daily     apixaban ANTICOAGULANT  5 mg Oral BID     aspirin  324 mg Oral or NG Tube Daily     ceFEPIme (MAXIPIME) IV  2 g Intravenous Q8H     [START ON 9/18/2021] furosemide  40 mg Intravenous Daily     insulin glargine  30 Units Subcutaneous BID     metoprolol  tartrate  50 mg Oral BID     pantoprazole  40 mg Per Feeding Tube QAM AC    Or     pantoprazole (PROTONIX) IV  40 mg Intravenous QAM AC     polyethylene glycol  17 g Oral Daily     rosuvastatin  10 mg Oral Daily     senna-docusate  1 tablet Oral BID     sodium chloride (PF)  3 mL Intracatheter Q8H     sodium chloride (PF)  3 mL Intracatheter Q8H     thiamine  250 mg Intravenous Daily    Followed by     [START ON 9/19/2021] thiamine  100 mg Oral Daily     vancomycin  1,000 mg Intravenous Q12H     Admission on 09/02/2021, Discharged on 09/02/2021   Component Date Value Ref Range Status     WBC Count 09/02/2021 11.9* 4.0 - 11.0 10e3/uL Final     RBC Count 09/02/2021 4.89  4.40 - 5.90 10e6/uL Final     Hemoglobin 09/02/2021 12.7* 13.3 - 17.7 g/dL Final     Hematocrit 09/02/2021 38.3* 40.0 - 53.0 % Final     MCV 09/02/2021 78  78 - 100 fL Final     MCH 09/02/2021 26.0* 26.5 - 33.0 pg Final     MCHC 09/02/2021 33.2  31.5 - 36.5 g/dL Final     RDW 09/02/2021 13.8  10.0 - 15.0 % Final     Platelet Count 09/02/2021 193  150 - 450 10e3/uL Final     Sodium 09/02/2021 135* 136 - 145 mmol/L Final     Potassium 09/02/2021 4.2  3.5 - 5.0 mmol/L Final     Chloride 09/02/2021 98  98 - 107 mmol/L Final     Carbon Dioxide (CO2) 09/02/2021 25  22 - 31 mmol/L Final     Anion Gap 09/02/2021 12  5 - 18 mmol/L Final     Urea Nitrogen 09/02/2021 17  8 - 22 mg/dL Final     Creatinine 09/02/2021 1.26  0.70 - 1.30 mg/dL Final     Calcium 09/02/2021 9.8  8.5 - 10.5 mg/dL Final     Glucose 09/02/2021 485* 70 - 125 mg/dL Final     GFR Estimate 09/02/2021 65  >60 mL/min/1.73m2 Final    As of July 11, 2021, eGFR is calculated by the CKD-EPI creatinine equation, without race adjustment. eGFR can be influenced by muscle mass, exercise, and diet. The reported eGFR is an estimation only and is only applicable if the renal function is stable.     Bilirubin Total 09/02/2021 0.4  0.0 - 1.0 mg/dL Final     Bilirubin Direct 09/02/2021 0.1  <=0.5 mg/dL Final      Protein Total 09/02/2021 7.8  6.0 - 8.0 g/dL Final     Albumin 09/02/2021 3.6  3.5 - 5.0 g/dL Final     Alkaline Phosphatase 09/02/2021 118  45 - 120 U/L Final     AST 09/02/2021 29  0 - 40 U/L Final     ALT 09/02/2021 23  0 - 45 U/L Final     Lipase 09/02/2021 159* 0 - 52 U/L Final     Troponin I 09/02/2021 2.15* 0.00 - 0.29 ng/mL Final     Ventricular Rate 09/02/2021 68  BPM Final     Atrial Rate 09/02/2021 68  BPM Final     NC Interval 09/02/2021 190  ms Final     QRS Duration 09/02/2021 88  ms Final     QT 09/02/2021 370  ms Final     QTc 09/02/2021 393  ms Final     P Axis 09/02/2021 52  degrees Final     R AXIS 09/02/2021 -5  degrees Final     T Axis 09/02/2021 186  degrees Final     Interpretation ECG 09/02/2021    Final                    Value:Sinus rhythm  Inferior infarct (cited on or before 23-NOV-2017)  ST & T wave abnormality, consider anterolateral ischemia  Abnormal ECG  When compared with ECG of 23-NOV-2017 12:06,  Criteria for Anterior infarct are no longer Present  Questionable change in initial forces of Inferior leads  ST now depressed in Anterior leads  Nonspecific T wave abnormality, worse in Inferior leads  T wave inversion less evident in Anterior leads  Confirmed by SEE ED PROVIDER NOTE FOR, ECG INTERPRETATION ([id number]),  ANALY SPRINGER ([id number]) on 9/2/2021 7:03:08 AM       Hold Specimen 09/02/2021 [facility]   Final     SARS CoV2 PCR 09/02/2021 Negative  Negative Final    NEGATIVE: SARS-CoV-2 (COVID-19) RNA not detected, presumed negative.     GLUCOSE BY METER POCT 09/02/2021 252* 70 - 99 mg/dL Final    Dr/RN Notified                  Tito Barcenas MD

## 2021-09-17 NOTE — PLAN OF CARE
[id number]: Alert and oriented x4. Tacky with movement, other VSS on RA. Up with one and walker. Chest tubes and wound vac taken off today. Voiding well. ABX started today. Incision and harvest sites CDI and GOYO. Tele A flutter/fib. Oxycodone for pain x1, guaifenesin x1 for cough. Tube feeding ran out pt refusing to have new bottle placed due to feeling full. Did eat dinner and lunch. Continues on insulin drip. Will continue to monitor tonight.

## 2021-09-18 ENCOUNTER — APPOINTMENT (OUTPATIENT)
Dept: PHYSICAL THERAPY | Facility: CLINIC | Age: 51
End: 2021-09-18
Attending: INTERNAL MEDICINE
Payer: COMMERCIAL

## 2021-09-18 ENCOUNTER — APPOINTMENT (OUTPATIENT)
Dept: SPEECH THERAPY | Facility: CLINIC | Age: 51
End: 2021-09-18
Attending: INTERNAL MEDICINE
Payer: COMMERCIAL

## 2021-09-18 ENCOUNTER — APPOINTMENT (OUTPATIENT)
Dept: GENERAL RADIOLOGY | Facility: CLINIC | Age: 51
End: 2021-09-18
Attending: PHYSICIAN ASSISTANT
Payer: COMMERCIAL

## 2021-09-18 LAB
ALBUMIN UR-MCNC: 70 MG/DL
ANION GAP SERPL CALCULATED.3IONS-SCNC: 6 MMOL/L (ref 3–14)
APPEARANCE UR: CLEAR
BILIRUB UR QL STRIP: NEGATIVE
BUN SERPL-MCNC: 26 MG/DL (ref 7–30)
CALCIUM SERPL-MCNC: 9 MG/DL (ref 8.5–10.1)
CHLORIDE BLD-SCNC: 108 MMOL/L (ref 94–109)
CO2 SERPL-SCNC: 27 MMOL/L (ref 20–32)
COLOR UR AUTO: YELLOW
CREAT SERPL-MCNC: 1.01 MG/DL (ref 0.66–1.25)
ERYTHROCYTE [DISTWIDTH] IN BLOOD BY AUTOMATED COUNT: 16.1 % (ref 10–15)
GFR SERPL CREATININE-BSD FRML MDRD: 85 ML/MIN/1.73M2
GLUCOSE BLD-MCNC: 298 MG/DL (ref 70–99)
GLUCOSE BLDC GLUCOMTR-MCNC: 106 MG/DL (ref 70–99)
GLUCOSE BLDC GLUCOMTR-MCNC: 110 MG/DL (ref 70–99)
GLUCOSE BLDC GLUCOMTR-MCNC: 119 MG/DL (ref 70–99)
GLUCOSE BLDC GLUCOMTR-MCNC: 124 MG/DL (ref 70–99)
GLUCOSE BLDC GLUCOMTR-MCNC: 124 MG/DL (ref 70–99)
GLUCOSE BLDC GLUCOMTR-MCNC: 128 MG/DL (ref 70–99)
GLUCOSE BLDC GLUCOMTR-MCNC: 151 MG/DL (ref 70–99)
GLUCOSE BLDC GLUCOMTR-MCNC: 265 MG/DL (ref 70–99)
GLUCOSE BLDC GLUCOMTR-MCNC: 292 MG/DL (ref 70–99)
GLUCOSE BLDC GLUCOMTR-MCNC: 98 MG/DL (ref 70–99)
GLUCOSE UR STRIP-MCNC: NEGATIVE MG/DL
HCT VFR BLD AUTO: 29.7 % (ref 40–53)
HGB BLD-MCNC: 9.1 G/DL (ref 13.3–17.7)
HGB UR QL STRIP: ABNORMAL
KETONES UR STRIP-MCNC: NEGATIVE MG/DL
LEUKOCYTE ESTERASE UR QL STRIP: NEGATIVE
MAGNESIUM SERPL-MCNC: 2.3 MG/DL (ref 1.6–2.3)
MCH RBC QN AUTO: 25.5 PG (ref 26.5–33)
MCHC RBC AUTO-ENTMCNC: 30.6 G/DL (ref 31.5–36.5)
MCV RBC AUTO: 83 FL (ref 78–100)
MUCOUS THREADS #/AREA URNS LPF: PRESENT /LPF
NITRATE UR QL: NEGATIVE
PH UR STRIP: 7 [PH] (ref 5–7)
PHOSPHATE SERPL-MCNC: 4.6 MG/DL (ref 2.5–4.5)
PLATELET # BLD AUTO: 458 10E3/UL (ref 150–450)
POTASSIUM BLD-SCNC: 3.6 MMOL/L (ref 3.4–5.3)
RBC # BLD AUTO: 3.57 10E6/UL (ref 4.4–5.9)
RBC URINE: 28 /HPF
SODIUM SERPL-SCNC: 141 MMOL/L (ref 133–144)
SP GR UR STRIP: 1.03 (ref 1–1.03)
UROBILINOGEN UR STRIP-MCNC: 2 MG/DL
WBC # BLD AUTO: 17.3 10E3/UL (ref 4–11)
WBC URINE: 4 /HPF

## 2021-09-18 PROCEDURE — 84100 ASSAY OF PHOSPHORUS: CPT | Performed by: INTERNAL MEDICINE

## 2021-09-18 PROCEDURE — 250N000013 HC RX MED GY IP 250 OP 250 PS 637: Performed by: PHYSICIAN ASSISTANT

## 2021-09-18 PROCEDURE — 250N000011 HC RX IP 250 OP 636: Performed by: PHYSICIAN ASSISTANT

## 2021-09-18 PROCEDURE — 999N000065 XR CHEST 2 VW

## 2021-09-18 PROCEDURE — 250N000009 HC RX 250: Performed by: PHYSICIAN ASSISTANT

## 2021-09-18 PROCEDURE — 85027 COMPLETE CBC AUTOMATED: CPT | Performed by: PHYSICIAN ASSISTANT

## 2021-09-18 PROCEDURE — 250N000013 HC RX MED GY IP 250 OP 250 PS 637: Performed by: INTERNAL MEDICINE

## 2021-09-18 PROCEDURE — 250N000013 HC RX MED GY IP 250 OP 250 PS 637: Performed by: NURSE PRACTITIONER

## 2021-09-18 PROCEDURE — 97530 THERAPEUTIC ACTIVITIES: CPT | Mod: GP

## 2021-09-18 PROCEDURE — 81001 URINALYSIS AUTO W/SCOPE: CPT | Performed by: PHYSICIAN ASSISTANT

## 2021-09-18 PROCEDURE — 99233 SBSQ HOSP IP/OBS HIGH 50: CPT | Performed by: INTERNAL MEDICINE

## 2021-09-18 PROCEDURE — 80048 BASIC METABOLIC PNL TOTAL CA: CPT | Performed by: PHYSICIAN ASSISTANT

## 2021-09-18 PROCEDURE — 36415 COLL VENOUS BLD VENIPUNCTURE: CPT | Performed by: PHYSICIAN ASSISTANT

## 2021-09-18 PROCEDURE — 97110 THERAPEUTIC EXERCISES: CPT | Mod: GP

## 2021-09-18 PROCEDURE — 92526 ORAL FUNCTION THERAPY: CPT | Mod: GN

## 2021-09-18 PROCEDURE — 250N000012 HC RX MED GY IP 250 OP 636 PS 637: Performed by: INTERNAL MEDICINE

## 2021-09-18 PROCEDURE — 258N000003 HC RX IP 258 OP 636: Performed by: PHYSICIAN ASSISTANT

## 2021-09-18 PROCEDURE — C9113 INJ PANTOPRAZOLE SODIUM, VIA: HCPCS | Performed by: PHYSICIAN ASSISTANT

## 2021-09-18 PROCEDURE — 120N000001 HC R&B MED SURG/OB

## 2021-09-18 PROCEDURE — 83735 ASSAY OF MAGNESIUM: CPT | Performed by: INTERNAL MEDICINE

## 2021-09-18 RX ORDER — DEXTROSE MONOHYDRATE 25 G/50ML
25-50 INJECTION, SOLUTION INTRAVENOUS
Status: DISCONTINUED | OUTPATIENT
Start: 2021-09-18 | End: 2021-09-22 | Stop reason: HOSPADM

## 2021-09-18 RX ORDER — POTASSIUM CHLORIDE 1500 MG/1
20 TABLET, EXTENDED RELEASE ORAL ONCE
Status: COMPLETED | OUTPATIENT
Start: 2021-09-18 | End: 2021-09-18

## 2021-09-18 RX ORDER — MAGNESIUM HYDROXIDE/ALUMINUM HYDROXICE/SIMETHICONE 120; 1200; 1200 MG/30ML; MG/30ML; MG/30ML
30 SUSPENSION ORAL EVERY 4 HOURS PRN
Status: DISCONTINUED | OUTPATIENT
Start: 2021-09-18 | End: 2021-09-22 | Stop reason: HOSPADM

## 2021-09-18 RX ORDER — BENZONATATE 100 MG/1
100 CAPSULE ORAL 3 TIMES DAILY PRN
Status: DISCONTINUED | OUTPATIENT
Start: 2021-09-18 | End: 2021-09-22 | Stop reason: HOSPADM

## 2021-09-18 RX ORDER — NICOTINE POLACRILEX 4 MG
15-30 LOZENGE BUCCAL
Status: DISCONTINUED | OUTPATIENT
Start: 2021-09-18 | End: 2021-09-22 | Stop reason: HOSPADM

## 2021-09-18 RX ADMIN — AMLODIPINE BESYLATE 5 MG: 5 TABLET ORAL at 08:22

## 2021-09-18 RX ADMIN — APIXABAN 5 MG: 5 TABLET, FILM COATED ORAL at 08:22

## 2021-09-18 RX ADMIN — INSULIN GLARGINE 15 UNITS: 100 INJECTION, SOLUTION SUBCUTANEOUS at 11:43

## 2021-09-18 RX ADMIN — METOPROLOL TARTRATE 50 MG: 50 TABLET, FILM COATED ORAL at 08:22

## 2021-09-18 RX ADMIN — GUAIFENESIN 10 ML: 100 SOLUTION ORAL at 01:10

## 2021-09-18 RX ADMIN — BENZONATATE 100 MG: 100 CAPSULE ORAL at 20:05

## 2021-09-18 RX ADMIN — CEFEPIME HYDROCHLORIDE 2 G: 2 INJECTION, POWDER, FOR SOLUTION INTRAVENOUS at 17:59

## 2021-09-18 RX ADMIN — FUROSEMIDE 40 MG: 10 INJECTION, SOLUTION INTRAVENOUS at 08:37

## 2021-09-18 RX ADMIN — POTASSIUM CHLORIDE 20 MEQ: 1500 TABLET, EXTENDED RELEASE ORAL at 14:08

## 2021-09-18 RX ADMIN — METOPROLOL TARTRATE 5 MG: 5 INJECTION INTRAVENOUS at 04:59

## 2021-09-18 RX ADMIN — AMIODARONE HYDROCHLORIDE 200 MG: 200 TABLET ORAL at 08:22

## 2021-09-18 RX ADMIN — APIXABAN 5 MG: 5 TABLET, FILM COATED ORAL at 21:26

## 2021-09-18 RX ADMIN — CALCIUM CARBONATE (ANTACID) CHEW TAB 500 MG 1000 MG: 500 CHEW TAB at 10:50

## 2021-09-18 RX ADMIN — PANTOPRAZOLE SODIUM 40 MG: 40 INJECTION, POWDER, FOR SOLUTION INTRAVENOUS at 09:13

## 2021-09-18 RX ADMIN — ALLOPURINOL 100 MG: 100 TABLET ORAL at 08:22

## 2021-09-18 RX ADMIN — ROSUVASTATIN CALCIUM 10 MG: 10 TABLET, FILM COATED ORAL at 08:23

## 2021-09-18 RX ADMIN — QUETIAPINE FUMARATE 50 MG: 50 TABLET ORAL at 00:59

## 2021-09-18 RX ADMIN — THIAMINE HYDROCHLORIDE 250 MG: 100 INJECTION, SOLUTION INTRAMUSCULAR; INTRAVENOUS at 09:15

## 2021-09-18 RX ADMIN — CEFEPIME HYDROCHLORIDE 2 G: 2 INJECTION, POWDER, FOR SOLUTION INTRAVENOUS at 02:19

## 2021-09-18 RX ADMIN — ASPIRIN 81 MG CHEWABLE TABLET 324 MG: 81 TABLET CHEWABLE at 08:22

## 2021-09-18 RX ADMIN — INSULIN ASPART 5 UNITS: 100 INJECTION, SOLUTION INTRAVENOUS; SUBCUTANEOUS at 17:54

## 2021-09-18 RX ADMIN — INSULIN ASPART 4 UNITS: 100 INJECTION, SOLUTION INTRAVENOUS; SUBCUTANEOUS at 13:17

## 2021-09-18 RX ADMIN — AMIODARONE HYDROCHLORIDE 200 MG: 200 TABLET ORAL at 21:26

## 2021-09-18 RX ADMIN — SENNOSIDES AND DOCUSATE SODIUM 1 TABLET: 8.6; 5 TABLET ORAL at 21:25

## 2021-09-18 RX ADMIN — INSULIN GLARGINE 15 UNITS: 100 INJECTION, SOLUTION SUBCUTANEOUS at 21:36

## 2021-09-18 RX ADMIN — CEFEPIME HYDROCHLORIDE 2 G: 2 INJECTION, POWDER, FOR SOLUTION INTRAVENOUS at 09:53

## 2021-09-18 RX ADMIN — METOPROLOL TARTRATE 50 MG: 50 TABLET, FILM COATED ORAL at 21:25

## 2021-09-18 RX ADMIN — SENNOSIDES AND DOCUSATE SODIUM 1 TABLET: 8.6; 5 TABLET ORAL at 08:23

## 2021-09-18 RX ADMIN — HYDRALAZINE HYDROCHLORIDE 10 MG: 20 INJECTION INTRAMUSCULAR; INTRAVENOUS at 04:14

## 2021-09-18 ASSESSMENT — ACTIVITIES OF DAILY LIVING (ADL)
ADLS_ACUITY_SCORE: 7

## 2021-09-18 NOTE — PROGRESS NOTES
Patient seen and care plan discussed with Dr. Mccullough and Dr. 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 Antelmo Mccullough  -CVS: HR: 90s-120s. SBP: 100s-150s. Went into atrial flutter overnight POD #3-- treated with amiodarone and cardioverted by EP on POD#4, recurrent afib/flutter 09/16, IV metoprolol PRN.  mg daily, Lopressor 50mg BID. Amlodipine 5mg daily-- will need to continue for at least 3 months to prevent radial artery spasm. PTA losartan on hold-- will consider resuming if HTN persists. Pacer wires removed. Chest tubes removed 09/17. Heparin infusion transitioned to NOAC. Transitioned to oral amiodarone with taper. Wound vac removed 09/17. Hypervolemic, improving, Lasix 40 mg IV daily  -Resp: Extubated on POD#5 at 1150. Saturating well on RA. Continue to encourage IS, cough, deep breathing, ambulation. Concern for PNA- abx 09/17 as below  -Neuro:  Delirium/confusion improving. Previous agitation/delirium preventing extubation-- CT head on POD#2 with no acute changes. Neurology consulted-- EEG and MRI negative, neuro signed off  -Renal: good UOP. Cr stable, diuresis as above.   -GI:  NG discontinued. SLP following, advanced to dysphagia diet, advance diet per SLP recs. Continues with dysphonia, if voice not improving will consider ENT consult on Monday Continue calorie counts  -:  Christina removed  -Endo: pre op a1c: 10.2. Hx of DMII, PTA glipizide, metformin on hold. Hyperglycemic with transition off insulin  infusion, insulin infusion resumed. Hospitalist consulted to assist with post operative glycemic control, appreciate recs. PTA allopurinol resumed.  -FEN: replace electrolytes as needed. Na: 141. K: 3.6. Diuretics reduced   -ID: Afebrile  WBC trending up, 17.3 today continues with productive cough, afebrile. Completed perioperative abx. Procal 09/15 intermediate. CXR, UA, sputum culture, blood culture, continue cefipime x 7 days, concern for VAP  -Heme: hgb: 9.1. plt: 458. Acute blood loss anemia and thrombocytopenia related to surgery  -Proph: PCD, ASA, BB, statin, PPI, apixaban  -Dispo: St 33. Continue therapies. Continue to encourage IS, cough, deep breathing, ambulation.           Interval History:   Persistent productive cough. WBC up to 17.3, afebrile. Confused overnight, redirectable this morning, glad to have NG out.          Medications:       allopurinol  100 mg Oral Daily     amiodarone  200 mg Oral or FT or NG tube BID    Followed by     [START ON 9/28/2021] amiodarone  200 mg Oral or FT or NG tube Daily     amLODIPine  5 mg Oral Daily     apixaban ANTICOAGULANT  5 mg Oral BID     aspirin  324 mg Oral or NG Tube Daily     ceFEPIme (MAXIPIME) IV  2 g Intravenous Q8H     furosemide  40 mg Intravenous Daily     insulin aspart  1-7 Units Subcutaneous TID AC     insulin aspart  1-5 Units Subcutaneous At Bedtime     insulin glargine  15 Units Subcutaneous BID     metoprolol tartrate  50 mg Oral BID     pantoprazole  40 mg Per Feeding Tube QAM AC    Or     pantoprazole (PROTONIX) IV  40 mg Intravenous QAM AC     polyethylene glycol  17 g Oral Daily     rosuvastatin  10 mg Oral Daily     senna-docusate  1 tablet Oral BID     sodium chloride (PF)  3 mL Intracatheter Q8H     sodium chloride (PF)  3 mL Intracatheter Q8H     [START ON 9/19/2021] thiamine  100 mg Oral Daily     acetaminophen, alum & mag hydroxide-simethicone, benzonatate, bisacodyl, calcium carbonate, glucose **OR** dextrose **OR** glucagon,  guaiFENesin, hydrALAZINE, HYDROmorphone **OR** HYDROmorphone, lidocaine 4%, lidocaine (buffered or not buffered), magnesium hydroxide, metoprolol, naloxone **OR** naloxone **OR** naloxone **OR** naloxone, nitroGLYcerin, ondansetron **OR** ondansetron, oxyCODONE **OR** oxyCODONE, QUEtiapine, BETA BLOCKER NOT PRESCRIBED, sodium chloride (PF), sodium chloride (PF)          Physical Exam:   Vitals were reviewed  Blood pressure 108/85, pulse 100, temperature 99.3  F (37.4  C), temperature source Oral, resp. rate 23, weight 85 kg (187 lb 6.3 oz), SpO2 98 %.  Rhythm: atrial flutter    Lungs: diminished bases, coarse on left    Cardiovascular: irregular, irregular, normal s1 and s2, JVP elevated    Abdomen: soft NTND    Extremeties: minimal edema    Incision: incision intact, no erythema or drainage    CT: removed    Weight:   Vitals:    09/13/21 0400 09/15/21 0500 09/16/21 0400 09/17/21 0316   Weight: 89.2 kg (196 lb 10.4 oz) 89.3 kg (196 lb 13.9 oz) 89.7 kg (197 lb 12 oz) 81.4 kg (179 lb 7.3 oz)    09/18/21 0652   Weight: 85 kg (187 lb 6.3 oz)            Data:   Labs:   Lab Results   Component Value Date    WBC 13.8 09/15/2021     Lab Results   Component Value Date    RBC 3.32 09/15/2021     Lab Results   Component Value Date    HGB 8.5 09/15/2021     Lab Results   Component Value Date    HCT 26.5 09/15/2021     No components found for: MCT  Lab Results   Component Value Date    MCV 80 09/15/2021     Lab Results   Component Value Date    MCH 25.6 09/15/2021     Lab Results   Component Value Date    MCHC 32.1 09/15/2021     Lab Results   Component Value Date    RDW 15.3 09/15/2021     Lab Results   Component Value Date     09/15/2021       Last Basic Metabolic Panel:  Lab Results   Component Value Date     09/15/2021      Lab Results   Component Value Date    POTASSIUM 3.2 09/15/2021     Lab Results   Component Value Date    CHLORIDE 110 09/15/2021     Lab Results   Component Value Date    TORRES 8.2 09/15/2021      Lab Results   Component Value Date    CO2 29 09/15/2021     Lab Results   Component Value Date    BUN 13 09/15/2021     Lab Results   Component Value Date    CR 1.00 09/15/2021     Lab Results   Component Value Date     09/15/2021     09/15/2021       Cherry Mclean PA-C  Cardiothoracic Surgery  Pager [phone]

## 2021-09-18 NOTE — PLAN OF CARE
Pt. Alert and oriented x4. Vital signs stable on 2L NC, tachy with activity. Assist of 1 with gb, needing reminders during ambulation to focus on balance. Tolerating mod carb diet. Lung sounds dim with crackles. Bowel sounds active, + flatus. Adequate urine output. Sternal incision well approximated. CT dressing removed, with some clots and drainage. Denies pain . Denies nausea. Tele afib CVR/RVR and occasionally aflutter CVR/RVR.

## 2021-09-18 NOTE — PROVIDER NOTIFICATION
MD Notification    Notified Person: MD    Notified Person Name: Santos Palm APRLASHAE    Notification Date/Time: 9/17/2021 at 2005    Notification Interaction: text paged    Purpose of Notification: Pt is c/o of heartburn and requesting tums. Also is refusing to have tube feed hooked back up saying he is to full.    Orders Received: see new orders

## 2021-09-18 NOTE — PROGRESS NOTES
CLINICAL NUTRITION SERVICES - REASSESSMENT NOTE      Recommendations Ordered by Registered Dietitian (RD):   Supplements as desired, pending overall oral intake    Future/Additional Recommendations:   Calorie counts per MD   Malnutrition:  (9/11)  % Weight Loss:  None noted  % Intake:  </= 50% for >/= 5 days (severe malnutrition) - p/t to TF start  Subcutaneous Fat Loss:  None observed  Muscle Loss:  None observed  Fluid Retention:  None noted     Malnutrition Diagnosis: Patient does not meet two of the above criteria necessary for diagnosing malnutrition       EVALUATION OF PROGRESS TOWARD GOALS   Diet:  Consistent CHO 75 gm CHO/meal, pureed diet (level 4), moderately thick liquids (level 3)    Nutrition Support:  TF stopped when bag ran out last night per patient request as he was feeling full. FTwas pulled this AM  TF had been increased on 9/16 to Promote with Fiber at 85 mL/hr = 2040 kcals (23 kcal/kg and 93% energy needs), 126 gm pro (1.4 gm/kg), 1695 mL H20, 29 gm fiber, 282 gm CHO    Intake/Tolerance:    Patient consumed 100% of 2 meals yesterday meeting 772 kcal and 45 gm protein (calorie counts ordered to officially start recorded intake today, 9/18, through 9/20)  Went to visit patient this morning but he was not in his room   Can review available nutritional supplements at a later time     ASSESSED NUTRITION NEEDS:  Dosing Weight 85 kg - 9/18  Estimated Energy Needs: 3524-6652 kcals (25-30 Kcal/Kg)  Justification: maintenance  Estimated Protein Needs: 102-127 grams protein (1.2-1.5 g pro/Kg)  Justification: post-op and hypercatabolism with acute illness      NEW FINDINGS:   FT removed and calorie counts started     Labs reviewed: Na 146 (H), K/Mg/Phos WNL  Recent Labs   Lab 09/18/21  0834 09/18/21  0614 09/18/21  0457 09/18/21  0402 09/18/21  0309 09/18/21  0213   * 124* 128* 110* 106* 98     Medications reviewed: 15 units lantus BID (insulin gtt stopped this morning), miralax, senokot,      Stoolin/14: BM x1  9/15: BM x5  : BM x1 (last recorded)    Previous Goals:   Osmolite 1.5 at 65 mL/hr will meet % needs while NPO   Evaluation: Not met    Previous Nutrition Diagnosis:   Inadequate energy intake related to TF at goal but Propofol now off as evidenced by meeting 87% energy needs   Evaluation: No change      CURRENT NUTRITION DIAGNOSIS  Inadequate protein-energy intake related to recent TF removal and current limited diet with potential variable appetite as evidenced by taking 36% estimated energy and 44% estimated protein needs from PO alone yesterday     INTERVENTIONS  Recommendations / Nutrition Prescription  ADAT per SLP  Encourage nutritional adequate meals TID + snacks/supplements as able     Implementation  Feeding Tube Flush: discontinue order    Goals  Patient will consume >/=60% estimated nutrient needs      MONITORING AND EVALUATION:  Progress towards goals will be monitored and evaluated per protocol and Practice Guidelines      Kylee Fung RD, LD  Clinical Dietitian   Weekend/TPN pager [phone]

## 2021-09-18 NOTE — PROGRESS NOTES
A/O fluctuating this AM. A/O x4 throughout shift; confused, A/O x0 at 0530, refusing vital signs. Vital signs previously hypertensive and tachycardic at times. Refused continuous O2 monitoring throughout shift. Metoprolol PRN given x1, hydralazine PRN given x1. Assist of 1. Tolerating consistent CHO puree diet with level 3 thickened liquids. Lung sounds coarse. No BM this shift. Adequate urine output. Sternal incision CDI, GOYO. Dressing to abdomen CDI. Pain managed with tylenol. Denies nausea. Tele Aflutter. Agitated, PRN Seroquel given at 0100. Refused tube feeding throughout night. Pt removed IV from left forearm; refused to have insulin drip restarted. Pt requested to speak with son, phone number provided.

## 2021-09-18 NOTE — PROGRESS NOTES
Paged for confusion and paranoia this morning, having pulled out an IV and declining insulin infusion. Per nursing staff he responds to re-direction. Is blood sugars seem stable at present and per chart review, he has not been in DKA; the infusion is for tight glucose control after CABG it seems. Nursing staff report his son was called and plans to come up this morning and help re-direct Mr. Bustamante. His Seroquel has been given and nursing staff plan to continue to monitor him very closely this morning.

## 2021-09-18 NOTE — PROVIDER NOTIFICATION
MD Notification    Notified Person: MD    Notified Person Name: Oneil Carreno     Notification Date/Time: 9/18/2021 at 0628    Notification Interaction: text paged    Purpose of Notification: Pt pulled one IV. Has one left. Is refusing to let us hook him back up to insulin gtt. Increasingly confused this AM, and paranoid. Please advise.    Orders Received: no new orders    Comments: Continue to monitor. If we need something to help calm pt re-page.

## 2021-09-18 NOTE — PROVIDER NOTIFICATION
Pt ate 75% of breakfast, tolerating well. Text page to Dr. Moise re: ok to give Lantus 30 units this AM.

## 2021-09-18 NOTE — PROVIDER NOTIFICATION
Hospitalist service cross cover:    Paged by nursing staff regarding heartburn.    Interventions/plan:  -- PRN tums ordered    Please page with additional concerns,     SCOTT Tapia, CNP  Hospitalist - House DONTA  Text Page  [phone])    ADDENDUM [id number]    Patient refusing tube feeds, glucose now in the 70s-80s.  Hold lantus for now. q 1hr glucose checks

## 2021-09-18 NOTE — PROGRESS NOTES
[facility]    Medicine Progress Note - Hospitalist Service       Date of Admission:  9/2/2021    Assessment & Plan             52 y/o male with PMH of CAD s/p stenting,  HTN, HLP, DM type 2, on insulin and gout- directly admitted from [facility] on 9/2/21 for NSTEMI and 1 week history of let sided chest pain. Underwent echocardiogram with some wall motion abnormalities, Coronary Angio then showed severe 3 vessel disease. He underwent 4V CABG on 9/8/21. Post procedure, he remained intubated until 9/14/21 due to delirium, agitation. He was assessed by neurology and underwent EEG. He also had post operative atrial flutter with RVR that was resistant to BB and amiodarone, but was successfully cardioverted on 9/13/21    NSTEMI  CAD s/p stenting ([id number] in [place]); severe 3 vessel disease s/p CABx4 POD #10  Ischemic cardiomyopathy  HTN  HLP  [PTA on Norvasc 5 mg daily, Losartan 50 mg po BID, Lopressor 50 mg BID and Crestor 10 mg po daily]  *Troponin: 2.15-->10.3-->12.451-->12.292.    *ECHO: LV systolic function mild-moderately reduced (EF 40-45%) with apical, distal septal wall severe hypokinesia, mild inferior wall hypokinesia.  RV systolic function normal.  Mild MR.  Ascending aorta mildly dilated.   - Cardiology following, appreciate input               --Coronary angiogram 9/2/2021 with severe 3-vessel disease.  - Underwent CABG 9/8/21: CABG X 4 WITH ENDOSCOPIC VEIN HARVESTING LIMA-LAD PREET-DISTAL RCA LEFT RADIAL-OM LEFT SV-ANOMOLOUS RV BRANCH  - extubated last night, current;y on O2 5 liters via NC; off precedex drip; up in the chair  - echo 9/14- EF 45-50%; there is anterior, septal, and apical wall akinesis. There is also hypokineiss of the inferior wall.  Mildly decreased right ventricular systolic function; there is mild to moderate (1-2+) mitral regurgitation  - CVS managing post operatively  - CT removed yesterday  - currently on  mg po daily, Lopressor 50 mg po BID,  Norvasc 5 mg po daily, Crestor 10 mg po daily; PTA Losartan on hold  - BP on higher side at times  - Aggressive IS  - cardiac rehab  - was on TF as per nutritionist but he passed video swallow yesterday, currently on pureed diet with moderately thick liquids; ate 2 meals yesterday; he did not want TF restarted because he feels full  - he was frustrated this morning because of NGT, frequent BS checks  - given the fact that he ate 100% of his meals yesterday- will stop TF and remove NGT  - calorie count  - continue with SLP, advance diet as per   -  his voice is hoarse, consider ENT consult     Atrial Flutter s/p cardioversion  2:1 A flutter 9/12/21. Trialed on beta blocker and amiodarone without conversion  - DCCV x2 on 9.13. First shock transitioned to atrial fib, then second shock converted to sinus rhythm.  - plan amiodarone 200mg twice daily for 14 days, then 200mg daily for 28 days  - 9/16 am- went back into Afib with RVR 's  - Lopressor increased from 25mg BID to 50 mg po BID  - Digoxin ivX2 doses given  - currently in Aflutter HR in 100's  - Metoprolol iv prn  - was on heparin drip, switched to Eliquis 5 mg po BID  - management as per CVS/EP; EP is OK with this rhythm as long as his rate is controlled  - follow up EP DONTA in 6 weeks     Delirium  Agitation  Acute toxic encephalopathy- improved  - as per prior notes- he was agitated with lightening sedation. CT  Head on POD#2 with no acute changes. Treated with seroquel and valproate per intensivist team. Neurology consulted-- EEG and MRI negative.   - started on Seroquel 50mg BID-->changed to Seroquel 25 mg po BID prn  - mentation significantly improved in the last few days, now AAOX3, was calm and following commands  - last night- he was more agitated and frustrated; he wanted NGT out because he feels full; also refused Insulin drip   - he is still orientedX3, although RN notes mentions some paranoid statements last night  - will remove NGT and stop  Insulin drip, try to minimize sleep interruptions    Acute hypoxic respiratory failure- improved  R/o HCAP  - initially delayed extubation as he was agitated with lightening sedation  - extubated on 9/14, now weaned off O2  - continues to have some productive cough of clear sputum  - afebrile now, WBCs 8.9--13.2--12.5--15.9; procal 0.43 on 09/15  - started on Cefepime and Vanco as per CVS, narrow ABX as indicated  - CBC today pending  - aggressive IS     Uncontrolled DM2  Hyperglycemia  A1c of 9.6% from 8/2021.  Rechecked A1c 9/2 and at 10.2%.  [ PTA on Lantus 30 untts daily, metformin 1000 mg BID and Glipizide 10 mg/d]  - had been on Insulin drip after surgery  - switched to long acting insulin 3 days ago; despite getting lantus 45 units 9/16 am- BS were in high 300's  - discussed with bedside RN and dietitian- his TF formula was changed  - also discussed with Reyna EDOUARD- [facility]- given his persistently elevated BS, started him on Insulin drip again; also started on Lantus 20 units BID  - he was started on diet as well as per nutritionist on 9/17  - changed the diet to mod carb diet, seems that oral intake is adequate  - got lantus 20 units yesterday morning, BSs 81--128  - will stop TF today; wants to stop Insulin drip which is reasonable given BS range  - BS in am 124  - recheck BS later on and decide about the Lantus dose  - MISS  - adjust insulin doses as indicated, needs good BS control  - hold PTA oral antidiabetic meds     Hypokalemia  - K replacement protocol     Gout  PTA allopurinol 100 mg/d.       History of latent TB  Patient has completed 9 month treatment in 2015.        Diet: Combination Diet Consistent Carb 75 Grams CHO per Meal Diet; Pureed Diet (level 4); Liquidized/Moderately Thick (level 3) (By spoon ONLY)  Calorie Counts    DVT Prophylaxis: Heparin infusion.  Christina Catheter: Not present  Central Lines: None  Code Status: Full Code      Disposition Plan   Expected discharge: 3-4 days  recommended  "to prior living arrangement as per [facility].     The patient's care was discussed with the Bedside Nurse, Patient, Patient's Family and CVS Consultant     Time Spent on this Encounter   I spent 35 minutes on the unit/floor managing the care of Jerry Bustamante. Over 50% of my time was spent on the following:   - Counseling the patient and/or family regarding: prognosis and medical compliance  - Coordination of care with the:CVS consultant(s) and nurse- discussed about TF/NGT, diabetes management    Agnes Moise MD  Hospitalist Service  [facility]  Securely message with the Vocera Web Console (learn more here)  Text page via [email or web address] Paging/Directory      Clinically Significant Risk Factors Present on Admission                ______________________________________________________________________    Interval History    Had a rough night; refused TF because he was feeling full  - frustrated and wanted to talk to me early morning; his daugter was also present; he asked to be transferred to [facility] because \"it's closer to his family\"   - wants NGT out, states it makes him cough  - wants to stop Insulin drip which is reasonable   - has some chest pain when he coughs, no SOB  - No N/V, no abd pain  - I told him that a transfer to [facility] is less likely to happen given bed situation;  - after talking to him, he calm down a little bit and agreed to stay with the plan mentioned above      Data reviewed today: I reviewed all medications, new labs and imaging results over the last 24 hours. I personally reviewed the Tele image(s) showing Aflutter with rate 100-120's.    Physical Exam   Vital Signs: Temp: 99.3  F (37.4  C) Temp src: Oral BP: 98/77 Pulse: (!) 156   Resp: 17 SpO2: 93 % O2 Device: None (Room air)    Weight: 187 lbs 6.26 oz     General: - awake, alert, upset  HEENT- normocephalic, atraumatic, PERRL, oral mucosa moist; NGT in place  RESP: bilateral air entry, some " crackles at right base, diminished air entry left base  CV- S1S2, RRR, no murmurs, no rubs  GI- abd- soft, nonT, nonD, BS present  EXTREMITIES- no leg swelling, no calf tenderness  NEURO:- awake, oriented to self, place and time (knows it's Sept 2021, does not know the day), follows commands, cranial nerves II-XII grossly intact  PSYCH- frustrated    Data   Recent Labs   Lab 09/18/21  0834 09/18/21  0614 09/18/21  0457 09/17/21  0700 09/17/21  0658 09/16/21  0402 09/16/21  0359 09/15/21  1330 09/15/21  1326 09/15/21  0536 09/15/21  0506 09/14/21  0611 09/14/21  0344 09/13/21  0045 09/13/21  0005   WBC  --   --   --   --  15.9*  --  12.5*  --   --   --  13.8*   < > 8.9   < > 7.4   HGB  --   --   --   --  8.7*  --  8.2*  --   --   --  8.5*   < > 7.8*   < > 7.7*   MCV  --   --   --   --  83  --  83  --   --   --  80   < > 81   < > 82   PLT  --   --   --   --  466*  --  387  --   --   --  374   < > 260   < > 239   NA  --   --   --   --  146*  --  145*  --   --   --  143   < > 144   < > 140   POTASSIUM  --   --   --   --  3.4  --  3.9  --  3.8   < > 3.2*   < > 3.4   < > 4.3   CHLORIDE  --   --   --   --  110*  --  110*  --   --   --  110*   < > 110*   < > 108   CO2  --   --   --   --  31  --  31  --   --   --  29   < > 28   < > 18*   BUN  --   --   --   --  24  --  19  --   --   --  13   < > 12   < > 10   CR  --   --   --   --  0.98  --  0.90  --   --   --  1.00   < > 0.83   < > 0.95   ANIONGAP  --   --   --   --  5  --  4  --   --   --  4   < > 6   < > 14   TORRES  --   --   --   --  9.0  --  8.5  --   --   --  8.2*   < > 8.5   < > 8.0*   * 124* 128*   < > 219*   < > 389*   < >  --    < > 173*   < > 137*   < > 215*   ALBUMIN  --   --   --   --   --   --   --   --   --   --   --   --  1.8*  --  1.9*   PROTTOTAL  --   --   --   --   --   --   --   --   --   --   --   --  6.4*  --  6.5*   BILITOTAL  --   --   --   --   --   --   --   --   --   --   --   --  0.3  --  0.5   ALKPHOS  --   --   --   --   --   --   --   --    --   --   --   --  262*  --  272*   ALT  --   --   --   --   --   --   --   --   --   --   --   --  15  --  18   AST  --   --   --   --   --   --   --   --   --   --   --   --  16  --  16    < > = values in this interval not displayed.     Recent Results (from the past 24 hour(s))   XR Chest Port 1 View    Narrative    EXAM: XR CHEST PORT 1 VIEW  LOCATION: [facility]  DATE/TIME: 9/17/2021 6:36 PM    INDICATION: s/p chest tubes removal  COMPARISON: 9/16/2021      Impression    IMPRESSION: Interval removal of subxiphoid pleural and mediastinal drains. No pneumothorax. Slight interstitial prominence with lungs otherwise clear. Borderline cardiomegaly. Pulmonary vessels normal. Postoperative changes from CABG. Feeding tube   remains in good position.     Medications     BETA BLOCKER NOT PRESCRIBED       sodium chloride Stopped (09/15/21 1400)       allopurinol  100 mg Oral Daily     amiodarone  200 mg Oral or FT or NG tube BID    Followed by     [START ON 9/28/2021] amiodarone  200 mg Oral or FT or NG tube Daily     amLODIPine  5 mg Oral Daily     apixaban ANTICOAGULANT  5 mg Oral BID     aspirin  324 mg Oral or NG Tube Daily     ceFEPIme (MAXIPIME) IV  2 g Intravenous Q8H     furosemide  40 mg Intravenous Daily     insulin aspart  1-7 Units Subcutaneous TID AC     insulin aspart  1-5 Units Subcutaneous At Bedtime     insulin glargine  30 Units Subcutaneous BID     metoprolol tartrate  50 mg Oral BID     pantoprazole  40 mg Per Feeding Tube QAM AC    Or     pantoprazole (PROTONIX) IV  40 mg Intravenous QAM AC     polyethylene glycol  17 g Oral Daily     rosuvastatin  10 mg Oral Daily     senna-docusate  1 tablet Oral BID     sodium chloride (PF)  3 mL Intracatheter Q8H     sodium chloride (PF)  3 mL Intracatheter Q8H     thiamine  250 mg Intravenous Daily    Followed by     [START ON 9/19/2021] thiamine  100 mg Oral Daily     vancomycin  1,000 mg Intravenous Q12H

## 2021-09-19 ENCOUNTER — APPOINTMENT (OUTPATIENT)
Dept: PHYSICAL THERAPY | Facility: CLINIC | Age: 51
End: 2021-09-19
Attending: INTERNAL MEDICINE
Payer: COMMERCIAL

## 2021-09-19 ENCOUNTER — APPOINTMENT (OUTPATIENT)
Dept: SPEECH THERAPY | Facility: CLINIC | Age: 51
End: 2021-09-19
Attending: INTERNAL MEDICINE
Payer: COMMERCIAL

## 2021-09-19 ENCOUNTER — APPOINTMENT (OUTPATIENT)
Dept: OCCUPATIONAL THERAPY | Facility: CLINIC | Age: 51
End: 2021-09-19
Attending: INTERNAL MEDICINE
Payer: COMMERCIAL

## 2021-09-19 LAB
ANION GAP SERPL CALCULATED.3IONS-SCNC: 4 MMOL/L (ref 3–14)
BACTERIA SPT CULT: NORMAL
BUN SERPL-MCNC: 26 MG/DL (ref 7–30)
CALCIUM SERPL-MCNC: 8.9 MG/DL (ref 8.5–10.1)
CHLORIDE BLD-SCNC: 108 MMOL/L (ref 94–109)
CO2 SERPL-SCNC: 29 MMOL/L (ref 20–32)
CREAT SERPL-MCNC: 1.05 MG/DL (ref 0.66–1.25)
ERYTHROCYTE [DISTWIDTH] IN BLOOD BY AUTOMATED COUNT: 16.3 % (ref 10–15)
GFR SERPL CREATININE-BSD FRML MDRD: 81 ML/MIN/1.73M2
GLUCOSE BLD-MCNC: 239 MG/DL (ref 70–99)
GLUCOSE BLDC GLUCOMTR-MCNC: 189 MG/DL (ref 70–99)
GLUCOSE BLDC GLUCOMTR-MCNC: 240 MG/DL (ref 70–99)
GLUCOSE BLDC GLUCOMTR-MCNC: 271 MG/DL (ref 70–99)
GLUCOSE BLDC GLUCOMTR-MCNC: 317 MG/DL (ref 70–99)
GLUCOSE BLDC GLUCOMTR-MCNC: 375 MG/DL (ref 70–99)
GRAM STAIN RESULT: NORMAL
HCT VFR BLD AUTO: 28.4 % (ref 40–53)
HGB BLD-MCNC: 8.6 G/DL (ref 13.3–17.7)
MAGNESIUM SERPL-MCNC: 2.4 MG/DL (ref 1.6–2.3)
MCH RBC QN AUTO: 25.5 PG (ref 26.5–33)
MCHC RBC AUTO-ENTMCNC: 30.3 G/DL (ref 31.5–36.5)
MCV RBC AUTO: 84 FL (ref 78–100)
PHOSPHATE SERPL-MCNC: 4 MG/DL (ref 2.5–4.5)
PLATELET # BLD AUTO: 493 10E3/UL (ref 150–450)
POTASSIUM BLD-SCNC: 4 MMOL/L (ref 3.4–5.3)
RBC # BLD AUTO: 3.37 10E6/UL (ref 4.4–5.9)
SODIUM SERPL-SCNC: 141 MMOL/L (ref 133–144)
WBC # BLD AUTO: 16.1 10E3/UL (ref 4–11)

## 2021-09-19 PROCEDURE — 84100 ASSAY OF PHOSPHORUS: CPT | Performed by: INTERNAL MEDICINE

## 2021-09-19 PROCEDURE — 36415 COLL VENOUS BLD VENIPUNCTURE: CPT | Performed by: PHYSICIAN ASSISTANT

## 2021-09-19 PROCEDURE — 97530 THERAPEUTIC ACTIVITIES: CPT | Mod: GP

## 2021-09-19 PROCEDURE — 83735 ASSAY OF MAGNESIUM: CPT | Performed by: INTERNAL MEDICINE

## 2021-09-19 PROCEDURE — 80048 BASIC METABOLIC PNL TOTAL CA: CPT | Performed by: PHYSICIAN ASSISTANT

## 2021-09-19 PROCEDURE — C9113 INJ PANTOPRAZOLE SODIUM, VIA: HCPCS | Performed by: PHYSICIAN ASSISTANT

## 2021-09-19 PROCEDURE — 97165 OT EVAL LOW COMPLEX 30 MIN: CPT | Mod: GO

## 2021-09-19 PROCEDURE — 97110 THERAPEUTIC EXERCISES: CPT | Mod: GP

## 2021-09-19 PROCEDURE — 250N000013 HC RX MED GY IP 250 OP 250 PS 637: Performed by: PHYSICIAN ASSISTANT

## 2021-09-19 PROCEDURE — 97535 SELF CARE MNGMENT TRAINING: CPT | Mod: GO

## 2021-09-19 PROCEDURE — 250N000011 HC RX IP 250 OP 636: Performed by: PHYSICIAN ASSISTANT

## 2021-09-19 PROCEDURE — 250N000013 HC RX MED GY IP 250 OP 250 PS 637: Performed by: INTERNAL MEDICINE

## 2021-09-19 PROCEDURE — 92526 ORAL FUNCTION THERAPY: CPT | Mod: GN

## 2021-09-19 PROCEDURE — 85027 COMPLETE CBC AUTOMATED: CPT | Performed by: PHYSICIAN ASSISTANT

## 2021-09-19 PROCEDURE — 250N000012 HC RX MED GY IP 250 OP 636 PS 637: Performed by: INTERNAL MEDICINE

## 2021-09-19 PROCEDURE — 99233 SBSQ HOSP IP/OBS HIGH 50: CPT | Performed by: INTERNAL MEDICINE

## 2021-09-19 PROCEDURE — 120N000001 HC R&B MED SURG/OB

## 2021-09-19 PROCEDURE — 97116 GAIT TRAINING THERAPY: CPT | Mod: GP

## 2021-09-19 RX ADMIN — INSULIN ASPART 5 UNITS: 100 INJECTION, SOLUTION INTRAVENOUS; SUBCUTANEOUS at 12:34

## 2021-09-19 RX ADMIN — CEFEPIME HYDROCHLORIDE 2 G: 2 INJECTION, POWDER, FOR SOLUTION INTRAVENOUS at 01:55

## 2021-09-19 RX ADMIN — INSULIN ASPART 3 UNITS: 100 INJECTION, SOLUTION INTRAVENOUS; SUBCUTANEOUS at 08:28

## 2021-09-19 RX ADMIN — METOPROLOL TARTRATE 50 MG: 50 TABLET, FILM COATED ORAL at 20:47

## 2021-09-19 RX ADMIN — POLYETHYLENE GLYCOL 3350 17 G: 17 POWDER, FOR SOLUTION ORAL at 08:26

## 2021-09-19 RX ADMIN — THIAMINE HCL TAB 100 MG 100 MG: 100 TAB at 08:27

## 2021-09-19 RX ADMIN — ALLOPURINOL 100 MG: 100 TABLET ORAL at 08:26

## 2021-09-19 RX ADMIN — APIXABAN 5 MG: 5 TABLET, FILM COATED ORAL at 08:27

## 2021-09-19 RX ADMIN — INSULIN GLARGINE 20 UNITS: 100 INJECTION, SOLUTION SUBCUTANEOUS at 20:53

## 2021-09-19 RX ADMIN — GUAIFENESIN 10 ML: 100 SOLUTION ORAL at 16:31

## 2021-09-19 RX ADMIN — GUAIFENESIN 10 ML: 100 SOLUTION ORAL at 08:26

## 2021-09-19 RX ADMIN — INSULIN GLARGINE 15 UNITS: 100 INJECTION, SOLUTION SUBCUTANEOUS at 08:32

## 2021-09-19 RX ADMIN — ROSUVASTATIN CALCIUM 10 MG: 10 TABLET, FILM COATED ORAL at 08:27

## 2021-09-19 RX ADMIN — CEFEPIME HYDROCHLORIDE 2 G: 2 INJECTION, POWDER, FOR SOLUTION INTRAVENOUS at 11:27

## 2021-09-19 RX ADMIN — PANTOPRAZOLE SODIUM 40 MG: 40 INJECTION, POWDER, FOR SOLUTION INTRAVENOUS at 08:24

## 2021-09-19 RX ADMIN — APIXABAN 5 MG: 5 TABLET, FILM COATED ORAL at 20:47

## 2021-09-19 RX ADMIN — INSULIN ASPART 4 UNITS: 100 INJECTION, SOLUTION INTRAVENOUS; SUBCUTANEOUS at 17:01

## 2021-09-19 RX ADMIN — AMLODIPINE BESYLATE 5 MG: 5 TABLET ORAL at 08:27

## 2021-09-19 RX ADMIN — ACETAMINOPHEN 650 MG: 325 TABLET, FILM COATED ORAL at 16:27

## 2021-09-19 RX ADMIN — CEFEPIME HYDROCHLORIDE 2 G: 2 INJECTION, POWDER, FOR SOLUTION INTRAVENOUS at 20:47

## 2021-09-19 RX ADMIN — METOPROLOL TARTRATE 50 MG: 50 TABLET, FILM COATED ORAL at 08:27

## 2021-09-19 RX ADMIN — FUROSEMIDE 40 MG: 10 INJECTION, SOLUTION INTRAVENOUS at 08:24

## 2021-09-19 RX ADMIN — AMIODARONE HYDROCHLORIDE 200 MG: 200 TABLET ORAL at 20:47

## 2021-09-19 RX ADMIN — ASPIRIN 81 MG CHEWABLE TABLET 324 MG: 81 TABLET CHEWABLE at 08:26

## 2021-09-19 RX ADMIN — BENZONATATE 100 MG: 100 CAPSULE ORAL at 11:27

## 2021-09-19 RX ADMIN — AMIODARONE HYDROCHLORIDE 200 MG: 200 TABLET ORAL at 08:27

## 2021-09-19 RX ADMIN — ACETAMINOPHEN 650 MG: 325 TABLET, FILM COATED ORAL at 11:27

## 2021-09-19 RX ADMIN — ACETAMINOPHEN 650 MG: 325 TABLET, FILM COATED ORAL at 05:24

## 2021-09-19 RX ADMIN — BENZONATATE 100 MG: 100 CAPSULE ORAL at 05:24

## 2021-09-19 RX ADMIN — BENZONATATE 100 MG: 100 CAPSULE ORAL at 16:30

## 2021-09-19 RX ADMIN — SENNOSIDES AND DOCUSATE SODIUM 1 TABLET: 8.6; 5 TABLET ORAL at 08:27

## 2021-09-19 ASSESSMENT — ACTIVITIES OF DAILY LIVING (ADL)
ADLS_ACUITY_SCORE: 7
DEPENDENT_IADLS:: INDEPENDENT

## 2021-09-19 NOTE — PROGRESS NOTES
CALORIE COUNT      Approximate Oral Intake for:  Saturday, 9/19    Calories: 1425 kcal  Protein: 72 grams   Diet: Consistent CHO 75 gm CHO/meal, pureed diet (level 4), moderately thick liquids (level 3)      Estimated Needs:    Dosing Weight 85 kg - 9/18  Estimated Energy Needs: 0314-8027 kcals (25-30 Kcal/Kg)  Justification: maintenance  Estimated Protein Needs: 102-127 grams protein (1.2-1.5 g pro/Kg)  Justification: post-op and hypercatabolism with acute illness      Summary:   Patient took 67% estimated energy and 70% estimated protein needs from 3 meals yesterday  Continue calorie counts through tomorrow   Appreciate RN assistance in recording meals     Kylee Fung, RD, LD  Clinical Dietitian

## 2021-09-19 NOTE — PROGRESS NOTES
"   09/19/21 1200   Quick Adds   Type of Visit Initial Occupational Therapy Evaluation   Living Environment   People in home spouse   Current Living Arrangements apartment   Home Accessibility no concerns   Transportation Anticipated car, drives self   Living Environment Comments first floor apt but reports \"I live many places\". daughter reported that he has a shower tub combo    Self-Care   Usual Activity Tolerance good   Current Activity Tolerance poor   Activity/Exercise/Self-Care Comment indp with ADL at baseline    Instrumental Activities of Daily Living (IADL)   IADL Comments indp at baseline with all ADL, drives self    Disability/Function   Hearing Difficulty or Deaf no   Fall history within last six months no   General Information   Onset of Illness/Injury or Date of Surgery 09/02/21   Referring Physician Agnes Moise MD   Patient/Family Therapy Goal Statement (OT) to do what is best. daughter reports she wants him to get home    Additional Occupational Profile Info/Pertinent History of Current Problem  50 y/o male with PMH of CAD s/p stenting,  HTN, HLP, DM type 2, on insulin and gout- directly admitted from [facility] on 9/2/21 for NSTEMI and 1 week history of let sided chest pain. Underwent echocardiogram with some wall motion abnormalities, Coronary Angio then showed severe 3 vessel disease. He underwent 4V CABG on 9/8/21. Post procedure, he remained intubated until 9/14/21 due to delirium, agitation. He was assessed by neurology and underwent EEG. He also had post operative atrial flutter with RVR that was resistant to BB and amiodarone, but was successfully cardioverted on 9/13/21   Performance Patterns (Routines, Roles, Habits) works as an     Cognitive Status Examination   Orientation Status orientation to person, place and time   Visual Perception   Visual Impairment/Limitations WFL   Pain Assessment   Patient Currently in Pain Yes, see Vital Sign flowsheet   Range of Motion " Comprehensive   Comment, General Range of Motion within sternal precautions    Strength Comprehensive (MMT)   Comment, General Manual Muscle Testing (MMT) Assessment within sternal precautions    Coordination   Upper Extremity Coordination No deficits were identified   Bed Mobility   Bed Mobility supine-sit   Supine-Sit [place] (Bed Mobility) minimum assist (75% patient effort)   Activities of Daily Living   BADL Assessment lower body dressing   Lower Body Dressing Assessment   [place] Level (Lower Body Dressing) dependent (less than 25% patient effort)   Clinical Impression   Criteria for Skilled Therapeutic Interventions Met (OT) yes;meets criteria;skilled treatment is necessary   OT Diagnosis decreased function in ADL   OT Problem List-Impairments impacting ADL problems related to;activity tolerance impaired;balance;mobility;range of motion (ROM);strength;pain;post-surgical precautions   Assessment of Occupational Performance 3-5 Performance Deficits   Identified Performance Deficits bathing, dressing, toileting, self cares, med mgmt, home mgmt   Planned Therapy Interventions (OT) ADL retraining;IADL retraining;bed mobility training;cognition;ROM;transfer training;home program guidelines;progressive activity/exercise   Clinical Decision Making Complexity (OT) low complexity   Therapy Frequency (OT) Daily   Predicted Duration of Therapy 4 days    Anticipated Equipment Needs Upon Discharge (OT)   (TBD pending progress )   Risk & Benefits of therapy have been explained care plan/treatment goals reviewed;evaluation/treatment results reviewed;risks/benefits reviewed;current/potential barriers reviewed;participants voiced agreement with care plan;participants included;patient   OT Discharge Planning    OT Discharge Recommendation (DC Rec) Acute Rehab Center-Motivated patient will benefit from intensive, interdisciplinary therapy.  Anticipate will be able to tolerate 3 hours of therapy per day   OT Rationale  for DC Rec pt below baseline function for self cares and ADL. good candidate for acute rehab as he was indp at baseline and is motivated to return to ADL , IADL and work related activities. pt may progress to home with assistnace pending clinical and medical course.    OT Brief overview of current status  dep for LB dressing, toileting/ transfers mobilty min A   Total Evaluation Time (Minutes)   Total Evaluation Time (Minutes) 10

## 2021-09-19 NOTE — PROGRESS NOTES
[facility]  Cardiovascular and Thoracic Surgery Daily Note          Assessment and Plan:   POD#10 s/p Coronary artery bypass grafting x 4 with left internal mammary artery to the distal left anterior descending, right internal mammary artery to the mid right coronary artery, left radial artery to the obtuse marginal artery, reverse saphenous vein graft to the anomalous RV branch off the LAD, endoscopic vein harvest from the left lower extremity, endoscopic left radial artery harvest, intraoperative HILLARY by Dr. Antelmo Mccullough  -CVS: HR: 90s-120s. SBP: 100s-150s. Went into atrial flutter overnight POD #3-- treated with amiodarone and cardioverted by EP on POD#4, recurrent afib/flutter 09/16, IV metoprolol PRN.  mg daily, Lopressor 50mg BID. Amlodipine 5mg daily-- will need to continue for at least 3 months to prevent radial artery spasm. PTA losartan on hold-- will consider resuming if HTN persists. Pacer wires removed. Chest tubes removed 09/17. Heparin infusion transitioned to NOAC. Transitioned to oral amiodarone with taper. Wound vac removed 09/17. Hypervolemic, improving, Lasix 40 mg IV daily  -Resp: Extubated on POD#5. Saturating well on RA. Continue to encourage IS, cough, deep breathing, ambulation. Concern for PNA- abx 09/17 as below  -Neuro:  Delirium/confusion improving, Seroquel PRN. Previous agitation/delirium preventing extubation-- CT head on POD#2 with no acute changes. Neurology consulted-- EEG and MRI negative, neuro signed off  -Renal: good UOP. Cr stable, diuresis as above.   -GI:  NG discontinued. SLP following, advanced to dysphagia diet, advance diet per SLP recs. Continues with dysphonia, if voice not improving will consider ENT consult on Monday Continue calorie counts  -:  Christina removed  -Endo: pre op a1c: 10.2. Hx of DMII, PTA glipizide, metformin on hold. Hyperglycemic with transition off insulin infusion, insulin infusion resumed. Hospitalist consulted to assist  with post operative glycemic control, appreciate recs. PTA allopurinol resumed.  -FEN: replace electrolytes as needed. Na: 141. K: 3.6. Diuretics reduced   -ID: Afebrile. WBC down to 16.1. Continues with productive cough, afebrile. Completed perioperative abx. Procal 09/15 intermediate. CXR, UA, sputum culture, blood culture, continue cefipime x 7 days, concern for VAP  -Heme: hgb: 8.6. plt: 493. Acute blood loss anemia and thrombocytopenia related to surgery  -Proph: PCD, ASA, BB, statin, PPI, apixaban  -Dispo: St [address]. Continue therapies. Continue to encourage IS, cough, deep breathing, ambulation.           Interval History:   Feeling a little bit better every day. Continues with productive cough. Participating with therapies          Medications:       allopurinol  100 mg Oral Daily     amiodarone  200 mg Oral or FT or NG tube BID    Followed by     [START ON 9/28/2021] amiodarone  200 mg Oral or FT or NG tube Daily     amLODIPine  5 mg Oral Daily     apixaban ANTICOAGULANT  5 mg Oral BID     aspirin  324 mg Oral or NG Tube Daily     ceFEPIme (MAXIPIME) IV  2 g Intravenous Q8H     furosemide  40 mg Intravenous Daily     insulin aspart  1-7 Units Subcutaneous TID AC     insulin aspart  1-5 Units Subcutaneous At Bedtime     insulin glargine  15 Units Subcutaneous BID     metoprolol tartrate  50 mg Oral BID     pantoprazole  40 mg Per Feeding Tube QAM AC    Or     pantoprazole (PROTONIX) IV  40 mg Intravenous QAM AC     polyethylene glycol  17 g Oral Daily     rosuvastatin  10 mg Oral Daily     senna-docusate  1 tablet Oral BID     sodium chloride (PF)  3 mL Intracatheter Q8H     sodium chloride (PF)  3 mL Intracatheter Q8H     thiamine  100 mg Oral Daily     acetaminophen, alum & mag hydroxide-simethicone, benzonatate, bisacodyl, calcium carbonate, glucose **OR** dextrose **OR** glucagon, guaiFENesin, hydrALAZINE, HYDROmorphone **OR** HYDROmorphone, lidocaine 4%, lidocaine (buffered or not buffered), magnesium  hydroxide, metoprolol, naloxone **OR** naloxone **OR** naloxone **OR** naloxone, nitroGLYcerin, ondansetron **OR** ondansetron, oxyCODONE **OR** oxyCODONE, QUEtiapine, BETA BLOCKER NOT PRESCRIBED, sodium chloride (PF), sodium chloride (PF)          Physical Exam:   Vitals were reviewed  Blood pressure 105/64, pulse 99, temperature 98.4  F (36.9  C), temperature source Oral, resp. rate 16, weight 85 kg (187 lb 6.3 oz), SpO2 96 %.  Rhythm: atrial flutter    Lungs: diminished bases, coarse on left    Cardiovascular: irregular, irregular, normal s1 and s2, JVP elevated    Abdomen: soft NTND    Extremeties: minimal edema    Incision: incision intact, no erythema or drainage    CT: removed    Weight:   Vitals:    09/13/21 0400 09/15/21 0500 09/16/21 0400 09/17/21 0316   Weight: 89.2 kg (196 lb 10.4 oz) 89.3 kg (196 lb 13.9 oz) 89.7 kg (197 lb 12 oz) 81.4 kg (179 lb 7.3 oz)    09/18/21 0652   Weight: 85 kg (187 lb 6.3 oz)            Data:   Labs:   Lab Results   Component Value Date    WBC 13.8 09/15/2021     Lab Results   Component Value Date    RBC 3.32 09/15/2021     Lab Results   Component Value Date    HGB 8.5 09/15/2021     Lab Results   Component Value Date    HCT 26.5 09/15/2021     No components found for: MCT  Lab Results   Component Value Date    MCV 80 09/15/2021     Lab Results   Component Value Date    MCH 25.6 09/15/2021     Lab Results   Component Value Date    MCHC 32.1 09/15/2021     Lab Results   Component Value Date    RDW 15.3 09/15/2021     Lab Results   Component Value Date     09/15/2021       Last Basic Metabolic Panel:  Lab Results   Component Value Date     09/15/2021      Lab Results   Component Value Date    POTASSIUM 3.2 09/15/2021     Lab Results   Component Value Date    CHLORIDE 110 09/15/2021     Lab Results   Component Value Date    TORRES 8.2 09/15/2021     Lab Results   Component Value Date    CO2 29 09/15/2021     Lab Results   Component Value Date    BUN 13 09/15/2021      Lab Results   Component Value Date    CR 1.00 09/15/2021     Lab Results   Component Value Date     09/15/2021     09/15/2021     Patient seen and discussed with Dr. Errol Mclena PA-C  Cardiothoracic Surgery  Pager [phone]

## 2021-09-19 NOTE — CONSULTS
Care Management Initial Consult    General Information  Assessment completed with: Patient, Family,    Type of CM/SW Visit: Initial Assessment    Primary Care Provider verified and updated as needed:     Readmission within the last 30 days:        Reason for Consult: discharge planning  Advance Care Planning:            Communication Assessment  Patient's communication style: spoken language (English or Bilingual), spoken language (non-English)    Hearing Difficulty or Deaf: no   Wear Glasses or Blind: no    Cognitive  Cognitive/Neuro/Behavioral: WDL  Level of Consciousness: alert  Arousal Level: opens eyes spontaneously  Orientation: oriented x 4  Mood/Behavior: calm, cooperative  Best Language: 0 - No aphasia  Speech: clear, logical    Living Environment:   People in home: child(pankaj), adult, spouse     Current living Arrangements: apartment      Able to return to prior arrangements: no       Family/Social Support:  Care provided by: self  Provides care for: no one  Marital Status:   Wife, Children          Description of Support System: Supportive, Involved    Support Assessment: Adequate family and caregiver support    Current Resources:   Patient receiving home care services: No     Community Resources: None  Equipment currently used at home: none  Supplies currently used at home: None    Employment/Financial:  Employment Status: unemployed        Financial Concerns: No concerns identified           Lifestyle & Psychosocial Needs:  Social Determinants of Health     Tobacco Use: Medium Risk     Smoking Tobacco Use: Former Smoker     Smokeless Tobacco Use: Never Used   Alcohol Use:      Frequency of Alcohol Consumption:      Average Number of Drinks:      Frequency of Binge Drinking:    Financial Resource Strain:      Difficulty of Paying Living Expenses:    Food Insecurity:      Worried About Running Out of Food in the Last Year:      Ran Out of Food in the Last Year:    Transportation Needs:      Lack of  Transportation (Medical):      Lack of Transportation (Non-Medical):    Physical Activity:      Days of Exercise per Week:      Minutes of Exercise per Session:    Stress:      Feeling of Stress :    Social Connections:      Frequency of Communication with Friends and Family:      Frequency of Social Gatherings with Friends and Family:      Attends Oriental orthodox Services:      Active Member of Clubs or Organizations:      Attends Club or Organization Meetings:      Marital Status:    Intimate Partner Violence:      Fear of Current or Ex-Partner:      Emotionally Abused:      Physically Abused:      Sexually Abused:    Depression:      PHQ-2 Score:    Housing Stability:      Unable to Pay for Housing in the Last Year:      Number of Places Lived in the Last Year:      Unstable Housing in the Last Year:        Functional Status:  Prior to admission patient needed assistance:   Dependent ADLs:: Independent  Dependent IADLs:: Independent                  Additional Information:  Per social service protocol for discharge planning. Patient admitted inpatient 09/02/21 for NSTEMI. Tentative discharge on 09/20/21. Reviewed chart, met with patient, daughter and another family member in the room to introduce self/role and discuss discharge. Per patient, he is , lives with spouse and adult children who are able to assist as needed. Reviewed therapy recommendations with patient and he in agreement with ARU. Patient states his preference is [facility] ARU as it close to family home and requests referral to  ARU as secondary. Referral sent via [facility], to  ARU and [facility].     KIM Velazco

## 2021-09-19 NOTE — PROGRESS NOTES
[facility]    Medicine Progress Note - Hospitalist Service       Date of Admission:  9/2/2021    Assessment & Plan             52 y/o male with PMH of CAD s/p stenting,  HTN, HLP, DM type 2, on insulin and gout- directly admitted from [facility] on 9/2/21 for NSTEMI and 1 week history of let sided chest pain. Underwent echocardiogram with some wall motion abnormalities, Coronary Angio then showed severe 3 vessel disease. He underwent 4V CABG on 9/8/21. Post procedure, he remained intubated until 9/14/21 due to delirium, agitation. He was assessed by neurology and underwent EEG. He also had post operative atrial flutter with RVR that was resistant to BB and amiodarone, but was successfully cardioverted on 9/13/21    NSTEMI  CAD s/p stenting ([id number] in [place]); severe 3 vessel disease s/p CABx4 POD #11  Ischemic cardiomyopathy  HTN  HLP  [PTA on Norvasc 5 mg daily, Losartan 50 mg po BID, Lopressor 50 mg BID and Crestor 10 mg po daily]  *Troponin: 2.15-->10.3-->12.451-->12.292.    *ECHO: LV systolic function mild-moderately reduced (EF 40-45%) with apical, distal septal wall severe hypokinesia, mild inferior wall hypokinesia.  RV systolic function normal.  Mild MR.  Ascending aorta mildly dilated.   - Cardiology following, appreciate input               --Coronary angiogram 9/2/2021 with severe 3-vessel disease.  - Underwent CABG 9/8/21: CABG X 4 WITH ENDOSCOPIC VEIN HARVESTING LIMA-LAD PREET-DISTAL RCA LEFT RADIAL-OM LEFT SV-ANOMOLOUS RV BRANCH  - extubated on 9/14, off precedex drip;   - echo 9/14- EF 45-50%; there is anterior, septal, and apical wall akinesis. There is also hypokineiss of the inferior wall.  Mildly decreased right ventricular systolic function; there is mild to moderate (1-2+) mitral regurgitation  - CVS managing post operatively  - transferred out of ICU on 09/16  - CT removed on 09/17  - currently on  mg po daily, Lopressor 50 mg po BID, Norvasc 5 mg po daily,  Crestor 10 mg po daily; PTA Losartan on hold  - diuresis as per CVS  - BP on higher side at times  - weaned off O2  - Aggressive IS  - cardiac rehab  - was on TF as per nutritionist but he passed video swallow; currently on pureed diet with moderately thick liquids; he did not want TF restarted because he feels full  - 09/18- removed NGT as patient was adamant to have it removed  - calorie count  - advance diet as per SLP  - his voice remains hoarse, consider ENT consult on Monday     Atrial Flutter s/p cardioversion  2:1 A flutter 9/12/21. Trialed on beta blocker and amiodarone without conversion  - DCCV x2 on 9.13. First shock transitioned to atrial fib, then second shock converted to sinus rhythm.  - plan amiodarone 200mg twice daily for 14 days, then 200mg daily for 28 days  - 9/16 am- went back into Afib with RVR 's  - Lopressor increased from 25mg BID to 50 mg po BID  - Digoxin ivX2 doses given  - currently in Aflutter HR in 80's  - Metoprolol iv prn  - was on heparin drip, switched to Eliquis 5 mg po BID  - management as per CVS/EP; EP is OK with this rhythm as long as his rate is controlled  - follow up EP DONTA in 6 weeks     Delirium  Agitation  Acute toxic encephalopathy- improved  - as per prior notes- he was agitated with lightening sedation. CT  Head on POD#2 with no acute changes. Treated with seroquel and valproate per intensivist team. Neurology consulted-- EEG and MRI negative.   - started on Seroquel 50mg BID-->changed to Seroquel 25 mg po BID prn  - mentation significantly improved in the last few days, now AAOX3, was calm and following commands  - yesterday- 9/18- he was more agitated and frustrated; he wanted NGT out because he feels full; also refused Insulin drip; he was still orientedX3, although RN notes mentions some paranoid statements   - NGT out now  - feels much better today    Acute hypoxic respiratory failure- improved  Possible HCAP  - initially delayed extubation as he was  agitated with lightening sedation  - extubated on 9/14, now weaned off O2  - continues to have some productive cough of clear sputum  - afebrile now, WBCs 8.9--13.2--12.5--15.9--17.3; procal 0.43 on 09/15  - started on Cefepime and Vanco as per CVS, narrow ABX as indicated  - WBCs today 16.1  - aggressive IS  - cbc in am  - monitor fever curve     Uncontrolled DM2  Hyperglycemia  A1c of 9.6% from 8/2021.  Rechecked A1c 9/2 and at 10.2%.  [ PTA on Lantus 30 untts daily, metformin 1000 mg BID and Glipizide 10 mg/d]  - had been on Insulin drip after surgery, then tried to switched to long acting insulin 4 days ago; despite getting lantus 45 units 9/16 am- BS were in high 300's but I think uncontrolled BS were related to TF formula also   - discussed with bedside RN and dietitian- his TF formula was changed  - he was started on diet as well as per nutritionist on 9/17  - changed the diet to mod carb diet, seems that oral intake is adequate  - he refused TF yesterday and wanted NGT out- removed on 9/18  - started on Lantus 15 units BID on 09/18; --240  - increase Lantus to 20 units BID  - add Novolog 5 units TID with meals  - MISS  - will likely need further insulin doses adjustments based on BS/oral intake  - hypoglycemia protocol  - hold PTA oral antidiabetic meds for now    Hypokalemia  - K replacement protocol     Gout  PTA allopurinol 100 mg/d.       History of latent TB  Patient has completed 9 month treatment in 2015.        Diet: Combination Diet Consistent Carb 75 Grams CHO per Meal Diet; Pureed Diet (level 4); Liquidized/Moderately Thick (level 3) (By spoon ONLY)  Calorie Counts  Snacks/Supplements Adult: Glucerna; With Meals    DVT Prophylaxis: Heparin infusion.  Christina Catheter: Not present  Central Lines: None  Code Status: Full Code      Disposition Plan   Expected discharge: 2-3 days?  recommended to prior living arrangement as per CVS.     The patient's care was discussed with the Bedside Nurse,  Patient and CVS Consultant     Time Spent on this Encounter   I spent 35 minutes on the unit/floor managing the care of Jerry Bustamante. Over 50% of my time was spent on the following:   - Counseling the patient and/or family regarding: prognosis and medical compliance  - Coordination of care with the:CVS consultant(s) and nurse- discussed about diabetes management    Agnes Moise MD  Hospitalist Service  [facility]  Securely message with the Vocera Web Console (learn more here)  Text page via [employer] Paging/Directory      Clinically Significant Risk Factors Present on Admission              ______________________________________________________________________    Interval History    Feeling better after NGT removed; as per nutritionist - ate 70% of estimated protein needs  - still having a productive cough  - reports some chest pain when coughing; also reports some SOB  - no N/V, no abd pain; had BMX2 today      Data reviewed today: I reviewed all medications, new labs and imaging results over the last 24 hours. I personally reviewed the Tele image(s) showing Aflutter with rate in 80's.    Physical Exam   Vital Signs: Temp: 98.4  F (36.9  C) Temp src: Oral BP: 105/64 Pulse: 99   Resp: 16 SpO2: 96 % O2 Device: None (Room air)    Weight: 187 lbs 6.26 oz     General: - awake, alert, up in the chair  HEENT- normocephalic, atraumatic, PERRL, oral mucosa moist; NGT in place  RESP: bilateral air entry, some crackles at right base, diminished air entry left base  CV- S1S2, RRR, no murmurs, no rubs  GI- abd- soft, nonT, nonD, BS present  EXTREMITIES- no leg swelling, no calf tenderness  NEURO:- awake, oriented to self, place and time (knows it's Sept 2021, does not know the day), follows commands, cranial nerves II-XII grossly intact  PSYCH- calm, cooperative    Data   Recent Labs   Lab 09/19/21  0749 09/19/21  0730 09/19/21  0206 09/18/21  2135 09/18/21  1226 09/17/21  0700 09/17/21  0658  09/14/21  0611 09/14/21  0344 09/13/21  0045 09/13/21  0005   WBC  --  16.1*  --   --  17.3*  --  15.9*   < > 8.9   < > 7.4   HGB  --  8.6*  --   --  9.1*  --  8.7*   < > 7.8*   < > 7.7*   MCV  --  84  --   --  83  --  83   < > 81   < > 82   PLT  --  493*  --   --  458*  --  466*   < > 260   < > 239   NA  --  141  --   --  141  --  146*   < > 144   < > 140   POTASSIUM  --  4.0  --   --  3.6  --  3.4   < > 3.4   < > 4.3   CHLORIDE  --  108  --   --  108  --  110*   < > 110*   < > 108   CO2  --  29  --   --  27  --  31   < > 28   < > 18*   BUN  --  26  --   --  26  --  24   < > 12   < > 10   CR  --  1.05  --   --  1.01  --  0.98   < > 0.83   < > 0.95   ANIONGAP  --  4  --   --  6  --  5   < > 6   < > 14   TORRES  --  8.9  --   --  9.0  --  9.0   < > 8.5   < > 8.0*   * 239* 189*   < > 298*   < > 219*   < > 137*   < > 215*   ALBUMIN  --   --   --   --   --   --   --   --  1.8*  --  1.9*   PROTTOTAL  --   --   --   --   --   --   --   --  6.4*  --  6.5*   BILITOTAL  --   --   --   --   --   --   --   --  0.3  --  0.5   ALKPHOS  --   --   --   --   --   --   --   --  262*  --  272*   ALT  --   --   --   --   --   --   --   --  15  --  18   AST  --   --   --   --   --   --   --   --  16  --  16    < > = values in this interval not displayed.     Recent Results (from the past 24 hour(s))   XR Chest 2 Views    Narrative    CHEST TWO VIEW   9/18/2021 10:37 AM     HISTORY: Status post chest tube removal.    COMPARISON: Chest x-ray on 9/17/2021.      Impression    IMPRESSION: AP and lateral views of the chest were obtained.  Postsurgical changes of cardiac surgery with median sternotomy wires  and surgical clips. Stable cardiomediastinal silhouette. Mild  bibasilar pulmonary opacities, likely atelectasis. No significant  pleural effusion or pneumothorax.    MICHELLE ZAMARRIPA MD         SYSTEM ID:  [id number]     Medications     BETA BLOCKER NOT PRESCRIBED       sodium chloride Stopped (09/15/21 1400)       allopurinol  100  mg Oral Daily     amiodarone  200 mg Oral or FT or NG tube BID    Followed by     [START ON 9/28/2021] amiodarone  200 mg Oral or FT or NG tube Daily     amLODIPine  5 mg Oral Daily     apixaban ANTICOAGULANT  5 mg Oral BID     aspirin  324 mg Oral or NG Tube Daily     ceFEPIme (MAXIPIME) IV  2 g Intravenous Q8H     furosemide  40 mg Intravenous Daily     insulin aspart  5 Units Subcutaneous TID w/meals     insulin aspart  1-7 Units Subcutaneous TID AC     insulin aspart  1-5 Units Subcutaneous At Bedtime     insulin glargine  20 Units Subcutaneous BID     metoprolol tartrate  50 mg Oral BID     pantoprazole  40 mg Per Feeding Tube QAM AC    Or     pantoprazole (PROTONIX) IV  40 mg Intravenous QAM AC     polyethylene glycol  17 g Oral Daily     rosuvastatin  10 mg Oral Daily     senna-docusate  1 tablet Oral BID     sodium chloride (PF)  3 mL Intracatheter Q8H     sodium chloride (PF)  3 mL Intracatheter Q8H     thiamine  100 mg Oral Daily

## 2021-09-19 NOTE — PLAN OF CARE
A/Ox4. VSS on RA. BG elevated 240, 375, and 317, added 5 units per meal. Tele: afib CVR/RVR and aflutter. Sternal incision with bruising, GOYO/WNL. LS: coarse, cough. BS: active, flatus +, multiple loose stools, hold bowel regimen. Mod cho diet with pureed and honey thickened liquids. Up SBA. Electrolyte rechecks in for the morning. Abx given. IV saline locked. Ambulated the halls several times during the day. Plan is discharge likely to ARU. Will continue to monito

## 2021-09-19 NOTE — PLAN OF CARE
A/O all shift, w/o agitation. VSS, LS dim w/ rhonchi.  C/o congested cough.  Bringing up moderate amts of thick, yellow secretions. Tessalon PRN. Good effort with IS, encouraged to use often.  Persists to be in Aflutter/afib, CVR. BGs elevated but improved from last margaret.  Honey thick liquids/pureed diet.  Takes pills crushed in pudding.  Up Ax1.  Showered last margaret. Intermittently uses O2 for comfort, sats WDL on RA, however.

## 2021-09-20 ENCOUNTER — APPOINTMENT (OUTPATIENT)
Dept: OCCUPATIONAL THERAPY | Facility: CLINIC | Age: 51
End: 2021-09-20
Attending: INTERNAL MEDICINE
Payer: COMMERCIAL

## 2021-09-20 ENCOUNTER — APPOINTMENT (OUTPATIENT)
Dept: SPEECH THERAPY | Facility: CLINIC | Age: 51
End: 2021-09-20
Attending: INTERNAL MEDICINE
Payer: COMMERCIAL

## 2021-09-20 ENCOUNTER — APPOINTMENT (OUTPATIENT)
Dept: PHYSICAL THERAPY | Facility: CLINIC | Age: 51
End: 2021-09-20
Attending: INTERNAL MEDICINE
Payer: COMMERCIAL

## 2021-09-20 LAB
ANION GAP SERPL CALCULATED.3IONS-SCNC: 3 MMOL/L (ref 3–14)
BUN SERPL-MCNC: 30 MG/DL (ref 7–30)
CALCIUM SERPL-MCNC: 8.6 MG/DL (ref 8.5–10.1)
CHLORIDE BLD-SCNC: 106 MMOL/L (ref 94–109)
CO2 SERPL-SCNC: 30 MMOL/L (ref 20–32)
CREAT SERPL-MCNC: 1.01 MG/DL (ref 0.66–1.25)
ERYTHROCYTE [DISTWIDTH] IN BLOOD BY AUTOMATED COUNT: 16.8 % (ref 10–15)
GFR SERPL CREATININE-BSD FRML MDRD: 85 ML/MIN/1.73M2
GLUCOSE BLD-MCNC: 192 MG/DL (ref 70–99)
GLUCOSE BLDC GLUCOMTR-MCNC: 194 MG/DL (ref 70–99)
GLUCOSE BLDC GLUCOMTR-MCNC: 210 MG/DL (ref 70–99)
GLUCOSE BLDC GLUCOMTR-MCNC: 257 MG/DL (ref 70–99)
GLUCOSE BLDC GLUCOMTR-MCNC: 291 MG/DL (ref 70–99)
GLUCOSE BLDC GLUCOMTR-MCNC: 299 MG/DL (ref 70–99)
HCT VFR BLD AUTO: 26.6 % (ref 40–53)
HGB BLD-MCNC: 8.1 G/DL (ref 13.3–17.7)
MAGNESIUM SERPL-MCNC: 2.5 MG/DL (ref 1.6–2.3)
MCH RBC QN AUTO: 25.7 PG (ref 26.5–33)
MCHC RBC AUTO-ENTMCNC: 30.5 G/DL (ref 31.5–36.5)
MCV RBC AUTO: 84 FL (ref 78–100)
PHOSPHATE SERPL-MCNC: 3.3 MG/DL (ref 2.5–4.5)
PLATELET # BLD AUTO: 444 10E3/UL (ref 150–450)
POTASSIUM BLD-SCNC: 3.8 MMOL/L (ref 3.4–5.3)
RBC # BLD AUTO: 3.15 10E6/UL (ref 4.4–5.9)
SODIUM SERPL-SCNC: 139 MMOL/L (ref 133–144)
WBC # BLD AUTO: 13.9 10E3/UL (ref 4–11)

## 2021-09-20 PROCEDURE — 99233 SBSQ HOSP IP/OBS HIGH 50: CPT | Performed by: HOSPITALIST

## 2021-09-20 PROCEDURE — 250N000013 HC RX MED GY IP 250 OP 250 PS 637: Performed by: PHYSICIAN ASSISTANT

## 2021-09-20 PROCEDURE — 80048 BASIC METABOLIC PNL TOTAL CA: CPT | Performed by: PHYSICIAN ASSISTANT

## 2021-09-20 PROCEDURE — 97530 THERAPEUTIC ACTIVITIES: CPT | Mod: GP

## 2021-09-20 PROCEDURE — 83735 ASSAY OF MAGNESIUM: CPT | Performed by: INTERNAL MEDICINE

## 2021-09-20 PROCEDURE — 85027 COMPLETE CBC AUTOMATED: CPT | Performed by: PHYSICIAN ASSISTANT

## 2021-09-20 PROCEDURE — 250N000011 HC RX IP 250 OP 636: Performed by: PHYSICIAN ASSISTANT

## 2021-09-20 PROCEDURE — 36415 COLL VENOUS BLD VENIPUNCTURE: CPT | Performed by: PHYSICIAN ASSISTANT

## 2021-09-20 PROCEDURE — C9113 INJ PANTOPRAZOLE SODIUM, VIA: HCPCS | Performed by: PHYSICIAN ASSISTANT

## 2021-09-20 PROCEDURE — 84100 ASSAY OF PHOSPHORUS: CPT | Performed by: INTERNAL MEDICINE

## 2021-09-20 PROCEDURE — 97110 THERAPEUTIC EXERCISES: CPT | Mod: GP

## 2021-09-20 PROCEDURE — 250N000012 HC RX MED GY IP 250 OP 636 PS 637: Performed by: INTERNAL MEDICINE

## 2021-09-20 PROCEDURE — 120N000001 HC R&B MED SURG/OB

## 2021-09-20 PROCEDURE — 92526 ORAL FUNCTION THERAPY: CPT | Mod: GN | Performed by: SPEECH-LANGUAGE PATHOLOGIST

## 2021-09-20 PROCEDURE — 97535 SELF CARE MNGMENT TRAINING: CPT | Mod: GO

## 2021-09-20 RX ORDER — FUROSEMIDE 40 MG
40 TABLET ORAL DAILY
Status: DISCONTINUED | OUTPATIENT
Start: 2021-09-21 | End: 2021-09-22 | Stop reason: HOSPADM

## 2021-09-20 RX ORDER — ASPIRIN 81 MG/1
81 TABLET, CHEWABLE ORAL DAILY
Status: DISCONTINUED | OUTPATIENT
Start: 2021-09-21 | End: 2021-09-22 | Stop reason: HOSPADM

## 2021-09-20 RX ADMIN — SENNOSIDES AND DOCUSATE SODIUM 1 TABLET: 8.6; 5 TABLET ORAL at 08:36

## 2021-09-20 RX ADMIN — INSULIN ASPART 3 UNITS: 100 INJECTION, SOLUTION INTRAVENOUS; SUBCUTANEOUS at 08:29

## 2021-09-20 RX ADMIN — AMIODARONE HYDROCHLORIDE 200 MG: 200 TABLET ORAL at 08:12

## 2021-09-20 RX ADMIN — INSULIN ASPART 4 UNITS: 100 INJECTION, SOLUTION INTRAVENOUS; SUBCUTANEOUS at 12:03

## 2021-09-20 RX ADMIN — AMIODARONE HYDROCHLORIDE 200 MG: 200 TABLET ORAL at 20:50

## 2021-09-20 RX ADMIN — METOPROLOL TARTRATE 50 MG: 50 TABLET, FILM COATED ORAL at 20:50

## 2021-09-20 RX ADMIN — CEFEPIME HYDROCHLORIDE 2 G: 2 INJECTION, POWDER, FOR SOLUTION INTRAVENOUS at 20:50

## 2021-09-20 RX ADMIN — ASPIRIN 81 MG CHEWABLE TABLET 324 MG: 81 TABLET CHEWABLE at 08:10

## 2021-09-20 RX ADMIN — PANTOPRAZOLE SODIUM 40 MG: 40 INJECTION, POWDER, FOR SOLUTION INTRAVENOUS at 08:10

## 2021-09-20 RX ADMIN — INSULIN ASPART 2 UNITS: 100 INJECTION, SOLUTION INTRAVENOUS; SUBCUTANEOUS at 18:08

## 2021-09-20 RX ADMIN — AMLODIPINE BESYLATE 5 MG: 5 TABLET ORAL at 08:11

## 2021-09-20 RX ADMIN — APIXABAN 5 MG: 5 TABLET, FILM COATED ORAL at 08:12

## 2021-09-20 RX ADMIN — CEFEPIME HYDROCHLORIDE 2 G: 2 INJECTION, POWDER, FOR SOLUTION INTRAVENOUS at 03:47

## 2021-09-20 RX ADMIN — CEFEPIME HYDROCHLORIDE 2 G: 2 INJECTION, POWDER, FOR SOLUTION INTRAVENOUS at 12:32

## 2021-09-20 RX ADMIN — ROSUVASTATIN CALCIUM 10 MG: 10 TABLET, FILM COATED ORAL at 08:10

## 2021-09-20 RX ADMIN — POLYETHYLENE GLYCOL 3350 17 G: 17 POWDER, FOR SOLUTION ORAL at 08:36

## 2021-09-20 RX ADMIN — APIXABAN 5 MG: 5 TABLET, FILM COATED ORAL at 20:50

## 2021-09-20 RX ADMIN — THIAMINE HCL TAB 100 MG 100 MG: 100 TAB at 08:11

## 2021-09-20 RX ADMIN — FUROSEMIDE 40 MG: 10 INJECTION, SOLUTION INTRAVENOUS at 08:10

## 2021-09-20 RX ADMIN — METOPROLOL TARTRATE 50 MG: 50 TABLET, FILM COATED ORAL at 08:10

## 2021-09-20 RX ADMIN — INSULIN GLARGINE 20 UNITS: 100 INJECTION, SOLUTION SUBCUTANEOUS at 20:50

## 2021-09-20 RX ADMIN — ALLOPURINOL 100 MG: 100 TABLET ORAL at 08:11

## 2021-09-20 RX ADMIN — INSULIN GLARGINE 20 UNITS: 100 INJECTION, SOLUTION SUBCUTANEOUS at 08:12

## 2021-09-20 ASSESSMENT — ACTIVITIES OF DAILY LIVING (ADL)
ADLS_ACUITY_SCORE: 5
ADLS_ACUITY_SCORE: 7
ADLS_ACUITY_SCORE: 5
ADLS_ACUITY_SCORE: 5
ADLS_ACUITY_SCORE: 7
ADLS_ACUITY_SCORE: 7

## 2021-09-20 NOTE — PHARMACY-RX INSURANCE COVERAGE
Anticoagulation coverage check.  Patient has [facility] Medical Assistance.    Xarelto:  $3/mo.   Eliquis:  NOT COVERED.     Jantoven (warfarin): $1/mo.      JORGE Dior, Pharmacy Technician/Liaison, Discharge Pharmacy, [phone]

## 2021-09-20 NOTE — PROGRESS NOTES
CALORIE COUNT      Approximate Oral Intake for:  (9/19)     Calories:  1740 cals  Protein:  96 gm pro      Estimated Needs:    Dosing Weight 85 kg - 9/18  Estimated Energy Needs: 3843-8692 kcals (25-30 Kcal/Kg)  Justification: maintenance  Estimated Protein Needs: 102-127 grams protein (1.2-1.5 g pro/Kg)  Justification: post-op and hypercatabolism with acute illness        Summary:   Diet: Consistent CHO (75 gm/pro), Pureed Diet (L4), Moderately Thick Liquids (L3)  Glucerna with meals  Continue calorie count for 1 more day

## 2021-09-20 NOTE — PROGRESS NOTES
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 Antelmo Mccullough on 9/8/21  -CVS: HR 70-90s, -140s. Went into atrial flutter overnight POD #3-- treated with amiodarone and cardioverted by EP on POD#4, recurrent afib/flutter 09/16, IV metoprolol PRN. Continues on amiodarone 200 mg bid and then 200 mg daily, change aspirin to 81 mg daily, change eliquis to xarelto 20 mg PO daily due to insurance coverage, Lopressor 50mg BID. Amlodipine 5mg daily-- will need to continue for at least 3 months to prevent radial artery spasm. PTA losartan on hold-- will consider resuming if HTN persists. CT/TPWs removed, changed lasix to 40 mg PO daily  -Resp: Extubated on POD#5. Saturating well on RA. Continue to encourage IS, cough, deep breathing, ambulation. Concern for PNA- abx (cefepime 2 g q 8 hours) started on 9/17 - 9/23  -Neuro:  Delirium/confusion resolved, Seroquel PRN. Previous agitation/delirium preventing extubation-- CT head on POD#2 with no acute changes. Neurology consulted-- EEG and MRI negative, neuro signed off, answering questions appropriately, moving all extremities  -Renal: good UOP. Cr stable, diuresis as above.   -GI:  NG discontinued. SLP following, advanced to dysphagia diet, advance diet per SLP recs. Continues with dysphonia, voice improving, denies any difficulty swallowing or coughing with eating, Continue calorie counts  -:  Christina removed  -Endo: pre op a1c: 10.2. Hx of DMII, PTA glipizide, metformin on hold. Hyperglycemic  with transition off insulin infusion, insulin infusion resumed. Hospitalist consulted to assist with post operative glycemic control, appreciate recs. PTA allopurinol resumed.  -FEN: replace electrolytes as needed. Na 139, K 3.8, Mg 2.5   -ID: Afebrile. WBC down to 13.9. Continues with productive cough, afebrile. Completed perioperative abx. Procal 09/15 intermediate. Continue cefipime x 7 days with concern for VAP  -Heme: hgb 8.1. plt 444. Acute blood loss anemia and thrombocytopenia related to surgery  -Proph: PCD, ASA, BB, statin, PPI, apixaban  -Dispo: Encouraged IS/TCDB/amb. Sternal precautions. Will change lasix to PO. Continue antibiotics through 9/23 for VAP. Looking at possible discharge to ARU. Looking at likely discharge in about 2 days. Changing eliquis to xarelto due to insurance coverage. Continue to monitor.           Interval History:   States that pain is being controlled. Denies any hallucinations. Breathing is ok. Working with IS. Appetite has been good. +BMs, denies any popping/clicking in his breast bone, ambulating, working with rehab, was able to get some sleep, plans for ARU, asking about a letter to explain that he has been in the hospital         Medications:       allopurinol  100 mg Oral Daily     amiodarone  200 mg Oral or FT or NG tube BID    Followed by     [START ON 9/28/2021] amiodarone  200 mg Oral or FT or NG tube Daily     amLODIPine  5 mg Oral Daily     apixaban ANTICOAGULANT  5 mg Oral BID     aspirin  324 mg Oral or NG Tube Daily     ceFEPIme (MAXIPIME) IV  2 g Intravenous Q8H     furosemide  40 mg Intravenous Daily     insulin aspart  5 Units Subcutaneous TID w/meals     insulin aspart  1-7 Units Subcutaneous TID AC     insulin aspart  1-5 Units Subcutaneous At Bedtime     insulin glargine  20 Units Subcutaneous BID     metoprolol tartrate  50 mg Oral BID     pantoprazole  40 mg Per Feeding Tube QAM AC    Or     pantoprazole (PROTONIX) IV  40 mg Intravenous QAM AC      polyethylene glycol  17 g Oral Daily     rosuvastatin  10 mg Oral Daily     senna-docusate  1 tablet Oral BID     sodium chloride (PF)  3 mL Intracatheter Q8H     sodium chloride (PF)  3 mL Intracatheter Q8H     thiamine  100 mg Oral Daily     acetaminophen, alum & mag hydroxide-simethicone, benzonatate, bisacodyl, calcium carbonate, glucose **OR** dextrose **OR** glucagon, guaiFENesin, hydrALAZINE, HYDROmorphone **OR** HYDROmorphone, lidocaine 4%, lidocaine (buffered or not buffered), magnesium hydroxide, metoprolol, naloxone **OR** naloxone **OR** naloxone **OR** naloxone, nitroGLYcerin, ondansetron **OR** ondansetron, oxyCODONE **OR** oxyCODONE, QUEtiapine, BETA BLOCKER NOT PRESCRIBED, sodium chloride (PF), sodium chloride (PF)          Physical Exam:   Vitals were reviewed  Blood pressure 122/70, pulse 91, temperature 98.1  F (36.7  C), temperature source Oral, resp. rate 16, weight 84.4 kg (186 lb), SpO2 98 %.  Gen: up at the side of the bed, comfortable, -O2    Lungs: mildly coarse, IS 1250 ml    Cardiovascular: RRR, telemetry SR 60-70s, -edema LE    Abdomen: BS+, mildly distended, soft    Derm: sternal incision D/I   L LE incisions D/I   L UE incisions D/I    CT: removed    Echo (9/14/21) - EF 45-50%   Akinesis of the anterior, septal and apical wall   Hypokinesis of the inferior wall   Mildly decreased RV systolic function   Mild to mod mitral regurg   Mild tricuspid regurg   Trace aortic regurg   No pericardial effusion    Weight:   Vitals:    09/15/21 0500 09/16/21 0400 09/17/21 0316 09/18/21 0652   Weight: 89.3 kg (196 lb 13.9 oz) 89.7 kg (197 lb 12 oz) 81.4 kg (179 lb 7.3 oz) 85 kg (187 lb 6.3 oz)    09/20/21 0510   Weight: 84.4 kg (186 lb)            Data:   Labs:   Lab Results   Component Value Date    WBC 13.8 09/15/2021     Lab Results   Component Value Date    RBC 3.32 09/15/2021     Lab Results   Component Value Date    HGB 8.5 09/15/2021     Lab Results   Component Value Date    HCT 26.5  09/15/2021     No components found for: MCT  Lab Results   Component Value Date    MCV 80 09/15/2021     Lab Results   Component Value Date    MCH 25.6 09/15/2021     Lab Results   Component Value Date    MCHC 32.1 09/15/2021     Lab Results   Component Value Date    RDW 15.3 09/15/2021     Lab Results   Component Value Date     09/15/2021       Last Basic Metabolic Panel:  Lab Results   Component Value Date     09/15/2021      Lab Results   Component Value Date    POTASSIUM 3.2 09/15/2021     Lab Results   Component Value Date    CHLORIDE 110 09/15/2021     Lab Results   Component Value Date    TORRES 8.2 09/15/2021     Lab Results   Component Value Date    CO2 29 09/15/2021     Lab Results   Component Value Date    BUN 13 09/15/2021     Lab Results   Component Value Date    CR 1.00 09/15/2021     Lab Results   Component Value Date     09/15/2021     09/15/2021     Pt seen with Dr Mccullough and the heart team with plans made.    Cristo Larson PA-C  [phone]

## 2021-09-20 NOTE — PLAN OF CARE
A+Ox4 VSS on room air. LS w/ crackles, frequent productive cough. Tele NSR. Incisions bruised. Bowels active, flatus+, BM+. Voiding adequately. Denies pain. Up SBA. Tolerating diet.

## 2021-09-20 NOTE — PLAN OF CARE
Pt A&Ox 4. CMS intact. VSS on RA. Lungs coarse. NSR. Incision site CDI. Denies pain. Independent. MOD CHO diet with midly thick liquid.  Voiding in BR. Continue to monitor.

## 2021-09-20 NOTE — PROGRESS NOTES
[facility]    Medicine Progress Note - Hospitalist Service       Date of Admission:  9/2/2021    Assessment & Plan             50 y/o male with PMH of CAD s/p stenting,  HTN, HLP, DM type 2, on insulin and gout- directly admitted from [facility] on 9/2/21 for NSTEMI and 1 week history of let sided chest pain. Underwent echocardiogram with some wall motion abnormalities, Coronary Angio then showed severe 3 vessel disease. He underwent 4V CABG on 9/8/21. Post procedure, he remained intubated until 9/14/21 due to delirium, agitation. He was assessed by neurology and underwent EEG. He also had post operative atrial flutter with RVR that was resistant to BB and amiodarone, but was successfully cardioverted on 9/13/21    NSTEMI  CAD s/p stenting (7823-4557 in [place]); severe 3 vessel disease s/p CABx4 POD #11  Ischemic cardiomyopathy  HTN  HLP  [PTA on Norvasc 5 mg daily, Losartan 50 mg po BID, Lopressor 50 mg BID and Crestor 10 mg po daily]  *Troponin: 2.15-->10.3-->12.451-->12.292.    *ECHO: LV systolic function mild-moderately reduced (EF 40-45%) with apical, distal septal wall severe hypokinesia, mild inferior wall hypokinesia.  RV systolic function normal.  Mild MR.  Ascending aorta mildly dilated.   Cardiology following, appreciate input  Coronary angiogram 9/2/2021 Underwent CABG 9/8/21: CABG X 4 WITH ENDOSCOPIC VEIN HARVESTING LIMA-LAD PREET-DISTAL RCA LEFT RADIAL-OM LEFT SV-ANOMOLOUS RV BRANCH  extubated on 9/14, off precedex drip;   echo 9/14- EF 45-50%; there is anterior, septal, and apical wall akinesis. There is also hypokineiss of the inferior wall.  Mildly decreased right ventricular systolic function; there is mild to moderate (1-2+) mitral regurgitation  transferred out of ICU on 09/16  CT removed on 09/17  was on TF as per nutritionist but he passed video swallow; currently on pureed diet with moderately thick liquids; he did not want TF restarted because he feels full  09/18-  removed NGT as patient was adamant to have it removed  plan  - CVS managing post operatively  - currently on  mg po daily, Lopressor 50 mg po BID, Norvasc 5 mg po daily, Crestor 10 mg po daily; PTA Losartan on hold  - diuresis as per CVS  - BP on higher side at times  - weaned off O2  - Aggressive IS  - cardiac rehab  - calorie count  - advance diet as per SLP  - defer ENT consult to primary team    Ventilator associated pneumonia causing acute hypoxemic respiratory failure  Increased sputum production on 9/17 started on cefepime and vancomycin and then cefepime daily  Improving WBC and not hypoxemic  Cultures negative  PCT of 0.43 of low utility in surgical patient  Plan  - continue on cefepime currently, could transition to empiric oral therapy on discharge to [facility] or vantin     Atrial Flutter s/p cardioversion  2:1 A flutter 9/12/21. Trialed on beta blocker and amiodarone without conversion  DCCV x2 on 9.13. First shock transitioned to atrial fib, then second shock converted to sinus rhythm.  plan   - amiodarone 200mg twice daily for 14 days, then 200mg daily for 28 days  - Lopressor increased from 25mg BID to 50 mg po BID  - Digoxin ivX2 doses given  - currently in Aflutter HR in 80's  - Metoprolol iv prn  - was on heparin drip, switched to Eliquis 5 mg po BID. May need to switch to xarelto for insurance reasons would defer to ordering provider  - management as per CVS/EP; EP is OK with this rhythm as long as his rate is controlled  - follow up EP DONTA in 6 weeks      Delirium  Agitation  Acute toxic encephalopathy- improved  As per prior notes- he was agitated with lightening sedation. CT  Head on POD#2 with no acute changes. Treated with seroquel and valproate per intensivist team. Neurology consulted-- EEG and MRI negative.   started on Seroquel 50mg BID-->changed to Seroquel 25 mg po BID prn  plan  - mentation significantly improved in the last few days, now AAOX3, was calm and following commands  -  appears to be resolving       Uncontrolled DM2  Hyperglycemia  A1c of 9.6% from 8/2021.  Rechecked A1c 9/2 and at 10.2%.  [ PTA on Lantus 30 untts daily, metformin 1000 mg BID and Glipizide 10 mg/d]  had been on Insulin drip after surgery, then tried to switched to long acting insulin 4 days ago; despite getting lantus 45 units 9/16 am- BS were in high 300's but I think uncontrolled BS were related to TF formula also   plan  - increase Lantus to 20 units BID, likely more increases needed on 9/21  - add Novolog 5 units TID with meals  - will likely need further insulin doses adjustments based on BS/oral intake  - hypoglycemia protocol  - hold PTA oral antidiabetic meds for now    Hypokalemia  - K replacement protocol     Gout  PTA allopurinol 100 mg/d.       History of latent TB  Patient has completed 9 month treatment in 2015.        Diet: Combination Diet Consistent Carb 75 Grams CHO per Meal Diet; Pureed Diet (level 4); Liquidized/Moderately Thick (level 3) (By spoon ONLY)  Calorie Counts  Snacks/Supplements Adult: Glucerna; With Meals    DVT Prophylaxis: Heparin infusion.  Christina Catheter: Not present  Central Lines: None  Code Status: Full Code      Disposition Plan   Expected discharge: 2-3 days?  recommended to prior living arrangement as per [facility].     The patient's care was discussed with the Bedside Nurse, Patient and CVS Consultant     Time Spent on this Encounter   I spent 35 minutes on the unit/floor managing the care of Jerry Bustamante. Over 50% of my time was spent on the following:   - Counseling the patient and/or family regarding: prognosis and medical compliance  - Coordination of care with the:CVS consultant(s) and nurse- discussed about diabetes management    Jaquan Power, DO  Hospitalist Service  [facility]  Securely message with the Vocera Web Console (learn more here)  Text page via [employer] Paging/Directory      Clinically Significant Risk Factors Present on  Admission              ______________________________________________________________________    Interval History    Records reviewed.  Feels short of breath since the surgery, primarily with exertion  No chest pain  Minimal cough      Data reviewed today: I reviewed all medications, new labs and imaging results over the last 24 hours. I personally reviewed the Tele image(s) showing Aflutter with rate in 80's.    Physical Exam   Vital Signs: Temp: 98.3  F (36.8  C) Temp src: Oral BP: 136/76 Pulse: 95   Resp: 16 SpO2: 92 % O2 Device: None (Room air)    Weight: 186 lbs 0 oz     General: - awake, alert, up in the chair  HEENT- normocephalic, atraumatic, PERRL, oral mucosa moist; NGT in place  RESP: bilateral air entry, some crackles at right base, diminished air entry left base  CV- S1S2, RRR, no murmurs, no rubs  GI- abd- soft, nonT, nonD, BS present  EXTREMITIES- no leg swelling, no calf tenderness  NEURO:- awake, oriented to self, place and time (knows it's Sept 2021, does not know the day), follows commands, cranial nerves II-XII grossly intact  PSYCH- calm, cooperative    Data   Recent Labs   Lab 09/20/21  0507 09/19/21  2208 09/19/21  1644 09/19/21  0749 09/19/21  0730 09/18/21  2135 09/18/21  1226 09/14/21  0611 09/14/21  0344   WBC 13.9*  --   --   --  16.1*  --  17.3*   < > 8.9   HGB 8.1*  --   --   --  8.6*  --  9.1*   < > 7.8*   MCV 84  --   --   --  84  --  83   < > 81     --   --   --  493*  --  458*   < > 260     --   --   --  141  --  141   < > 144   POTASSIUM 3.8  --   --   --  4.0  --  3.6   < > 3.4   CHLORIDE 106  --   --   --  108  --  108   < > 110*   CO2 30  --   --   --  29  --  27   < > 28   BUN 30  --   --   --  26  --  26   < > 12   CR 1.01  --   --   --  1.05  --  1.01   < > 0.83   ANIONGAP 3  --   --   --  4  --  6   < > 6   TORRES 8.6  --   --   --  8.9  --  9.0   < > 8.5   * 271* 317*   < > 239*   < > 298*   < > 137*   ALBUMIN  --   --   --   --   --   --   --   --  1.8*    PROTTOTAL  --   --   --   --   --   --   --   --  6.4*   BILITOTAL  --   --   --   --   --   --   --   --  0.3   ALKPHOS  --   --   --   --   --   --   --   --  262*   ALT  --   --   --   --   --   --   --   --  15   AST  --   --   --   --   --   --   --   --  16    < > = values in this interval not displayed.     No results found for this or any previous visit (from the past 24 hour(s)).  Medications     BETA BLOCKER NOT PRESCRIBED       sodium chloride Stopped (09/15/21 1400)       allopurinol  100 mg Oral Daily     amiodarone  200 mg Oral or FT or NG tube BID    Followed by     [START ON 9/28/2021] amiodarone  200 mg Oral or FT or NG tube Daily     amLODIPine  5 mg Oral Daily     apixaban ANTICOAGULANT  5 mg Oral BID     aspirin  324 mg Oral or NG Tube Daily     ceFEPIme (MAXIPIME) IV  2 g Intravenous Q8H     furosemide  40 mg Intravenous Daily     insulin aspart  5 Units Subcutaneous TID w/meals     insulin aspart  1-7 Units Subcutaneous TID AC     insulin aspart  1-5 Units Subcutaneous At Bedtime     insulin glargine  20 Units Subcutaneous BID     metoprolol tartrate  50 mg Oral BID     pantoprazole  40 mg Per Feeding Tube QAM AC    Or     pantoprazole (PROTONIX) IV  40 mg Intravenous QAM AC     polyethylene glycol  17 g Oral Daily     rosuvastatin  10 mg Oral Daily     senna-docusate  1 tablet Oral BID     sodium chloride (PF)  3 mL Intracatheter Q8H     sodium chloride (PF)  3 mL Intracatheter Q8H     thiamine  100 mg Oral Daily

## 2021-09-21 ENCOUNTER — APPOINTMENT (OUTPATIENT)
Dept: OCCUPATIONAL THERAPY | Facility: CLINIC | Age: 51
End: 2021-09-21
Attending: INTERNAL MEDICINE
Payer: COMMERCIAL

## 2021-09-21 ENCOUNTER — APPOINTMENT (OUTPATIENT)
Dept: PHYSICAL THERAPY | Facility: CLINIC | Age: 51
End: 2021-09-21
Attending: INTERNAL MEDICINE
Payer: COMMERCIAL

## 2021-09-21 ENCOUNTER — APPOINTMENT (OUTPATIENT)
Dept: SPEECH THERAPY | Facility: CLINIC | Age: 51
End: 2021-09-21
Attending: INTERNAL MEDICINE
Payer: COMMERCIAL

## 2021-09-21 LAB
ANION GAP SERPL CALCULATED.3IONS-SCNC: 4 MMOL/L (ref 3–14)
BUN SERPL-MCNC: 26 MG/DL (ref 7–30)
CALCIUM SERPL-MCNC: 8.6 MG/DL (ref 8.5–10.1)
CHLORIDE BLD-SCNC: 107 MMOL/L (ref 94–109)
CO2 SERPL-SCNC: 27 MMOL/L (ref 20–32)
CREAT SERPL-MCNC: 0.97 MG/DL (ref 0.66–1.25)
ERYTHROCYTE [DISTWIDTH] IN BLOOD BY AUTOMATED COUNT: 16.8 % (ref 10–15)
GFR SERPL CREATININE-BSD FRML MDRD: 89 ML/MIN/1.73M2
GLUCOSE BLD-MCNC: 198 MG/DL (ref 70–99)
GLUCOSE BLDC GLUCOMTR-MCNC: 136 MG/DL (ref 70–99)
GLUCOSE BLDC GLUCOMTR-MCNC: 221 MG/DL (ref 70–99)
GLUCOSE BLDC GLUCOMTR-MCNC: 227 MG/DL (ref 70–99)
GLUCOSE BLDC GLUCOMTR-MCNC: 277 MG/DL (ref 70–99)
HCT VFR BLD AUTO: 25.8 % (ref 40–53)
HGB BLD-MCNC: 7.8 G/DL (ref 13.3–17.7)
MCH RBC QN AUTO: 25.5 PG (ref 26.5–33)
MCHC RBC AUTO-ENTMCNC: 30.2 G/DL (ref 31.5–36.5)
MCV RBC AUTO: 84 FL (ref 78–100)
PLATELET # BLD AUTO: 406 10E3/UL (ref 150–450)
POTASSIUM BLD-SCNC: 4.1 MMOL/L (ref 3.4–5.3)
RBC # BLD AUTO: 3.06 10E6/UL (ref 4.4–5.9)
SODIUM SERPL-SCNC: 138 MMOL/L (ref 133–144)
WBC # BLD AUTO: 12.1 10E3/UL (ref 4–11)

## 2021-09-21 PROCEDURE — 250N000013 HC RX MED GY IP 250 OP 250 PS 637: Performed by: PHYSICIAN ASSISTANT

## 2021-09-21 PROCEDURE — 120N000001 HC R&B MED SURG/OB

## 2021-09-21 PROCEDURE — 97535 SELF CARE MNGMENT TRAINING: CPT | Mod: GO

## 2021-09-21 PROCEDURE — 250N000011 HC RX IP 250 OP 636: Performed by: PHYSICIAN ASSISTANT

## 2021-09-21 PROCEDURE — 99232 SBSQ HOSP IP/OBS MODERATE 35: CPT | Performed by: HOSPITALIST

## 2021-09-21 PROCEDURE — 92526 ORAL FUNCTION THERAPY: CPT | Mod: GN

## 2021-09-21 PROCEDURE — 97530 THERAPEUTIC ACTIVITIES: CPT | Mod: GP

## 2021-09-21 PROCEDURE — 97110 THERAPEUTIC EXERCISES: CPT | Mod: GP

## 2021-09-21 PROCEDURE — 250N000013 HC RX MED GY IP 250 OP 250 PS 637: Performed by: INTERNAL MEDICINE

## 2021-09-21 PROCEDURE — 80048 BASIC METABOLIC PNL TOTAL CA: CPT | Performed by: PHYSICIAN ASSISTANT

## 2021-09-21 PROCEDURE — 250N000012 HC RX MED GY IP 250 OP 636 PS 637: Performed by: INTERNAL MEDICINE

## 2021-09-21 PROCEDURE — C9113 INJ PANTOPRAZOLE SODIUM, VIA: HCPCS | Performed by: PHYSICIAN ASSISTANT

## 2021-09-21 PROCEDURE — 36415 COLL VENOUS BLD VENIPUNCTURE: CPT | Performed by: PHYSICIAN ASSISTANT

## 2021-09-21 PROCEDURE — 85014 HEMATOCRIT: CPT | Performed by: PHYSICIAN ASSISTANT

## 2021-09-21 PROCEDURE — 250N000012 HC RX MED GY IP 250 OP 636 PS 637: Performed by: HOSPITALIST

## 2021-09-21 RX ORDER — PANTOPRAZOLE SODIUM 40 MG/1
40 TABLET, DELAYED RELEASE ORAL
Status: DISCONTINUED | OUTPATIENT
Start: 2021-09-22 | End: 2021-09-22 | Stop reason: HOSPADM

## 2021-09-21 RX ADMIN — INSULIN GLARGINE 20 UNITS: 100 INJECTION, SOLUTION SUBCUTANEOUS at 08:39

## 2021-09-21 RX ADMIN — SENNOSIDES AND DOCUSATE SODIUM 1 TABLET: 8.6; 5 TABLET ORAL at 08:33

## 2021-09-21 RX ADMIN — ROSUVASTATIN CALCIUM 10 MG: 10 TABLET, FILM COATED ORAL at 08:33

## 2021-09-21 RX ADMIN — FUROSEMIDE 40 MG: 40 TABLET ORAL at 08:33

## 2021-09-21 RX ADMIN — POLYETHYLENE GLYCOL 3350 17 G: 17 POWDER, FOR SOLUTION ORAL at 08:33

## 2021-09-21 RX ADMIN — SENNOSIDES AND DOCUSATE SODIUM 1 TABLET: 8.6; 5 TABLET ORAL at 20:37

## 2021-09-21 RX ADMIN — INSULIN GLARGINE 25 UNITS: 100 INJECTION, SOLUTION SUBCUTANEOUS at 20:36

## 2021-09-21 RX ADMIN — METOPROLOL TARTRATE 50 MG: 50 TABLET, FILM COATED ORAL at 20:37

## 2021-09-21 RX ADMIN — AMIODARONE HYDROCHLORIDE 200 MG: 200 TABLET ORAL at 20:36

## 2021-09-21 RX ADMIN — ASPIRIN 81 MG CHEWABLE TABLET 81 MG: 81 TABLET CHEWABLE at 08:33

## 2021-09-21 RX ADMIN — AMIODARONE HYDROCHLORIDE 200 MG: 200 TABLET ORAL at 08:33

## 2021-09-21 RX ADMIN — METOPROLOL TARTRATE 50 MG: 50 TABLET, FILM COATED ORAL at 08:33

## 2021-09-21 RX ADMIN — AMLODIPINE BESYLATE 5 MG: 5 TABLET ORAL at 08:33

## 2021-09-21 RX ADMIN — BENZONATATE 100 MG: 100 CAPSULE ORAL at 16:45

## 2021-09-21 RX ADMIN — ACETAMINOPHEN 650 MG: 325 TABLET, FILM COATED ORAL at 00:59

## 2021-09-21 RX ADMIN — INSULIN GLARGINE 5 UNITS: 100 INJECTION, SOLUTION SUBCUTANEOUS at 13:09

## 2021-09-21 RX ADMIN — THIAMINE HCL TAB 100 MG 100 MG: 100 TAB at 08:33

## 2021-09-21 RX ADMIN — CEFEPIME HYDROCHLORIDE 2 G: 2 INJECTION, POWDER, FOR SOLUTION INTRAVENOUS at 20:36

## 2021-09-21 RX ADMIN — INSULIN ASPART 2 UNITS: 100 INJECTION, SOLUTION INTRAVENOUS; SUBCUTANEOUS at 17:18

## 2021-09-21 RX ADMIN — GUAIFENESIN 10 ML: 100 SOLUTION ORAL at 22:31

## 2021-09-21 RX ADMIN — INSULIN ASPART 3 UNITS: 100 INJECTION, SOLUTION INTRAVENOUS; SUBCUTANEOUS at 11:44

## 2021-09-21 RX ADMIN — CEFEPIME HYDROCHLORIDE 2 G: 2 INJECTION, POWDER, FOR SOLUTION INTRAVENOUS at 11:21

## 2021-09-21 RX ADMIN — PANTOPRAZOLE SODIUM 40 MG: 40 INJECTION, POWDER, FOR SOLUTION INTRAVENOUS at 08:33

## 2021-09-21 RX ADMIN — ALLOPURINOL 100 MG: 100 TABLET ORAL at 08:33

## 2021-09-21 RX ADMIN — ACETAMINOPHEN 650 MG: 325 TABLET, FILM COATED ORAL at 16:41

## 2021-09-21 RX ADMIN — CEFEPIME HYDROCHLORIDE 2 G: 2 INJECTION, POWDER, FOR SOLUTION INTRAVENOUS at 04:58

## 2021-09-21 RX ADMIN — INSULIN ASPART 2 UNITS: 100 INJECTION, SOLUTION INTRAVENOUS; SUBCUTANEOUS at 08:35

## 2021-09-21 RX ADMIN — RIVAROXABAN 20 MG: 20 TABLET, FILM COATED ORAL at 16:41

## 2021-09-21 ASSESSMENT — ACTIVITIES OF DAILY LIVING (ADL)
ADLS_ACUITY_SCORE: 5
ADLS_ACUITY_SCORE: 5
ADLS_ACUITY_SCORE: 7
ADLS_ACUITY_SCORE: 7
ADLS_ACUITY_SCORE: 5
ADLS_ACUITY_SCORE: 7

## 2021-09-21 NOTE — PROGRESS NOTES
[facility]  Cardiovascular and Thoracic Surgery Daily Note          Assessment and Plan:   POD#12 s/p Coronary artery bypass grafting x 4 with left internal mammary artery to the distal left anterior descending, right internal mammary artery to the mid right coronary artery, left radial artery to the obtuse marginal artery, reverse saphenous vein graft to the anomalous RV branch off the LAD, endoscopic vein harvest from the left lower extremity, endoscopic left radial artery harvest, intraoperative H[place] by Dr. Antelmo Mccullough on 9/8/21  -CVS: HR 60-80s, -130s. Went into atrial flutter overnight POD #3-- treated with amiodarone and cardioverted by EP on POD#4, recurrent afib/flutter 09/16, IV metoprolol PRN. Continues on amiodarone 200 mg bid and then 200 mg daily, change aspirin to 81 mg daily, xarelto 20 mg PO daily due to insurance coverage, Lopressor 50mg BID. Amlodipine 5mg daily-- will need to continue for at least 3 months to prevent radial artery spasm. PTA losartan on hold-- will consider resuming if HTN persists. CT/TPWs removed, lasix 40 mg PO daily  -Resp: Extubated on POD#5. Saturating well on RA. Continue to encourage IS, cough, deep breathing, ambulation. Concern for PNA- abx (cefepime 2 g q 8 hours) started on 9/17 - 9/23, will plan to change to PO if able to discharge  -Neuro:  Delirium/confusion resolved, Seroquel PRN. Previous agitation/delirium preventing extubation-- CT head on POD#2 with no acute changes. Neurology consulted-- EEG and MRI negative, neuro signed off, answering questions appropriately, moving all extremities  -Renal: good UOP. Cr stable, diuresis as above.   -GI:  NG discontinued. SLP following, advanced to dysphagia diet, advance diet per SLP recs. Continues with dysphonia, voice improving, denies any difficulty swallowing or coughing with eating, Continue calorie counts  -:  Christina removed  -Endo: pre op a1c: 10.2. Hx of DMII, PTA glipizide, metformin on  hold. Hyperglycemic with transition off insulin infusion, insulin infusion resumed. Hospitalist consulted to assist with post operative glycemic control, appreciate recs. PTA allopurinol resumed.  -FEN: replace electrolytes as needed. Na 138, K 4.1  -ID: Afebrile. WBC down to 12.1. Continues with productive cough, afebrile. Completed perioperative abx. Procal 09/15 intermediate. Continue cefipime x 7 days with concern for VAP  -Heme: hgb 7.8. plt 406. Acute blood loss anemia and thrombocytopenia related to surgery  -Proph: PCD, ASA, BB, statin, PPI, apixaban  -Dispo: Encouraged IS/TCDB/amb. Sternal precautions. Continue antibiotics through 9/23 for VAP. Looks to good for rehab. Will plan to discharge to home in 1-2 days. Adjusting diabetic meds. Continue to monitor.           Interval History:   States that pain is being controlled. Denies any hallucinations. Breathing is ok. Working with IS. Appetite has been good. +BMs, denies any popping/clicking in his breast bone, ambulating in halls on his own, working with rehab, was able to get some sleep         Medications:       allopurinol  100 mg Oral Daily     amiodarone  200 mg Oral or FT or NG tube BID    Followed by     [START ON 9/28/2021] amiodarone  200 mg Oral or FT or NG tube Daily     amLODIPine  5 mg Oral Daily     aspirin  81 mg Oral or NG Tube Daily     ceFEPIme (MAXIPIME) IV  2 g Intravenous Q8H     furosemide  40 mg Oral Daily     insulin aspart  7 Units Subcutaneous TID w/meals     insulin aspart  1-7 Units Subcutaneous TID AC     insulin aspart  1-5 Units Subcutaneous At Bedtime     insulin glargine  25 Units Subcutaneous BID     insulin glargine  5 Units Subcutaneous Once     metoprolol tartrate  50 mg Oral BID     pantoprazole  40 mg Per Feeding Tube QAM AC    Or     pantoprazole (PROTONIX) IV  40 mg Intravenous QAM AC     polyethylene glycol  17 g Oral Daily     rivaroxaban ANTICOAGULANT  20 mg Oral Daily with supper     rosuvastatin  10 mg Oral  Daily     senna-docusate  1 tablet Oral BID     sodium chloride (PF)  3 mL Intracatheter Q8H     sodium chloride (PF)  3 mL Intracatheter Q8H     thiamine  100 mg Oral Daily     acetaminophen, alum & mag hydroxide-simethicone, benzonatate, bisacodyl, calcium carbonate, glucose **OR** dextrose **OR** glucagon, guaiFENesin, hydrALAZINE, HYDROmorphone **OR** HYDROmorphone, lidocaine 4%, lidocaine (buffered or not buffered), magnesium hydroxide, metoprolol, naloxone **OR** naloxone **OR** naloxone **OR** naloxone, nitroGLYcerin, ondansetron **OR** ondansetron, oxyCODONE **OR** oxyCODONE, QUEtiapine, BETA BLOCKER NOT PRESCRIBED, sodium chloride (PF), sodium chloride (PF)          Physical Exam:   Vitals were reviewed  Blood pressure 135/77, pulse 79, temperature 98.1  F (36.7  C), temperature source Oral, resp. rate 16, weight 84.4 kg (186 lb), SpO2 97 %.  Gen: up in bed, comfortable, -O2    Lungs: CTA, IS 1000 ml    Cardiovascular: RRR, telemetry SR 70s, -edema LE    Abdomen: BS+, NT/ND, soft    Derm: sternal incision D/I   L LE incisions D/I   L UE incisions D/I    CT: removed    Echo (9/14/21) - EF 45-50%   Akinesis of the anterior, septal and apical wall   Hypokinesis of the inferior wall   Mildly decreased RV systolic function   Mild to mod mitral regurg   Mild tricuspid regurg   Trace aortic regurg   No pericardial effusion    Weight:   Vitals:    09/15/21 0500 09/16/21 0400 09/17/21 0316 09/18/21 0652   Weight: 89.3 kg (196 lb 13.9 oz) 89.7 kg (197 lb 12 oz) 81.4 kg (179 lb 7.3 oz) 85 kg (187 lb 6.3 oz)    09/20/21 0510   Weight: 84.4 kg (186 lb)            Data:   Labs:   Lab Results   Component Value Date    WBC 13.8 09/15/2021     Lab Results   Component Value Date    RBC 3.32 09/15/2021     Lab Results   Component Value Date    HGB 8.5 09/15/2021     Lab Results   Component Value Date    HCT 26.5 09/15/2021     No components found for: MCT  Lab Results   Component Value Date    MCV 80 09/15/2021     Lab  Results   Component Value Date    MCH 25.6 09/15/2021     Lab Results   Component Value Date    MCHC 32.1 09/15/2021     Lab Results   Component Value Date    RDW 15.3 09/15/2021     Lab Results   Component Value Date     09/15/2021       Last Basic Metabolic Panel:  Lab Results   Component Value Date     09/15/2021      Lab Results   Component Value Date    POTASSIUM 3.2 09/15/2021     Lab Results   Component Value Date    CHLORIDE 110 09/15/2021     Lab Results   Component Value Date    TORRES 8.2 09/15/2021     Lab Results   Component Value Date    CO2 29 09/15/2021     Lab Results   Component Value Date    BUN 13 09/15/2021     Lab Results   Component Value Date    CR 1.00 09/15/2021     Lab Results   Component Value Date     09/15/2021     09/15/2021     Pt seen with Dr Mccullough and the heart team with plans made.    Cristo Larson PA-C  [phone]

## 2021-09-21 NOTE — PLAN OF CARE
Pt. Alert and oriented x4. Vital signs stable on RA. Up SBA/ind. Tolerating mod carb/soft diet, nectar thick liquids. Lung sounds with crackles, encouraged IS. Bowel sounds active, + flatus. BM yesterday, adqeuate urine output. Sternal incision well approximated. CT and harvest site CDI. Pain managed with Tylenol. Denies nausea. Tele SR.

## 2021-09-21 NOTE — PROGRESS NOTES
CLINICAL NUTRITION SERVICES - REASSESSMENT NOTE      Recommendations Ordered by Registered Dietitian (RD): Trial Magic Cup with meals for additional nutrition    Malnutrition: (9/11)  % Weight Loss:  None noted  % Intake:  </= 50% for >/= 5 days (severe malnutrition) - p/t to TF start  Subcutaneous Fat Loss:  None observed  Muscle Loss:  None observed  Fluid Retention:  None noted     Malnutrition Diagnosis: Patient does not meet two of the above criteria necessary for diagnosing malnutrition       EVALUATION OF PROGRESS TOWARD GOALS   Diet:  Consistent CHO 75 grams per meal, soft and bite sized (level 6), mildly thick (level 2) + supplements as desired     Nutrition Support:  FT was pulled on 9/18 as patient per patient request due to full feeling with TF running     Intake/Tolerance:  Patient has been on a 3-day calorie count.  Noted that MD documenting good appetite.  Calorie count intake for yesterday was 1030 kcal and 71 grams protein   Three day average --> 1398 kcal (16 kcal/kg and ~2/3 needs), 80 g protein (0.9 g/kg and ~3/4 needs)  Patient has not been requesting any supplements when ordering       ASSESSED NUTRITION NEEDS:  Dosing Weight 85 kg - 9/18  Estimated Energy Needs: 4459-2570 kcals (25-30 Kcal/Kg)  Justification: maintenance  Estimated Protein Needs: 102-127 grams protein (1.2-1.5 g pro/Kg)  Justification: post-op and hypercatabolism with acute illness      NEW FINDINGS:   Plan for ARU at discharge     Weight 84.4 kg (down 3.1 kg from admit) - Likely partially fluid related     Previous Goals (9/18):   Patient will consume >/=60% estimated nutrient needs  Evaluation: Met    Previous Nutrition Diagnosis (9/18):   Inadequate protein-energy intake related to recent TF removal and current limited diet with potential variable appetite as evidenced by taking 36% estimated energy and 44% estimated protein needs from PO alone yesterday   Evaluation: Improving      CURRENT NUTRITION DIAGNOSIS  Inadequate  oral intake related to improving appetite after removal of FT as evidenced by meeting ~2/3-3/4 needs orally     INTERVENTIONS  Recommendations / Nutrition Prescription  Continue current diet as tolerated  Trial Magic Cup with meals for additional nutrition     Implementation  Medical Food Supplement:  Ordered as above     Goals  Patient will consume 75% meals and supplements     MONITORING AND EVALUATION:  Progress towards goals will be monitored and evaluated per protocol and Practice Guidelines    Heidi Wild RD, LD, CNSC   Clinical Dietitian - [facility]

## 2021-09-21 NOTE — PROGRESS NOTES
[facility] [place]    Medicine Progress Note - Hospitalist Service       Date of Admission:  9/2/2021    Assessment & Plan             50 y/o male with PMH of CAD s/p stenting,  HTN, HLP, DM type 2, on insulin and gout- directly admitted from [facility] on 9/2/21 for NSTEMI and 1 week history of let sided chest pain. Underwent echocardiogram with some wall motion abnormalities, Coronary Angio then showed severe 3 vessel disease. He underwent 4V CABG on 9/8/21. Post procedure, he remained intubated until 9/14/21 due to delirium, agitation. He was assessed by neurology and underwent EEG. He also had post operative atrial flutter with RVR that was resistant to BB and amiodarone, but was successfully cardioverted on 9/13/21    NSTEMI  CAD s/p stenting ([id number] in [place]); severe 3 vessel disease s/p CABx4 POD #11  Ischemic cardiomyopathy  HTN  HLP  [PTA on Norvasc 5 mg daily, Losartan 50 mg po BID, Lopressor 50 mg BID and Crestor 10 mg po daily]  *Troponin: 2.15-->10.3-->12.451-->12.292.    *ECHO: LV systolic function mild-moderately reduced (EF 40-45%) with apical, distal septal wall severe hypokinesia, mild inferior wall hypokinesia.  RV systolic function normal.  Mild MR.  Ascending aorta mildly dilated.   Cardiology following, appreciate input  Coronary angiogram 9/2/2021 Underwent CABG 9/8/21: CABG X 4 WITH ENDOSCOPIC VEIN HARVESTING LIMA-LAD PREET-DISTAL RCA LEFT RADIAL-OM LEFT SV-ANOMOLOUS RV BRANCH  extubated on 9/14, off precedex drip;   echo 9/14- EF 45-50%; there is anterior, septal, and apical wall akinesis. There is also hypokineiss of the inferior wall.  Mildly decreased right ventricular systolic function; there is mild to moderate (1-2+) mitral regurgitation  transferred out of ICU on 09/16  CT removed on 09/17  was on TF as per nutritionist but he passed video swallow; currently on pureed diet with moderately thick liquids; he did not want TF restarted because he feels full  09/18-  removed NGT as patient was adamant to have it removed  plan  - [facility] managing post operatively  - currently on  mg po daily, Lopressor 50 mg po BID, Norvasc 5 mg po daily, Crestor 10 mg po daily; PTA Losartan on hold  - diuresis as per [facility]  - BP on higher side at times  - weaned off O2  - Aggressive IS  - cardiac rehab  - calorie count  - advance diet as per SLP  - defer ENT consult to primary team    Ventilator associated pneumonia causing acute hypoxemic respiratory failure  Increased sputum production on 9/17 started on cefepime and vancomycin and then cefepime daily  Improving WBC and not hypoxemic  Cultures negative  PCT of 0.43 of low utility in surgical patient  Plan  - continue on cefepime currently, defer to ordering providers     Atrial Flutter s/p cardioversion  2:1 A flutter 9/12/21. Trialed on beta blocker and amiodarone without conversion  DCCV x2 on 9.13. First shock transitioned to atrial fib, then second shock converted to sinus rhythm.  plan   - amiodarone 200mg twice daily for 14 days, then 200mg daily for 28 days  - Lopressor increased from 25mg BID to 50 mg po BID  - Digoxin ivX2 doses given  - currently in Aflutter HR in 80's  - Metoprolol iv prn  - was on heparin drip, switched to Eliquis 5 mg po BID. May need to switch to xarelto for insurance reasons would defer to ordering provider  - management as per [facility]/EP; EP is OK with this rhythm as long as his rate is controlled  - follow up EP DONTA in 6 weeks      Delirium (resolved)  Agitation  Acute toxic encephalopathy- improved  As per prior notes- he was agitated with lightening sedation. CT  Head on POD#2 with no acute changes. Treated with seroquel and valproate per intensivist team. Neurology consulted-- EEG and MRI negative.   started on Seroquel 50mg BID-->changed to Seroquel 25 mg po BID prn  plan  - resolved       Uncontrolled DM2  Hyperglycemia  A1c of 9.6% from 8/2021.  Rechecked A1c 9/2 and at 10.2%.  [ PTA on Lantus 30 untts  daily, metformin 1000 mg BID and Glipizide 10 mg/d]  had been on Insulin drip after surgery, then tried to switched to long acting insulin 4 days ago; despite getting lantus 45 units 9/16 am- BS were in high 300's but I think uncontrolled BS were related to TF formula also   plan  - increase Lantus to 25 units bid  - add Novolog 5 units TID with meals  - will likely need further insulin doses adjustments based on BS/oral intake  - hypoglycemia protocol  - hold PTA oral antidiabetic meds for now    Hypokalemia  - K replacement protocol     Gout  PTA allopurinol 100 mg/d.       History of latent TB  Patient has completed 9 month treatment in 2015.        Diet: Snacks/Supplements Adult: Glucerna; With Meals  Combination Diet Consistent Carb 75 Grams CHO per Meal Diet; Soft and Bite Sized Diet (level 6); Mildly Thick (level 2) (By spoon, or small sips by cup); No Straws  Snacks/Supplements Adult: Magic Cup; With Meals    DVT Prophylaxis: Heparin infusion.  Christina Catheter: Not present  Central Lines: None  Code Status: Full Code      Disposition Plan   Expected discharge: 1-2 days recommended to prior living arrangement as per [facility].     The patient's care was discussed with the Bedside Nurse, Patient and [facility] Consultant     Time Spent on this Encounter   I spent 25 minutes on this patient care, mainly in managing diabetes    Jaquan Power DO  Hospitalist Service  [facility]  Securely message with the Vocera Web Console (learn more here)  Text page via [employer] Paging/Directory      Clinically Significant Risk Factors Present on Admission              ______________________________________________________________________    Interval History    No acute concerns, continues to improve. Diet is improving and glucose still undercontrolled.      Data reviewed today: I reviewed all medications, new labs and imaging results over the last 24 hours. I personally reviewed the Tele image(s) showing  Aflutter with rate in 80's.    Physical Exam   Vital Signs: Temp: 98.1  F (36.7  C) Temp src: Oral BP: 135/77 (Post CR) Pulse: 79   Resp: 16 SpO2: 97 % O2 Device: None (Room air)    Weight: 186 lbs 0 oz     General: - awake, alert, up in the chair  HEENT- normocephalic, atraumatic, PERRL, oral mucosa moist;   RESP: bilateral air entry without adventitious sounds  CV- S1S2, RRR, no murmurs, no rubs  GI- abd- soft, nonT, nonD, BS present  EXTREMITIES- no leg swelling, no calf tenderness  NEURO:- alert and oriented in all spheres. Muscle strength intact  PSYCH- calm, cooperative    Data   Recent Labs   Lab 09/21/21  0508 09/21/21  0223 09/20/21  2216 09/20/21  0739 09/20/21  0507 09/19/21  0749 09/19/21  0730   WBC 12.1*  --   --   --  13.9*  --  16.1*   HGB 7.8*  --   --   --  8.1*  --  8.6*   MCV 84  --   --   --  84  --  84     --   --   --  444  --  493*     --   --   --  139  --  141   POTASSIUM 4.1  --   --   --  3.8  --  4.0   CHLORIDE 107  --   --   --  106  --  108   CO2 27  --   --   --  30  --  29   BUN 26  --   --   --  30  --  26   CR 0.97  --   --   --  1.01  --  1.05   ANIONGAP 4  --   --   --  3  --  4   TORRES 8.6  --   --   --  8.6  --  8.9   * 221* 299*   < > 192*   < > 239*    < > = values in this interval not displayed.     No results found for this or any previous visit (from the past 24 hour(s)).  Medications     BETA BLOCKER NOT PRESCRIBED       sodium chloride Stopped (09/15/21 1400)       allopurinol  100 mg Oral Daily     amiodarone  200 mg Oral or FT or NG tube BID    Followed by     [START ON 9/28/2021] amiodarone  200 mg Oral or FT or NG tube Daily     amLODIPine  5 mg Oral Daily     aspirin  81 mg Oral or NG Tube Daily     ceFEPIme (MAXIPIME) IV  2 g Intravenous Q8H     furosemide  40 mg Oral Daily     insulin aspart  5 Units Subcutaneous TID w/meals     insulin aspart  1-7 Units Subcutaneous TID AC     insulin aspart  1-5 Units Subcutaneous At Bedtime     insulin  glargine  25 Units Subcutaneous BID     insulin glargine  5 Units Subcutaneous Once     metoprolol tartrate  50 mg Oral BID     pantoprazole  40 mg Per Feeding Tube QAM AC    Or     pantoprazole (PROTONIX) IV  40 mg Intravenous QAM AC     polyethylene glycol  17 g Oral Daily     rivaroxaban ANTICOAGULANT  20 mg Oral Daily with supper     rosuvastatin  10 mg Oral Daily     senna-docusate  1 tablet Oral BID     sodium chloride (PF)  3 mL Intracatheter Q8H     sodium chloride (PF)  3 mL Intracatheter Q8H     thiamine  100 mg Oral Daily

## 2021-09-21 NOTE — PLAN OF CARE
A+Ox4 VSS on room air. LS w/ crackles, frequent productive cough. Tele NSR. Incisions bruised. Bowels active, flatus+, BM+. Voiding adequately. Tylenol for pain. Up independently. Tolerating diet.

## 2021-09-22 ENCOUNTER — APPOINTMENT (OUTPATIENT)
Dept: PHYSICAL THERAPY | Facility: CLINIC | Age: 51
End: 2021-09-22
Attending: INTERNAL MEDICINE
Payer: COMMERCIAL

## 2021-09-22 ENCOUNTER — APPOINTMENT (OUTPATIENT)
Dept: OCCUPATIONAL THERAPY | Facility: CLINIC | Age: 51
End: 2021-09-22
Attending: INTERNAL MEDICINE
Payer: COMMERCIAL

## 2021-09-22 VITALS
TEMPERATURE: 98.1 F | HEART RATE: 65 BPM | WEIGHT: 186 LBS | DIASTOLIC BLOOD PRESSURE: 77 MMHG | OXYGEN SATURATION: 99 % | RESPIRATION RATE: 20 BRPM | BODY MASS INDEX: 26.69 KG/M2 | SYSTOLIC BLOOD PRESSURE: 128 MMHG

## 2021-09-22 LAB
ANION GAP SERPL CALCULATED.3IONS-SCNC: 5 MMOL/L (ref 3–14)
BACTERIA BLD CULT: NO GROWTH
BACTERIA BLD CULT: NO GROWTH
BUN SERPL-MCNC: 23 MG/DL (ref 7–30)
CALCIUM SERPL-MCNC: 8.7 MG/DL (ref 8.5–10.1)
CHLORIDE BLD-SCNC: 107 MMOL/L (ref 94–109)
CO2 SERPL-SCNC: 26 MMOL/L (ref 20–32)
CREAT SERPL-MCNC: 0.91 MG/DL (ref 0.66–1.25)
ERYTHROCYTE [DISTWIDTH] IN BLOOD BY AUTOMATED COUNT: 17.2 % (ref 10–15)
GFR SERPL CREATININE-BSD FRML MDRD: >90 ML/MIN/1.73M2
GLUCOSE BLD-MCNC: 151 MG/DL (ref 70–99)
GLUCOSE BLDC GLUCOMTR-MCNC: 140 MG/DL (ref 70–99)
GLUCOSE BLDC GLUCOMTR-MCNC: 148 MG/DL (ref 70–99)
GLUCOSE BLDC GLUCOMTR-MCNC: 188 MG/DL (ref 70–99)
HCT VFR BLD AUTO: 29.4 % (ref 40–53)
HGB BLD-MCNC: 9.1 G/DL (ref 13.3–17.7)
MCH RBC QN AUTO: 25.9 PG (ref 26.5–33)
MCHC RBC AUTO-ENTMCNC: 31 G/DL (ref 31.5–36.5)
MCV RBC AUTO: 84 FL (ref 78–100)
PLATELET # BLD AUTO: 437 10E3/UL (ref 150–450)
POTASSIUM BLD-SCNC: 4 MMOL/L (ref 3.4–5.3)
RBC # BLD AUTO: 3.52 10E6/UL (ref 4.4–5.9)
SODIUM SERPL-SCNC: 138 MMOL/L (ref 133–144)
WBC # BLD AUTO: 13 10E3/UL (ref 4–11)

## 2021-09-22 PROCEDURE — 250N000013 HC RX MED GY IP 250 OP 250 PS 637: Performed by: PHYSICIAN ASSISTANT

## 2021-09-22 PROCEDURE — 250N000012 HC RX MED GY IP 250 OP 636 PS 637: Performed by: HOSPITALIST

## 2021-09-22 PROCEDURE — 250N000011 HC RX IP 250 OP 636: Performed by: PHYSICIAN ASSISTANT

## 2021-09-22 PROCEDURE — 85027 COMPLETE CBC AUTOMATED: CPT | Performed by: PHYSICIAN ASSISTANT

## 2021-09-22 PROCEDURE — 97110 THERAPEUTIC EXERCISES: CPT | Mod: GP

## 2021-09-22 PROCEDURE — 80048 BASIC METABOLIC PNL TOTAL CA: CPT | Performed by: PHYSICIAN ASSISTANT

## 2021-09-22 PROCEDURE — 99232 SBSQ HOSP IP/OBS MODERATE 35: CPT | Performed by: HOSPITALIST

## 2021-09-22 PROCEDURE — 97530 THERAPEUTIC ACTIVITIES: CPT | Mod: GP

## 2021-09-22 PROCEDURE — 36415 COLL VENOUS BLD VENIPUNCTURE: CPT | Performed by: PHYSICIAN ASSISTANT

## 2021-09-22 PROCEDURE — 250N000013 HC RX MED GY IP 250 OP 250 PS 637

## 2021-09-22 PROCEDURE — 97535 SELF CARE MNGMENT TRAINING: CPT | Mod: GO

## 2021-09-22 RX ORDER — ROSUVASTATIN CALCIUM 10 MG/1
10 TABLET, COATED ORAL DAILY
Qty: 30 TABLET | Refills: 3 | Status: SHIPPED | OUTPATIENT
Start: 2021-09-22 | End: 2021-11-17

## 2021-09-22 RX ORDER — NITROGLYCERIN 0.4 MG/1
TABLET SUBLINGUAL
Qty: 30 TABLET | Refills: 1 | Status: SHIPPED | OUTPATIENT
Start: 2021-09-22 | End: 2022-10-25

## 2021-09-22 RX ORDER — AMLODIPINE BESYLATE 5 MG/1
5 TABLET ORAL DAILY
Qty: 30 TABLET | Refills: 2 | Status: ON HOLD | OUTPATIENT
Start: 2021-09-22 | End: 2021-11-14

## 2021-09-22 RX ORDER — OXYCODONE HYDROCHLORIDE 5 MG/1
5 TABLET ORAL EVERY 4 HOURS PRN
Qty: 10 TABLET | Refills: 0 | Status: SHIPPED | OUTPATIENT
Start: 2021-09-22 | End: 2021-09-27

## 2021-09-22 RX ORDER — ACETAMINOPHEN 325 MG/1
650 TABLET ORAL EVERY 4 HOURS PRN
Qty: 200 TABLET | Refills: 0 | Status: SHIPPED | OUTPATIENT
Start: 2021-09-22 | End: 2022-10-25

## 2021-09-22 RX ORDER — LANCETS
EACH MISCELLANEOUS
Qty: 200 EACH | Refills: 0 | Status: SHIPPED | OUTPATIENT
Start: 2021-09-22 | End: 2022-06-27

## 2021-09-22 RX ORDER — ASPIRIN 81 MG/1
81 TABLET, CHEWABLE ORAL DAILY
Qty: 100 TABLET | Refills: 1 | Status: SHIPPED | OUTPATIENT
Start: 2021-09-23

## 2021-09-22 RX ORDER — ALLOPURINOL 100 MG/1
100 TABLET ORAL DAILY
Qty: 30 TABLET | Refills: 3 | Status: SHIPPED | OUTPATIENT
Start: 2021-09-22 | End: 2021-11-17

## 2021-09-22 RX ORDER — AMIODARONE HYDROCHLORIDE 200 MG/1
200 TABLET ORAL DAILY
Qty: 30 TABLET | Refills: 0 | Status: SHIPPED | OUTPATIENT
Start: 2021-09-28 | End: 2021-11-04

## 2021-09-22 RX ORDER — GLUCOSAMINE HCL/CHONDROITIN SU 500-400 MG
CAPSULE ORAL
Qty: 100 EACH | Refills: 3 | Status: SHIPPED | OUTPATIENT
Start: 2021-09-22 | End: 2022-02-16

## 2021-09-22 RX ORDER — METOPROLOL TARTRATE 50 MG
50 TABLET ORAL 2 TIMES DAILY
Qty: 60 TABLET | Refills: 3 | Status: SHIPPED | OUTPATIENT
Start: 2021-09-22 | End: 2021-09-28

## 2021-09-22 RX ORDER — FUROSEMIDE 20 MG
40 TABLET ORAL DAILY
Qty: 30 TABLET | Refills: 0 | Status: SHIPPED | OUTPATIENT
Start: 2021-09-23 | End: 2021-09-27

## 2021-09-22 RX ADMIN — METOPROLOL TARTRATE 50 MG: 50 TABLET, FILM COATED ORAL at 08:03

## 2021-09-22 RX ADMIN — SENNOSIDES AND DOCUSATE SODIUM 1 TABLET: 8.6; 5 TABLET ORAL at 08:03

## 2021-09-22 RX ADMIN — THIAMINE HCL TAB 100 MG 100 MG: 100 TAB at 08:03

## 2021-09-22 RX ADMIN — ROSUVASTATIN CALCIUM 10 MG: 10 TABLET, FILM COATED ORAL at 08:02

## 2021-09-22 RX ADMIN — POLYETHYLENE GLYCOL 3350 17 G: 17 POWDER, FOR SOLUTION ORAL at 08:03

## 2021-09-22 RX ADMIN — INSULIN ASPART 1 UNITS: 100 INJECTION, SOLUTION INTRAVENOUS; SUBCUTANEOUS at 08:04

## 2021-09-22 RX ADMIN — ASPIRIN 81 MG CHEWABLE TABLET 81 MG: 81 TABLET CHEWABLE at 08:03

## 2021-09-22 RX ADMIN — AMIODARONE HYDROCHLORIDE 200 MG: 200 TABLET ORAL at 08:02

## 2021-09-22 RX ADMIN — INSULIN GLARGINE 25 UNITS: 100 INJECTION, SOLUTION SUBCUTANEOUS at 08:08

## 2021-09-22 RX ADMIN — INSULIN ASPART 1 UNITS: 100 INJECTION, SOLUTION INTRAVENOUS; SUBCUTANEOUS at 12:37

## 2021-09-22 RX ADMIN — CEFEPIME HYDROCHLORIDE 2 G: 2 INJECTION, POWDER, FOR SOLUTION INTRAVENOUS at 04:00

## 2021-09-22 RX ADMIN — FUROSEMIDE 40 MG: 40 TABLET ORAL at 08:03

## 2021-09-22 RX ADMIN — PANTOPRAZOLE SODIUM 40 MG: 40 TABLET, DELAYED RELEASE ORAL at 06:58

## 2021-09-22 RX ADMIN — ALLOPURINOL 100 MG: 100 TABLET ORAL at 08:03

## 2021-09-22 RX ADMIN — AMLODIPINE BESYLATE 5 MG: 5 TABLET ORAL at 08:02

## 2021-09-22 ASSESSMENT — ACTIVITIES OF DAILY LIVING (ADL)
ADLS_ACUITY_SCORE: 7

## 2021-09-22 NOTE — DISCHARGE INSTRUCTIONS
*Weight yourself daily and please call if weight gain 2-3 lbs over 24 hours or if greater than 5 lbs in 1 week as this may indicate fluid overload and could signify a problem with your heart. Please also call if you notice worsening swelling in your legs as this may indicate fluid overload as well.  A certain amount of swelling is expected, especially if you had vein taken out of your leg for bypass surgery.  Keep your legs elevated above the level of your heart when you are in bed to help drainage.  *No lifting more than 10 lbs for 8-12 weeks.  *No driving for 1 month.  *Call for pain medication refill.  Other medications should be filled by your primary doctor.  *Call for temperature greater than 101 degree Fahrenheit, signs of incision infection, such as redness, drainage, odor, pain.  *Daily shower with antibacterial soap. You can gently clean incision.  *Some popping or clicking sensations can be normal after open heart surgery as the chest has been stretched open.  If it is associated with significant pain or becomes bothersome, please call.  *Some drainage from the sites where your chest tubes were is normal and can persist for awhile until the tract has healed, it is best to keep this open to air to allow scab formation and healing, but you can cover it with a sterile gauze pad if necessary for comfort.  *It can be normal to have a decrease appetite for awhile after surgery.  You can eat whatever it takes to keep up a normal food/calorie intake in the immediate post-operative period for about a month or so, and eventually we will want you to adopt a heart healthy diet, especially if you have coronary artery disease.  *If you are going home, please set up outpatient cardiac rehabilitation as soon as possible, knowing that there may be a bit of a delay before you can start after you get discharged from the hospital.  In the meantime, continue to work on the rehab activities you learned in the hospital and  "maintain your physical activity.  Aerobic activity, especially walking, is a great way to regain your physical endurance.  *Please continue docusate (Colace) for as long as you are on narcotic pain medications (oxycodone, Oxycontin, MS Contin), as these medication can be constipating.  You can discontinue these once you are off these medications and having normal bowel movements or sooner if you are experiencing diarrhea.  *You may use Tylenol (acetaminophen) as needed for pain as well to help transition off of narcotic pain medications, as long as you have no problems with your liver.  Keep intake less than 4 g (4,000 mg) of Tylenol daily.  *The following medications may or may not be new for you:   -Aspirin: this helps to thin your blood and possibly prevent clot formation, this can   also be hard on your stomach though so be sure to use the \"enteric coated\"   Version.  You may be prescribed either the baby aspirin dose (81 mg), or the   adult dose (325 mg) depending on your situation.     -Beta blocker (carvedilol, atenolol, metoprolol): this medication lowers your blood   pressure and heart rate and is helpful for your heart, especially if you have had a   heart attack or bypass surgery.  This medication can also help prevent atrial   fibrillation, a heart rhythm disturbance, or control the rate of it so your heart isn't   beating so fast.     -Cholesterol medication (atorvastatin, simvastatin, rosuvastatin): these    medications help decrease cholesterol, which is a contributor to plaque formation  in your arteries.     "

## 2021-09-22 NOTE — DISCHARGE SUMMARY
Discharge Summary    Jerry Bustamante MRN# [id number]   YOB: 1970 Age: 51 year old     Date of Admission:  9/2/2021  Admit Wgt: 87.5 kg  Date of Discharge:  9/22/2021  Discharge Wgt: 84.4 kg  Admitting Physician:  Akin Brown MD  Discharge Physician:  Cristo Larson PA-C  Discharging Service:  Cardiovascular and Thoracic Surgery     Home clinic: [facility]  Primary Provider: Medicine, [facility]          Admission Diagnoses:   NSTEMI (non-ST elevated myocardial infarction) (H) [I21.4]          Discharge Diagnosis:   S/p CABG x 4  Hypertension  Hyperlipidemia  VAP - antibiotics finished prior to discharge  Ischemic cardiomyopathy  Radial artery - plans for amlodipine x 3 months for prevention of radial spasm  Atrial flutter s/p cardioversion  Anticoagulation - xarelto and amiodarone taper  Delirium - resolved  Acute toxic encephalopathy - resolved  Uncontrolled DM type II  Gout  Hx of latent TB  Cardiogenic shock - treated with inotropes and pressors, resolved             Discharge Disposition:   Discharged to home           Condition on Discharge:   Discharge condition: Good   Discharge vitals: Blood pressure 128/77, pulse 65, temperature 98.1  F (36.7  C), temperature source Oral, resp. rate 20, weight 84.4 kg (186 lb), SpO2 99 %.     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          Medications Prior to Admission:     Medications Prior to Admission   Medication Sig Dispense Refill Last Dose     [DISCONTINUED] allopurinol (ZYLOPRIM) 100 MG  tablet [ALLOPURINOL (ZYLOPRIM) 100 MG TABLET] Take 100 mg by mouth daily.  3 9/1/2021 at PM     [DISCONTINUED] amLODIPine (NORVASC) 5 MG tablet [AMLODIPINE (NORVASC) 5 MG TABLET] Take 5 mg by mouth.   9/1/2021 at PM     [DISCONTINUED] glipiZIDE (GLUCOTROL XL) 10 MG 24 hr tablet [GLIPIZIDE (GLUCOTROL XL) 10 MG 24 HR TABLET] Take 10 mg by mouth daily.   9/1/2021 at AM     [DISCONTINUED] insulin glargine (LANTUS) 100 unit/mL injection Inject 30 Units Subcutaneous At Bedtime    9/1/2021 at PM     [DISCONTINUED] losartan (COZAAR) 50 MG tablet Take 50 mg by mouth 2 times daily   9/1/2021 at PM     [DISCONTINUED] metFORMIN (GLUCOPHAGE) 500 MG tablet [METFORMIN (GLUCOPHAGE) 500 MG TABLET] Take 1,000 mg by mouth 2 (two) times a day with meals.   9/1/2021 at PM     [DISCONTINUED] metoprolol tartrate (LOPRESSOR) 50 MG tablet [METOPROLOL TARTRATE (LOPRESSOR) 50 MG TABLET] Take 50 mg by mouth 2 (two) times a day.   9/1/2021 at PM     [DISCONTINUED] rosuvastatin (CRESTOR) 10 MG tablet Take 10 mg by mouth daily   9/1/2021 at PM             Discharge Medications:     Current Discharge Medication List      START taking these medications    Details   acetaminophen (TYLENOL) 325 MG tablet Take 2 tablets (650 mg) by mouth every 4 hours as needed for other (For optimal non-opioid multimodal pain management to improve pain control.)  Qty: 200 tablet, Refills: 0    Associated Diagnoses: S/P coronary artery bypass graft x 3      alcohol swab prep pads Use to swab area of injection/rupali as directed.  Qty: 100 each, Refills: 3    Associated Diagnoses: NSTEMI (non-ST elevated myocardial infarction) (H)      amiodarone (PACERONE) 200 MG tablet Take 1 tablet (200 mg) by mouth daily  Qty: 30 tablet, Refills: 0    Associated Diagnoses: S/P coronary artery bypass graft x 3      aspirin (ASA) 81 MG chewable tablet 1 tablet (81 mg) by Oral or NG Tube route daily  Qty: 100 tablet, Refills: 1    Associated Diagnoses: S/P coronary artery bypass graft  x 3      blood glucose (NO BRAND SPECIFIED) test strip Use to test blood sugar 4 times daily or as directed.  Qty: 100 strip, Refills: 6    Comments: To accompany: glucometer per insurance.  Associated Diagnoses: NSTEMI (non-ST elevated myocardial infarction) (H)      blood glucose calibration (NO BRAND SPECIFIED) solution Use to calibrate blood glucose monitor as needed as directed.  Qty: 200 each, Refills: 0    Comments: To accompany: Glucometer per insurance.  Associated Diagnoses: NSTEMI (non-ST elevated myocardial infarction) (H)      blood glucose monitoring (NO BRAND SPECIFIED) meter device kit Use to test blood sugar 4 times daily or as directed.  Qty: 1 kit, Refills: 0    Comments: Preferred blood glucose meter OR supplies to accompany: glucometer per insurance  Associated Diagnoses: NSTEMI (non-ST elevated myocardial infarction) (H)      furosemide (LASIX) 20 MG tablet Take 2 tablets (40 mg) by mouth daily  Qty: 30 tablet, Refills: 0    Associated Diagnoses: S/P coronary artery bypass graft x 3      guaiFENesin (ROBITUSSIN) 20 mg/mL SOLN solution Take 10 mLs by mouth every 4 hours as needed for cough  Qty: 236 mL, Refills: 0    Associated Diagnoses: S/P coronary artery bypass graft x 3      insulin aspart (NOVOLOG PEN) 100 UNIT/ML pen Inject 5 Units Subcutaneous 3 times daily (with meals)  Qty: 4.5 mL, Refills: 0    Associated Diagnoses: Atrial fibrillation with RVR (H)      nitroGLYcerin (NITROSTAT) 0.4 MG sublingual tablet For chest pain place 1 tablet under the tongue every 5 minutes for 3 doses. If symptoms persist 5 minutes after 1st dose call 911.  Qty: 30 tablet, Refills: 1    Associated Diagnoses: S/P coronary artery bypass graft x 3      oxyCODONE (ROXICODONE) 5 MG tablet Take 1 tablet (5 mg) by mouth every 4 hours as needed for moderate to severe pain  Qty: 10 tablet, Refills: 0    Associated Diagnoses: S/P coronary artery bypass graft x 3      rivaroxaban ANTICOAGULANT (XARELTO) 20 MG TABS  tablet Take 1 tablet (20 mg) by mouth daily (with dinner)  Qty: 30 tablet, Refills: 2    Associated Diagnoses: S/P coronary artery bypass graft x 3      thin (NO BRAND SPECIFIED) lancets Use with lanceting device.  Qty: 200 each, Refills: 0    Comments: To accompany: per insurance.  Associated Diagnoses: NSTEMI (non-ST elevated myocardial infarction) (H)         CONTINUE these medications which have CHANGED    Details   allopurinol (ZYLOPRIM) 100 MG tablet Take 1 tablet (100 mg) by mouth daily  Qty: 30 tablet, Refills: 3    Associated Diagnoses: S/P coronary artery bypass graft x 3      amLODIPine (NORVASC) 5 MG tablet Take 1 tablet (5 mg) by mouth daily  Qty: 30 tablet, Refills: 2    Associated Diagnoses: S/P coronary artery bypass graft x 3      insulin glargine (LANTUS PEN) 100 UNIT/ML pen Inject 25 Units Subcutaneous 2 times daily  Qty: 15 mL, Refills: 0    Comments: If Lantus is not covered by insurance, may substitute Basaglar at same dose and frequency.    Associated Diagnoses: Atrial fibrillation with RVR (H)      metoprolol tartrate (LOPRESSOR) 50 MG tablet Take 1 tablet (50 mg) by mouth 2 times daily  Qty: 60 tablet, Refills: 3    Associated Diagnoses: S/P coronary artery bypass graft x 3      rosuvastatin (CRESTOR) 10 MG tablet Take 1 tablet (10 mg) by mouth daily  Qty: 30 tablet, Refills: 3    Associated Diagnoses: S/P coronary artery bypass graft x 3         STOP taking these medications       glipiZIDE (GLUCOTROL XL) 10 MG 24 hr tablet Comments:   Reason for Stopping:         losartan (COZAAR) 50 MG tablet Comments:   Reason for Stopping:         metFORMIN (GLUCOPHAGE) 500 MG tablet Comments:   Reason for Stopping:                     Consultations:   Nutrition, Intensivist, neurology             Brief History of Illness:   Mr. Bustamante is a very pleasant 51-year-old gentleman with history of severe coronary artery disease, status post PCI and stent to the mid LAD in 2011. He was admitted to the hospital  with chest pain and was diagnosed with a non-ST elevation MI. He had a mildly depressed LV systolic function with EF around 45% to 50%. A coronary angiogram demonstrated severe 3-vessel coronary artery disease. After discussing pt's symptoms and angiogram surgery was indicated. After discussing the risks/benefits of surgery pt decided to proceed with surgery.          Hospital Course:   Pt was taken to the OR on 9/8/21 by Dr Mccullough for a CABG x 4. The findings of surgery included the patient had a LVEF around 40% to 45%. The left internal mammary artery was excellent quality conduit with excellent flow, measuring 2.5 mm plus in diameter. The right internal mammary artery was rather small, about 1.5 mm in diameter, but the flow was still good. The left radial artery was a good quality conduit measuring roughly 2.5 mm in diameter. The left greater saphenous vein was of acceptable quality and it was roughly 3-4 mm in diameter. The mid RCA had mild disease and the probe size was 1.5 mm. It was quite thick, but no heavy plaque. The obtuse marginal artery was partly intramyocardial and had mild disease. The probe size was 1.5 mm. The distal circumflex marginal artery was examined, but was too small for bypass grafting. The third diagonal artery as a possible target was examined, but was also too small for bypass grafting. The distal LAD (proximal to the apical disease) had mild to moderate disease and the probe size was 1.5 mm. The anomalous RV branches off the LAD had minimal disease and the probe size was 1.5 mm as well. There were no complications due to surgery. Prior to surgery being finished chest tubes and TPWs were left in place. Wounds were cleaned and dressings were applied. Pt was transferred out of the OR hemodynamically stable.    While in the ICU pt's BP was maintained using inotropes and pressors. These were continuously weaned until they were no longer needed. Initially pt's glucose was maintained using  an insulin gtt. This was changed to lantus and sliding scale insulin and managed by the hospitalist team. Pt remained intubated due to encephalopathy and acute resp failure. Due to the encephalopathy neurology was consulted and and EEG was done. Their recommendations were followed with seroquel being started. Pt was also noted to be in atrial flutter. At that time EP was consulted and pt was cardioverted on 9/13/2021. Pt was also placed on a tapering dose of amiodarone and started on xarelto when able. Pt was eventually extubated on POD #6. Pt was transferred out of the ICU on POD #7.    While on the step down unit pt continued to progress well. Chest tubes and TPWs were remvoed when deemed necessary. Pt was initially on tube feeds and worked with speech therapy to advance his diet. Initially pt did have some hoarseness of his throat and voice. This was improving prior to discharge. Pt was noted to have an increased WBC and increased productive cough and was started on antibiotics for suspected ventilator assisted pneumonia. Pt finished antibiotics prior to discharge. Throughout hospitalization BP meds were titrated. At the time of discharge pt was continued on his lasix 20 mg PO daily. This was due to EF of 45-50%. This may be able to be discontinued as heart function improves. The remainder of pt's hospitalization was unremarkable. Prior to discharge pt's pain was controlled, he had had a BM and was ambulating per self. All questions/concerns were addressed prior to discharge. Pt is being discharged to home with family. Pt/son are agreeable to plan of care. Continue to monitor.                Significant Results:   Lab Results   Component Value Date    WBC 13.0 09/22/2021     Lab Results   Component Value Date    RBC 3.52 09/22/2021     Lab Results   Component Value Date    HGB 9.1 09/22/2021     Lab Results   Component Value Date    HCT 29.4 09/22/2021     No components found for: MCT  Lab Results   Component  Value Date    MCV 84 09/22/2021     Lab Results   Component Value Date    MCH 25.9 09/22/2021     Lab Results   Component Value Date    MCHC 31.0 09/22/2021     Lab Results   Component Value Date    RDW 17.2 09/22/2021     Lab Results   Component Value Date     09/22/2021       Last Basic Metabolic Panel:  Lab Results   Component Value Date     09/22/2021      Lab Results   Component Value Date    POTASSIUM 4.0 09/22/2021     Lab Results   Component Value Date    CHLORIDE 107 09/22/2021     Lab Results   Component Value Date    TORRES 8.7 09/22/2021     Lab Results   Component Value Date    CO2 26 09/22/2021     Lab Results   Component Value Date    BUN 23 09/22/2021     Lab Results   Component Value Date    CR 0.91 09/22/2021     Lab Results   Component Value Date     09/22/2021     09/22/2021                  Pending Results:   None           Discharge Instructions and Follow-Up:   Discharge diet: Regular   Discharge activity: Daily weights: Call if weight gain 2-3 lbs over 24 hours or if greater than 5 lbs in 1 week.  No lifting more than 10 lbs for 8-12 weeks.  No driving for 1 month.  Call for pain medication refill.  Call for temperature greater than 101.0  Daily shower with antibacterial soap.   Discharge follow-up: Follow up with cardiothoracic surgery on 9/27/2021 at 2:30 pm.  Follow up with Janet Mccabe PA-C (EP) on 11/4/2021 at 1:10 pm.  Follow up with Dr Castro (cardiology) on 11/29/2021 at 8:45 am.    Outpatient therapy: Cardiac rehab   Home Care agency: None    Supplies and equipment: None   Lines and drains: None    Wound care: Wash incision daily with antibacterial soap   Other instructions: None

## 2021-09-22 NOTE — PROGRESS NOTES
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 Antelmo Mccullough on 9/8/21  -CVS: HR 60-70s, -140s/60-80s. Went into atrial flutter overnight POD #3-- treated with amiodarone and cardioverted by EP on POD#4, recurrent afib/flutter 09/16, IV metoprolol PRN. Continues on amiodarone 200 mg bid and then 200 mg daily, change aspirin to 81 mg daily, xarelto 20 mg PO daily due to insurance coverage, Lopressor 50mg BID. Amlodipine 5mg daily-- will need to continue for at least 3 months to prevent radial artery spasm. PTA losartan on hold-- will consider resuming if HTN persists. CT/TPWs removed, lasix 40 mg PO daily  -Resp: Extubated on POD#5. Saturating well on RA. Continue to encourage IS, cough, deep breathing, ambulation. Concern for PNA- abx (cefepime 2 g q 8 hours) started on 9/17 - 9/23, will plan to change to PO at discharge  -Neuro:  Delirium/confusion resolved, Seroquel PRN. Previous agitation/delirium preventing extubation-- CT head on POD#2 with no acute changes. Neurology consulted-- EEG and MRI negative, neuro signed off, answering questions appropriately, moving all extremities  -Renal: good UOP. Cr stable, diuresis as above.   -GI:  NG discontinued. SLP following, advanced to dysphagia diet, advance diet per SLP recs. Continues with dysphonia, voice improving, denies any difficulty swallowing or coughing with eating, Continue calorie counts  -:  Christina removed  -Endo: pre op a1c: 10.2. Hx of DMII, PTA glipizide, metformin on  hold. Hyperglycemic with transition off insulin infusion, insulin infusion resumed. Hospitalist consulted to assist with post operative glycemic control, appreciate recs. PTA allopurinol resumed.  -FEN: replace electrolytes as needed. Na 138, K 4.0  -ID: Afebrile. WBC 13. Continues with productive cough, afebrile. Completed perioperative abx. Procal 09/15 intermediate. Continue cefipime x 7 days with concern for VAP, will change to PO through 9/23  -Heme: hgb 9.1. plt 437. Acute blood loss anemia and thrombocytopenia related to surgery  -Proph: PCD, ASA, BB, statin, PPI, apixaban  -Dispo: Encouraged IS/TCDB/amb. Sternal precautions. Continue antibiotics through 9/23 for VAP. Looks to good for rehab. Ok to discharge to home today. Adjusting diabetic meds. Continue to monitor.           Interval History:   States that pain is being controlled. Denies any hallucinations. Breathing is ok. Working with IS. Appetite has been good. +BMs, denies any popping/clicking in his breast bone, ambulating in halls on his own, working with rehab, was able to get some sleep, feels good about going home         Medications:       allopurinol  100 mg Oral Daily     amiodarone  200 mg Oral or FT or NG tube BID    Followed by     [START ON 9/28/2021] amiodarone  200 mg Oral or FT or NG tube Daily     amLODIPine  5 mg Oral Daily     aspirin  81 mg Oral or NG Tube Daily     ceFEPIme (MAXIPIME) IV  2 g Intravenous Q8H     furosemide  40 mg Oral Daily     insulin aspart  7 Units Subcutaneous TID w/meals     insulin aspart  1-7 Units Subcutaneous TID AC     insulin aspart  1-5 Units Subcutaneous At Bedtime     insulin glargine  25 Units Subcutaneous BID     metoprolol tartrate  50 mg Oral BID     pantoprazole  40 mg Oral QAM AC     polyethylene glycol  17 g Oral Daily     rivaroxaban ANTICOAGULANT  20 mg Oral Daily with supper     rosuvastatin  10 mg Oral Daily     senna-docusate  1 tablet Oral BID     sodium chloride (PF)  3 mL Intracatheter  Q8H     sodium chloride (PF)  3 mL Intracatheter Q8H     thiamine  100 mg Oral Daily     acetaminophen, alum & mag hydroxide-simethicone, benzonatate, bisacodyl, calcium carbonate, glucose **OR** dextrose **OR** glucagon, guaiFENesin, hydrALAZINE, HYDROmorphone **OR** HYDROmorphone, lidocaine 4%, lidocaine (buffered or not buffered), magnesium hydroxide, metoprolol, naloxone **OR** naloxone **OR** naloxone **OR** naloxone, nitroGLYcerin, ondansetron **OR** ondansetron, oxyCODONE **OR** oxyCODONE, QUEtiapine, BETA BLOCKER NOT PRESCRIBED, sodium chloride (PF), sodium chloride (PF)          Physical Exam:   Vitals were reviewed  Blood pressure (!) 141/80, pulse 70, temperature 98  F (36.7  C), temperature source Oral, resp. rate 18, weight 84.4 kg (186 lb), SpO2 96 %.  Gen: up in bed, comfortable, -O2, son present    Lungs: CTA, IS 2834-2245 ml    Cardiovascular: RRR, telemetry SR 70s, -edema LE    Abdomen: BS+, NT/ND, soft    Derm: sternal incision D/I   L LE incisions D/I   L UE incisions D/I    CT: removed    Echo (9/14/21) - EF 45-50%   Akinesis of the anterior, septal and apical wall   Hypokinesis of the inferior wall   Mildly decreased RV systolic function   Mild to mod mitral regurg   Mild tricuspid regurg   Trace aortic regurg   No pericardial effusion    Weight:   Vitals:    09/15/21 0500 09/16/21 0400 09/17/21 0316 09/18/21 0652   Weight: 89.3 kg (196 lb 13.9 oz) 89.7 kg (197 lb 12 oz) 81.4 kg (179 lb 7.3 oz) 85 kg (187 lb 6.3 oz)    09/20/21 0510   Weight: 84.4 kg (186 lb)            Data:   Labs:   Lab Results   Component Value Date    WBC 13.8 09/15/2021     Lab Results   Component Value Date    RBC 3.32 09/15/2021     Lab Results   Component Value Date    HGB 8.5 09/15/2021     Lab Results   Component Value Date    HCT 26.5 09/15/2021     No components found for: MCT  Lab Results   Component Value Date    MCV 80 09/15/2021     Lab Results   Component Value Date    MCH 25.6 09/15/2021     Lab Results    Component Value Date    MCHC 32.1 09/15/2021     Lab Results   Component Value Date    RDW 15.3 09/15/2021     Lab Results   Component Value Date     09/15/2021       Last Basic Metabolic Panel:  Lab Results   Component Value Date     09/15/2021      Lab Results   Component Value Date    POTASSIUM 3.2 09/15/2021     Lab Results   Component Value Date    CHLORIDE 110 09/15/2021     Lab Results   Component Value Date    TORRES 8.2 09/15/2021     Lab Results   Component Value Date    CO2 29 09/15/2021     Lab Results   Component Value Date    BUN 13 09/15/2021     Lab Results   Component Value Date    CR 1.00 09/15/2021     Lab Results   Component Value Date     09/15/2021     09/15/2021     Pt seen with Dr Mccullough and the heart team with plans made.    Cristo Larson PA-C  [phone]

## 2021-09-22 NOTE — PROGRESS NOTES
[facility]    Medicine Progress Note - Hospitalist Service       Date of Admission:  9/2/2021    Assessment & Plan             52 y/o male with PMH of CAD s/p stenting,  HTN, HLP, DM type 2, on insulin and gout- directly admitted from [facility] on 9/2/21 for NSTEMI and 1 week history of let sided chest pain. Underwent echocardiogram with some wall motion abnormalities, Coronary Angio then showed severe 3 vessel disease. He underwent 4V CABG on 9/8/21. Post procedure, he remained intubated until 9/14/21 due to delirium, agitation. He was assessed by neurology and underwent EEG. He also had post operative atrial flutter with RVR that was resistant to BB and amiodarone, but was successfully cardioverted on 9/13/21    NSTEMI  CAD s/p stenting ([id number] in [place]); severe 3 vessel disease s/p CABx4 POD #11  Ischemic cardiomyopathy  HTN  HLP  [PTA on Norvasc 5 mg daily, Losartan 50 mg po BID, Lopressor 50 mg BID and Crestor 10 mg po daily]  *Troponin: 2.15-->10.3-->12.451-->12.292.    *ECHO: LV systolic function mild-moderately reduced (EF 40-45%) with apical, distal septal wall severe hypokinesia, mild inferior wall hypokinesia.  RV systolic function normal.  Mild MR.  Ascending aorta mildly dilated.   Cardiology following, appreciate input  Coronary angiogram 9/2/2021 Underwent CABG 9/8/21: CABG X 4 WITH ENDOSCOPIC VEIN HARVESTING LIMA-LAD PREET-DISTAL RCA LEFT RADIAL-OM LEFT SV-ANOMOLOUS RV BRANCH  extubated on 9/14, off precedex drip;   echo 9/14- EF 45-50%; there is anterior, septal, and apical wall akinesis. There is also hypokineiss of the inferior wall.  Mildly decreased right ventricular systolic function; there is mild to moderate (1-2+) mitral regurgitation  transferred out of ICU on 09/16  CT removed on 09/17  was on TF as per nutritionist but he passed video swallow; currently on pureed diet with moderately thick liquids; he did not want TF restarted because he feels full  09/18-  removed NGT as patient was adamant to have it removed  plan  - [facility] managing post operatively  - defer medication management to their team  - defer ENT consult to primary team, I dont think he needs it given his hoarseness is improving    Ventilator associated pneumonia causing acute hypoxemic respiratory failure  Increased sputum production on 9/17 started on cefepime and vancomycin and then cefepime daily  Improving WBC and not hypoxemic  Cultures negative  PCT of 0.43 of low utility in surgical patient  Plan  - continue on cefepime currently, defer to ordering providers     Atrial Flutter s/p cardioversion  2:1 A flutter 9/12/21. Trialed on beta blocker and amiodarone without conversion  DCCV x2 on 9.13. First shock transitioned to atrial fib, then second shock converted to sinus rhythm.  plan   - amiodarone 200mg twice daily for 14 days, then 200mg daily for 28 days  - Lopressor increased from 25mg BID to 50 mg po BID  - continue on xarelto  - management as per [facility]/EP; EP is OK with this rhythm as long as his rate is controlled  - follow up EP DONTA in 6 weeks      Delirium (resolved)  Agitation  Acute toxic encephalopathy- improved  As per prior notes- he was agitated with lightening sedation. CT  Head on POD#2 with no acute changes. Treated with seroquel and valproate per intensivist team. Neurology consulted-- EEG and MRI negative.   started on Seroquel 50mg BID-->changed to Seroquel 25 mg po BID prn  plan  - resolved       Uncontrolled DM2  Hyperglycemia  A1c of 9.6% from 8/2021.  Rechecked A1c 9/2 and at 10.2%.  [ PTA on Lantus 30 untts daily, metformin 1000 mg BID and Glipizide 10 mg/d]  had been on Insulin drip after surgery, then tried to switched to long acting insulin 4 days ago; despite getting lantus 45 units 9/16 am- BS were in high 300's but I think uncontrolled BS were related to TF formula also   plan  - increase Lantus to 25 units bid  - add Novolog 5 units TID with meals  - follow-up with PCP  -  ordered glucometer and supplies for the patient  - nursing education prior to discharge requested    Hypokalemia  - improved     Gout  PTA allopurinol 100 mg/d.       History of latent TB  Patient has completed 9 month treatment in 2015.        Diet: Snacks/Supplements Adult: Glucerna; With Meals  Combination Diet Consistent Carb 75 Grams CHO per Meal Diet; Soft and Bite Sized Diet (level 6); Mildly Thick (level 2) (By spoon, or small sips by cup); No Straws  Snacks/Supplements Adult: Magic Cup; With Meals    DVT Prophylaxis: Heparin infusion.  Christina Catheter: Not present  Central Lines: None  Code Status: Full Code      Disposition Plan   Expected discharge: today recommended to prior living arrangement as per [facility].     The patient's care was discussed with the Bedside Nurse, Patient and [facility] Consultant     Time Spent on this Encounter   I spent 25 minutes on this patient care, mainly in managing diabetes    Jaquan Power, DO  Hospitalist Service  [facility]  Securely message with the Vocera Web Console (learn more here)  Text page via [employer] Paging/Directory      Clinically Significant Risk Factors Present on Admission              ______________________________________________________________________    Interval History    No acute concerns, continues to improve. Needs glucometer for home    Data reviewed today: I reviewed all medications, new labs and imaging results over the last 24 hours. I personally reviewed the Tele image(s) showing Aflutter with rate in 80's.    Physical Exam   Vital Signs: Temp: 98  F (36.7  C) Temp src: Oral BP: (!) 141/80 Pulse: 70   Resp: 18 SpO2: 96 % O2 Device: None (Room air)    Weight: 186 lbs 0 oz     General: - awake, alert, up in the chair  HEENT- normocephalic, atraumatic, PERRL, oral mucosa moist;   RESP: bilateral air entry without adventitious sounds  CV- S1S2, RRR, no murmurs, no rubs  GI- abd- soft, nonT, nonD, BS present  EXTREMITIES- no leg  swelling, no calf tenderness  NEURO:- alert and oriented in all spheres. Muscle strength intact  PSYCH- calm, cooperative    Data   Recent Labs   Lab 09/22/21  0753 09/22/21  0653 09/22/21  0129 09/21/21  1139 09/21/21  0508 09/20/21  0739 09/20/21  0507   WBC  --  13.0*  --   --  12.1*  --  13.9*   HGB  --  9.1*  --   --  7.8*  --  8.1*   MCV  --  84  --   --  84  --  84   PLT  --  437  --   --  406  --  444   NA  --  138  --   --  138  --  139   POTASSIUM  --  4.0  --   --  4.1  --  3.8   CHLORIDE  --  107  --   --  107  --  106   CO2  --  26  --   --  27  --  30   BUN  --  23  --   --  26  --  30   CR  --  0.91  --   --  0.97  --  1.01   ANIONGAP  --  5  --   --  4  --  3   TORRES  --  8.7  --   --  8.6  --  8.6   * 151* 148*   < > 198*   < > 192*    < > = values in this interval not displayed.     No results found for this or any previous visit (from the past 24 hour(s)).  Medications     BETA BLOCKER NOT PRESCRIBED       sodium chloride Stopped (09/15/21 1400)       allopurinol  100 mg Oral Daily     amiodarone  200 mg Oral or FT or NG tube BID    Followed by     [START ON 9/28/2021] amiodarone  200 mg Oral or FT or NG tube Daily     amLODIPine  5 mg Oral Daily     aspirin  81 mg Oral or NG Tube Daily     ceFEPIme (MAXIPIME) IV  2 g Intravenous Q8H     furosemide  40 mg Oral Daily     insulin aspart  7 Units Subcutaneous TID w/meals     insulin aspart  1-7 Units Subcutaneous TID AC     insulin aspart  1-5 Units Subcutaneous At Bedtime     insulin glargine  25 Units Subcutaneous BID     metoprolol tartrate  50 mg Oral BID     pantoprazole  40 mg Oral QAM AC     polyethylene glycol  17 g Oral Daily     rivaroxaban ANTICOAGULANT  20 mg Oral Daily with supper     rosuvastatin  10 mg Oral Daily     senna-docusate  1 tablet Oral BID     sodium chloride (PF)  3 mL Intracatheter Q8H     sodium chloride (PF)  3 mL Intracatheter Q8H     thiamine  100 mg Oral Daily

## 2021-09-22 NOTE — PLAN OF CARE
A+Ox4 VSS on room air. LS w/ crackles, frequent productive cough robitussin given with good affect. Tele NSR. Incisions bruised. Bowels active, flatus+. Voiding adequately. Denies pain. Up independently. Tolerating diet.

## 2021-09-22 NOTE — PLAN OF CARE
Physical Therapy Discharge Summary    Reason for therapy discharge:    Discharged to home with outpatient therapy.    Progress towards therapy goal(s). See goals on Care Plan in [facility] electronic health record for goal details.  Goals partially met.  Barriers to achieving goals:   discharge from facility.    Therapy recommendation(s):    Continued therapy is recommended.  Rationale/Recommendations:  To further increase independence with mobility.

## 2021-09-22 NOTE — PLAN OF CARE
Occupational Therapy Discharge Summary    Reason for therapy discharge:    All goals and outcomes met, no further needs identified.    Progress towards therapy goal(s). See goals on Care Plan in [facility] electronic health record for goal details.  Goals met    Therapy recommendation(s):    Continued therapy is recommended.  Rationale/Recommendations:  OP OT for more thorough cognitive assessment/tx.

## 2021-09-22 NOTE — PROGRESS NOTES
Discharge criteria met. Discharge order received. All discharge instructions reviewed with pt. Pt verbalized understanding. All questions answered. PIV removed. Rx filled and given to pt. All personal belongs returned to pt. Pt left the unit via W/C in stable condition accompanied with staff and family.

## 2021-09-24 ENCOUNTER — TELEPHONE (OUTPATIENT)
Dept: OTHER | Facility: CLINIC | Age: 51
End: 2021-09-24

## 2021-09-24 ENCOUNTER — TELEPHONE (OUTPATIENT)
Dept: CARDIOLOGY | Facility: CLINIC | Age: 51
End: 2021-09-24

## 2021-09-24 LAB
ATRIAL RATE - MUSE: 110 BPM
DIASTOLIC BLOOD PRESSURE - MUSE: NORMAL MMHG
INTERPRETATION ECG - MUSE: NORMAL
P AXIS - MUSE: NORMAL DEGREES
PR INTERVAL - MUSE: NORMAL MS
QRS DURATION - MUSE: 80 MS
QT - MUSE: 350 MS
QTC - MUSE: 460 MS
R AXIS - MUSE: -34 DEGREES
SYSTOLIC BLOOD PRESSURE - MUSE: NORMAL MMHG
T AXIS - MUSE: 97 DEGREES
VENTRICULAR RATE- MUSE: 104 BPM

## 2021-09-24 NOTE — TELEPHONE ENCOUNTER
48 hour post op call    ACTIVITY  How are you doing with activity? I am doing well.  Are you continuing to use your IS 3-4 times a day and do you have any shortness of breath. I am not short of breath     Are you weighing yourself daily and has it been stable?. I am stable     PAIN  How is your pain, what are you doing for your pain? I am only taking tylenol. My pain is fine.     Are you still doing sternal precautions? Yes    Do you hear any clicking when you are moving or taking a deep breath? no     INCISION:   It looks good.     ASSISTANCE  Do you have someone at home to help you with your daily activities and transportation needs? His daughter       MEDICATIONS  I would like to review your discharge medications and answer any questions you may have.   reviewed     Are you on a blood thinner/Coumadin  No    Who is managing your Coumadin dosing/INR? No       FOLLOW UP       Scheduled for cardiac rehab? 9/30   Scheduled to see our PA or Surgeon? 9/27  Scheduled to see your cardiologist ? Dr Castro   Scheduled to see DONTA/Core 11/4 Janet   Scheduled to see your primary care physician? Not yet   Do you have our contact information? Yes

## 2021-09-24 NOTE — TELEPHONE ENCOUNTER
CV Surgery    Left VM for patient to call if he had any questions while at home.   He has our contact information.     Gracie Miller PA-C

## 2021-09-27 ENCOUNTER — OFFICE VISIT (OUTPATIENT)
Dept: CARDIOLOGY | Facility: CLINIC | Age: 51
End: 2021-09-27
Payer: COMMERCIAL

## 2021-09-27 VITALS
WEIGHT: 181.6 LBS | BODY MASS INDEX: 25.42 KG/M2 | DIASTOLIC BLOOD PRESSURE: 60 MMHG | SYSTOLIC BLOOD PRESSURE: 110 MMHG | OXYGEN SATURATION: 94 % | HEIGHT: 71 IN | HEART RATE: 80 BPM

## 2021-09-27 DIAGNOSIS — Z95.1 S/P CORONARY ARTERY BYPASS GRAFT X 3: ICD-10-CM

## 2021-09-27 PROCEDURE — 99024 POSTOP FOLLOW-UP VISIT: CPT | Performed by: PHYSICIAN ASSISTANT

## 2021-09-27 RX ORDER — OXYCODONE HYDROCHLORIDE 5 MG/1
5 TABLET ORAL EVERY 4 HOURS PRN
Qty: 10 TABLET | Refills: 0 | Status: SHIPPED | OUTPATIENT
Start: 2021-09-27 | End: 2021-10-21

## 2021-09-27 ASSESSMENT — MIFFLIN-ST. JEOR: SCORE: 1690.86

## 2021-09-27 NOTE — PATIENT INSTRUCTIONS
No lifting over 10 lbs x 8 weeks after surgery. After the 8 weeks gradually increase what you are lifting.  No driving for 4 weeks or until off of all narcotics (stronger pain medications - oxycodone).  Participate in cardiac rehab phase II.  No further surgical restrictions 12 weeks after surgery.  If you have any questions/concerns for your surgeon please call [phone].  Will refill oxycodone with #20 tablets.  You need to take the xarelto 20 mg to prevent blood clots in your heart.   Ok to stop furosemide.   Work with your primary care provider on controlling your blood sugars.  At some time when able you need to be back on losartan (helps protect your kidneys with having diabetes).

## 2021-09-27 NOTE — PROGRESS NOTES
"Pt comes to the clinic for routine f/u of s/p CABG x 4 with radial artery on 9/8/21 by Dr Mccullough. Pt was initially admitted to the hospital on 9/2/21 with a NSTEMI. He was discharged to home on 9/22/21. His hospital course was complicated by VAP which was treated prior to discharge, cardioverted on 9/13 and had prolonged intubation (extubated POD #6) due to encephalopathy. At the time of discharged pt was started on xarelto and tapering dose of amiodarone due to aflutter/cardioversion. Pt was in SR at the time of discharge.     Pt states that his pain is being controlled. Most of his pain is from his R knee due to gout. He is taking his allopurinol. Breathing is good. Continues to work with his IS and flutter valve. Appetite has been good. Denies any nausea. +BMs, denies any popping/clicking in his breast bone, ambulating at home in his apartment, sleeping well and lying flat in bed. Denies any F/C/NS. Denies any lightheadedness or dizziness. Weight has been consistent. Feels that the lasix is drying him out. Will be starting cardiac rehab. Reviewed all of his meds due to confusion. Re enforced importance of his meds. He was not taking xarelto but will start. Has f/u with EP. No numbness or tingling of fingers on L hand.    Up in chair, comfortable, -O2  /60   Pulse 80   Ht 1.803 m (5' 11\")   Wt 82.4 kg (181 lb 9.6 oz)   SpO2 94%   BMI 25.33 kg/m    CV - reg rate, -edema LE  Pulm - CTA  Derm - sternal incision D/I   L LE incisions D/I   L UE incisions D/I    A/P:  Reviewed surgical restrictions. All questions/concerns were addressed. Will refill oxycodone qty #10 with no refills. Pt continues on his lopressor, aspirin and crestor. Reviewed all meds. Will work with his PCP on DM. No need for further f/u with surgery unless questions/concerns arise. Pt is to continue to work with PCP, cardiology and EP. Pt is agreeable to plan of care. Continue to monitor.     Cristo Larson PA-C  "

## 2021-09-28 ENCOUNTER — NURSE TRIAGE (OUTPATIENT)
Dept: NURSING | Facility: CLINIC | Age: 51
End: 2021-09-28

## 2021-09-28 ENCOUNTER — APPOINTMENT (OUTPATIENT)
Dept: RADIOLOGY | Facility: HOSPITAL | Age: 51
End: 2021-09-28
Payer: COMMERCIAL

## 2021-09-28 ENCOUNTER — HOSPITAL ENCOUNTER (EMERGENCY)
Facility: HOSPITAL | Age: 51
Discharge: HOME | End: 2021-09-28
Attending: EMERGENCY MEDICINE | Admitting: EMERGENCY MEDICINE
Payer: COMMERCIAL

## 2021-09-28 VITALS
TEMPERATURE: 97 F | RESPIRATION RATE: 28 BRPM | HEIGHT: 71 IN | SYSTOLIC BLOOD PRESSURE: 125 MMHG | HEART RATE: 79 BPM | DIASTOLIC BLOOD PRESSURE: 80 MMHG | WEIGHT: 180 LBS | BODY MASS INDEX: 25.2 KG/M2 | OXYGEN SATURATION: 97 %

## 2021-09-28 DIAGNOSIS — I48.92 ATRIAL FLUTTER (H): ICD-10-CM

## 2021-09-28 LAB
ANION GAP SERPL CALCULATED.3IONS-SCNC: 9 MMOL/L (ref 5–18)
ATRIAL RATE - MUSE: 288 BPM
ATRIAL RATE - MUSE: 74 BPM
BNP SERPL-MCNC: 296 PG/ML (ref 0–43)
BUN SERPL-MCNC: 17 MG/DL (ref 8–22)
CALCIUM SERPL-MCNC: 9.5 MG/DL (ref 8.5–10.5)
CHLORIDE BLD-SCNC: 104 MMOL/L (ref 98–107)
CO2 SERPL-SCNC: 25 MMOL/L (ref 22–31)
CREAT SERPL-MCNC: 0.85 MG/DL (ref 0.7–1.3)
DIASTOLIC BLOOD PRESSURE - MUSE: NORMAL MMHG
DIASTOLIC BLOOD PRESSURE - MUSE: NORMAL MMHG
ERYTHROCYTE [DISTWIDTH] IN BLOOD BY AUTOMATED COUNT: 17.1 % (ref 10–15)
GFR SERPL CREATININE-BSD FRML MDRD: >90 ML/MIN/1.73M2
GLUCOSE BLD-MCNC: 182 MG/DL (ref 70–125)
HCT VFR BLD AUTO: 31.9 % (ref 40–53)
HGB BLD-MCNC: 10 G/DL (ref 13.3–17.7)
HOLD SPECIMEN: NORMAL
HOLD SPECIMEN: NORMAL
INTERPRETATION ECG - MUSE: NORMAL
INTERPRETATION ECG - MUSE: NORMAL
MAGNESIUM SERPL-MCNC: 1.8 MG/DL (ref 1.8–2.6)
MCH RBC QN AUTO: 26.2 PG (ref 26.5–33)
MCHC RBC AUTO-ENTMCNC: 31.3 G/DL (ref 31.5–36.5)
MCV RBC AUTO: 84 FL (ref 78–100)
P AXIS - MUSE: -78 DEGREES
P AXIS - MUSE: 27 DEGREES
PLATELET # BLD AUTO: 422 10E3/UL (ref 150–450)
POTASSIUM BLD-SCNC: 4.2 MMOL/L (ref 3.5–5)
PR INTERVAL - MUSE: 162 MS
PR INTERVAL - MUSE: NORMAL MS
QRS DURATION - MUSE: 80 MS
QRS DURATION - MUSE: 82 MS
QT - MUSE: 254 MS
QT - MUSE: 364 MS
QTC - MUSE: 393 MS
QTC - MUSE: 404 MS
R AXIS - MUSE: -26 DEGREES
R AXIS - MUSE: -30 DEGREES
RBC # BLD AUTO: 3.82 10E6/UL (ref 4.4–5.9)
SODIUM SERPL-SCNC: 138 MMOL/L (ref 136–145)
SYSTOLIC BLOOD PRESSURE - MUSE: NORMAL MMHG
SYSTOLIC BLOOD PRESSURE - MUSE: NORMAL MMHG
T AXIS - MUSE: 111 DEGREES
T AXIS - MUSE: 121 DEGREES
TROPONIN I SERPL-MCNC: <0.01 NG/ML (ref 0–0.29)
VENTRICULAR RATE- MUSE: 144 BPM
VENTRICULAR RATE- MUSE: 74 BPM
WBC # BLD AUTO: 9.4 10E3/UL (ref 4–11)

## 2021-09-28 PROCEDURE — 83880 ASSAY OF NATRIURETIC PEPTIDE: CPT | Performed by: EMERGENCY MEDICINE

## 2021-09-28 PROCEDURE — 250N000009 HC RX 250: Performed by: EMERGENCY MEDICINE

## 2021-09-28 PROCEDURE — 36415 COLL VENOUS BLD VENIPUNCTURE: CPT | Performed by: EMERGENCY MEDICINE

## 2021-09-28 PROCEDURE — 84484 ASSAY OF TROPONIN QUANT: CPT | Performed by: EMERGENCY MEDICINE

## 2021-09-28 PROCEDURE — 250N000013 HC RX MED GY IP 250 OP 250 PS 637: Performed by: EMERGENCY MEDICINE

## 2021-09-28 PROCEDURE — 93005 ELECTROCARDIOGRAM TRACING: CPT | Performed by: EMERGENCY MEDICINE

## 2021-09-28 PROCEDURE — 85027 COMPLETE CBC AUTOMATED: CPT | Performed by: EMERGENCY MEDICINE

## 2021-09-28 PROCEDURE — 80048 BASIC METABOLIC PNL TOTAL CA: CPT | Performed by: EMERGENCY MEDICINE

## 2021-09-28 PROCEDURE — 99285 EMERGENCY DEPT VISIT HI MDM: CPT | Mod: 25

## 2021-09-28 PROCEDURE — 96374 THER/PROPH/DIAG INJ IV PUSH: CPT

## 2021-09-28 PROCEDURE — 83735 ASSAY OF MAGNESIUM: CPT | Performed by: EMERGENCY MEDICINE

## 2021-09-28 PROCEDURE — 71045 X-RAY EXAM CHEST 1 VIEW: CPT

## 2021-09-28 PROCEDURE — 96376 TX/PRO/DX INJ SAME DRUG ADON: CPT

## 2021-09-28 RX ORDER — METOPROLOL TARTRATE 75 MG/1
75 TABLET, FILM COATED ORAL 2 TIMES DAILY
Qty: 60 TABLET | Refills: 0 | Status: SHIPPED | OUTPATIENT
Start: 2021-09-28 | End: 2021-11-04 | Stop reason: DRUGHIGH

## 2021-09-28 RX ORDER — AMIODARONE HYDROCHLORIDE 200 MG/1
200 TABLET ORAL DAILY
Status: DISCONTINUED | OUTPATIENT
Start: 2021-09-28 | End: 2021-09-28 | Stop reason: HOSPADM

## 2021-09-28 RX ORDER — METOPROLOL TARTRATE 1 MG/ML
5 INJECTION, SOLUTION INTRAVENOUS EVERY 5 MIN PRN
Status: DISCONTINUED | OUTPATIENT
Start: 2021-09-28 | End: 2021-09-28 | Stop reason: HOSPADM

## 2021-09-28 RX ADMIN — METOPROLOL TARTRATE 5 MG: 5 INJECTION INTRAVENOUS at 06:33

## 2021-09-28 RX ADMIN — METOPROLOL TARTRATE 5 MG: 5 INJECTION INTRAVENOUS at 07:11

## 2021-09-28 RX ADMIN — AMIODARONE HYDROCHLORIDE 200 MG: 200 TABLET ORAL at 08:03

## 2021-09-28 ASSESSMENT — MIFFLIN-ST. JEOR: SCORE: 1683.6

## 2021-09-28 NOTE — ED TRIAGE NOTES
Pt c/o palpitations and tachycardia since 8 pm last night, states drank 1 small latte, denies cp or sob, had cabg 2weeks ago

## 2021-09-28 NOTE — ED NOTES
Nursing Assessment: Cardiovascular: Upon initial assessment of patient at 07:30am, his heart rate was in the 130's, Atrial flutter. He had received two doses of metoprolol from off going nurse. He was asymptomatic with the rhythm, including shortness of breath or chest pain. Lung sounds clear. At this time, he converted to normal sinus rhythm with rate in the 70's. Repeat EKG done and given to provider. Heart tones are normal with easily palpated radial pulses.

## 2021-09-28 NOTE — ED PROVIDER NOTES
"I am seeing this patient along with Liz Brennan PA-C.  I, Paul Paez MD have reviewed the documentation, personally taken the patient's history, performed an exam and agree with the physical findings, diagnosis and management plan. I have taken a history, review of systems, physical exam, and I concur with his documentation of Jerry Bustamante.    HPI:    Per patient's son, the patient's heart rate has been \"too high\" in the 140s today. Patient does not have any increased chest pain and his breathing is normal. Patient endorses palpitations. The son notes that the patient had a small latte yesterday afternoon (09/28) after a post-op checkup. Patient has been taking his medications as prescribed, though he has not yet taken his medications today. Denies nausea, dizziness, or any other complaints or concerns at this time.     ROS: A ten-point ROS was performed. All pertinent positives noted in the HPI.  All other systems reviewed and are negative except as noted.    PEx:   Vitals: /80   Pulse 79   Temp 97  F (36.1  C)   Resp 28   Ht 1.803 m (5' 11\")   Wt 81.6 kg (180 lb)   SpO2 97%   BMI 25.10 kg/m    General: Appears in no acute distress, awake, alert, interactive.  Eyes: Conjunctivae non-injected. Sclera anicteric.  HENT: Atraumatic.  Neck: Supple.  Respiratory/Chest: Respiration unlabored.  Abdomen: non distended  Musculoskeletal: Normal extremities. No edema or erythema.  Skin: Normal color. No rash or diaphoresis.  Neurologic: Face symmetric, moves all extremities spontaneously. Speech clear.  Psychiatric: Oriented to person, place, and time. Affect appropriate.      ECG #1:  Performed at: 5:54     I have independently reviewed and interpreted the EKG, along with the final read. EKG also reviewed by Dr. Edwards.     Ventricular rate 144 bpm  RI interval * ms  QRS duration 80 ms  QT/QTc 254/393  P-R-T axes -78  -30  121     Impression: Atrial flutter with 2:1 AV conduction. Left axis deviation. " Inferior infarct, age undetermined. ST & T wave abnormality, consider lateral ischemia. Abnormal EKG.     ECG #2:  Performed at: 7:43     I have independently reviewed and interpreted the EKG, along with the final read. EKG also reviewed by Dr. Paez.     Ventricular rate 74 bpm  TN interval 162 ms  QRS duration 82 ms  QT/QTc 364/404  P-R-T axes 27  -26  111     Impression: Normal sinus rhythm. Inferior infarct, age undetermined. T wave abnormality, consider lateral ischemia. Abnormal EKG.     MDM:    A flutter with RVR.   Plan for Metoprolol, give home meds.  Question of compliance therefore not a ideal candidate for nonemergent cardioversion.  Labs reviewed.  Spontaneously converted after treatment.  Currently symptom-free.  Plan for discharge home      Diagnoses       Codes Comments    Atrial flutter (H)     I48.92                         Paul Paez MD  09/28/21 1110

## 2021-09-28 NOTE — TELEPHONE ENCOUNTER
Triage note:     Patient states he had latte at 8pm and started having heart palpitations as high as 142 BPM.    Per protocol patient to go to ED now. Given home care advice per protocol and when to call back.Patient verbalized understanding and agrees to plan of care.    Halima Hull RN   09/28/21 5:20 AM  [facility] Nurse Advisor    COVID 19 Nurse Triage Plan/Patient Instructions    Please be aware that novel coronavirus (COVID-19) may be circulating in the community. If you develop symptoms such as fever, cough, or SOB or if you have concerns about the presence of another infection including coronavirus (COVID-19), please contact your health care provider or visit [email or web address].     Disposition/Instructions    Home care recommended. Follow home care protocol based instructions.  ED Visit recommended. Follow protocol based instructions.     Bring Your Own Device:  Please also bring your smart device(s) (smart phones, tablets, laptops) and their charging cables for your personal use and to communicate with your care team during your visit.    Thank you for taking steps to prevent the spread of this virus.  o Limit your contact with others.  o Wear a simple mask to cover your cough.  o Wash your hands well and often.    Resources    [facility]: About COVID-19: [email or web address]    CDC: What to Do If You're Sick: www.cdc.gov/coronavirus/2019-ncov/about/steps-when-sick.html    CDC: Ending Home Isolation: www.cdc.gov/coronavirus/2019-ncov/hcp/disposition-in-home-patients.html     CDC: Caring for Someone: www.cdc.gov/coronavirus/2019-ncov/if-you-are-sick/care-for-someone.html     [facility]: Interim Guidance for Hospital Discharge to Home: www.health.[facility].mn.us/diseases/coronavirus/hcp/hospdischarge.pdf    [facility] clinical trials (COVID-19 research studies): [email or web address]     Below are the COVID-19 hotlines at the [facility]  [facility] ([facility]). Interpreters are available.   o For health questions: Call [phone] or 1-687.988.5278 (7 a.m. to 7 p.m.)  o For questions about schools and childcare: Call 443-677-0433 or 1-330.973.6588 (7 a.m. to 7 p.m.)                     Reason for Disposition    [1] Heart beating very rapidly (e.g., > 140 / minute) AND [2] present now  (Exception: during exercise)    Additional Information    Negative: Passed out (i.e., lost consciousness, collapsed and was not responding)    Negative: Shock suspected (e.g., cold/pale/clammy skin, too weak to stand, low BP, rapid pulse)    Negative: Difficult to awaken or acting confused (e.g., disoriented, slurred speech)    Negative: Visible sweat on face or sweat dripping down face    Negative: Unable to walk, or can only walk with assistance (e.g., requires support)    Negative: [1] Received SHOCK from implantable cardiac defibrillator AND [2] persisting symptoms (i.e., palpitations, lightheadedness)    Negative: [1] Dizziness, lightheadedness, or weakness AND [2] heart beating very rapidly (e.g., > 140 / minute)    Negative: [1] Dizziness, lightheadedness, or weakness AND [2] heart beating very slowly  (e.g., < 50 / minute)    Negative: Sounds like a life-threatening emergency to the triager    Negative: Chest pain    Negative: Difficulty breathing    Negative: Dizziness, lightheadedness, or weakness    Protocols used: HEART RATE AND HEARTBEAT [id number]

## 2021-09-28 NOTE — DISCHARGE INSTRUCTIONS
You were seen in the emergency department today for evaluation of rapid heart rate.  You were in a rhythm called a flutter, this was improved with IV metoprolol.    Please discontinue your metoprolol 50 mg twice daily dose.  Start taking metoprolol 75 mg twice daily.    Follow-up with your cardiologist for recheck as soon as possible.  Return to the ER if you develop any new or worsening symptoms like severe pain, dizziness or lightheadedness, passing out, chest pain or shortness of breath, return of rapid heart rate, or any other symptoms that concern you.

## 2021-09-28 NOTE — ED PROVIDER NOTES
EMERGENCY DEPARTMENT ENCOUNTER      NAME: Jerry Bustamante  AGE: 51 year old male  YOB: 1970  MRN: [id number]  EVALUATION DATE & TIME: 9/28/2021  6:01 AM    PCP: [facility]    ED PROVIDER: Liz Brennan PA-C      Chief Complaint   Patient presents with     Palpitations         FINAL IMPRESSION:  1. Atrial flutter (H)          ED COURSE & MEDICAL DECISION MAKING:    Pertinent Labs & Imaging studies reviewed. (See chart for details)    51 year old male presents to the Emergency Department for evaluation of atrial flutter.     Physical exam is remarkable for a generally well-appearing male who is in no acute distress.  He has a healing midline surgical scar consistent with recent sternotomy.  Heart and lung sounds clear diffusely throughout, tachycardic.  Abdomen soft and nontender.  Vital signs are stable and he is afebrile.    CBC is remarkable for stable anemia with hemoglobin of 10.0, no leukocytosis.  BMP unremarkable with no significant electrolyte derangements.  Troponin is negative.  Mild elevation of BNP at 296.  EKG initially showing a flutter, improved to normal sinus rhythm after medications.    The patient was given 2 doses of IV metoprolol and his oral home dose of amiodarone with conversion to normal sinus rhythm.  I discussed his presentation with cardiology who recommended increasing his dose of metoprolol to 75 mg twice daily and plan for outpatient follow-up.  I do think this is reasonable as the patient reports no symptoms associated with his a flutter and he remained in normal sinus rhythm after conversion.  Discussed follow-up with cardiology as soon as possible, recommend return to the ER if he develops any new or worsening symptoms like severe pain, fever, persistent vomiting, dizziness or syncope, chest pain, shortness of breath, or any other concerning symptoms.  The patient is amenable with this treatment plan and verbalized his understanding.  Patient's care  was discussed with Dr. Paez who is in agreement with the treatment plan.    ED Course   6:04 AM Performed my initial history and physical exam. Discussed workup in the emergency department, management of symptoms, and likely disposition. PPE: Provider wore surgical mask and gloves.   7:07 AM I staffed the patient with Dr. Paez, who will evaluate the patient. Dr. Paez was updated on the patient throughout ED course.  9:26 AM I discussed the case with Dr. Kyle, cardiology.   09:40 AM I discussed the plan for discharge with the patient, and patient is agreeable. We discussed supportive cares at home and reasons for return to the ER including new or worsening symptoms - all questions and concerns addressed. Patient to be discharged by RN.    At the conclusion of the encounter I discussed the results of all of the tests and the disposition. The questions were answered. The patient or family acknowledged understanding and was agreeable with the care plan.     Voice recognition software was used in the creation of this note. Any grammatical or nonsensical errors are due to inherent errors with the software and are not the intention of the writer.     MEDICATIONS GIVEN IN THE EMERGENCY:  Medications   metoprolol (LOPRESSOR) injection 5 mg (5 mg Intravenous Given 9/28/21 0711)   amiodarone (PACERONE) tablet 200 mg (200 mg Oral Given 9/28/21 0803)       NEW PRESCRIPTIONS STARTED AT TODAY'S ER VISIT  New Prescriptions    METOPROLOL TARTRATE 75 MG TABS    Take 75 mg by mouth 2 times daily            =================================================================    HPI    Patient information was obtained from: Patient and patient's son    Use of Intrepreter: N/A        Jerry Bustamante is a 51 year old male with a pertinent medical history of hypertension, hyperlipidemia, CAD s/p CABG (9/8/21), and DM II who presents to this ED by walk in accompanied by his son for evaluation of palpitations.    Per chart review, patient  "was seen in this ED on 9/2/2021 with burning chest discomfort. EKG on arrival unchanged and reassuring. Troponin elevated at 2.15, concern for an acute NSTEMI. Heparin was started. Patient was admitted to [facility]. Taken to the OR on 9/8 by Dr. Mccullough for CABG x 4. The findings of surgery included LVEF around 40% to 45%. While in the ICU patient's BP was maintained using inotropes and pressors. Patient remained intubated due to encephalopathy and acute resp failure. Due to the encephalopathy neurology was consulted and an EEG was done. Their recommendations were followed with seroquel being started. Patient was also noted to be in atrial flutter; patient was cardioverted on 9/13. Patient was also placed on a tapering dose of amiodarone and started on Xarelto. Patient was eventually extubated on POD #6 and tansferred out of the ICU on POD #7. Patient was noted to have an increased WBC and increased productive cough and was started on antibiotics for suspected ventilator assisted pneumonia. Discharged home on 9/22/2021 continued on his lasix 20 mg PO daily.     Per son, the patient's heart rate has been \"too high\" in the 140s today. Patient does not have any increased chest pain and his breathing is normal. Patient endorses palpitations. Son notes that the patient had a small caffeinated latte yesterday afternoon after a post-op checkup but symptoms didn't start until early this morning. He denies nausea, dizziness, or additional symptoms at this time. He states that he has been taking his medications as prescribed, though he has not yet taken his medications today.         REVIEW OF SYSTEMS   Respiratory: No reported SOB  Cardiovascular:  Reports palpitations. No reported CP  GI:  No reported nausea   Neurologic:  No reported vertigo or syncope    All other systems reviewed and are negative unless noted in HPI.      PAST MEDICAL HISTORY:  Past Medical History:   Diagnosis Date     Coronary artery disease  "     Diabetes mellitus, type II (H)      Gout      High cholesterol      HLD (hyperlipidemia)      Hypertension        PAST SURGICAL HISTORY:  Past Surgical History:   Procedure Laterality Date     ANGIOPLASTY       BYPASS GRAFT ARTERY CORONARY N/A 9/8/2021    Procedure: CORONARY ARTERY BYPASS GRAFTING X 4 WITH ENDOSCOPIC VEIN HARVESTING [place] PREET-DISTAL RCA LEFT RADIAL-OM LEFT SV-ANOMOLOUS RV BRANCH ON PUMP/HILLARY;  Surgeon: Antelmo Mccullough MD;  Location:  OR     CV HEART CATHETERIZATION WITH POSSIBLE INTERVENTION N/A 9/2/2021    Procedure: Heart Catheterization with Possible Intervention;  Surgeon: Paul Warner MD;  Location:  HEART CARDIAC CATH LAB     CV LEFT VENTRICULOGRAM N/A 9/2/2021    Procedure: Left Ventriculogram;  Surgeon: Paul Warner MD;  Location:  HEART CARDIAC CATH LAB       CURRENT MEDICATIONS:    metoprolol tartrate 75 MG TABS  acetaminophen (TYLENOL) 325 MG tablet  alcohol swab prep pads  allopurinol (ZYLOPRIM) 100 MG tablet  amiodarone (PACERONE) 200 MG tablet  amLODIPine (NORVASC) 5 MG tablet  aspirin (ASA) 81 MG chewable tablet  blood glucose (NO BRAND SPECIFIED) test strip  blood glucose calibration (NO BRAND SPECIFIED) solution  blood glucose monitoring (NO BRAND SPECIFIED) meter device kit  guaiFENesin (ROBITUSSIN) 20 mg/mL SOLN solution  insulin aspart (NOVOLOG PEN) 100 UNIT/ML pen  insulin glargine (LANTUS PEN) 100 UNIT/ML pen  nitroGLYcerin (NITROSTAT) 0.4 MG sublingual tablet  oxyCODONE (ROXICODONE) 5 MG tablet  rivaroxaban ANTICOAGULANT (XARELTO) 20 MG TABS tablet  rosuvastatin (CRESTOR) 10 MG tablet  thin (NO BRAND SPECIFIED) lancets        ALLERGIES:  No Known Allergies    FAMILY HISTORY:  Family History   Problem Relation Age of Onset     Hypertension Mother      Diabetes Maternal Aunt      Diabetes Maternal Uncle      Cancer No family hx of      Cerebrovascular Disease No family hx of        SOCIAL HISTORY:   Social History     Socioeconomic History      "Marital status:      Spouse name: Not on file     Number of children: Not on file     Years of education: Not on file     Highest education level: Not on file   Occupational History     Not on file   Tobacco Use     Smoking status: Former Smoker     Packs/day: 1.50     Years: 13.00     Pack years: 19.50     Types: Cigarettes     Quit date: 2003     Years since quittin.8     Smokeless tobacco: Never Used   Substance and Sexual Activity     Alcohol use: No     Drug use: No     Sexual activity: Yes     Partners: Female   Other Topics Concern     Not on file   Social History Narrative     Not on file     Social Determinants of Health     Financial Resource Strain:      Difficulty of Paying Living Expenses:    Food Insecurity:      Worried About Running Out of Food in the Last Year:      Ran Out of Food in the Last Year:    Transportation Needs:      Lack of Transportation (Medical):      Lack of Transportation (Non-Medical):    Physical Activity:      Days of Exercise per Week:      Minutes of Exercise per Session:    Stress:      Feeling of Stress :    Social Connections:      Frequency of Communication with Friends and Family:      Frequency of Social Gatherings with Friends and Family:      Attends [employer] Services:      Active Member of Clubs or Organizations:      Attends Club or Organization Meetings:      Marital Status:    Intimate Partner Violence:      Fear of Current or Ex-Partner:      Emotionally Abused:      Physically Abused:      Sexually Abused:        VITALS:  Patient Vitals for the past 24 hrs:   BP Temp Pulse Resp SpO2 Height Weight   21 0930 125/80 -- 79 28 97 % -- --   21 0745 122/72 -- 74 26 95 % -- --   21 0730 103/82 -- (!) 133 23 94 % -- --   21 0715 118/81 -- (!) 142 19 95 % -- --   21 0547 123/69 97  F (36.1  C) (!) 148 18 99 % 1.803 m (5' 11\") 81.6 kg (180 lb)       PHYSICAL EXAM    VITAL SIGNS: /80   Pulse 79   Temp 97  F (36.1  C)  " " Resp 28   Ht 1.803 m (5' 11\")   Wt 81.6 kg (180 lb)   SpO2 97%   BMI 25.10 kg/m    General Appearance: Alert, cooperative, normal speech and facial symmetry, appears stated age, the patient does not appear in distress  Head:  Normocephalic, without obvious abnormality, atraumatic  Eyes: Conjunctiva/corneas clear, EOM's intact, no nystagmus, PERRL  ENT:  Lips, mucosa, and tongue normal; teeth and gums normal, no pharyngeal inflammation, no dysphonia or difficulty swallowing, membranes are moist without pallor  Chest:  Healing midline surgical scar consistent with recent sternotomy  Cardio: Tachycardia; Regular rhythm, S1 and S2 normal, no murmur, rub    or gallop, 2+ pulses symmetric in all extremities  Pulm:  Clear to auscultation bilaterally, respirations unlabored with no accessory muscle use  Abdomen:  Abdomen is soft, non-distended with no tenderness to palpation, rebound tenderness, or guarding.   Extremities:  Extremities normal, there is no tenderness to palpation , atraumatic, no cyanosis or edema, full function and range of motion, pulses equal in all extremities, normal cap refill, no joint swelling  Neuro: Patient is awake, alert, and responsive to voice. No gross motor weaknesses or sensory loss; moves all extremities.     LAB:  All pertinent labs reviewed and interpreted.  Labs Ordered and Resulted from Time of ED Arrival Up to the Time of Departure from the ED   CBC WITH PLATELETS - Abnormal; Notable for the following components:       Result Value    RBC Count 3.82 (*)     Hemoglobin 10.0 (*)     Hematocrit 31.9 (*)     MCH 26.2 (*)     MCHC 31.3 (*)     RDW 17.1 (*)     All other components within normal limits   BASIC METABOLIC PANEL - Abnormal; Notable for the following components:    Glucose 182 (*)     All other components within normal limits   B-TYPE NATRIURETIC PEPTIDE ( EAST ONLY) - Abnormal; Notable for the following components:     (*)     All other components within normal " limits   MAGNESIUM - Normal   TROPONIN I - Normal   EXTRA BLUE TOP TUBE   EXTRA RED TOP TUBE   PERIPHERAL IV CATHETER   CALL   EXTRA TUBE    Narrative:     The following orders were created for panel order [place] Draw.  Procedure                               Abnormality         Status                     ---------                               -----------         ------                     Extra Blue Top Tube[[id number]]                              Final result               Extra Red Top Tube[[id number]]                               Final result                 Please view results for these tests on the individual orders.       RADIOLOGY:  Reviewed all pertinent imaging. Please see official radiology report.  XR Chest Port 1 View   Final Result   IMPRESSION: Status post median sternotomy and CABG with stable heart size. No overt failure, lobar consolidation, or large effusions. Mild left basilar atelectasis.          EKG:    Performed at: 5:54    I have independently reviewed and interpreted the EKG, along with the final read. EKG also reviewed by Dr. Edwards.    Ventricular rate 144 bpm  MS interval * ms  QRS duration 80 ms  QT/QTc 254/393  P-R-T axes -78  -30  121    Impression: Atrial flutter with 2:1 AV conduction. Left axis deviation. Inferior infarct, age undetermined. ST & T wave abnormality, consider lateral ischemia. Abnormal EKG.     EKG #2:    Performed at: 7:43    I have independently reviewed and interpreted the EKG, along with the final read. EKG also reviewed by Dr. Paez.    Ventricular rate 74 bpm  MS interval 162 ms  QRS duration 82 ms  QT/QTc 364/404  P-R-T axes 27  -26  111    Impression: Normal sinus rhythm. Inferior infarct, age undetermined. T wave abnormality, consider lateral ischemia. Abnormal EKG.       IMarcelle, am serving as a scribe to document services personally performed by Liz Brennan PA-C based on my observation and the provider's statements to me. I, Liz Brennan,  SILKE attest that Marcelle Hackett is acting in a scribe capacity, has observed my performance of the services and has documented them in accordance with my direction.     Liz Brennan PA-C  Emergency Medicine  [facility] EMERGENCY DEPARTMENT  [address] [place] [id number] [phone]  Dept: [phone]     Liz Brennan PA-C  09/28/21 1011

## 2021-09-30 ENCOUNTER — HOSPITAL ENCOUNTER (OUTPATIENT)
Dept: CARDIAC REHAB | Facility: HOSPITAL | Age: 51
End: 2021-09-30
Attending: STUDENT IN AN ORGANIZED HEALTH CARE EDUCATION/TRAINING PROGRAM
Payer: COMMERCIAL

## 2021-09-30 DIAGNOSIS — I25.10 CORONARY ARTERY DISEASE INVOLVING NATIVE CORONARY ARTERY OF NATIVE HEART WITHOUT ANGINA PECTORIS: ICD-10-CM

## 2021-09-30 PROCEDURE — 93798 PHYS/QHP OP CAR RHAB W/ECG: CPT

## 2021-09-30 PROCEDURE — 93797 PHYS/QHP OP CAR RHAB WO ECG: CPT | Mod: XU

## 2021-10-04 ENCOUNTER — HOSPITAL ENCOUNTER (OUTPATIENT)
Dept: CARDIAC REHAB | Facility: HOSPITAL | Age: 51
End: 2021-10-04
Attending: INTERNAL MEDICINE
Payer: COMMERCIAL

## 2021-10-04 LAB
GLUCOSE BLDC GLUCOMTR-MCNC: 112 MG/DL (ref 70–99)
GLUCOSE BLDC GLUCOMTR-MCNC: 135 MG/DL (ref 70–99)

## 2021-10-04 PROCEDURE — 93798 PHYS/QHP OP CAR RHAB W/ECG: CPT

## 2021-10-06 ENCOUNTER — HOSPITAL ENCOUNTER (OUTPATIENT)
Dept: CARDIAC REHAB | Facility: HOSPITAL | Age: 51
End: 2021-10-06
Attending: INTERNAL MEDICINE
Payer: COMMERCIAL

## 2021-10-06 PROCEDURE — 93798 PHYS/QHP OP CAR RHAB W/ECG: CPT

## 2021-10-08 ENCOUNTER — HOSPITAL ENCOUNTER (OUTPATIENT)
Dept: CARDIAC REHAB | Facility: HOSPITAL | Age: 51
End: 2021-10-08
Attending: INTERNAL MEDICINE
Payer: COMMERCIAL

## 2021-10-08 PROCEDURE — 93798 PHYS/QHP OP CAR RHAB W/ECG: CPT

## 2021-10-09 ENCOUNTER — HEALTH MAINTENANCE LETTER (OUTPATIENT)
Age: 51
End: 2021-10-09

## 2021-10-11 ENCOUNTER — HOSPITAL ENCOUNTER (OUTPATIENT)
Dept: CARDIAC REHAB | Facility: HOSPITAL | Age: 51
End: 2021-10-11
Attending: INTERNAL MEDICINE
Payer: COMMERCIAL

## 2021-10-11 PROCEDURE — 93798 PHYS/QHP OP CAR RHAB W/ECG: CPT

## 2021-10-13 ENCOUNTER — HOSPITAL ENCOUNTER (OUTPATIENT)
Dept: CARDIAC REHAB | Facility: HOSPITAL | Age: 51
End: 2021-10-13
Attending: INTERNAL MEDICINE
Payer: COMMERCIAL

## 2021-10-13 LAB — GLUCOSE BLDC GLUCOMTR-MCNC: 177 MG/DL (ref 70–99)

## 2021-10-13 PROCEDURE — 93798 PHYS/QHP OP CAR RHAB W/ECG: CPT

## 2021-10-15 ENCOUNTER — HOSPITAL ENCOUNTER (OUTPATIENT)
Dept: CARDIAC REHAB | Facility: HOSPITAL | Age: 51
End: 2021-10-15
Attending: INTERNAL MEDICINE
Payer: COMMERCIAL

## 2021-10-15 PROCEDURE — 93798 PHYS/QHP OP CAR RHAB W/ECG: CPT

## 2021-10-18 ENCOUNTER — HOSPITAL ENCOUNTER (OUTPATIENT)
Dept: CARDIAC REHAB | Facility: HOSPITAL | Age: 51
End: 2021-10-18
Attending: INTERNAL MEDICINE
Payer: COMMERCIAL

## 2021-10-18 PROCEDURE — 93798 PHYS/QHP OP CAR RHAB W/ECG: CPT

## 2021-10-20 ENCOUNTER — HOSPITAL ENCOUNTER (OUTPATIENT)
Dept: CARDIAC REHAB | Facility: HOSPITAL | Age: 51
End: 2021-10-20
Attending: INTERNAL MEDICINE
Payer: COMMERCIAL

## 2021-10-20 PROCEDURE — 93798 PHYS/QHP OP CAR RHAB W/ECG: CPT

## 2021-10-21 ENCOUNTER — APPOINTMENT (OUTPATIENT)
Dept: RADIOLOGY | Facility: HOSPITAL | Age: 51
End: 2021-10-21
Attending: FAMILY MEDICINE
Payer: COMMERCIAL

## 2021-10-21 ENCOUNTER — HOSPITAL ENCOUNTER (EMERGENCY)
Facility: HOSPITAL | Age: 51
Discharge: HOME | End: 2021-10-21
Attending: FAMILY MEDICINE | Admitting: FAMILY MEDICINE
Payer: COMMERCIAL

## 2021-10-21 VITALS
WEIGHT: 180 LBS | HEART RATE: 79 BPM | HEIGHT: 71 IN | SYSTOLIC BLOOD PRESSURE: 171 MMHG | OXYGEN SATURATION: 96 % | DIASTOLIC BLOOD PRESSURE: 91 MMHG | TEMPERATURE: 98.2 F | RESPIRATION RATE: 21 BRPM | BODY MASS INDEX: 25.2 KG/M2

## 2021-10-21 DIAGNOSIS — R07.89 CHEST WALL PAIN: ICD-10-CM

## 2021-10-21 DIAGNOSIS — Z95.1 S/P CORONARY ARTERY BYPASS GRAFT X 3: ICD-10-CM

## 2021-10-21 DIAGNOSIS — M54.6 ACUTE RIGHT-SIDED THORACIC BACK PAIN: ICD-10-CM

## 2021-10-21 LAB
ANION GAP SERPL CALCULATED.3IONS-SCNC: 10 MMOL/L (ref 5–18)
ATRIAL RATE - MUSE: 78 BPM
BASOPHILS # BLD AUTO: 0 10E3/UL (ref 0–0.2)
BASOPHILS NFR BLD AUTO: 0 %
BUN SERPL-MCNC: 12 MG/DL (ref 8–22)
CALCIUM SERPL-MCNC: 10.1 MG/DL (ref 8.5–10.5)
CHLORIDE BLD-SCNC: 100 MMOL/L (ref 98–107)
CO2 SERPL-SCNC: 28 MMOL/L (ref 22–31)
CREAT SERPL-MCNC: 0.94 MG/DL (ref 0.7–1.3)
DIASTOLIC BLOOD PRESSURE - MUSE: 74 MMHG
EOSINOPHIL # BLD AUTO: 0.3 10E3/UL (ref 0–0.7)
EOSINOPHIL NFR BLD AUTO: 4 %
ERYTHROCYTE [DISTWIDTH] IN BLOOD BY AUTOMATED COUNT: 14.7 % (ref 10–15)
GFR SERPL CREATININE-BSD FRML MDRD: >90 ML/MIN/1.73M2
GLUCOSE BLD-MCNC: 226 MG/DL (ref 70–125)
GLUCOSE BLDC GLUCOMTR-MCNC: 220 MG/DL (ref 70–99)
HCT VFR BLD AUTO: 38.7 % (ref 40–53)
HGB BLD-MCNC: 12 G/DL (ref 13.3–17.7)
IMM GRANULOCYTES # BLD: 0 10E3/UL
IMM GRANULOCYTES NFR BLD: 0 %
INTERPRETATION ECG - MUSE: NORMAL
LYMPHOCYTES # BLD AUTO: 1.9 10E3/UL (ref 0.8–5.3)
LYMPHOCYTES NFR BLD AUTO: 20 %
MCH RBC QN AUTO: 24.5 PG (ref 26.5–33)
MCHC RBC AUTO-ENTMCNC: 31 G/DL (ref 31.5–36.5)
MCV RBC AUTO: 79 FL (ref 78–100)
MONOCYTES # BLD AUTO: 0.8 10E3/UL (ref 0–1.3)
MONOCYTES NFR BLD AUTO: 8 %
NEUTROPHILS # BLD AUTO: 6.2 10E3/UL (ref 1.6–8.3)
NEUTROPHILS NFR BLD AUTO: 68 %
NRBC # BLD AUTO: 0 10E3/UL
NRBC BLD AUTO-RTO: 0 /100
P AXIS - MUSE: 48 DEGREES
PLATELET # BLD AUTO: 381 10E3/UL (ref 150–450)
POTASSIUM BLD-SCNC: 3.7 MMOL/L (ref 3.5–5)
PR INTERVAL - MUSE: 162 MS
QRS DURATION - MUSE: 84 MS
QT - MUSE: 410 MS
QTC - MUSE: 467 MS
R AXIS - MUSE: 0 DEGREES
RBC # BLD AUTO: 4.9 10E6/UL (ref 4.4–5.9)
SODIUM SERPL-SCNC: 138 MMOL/L (ref 136–145)
SYSTOLIC BLOOD PRESSURE - MUSE: 144 MMHG
T AXIS - MUSE: 114 DEGREES
TROPONIN I SERPL-MCNC: <0.01 NG/ML (ref 0–0.29)
VENTRICULAR RATE- MUSE: 78 BPM
WBC # BLD AUTO: 9.1 10E3/UL (ref 4–11)

## 2021-10-21 PROCEDURE — 84484 ASSAY OF TROPONIN QUANT: CPT | Performed by: FAMILY MEDICINE

## 2021-10-21 PROCEDURE — 85025 COMPLETE CBC W/AUTO DIFF WBC: CPT | Performed by: FAMILY MEDICINE

## 2021-10-21 PROCEDURE — 80048 BASIC METABOLIC PNL TOTAL CA: CPT | Performed by: FAMILY MEDICINE

## 2021-10-21 PROCEDURE — 99285 EMERGENCY DEPT VISIT HI MDM: CPT | Mod: 25

## 2021-10-21 PROCEDURE — 71046 X-RAY EXAM CHEST 2 VIEWS: CPT

## 2021-10-21 PROCEDURE — 93005 ELECTROCARDIOGRAM TRACING: CPT | Performed by: FAMILY MEDICINE

## 2021-10-21 PROCEDURE — 36415 COLL VENOUS BLD VENIPUNCTURE: CPT | Performed by: FAMILY MEDICINE

## 2021-10-21 PROCEDURE — 250N000013 HC RX MED GY IP 250 OP 250 PS 637: Performed by: FAMILY MEDICINE

## 2021-10-21 RX ORDER — OXYCODONE HYDROCHLORIDE 5 MG/1
5 TABLET ORAL ONCE
Status: COMPLETED | OUTPATIENT
Start: 2021-10-21 | End: 2021-10-21

## 2021-10-21 RX ORDER — GABAPENTIN 300 MG/1
300 CAPSULE ORAL 3 TIMES DAILY
Qty: 30 CAPSULE | Refills: 0 | Status: ON HOLD | OUTPATIENT
Start: 2021-10-21 | End: 2021-11-14

## 2021-10-21 RX ORDER — OXYCODONE HYDROCHLORIDE 5 MG/1
5 TABLET ORAL EVERY 6 HOURS PRN
Qty: 6 TABLET | Refills: 0 | Status: SHIPPED | OUTPATIENT
Start: 2021-10-21 | End: 2021-11-17

## 2021-10-21 RX ADMIN — OXYCODONE HYDROCHLORIDE 5 MG: 5 TABLET ORAL at 05:20

## 2021-10-21 ASSESSMENT — MIFFLIN-ST. JEOR: SCORE: 1683.6

## 2021-10-21 ASSESSMENT — ENCOUNTER SYMPTOMS
ARTHRALGIAS: 1
MYALGIAS: 1
SHORTNESS OF BREATH: 0

## 2021-10-21 NOTE — ED TRIAGE NOTES
States developed pain to right shoulder radiating down right arm.In cardiac rehab post CABG 9/08/2021.Unable to sleep.

## 2021-10-21 NOTE — ED PROVIDER NOTES
EMERGENCY DEPARTMENT ENCOUNTER      NAME: Jerry Bustamante  AGE: 51 year old male  YOB: 1970  MRN: [id number]  EVALUATION DATE & TIME: 10/21/2021  5:02 AM    PCP: Medicine, [facility]    ED PROVIDER: Gerber Polk M.D.    Chief Complaint   Patient presents with     Shoulder Pain       FINAL IMPRESSION:  1. Acute right-sided thoracic back pain    2. Chest wall pain    3. S/P coronary artery bypass graft x 3        ED COURSE & MEDICAL DECISION MAKING:    Pertinent Labs & Imaging studies personally reviewed and interpreted by me. (See chart for details)     5:05 AM Patient seen and examined, prior records reviewed.  I met with the patient for the initial interview and physical examination. Discussed plan for treatment and workup in the ED. patient presents with right back shoulder and arm pain.  On exam, sensation is intact, no pain with movement at the wrist or elbow.  There is point tenderness of the right anterior chest wall which reproduces pain as well as some tenderness along the thoracic paraspinous musculature which reproduces symptoms.  Symptoms are most consistent with musculoskeletal pain, possible radiculopathy into the arm as well given radiation of pain.  EKG and troponin are ordered although coronary syndrome is unlikely.  6:31 AM troponin normal, remaining labs are reassuring.  Chest x-ray does not show any acute pathology.  Patient stable for discharge, he will be given gabapentin and follow-up with primary care.    At the conclusion of the encounter I discussed the results of all of the tests and the disposition. The questions were answered. The patient or family acknowledged understanding and was agreeable with the care plan.     PPE: Provider wore gloves, N95 mask, eye protection, surgical cap, and paper mask.   PROCEDURES:   Procedures    MEDICATIONS GIVEN IN THE EMERGENCY:  Medications   oxyCODONE (ROXICODONE) tablet 5 mg (5 mg Oral Given 10/21/21 0520)       NEW  "PRESCRIPTIONS STARTED AT TODAY'S ER VISIT  New Prescriptions    GABAPENTIN (NEURONTIN) 300 MG CAPSULE    Take 1 capsule (300 mg) by mouth 3 times daily       =================================================================    HPI    Patient information was obtained from: The patient      Jerry Bustamante is a 51 year old male with a pertinent history of type 2 diabetes mellitus, NSTEMI, and hyperlipidemia who presents to this ED via walk-in for evaluation of shoulder pain.    The patient reports that ~3 days ago he developed right chest, shoulder, armpit, elbow, and wrist pain that has noticeably increased this morning. He states his right wrist is in \"moderate\" pain while his right armpit is in 9/10 pain. He last took Tylenol at 2230 without relief. The patient reports that he recently had a CABG surgery (09/08/2021). The patient denies difficulty breathing, pain with breathing, and any other symptoms or complaints at this time.      Of note, he has a PCP appointment today (10/21).       REVIEW OF SYSTEMS   Review of Systems   Respiratory: Negative for shortness of breath.         Negative for pain with breathing   Cardiovascular: Positive for chest pain (right).   Musculoskeletal: Positive for arthralgias and myalgias.   All other systems reviewed and are negative.     All other systems reviewed and negative    PAST MEDICAL HISTORY:  Past Medical History:   Diagnosis Date     Coronary artery disease      Diabetes mellitus, type II (H)      Gout      High cholesterol      HLD (hyperlipidemia)      Hypertension        PAST SURGICAL HISTORY:  Past Surgical History:   Procedure Laterality Date     ANGIOPLASTY       BYPASS GRAFT ARTERY CORONARY N/A 9/8/2021    Procedure: CORONARY ARTERY BYPASS GRAFTING X 4 WITH ENDOSCOPIC VEIN HARVESTING LIMA-LAD PREET-DISTAL RCA LEFT RADIAL-OM LEFT SV-ANOMOLOUS RV BRANCH ON PUMP/HILLARY;  Surgeon: Antelmo Mccullough MD;  Location: [facility] OR     CV HEART CATHETERIZATION WITH POSSIBLE " INTERVENTION N/A 9/2/2021    Procedure: Heart Catheterization with Possible Intervention;  Surgeon: Paul Warner MD;  Location:  HEART CARDIAC CATH LAB     CV LEFT VENTRICULOGRAM N/A 9/2/2021    Procedure: Left Ventriculogram;  Surgeon: Paul Warner MD;  Location:  HEART CARDIAC CATH LAB       CURRENT MEDICATIONS:    No current facility-administered medications for this encounter.     Current Outpatient Medications   Medication     gabapentin (NEURONTIN) 300 MG capsule     oxyCODONE (ROXICODONE) 5 MG tablet     acetaminophen (TYLENOL) 325 MG tablet     alcohol swab prep pads     allopurinol (ZYLOPRIM) 100 MG tablet     amiodarone (PACERONE) 200 MG tablet     amLODIPine (NORVASC) 5 MG tablet     aspirin (ASA) 81 MG chewable tablet     blood glucose (NO BRAND SPECIFIED) test strip     blood glucose calibration (NO BRAND SPECIFIED) solution     blood glucose monitoring (NO BRAND SPECIFIED) meter device kit     guaiFENesin (ROBITUSSIN) 20 mg/mL SOLN solution     insulin aspart (NOVOLOG PEN) 100 UNIT/ML pen     insulin glargine (LANTUS PEN) 100 UNIT/ML pen     metoprolol tartrate 75 MG TABS     nitroGLYcerin (NITROSTAT) 0.4 MG sublingual tablet     rivaroxaban ANTICOAGULANT (XARELTO) 20 MG TABS tablet     rosuvastatin (CRESTOR) 10 MG tablet     thin (NO BRAND SPECIFIED) lancets       ALLERGIES:  No Known Allergies    FAMILY HISTORY:  Family History   Problem Relation Age of Onset     Hypertension Mother      Diabetes Maternal Aunt      Diabetes Maternal Uncle      Cancer No family hx of      Cerebrovascular Disease No family hx of        SOCIAL HISTORY:   Social History     Socioeconomic History     Marital status:      Spouse name: Not on file     Number of children: Not on file     Years of education: Not on file     Highest education level: Not on file   Occupational History     Not on file   Tobacco Use     Smoking status: Former Smoker     Packs/day: 1.50     Years: 13.00     Pack years: 19.50  "    Types: Cigarettes     Quit date: 2003     Years since quittin.9     Smokeless tobacco: Never Used   Substance and Sexual Activity     Alcohol use: No     Drug use: No     Sexual activity: Yes     Partners: Female   Other Topics Concern     Not on file   Social History Narrative     Not on file     Social Determinants of Health     Financial Resource Strain:      Difficulty of Paying Living Expenses:    Food Insecurity:      Worried About Running Out of Food in the Last Year:      Ran Out of Food in the Last Year:    Transportation Needs:      Lack of Transportation (Medical):      Lack of Transportation (Non-Medical):    Physical Activity:      Days of Exercise per Week:      Minutes of Exercise per Session:    Stress:      Feeling of Stress :    Social Connections:      Frequency of Communication with Friends and Family:      Frequency of Social Gatherings with Friends and Family:      Attends Orthodoxy Services:      Active Member of Clubs or Organizations:      Attends Club or Organization Meetings:      Marital Status:    Intimate Partner Violence:      Fear of Current or Ex-Partner:      Emotionally Abused:      Physically Abused:      Sexually Abused:        VITALS:  BP (!) 171/91   Pulse 79   Temp 98.2  F (36.8  C) (Temporal)   Resp 21   Ht 1.803 m (5' 11\")   Wt 81.6 kg (180 lb)   SpO2 96%   BMI 25.10 kg/m      PHYSICAL EXAM:  Physical Exam  Vitals and nursing note reviewed.   Constitutional:       Appearance: Normal appearance.   HENT:      Head: Normocephalic and atraumatic.      Right Ear: External ear normal.      Left Ear: External ear normal.      Nose: Nose normal.      Mouth/Throat:      Mouth: Mucous membranes are moist.   Eyes:      Extraocular Movements: Extraocular movements intact.      Conjunctiva/sclera: Conjunctivae normal.      Pupils: Pupils are equal, round, and reactive to light.   Cardiovascular:      Rate and Rhythm: Normal rate and regular rhythm.   Pulmonary:      " Effort: Pulmonary effort is normal.      Breath sounds: Normal breath sounds. No wheezing or rales.   Abdominal:      General: Abdomen is flat. There is no distension.      Palpations: Abdomen is soft.      Tenderness: There is no abdominal tenderness. There is no guarding.   Musculoskeletal:         General: Normal range of motion.      Cervical back: Normal range of motion and neck supple.      Right lower leg: No edema.      Left lower leg: No edema.      Comments: Point tenderness to the mid-thoracic paraspinous musculature on right.   Lymphadenopathy:      Cervical: No cervical adenopathy.   Skin:     General: Skin is warm and dry.      Comments: Thoracotomy incision clean, dry, and intact with dermabond in place.    Neurological:      General: No focal deficit present.      Mental Status: He is alert and oriented to person, place, and time. Mental status is at baseline.      Comments: No gross focal neurologic deficits   Psychiatric:         Mood and Affect: Mood normal.         Behavior: Behavior normal.         Thought Content: Thought content normal.          LAB:  All pertinent labs reviewed and interpreted.  Results for orders placed or performed during the hospital encounter of 10/21/21   Chest XR,  PA & LAT    Impression    IMPRESSION: No acute abnormality.   Glucose by meter   Result Value Ref Range    GLUCOSE BY METER POCT 220 (H) 70 - 99 mg/dL   Basic metabolic panel   Result Value Ref Range    Sodium 138 136 - 145 mmol/L    Potassium 3.7 3.5 - 5.0 mmol/L    Chloride 100 98 - 107 mmol/L    Carbon Dioxide (CO2) 28 22 - 31 mmol/L    Anion Gap 10 5 - 18 mmol/L    Urea Nitrogen 12 8 - 22 mg/dL    Creatinine 0.94 0.70 - 1.30 mg/dL    Calcium 10.1 8.5 - 10.5 mg/dL    Glucose 226 (H) 70 - 125 mg/dL    GFR Estimate >90 >60 mL/min/1.73m2   Result Value Ref Range    Troponin I <0.01 0.00 - 0.29 ng/mL   CBC with platelets and differential   Result Value Ref Range    WBC Count 9.1 4.0 - 11.0 10e3/uL    RBC Count  4.90 4.40 - 5.90 10e6/uL    Hemoglobin 12.0 (L) 13.3 - 17.7 g/dL    Hematocrit 38.7 (L) 40.0 - 53.0 %    MCV 79 78 - 100 fL    MCH 24.5 (L) 26.5 - 33.0 pg    MCHC 31.0 (L) 31.5 - 36.5 g/dL    RDW 14.7 10.0 - 15.0 %    Platelet Count 381 150 - 450 10e3/uL    % Neutrophils 68 %    % Lymphocytes 20 %    % Monocytes 8 %    % Eosinophils 4 %    % Basophils 0 %    % Immature Granulocytes 0 %    NRBCs per 100 WBC 0 <1 /100    Absolute Neutrophils 6.2 1.6 - 8.3 10e3/uL    Absolute Lymphocytes 1.9 0.8 - 5.3 10e3/uL    Absolute Monocytes 0.8 0.0 - 1.3 10e3/uL    Absolute Eosinophils 0.3 0.0 - 0.7 10e3/uL    Absolute Basophils 0.0 0.0 - 0.2 10e3/uL    Absolute Immature Granulocytes 0.0 <=0.0 10e3/uL    Absolute NRBCs 0.0 10e3/uL       RADIOLOGY:  Reviewed all pertinent imaging. Please see official radiology report.  Chest XR,  PA & LAT   Final Result   IMPRESSION: No acute abnormality.          EKG:    Performed at: 5:57 AM  Impression: Sinus rhythm, old inferior infarct, nonspecific ST changes  Rate: 78  Rhythm: Sinus  Axis: Normal  NC Interval: 162  QRS Interval: 84  QTc Interval: 467  ST Changes: No acute ischemic changes  Comparison: Prior of September 28 no acute changes    I have independently reviewed and interpreted the EKG(s) documented above.    I, Kimo Lyn, am serving as a scribe to document services personally performed by Dr. Polk based on my observation and the provider's statements to me. I, Gerber Polk MD attest that Kimo Lyn is acting in a scribe capacity, has observed my performance of the services and has documented them in accordance with my direction.    Gerber Polk M.D.  Emergency Medicine  [facility] EMERGENCY DEPARTMENT  [facility] [facility] [id number] [phone]  Dept: [phone]       Gerber Polk MD  10/21/21 0632

## 2021-10-22 ENCOUNTER — HOSPITAL ENCOUNTER (OUTPATIENT)
Dept: CARDIAC REHAB | Facility: HOSPITAL | Age: 51
End: 2021-10-22
Attending: INTERNAL MEDICINE
Payer: COMMERCIAL

## 2021-10-22 PROCEDURE — 93798 PHYS/QHP OP CAR RHAB W/ECG: CPT

## 2021-10-25 ENCOUNTER — HOSPITAL ENCOUNTER (OUTPATIENT)
Dept: CARDIAC REHAB | Facility: HOSPITAL | Age: 51
End: 2021-10-25
Attending: INTERNAL MEDICINE
Payer: COMMERCIAL

## 2021-10-25 PROCEDURE — 93798 PHYS/QHP OP CAR RHAB W/ECG: CPT

## 2021-10-27 ENCOUNTER — HOSPITAL ENCOUNTER (OUTPATIENT)
Dept: CARDIAC REHAB | Facility: HOSPITAL | Age: 51
End: 2021-10-27
Attending: INTERNAL MEDICINE
Payer: COMMERCIAL

## 2021-10-27 PROCEDURE — 93798 PHYS/QHP OP CAR RHAB W/ECG: CPT

## 2021-10-29 ENCOUNTER — HOSPITAL ENCOUNTER (OUTPATIENT)
Dept: CARDIAC REHAB | Facility: HOSPITAL | Age: 51
End: 2021-10-29
Attending: INTERNAL MEDICINE
Payer: COMMERCIAL

## 2021-10-29 PROCEDURE — 93798 PHYS/QHP OP CAR RHAB W/ECG: CPT

## 2021-11-01 ENCOUNTER — HOSPITAL ENCOUNTER (OUTPATIENT)
Dept: CARDIAC REHAB | Facility: HOSPITAL | Age: 51
End: 2021-11-01
Attending: INTERNAL MEDICINE
Payer: COMMERCIAL

## 2021-11-01 PROBLEM — Z92.89 HISTORY OF CARDIOVERSION: Status: ACTIVE | Noted: 2021-11-01

## 2021-11-01 PROBLEM — Z95.1 S/P CABG (CORONARY ARTERY BYPASS GRAFT): Status: ACTIVE | Noted: 2021-11-01

## 2021-11-01 PROCEDURE — 93798 PHYS/QHP OP CAR RHAB W/ECG: CPT

## 2021-11-02 NOTE — PROGRESS NOTES
[facility] HEART CLINIC    I had the pleasure of seeing Jerry when he came for hospital follow-up.  This 51 year old saw Dr. Castro and Dr. Hilton  for his history of:    1. 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 Mccullough on 9/8/2021. Of note, the distal Cx was too small for bypass grafting, as was D3. Complicated by prolonged intubation, encephalopathy and hypotension requiring pressors as well as AFlutter requiring DCCV.  Admitted 9/2-22/2021.   2. Post-op Atrial Flutter - had 2:1 AFlutter on POD #5 s/p DCCV 9/13 (AFlutter to AFib with first 150J shock, then AFib to SR with second 200J shock). Recurrent arrhythmia 9/17 treated medically.   3. Ischemic CM - EF pre-op was 40-45%   4. HTN  5. DM2      Dr. Hilton cared for Caleb while hospitalized and performed DCCV on 9/13/2021 d/t rapid AFlutter despite Amiodarone and continued soft BPs limiting medication use following his CABG done 9/8/2021. He then went back into AFib/AFlutter on POD #8 and continued rate control and continued amiodarone therapy rec'd as rates were improved and tolerating OK.  Dr. Barcenas noted that DCCV could be done in 4-6 weeks if remained in AFib.    He was discharged on 9/22/2021 @ 186# on amlodipine (to be used x 3 months to prevent radial spasm - also on PTA), amiodarone 200 mg daily, ASA 81 mg daily and Lasix 40 mg daily and Xarelto 20 mg daily. His PTA Lipitor 40 was switched to Crestor 10 mg daily and lopressor was continued @ 50 mg BID. Losartan 50 mg daily was stopped.     He was in the ER 9/28 d/t rapid HR. EKG showed AFlutter 144 bpm, and he spontaneously converted after IV metoprolol given x 2 as well as his home amiodarone dose. Metoprolol was increased to 75 mg BID.     He was then in the ER 10/21 d/t R shoulder pain. He was given Gabapentin for MSK pain with possible radiculopathy.     Interval History:  Today he comes in  noting a 1 week h/o current hoarseness (had improved after prolonged intubation), along with increased WHITE, nocturnal cough and wheezing. No fever, chills. Fully vaccinated for COVID19. He saw his PCP earlier today. He had stopped all of his hospital medications and resumed all of his PTA meds. During that appt, no mention was made of his hoarseness and lung exam was documented as clear without wheezing/crackles.    States that before this started, he was recovering well following his prolonged hospitalization.  He'd been attending cardiac rehab without c/o CP, SOB.    No palpitations, dizziness, lightheadedness.     Given his concerning respiratory sxs today, a very limited visit was done.  We reviewed his Cardiac Rehab BPs (110s/60s), HR 70-90s, stable weights and tele showing SR. O2 sats today monitored throughout our visit were 96-98%.         VITALS:  Vitals: /64   Pulse 79   Wt 80.3 kg (177 lb)   SpO2 97%   BMI 24.69 kg/m      Diagnostic Testing:  Echocardiogram 9/2021 - EF 45-50% with HK of inferior wall and AK of anterior, septal and apical walls. RV with mild decreased RVSF. 1-2+MR, 1+ TR.   Angiogram 9/2/2021 - EF 30-40% Moderately elevated LVFP. Prox RCA lesion is 85% stenosed. 2nd Mrg lesion is 85% stenosed. 3rd Diag lesion is 70% stenosed.. Ost LAD to Mid LAD lesion is 80% stenosed. Dist Cx lesion is 80% stenosed.      Plan:  1. COVID testing  2. Echo prior to Dr. Castro appt    Assessment/Plan:    1. CAD    S/p 4 v CABG on 9/8/2021 (LIMA/LAD, PREET/mRCA, L RA/OM and VG/anomalous RV branch of LAD)    Had prolonged hospitalization, as above, but until these respiratory sxs started over the last week he'd been doing well    Reviewed med list - he remains on Lasix 20 mg daily (resumed PTA dose), losartan 50 mg, metoprolol tartrate 50 BID, ASA 81 and Crestor 10 mg daily    Remains on amlodipine 5 mg daily. CVSurgery rec'd he continue this x 3 months to prevent radial artery spasm; he was on this  PTA so will continue it long term    PLAN:    Sees Dr. Castro in follow-up 12/2021. Will get echo prior to assess EF/wall motion post surgery    Continue meds    2. Atrial Flutter    Had this post op and require DCCV 9/13; had recurrence and left the hospital in AFlutter, I believe, on amiodarone and metoprolol and Xarelto     ER visit ~1 week post discharge for recurrent AFlutter which spontaneously converted with IV metoprolol and home dose po amiodarone    Cardiac Rehab strips show remains in SR    No c/o palpitations    Stopped amiodarone 200 mg daily and Xarelto 20 mg daily. Remains on ASA and metoprolol 50 mg BID    PLAN:    As back in SR, will continue BB and ASA for post-op atrial arrhythmia    No EP follow-up needed     3. Recurrent hoarseness/wheezing, SOB    Notes that this started back up a week ago. Feels he has a lot of sinus drainage    Full PPE worn during exam today (gown, gloves, N95, face shield) d/t his cough and SOB. O2 sats OK    PLAN:    COVID testing - I helped arrange this though [facility] and he'll go today to get tested    Try Zyrtec/ceterizine 10 mg night x 1 week to see if this helps with drainage    Continue Lasix 20 mg daily (his PTA dose) - lungs did not sound wet and CXR 10/21 without pleural effusions/with nl vasculature.    Will touch base next week         Janet Mccabe PA-C, MSPAS      No orders of the defined types were placed in this encounter.    Orders Placed This Encounter   Medications     furosemide (LASIX) 20 MG tablet     Sig: Take 1 tablet (20 mg) by mouth daily     metoprolol tartrate (LOPRESSOR) 50 MG tablet     Sig: Take 1 tablet (50 mg) by mouth 2 times daily     losartan (COZAAR) 50 MG tablet     Sig: Take 1 tablet (50 mg) by mouth daily     Medications Discontinued During This Encounter   Medication Reason     amiodarone (PACERONE) 200 MG tablet Stopped by Patient     rivaroxaban ANTICOAGULANT (XARELTO) 20 MG TABS tablet Stopped by Patient     metoprolol tartrate 75 MG  TABS Dose adjustment         Encounter Diagnoses   Name Primary?     Typical atrial flutter (H)      S/P coronary artery bypass graft x 3 Yes       CURRENT MEDICATIONS:  Current Outpatient Medications   Medication Sig Dispense Refill     furosemide (LASIX) 20 MG tablet Take 1 tablet (20 mg) by mouth daily       losartan (COZAAR) 50 MG tablet Take 1 tablet (50 mg) by mouth daily       metoprolol tartrate (LOPRESSOR) 50 MG tablet Take 1 tablet (50 mg) by mouth 2 times daily       acetaminophen (TYLENOL) 325 MG tablet Take 2 tablets (650 mg) by mouth every 4 hours as needed for other (For optimal non-opioid multimodal pain management to improve pain control.) 200 tablet 0     alcohol swab prep pads Use to swab area of injection/rupali as directed. 100 each 3     allopurinol (ZYLOPRIM) 100 MG tablet Take 1 tablet (100 mg) by mouth daily 30 tablet 3     amLODIPine (NORVASC) 5 MG tablet Take 1 tablet (5 mg) by mouth daily 30 tablet 2     aspirin (ASA) 81 MG chewable tablet 1 tablet (81 mg) by Oral or NG Tube route daily 100 tablet 1     blood glucose (NO BRAND SPECIFIED) test strip Use to test blood sugar 4 times daily or as directed. 100 strip 6     blood glucose calibration (NO BRAND SPECIFIED) solution Use to calibrate blood glucose monitor as needed as directed. 200 each 0     blood glucose monitoring (NO BRAND SPECIFIED) meter device kit Use to test blood sugar 4 times daily or as directed. 1 kit 0     gabapentin (NEURONTIN) 300 MG capsule Take 1 capsule (300 mg) by mouth 3 times daily 30 capsule 0     guaiFENesin (ROBITUSSIN) 20 mg/mL SOLN solution Take 10 mLs by mouth every 4 hours as needed for cough 236 mL 0     insulin aspart (NOVOLOG PEN) 100 UNIT/ML pen Inject 5 Units Subcutaneous 3 times daily (with meals) 4.5 mL 0     insulin glargine (LANTUS PEN) 100 UNIT/ML pen Inject 25 Units Subcutaneous 2 times daily 15 mL 0     nitroGLYcerin (NITROSTAT) 0.4 MG sublingual tablet For chest pain place 1 tablet under the  tongue every 5 minutes for 3 doses. If symptoms persist 5 minutes after 1st dose call 911. 30 tablet 1     oxyCODONE (ROXICODONE) 5 MG tablet Take 1 tablet (5 mg) by mouth every 6 hours as needed for moderate to severe pain 6 tablet 0     rosuvastatin (CRESTOR) 10 MG tablet Take 1 tablet (10 mg) by mouth daily 30 tablet 3     thin (NO BRAND SPECIFIED) lancets Use with lanceting device. 200 each 0       ALLERGIES   No Known Allergies      Review of Systems:  Skin:  Negative     Eyes:  Negative    ENT:    sinus trouble;hoarseness  Respiratory:  Positive for dyspnea on exertion;cough;wheezing;hemoptysis  Cardiovascular:  Negative for;palpitations;edema;syncope or near-syncope;lightheadedness;dizziness Positive for;fatigue  Gastroenterology: Negative for melena;hematochezia  Genitourinary:  Negative    Musculoskeletal:  Negative    Neurologic:  Negative    Psychiatric:  Negative    Heme/Lymph/Imm:  Negative    Endocrine:  Negative      Physical Exam:  Vitals: /64   Pulse 79   Wt 80.3 kg (177 lb)   SpO2 97%   BMI 24.69 kg/m      Constitutional:  cooperative;well developed;well nourished        Skin:  warm and dry to the touch        Head:  normocephalic        Eyes:  conjunctivae and lids unremarkable;sclera white;no xanthalasma        ENT:  not assessed this visit   Hoarse, with upper airway wheeze    Neck:  JVP normal        Chest:    extensive wheezes      Cardiac: regular rhythm;no murmurs, gallops or rubs detected                  Abdomen:  abdomen soft        Vascular: pulses full and equal                                      Extremities and Back:  no edema;no deformities, clubbing, cyanosis, erythema observed        Neurological:  no gross motor deficits            PAST MEDICAL HISTORY:  Past Medical History:   Diagnosis Date     Coronary artery disease      Diabetes mellitus, type II (H)      Gout      High cholesterol      History of cardioversion 11/1/2021     HLD (hyperlipidemia)      Hypertension       S/P CABG (coronary artery bypass graft) x4 2021       PAST SURGICAL HISTORY:  Past Surgical History:   Procedure Laterality Date     ANGIOPLASTY       BYPASS GRAFT ARTERY CORONARY N/A 2021    Procedure: CORONARY ARTERY BYPASS GRAFTING X 4 WITH ENDOSCOPIC VEIN HARVESTING LIMA-LAD PREET-DISTAL RCA LEFT RADIAL-OM LEFT SV-ANOMOLOUS RV BRANCH ON PUMP/HILLARY;  Surgeon: Antelmo Mccullough MD;  Location:  OR     CV HEART CATHETERIZATION WITH POSSIBLE INTERVENTION N/A 2021    Procedure: Heart Catheterization with Possible Intervention;  Surgeon: Paul Warner MD;  Location:  HEART CARDIAC CATH LAB     CV LEFT VENTRICULOGRAM N/A 2021    Procedure: Left Ventriculogram;  Surgeon: Paul Warner MD;  Location:  HEART CARDIAC CATH LAB       FAMILY HISTORY:  Family History   Problem Relation Age of Onset     Hypertension Mother      Diabetes Maternal Aunt      Diabetes Maternal Uncle      Cancer No family hx of      Cerebrovascular Disease No family hx of        SOCIAL HISTORY:  Social History     Socioeconomic History     Marital status:      Spouse name: Not on file     Number of children: Not on file     Years of education: Not on file     Highest education level: Not on file   Occupational History     Not on file   Tobacco Use     Smoking status: Former Smoker     Packs/day: 1.50     Years: 13.00     Pack years: 19.50     Types: Cigarettes     Quit date: 2003     Years since quittin.9     Smokeless tobacco: Never Used   Substance and Sexual Activity     Alcohol use: No     Drug use: No     Sexual activity: Yes     Partners: Female   Other Topics Concern     Not on file   Social History Narrative     Not on file     Social Determinants of Health     Financial Resource Strain:      Difficulty of Paying Living Expenses:    Food Insecurity:      Worried About Running Out of Food in the Last Year:      Ran Out of Food in the Last Year:    Transportation Needs:      Lack of  Transportation (Medical):      Lack of Transportation (Non-Medical):    Physical Activity:      Days of Exercise per Week:      Minutes of Exercise per Session:    Stress:      Feeling of Stress :    Social Connections:      Frequency of Communication with Friends and Family:      Frequency of Social Gatherings with Friends and Family:      Attends Qu[employer] Services:      Active Member of Clubs or Organizations:      Attends Club or Organization Meetings:      Marital Status:    Intimate Partner Violence:      Fear of Current or Ex-Partner:      Emotionally Abused:      Physically Abused:      Sexually Abused:

## 2021-11-03 ENCOUNTER — HOSPITAL ENCOUNTER (OUTPATIENT)
Dept: CARDIAC REHAB | Facility: HOSPITAL | Age: 51
End: 2021-11-03
Attending: INTERNAL MEDICINE
Payer: COMMERCIAL

## 2021-11-03 PROCEDURE — 93798 PHYS/QHP OP CAR RHAB W/ECG: CPT

## 2021-11-04 ENCOUNTER — OFFICE VISIT (OUTPATIENT)
Dept: CARDIOLOGY | Facility: CLINIC | Age: 51
End: 2021-11-04
Attending: NURSE PRACTITIONER
Payer: COMMERCIAL

## 2021-11-04 DIAGNOSIS — I48.3 TYPICAL ATRIAL FLUTTER (H): ICD-10-CM

## 2021-11-04 DIAGNOSIS — Z95.1 S/P CABG (CORONARY ARTERY BYPASS GRAFT): ICD-10-CM

## 2021-11-04 DIAGNOSIS — Z95.1 S/P CORONARY ARTERY BYPASS GRAFT X 3: Primary | ICD-10-CM

## 2021-11-04 PROCEDURE — 99214 OFFICE O/P EST MOD 30 MIN: CPT | Performed by: PHYSICIAN ASSISTANT

## 2021-11-04 RX ORDER — LOSARTAN POTASSIUM 50 MG/1
50 TABLET ORAL 2 TIMES DAILY
COMMUNITY
Start: 2021-11-04 | End: 2021-11-17

## 2021-11-04 RX ORDER — FUROSEMIDE 20 MG
20 TABLET ORAL DAILY
Status: ON HOLD
Start: 2021-11-04 | End: 2021-11-14

## 2021-11-04 RX ORDER — METOPROLOL TARTRATE 50 MG
50 TABLET ORAL 2 TIMES DAILY
COMMUNITY
Start: 2021-11-04 | End: 2021-11-17

## 2021-11-04 NOTE — PATIENT INSTRUCTIONS
Kelly - it was nice to meet you today!    1. Your heart rhythm is back to normal based on exam and the checks at cardiac rehab - this is good news!  2. BP and HR are good at rehab!    PLAN:  1. OK to continue current medications - OK to stay off of the amiodarone  2. We'll call you to set up echo before you see Dr. Castro to see if pumping function of heart back to normal  3. Try 1 week for Zyrtec at night (generic is cetirizine) 10 mg daily at night to see if this helps the drainage  4. Get COVID test today at 2:30  5. Call us with results [phone]

## 2021-11-04 NOTE — LETTER
11/4/2021    [place] [id number] [address]    RE: Jerry O Farah       Dear Colleague,    I had the pleasure of seeing Jerry Bustamante in the [facility].  [facility]    I had the pleasure of seeing Jerry when he came for hospital follow-up.  This 51 year old saw Dr. Castro and Dr. Hilton  for his history of:    1. 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 Mccullough on 9/8/2021. Of note, the distal Cx was too small for bypass grafting, as was D3. Complicated by prolonged intubation, encephalopathy and hypotension requiring pressors as well as AFlutter requiring DCCV.  Admitted 9/2-22/2021.   2. Post-op Atrial Flutter - had 2:1 AFlutter on POD #5 s/p DCCV 9/13 (AFlutter to AFib with first 150J shock, then AFib to SR with second 200J shock). Recurrent arrhythmia 9/17 treated medically.   3. Ischemic CM - EF pre-op was 40-45%   4. HTN  5. DM2      Dr. Hilton cared for Caleb while hospitalized and performed DCCV on 9/13/2021 d/t rapid AFlutter despite Amiodarone and continued soft BPs limiting medication use following his CABG done 9/8/2021. He then went back into AFib/AFlutter on POD #8 and continued rate control and continued amiodarone therapy rec'd as rates were improved and tolerating OK.  Dr. Barcenas noted that DCCV could be done in 4-6 weeks if remained in AFib.    He was discharged on 9/22/2021 @ 186# on amlodipine (to be used x 3 months to prevent radial spasm - also on PTA), amiodarone 200 mg daily, ASA 81 mg daily and Lasix 40 mg daily and Xarelto 20 mg daily. His PTA Lipitor 40 was switched to Crestor 10 mg daily and lopressor was continued @ 50 mg BID. Losartan 50 mg daily was stopped.     He was in the ER 9/28 d/t rapid HR. EKG showed AFlutter 144 bpm, and he spontaneously converted after IV metoprolol  given x 2 as well as his home amiodarone dose. Metoprolol was increased to 75 mg BID.     He was then in the ER 10/21 d/t R shoulder pain. He was given Gabapentin for MSK pain with possible radiculopathy.     Interval History:  Today he comes in noting a 1 week h/o current hoarseness (had improved after prolonged intubation), along with increased WHITE, nocturnal cough and wheezing. No fever, chills. Fully vaccinated for COVID19. He saw his PCP earlier today. He had stopped all of his hospital medications and resumed all of his PTA meds. During that appt, no mention was made of his hoarseness and lung exam was documented as clear without wheezing/crackles.    States that before this started, he was recovering well following his prolonged hospitalization.  He'd been attending cardiac rehab without c/o CP, SOB.    No palpitations, dizziness, lightheadedness.     Given his concerning respiratory sxs today, a very limited visit was done.  We reviewed his Cardiac Rehab BPs (110s/60s), HR 70-90s, stable weights and tele showing SR. O2 sats today monitored throughout our visit were 96-98%.         VITALS:  Vitals: /64   Pulse 79   Wt 80.3 kg (177 lb)   SpO2 97%   BMI 24.69 kg/m      Diagnostic Testing:  Echocardiogram 9/2021 - EF 45-50% with HK of inferior wall and AK of anterior, septal and apical walls. RV with mild decreased RVSF. 1-2+MR, 1+ TR.   Angiogram 9/2/2021 - EF 30-40% Moderately elevated LVFP. Prox RCA lesion is 85% stenosed. 2nd Mrg lesion is 85% stenosed. 3rd Diag lesion is 70% stenosed.. Ost LAD to Mid LAD lesion is 80% stenosed. Dist Cx lesion is 80% stenosed.      Plan:  1. COVID testing  2. Echo prior to Dr. Castro appt    Assessment/Plan:    1. CAD    S/p 4 v CABG on 9/8/2021 (LIMA/LAD, PREET/mRCA, L RA/OM and VG/anomalous RV branch of LAD)    Had prolonged hospitalization, as above, but until these respiratory sxs started over the last week he'd been doing well    Reviewed med list - he remains  on Lasix 20 mg daily (resumed PTA dose), losartan 50 mg, metoprolol tartrate 50 BID, ASA 81 and Crestor 10 mg daily    Remains on amlodipine 5 mg daily. CVSurgery rec'd he continue this x 3 months to prevent radial artery spasm; he was on this PTA so will continue it long term    PLAN:    Sees Dr. Castro in follow-up 12/2021. Will get echo prior to assess EF/wall motion post surgery    Continue meds    2. Atrial Flutter    Had this post op and require DCCV 9/13; had recurrence and left the hospital in AFlutter, I believe, on amiodarone and metoprolol and Xarelto     ER visit ~1 week post discharge for recurrent AFlutter which spontaneously converted with IV metoprolol and home dose po amiodarone    Cardiac Rehab strips show remains in SR    No c/o palpitations    Stopped amiodarone 200 mg daily and Xarelto 20 mg daily. Remains on ASA and metoprolol 50 mg BID    PLAN:    As back in SR, will continue BB and ASA for post-op atrial arrhythmia    No EP follow-up needed     3. Recurrent hoarseness/wheezing, SOB    Notes that this started back up a week ago. Feels he has a lot of sinus drainage    Full PPE worn during exam today (gown, gloves, N95, face shield) d/t his cough and SOB. O2 sats OK    PLAN:    COVID testing - I helped arrange this though [facility] and he'll go today to get tested    Try Zyrtec/ceterizine 10 mg night x 1 week to see if this helps with drainage    Continue Lasix 20 mg daily (his PTA dose) - lungs did not sound wet and CXR 10/21 without pleural effusions/with nl vasculature.    Will touch base next week         Janet Mccabe PA-C, MSPAS      No orders of the defined types were placed in this encounter.    Orders Placed This Encounter   Medications     furosemide (LASIX) 20 MG tablet     Sig: Take 1 tablet (20 mg) by mouth daily     metoprolol tartrate (LOPRESSOR) 50 MG tablet     Sig: Take 1 tablet (50 mg) by mouth 2 times daily     losartan (COZAAR) 50 MG tablet     Sig: Take 1 tablet (50 mg) by mouth  daily     Medications Discontinued During This Encounter   Medication Reason     amiodarone (PACERONE) 200 MG tablet Stopped by Patient     rivaroxaban ANTICOAGULANT (XARELTO) 20 MG TABS tablet Stopped by Patient     metoprolol tartrate 75 MG TABS Dose adjustment         Encounter Diagnoses   Name Primary?     Typical atrial flutter (H)      S/P coronary artery bypass graft x 3 Yes       CURRENT MEDICATIONS:  Current Outpatient Medications   Medication Sig Dispense Refill     furosemide (LASIX) 20 MG tablet Take 1 tablet (20 mg) by mouth daily       losartan (COZAAR) 50 MG tablet Take 1 tablet (50 mg) by mouth daily       metoprolol tartrate (LOPRESSOR) 50 MG tablet Take 1 tablet (50 mg) by mouth 2 times daily       acetaminophen (TYLENOL) 325 MG tablet Take 2 tablets (650 mg) by mouth every 4 hours as needed for other (For optimal non-opioid multimodal pain management to improve pain control.) 200 tablet 0     alcohol swab prep pads Use to swab area of injection/rupali as directed. 100 each 3     allopurinol (ZYLOPRIM) 100 MG tablet Take 1 tablet (100 mg) by mouth daily 30 tablet 3     amLODIPine (NORVASC) 5 MG tablet Take 1 tablet (5 mg) by mouth daily 30 tablet 2     aspirin (ASA) 81 MG chewable tablet 1 tablet (81 mg) by Oral or NG Tube route daily 100 tablet 1     blood glucose (NO BRAND SPECIFIED) test strip Use to test blood sugar 4 times daily or as directed. 100 strip 6     blood glucose calibration (NO BRAND SPECIFIED) solution Use to calibrate blood glucose monitor as needed as directed. 200 each 0     blood glucose monitoring (NO BRAND SPECIFIED) meter device kit Use to test blood sugar 4 times daily or as directed. 1 kit 0     gabapentin (NEURONTIN) 300 MG capsule Take 1 capsule (300 mg) by mouth 3 times daily 30 capsule 0     guaiFENesin (ROBITUSSIN) 20 mg/mL SOLN solution Take 10 mLs by mouth every 4 hours as needed for cough 236 mL 0     insulin aspart (NOVOLOG PEN) 100 UNIT/ML pen Inject 5 Units  Subcutaneous 3 times daily (with meals) 4.5 mL 0     insulin glargine (LANTUS PEN) 100 UNIT/ML pen Inject 25 Units Subcutaneous 2 times daily 15 mL 0     nitroGLYcerin (NITROSTAT) 0.4 MG sublingual tablet For chest pain place 1 tablet under the tongue every 5 minutes for 3 doses. If symptoms persist 5 minutes after 1st dose call 911. 30 tablet 1     oxyCODONE (ROXICODONE) 5 MG tablet Take 1 tablet (5 mg) by mouth every 6 hours as needed for moderate to severe pain 6 tablet 0     rosuvastatin (CRESTOR) 10 MG tablet Take 1 tablet (10 mg) by mouth daily 30 tablet 3     thin (NO BRAND SPECIFIED) lancets Use with lanceting device. 200 each 0       ALLERGIES   No Known Allergies      Review of Systems:  Skin:  Negative     Eyes:  Negative    ENT:    sinus trouble;hoarseness  Respiratory:  Positive for dyspnea on exertion;cough;wheezing;hemoptysis  Cardiovascular:  Negative for;palpitations;edema;syncope or near-syncope;lightheadedness;dizziness Positive for;fatigue  Gastroenterology: Negative for melena;hematochezia  Genitourinary:  Negative    Musculoskeletal:  Negative    Neurologic:  Negative    Psychiatric:  Negative    Heme/Lymph/Imm:  Negative    Endocrine:  Negative      Physical Exam:  Vitals: /64   Pulse 79   Wt 80.3 kg (177 lb)   SpO2 97%   BMI 24.69 kg/m      Constitutional:  cooperative;well developed;well nourished        Skin:  warm and dry to the touch        Head:  normocephalic        Eyes:  conjunctivae and lids unremarkable;sclera white;no xanthalasma        ENT:  not assessed this visit   Hoarse, with upper airway wheeze    Neck:  JVP normal        Chest:    extensive wheezes      Cardiac: regular rhythm;no murmurs, gallops or rubs detected                  Abdomen:  abdomen soft        Vascular: pulses full and equal                                      Extremities and Back:  no edema;no deformities, clubbing, cyanosis, erythema observed        Neurological:  no gross motor deficits             PAST MEDICAL HISTORY:  Past Medical History:   Diagnosis Date     Coronary artery disease      Diabetes mellitus, type II (H)      Gout      High cholesterol      History of cardioversion 2021     HLD (hyperlipidemia)      Hypertension      S/P CABG (coronary artery bypass graft) x4 2021       PAST SURGICAL HISTORY:  Past Surgical History:   Procedure Laterality Date     ANGIOPLASTY       BYPASS GRAFT ARTERY CORONARY N/A 2021    Procedure: CORONARY ARTERY BYPASS GRAFTING X 4 WITH ENDOSCOPIC VEIN HARVESTING LIMA-LAD PREET-DISTAL RCA LEFT RADIAL-OM LEFT SV-ANOMOLOUS RV BRANCH ON PUMP/HILLARY;  Surgeon: Antelmo Mccullough MD;  Location:  OR     CV HEART CATHETERIZATION WITH POSSIBLE INTERVENTION N/A 2021    Procedure: Heart Catheterization with Possible Intervention;  Surgeon: Paul Warner MD;  Location:  HEART CARDIAC CATH LAB     CV LEFT VENTRICULOGRAM N/A 2021    Procedure: Left Ventriculogram;  Surgeon: Paul Warner MD;  Location:  HEART CARDIAC CATH LAB       FAMILY HISTORY:  Family History   Problem Relation Age of Onset     Hypertension Mother      Diabetes Maternal Aunt      Diabetes Maternal Uncle      Cancer No family hx of      Cerebrovascular Disease No family hx of        SOCIAL HISTORY:  Social History     Socioeconomic History     Marital status:      Spouse name: Not on file     Number of children: Not on file     Years of education: Not on file     Highest education level: Not on file   Occupational History     Not on file   Tobacco Use     Smoking status: Former Smoker     Packs/day: 1.50     Years: 13.00     Pack years: 19.50     Types: Cigarettes     Quit date: 2003     Years since quittin.9     Smokeless tobacco: Never Used   Substance and Sexual Activity     Alcohol use: No     Drug use: No     Sexual activity: Yes     Partners: Female   Other Topics Concern     Not on file   Social History Narrative     Not on file     Social  Determinants of Health     Financial Resource Strain:      Difficulty of Paying Living Expenses:    Food Insecurity:      Worried About Running Out of Food in the Last Year:      Ran Out of Food in the Last Year:    Transportation Needs:      Lack of Transportation (Medical):      Lack of Transportation (Non-Medical):    Physical Activity:      Days of Exercise per Week:      Minutes of Exercise per Session:    Stress:      Feeling of Stress :    Social Connections:      Frequency of Communication with Friends and Family:      Frequency of Social Gatherings with Friends and Family:      Attends Mandaeism Services:      Active Member of Clubs or Organizations:      Attends Club or Organization Meetings:      Marital Status:    Intimate Partner Violence:      Fear of Current or Ex-Partner:      Emotionally Abused:      Physically Abused:      Sexually Abused:          [facility]      cc:   SCOTT Thomas CNP  [address]  [place] [address]

## 2021-11-05 ENCOUNTER — DOCUMENTATION ONLY (OUTPATIENT)
Dept: CARDIOLOGY | Facility: CLINIC | Age: 51
End: 2021-11-05

## 2021-11-05 VITALS
BODY MASS INDEX: 24.69 KG/M2 | DIASTOLIC BLOOD PRESSURE: 64 MMHG | WEIGHT: 177 LBS | HEART RATE: 79 BPM | SYSTOLIC BLOOD PRESSURE: 118 MMHG | OXYGEN SATURATION: 97 %

## 2021-11-05 NOTE — PROGRESS NOTES
Pls call pt next week when I'm out    1) What did COVID test 11/4 show?  2) Is his hoarseness/SOB improved with the Zyrtec??    Trevin Gonzales

## 2021-11-08 ENCOUNTER — TELEPHONE (OUTPATIENT)
Dept: CARDIOLOGY | Facility: CLINIC | Age: 51
End: 2021-11-08
Payer: COMMERCIAL

## 2021-11-08 NOTE — PROGRESS NOTES
Message left for patient in follow up to OV on 11/4.  Awaiting return call from patient.  NEY Steiner

## 2021-11-08 NOTE — TELEPHONE ENCOUNTER
Pt calling to report to ALLEN RAYMUNDO that he did get a COVID test, and he has a negative result-he is aware she is out this week but wanted to make sure she had this  mmunns lpn

## 2021-11-12 ENCOUNTER — HOSPITAL ENCOUNTER (OUTPATIENT)
Facility: HOSPITAL | Age: 51
Discharge: HOME | End: 2021-11-14
Attending: STUDENT IN AN ORGANIZED HEALTH CARE EDUCATION/TRAINING PROGRAM | Admitting: EMERGENCY MEDICINE
Payer: COMMERCIAL

## 2021-11-12 ENCOUNTER — HOSPITAL ENCOUNTER (OUTPATIENT)
Dept: CARDIAC REHAB | Facility: HOSPITAL | Age: 51
End: 2021-11-12
Attending: INTERNAL MEDICINE
Payer: COMMERCIAL

## 2021-11-12 DIAGNOSIS — E11.49 OTHER DIABETIC NEUROLOGICAL COMPLICATION ASSOCIATED WITH TYPE 2 DIABETES MELLITUS (H): ICD-10-CM

## 2021-11-12 DIAGNOSIS — J06.9 VIRAL URI WITH COUGH: Primary | ICD-10-CM

## 2021-11-12 DIAGNOSIS — Z95.1 S/P CABG (CORONARY ARTERY BYPASS GRAFT): ICD-10-CM

## 2021-11-12 DIAGNOSIS — R06.2 WHEEZING: ICD-10-CM

## 2021-11-12 DIAGNOSIS — R49.0 HOARSENESS: ICD-10-CM

## 2021-11-12 LAB
ANION GAP SERPL CALCULATED.3IONS-SCNC: 11 MMOL/L (ref 5–18)
BASE EXCESS BLDV CALC-SCNC: 7 MMOL/L
BASOPHILS # BLD AUTO: 0.1 10E3/UL (ref 0–0.2)
BASOPHILS NFR BLD AUTO: 1 %
BUN SERPL-MCNC: 18 MG/DL (ref 8–22)
CALCIUM SERPL-MCNC: 10.5 MG/DL (ref 8.5–10.5)
CHLORIDE BLD-SCNC: 99 MMOL/L (ref 98–107)
CO2 SERPL-SCNC: 28 MMOL/L (ref 22–31)
CREAT SERPL-MCNC: 0.9 MG/DL (ref 0.7–1.3)
D DIMER PPP FEU-MCNC: 1.23 UG/ML FEU (ref 0–0.5)
EOSINOPHIL # BLD AUTO: 0.7 10E3/UL (ref 0–0.7)
EOSINOPHIL NFR BLD AUTO: 7 %
ERYTHROCYTE [DISTWIDTH] IN BLOOD BY AUTOMATED COUNT: 14.4 % (ref 10–15)
GFR SERPL CREATININE-BSD FRML MDRD: >90 ML/MIN/1.73M2
GLUCOSE BLD-MCNC: 126 MG/DL (ref 70–125)
HCO3 BLDV-SCNC: 29 MMOL/L (ref 24–30)
HCT VFR BLD AUTO: 42.4 % (ref 40–53)
HGB BLD-MCNC: 13 G/DL (ref 13.3–17.7)
HOLD SPECIMEN: NORMAL
IMM GRANULOCYTES # BLD: 0 10E3/UL
IMM GRANULOCYTES NFR BLD: 0 %
LYMPHOCYTES # BLD AUTO: 2.8 10E3/UL (ref 0.8–5.3)
LYMPHOCYTES NFR BLD AUTO: 29 %
MCH RBC QN AUTO: 23.6 PG (ref 26.5–33)
MCHC RBC AUTO-ENTMCNC: 30.7 G/DL (ref 31.5–36.5)
MCV RBC AUTO: 77 FL (ref 78–100)
MONOCYTES # BLD AUTO: 0.7 10E3/UL (ref 0–1.3)
MONOCYTES NFR BLD AUTO: 7 %
NEUTROPHILS # BLD AUTO: 5.4 10E3/UL (ref 1.6–8.3)
NEUTROPHILS NFR BLD AUTO: 56 %
NRBC # BLD AUTO: 0 10E3/UL
NRBC BLD AUTO-RTO: 0 /100
OXYHGB MFR BLDV: 79.3 % (ref 70–75)
PCO2 BLDV: 49 MM HG (ref 35–50)
PH BLDV: 7.42 [PH] (ref 7.35–7.45)
PLATELET # BLD AUTO: 318 10E3/UL (ref 150–450)
PO2 BLDV: 46 MM HG (ref 25–47)
POTASSIUM BLD-SCNC: 3.7 MMOL/L (ref 3.5–5)
RBC # BLD AUTO: 5.51 10E6/UL (ref 4.4–5.9)
SAO2 % BLDV: 80.9 % (ref 70–75)
SODIUM SERPL-SCNC: 138 MMOL/L (ref 136–145)
WBC # BLD AUTO: 9.6 10E3/UL (ref 4–11)

## 2021-11-12 PROCEDURE — 85025 COMPLETE CBC W/AUTO DIFF WBC: CPT | Performed by: EMERGENCY MEDICINE

## 2021-11-12 PROCEDURE — 84484 ASSAY OF TROPONIN QUANT: CPT | Performed by: EMERGENCY MEDICINE

## 2021-11-12 PROCEDURE — 96375 TX/PRO/DX INJ NEW DRUG ADDON: CPT

## 2021-11-12 PROCEDURE — C9803 HOPD COVID-19 SPEC COLLECT: HCPCS

## 2021-11-12 PROCEDURE — 36415 COLL VENOUS BLD VENIPUNCTURE: CPT | Performed by: EMERGENCY MEDICINE

## 2021-11-12 PROCEDURE — 87636 SARSCOV2 & INF A&B AMP PRB: CPT | Performed by: EMERGENCY MEDICINE

## 2021-11-12 PROCEDURE — 99285 EMERGENCY DEPT VISIT HI MDM: CPT | Mod: 25

## 2021-11-12 PROCEDURE — 85379 FIBRIN DEGRADATION QUANT: CPT | Performed by: EMERGENCY MEDICINE

## 2021-11-12 PROCEDURE — 250N000011 HC RX IP 250 OP 636: Performed by: EMERGENCY MEDICINE

## 2021-11-12 PROCEDURE — 82805 BLOOD GASES W/O2 SATURATION: CPT | Performed by: EMERGENCY MEDICINE

## 2021-11-12 PROCEDURE — 83880 ASSAY OF NATRIURETIC PEPTIDE: CPT | Performed by: EMERGENCY MEDICINE

## 2021-11-12 PROCEDURE — 93798 PHYS/QHP OP CAR RHAB W/ECG: CPT

## 2021-11-12 PROCEDURE — 80048 BASIC METABOLIC PNL TOTAL CA: CPT | Performed by: EMERGENCY MEDICINE

## 2021-11-12 PROCEDURE — 94640 AIRWAY INHALATION TREATMENT: CPT

## 2021-11-12 PROCEDURE — 250N000009 HC RX 250: Performed by: EMERGENCY MEDICINE

## 2021-11-12 RX ORDER — IPRATROPIUM BROMIDE AND ALBUTEROL SULFATE 2.5; .5 MG/3ML; MG/3ML
3 SOLUTION RESPIRATORY (INHALATION) ONCE
Status: COMPLETED | OUTPATIENT
Start: 2021-11-12 | End: 2021-11-12

## 2021-11-12 RX ORDER — DEXAMETHASONE SODIUM PHOSPHATE 10 MG/ML
10 INJECTION, SOLUTION INTRAMUSCULAR; INTRAVENOUS ONCE
Status: COMPLETED | OUTPATIENT
Start: 2021-11-12 | End: 2021-11-12

## 2021-11-12 RX ADMIN — DEXAMETHASONE SODIUM PHOSPHATE 10 MG: 10 INJECTION INTRAMUSCULAR; INTRAVENOUS at 23:33

## 2021-11-12 RX ADMIN — IPRATROPIUM BROMIDE AND ALBUTEROL SULFATE 3 ML: .5; 3 SOLUTION RESPIRATORY (INHALATION) at 23:34

## 2021-11-12 ASSESSMENT — ENCOUNTER SYMPTOMS
NECK PAIN: 0
VOICE CHANGE: 1
SHORTNESS OF BREATH: 1
FEVER: 0
COUGH: 1
WHEEZING: 1

## 2021-11-13 ENCOUNTER — APPOINTMENT (OUTPATIENT)
Dept: CT IMAGING | Facility: HOSPITAL | Age: 51
End: 2021-11-13
Attending: EMERGENCY MEDICINE
Payer: COMMERCIAL

## 2021-11-13 PROBLEM — R49.0 HOARSENESS: Status: ACTIVE | Noted: 2021-11-13

## 2021-11-13 LAB
BNP SERPL-MCNC: 93 PG/ML (ref 0–43)
FLUAV RNA SPEC QL NAA+PROBE: NEGATIVE
FLUBV RNA RESP QL NAA+PROBE: NEGATIVE
GLUCOSE BLDC GLUCOMTR-MCNC: 207 MG/DL (ref 70–99)
GLUCOSE BLDC GLUCOMTR-MCNC: 233 MG/DL (ref 70–99)
GLUCOSE BLDC GLUCOMTR-MCNC: 234 MG/DL (ref 70–99)
GLUCOSE BLDC GLUCOMTR-MCNC: 284 MG/DL (ref 70–99)
HOLD SPECIMEN: NORMAL
PROCALCITONIN SERPL-MCNC: 0.04 NG/ML (ref 0–0.49)
SARS-COV-2 RNA RESP QL NAA+PROBE: NEGATIVE
TROPONIN I SERPL-MCNC: <0.01 NG/ML (ref 0–0.29)

## 2021-11-13 PROCEDURE — 250N000011 HC RX IP 250 OP 636: Performed by: STUDENT IN AN ORGANIZED HEALTH CARE EDUCATION/TRAINING PROGRAM

## 2021-11-13 PROCEDURE — G0463 HOSPITAL OUTPT CLINIC VISIT: HCPCS

## 2021-11-13 PROCEDURE — 96365 THER/PROPH/DIAG IV INF INIT: CPT | Mod: 59

## 2021-11-13 PROCEDURE — 96375 TX/PRO/DX INJ NEW DRUG ADDON: CPT

## 2021-11-13 PROCEDURE — 250N000009 HC RX 250: Performed by: INTERNAL MEDICINE

## 2021-11-13 PROCEDURE — 36415 COLL VENOUS BLD VENIPUNCTURE: CPT | Performed by: INTERNAL MEDICINE

## 2021-11-13 PROCEDURE — 99219 PR INITIAL OBSERVATION CARE,LEVEL II: CPT | Mod: AI | Performed by: STUDENT IN AN ORGANIZED HEALTH CARE EDUCATION/TRAINING PROGRAM

## 2021-11-13 PROCEDURE — 258N000003 HC RX IP 258 OP 636: Performed by: STUDENT IN AN ORGANIZED HEALTH CARE EDUCATION/TRAINING PROGRAM

## 2021-11-13 PROCEDURE — 250N000013 HC RX MED GY IP 250 OP 250 PS 637: Performed by: INTERNAL MEDICINE

## 2021-11-13 PROCEDURE — 250N000011 HC RX IP 250 OP 636: Performed by: EMERGENCY MEDICINE

## 2021-11-13 PROCEDURE — 96367 TX/PROPH/DG ADDL SEQ IV INF: CPT | Mod: XU

## 2021-11-13 PROCEDURE — 96376 TX/PRO/DX INJ SAME DRUG ADON: CPT | Mod: XU

## 2021-11-13 PROCEDURE — 120N000001 HC R&B MED SURG/OB

## 2021-11-13 PROCEDURE — 70491 CT SOFT TISSUE NECK W/DYE: CPT

## 2021-11-13 PROCEDURE — 71275 CT ANGIOGRAPHY CHEST: CPT

## 2021-11-13 PROCEDURE — 250N000009 HC RX 250: Performed by: EMERGENCY MEDICINE

## 2021-11-13 PROCEDURE — 999N000157 HC STATISTIC RCP TIME EA 10 MIN

## 2021-11-13 PROCEDURE — 96366 THER/PROPH/DIAG IV INF ADDON: CPT

## 2021-11-13 PROCEDURE — 96376 TX/PRO/DX INJ SAME DRUG ADON: CPT

## 2021-11-13 PROCEDURE — 96372 THER/PROPH/DIAG INJ SC/IM: CPT | Mod: XU | Performed by: INTERNAL MEDICINE

## 2021-11-13 PROCEDURE — 250N000012 HC RX MED GY IP 250 OP 636 PS 637: Performed by: INTERNAL MEDICINE

## 2021-11-13 PROCEDURE — 31505 DIAGNOSTIC LARYNGOSCOPY: CPT

## 2021-11-13 PROCEDURE — 250N000009 HC RX 250: Performed by: STUDENT IN AN ORGANIZED HEALTH CARE EDUCATION/TRAINING PROGRAM

## 2021-11-13 PROCEDURE — 84145 PROCALCITONIN (PCT): CPT | Performed by: INTERNAL MEDICINE

## 2021-11-13 PROCEDURE — 99207 PR CDG-CODE CATEGORY CHANGED: CPT | Performed by: STUDENT IN AN ORGANIZED HEALTH CARE EDUCATION/TRAINING PROGRAM

## 2021-11-13 RX ORDER — ACETAMINOPHEN 325 MG/1
650 TABLET ORAL EVERY 4 HOURS PRN
Status: DISCONTINUED | OUTPATIENT
Start: 2021-11-13 | End: 2021-11-14 | Stop reason: HOSPADM

## 2021-11-13 RX ORDER — FUROSEMIDE 10 MG/ML
40 INJECTION INTRAMUSCULAR; INTRAVENOUS ONCE
Status: COMPLETED | OUTPATIENT
Start: 2021-11-13 | End: 2021-11-13

## 2021-11-13 RX ORDER — PANTOPRAZOLE SODIUM 40 MG/1
40 TABLET, DELAYED RELEASE ORAL
Status: DISCONTINUED | OUTPATIENT
Start: 2021-11-13 | End: 2021-11-14 | Stop reason: HOSPADM

## 2021-11-13 RX ORDER — INSULIN ASPART 100 [IU]/ML
5 INJECTION, SOLUTION INTRAVENOUS; SUBCUTANEOUS
COMMUNITY
End: 2021-11-17

## 2021-11-13 RX ORDER — ONDANSETRON 4 MG/1
4 TABLET, ORALLY DISINTEGRATING ORAL EVERY 6 HOURS PRN
Status: DISCONTINUED | OUTPATIENT
Start: 2021-11-13 | End: 2021-11-14 | Stop reason: HOSPADM

## 2021-11-13 RX ORDER — HYDRALAZINE HYDROCHLORIDE 20 MG/ML
10 INJECTION INTRAMUSCULAR; INTRAVENOUS EVERY 4 HOURS PRN
Status: DISCONTINUED | OUTPATIENT
Start: 2021-11-13 | End: 2021-11-14 | Stop reason: HOSPADM

## 2021-11-13 RX ORDER — ONDANSETRON 2 MG/ML
4 INJECTION INTRAMUSCULAR; INTRAVENOUS EVERY 6 HOURS PRN
Status: DISCONTINUED | OUTPATIENT
Start: 2021-11-13 | End: 2021-11-14 | Stop reason: HOSPADM

## 2021-11-13 RX ORDER — DEXTROSE MONOHYDRATE 25 G/50ML
25-50 INJECTION, SOLUTION INTRAVENOUS
Status: DISCONTINUED | OUTPATIENT
Start: 2021-11-13 | End: 2021-11-14 | Stop reason: HOSPADM

## 2021-11-13 RX ORDER — PREDNISONE 20 MG/1
40 TABLET ORAL DAILY
Status: DISCONTINUED | OUTPATIENT
Start: 2021-11-13 | End: 2021-11-14 | Stop reason: HOSPADM

## 2021-11-13 RX ORDER — PIPERACILLIN SODIUM, TAZOBACTAM SODIUM 3; .375 G/15ML; G/15ML
3.38 INJECTION, POWDER, LYOPHILIZED, FOR SOLUTION INTRAVENOUS EVERY 6 HOURS
Status: DISCONTINUED | OUTPATIENT
Start: 2021-11-13 | End: 2021-11-13

## 2021-11-13 RX ORDER — PIPERACILLIN SODIUM, TAZOBACTAM SODIUM 3; .375 G/15ML; G/15ML
3.38 INJECTION, POWDER, LYOPHILIZED, FOR SOLUTION INTRAVENOUS ONCE
Status: COMPLETED | OUTPATIENT
Start: 2021-11-13 | End: 2021-11-13

## 2021-11-13 RX ORDER — AMLODIPINE BESYLATE 5 MG/1
5 TABLET ORAL DAILY
Status: DISCONTINUED | OUTPATIENT
Start: 2021-11-13 | End: 2021-11-13

## 2021-11-13 RX ORDER — HYDRALAZINE HYDROCHLORIDE 20 MG/ML
5 INJECTION INTRAMUSCULAR; INTRAVENOUS EVERY 6 HOURS PRN
Status: DISCONTINUED | OUTPATIENT
Start: 2021-11-13 | End: 2021-11-13

## 2021-11-13 RX ORDER — LIDOCAINE HYDROCHLORIDE 20 MG/ML
10 JELLY TOPICAL ONCE
Status: COMPLETED | OUTPATIENT
Start: 2021-11-13 | End: 2021-11-13

## 2021-11-13 RX ORDER — ASPIRIN 81 MG/1
81 TABLET, CHEWABLE ORAL DAILY
Status: DISCONTINUED | OUTPATIENT
Start: 2021-11-13 | End: 2021-11-14 | Stop reason: HOSPADM

## 2021-11-13 RX ORDER — DEXAMETHASONE SODIUM PHOSPHATE 4 MG/ML
4 INJECTION, SOLUTION INTRA-ARTICULAR; INTRALESIONAL; INTRAMUSCULAR; INTRAVENOUS; SOFT TISSUE EVERY 12 HOURS
Status: DISCONTINUED | OUTPATIENT
Start: 2021-11-13 | End: 2021-11-13

## 2021-11-13 RX ORDER — NICOTINE POLACRILEX 4 MG
15-30 LOZENGE BUCCAL
Status: DISCONTINUED | OUTPATIENT
Start: 2021-11-13 | End: 2021-11-14 | Stop reason: HOSPADM

## 2021-11-13 RX ORDER — ALBUTEROL SULFATE 5 MG/ML
2.5 SOLUTION RESPIRATORY (INHALATION) EVERY 6 HOURS PRN
Status: DISCONTINUED | OUTPATIENT
Start: 2021-11-13 | End: 2021-11-14 | Stop reason: HOSPADM

## 2021-11-13 RX ORDER — NITROGLYCERIN 0.4 MG/1
0.4 TABLET SUBLINGUAL EVERY 5 MIN PRN
Status: DISCONTINUED | OUTPATIENT
Start: 2021-11-13 | End: 2021-11-14 | Stop reason: HOSPADM

## 2021-11-13 RX ORDER — IPRATROPIUM BROMIDE AND ALBUTEROL SULFATE 2.5; .5 MG/3ML; MG/3ML
3 SOLUTION RESPIRATORY (INHALATION)
Status: DISCONTINUED | OUTPATIENT
Start: 2021-11-13 | End: 2021-11-13

## 2021-11-13 RX ORDER — IOPAMIDOL 755 MG/ML
100 INJECTION, SOLUTION INTRAVASCULAR ONCE
Status: COMPLETED | OUTPATIENT
Start: 2021-11-13 | End: 2021-11-13

## 2021-11-13 RX ORDER — METOPROLOL TARTRATE 25 MG/1
50 TABLET, FILM COATED ORAL 2 TIMES DAILY
Status: DISCONTINUED | OUTPATIENT
Start: 2021-11-13 | End: 2021-11-14 | Stop reason: HOSPADM

## 2021-11-13 RX ORDER — LIDOCAINE 40 MG/G
CREAM TOPICAL
Status: DISCONTINUED | OUTPATIENT
Start: 2021-11-13 | End: 2021-11-14 | Stop reason: HOSPADM

## 2021-11-13 RX ORDER — OXYCODONE HYDROCHLORIDE 5 MG/1
5 TABLET ORAL EVERY 6 HOURS PRN
Status: DISCONTINUED | OUTPATIENT
Start: 2021-11-13 | End: 2021-11-14 | Stop reason: HOSPADM

## 2021-11-13 RX ORDER — FUROSEMIDE 10 MG/ML
20 INJECTION INTRAMUSCULAR; INTRAVENOUS EVERY 12 HOURS
Status: DISCONTINUED | OUTPATIENT
Start: 2021-11-13 | End: 2021-11-14

## 2021-11-13 RX ORDER — PANTOPRAZOLE SODIUM 20 MG/1
40 TABLET, DELAYED RELEASE ORAL
Status: DISCONTINUED | OUTPATIENT
Start: 2021-11-14 | End: 2021-11-13

## 2021-11-13 RX ORDER — PIPERACILLIN SODIUM, TAZOBACTAM SODIUM 3; .375 G/15ML; G/15ML
3.38 INJECTION, POWDER, LYOPHILIZED, FOR SOLUTION INTRAVENOUS EVERY 8 HOURS
Status: DISCONTINUED | OUTPATIENT
Start: 2021-11-13 | End: 2021-11-13

## 2021-11-13 RX ORDER — GABAPENTIN 300 MG/1
300 CAPSULE ORAL 3 TIMES DAILY PRN
Status: DISCONTINUED | OUTPATIENT
Start: 2021-11-13 | End: 2021-11-14 | Stop reason: HOSPADM

## 2021-11-13 RX ORDER — ALLOPURINOL 100 MG/1
100 TABLET ORAL DAILY
Status: DISCONTINUED | OUTPATIENT
Start: 2021-11-13 | End: 2021-11-14 | Stop reason: HOSPADM

## 2021-11-13 RX ORDER — ROSUVASTATIN CALCIUM 10 MG/1
10 TABLET, COATED ORAL DAILY
Status: DISCONTINUED | OUTPATIENT
Start: 2021-11-13 | End: 2021-11-14 | Stop reason: HOSPADM

## 2021-11-13 RX ADMIN — IPRATROPIUM BROMIDE AND ALBUTEROL 1 PUFF: 20; 100 SPRAY, METERED RESPIRATORY (INHALATION) at 20:51

## 2021-11-13 RX ADMIN — PIPERACILLIN SODIUM AND TAZOBACTAM SODIUM 3.38 G: 3; .375 INJECTION, POWDER, LYOPHILIZED, FOR SOLUTION INTRAVENOUS at 02:59

## 2021-11-13 RX ADMIN — PANTOPRAZOLE SODIUM 40 MG: 20 TABLET, DELAYED RELEASE ORAL at 10:57

## 2021-11-13 RX ADMIN — IOPAMIDOL 100 ML: 755 INJECTION, SOLUTION INTRAVENOUS at 00:37

## 2021-11-13 RX ADMIN — METFORMIN HYDROCHLORIDE 1000 MG: 500 TABLET ORAL at 11:57

## 2021-11-13 RX ADMIN — IPRATROPIUM BROMIDE AND ALBUTEROL SULFATE 3 ML: .5; 3 SOLUTION RESPIRATORY (INHALATION) at 12:44

## 2021-11-13 RX ADMIN — METOPROLOL TARTRATE 50 MG: 25 TABLET, FILM COATED ORAL at 20:53

## 2021-11-13 RX ADMIN — INSULIN GLARGINE 25 UNITS: 100 INJECTION, SOLUTION SUBCUTANEOUS at 11:48

## 2021-11-13 RX ADMIN — INSULIN GLARGINE 30 UNITS: 100 INJECTION, SOLUTION SUBCUTANEOUS at 20:53

## 2021-11-13 RX ADMIN — ACETAMINOPHEN 650 MG: 325 TABLET ORAL at 20:52

## 2021-11-13 RX ADMIN — AZITHROMYCIN MONOHYDRATE 500 MG: 500 INJECTION, POWDER, LYOPHILIZED, FOR SOLUTION INTRAVENOUS at 05:54

## 2021-11-13 RX ADMIN — INSULIN ASPART 2 UNITS: 100 INJECTION, SOLUTION INTRAVENOUS; SUBCUTANEOUS at 09:03

## 2021-11-13 RX ADMIN — ROSUVASTATIN CALCIUM 10 MG: 10 TABLET, FILM COATED ORAL at 11:57

## 2021-11-13 RX ADMIN — METOPROLOL TARTRATE 50 MG: 25 TABLET, FILM COATED ORAL at 10:57

## 2021-11-13 RX ADMIN — INSULIN ASPART 3 UNITS: 100 INJECTION, SOLUTION INTRAVENOUS; SUBCUTANEOUS at 18:15

## 2021-11-13 RX ADMIN — ALLOPURINOL 100 MG: 100 TABLET ORAL at 11:57

## 2021-11-13 RX ADMIN — FUROSEMIDE 20 MG: 10 INJECTION, SOLUTION INTRAMUSCULAR; INTRAVENOUS at 10:45

## 2021-11-13 RX ADMIN — IPRATROPIUM BROMIDE AND ALBUTEROL SULFATE 3 ML: .5; 3 SOLUTION RESPIRATORY (INHALATION) at 09:04

## 2021-11-13 RX ADMIN — INSULIN ASPART 3 UNITS: 100 INJECTION, SOLUTION INTRAVENOUS; SUBCUTANEOUS at 11:46

## 2021-11-13 RX ADMIN — FUROSEMIDE 40 MG: 10 INJECTION, SOLUTION INTRAMUSCULAR; INTRAVENOUS at 02:58

## 2021-11-13 RX ADMIN — LIDOCAINE HYDROCHLORIDE 10 ML: 20 JELLY TOPICAL at 02:41

## 2021-11-13 RX ADMIN — PREDNISONE 40 MG: 20 TABLET ORAL at 10:57

## 2021-11-13 RX ADMIN — FUROSEMIDE 20 MG: 10 INJECTION, SOLUTION INTRAMUSCULAR; INTRAVENOUS at 20:53

## 2021-11-13 RX ADMIN — METFORMIN HYDROCHLORIDE 1000 MG: 500 TABLET ORAL at 18:15

## 2021-11-13 RX ADMIN — ASPIRIN 81 MG CHEWABLE TABLET 81 MG: 81 TABLET CHEWABLE at 10:57

## 2021-11-13 ASSESSMENT — ACTIVITIES OF DAILY LIVING (ADL)
ADLS_ACUITY_SCORE: 3
DEPENDENT_IADLS:: INDEPENDENT
DIFFICULTY_EATING/SWALLOWING: NO
WEAR_GLASSES_OR_BLIND: NO
ADLS_ACUITY_SCORE: 3
WALKING_OR_CLIMBING_STAIRS_DIFFICULTY: NO
ADLS_ACUITY_SCORE: 12
TOILETING_ISSUES: NO
ADLS_ACUITY_SCORE: 3
CONCENTRATING,_REMEMBERING_OR_MAKING_DECISIONS_DIFFICULTY: NO
ADLS_ACUITY_SCORE: 3
ADLS_ACUITY_SCORE: 12
DRESSING/BATHING_DIFFICULTY: NO
HEARING_DIFFICULTY_OR_DEAF: NO
ADLS_ACUITY_SCORE: 3
ADLS_ACUITY_SCORE: 12
ADLS_ACUITY_SCORE: 12
ADLS_ACUITY_SCORE: 3
ADLS_ACUITY_SCORE: 3
ADLS_ACUITY_SCORE: 12
ADLS_ACUITY_SCORE: 3
DOING_ERRANDS_INDEPENDENTLY_DIFFICULTY: NO
ADLS_ACUITY_SCORE: 3
ADLS_ACUITY_SCORE: 12
ADLS_ACUITY_SCORE: 3
DIFFICULTY_COMMUNICATING: NO
TOILETING_ISSUES: NO
ADLS_ACUITY_SCORE: 3
PATIENT_/_FAMILY_COMMUNICATION_STYLE: SPOKEN LANGUAGE (ENGLISH OR BILINGUAL)
ADLS_ACUITY_SCORE: 3

## 2021-11-13 ASSESSMENT — ENCOUNTER SYMPTOMS
ABDOMINAL PAIN: 0
WOUND: 1
BRUISES/BLEEDS EASILY: 0
CONFUSION: 0
HEADACHES: 0

## 2021-11-13 ASSESSMENT — MIFFLIN-ST. JEOR: SCORE: 1656.39

## 2021-11-13 NOTE — ED PROVIDER NOTES
EMERGENCY DEPARTMENT ENCOUNTER      NAME: Jerry Bustamante  AGE: 51 year old male  YOB: 1970  MRN: [id number]  EVALUATION DATE & TIME: 11/12/2021 10:51 PM    PCP: Medicine, [facility]        Chief Complaint   Patient presents with     Shortness of Breath         FINAL IMPRESSION:  1. Wheezing    2. Hoarseness          ED COURSE & MEDICAL DECISION MAKING:    Pertinent Labs & Imaging studies reviewed. (See chart for details)  51 year old male presents to the Emergency Department for evaluation of hoarseness, difficulty with swallowing, shortness of breath, wheezing    ED Course as of 11/13/21 0325   Fri Nov 12, 2021   2348 Patient is here with hoarseness, cough, wheezing that is gradually getting worse.  He had prolong intubation in September.   2349 No history of asthma.  Differential includes asthma, bronchitis, CHF, tracheal stenosis, tracheitis, malignancy, pulmonary emboli, anxiety, retropharyngeal abscess   2349 Plan for Decadron, DuoNeb and reassess   2349 Plan for labs including Covid test, if D-dimer elevated will obtain CTA PE will also obtain CT neck   2355 D-Dimer Quantitative(!): 1.23  Therefore CTA PE and CT neck ordered   2357 Ph Venous: 7.42   2357 PCO2 Venous: 49  VBG reassuring   2357 SARS CoV2 PCR: Negative   Sat Nov 13, 2021   0012 Influenza A: Negative   0012 Troponin I: <0.01   0012 BNP(!): 93  CHF exacerbation less likely   0114 CT negative for PE but does show bilateral opacities suggestive of edema with infectious process less likely.   0323 CT shows a artenoid inflammation.  Likely chronic but cannot exclude infectious process therefore Zosyn given.  Also given dexamethasone for symptoms.  Given Lasix for CT finding suggestive of pulmonary edema.  Plan for admission for further evaluation and ENT consult tomorrow.    0324 Pulmonary emboli unlikely.  ACS unlikely.   0324 WBC: 9.6   0324 Hemoglobin(!): 13.0   0324 Platelet Count: 318   0324 I performed indirect laryngoscopy  and there is no visible mass or vocal cord paralysis       11:11 PM I met with the patient for the initial interview and physical examination. Discussed plan for treatment and workup in the ED.      11:24 PM I rechecked and updated the patient.      12:06 AM I rechecked and updated the patient. Per patient's wife, patient is not on blood thinners. Patient endorses some improvement to his respiratory symptoms but still sounds raspy, notably to his upper respiratory airways.     2:32 AM I discussed the case with hospitalist, Dr. Perez, who accepts the patient.     3:03 AM I rechecked and updated the patient.          At the conclusion of the encounter I discussed the results of all of the tests and the disposition. The questions were answered. The patient or family acknowledged understanding and was agreeable with the care plan.     PPE: Provider wore gloves, N95 mask, and goggles.          MEDICATIONS GIVEN IN THE EMERGENCY:  Medications   piperacillin-tazobactam (ZOSYN) 3.375 g vial to attach to  mL bag (3.375 g Intravenous New Bag 11/13/21 0259)   ipratropium - albuterol 0.5 mg/2.5 mg/3 mL (DUONEB) neb solution 3 mL (3 mLs Nebulization Given 11/12/21 2334)   dexamethasone PF (DECADRON) injection 10 mg (10 mg Intravenous Given 11/12/21 2333)   iopamidol (ISOVUE-370) solution 100 mL (100 mLs Intravenous Given 11/13/21 0037)   lidocaine (XYLOCAINE) 2 % external gel 10 mL (10 mLs Topical Given by Other 11/13/21 0241)   furosemide (LASIX) injection 40 mg (40 mg Intravenous Given 11/13/21 0258)       NEW PRESCRIPTIONS STARTED AT TODAY'S ER VISIT  New Prescriptions    No medications on file            =================================================================    HPI    Patient information was obtained from: self     Use of Intrepreter: N/A      Jerry Bustamante is a 51 year old male with a pertinent history of CAD, NSTEMI, type II diabetes mellitus, cardioversion, atherosclerosis, S/P CABG, who presents to  this ED via walk-in for evaluation of shortness of breath.     Difficult History.     Patient reports onset of wheezing, hoarseness, increased dyspnea on exertion, and cough at night for 2 weeks. He notes that walking and lying down provokes his symptoms. Per patient's wife, patient is not on blood thinners. He does report a recent bypass procedure in 9/2021 (2 months ago) during which he had a prolonged intubation. At the time there was concern for swallowing problems. Patient does endorse pain with swallowing and coughing to the neck, but otherwise denies other neck pain or food getting stuck. He does endorse coughing up blood. Patient is vaccinated against COVID-19 and recently had a negative COVID-19 test. Patient denies a history of asthma or smoking tobacco. Patient notes that he saw cardiology on the 4th but because of his respiratory symptoms he just had an abbreviated visit. Patient denies any fevers, neck pain, and any other symptoms or complaints at this time.     REVIEW OF SYSTEMS   Review of Systems   Constitutional: Negative for fever.   HENT: Positive for voice change (hoarse).         Positive for pain with swallowing and coughing.    Respiratory: Positive for cough, shortness of breath and wheezing.    Cardiovascular: Negative for chest pain.   Gastrointestinal: Negative for abdominal pain.   Musculoskeletal: Negative for neck pain.   Skin: Positive for wound.   Allergic/Immunologic: Positive for immunocompromised state.   Neurological: Negative for headaches.   Hematological: Does not bruise/bleed easily.   Psychiatric/Behavioral: Negative for confusion. Suicidal ideas:             PAST MEDICAL HISTORY:  Past Medical History:   Diagnosis Date     Coronary artery disease      Diabetes mellitus, type II (H)      Gout      High cholesterol      History of cardioversion 11/1/2021     HLD (hyperlipidemia)      Hypertension      S/P CABG (coronary artery bypass graft) x4 11/1/2021       PAST SURGICAL  HISTORY:  Past Surgical History:   Procedure Laterality Date     ANGIOPLASTY       BYPASS GRAFT ARTERY CORONARY N/A 9/8/2021    Procedure: CORONARY ARTERY BYPASS GRAFTING X 4 WITH ENDOSCOPIC VEIN HARVESTING [place] PREET-DISTAL RCA LEFT RADIAL-OM LEFT SV-ANOMOLOUS RV BRANCH ON PUMP/HILLARY;  Surgeon: Antelmo Mccullough MD;  Location:  OR     CV HEART CATHETERIZATION WITH POSSIBLE INTERVENTION N/A 9/2/2021    Procedure: Heart Catheterization with Possible Intervention;  Surgeon: Paul Warner MD;  Location:  HEART CARDIAC CATH LAB     CV LEFT VENTRICULOGRAM N/A 9/2/2021    Procedure: Left Ventriculogram;  Surgeon: Paul Warner MD;  Location:  HEART CARDIAC CATH LAB           CURRENT MEDICATIONS:    Patient's Medications   New Prescriptions    No medications on file   Previous Medications    ACETAMINOPHEN (TYLENOL) 325 MG TABLET    Take 2 tablets (650 mg) by mouth every 4 hours as needed for other (For optimal non-opioid multimodal pain management to improve pain control.)    ALCOHOL SWAB PREP PADS    Use to swab area of injection/rupali as directed.    ALLOPURINOL (ZYLOPRIM) 100 MG TABLET    Take 1 tablet (100 mg) by mouth daily    AMLODIPINE (NORVASC) 5 MG TABLET    Take 1 tablet (5 mg) by mouth daily    ASPIRIN (ASA) 81 MG CHEWABLE TABLET    1 tablet (81 mg) by Oral or NG Tube route daily    BLOOD GLUCOSE (NO BRAND SPECIFIED) TEST STRIP    Use to test blood sugar 4 times daily or as directed.    BLOOD GLUCOSE CALIBRATION (NO BRAND SPECIFIED) SOLUTION    Use to calibrate blood glucose monitor as needed as directed.    BLOOD GLUCOSE MONITORING (NO BRAND SPECIFIED) METER DEVICE KIT    Use to test blood sugar 4 times daily or as directed.    FUROSEMIDE (LASIX) 20 MG TABLET    Take 1 tablet (20 mg) by mouth daily    GABAPENTIN (NEURONTIN) 300 MG CAPSULE    Take 1 capsule (300 mg) by mouth 3 times daily    GUAIFENESIN (ROBITUSSIN) 20 MG/ML SOLN SOLUTION    Take 10 mLs by mouth every 4 hours as needed  for cough    INSULIN ASPART (NOVOLOG PEN) 100 UNIT/ML PEN    Inject 5 Units Subcutaneous 3 times daily (with meals)    INSULIN GLARGINE (LANTUS PEN) 100 UNIT/ML PEN    Inject 25 Units Subcutaneous 2 times daily    LOSARTAN (COZAAR) 50 MG TABLET    Take 1 tablet (50 mg) by mouth daily    METOPROLOL TARTRATE (LOPRESSOR) 50 MG TABLET    Take 1 tablet (50 mg) by mouth 2 times daily    NITROGLYCERIN (NITROSTAT) 0.4 MG SUBLINGUAL TABLET    For chest pain place 1 tablet under the tongue every 5 minutes for 3 doses. If symptoms persist 5 minutes after 1st dose call 911.    OXYCODONE (ROXICODONE) 5 MG TABLET    Take 1 tablet (5 mg) by mouth every 6 hours as needed for moderate to severe pain    ROSUVASTATIN (CRESTOR) 10 MG TABLET    Take 1 tablet (10 mg) by mouth daily    THIN (NO BRAND SPECIFIED) LANCETS    Use with lanceting device.   Modified Medications    No medications on file   Discontinued Medications    No medications on file       ALLERGIES:  No Known Allergies    FAMILY HISTORY:  Family History   Problem Relation Age of Onset     Hypertension Mother      Diabetes Maternal Aunt      Diabetes Maternal Uncle      Cancer No family hx of      Cerebrovascular Disease No family hx of        SOCIAL HISTORY:   Social History     Socioeconomic History     Marital status:      Spouse name: Not on file     Number of children: Not on file     Years of education: Not on file     Highest education level: Not on file   Occupational History     Not on file   Tobacco Use     Smoking status: Former Smoker     Packs/day: 1.50     Years: 13.00     Pack years: 19.50     Types: Cigarettes     Quit date: 2003     Years since quittin.9     Smokeless tobacco: Never Used   Substance and Sexual Activity     Alcohol use: No     Drug use: No     Sexual activity: Yes     Partners: Female   Other Topics Concern     Not on file   Social History Narrative     Not on file     Social Determinants of Health     Financial Resource  Strain: Not on file   Food Insecurity: Not on file   Transportation Needs: Not on file   Physical Activity: Not on file   Stress: Not on file   Social Connections: Not on file   Intimate Partner Violence: Not on file   Housing Stability: Not on file       VITALS:  Patient Vitals for the past 24 hrs:   BP Temp Temp src Pulse Resp SpO2 Weight   11/13/21 0320 -- -- -- 85 16 94 % --   11/13/21 0310 -- -- -- 86 16 96 % --   11/13/21 0300 -- -- -- 82 16 96 % --   11/13/21 0250 -- -- -- 86 16 94 % --   11/13/21 0230 -- -- -- 78 16 -- --   11/13/21 0200 -- -- -- 79 18 96 % --   11/13/21 0145 -- -- -- 78 18 97 % --   11/13/21 0140 -- -- -- 79 20 98 % --   11/13/21 0130 -- -- -- 83 18 99 % --   11/13/21 0030 (!) 170/88 -- -- 81 17 95 % --   11/13/21 0020 -- -- -- 77 20 95 % --   11/13/21 0015 (!) 144/87 -- -- 79 20 95 % --   11/13/21 0000 (!) 161/90 -- -- 80 16 94 % --   11/12/21 2345 (!) 155/85 -- -- 78 16 99 % --   11/12/21 2340 -- -- -- 79 20 100 % --   11/12/21 2330 (!) 142/88 -- -- 77 16 95 % --   11/12/21 2315 (!) 151/93 -- -- 79 20 97 % --   11/12/21 2300 (!) 160/98 -- -- 79 24 96 % --   11/12/21 2242 (!) 174/90 (!) 96  F (35.6  C) Temporal 83 16 99 % 79.4 kg (175 lb)       PHYSICAL EXAM      Vitals: BP (!) 170/88   Pulse 85   Temp (!) 96  F (35.6  C) (Temporal)   Resp 16   Wt 79.4 kg (175 lb)   SpO2 94%   BMI 24.41 kg/m    General: Appears in no acute distress, awake, alert, interactive. No tripoding.   Eyes: Conjunctivae non-injected. Sclera anicteric.  HENT: Atraumatic. No tonsillar exudate. No drooling. Coarse voice. Myositis to right eyelid.   Neck: Supple. Full ROM of neck, nontender.   Respiratory/Chest: No crackles. Wheezing that appears to be coming mostly from the neck with inhalation and exhalation. Audible stridor.   Heart: RRR  Abdomen: non distended  Musculoskeletal: Normal extremities. No edema or erythema.  Skin: Normal color. No rash or diaphoresis. Well healed sternotomy incision.   Neurologic:  Face symmetric, moves all extremities spontaneously. Speech clear.  Psychiatric: Oriented to person, place, and time. Affect appropriate.    LAB:  All pertinent labs reviewed and interpreted.  Results for orders placed or performed during the hospital encounter of 11/12/21   CT Chest Pulmonary Embolism w Contrast    Impression    IMPRESSION:  1.  Negative for pulmonary embolism.    2.  Generalized bronchiectasis.    3.  Mild diffuse hazy opacities in the lungs bilaterally are nonspecific and may be due to a low-grade infiltrate versus edema with edema favored. Clinical correlation.    4.  Recent postoperative changes of sternotomy. No fluid collections or abscess formation.    5.  There appears to be some generalized soft tissue edema in the laryngeal region. Please see separate neck CT report for those details.   Soft tissue neck CT w contrast    Impression    IMPRESSION:   1.  Nonspecific changes involving the aryepiglottic folds as well as the area of the arytenoid cartilages. There is nonspecific soft tissue thickening of the aryepiglottic folds without airway compromise. Overall would favor chronic changes relating to   trauma or potentially long-term intubation. While considered less likely, component of active inflammation would be difficult to completely exclude.  2.  Otherwise unremarkable CT soft tissue neck.   Extra Blue Top Tube   Result Value Ref Range    Hold Specimen JIC    Extra Red Top Tube   Result Value Ref Range    Hold Specimen JIC    Extra Green Top (Lithium Heparin) Tube   Result Value Ref Range    Hold Specimen JIC    Extra Purple Top Tube   Result Value Ref Range    Hold Specimen RECEIVED    Extra Green Top (Lithium Heparin) ON ICE   Result Value Ref Range    Hold Specimen RECEIVED    B-Type Natriuretic Peptide ([facility] Only)   Result Value Ref Range    BNP 93 (H) 0 - 43 pg/mL   Troponin I (now)   Result Value Ref Range    Troponin I <0.01 0.00 - 0.29 ng/mL   Basic metabolic panel   Result  Value Ref Range    Sodium 138 136 - 145 mmol/L    Potassium 3.7 3.5 - 5.0 mmol/L    Chloride 99 98 - 107 mmol/L    Carbon Dioxide (CO2) 28 22 - 31 mmol/L    Anion Gap 11 5 - 18 mmol/L    Urea Nitrogen 18 8 - 22 mg/dL    Creatinine 0.90 0.70 - 1.30 mg/dL    Calcium 10.5 8.5 - 10.5 mg/dL    Glucose 126 (H) 70 - 125 mg/dL    GFR Estimate >90 >60 mL/min/1.73m2   Symptomatic Influenza A/B & SARS-CoV2 (COVID-19) Virus PCR Multiplex Nasopharyngeal    Specimen: Nasopharyngeal; Swab   Result Value Ref Range    Influenza A target Negative Negative    Influenza B target Negative Negative    SARS CoV2 PCR Negative Negative   D dimer quantitative   Result Value Ref Range    D-Dimer Quantitative 1.23 (H) 0.00 - 0.50 ug/mL FEU   Blood gas venous   Result Value Ref Range    pH Venous 7.42 7.35 - 7.45    pCO2 Venous 49 35 - 50 mm Hg    pO2 Venous 46 25 - 47 mm Hg    Bicarbonate Venous 29 24 - 30 mmol/L    Base Excess/Deficit (+/-) 7.0   mmol/L    Oxyhemoglobin Venous 79.3 (H) 70.0 - 75.0 %    O2 Sat, Venous 80.9 (H) 70.0 - 75.0 %   CBC with platelets and differential   Result Value Ref Range    WBC Count 9.6 4.0 - 11.0 10e3/uL    RBC Count 5.51 4.40 - 5.90 10e6/uL    Hemoglobin 13.0 (L) 13.3 - 17.7 g/dL    Hematocrit 42.4 40.0 - 53.0 %    MCV 77 (L) 78 - 100 fL    MCH 23.6 (L) 26.5 - 33.0 pg    MCHC 30.7 (L) 31.5 - 36.5 g/dL    RDW 14.4 10.0 - 15.0 %    Platelet Count 318 150 - 450 10e3/uL    % Neutrophils 56 %    % Lymphocytes 29 %    % Monocytes 7 %    % Eosinophils 7 %    % Basophils 1 %    % Immature Granulocytes 0 %    NRBCs per 100 WBC 0 <1 /100    Absolute Neutrophils 5.4 1.6 - 8.3 10e3/uL    Absolute Lymphocytes 2.8 0.8 - 5.3 10e3/uL    Absolute Monocytes 0.7 0.0 - 1.3 10e3/uL    Absolute Eosinophils 0.7 0.0 - 0.7 10e3/uL    Absolute Basophils 0.1 0.0 - 0.2 10e3/uL    Absolute Immature Granulocytes 0.0 <=0.0 10e3/uL    Absolute NRBCs 0.0 10e3/uL       RADIOLOGY:  Reviewed all pertinent imaging. Please see official radiology  report.  Soft tissue neck CT w contrast   Final Result   IMPRESSION:    1.  Nonspecific changes involving the aryepiglottic folds as well as the area of the arytenoid cartilages. There is nonspecific soft tissue thickening of the aryepiglottic folds without airway compromise. Overall would favor chronic changes relating to    trauma or potentially long-term intubation. While considered less likely, component of active inflammation would be difficult to completely exclude.   2.  Otherwise unremarkable CT soft tissue neck.      CT Chest Pulmonary Embolism w Contrast   Final Result   IMPRESSION:   1.  Negative for pulmonary embolism.      2.  Generalized bronchiectasis.      3.  Mild diffuse hazy opacities in the lungs bilaterally are nonspecific and may be due to a low-grade infiltrate versus edema with edema favored. Clinical correlation.      4.  Recent postoperative changes of sternotomy. No fluid collections or abscess formation.      5.  There appears to be some generalized soft tissue edema in the laryngeal region. Please see separate neck CT report for those details.          EKG:    None.       PROCEDURES:       [facility]    Procedure: Indirect laryngoscopy    Date/Time: 11/13/2021 3:21 AM  Performed by: Paul Paez MD  Authorized by: Paul Paez MD        ANESTHESIA    Local Anesthetic: Lidocaine 1% with epinephrine (atomized)  Anesthetic Total (mL):  3    PROCEDURE   Patient Tolerance:  Patient tolerated the procedure well with no immediate complications  Describe Procedure: Indirect laryngoscopy.    Right naris, lidocaine with Afrin atomized, Urojet.  Using fiberoptic scope and direct laryngoscopy performed.  No visible vocal cord mass.  No visible vocal cord paralysis.     Patient tolerated procedure without difficulties or complications         Vicki LUNDBERG, am serving as a scribe to document services personally performed by Dr. Paez based on my observation and the  provider's statements to me. I, Yfn Paez MD attest that Vicki Jarrett is acting in a scribe capacity, has observed my performance of the services and has documented them in accordance with my direction.    Yfn Paez M.D.  Emergency Medicine  [facility] EMERGENCY DEPARTMENT  [address] [place] [id number]  [phone]  Dept: [phone]     Paul Paez MD  11/13/21 0325

## 2021-11-13 NOTE — H&P
ADMISSION HISTORY & PHYSICAL        Patient YOB: 1970  Admit date: 11/12/2021  Date of Service: 11/13/2021    ASSESSMENT AND PLAN:  51 year old male presenting with:    Wheezing and hoarseness of voice  -Following recent CABG x4  admitted from 9/2/2021-9/22/2021. At that time required intubation and mechanical ventilation postoperatively for 6 days  -Now presenting with hoarseness and wheezing over the last 2 weeks following URI symptoms initially worsening over the last 2 days.  -CT neck showing nonspecific changes involving aryepiglottic folds and arytenoid cartilage without airway compromise-chronic related to intubation changes versus acute.  -Bedside  indirect laryngoscope done by ED provider with no obvious vocal cord paralysis or mass noted.  -CT chest with bilateral diffuse opacity secondary to low-grade infiltrates versus edema. BNP 93 down from 296 on prior admission.  -Dexamethasone 4 mg twice daily, started on IV Zosyn at ED, will add azithromycin  -DuoNebs every 6 hours  -ENT consult, close observation for any change in status, continuous pulse oximetry monitoring.      Hypertension  -Resume home meds after med rec, hydralazine pRN      Ischemic cardiomyopathy  -Last echo from 9/14/2021 with LVEF 40- 50% with hypokinesis of inferior wall  -BNP 93 from 296 prior prior admission but CT chest with possible edema, received IV Lasix at ED. Will continue with lasix 20 mg q12hrs, monitor I/o, daily wt         Hyperlipidemia  -Resume statin    Type II DM  -Sliding scale insulin    Gout  -PTA allopurinol    CODE STATUS: Ful code   DVT proph-Lovenox    Disposition:  -Anticipated Length of Stay in midnights and medical necessity (including a midnight in the Emergency Department after triage if applicable):> 2 days       CHIEF COMPLAINT:  Worsening shortness of breath and hoarsness    HISTORY OF PRESENTING ILLNESS:    51-year-old male with past medical history of CAD status post CABG x4 recently  discharged after admitted from 9/2/2021-9/22/2021. At that time required intubation and mechanical ventilation postoperatively for 6 days, presents today with hoarseness and difficulty breathing for the last 2 weeks. Patient was improving after discharge but 2 weeks ago he had URI symptoms and later afterwards started to have cough productive of scanty sputum. Progressively worse with with audible wheezing and hoarseness of voice worsening over the last 2 days. Denies any difficulty swallowing, no fever or chills at this time. No nausea or vomiting no diarrhea. Work-up at ED CT neck showing nonspecific changes involving aryepiglottic folds and arytenoid cartilages without airway compromise. There is overall favor chronic changes related to trauma or potentially long-term intubation. But also could be a component of acute inflammation. CT chest with generalized bronchiectasis and mild diffuse hazy opacities bilaterally secondary to low-grade infiltrate versus edema.    PMH/PSH:  Patient Active Problem List   Diagnosis     Cardiovascular stress test abnormal     Coronary artery disease involving native coronary artery without angina pectoris     NSTEMI (non-ST elevated myocardial infarction) (H)     Dehydration     Hyperglycemia     Diabetes mellitus type 2 without retinopathy (H)     History of cardioversion     S/P CABG (coronary artery bypass graft) x4     Hoarseness       Past Medical History:   Diagnosis Date     Coronary artery disease      Diabetes mellitus, type II (H)      Gout      High cholesterol      History of cardioversion 11/1/2021     HLD (hyperlipidemia)      Hypertension      S/P CABG (coronary artery bypass graft) x4 11/1/2021        Past Surgical History:   Procedure Laterality Date     ANGIOPLASTY       BYPASS GRAFT ARTERY CORONARY N/A 9/8/2021    Procedure: CORONARY ARTERY BYPASS GRAFTING X 4 WITH ENDOSCOPIC VEIN HARVESTING [place] PREET-DISTAL RCA LEFT RADIAL-OM LEFT SV-ANOMOLOUS RV BRANCH ON  PUMP/HILLARY;  Surgeon: Antelmo Mccullough MD;  Location:  OR     CV HEART CATHETERIZATION WITH POSSIBLE INTERVENTION N/A 9/2/2021    Procedure: Heart Catheterization with Possible Intervention;  Surgeon: Paul Warner MD;  Location:  HEART CARDIAC CATH LAB     CV LEFT VENTRICULOGRAM N/A 9/2/2021    Procedure: Left Ventriculogram;  Surgeon: Paul Warner MD;  Location:  HEART CARDIAC CATH LAB          ALLERGIES:  No Known Allergies    MEDICATIONS:  Reviewed.  No current facility-administered medications for this encounter.     Current Outpatient Medications   Medication     acetaminophen (TYLENOL) 325 MG tablet     alcohol swab prep pads     allopurinol (ZYLOPRIM) 100 MG tablet     amLODIPine (NORVASC) 5 MG tablet     aspirin (ASA) 81 MG chewable tablet     blood glucose (NO BRAND SPECIFIED) test strip     blood glucose calibration (NO BRAND SPECIFIED) solution     blood glucose monitoring (NO BRAND SPECIFIED) meter device kit     furosemide (LASIX) 20 MG tablet     gabapentin (NEURONTIN) 300 MG capsule     guaiFENesin (ROBITUSSIN) 20 mg/mL SOLN solution     insulin aspart (NOVOLOG PEN) 100 UNIT/ML pen     insulin glargine (LANTUS PEN) 100 UNIT/ML pen     losartan (COZAAR) 50 MG tablet     metoprolol tartrate (LOPRESSOR) 50 MG tablet     nitroGLYcerin (NITROSTAT) 0.4 MG sublingual tablet     oxyCODONE (ROXICODONE) 5 MG tablet     rosuvastatin (CRESTOR) 10 MG tablet     thin (NO BRAND SPECIFIED) lancets     No current facility-administered medications for this encounter.    Current Outpatient Medications:      acetaminophen (TYLENOL) 325 MG tablet, Take 2 tablets (650 mg) by mouth every 4 hours as needed for other (For optimal non-opioid multimodal pain management to improve pain control.), Disp: 200 tablet, Rfl: 0     alcohol swab prep pads, Use to swab area of injection/rupali as directed., Disp: 100 each, Rfl: 3     allopurinol (ZYLOPRIM) 100 MG tablet, Take 1 tablet (100 mg) by mouth daily, Disp:  30 tablet, Rfl: 3     amLODIPine (NORVASC) 5 MG tablet, Take 1 tablet (5 mg) by mouth daily, Disp: 30 tablet, Rfl: 2     aspirin (ASA) 81 MG chewable tablet, 1 tablet (81 mg) by Oral or NG Tube route daily, Disp: 100 tablet, Rfl: 1     blood glucose (NO BRAND SPECIFIED) test strip, Use to test blood sugar 4 times daily or as directed., Disp: 100 strip, Rfl: 6     blood glucose calibration (NO BRAND SPECIFIED) solution, Use to calibrate blood glucose monitor as needed as directed., Disp: 200 each, Rfl: 0     blood glucose monitoring (NO BRAND SPECIFIED) meter device kit, Use to test blood sugar 4 times daily or as directed., Disp: 1 kit, Rfl: 0     furosemide (LASIX) 20 MG tablet, Take 1 tablet (20 mg) by mouth daily, Disp: , Rfl:      gabapentin (NEURONTIN) 300 MG capsule, Take 1 capsule (300 mg) by mouth 3 times daily, Disp: 30 capsule, Rfl: 0     guaiFENesin (ROBITUSSIN) 20 mg/mL SOLN solution, Take 10 mLs by mouth every 4 hours as needed for cough, Disp: 236 mL, Rfl: 0     insulin aspart (NOVOLOG PEN) 100 UNIT/ML pen, Inject 5 Units Subcutaneous 3 times daily (with meals), Disp: 4.5 mL, Rfl: 0     insulin glargine (LANTUS PEN) 100 UNIT/ML pen, Inject 25 Units Subcutaneous 2 times daily, Disp: 15 mL, Rfl: 0     losartan (COZAAR) 50 MG tablet, Take 1 tablet (50 mg) by mouth daily, Disp: , Rfl:      metoprolol tartrate (LOPRESSOR) 50 MG tablet, Take 1 tablet (50 mg) by mouth 2 times daily, Disp: , Rfl:      nitroGLYcerin (NITROSTAT) 0.4 MG sublingual tablet, For chest pain place 1 tablet under the tongue every 5 minutes for 3 doses. If symptoms persist 5 minutes after 1st dose call 911., Disp: 30 tablet, Rfl: 1     oxyCODONE (ROXICODONE) 5 MG tablet, Take 1 tablet (5 mg) by mouth every 6 hours as needed for moderate to severe pain, Disp: 6 tablet, Rfl: 0     rosuvastatin (CRESTOR) 10 MG tablet, Take 1 tablet (10 mg) by mouth daily, Disp: 30 tablet, Rfl: 3     thin (NO BRAND SPECIFIED) lancets, Use with lanceting  device., Disp: 200 each, Rfl: 0   Scheduled Meds:  Continuous Infusions:  PRN Meds:.    SOCIAL HISTORY:  Social History     Socioeconomic History     Marital status:      Spouse name: Not on file     Number of children: Not on file     Years of education: Not on file     Highest education level: Not on file   Occupational History     Not on file   Tobacco Use     Smoking status: Former Smoker     Packs/day: 1.50     Years: 13.00     Pack years: 19.50     Types: Cigarettes     Quit date: 2003     Years since quittin.9     Smokeless tobacco: Never Used   Substance and Sexual Activity     Alcohol use: No     Drug use: No     Sexual activity: Yes     Partners: Female   Other Topics Concern     Not on file   Social History Narrative     Not on file     Social Determinants of Health     Financial Resource Strain: Not on file   Food Insecurity: Not on file   Transportation Needs: Not on file   Physical Activity: Not on file   Stress: Not on file   Social Connections: Not on file   Intimate Partner Violence: Not on file   Housing Stability: Not on file       FAMILY HISTORY:  Family History   Problem Relation Age of Onset     Hypertension Mother      Diabetes Maternal Aunt      Diabetes Maternal Uncle      Cancer No family hx of      Cerebrovascular Disease No family hx of         ROS:  12 point review of systems reviewed and is negative except for what has already been mentioned in HPI.       PHYSICAL EXAM:  GENRL:    BP (!) 170/88   Pulse 85   Temp (!) 96  F (35.6  C) (Temporal)   Resp 16   Wt 79.4 kg (175 lb)   SpO2 94%   BMI 24.41 kg/m    No intake/output data recorded.  I/O this shift:  In: -   Out: 700 [Urine:700]  HEENT: NC/AT  CHEST: Bilateral wheezing and hoarseness of voice noted  HEART: S1S2 normal, regular.   ABDMN: Soft. Non-tender, non-distended.  No guarding or rigidity. Bowel sounds- active  EXTRM: No pedal edema   INTGM: No skin rash, no cyanosis or clubbing  MUSCULOSKELETAL: no joint  tenderness or swelling on upper and lower extremities  NEURO: Alert and oriented. No focal neurological deficit.        DIAGNOSTIC DATA:    Recent Labs   Lab 11/12/21 2303   WBC 9.6   HGB 13.0*   HCT 42.4          Recent Labs   Lab 11/12/21 2303      CO2 28   BUN 18       No results for input(s): INR in the last 168 hours.    Recent Labs   Lab 11/12/21  2303      CO2 28   BUN 18       [unfilled]    Chest XR,  PA & LAT    Result Date: 10/21/2021  EXAM: XR CHEST 2 VW LOCATION: [facility] DATE/TIME: 10/21/2021 5:22 AM INDICATION: Right chest pain. COMPARISON: 9/20/2021. FINDINGS: Sternal wires and mediastinal clips. No pneumothorax. The heart size is normal. Curvilinear scarring in the left lung laterally. The lungs are otherwise clear. No pleural effusion.     IMPRESSION: No acute abnormality.    CT Chest Pulmonary Embolism w Contrast    Result Date: 11/13/2021  EXAM: CT CHEST PULMONARY EMBOLISM W CONTRAST LOCATION: [facility] DATE/TIME: 11/13/2021 12:36 AM INDICATION: Recent CABG, hemoptysis, shortness of breath. COMPARISON: 9/3/2021. TECHNIQUE: CT chest pulmonary angiogram during arterial phase injection of IV contrast. Multiplanar reformats and MIP reconstructions were performed. Dose reduction techniques were used. CONTRAST: isovue 370 100ml FINDINGS: ANGIOGRAM CHEST: Pulmonary arteries are normal caliber and negative for pulmonary emboli. Thoracic aorta is negative for dissection. No CT evidence of right heart strain. LUNGS AND PLEURA: Generalized bronchiectasis. Mild hazy opacities in the lungs diffusely may be due to low-grade infiltrate versus edema with edema favored. Clinical correlation. Scattered areas of subpleural atelectasis. No pleural effusions. MEDIASTINUM/AXILLAE: Recent postoperative changes of sternotomy with coronary artery bypass surgery. No fluid collections or abscess formation. Trace amount of pericardial fluid  anteriorly. No adenopathy. Mild cardiac enlargement. Normal caliber thoracic  aorta. Esophagus is grossly negative. There appears to be some generalized edema in the laryngeal region. Please see separate neck CT report for those details. CORONARY ARTERY CALCIFICATION: CABG UPPER ABDOMEN: Benign-appearing left renal cysts. MUSCULOSKELETAL: Recent sternotomy.     IMPRESSION: 1.  Negative for pulmonary embolism. 2.  Generalized bronchiectasis. 3.  Mild diffuse hazy opacities in the lungs bilaterally are nonspecific and may be due to a low-grade infiltrate versus edema with edema favored. Clinical correlation. 4.  Recent postoperative changes of sternotomy. No fluid collections or abscess formation. 5.  There appears to be some generalized soft tissue edema in the laryngeal region. Please see separate neck CT report for those details.    Soft tissue neck CT w contrast    Result Date: 11/13/2021  EXAM: CT SOFT TISSUE NECK W CONTRAST LOCATION: [facility] DATE/TIME: 11/13/2021 12:36 AM INDICATION: Difficulty swallowing, hoarseness and wheezing, recent prolonged intubation. COMPARISON: None. CONTRAST: Isovue 370 100ml TECHNIQUE: Routine CT Soft Tissue Neck with IV contrast. Multiplanar reformats. Dose reduction techniques were used. FINDINGS: MUCOSAL SPACES/SOFT TISSUES: Normal mucosal spaces of the upper aerodigestive tract. No mucosal mass or inflammation identified. Artifact at the level of the true vocal cords. There is some nonspecific soft tissue thickening of the aryepiglottic folds. In addition, the arachnoid cartilages, tracheal cartilage and cricoid cartilage are not well ossified. In the expected location of the arytenoid cartilages, there are low-attenuation changes. No airway compromise. Normal parapharyngeal space and subcutaneous soft tissues. Normal oral cavity,  spaces, and floor of mouth structures. LYMPH NODES: No pathologic lymph nodes by size or morphology criteria.  SALIVARY GLANDS: Normal parotid and submandibular glands. THYROID: Normal. VESSELS: Vascular structures of the neck are patent. VISUALIZED INTRACRANIAL/ORBITS/SINUSES: No abnormality of the visualized intracranial compartment or orbits. Visualized paranasal sinuses and mastoid air cells are clear. OTHER: No destructive osseous lesion. The included lung apices are clear.     IMPRESSION: 1.  Nonspecific changes involving the aryepiglottic folds as well as the area of the arytenoid cartilages. There is nonspecific soft tissue thickening of the aryepiglottic folds without airway compromise. Overall would favor chronic changes relating to trauma or potentially long-term intubation. While considered less likely, component of active inflammation would be difficult to completely exclude. 2.  Otherwise unremarkable CT soft tissue neck.      Patient's new lab studies reviewed personally.  Patient's new radiology reports reviewed personally.      Note created using dragon voice recognition software.  Errors in spelling or words which seems out of context are unintentional.  Sounds alike errors may have escaped editing.     11/13/2021      Jaylen Perez  [facility]MS

## 2021-11-13 NOTE — PROGRESS NOTES
[facility] Progress Note       ASSESSMENT:    Active Problems:    Hoarseness      PLAN:   Patient seen and examined. Historical data reviewed. Please see admission H/P for additional information.     51-year-old male with history of CAD status post CABG recently discharged 9/22/2021 presents with frequent cough, WHITE and scant hemoptysis with voice hoarseness and wheezing.      Viral URI with concern for reactive airway exacerbation: Clinical scenario most consistent with viral URI given frequent coughing episodes followed by voice hoarseness and then wheezing with associated dyspnea.  Patient has received Decadron and nebulizers in the emergency room and feels remarkably improved.  CTA of the chest shows generalized bronchiectasis with mild hazy opacities bilaterally that seem to be nonspecific and most likely due to edema rather than infiltrate.  Of note WBC normal and procalcitonin normal.  Influenza and COVID-19 negative  --Continue treatment for likely viral URI with reactive airway disease with burst of oral prednisone, scheduled duo nebs, as needed albuterol nebs  --Hold off empiric antibiotics given lack of inflammatory markers and imaging most consistent with mild pulmonary edema      Voice hoarseness and wheezing: Likely viral laryngitis with concern for rective airways disease  Of note is concern for epiglottic inflammation seen on CT soft tissue of the neck that is likely due to recent intubation.  This was discussed with ENT on-call and they recommend empiric steroids and inhalers and outpatient ENT referral for laryngoscopy if needed.  It is noted that direct laryngoscopic visualization was performed by ED physician and there was no evidence of vocal cord paralysis or mass.  --Continue prednisone burst, scheduled duo nebs and likely will send home with inhaler      Bilateral pulmonary opacities: This is likely to favor edema rather than infiltrate.  Patient is not hypoxic.  BNP noted to  be elevated but decreased from previous  --Continue scheduled IV Lasix      Acute on chronic systolic CHF: Noted is EF of 45 to 50% on last TTE and mild bilateral pulmonary edema seen on CTA of the chest on admission with elevated BNP  --Continue IV Lasix as above, likely will increase home dose Lasix at discharge  --Continue home metoprolol      Scant hemoptysis: Likely airway irritation from violent coughing, this has improved with steroids  --Monitor clinically for any recurrent hemoptysis or hypoxia      History of hypertension: Hold home losartan and amlodipine given soft blood pressures, continue home metoprolol as hemodynamics tolerate      CAD: Continue home aspirin, statin, metoprolol      DM2: With evidence of steroid-induced hyperglycemia  --Increase home dose Lantus to 30 units twice daily  --Placed on resistant sliding scale insulin  --Continue home mealtime coverage  --Continue home Metformin      DVT PPX: Subcu Lovenox          Ed Tipton D.O.  [phone]

## 2021-11-13 NOTE — ED NOTES
Pt is s/p CABG x 4 VD last Sept..  Has hoarseness after being intubated.  Past week coughing and SOB worse x 1 week and increased last 2 days.  Pt is audible wheezing and feels hoarseness is worse as well.  Noted intermittent coughing.  Pt's incision looks good except for left abdomen small incision still has a scab on

## 2021-11-13 NOTE — PHARMACY-ADMISSION MEDICATION HISTORY
Pharmacy Note - Admission Medication History    Pertinent Provider Information: Pt states he takes Tylenol regularly for pain and Gabapentin or oxycodone in addition only if pain is severe and not controlled by Tylenol.      ______________________________________________________________________    Prior To Admission (PTA) med list completed and updated in EMR.       PTA Med List   Medication Sig Last Dose     acetaminophen (TYLENOL) 325 MG tablet Take 2 tablets (650 mg) by mouth every 4 hours as needed for other (For optimal non-opioid multimodal pain management to improve pain control.) 11/12/2021 at Unknown time     allopurinol (ZYLOPRIM) 100 MG tablet Take 1 tablet (100 mg) by mouth daily 11/12/2021 at Unknown time     amLODIPine (NORVASC) 5 MG tablet Take 1 tablet (5 mg) by mouth daily 11/12/2021 at Unknown time     aspirin (ASA) 81 MG chewable tablet 1 tablet (81 mg) by Oral or NG Tube route daily 11/12/2021 at Unknown time     furosemide (LASIX) 20 MG tablet Take 1 tablet (20 mg) by mouth daily 11/12/2021 at Unknown time     gabapentin (NEURONTIN) 300 MG capsule Take 1 capsule (300 mg) by mouth 3 times daily (Patient taking differently: Take 300 mg by mouth 3 times daily as needed ) Past Week at Unknown time     insulin aspart (NOVOLOG FLEXPEN) 100 UNIT/ML pen Inject 5 Units Subcutaneous 3 times daily (with meals) 11/12/2021 at Unknown time     insulin glargine (LANTUS PEN) 100 UNIT/ML pen Inject 25 Units Subcutaneous 2 times daily 11/12/2021 at Unknown time     losartan (COZAAR) 50 MG tablet Take 50 mg by mouth 2 times daily  11/12/2021 at Unknown time     metFORMIN (GLUCOPHAGE) 500 MG tablet Take 1,000 mg by mouth 2 times daily (with meals) 11/12/2021 at Unknown time     metoprolol tartrate (LOPRESSOR) 50 MG tablet Take 1 tablet (50 mg) by mouth 2 times daily 11/12/2021 at Unknown time     nitroGLYcerin (NITROSTAT) 0.4 MG sublingual tablet For chest pain place 1 tablet under the tongue every 5 minutes for 3  doses. If symptoms persist 5 minutes after 1st dose call 911. Unknown at prn     oxyCODONE (ROXICODONE) 5 MG tablet Take 1 tablet (5 mg) by mouth every 6 hours as needed for moderate to severe pain 11/9/2021 at prn     rosuvastatin (CRESTOR) 10 MG tablet Take 1 tablet (10 mg) by mouth daily 11/12/2021 at prn       Information source(s): Patient and CareEverywhere/SureScripts  Method of interview communication: in-person    Summary of Changes to PTA Med List  New: Metformin  Discontinued: Amiodarone, guaifenesin, glipizide  Changed: added units to insulin    Patient was asked about OTC/herbal products specifically.  PTA med list reflects this.    In the past week, patient estimated taking medication this percent of the time:  50-90% due to other.    Allergies were reviewed, assessed, and updated with the patient.      Patient does not use any multi-dose medications prior to admission.    The information provided in this note is only as accurate as the sources available at the time of the update(s).    Thank you for the opportunity to participate in the care of this patient.    Sol Vasquez  11/13/2021 9:55 AM

## 2021-11-13 NOTE — PLAN OF CARE
Problem: Airway Clearance Ineffective (Pulmonary Impairment)  Goal: Effective Airway Clearance  Outcome: Improving

## 2021-11-13 NOTE — TREATMENT PLAN
[facility] Treatment Plan    Patient Score: 6  Patient Acuity: 4    Clinical Indication for Therapy: history of bronchospasm and prevent atelectasis    Therapy Ordered: Change scheduled DuoNeb to Combivent per [facility] protocol    Assessment Summary: Per pt, breathing is much better.  Normal respiratory rate and WOB.  BS clear and diminished bilaterally.  On RA.  Strong, productive cough, also improved.  IS to 1250 mL.      Change nebs to MDI per protocol.  Provided spacer.  Discussed MDI use and spacer use with pt.  Updated RN.  Will reevaluate as needed.    Smita Meek, RT  11/13/2021

## 2021-11-13 NOTE — PROGRESS NOTES
[facility] Treatment Plan    Patient Score: 8  Patient Acuity: 4    Clinical Indication for Therapy: Wheezing and hoarseness     Therapy Ordered: Balaji GERARD MD order    Assessment Summary: Patient boarding in the ED. Presented w/ wheezing and hoarseness. BS=diminished bilaterally. Remains on RA. No hx of COPD or asthma; no chronic resp med use. Recent CABGX4, hospitalized at [facility] from 9/2/2021-9/22/2021. Intubated 6 days following heart surgery.  Hosp service ordered scheduled neb txs. Will continue current schedule now now.     /75   Pulse 81   Temp (!) 96  F (35.6  C) (Temporal)   Resp 18   Wt 79.4 kg (175 lb)   SpO2 94%   BMI 24.41 kg/m       Levi Davis, RRT

## 2021-11-13 NOTE — ED NOTES
[facility] ED Handoff Report    ED Chief Complaint: stridor/sob/hoarse since intubated in Sept    ED Diagnosis:  (R06.2) Wheezing  Comment: dry cough, denies asthma/COPD  Plan: nebs prn    (R49.0) Hoarseness  Comment: hoarse voice since CABG 9/2021  Plan: ENT to see patient       PMH:    Past Medical History:   Diagnosis Date     Coronary artery disease      Diabetes mellitus, type II (H)      Gout      High cholesterol      History of cardioversion 11/1/2021     HLD (hyperlipidemia)      Hypertension      S/P CABG (coronary artery bypass graft) x4 11/1/2021        Code Status:  Prior     Falls Risk: No Band: Not applicable    Current Living Situation/Residence: with family     Elimination Status: Continent: Yes     Activity Level: Independent    Patients Preferred Language:  English     Needed: No    Vital Signs:  /73   Pulse 83   Temp (!) 96  F (35.6  C) (Temporal)   Resp 18   Wt 79.4 kg (175 lb)   SpO2 93%   BMI 24.41 kg/m       Cardiac Rhythm: NSR     Pain Score: 2/10    Is the Patient Confused:  No    Last Food or Drink: 11/12/21 prior to arrival in ED    Focused Assessment:  Pt c&a, vss on room air. Hoarse voice and raspy/stridor breathing since he was intubated 2 months ago, got worse the last few days. Wheezing/stridor upper airway, lungs clear/diminished lower. Dry cough. Covid negative.     Tests Performed: Done: Labs and Imaging    Treatments Provided:  Iv antibx, steroid, scoped in ED, CT, labs, diuretic, neb    Family Dynamics/Concerns: No    Family Updated On Visitor Policy: Yes    Plan of Care Communicated to Family: Yes    Who Was Updated about Plan of Care: patient informed family    Belongings Checklist Done and Signed by Patient: Yes    Covid: symptomatic, negative    Additional Information: pt bilingual/no  used in ED    RN: Dalia Muse   11/13/2021 6:22 AM

## 2021-11-14 ENCOUNTER — TELEPHONE (OUTPATIENT)
Dept: FAMILY MEDICINE | Facility: CLINIC | Age: 51
End: 2021-11-14
Payer: COMMERCIAL

## 2021-11-14 VITALS
OXYGEN SATURATION: 96 % | HEART RATE: 78 BPM | WEIGHT: 174 LBS | SYSTOLIC BLOOD PRESSURE: 116 MMHG | BODY MASS INDEX: 24.36 KG/M2 | HEIGHT: 71 IN | RESPIRATION RATE: 20 BRPM | TEMPERATURE: 98 F | DIASTOLIC BLOOD PRESSURE: 74 MMHG

## 2021-11-14 PROBLEM — J06.9 VIRAL URI WITH COUGH: Status: ACTIVE | Noted: 2021-11-14

## 2021-11-14 PROBLEM — R06.2 WHEEZING: Status: ACTIVE | Noted: 2021-11-14

## 2021-11-14 LAB
ANION GAP SERPL CALCULATED.3IONS-SCNC: 10 MMOL/L (ref 5–18)
BASOPHILS # BLD AUTO: 0 10E3/UL (ref 0–0.2)
BASOPHILS NFR BLD AUTO: 0 %
BUN SERPL-MCNC: 45 MG/DL (ref 8–22)
CALCIUM SERPL-MCNC: 9.9 MG/DL (ref 8.5–10.5)
CHLORIDE BLD-SCNC: 102 MMOL/L (ref 98–107)
CO2 SERPL-SCNC: 27 MMOL/L (ref 22–31)
CREAT SERPL-MCNC: 1 MG/DL (ref 0.7–1.3)
EOSINOPHIL # BLD AUTO: 0 10E3/UL (ref 0–0.7)
EOSINOPHIL NFR BLD AUTO: 0 %
ERYTHROCYTE [DISTWIDTH] IN BLOOD BY AUTOMATED COUNT: 14.6 % (ref 10–15)
GFR SERPL CREATININE-BSD FRML MDRD: 86 ML/MIN/1.73M2
GLUCOSE BLD-MCNC: 167 MG/DL (ref 70–125)
GLUCOSE BLDC GLUCOMTR-MCNC: 126 MG/DL (ref 70–99)
GLUCOSE BLDC GLUCOMTR-MCNC: 135 MG/DL (ref 70–99)
GLUCOSE BLDC GLUCOMTR-MCNC: 143 MG/DL (ref 70–99)
HCT VFR BLD AUTO: 38.7 % (ref 40–53)
HGB BLD-MCNC: 12 G/DL (ref 13.3–17.7)
IMM GRANULOCYTES # BLD: 0 10E3/UL
IMM GRANULOCYTES NFR BLD: 0 %
LYMPHOCYTES # BLD AUTO: 1.9 10E3/UL (ref 0.8–5.3)
LYMPHOCYTES NFR BLD AUTO: 16 %
MCH RBC QN AUTO: 23.8 PG (ref 26.5–33)
MCHC RBC AUTO-ENTMCNC: 31 G/DL (ref 31.5–36.5)
MCV RBC AUTO: 77 FL (ref 78–100)
MONOCYTES # BLD AUTO: 0.7 10E3/UL (ref 0–1.3)
MONOCYTES NFR BLD AUTO: 6 %
NEUTROPHILS # BLD AUTO: 9.5 10E3/UL (ref 1.6–8.3)
NEUTROPHILS NFR BLD AUTO: 78 %
NRBC # BLD AUTO: 0 10E3/UL
NRBC BLD AUTO-RTO: 0 /100
PLATELET # BLD AUTO: 333 10E3/UL (ref 150–450)
POTASSIUM BLD-SCNC: 3.7 MMOL/L (ref 3.5–5)
RBC # BLD AUTO: 5.05 10E6/UL (ref 4.4–5.9)
SODIUM SERPL-SCNC: 139 MMOL/L (ref 136–145)
WBC # BLD AUTO: 12.1 10E3/UL (ref 4–11)

## 2021-11-14 PROCEDURE — 96372 THER/PROPH/DIAG INJ SC/IM: CPT | Performed by: STUDENT IN AN ORGANIZED HEALTH CARE EDUCATION/TRAINING PROGRAM

## 2021-11-14 PROCEDURE — 250N000012 HC RX MED GY IP 250 OP 636 PS 637: Performed by: INTERNAL MEDICINE

## 2021-11-14 PROCEDURE — 85025 COMPLETE CBC W/AUTO DIFF WBC: CPT | Performed by: STUDENT IN AN ORGANIZED HEALTH CARE EDUCATION/TRAINING PROGRAM

## 2021-11-14 PROCEDURE — G0378 HOSPITAL OBSERVATION PER HR: HCPCS

## 2021-11-14 PROCEDURE — 99217 PR OBSERVATION CARE DISCHARGE: CPT | Performed by: INTERNAL MEDICINE

## 2021-11-14 PROCEDURE — 250N000011 HC RX IP 250 OP 636: Performed by: STUDENT IN AN ORGANIZED HEALTH CARE EDUCATION/TRAINING PROGRAM

## 2021-11-14 PROCEDURE — 36415 COLL VENOUS BLD VENIPUNCTURE: CPT | Performed by: STUDENT IN AN ORGANIZED HEALTH CARE EDUCATION/TRAINING PROGRAM

## 2021-11-14 PROCEDURE — 80048 BASIC METABOLIC PNL TOTAL CA: CPT | Performed by: STUDENT IN AN ORGANIZED HEALTH CARE EDUCATION/TRAINING PROGRAM

## 2021-11-14 PROCEDURE — 250N000013 HC RX MED GY IP 250 OP 250 PS 637: Performed by: INTERNAL MEDICINE

## 2021-11-14 PROCEDURE — 96376 TX/PRO/DX INJ SAME DRUG ADON: CPT

## 2021-11-14 RX ORDER — GABAPENTIN 300 MG/1
300 CAPSULE ORAL 3 TIMES DAILY PRN
Qty: 30 CAPSULE | Refills: 0 | Status: SHIPPED | OUTPATIENT
Start: 2021-11-14 | End: 2021-12-29

## 2021-11-14 RX ORDER — FUROSEMIDE 20 MG
20 TABLET ORAL
Status: DISCONTINUED | OUTPATIENT
Start: 2021-11-14 | End: 2021-11-14 | Stop reason: HOSPADM

## 2021-11-14 RX ORDER — ALBUTEROL SULFATE 90 UG/1
2 AEROSOL, METERED RESPIRATORY (INHALATION) EVERY 6 HOURS PRN
Qty: 18 G | Refills: 0 | Status: SHIPPED | OUTPATIENT
Start: 2021-11-14 | End: 2022-06-27

## 2021-11-14 RX ORDER — PANTOPRAZOLE SODIUM 40 MG/1
40 TABLET, DELAYED RELEASE ORAL
Qty: 7 TABLET | Refills: 0 | Status: SHIPPED | OUTPATIENT
Start: 2021-11-15 | End: 2021-11-17

## 2021-11-14 RX ORDER — FUROSEMIDE 20 MG
20 TABLET ORAL
Qty: 42 TABLET | Refills: 0 | Status: SHIPPED | OUTPATIENT
Start: 2021-11-14 | End: 2021-11-17

## 2021-11-14 RX ORDER — PREDNISONE 20 MG/1
40 TABLET ORAL DAILY
Qty: 6 TABLET | Refills: 0 | Status: SHIPPED | OUTPATIENT
Start: 2021-11-15 | End: 2021-11-17

## 2021-11-14 RX ADMIN — ALLOPURINOL 100 MG: 100 TABLET ORAL at 09:09

## 2021-11-14 RX ADMIN — ENOXAPARIN SODIUM 40 MG: 40 INJECTION SUBCUTANEOUS at 09:14

## 2021-11-14 RX ADMIN — ASPIRIN 81 MG CHEWABLE TABLET 81 MG: 81 TABLET CHEWABLE at 09:09

## 2021-11-14 RX ADMIN — PREDNISONE 40 MG: 20 TABLET ORAL at 09:08

## 2021-11-14 RX ADMIN — ONDANSETRON 4 MG: 4 TABLET, ORALLY DISINTEGRATING ORAL at 02:20

## 2021-11-14 RX ADMIN — METOPROLOL TARTRATE 50 MG: 25 TABLET, FILM COATED ORAL at 09:09

## 2021-11-14 RX ADMIN — ROSUVASTATIN CALCIUM 10 MG: 10 TABLET, FILM COATED ORAL at 09:09

## 2021-11-14 RX ADMIN — IPRATROPIUM BROMIDE AND ALBUTEROL 1 PUFF: 20; 100 SPRAY, METERED RESPIRATORY (INHALATION) at 11:33

## 2021-11-14 RX ADMIN — FUROSEMIDE 20 MG: 10 INJECTION, SOLUTION INTRAMUSCULAR; INTRAVENOUS at 09:14

## 2021-11-14 RX ADMIN — INSULIN GLARGINE 30 UNITS: 100 INJECTION, SOLUTION SUBCUTANEOUS at 11:32

## 2021-11-14 RX ADMIN — METFORMIN HYDROCHLORIDE 1000 MG: 500 TABLET ORAL at 09:08

## 2021-11-14 RX ADMIN — INSULIN ASPART 1 UNITS: 100 INJECTION, SOLUTION INTRAVENOUS; SUBCUTANEOUS at 12:37

## 2021-11-14 RX ADMIN — PANTOPRAZOLE SODIUM 40 MG: 20 TABLET, DELAYED RELEASE ORAL at 06:36

## 2021-11-14 ASSESSMENT — ACTIVITIES OF DAILY LIVING (ADL)
ADLS_ACUITY_SCORE: 3

## 2021-11-14 NOTE — TELEPHONE ENCOUNTER
Reason for Call:  Other appointment    Detailed comments: Inf-New pt-Jns respiratory distress f/ within 3-5 days from 11/12/21-11/14/21    Phone Number Patient can be reached at: Cell number on file:    Telephone Information:   Mobile [phone]       Best Time: any    Can we leave a detailed message on this number? n/a    Call taken on 11/14/2021 at 12:52 PM by Rupa Aggarwal

## 2021-11-14 NOTE — CONSULTS
Care Management Initial Consult    General Information  Assessment completed with: Patient,Spouse or significant other, pt and wife Arlen  Type of CM/SW Visit: CM Role Introduction    Primary Care Provider verified and updated as needed: Yes   Readmission within the last 30 days: no previous admission in last 30 days   Return Category: Progression of disease  Reason for Consult: discharge planning  Advance Care Planning: Advance Care Planning Reviewed: no concerns identified          Communication Assessment  Patient's communication style: spoken language (English or Bilingual)    Hearing Difficulty or Deaf: no   Wear Glasses or Blind: no    Cognitive  Cognitive/Neuro/Behavioral: WDL                      Living Environment:   People in home: spouse,child(pankaj), dependent     Current living Arrangements: house      Able to return to prior arrangements: yes       Family/Social Support:  Care provided by: self  Provides care for: no one  Marital Status:   Wife          Description of Support System: Supportive    Support Assessment: Adequate family and caregiver support    Current Resources:   Patient receiving home care services: No     Community Resources: None  Equipment currently used at home: none  Supplies currently used at home: None    Employment/Financial:  Employment Status:          Financial Concerns: No concerns identified   Referral to Financial Counselor: No       Lifestyle & Psychosocial Needs:  Social Determinants of Health     Tobacco Use: Medium Risk     Smoking Tobacco Use: Former Smoker     Smokeless Tobacco Use: Never Used   Alcohol Use: Not on file   Financial Resource Strain: Not on file   Food Insecurity: Not on file   Transportation Needs: Not on file   Physical Activity: Not on file   Stress: Not on file   Social Connections: Not on file   Intimate Partner Violence: Not on file   Depression: Not on file   Housing Stability: Not on file       Functional Status:  Prior to admission  patient needed assistance:   Dependent ADLs:: Independent  Dependent IADLs:: Independent  Assesssment of Functional Status: Not at  functional baseline    Mental Health Status:  Mental Health Status: No Current Concerns       Chemical Dependency Status:                Values/Beliefs:  Spiritual, Cultural Beliefs, Confucianism Practices, Values that affect care:                 Additional Information:  IVAN assessed, lives w/wife and children, no svcs and declined HCD. Wife to transport at discharge. Arlen [phone]      Rita Mustafa RN

## 2021-11-14 NOTE — PLAN OF CARE
Pt denied pain. Given zofran x1 for nausea. Up independently in room. Endorses that coughing and hoarseness is getting better. NSR on tele monitor.

## 2021-11-14 NOTE — UTILIZATION REVIEW
"Admission Status; Secondary Review Determination         Under the authority of the Utilization Management Committee, the utilization review process indicated a secondary review on the above patient.  The review outcome is based on review of the medical records, discussions with staff, and applying clinical experience noted on the date of the review.          (x) Observation Status Appropriate - This patient does not meet hospital inpatient criteria and is placed in observation status. If this patient's primary payer is Medicare and was admitted as an inpatient, Condition Code 44 should be used and patient status changed to \"observation\".     RATIONALE FOR DETERMINATION:  50 y/o male presented with increased cough, dyspnea, hoarseness, wheezing.  He was felt to have a probable viral URI.  He was given steroids.  He is not needing oxygen.  He quickly improved and is felt to likely discharge today.      The severity of illness, intensity of service provided, expected LOS and risk for adverse outcome make the care appropriate for further observation; however, doesn't meet criteria for hospital inpatient admission. Dr. Ed Tipton notified of this determination.    The information on this document is developed by the utilization review team in order for the business office to ensure compliance.  This only denotes the appropriateness of proper admission status and does not reflect the quality of care rendered.         The definitions of Inpatient Status and Observation Status used in making the determination above are those provided in the CMS Coverage Manual, Chapter 1 and Chapter 6, section 70.4.      Sincerely,    Steve Schmid DO, [facility]  Utilization Review  Physician Advisor  "

## 2021-11-14 NOTE — PLAN OF CARE
Problem: Adult Inpatient Plan of Care  Goal: Plan of Care Review  Outcome: Adequate for Discharge   Pt ready for discharge, pt would like to change his family practice doctor from [facility] to a [place] doctor. He has a cardiologist in [place] and his cardiac rehab is here at [facility]. I called the appointment line and they took the patients information and will call him back early this week to arrange a new primary care physician for him.   Pt verbalizes understanding the discharge instructions, changes to medications and follow up appointments.   Pt discharged to home via daughter, states he has all of his personal items.

## 2021-11-14 NOTE — PLAN OF CARE
Problem: Pain Acute  Goal: Acceptable Pain Control and Functional Ability  Outcome: No Change  Intervention: Develop Pain Management Plan  Recent Flowsheet Documentation  Taken 11/13/2021 2052 by Marivel Mustafa RN  Pain Management Interventions: medication (see MAR)     Problem: Airway Clearance Ineffective (Pulmonary Impairment)  Goal: Effective Airway Clearance  Outcome: No Change     Up to unit at 1600. A/Ox4. VSS on RA. Continuous pulse ox with O2 sats in high 90s. Speech hoarse and whispers. Tele NSR. C/o 4/10 pain to back under R shoulder blade area, PRN tylenol given x1, effective. Up ind. Ambulated halls. Consistent carb diet with BGM.  and 233, coverage given. 1500 ml fluid restriction. L PIV SL. Using urinal. Encouraged IS every hour. ENT to consult. Neb x1 given by RT, inhalers available. Nursing to continue to monitor.

## 2021-11-14 NOTE — DISCHARGE SUMMARY
[facility] MEDICINE  DISCHARGE SUMMARY      Primary Care Physician: [facility]              Admission Date: 11/12/2021    Discharge Provider: Ed Tipton DO Discharge Date: 11/14/2021    Diet: see discharge orders below Code Status: Full Code    Activity: as tolerated       Condition at Discharge: Stable      REASON FOR ADMISSION (See Admission Note for Details)      SOB    PRINCIPAL DISCHARGE DIAGNOSIS    Active Problems:    S/P CABG (coronary artery bypass graft)    Hoarseness    Wheezing    Viral URI with cough        SIGNIFICANT FINDINGS (Imaging, labs):      See below    PENDING LABS      None    PROCEDURES ( this hospitalization only)       None    RECOMMENDATION FOR F/U VISIT      See below    DISPOSITION ( home, home care, TCU...)      home    SUMMARY OF HOSPITAL COURSE:       51-year-old male with history of CAD status post CABG recently discharged 9/22/2021 presents with frequent cough, WHITE and scant hemoptysis with voice hoarseness and wheezing.        Viral URI with concern for reactive airway exacerbation: Clinical scenario most consistent with viral URI given frequent coughing episodes followed by voice hoarseness and then wheezing with associated dyspnea.  Patient has received Decadron and nebulizers in the emergency room and felt remarkably improved.  CTA of the chest shows generalized bronchiectasis with mild hazy opacities bilaterally that seem to be nonspecific and most likely due to edema rather than infiltrate.  Of note WBC normal and procalcitonin normal.  Influenza and COVID-19 negative  --Continue treatment for likely viral URI with reactive airway disease with burst of oral prednisone, and will send home with as needed albuterol inhaler and pantoprazole for GERD PPX  --Hold off empiric antibiotics given lack of inflammatory markers and imaging most consistent with mild pulmonary edema  -- increase home lasix to 20 mg BID  given mild pulmonary edema  -- outpatient referral to pulmonary medicine and ENT given concern for voice hoarseness and underlying undiagnosed reactive airway disease        Voice hoarseness and wheezing: Likely viral laryngitis with concern for rective airways disease  Of note is concern for epiglottic inflammation seen on CT soft tissue of the neck that is likely due to recent intubation.  This was discussed with ENT on-call and they recommend empiric steroids and inhalers and outpatient ENT referral for laryngoscopy if needed.  It is noted that direct laryngoscopic visualization was performed by ED physician and there was no evidence of vocal cord paralysis or mass.  --Continue prednisone burst, will send home with albuterol as needed, ENT referral made after discharge        Bilateral pulmonary opacities: This is likely to favor edema rather than infiltrate.  Patient is not hypoxic.  BNP noted to be elevated but decreased from previous  --increase home dose lasix to 20 mg BID  -- keep already scheduled CHF follow up appointment with cardiology          Acute on chronic systolic CHF: Noted is EF of 45 to 50% on last TTE and mild bilateral pulmonary edema seen on CTA of the chest on admission with elevated BNP  --increase home dose lasix to 20 mg BID  -- keep already scheduled CHF follow up appointment with cardiology in 2 weeks  --Continue home metoprolol        Scant hemoptysis: Likely airway irritation from violent coughing, this has improved with steroids  --Monitor clinically          History of hypertension: Hold home amlodipine given soft blood pressures while inpatient, continue home metoprolol and losartan        CAD: Continue home aspirin, statin, metoprolol        DM2: With evidence of steroid-induced hyperglycemia  --patient instructed he can Increase home dose Lantus to 30 units twice daily if needed for steroid induced hyperglycemia  --Continue home mealtime aspart coverage  --Continue home  Metformin       Discharge Medications with Med changes:         Review of your medicines      START taking      Dose / Directions   albuterol 108 (90 Base) MCG/ACT inhaler  Commonly known as: PROAIR HFA/PROVENTIL HFA/VENTOLIN HFA      Dose: 2 puff  Inhale 2 puffs into the lungs every 6 hours as needed for shortness of breath / dyspnea or wheezing  Quantity: 18 g  Refills: 0     guaiFENesin 20 mg/mL Soln solution  Commonly known as: ROBITUSSIN      Dose: 10 mL  Take 10 mLs by mouth every 4 hours as needed for cough  Refills: 0     pantoprazole 40 MG EC tablet  Commonly known as: PROTONIX      Dose: 40 mg  Start taking on: November 15, 2021  Take 1 tablet (40 mg) by mouth every morning (before breakfast) for 7 days  Quantity: 7 tablet  Refills: 0     predniSONE 20 MG tablet  Commonly known as: DELTASONE      Dose: 40 mg  Start taking on: November 15, 2021  Take 2 tablets (40 mg) by mouth daily for 3 days  Quantity: 6 tablet  Refills: 0        CONTINUE these medicines which may have CHANGED, or have new prescriptions. If we are uncertain of the size of tablets/capsules you have at home, strength may be listed as something that might have changed.      Dose / Directions   furosemide 20 MG tablet  Commonly known as: LASIX  This may have changed: when to take this  Used for: S/P CABG (coronary artery bypass graft) x4      Dose: 20 mg  Take 1 tablet (20 mg) by mouth 2 times daily for 21 days  Quantity: 42 tablet  Refills: 0     gabapentin 300 MG capsule  Commonly known as: NEURONTIN  This may have changed:     when to take this    reasons to take this  Used for: Other diabetic neurological complication associated with type 2 diabetes mellitus (H)      Dose: 300 mg  Take 1 capsule (300 mg) by mouth 3 times daily as needed for neuropathic pain  Quantity: 30 capsule  Refills: 0        CONTINUE these medicines which have NOT CHANGED      Dose / Directions   acetaminophen 325 MG tablet  Commonly known as: TYLENOL  Used for: S/P  coronary artery bypass graft x 3      Dose: 650 mg  Take 2 tablets (650 mg) by mouth every 4 hours as needed for other (For optimal non-opioid multimodal pain management to improve pain control.)  Quantity: 200 tablet  Refills: 0     alcohol swab prep pads  Used for: NSTEMI (non-ST elevated myocardial infarction) (H)      Use to swab area of injection/rupali as directed.  Quantity: 100 each  Refills: 3     allopurinol 100 MG tablet  Commonly known as: ZYLOPRIM  Used for: S/P coronary artery bypass graft x 3      Dose: 100 mg  Take 1 tablet (100 mg) by mouth daily  Quantity: 30 tablet  Refills: 3     aspirin 81 MG chewable tablet  Commonly known as: ASA  Used for: S/P coronary artery bypass graft x 3      Dose: 81 mg  1 tablet (81 mg) by Oral or NG Tube route daily  Quantity: 100 tablet  Refills: 1     blood glucose calibration solution  Commonly known as: NO BRAND SPECIFIED  Used for: NSTEMI (non-ST elevated myocardial infarction) (H)      Use to calibrate blood glucose monitor as needed as directed.  Quantity: 200 each  Refills: 0     blood glucose monitoring meter device kit  Commonly known as: NO BRAND SPECIFIED  Used for: NSTEMI (non-ST elevated myocardial infarction) (H)      Use to test blood sugar 4 times daily or as directed.  Quantity: 1 kit  Refills: 0     blood glucose test strip  Commonly known as: NO BRAND SPECIFIED  Used for: NSTEMI (non-ST elevated myocardial infarction) (H)      Use to test blood sugar 4 times daily or as directed.  Quantity: 100 strip  Refills: 6     insulin glargine 100 UNIT/ML pen  Commonly known as: LANTUS PEN      Dose: 25 Units  Inject 25 Units Subcutaneous 2 times daily  Refills: 0     losartan 50 MG tablet  Commonly known as: COZAAR      Dose: 50 mg  Take 50 mg by mouth 2 times daily  Refills: 0     metFORMIN 500 MG tablet  Commonly known as: GLUCOPHAGE      Dose: 1,000 mg  Take 1,000 mg by mouth 2 times daily (with meals)  Refills: 0     metoprolol tartrate 50 MG  tablet  Commonly known as: LOPRESSOR      Dose: 50 mg  Take 1 tablet (50 mg) by mouth 2 times daily  Refills: 0     nitroGLYcerin 0.4 MG sublingual tablet  Commonly known as: NITROSTAT  Used for: S/P coronary artery bypass graft x 3      For chest pain place 1 tablet under the tongue every 5 minutes for 3 doses. If symptoms persist 5 minutes after 1st dose call 911.  Quantity: 30 tablet  Refills: 1     NovoLOG FLEXPEN 100 UNIT/ML pen  Generic drug: insulin aspart      Dose: 5 Units  Inject 5 Units Subcutaneous 3 times daily (with meals)  Refills: 0     oxyCODONE 5 MG tablet  Commonly known as: ROXICODONE  Used for: S/P coronary artery bypass graft x 3      Dose: 5 mg  Take 1 tablet (5 mg) by mouth every 6 hours as needed for moderate to severe pain  Quantity: 6 tablet  Refills: 0     rosuvastatin 10 MG tablet  Commonly known as: CRESTOR  Used for: S/P coronary artery bypass graft x 3      Dose: 10 mg  Take 1 tablet (10 mg) by mouth daily  Quantity: 30 tablet  Refills: 3     thin lancets  Commonly known as: NO BRAND SPECIFIED  Used for: NSTEMI (non-ST elevated myocardial infarction) (H)      Use with lanceting device.  Quantity: 200 each  Refills: 0        STOP taking    amLODIPine 5 MG tablet  Commonly known as: NORVASC              Where to get your medicines      These medications were sent to [id number] - [place], [place] - [address], [place] [address]    Phone: [phone]     albuterol 108 (90 Base) MCG/ACT inhaler    furosemide 20 MG tablet    gabapentin 300 MG capsule    pantoprazole 40 MG EC tablet    predniSONE 20 MG tablet     Some of these will need a paper prescription and others can be bought over the counter. Ask your nurse if you have questions.    You don't need a prescription for these medications    guaiFENesin 20 mg/mL Soln solution                       Discharge Procedure Orders   Adult Pulmonary Medicine Referral   Standing Status: Future   Referral Priority:  Routine Referral Type: Consultation   Requested Specialty: Pulmonary Disease   Number of Visits Requested: 1     Otolaryngology Referral   Standing Status: Future   Referral Priority: Routine Referral Type: Consultation   Requested Specialty: Otolaryngology   Number of Visits Requested: 1     Reason for your hospital stay   Order Comments: Respiratory distress     Activity   Order Comments: Your activity upon discharge: activity as tolerated     Order Specific Question Answer Comments   Is discharge order? Yes      Monitor and record   Order Comments: Weigh yourself every morning     When to contact your care team   Order Comments: Contact your care team If symptoms get worse, If increased shortness of breath, If waking up at night with difficulty breathing, If unable to lie down for sleep due to symptoms, If weight gain of 2 pounds a day for 2 days in a row OR 5 pounds in 1 week., Increased swelling in your ankles or legs, and Dizziness or lightheadedness     Discharge Follow Up - with Primary Care clinic within 3-5 days (RN to schedule prior to d/c for [facility] primary care     Add follow up information to the AVS prior to printing     Discharge Instructions   Order Comments: Keep your already scheduled cardology follow up appointment in 2 weeks  I have increased your lasix to 20mg twice daily to keep your lungs dry  Amlodipine has been held due to soft blood pressures while here, hold until time of PCP follow up  Take prednisone for 3 more days and then stop  Take pantoprazole for 5 more days for stomach protection while on prednisone  If your blood sugars are high at home due to prednisone then would recommend increasing your Lantus to 30 units twice per day  Take albuterol inhaler as needed for wheezing or shortness of breath  I have made a referral to pulmonary medicine for formal pulmonary function testing and they will be in contact with you to schedule this appointment     Diet   Order Comments: Follow  "this diet upon discharge: Orders Placed This Encounter      Fluid restriction 1500 ML FLUID      Moderate Consistent Carb (60 g CHO per Meal) Diet     Order Specific Question Answer Comments   Is discharge order? Yes          Subjective       NAD. Denies any nausea, vomiting, abdominal pain, chest pain, SOB, new swelling, fevers, chills, confusion or headache. Still with voice hoarseness. Cough improved but still with scant clear sputum    Examination      Vital Signs in last 24 hours:   Vital signs:  Temp: 97.9  F (36.6  C) Temp src: Oral BP: 115/72 Pulse: 80   Resp: 18 SpO2: 95 % O2 Device: None (Room air)   Height: 180.3 cm (5' 11\") Weight: 78.9 kg (174 lb)  Estimated body mass index is 24.27 kg/m  as calculated from the following:    Height as of this encounter: 1.803 m (5' 11\").    Weight as of this encounter: 78.9 kg (174 lb).            General: NAD  HEENT: hoarseness of voice ongoing  RESPIRATORY: Respirations nonlabored  CARDIOVASCULAR: No le edema bilat.  NEUROLOGIC: No focal arm or leg weakness, speech is clear      Please see EMR for more detailed significant labs, imaging, consultant notes etc.  Total time spent on discharge: >30 minutes    Ed Tipton D.O.        CC:Medicine, [facility]        "

## 2021-11-14 NOTE — PLAN OF CARE
Problem: Diabetes Comorbidity  Goal: Blood Glucose Level Within Targeted Range  Outcome: No Change

## 2021-11-15 ENCOUNTER — PATIENT OUTREACH (OUTPATIENT)
Dept: CARE COORDINATION | Facility: CLINIC | Age: 51
End: 2021-11-15
Payer: COMMERCIAL

## 2021-11-15 DIAGNOSIS — Z71.89 OTHER SPECIFIED COUNSELING: ICD-10-CM

## 2021-11-15 NOTE — PROGRESS NOTES
Clinic Care Coordination Contact  [facility]: Post-Discharge Note  The patient was not able to talk well, CHW did not complete the full TCM assessment.   SITUATION                                                      Admission:    Admission Date: 11/12/21   Reason for Admission: S/P CABG (coronary artery bypass graft)  Discharge:   Discharge Date: 11/14/21  Discharge Diagnosis: S/P CABG (coronary artery bypass graft)    BACKGROUND                                                      51-year-old male with past medical history of CAD status post CABG x4 recently discharged after admitted from 9/2/2021-9/22/2021. At that time required intubation and mechanical ventilation postoperatively for 6 days, presents today with hoarseness and difficulty breathing for the last 2 weeks. Patient was improving after discharge but 2 weeks ago he had URI symptoms and later afterwards started to have cough productive of scanty sputum. Progressively worse with with audible wheezing and hoarseness of voice worsening over the last 2 days. Denies any difficulty swallowing, no fever or chills at this time. No nausea or vomiting no diarrhea. Work-up at ED CT neck showing nonspecific changes involving aryepiglottic folds and arytenoid cartilages without airway compromise. There is overall favor chronic changes related to trauma or potentially long-term intubation. But also could be a component of acute inflammation. CT chest with generalized bronchiectasis and mild diffuse hazy opacities bilaterally secondary to low-grade infiltrate versus edema.    ASSESSMENT      Enrollment  Primary Care Care Coordination Status: Not a Candidate    Discharge Assessment  Do you feel your condition is stable enough to be safe at home until your provider visit?: Yes  Does the patient have their discharge instructions? : Yes  Does the patient have questions regarding their discharge instructions? : No  Were you started on any new medications or were  there changes to any of your previous medications? : Yes  Do you have questions regarding any of your medications? : No  Discharge follow-up appointment scheduled within 14 calendar days? : No  Is patient agreeable to assistance with scheduling? : No    Post-op (CHW CTA Only)  If the patient had a surgery or procedure, do they have any questions for a nurse?: No      PLAN                                                      Outpatient Plan:    Keep your already scheduled cardology follow up appointment in 2 weeks  I have increased your lasix to 20mg twice daily to keep your lungs dry  Amlodipine has been held due to soft blood pressures while here, hold until time of PCP follow up  Take prednisone for 3 more days and then stop  Take pantoprazole for 5 more days for stomach protection while on prednisone  If your blood sugars are high at home due to prednisone then would recommend increasing your Lantus to 30 units twice per day  Take albuterol inhaler as needed for wheezing or shortness of breath  I have made a referral to pulmonary medicine for formal pulmonary function testing and they will be in contact with you to schedule this appointment    Future Appointments   Date Time Provider Department Center   11/19/2021 10:00 AM [facility] CARDIAC REHAB RESOURCE 1 [facility]   11/26/2021  8:30 AM Sarah Valentin MD [facility]   11/26/2021 10:00 AM [facility] CARDIAC REHAB RESOURCE 1 [facility]   11/30/2021  9:10 AM Cassius Singh MD [facility]   12/3/2021 10:00 AM [facility] CARDIAC REHAB RESOURCE 1 [facility]   12/10/2021 10:00 AM N CARDIAC REHAB RESOURCE 1 [facility]   12/14/2021  7:45 AM [id number] [facility]   12/14/2021  8:45 AM [facility]   12/14/2021  3:45 PM Waylon Castro MD [facility]IN   12/17/2021 10:00 AM [facility] CARDIAC REHAB RESOURCE 1 [facility]   12/24/2021 10:00 AM [facility] CARDIAC REHAB RESOURCE 1 [facility]   12/31/2021 10:00 AM [facility] CARDIAC  REHAB RESOURCE 1 [facility]RB [facility]   1/7/2022 10:00 AM [facility] CARDIAC REHAB RESOURCE 1 [facility]   1/14/2022 10:00 AM [facility] CARDIAC REHAB RESOURCE 1 [facility]   1/21/2022 10:00 AM [facility] CARDIAC REHAB RESOURCE 1 [facility]RB [facility]   1/28/2022 10:00 AM [facility] CARDIAC REHAB RESOURCE 1 [facility]RB [facility]   2/4/2022 10:00 AM [facility] CARDIAC REHAB RESOURCE 1 [facility]RB [facility]   2/11/2022 10:00 AM [facility] CARDIAC REHAB RESOURCE 1 [facility]CVRB [facility]   2/18/2022 10:00 AM [facility] CARDIAC REHAB RESOURCE 1 [facility]RB [facility]   2/25/2022 10:00 AM [facility]ARDIAC REHAB RESOURCE [facility]         For any urgent concerns, please contact our 24 hour nurse triage line: [phone] ([address] [place] [address])         GWYN Evans  [phone]  [facility]  [facility]

## 2021-11-15 NOTE — TELEPHONE ENCOUNTER
Spoke with Dr. Youngblood as patient requesting to be seen in [facility] however no appts available till 11/24 on IM or FM schedule. Dr. Youngblood advised ok to book for this Wednesday at 520pm. Patient verbalized understanding with no further questions   Jose Martin Mosher CMA on 11/15/2021 at 1:43 PM

## 2021-11-15 NOTE — TELEPHONE ENCOUNTER
Should use a hospital follow up visit.  I would look across MARCOS Torre, Satish and see if anyone has any openings.  Can also use new patient appointment block

## 2021-11-17 ENCOUNTER — OFFICE VISIT (OUTPATIENT)
Dept: PEDIATRICS | Facility: CLINIC | Age: 51
End: 2021-11-17
Payer: COMMERCIAL

## 2021-11-17 VITALS
DIASTOLIC BLOOD PRESSURE: 80 MMHG | SYSTOLIC BLOOD PRESSURE: 122 MMHG | HEART RATE: 80 BPM | WEIGHT: 181.6 LBS | BODY MASS INDEX: 25.33 KG/M2

## 2021-11-17 DIAGNOSIS — E11.69 TYPE 2 DIABETES MELLITUS WITH OTHER SPECIFIED COMPLICATION, WITHOUT LONG-TERM CURRENT USE OF INSULIN (H): ICD-10-CM

## 2021-11-17 DIAGNOSIS — I50.9 CONGESTIVE HEART FAILURE, UNSPECIFIED HF CHRONICITY, UNSPECIFIED HEART FAILURE TYPE (H): ICD-10-CM

## 2021-11-17 DIAGNOSIS — E11.49 OTHER DIABETIC NEUROLOGICAL COMPLICATION ASSOCIATED WITH TYPE 2 DIABETES MELLITUS (H): ICD-10-CM

## 2021-11-17 DIAGNOSIS — Z95.1 S/P CABG (CORONARY ARTERY BYPASS GRAFT): ICD-10-CM

## 2021-11-17 DIAGNOSIS — I10 BENIGN ESSENTIAL HYPERTENSION: ICD-10-CM

## 2021-11-17 DIAGNOSIS — J06.9 VIRAL URI WITH COUGH: Primary | ICD-10-CM

## 2021-11-17 DIAGNOSIS — M10.9 GOUT, UNSPECIFIED CAUSE, UNSPECIFIED CHRONICITY, UNSPECIFIED SITE: ICD-10-CM

## 2021-11-17 DIAGNOSIS — R49.0 HOARSENESS: ICD-10-CM

## 2021-11-17 DIAGNOSIS — Z86.79 HISTORY OF ATRIAL FLUTTER: ICD-10-CM

## 2021-11-17 LAB
ALBUMIN SERPL-MCNC: 3.6 G/DL (ref 3.5–5)
ALP SERPL-CCNC: 124 U/L (ref 45–120)
ALT SERPL W P-5'-P-CCNC: 12 U/L (ref 0–45)
ANION GAP SERPL CALCULATED.3IONS-SCNC: 16 MMOL/L (ref 5–18)
AST SERPL W P-5'-P-CCNC: 7 U/L (ref 0–40)
BASOPHILS # BLD AUTO: 0 10E3/UL (ref 0–0.2)
BASOPHILS NFR BLD AUTO: 0 %
BILIRUB SERPL-MCNC: 0.2 MG/DL (ref 0–1)
BUN SERPL-MCNC: 29 MG/DL (ref 8–22)
CALCIUM SERPL-MCNC: 10 MG/DL (ref 8.5–10.5)
CHLORIDE BLD-SCNC: 101 MMOL/L (ref 98–107)
CHOLEST SERPL-MCNC: 162 MG/DL
CO2 SERPL-SCNC: 23 MMOL/L (ref 22–31)
CREAT SERPL-MCNC: 1.09 MG/DL (ref 0.7–1.3)
EOSINOPHIL # BLD AUTO: 0 10E3/UL (ref 0–0.7)
EOSINOPHIL NFR BLD AUTO: 0 %
ERYTHROCYTE [DISTWIDTH] IN BLOOD BY AUTOMATED COUNT: 14.5 % (ref 10–15)
FASTING STATUS PATIENT QL REPORTED: NORMAL
GFR SERPL CREATININE-BSD FRML MDRD: 77 ML/MIN/1.73M2
GLUCOSE BLD-MCNC: 272 MG/DL (ref 70–125)
HBA1C MFR BLD: 7.6 % (ref 0–5.6)
HCT VFR BLD AUTO: 40.1 % (ref 40–53)
HDLC SERPL-MCNC: 55 MG/DL
HGB BLD-MCNC: 12.5 G/DL (ref 13.3–17.7)
IMM GRANULOCYTES # BLD: 0 10E3/UL
IMM GRANULOCYTES NFR BLD: 0 %
LDLC SERPL CALC-MCNC: 78 MG/DL
LYMPHOCYTES # BLD AUTO: 1.5 10E3/UL (ref 0.8–5.3)
LYMPHOCYTES NFR BLD AUTO: 23 %
MCH RBC QN AUTO: 23.5 PG (ref 26.5–33)
MCHC RBC AUTO-ENTMCNC: 31.2 G/DL (ref 31.5–36.5)
MCV RBC AUTO: 75 FL (ref 78–100)
MONOCYTES # BLD AUTO: 0.3 10E3/UL (ref 0–1.3)
MONOCYTES NFR BLD AUTO: 4 %
NEUTROPHILS # BLD AUTO: 4.8 10E3/UL (ref 1.6–8.3)
NEUTROPHILS NFR BLD AUTO: 72 %
PLATELET # BLD AUTO: 317 10E3/UL (ref 150–450)
POTASSIUM BLD-SCNC: 4.8 MMOL/L (ref 3.5–5)
PROT SERPL-MCNC: 7.5 G/DL (ref 6–8)
RBC # BLD AUTO: 5.32 10E6/UL (ref 4.4–5.9)
SODIUM SERPL-SCNC: 140 MMOL/L (ref 136–145)
TRIGL SERPL-MCNC: 146 MG/DL
WBC # BLD AUTO: 6.7 10E3/UL (ref 4–11)

## 2021-11-17 PROCEDURE — 85025 COMPLETE CBC W/AUTO DIFF WBC: CPT | Performed by: PEDIATRICS

## 2021-11-17 PROCEDURE — 99496 TRANSJ CARE MGMT HIGH F2F 7D: CPT | Performed by: PEDIATRICS

## 2021-11-17 PROCEDURE — 36415 COLL VENOUS BLD VENIPUNCTURE: CPT | Performed by: PEDIATRICS

## 2021-11-17 PROCEDURE — 80053 COMPREHEN METABOLIC PANEL: CPT | Performed by: PEDIATRICS

## 2021-11-17 PROCEDURE — 80061 LIPID PANEL: CPT | Performed by: PEDIATRICS

## 2021-11-17 PROCEDURE — 83036 HEMOGLOBIN GLYCOSYLATED A1C: CPT | Performed by: PEDIATRICS

## 2021-11-17 RX ORDER — ALLOPURINOL 100 MG/1
100 TABLET ORAL DAILY
Qty: 90 TABLET | Refills: 1 | Status: SHIPPED | OUTPATIENT
Start: 2021-11-17 | End: 2022-02-16

## 2021-11-17 RX ORDER — BLOOD SUGAR DIAGNOSTIC
STRIP MISCELLANEOUS
Qty: 600 STRIP | Refills: 1 | Status: SHIPPED | OUTPATIENT
Start: 2021-11-17 | End: 2022-02-16

## 2021-11-17 RX ORDER — PANTOPRAZOLE SODIUM 40 MG/1
40 TABLET, DELAYED RELEASE ORAL
Qty: 7 TABLET | Refills: 0 | Status: CANCELLED | OUTPATIENT
Start: 2021-11-17

## 2021-11-17 RX ORDER — BLOOD PRESSURE TEST KIT
KIT MISCELLANEOUS
Qty: 120 EACH | Refills: 3 | Status: SHIPPED | OUTPATIENT
Start: 2021-11-17 | End: 2022-10-25

## 2021-11-17 RX ORDER — ROSUVASTATIN CALCIUM 10 MG/1
10 TABLET, COATED ORAL DAILY
Qty: 90 TABLET | Refills: 1 | Status: SHIPPED | OUTPATIENT
Start: 2021-11-17 | End: 2022-02-16

## 2021-11-17 RX ORDER — LOSARTAN POTASSIUM 50 MG/1
50 TABLET ORAL 2 TIMES DAILY
Qty: 180 TABLET | Refills: 1 | Status: SHIPPED | OUTPATIENT
Start: 2021-11-17 | End: 2022-02-16

## 2021-11-17 RX ORDER — FUROSEMIDE 20 MG
20 TABLET ORAL
Qty: 180 TABLET | Refills: 1 | Status: SHIPPED | OUTPATIENT
Start: 2021-11-17 | End: 2022-02-16

## 2021-11-17 RX ORDER — INSULIN ASPART 100 [IU]/ML
5 INJECTION, SOLUTION INTRAVENOUS; SUBCUTANEOUS
Qty: 30 ML | Refills: 1 | Status: SHIPPED | OUTPATIENT
Start: 2021-11-17 | End: 2022-02-16

## 2021-11-17 RX ORDER — FLASH GLUCOSE SENSOR
1 KIT MISCELLANEOUS
Qty: 2 EACH | Refills: 5 | Status: SHIPPED | OUTPATIENT
Start: 2021-11-17 | End: 2022-02-16

## 2021-11-17 RX ORDER — FLASH GLUCOSE SCANNING READER
1 EACH MISCELLANEOUS ONCE
Qty: 1 EACH | Refills: 0 | Status: SHIPPED | OUTPATIENT
Start: 2021-11-17 | End: 2021-11-17

## 2021-11-17 RX ORDER — METOPROLOL TARTRATE 50 MG
50 TABLET ORAL 2 TIMES DAILY
Qty: 180 TABLET | Refills: 0 | Status: SHIPPED | OUTPATIENT
Start: 2021-11-17 | End: 2022-02-16

## 2021-11-17 RX ORDER — LANCETS
EACH MISCELLANEOUS
Qty: 100 EACH | Refills: 4 | Status: SHIPPED | OUTPATIENT
Start: 2021-11-17 | End: 2022-02-16

## 2021-11-17 NOTE — PROGRESS NOTES
"  Assessment & Plan   Problem List Items Addressed This Visit     Type 2 diabetes mellitus, without long-term current use of insulin (H)     Currently is doing Lantus only 30 units every morning.  His fasting sugars are acceptable.  He is also on Metformin 1000 mg twice a day. He is eating again, so advised him to start his Novolog again with meals (5 units TID with meals) and gave him instructions to titrate up lantus given that he is eating much better now at home. Referral to diabetes education as well to help titrate his insulin.         Relevant Medications    insulin glargine (LANTUS PEN) 100 UNIT/ML pen    blood glucose (NO BRAND SPECIFIED) test strip    metFORMIN (GLUCOPHAGE) 500 MG tablet    ACCU-CHEK GUIDE test strip    insulin aspart (NOVOLOG FLEXPEN) 100 UNIT/ML pen    S/P CABG (coronary artery bypass graft)     Followed by Cardiology. 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 Currently on lasix 20mg BID (increased during recent hospitalization), losartan 50mg BID, ASA 81mg and rosuvastatin 10mg daily. Originally was on amlodipine post-op for prevention for radial artery spasm (rec'd for 3 months), but stopped during recent hospitalization in 11/2021 due to hypotension. I will CC cardiology so they are aware. He is near the 3 month carlton anyways. He is seeing them next month.               Relevant Medications    furosemide (LASIX) 20 MG tablet    rosuvastatin (CRESTOR) 10 MG tablet    Hoarseness     During last hospitalization there was epiglottic inflammation seen on CT soft tissue of the neck that was felt due to recent intubation. This was discussed with ENT on-call during his Nov hospitalization and they recommend empiric steroids and inhalers and outpatient ENT referral for laryngoscopy if needed.  \"It is noted that direct laryngoscopic visualization was performed by ED physician and there was no evidence of vocal cord paralysis or mass.\" He will " follow up with ENT. Hoarseness today is at its baseline.         Gout    Relevant Medications    allopurinol (ZYLOPRIM) 100 MG tablet    Benign essential hypertension     Pressure at goal today.  He is on losartan 50 mg twice a day, metoprolol 50 mg twice a day.  Amlodipine was stopped during his last hospital stay in November 2021 due to hypotension.  I will his cardiologist know as they had wanted him to be on amlodipine for 3 months to prevent radial artery spasm post CABG. Due to see them again next month.         Relevant Medications    losartan (COZAAR) 50 MG tablet    History of atrial flutter     Followed by Cardiology. Developed atrial flutter post CABG and require DCCV 9/13; had recurrence, left on amiodarone, xarelto, and metoprolol. Then converted wit IV metoprolol in ED, has elizabeth in SR since. No longer on amiodarone or xarelto. On aspirin and metoprolol 50mg BID.          Other diabetic neurological complication associated with type 2 diabetes mellitus (H)     Diabetic neuropathy well-controlled on gabapentin.         Relevant Medications    insulin glargine (LANTUS PEN) 100 UNIT/ML pen    blood glucose (NO BRAND SPECIFIED) test strip    metFORMIN (GLUCOPHAGE) 500 MG tablet    metoprolol tartrate (LOPRESSOR) 50 MG tablet    losartan (COZAAR) 50 MG tablet    ACCU-CHEK GUIDE test strip    blood glucose monitoring (SOFTCLIX) lancets    insulin aspart (NOVOLOG FLEXPEN) 100 UNIT/ML pen    insulin pen needle (32G X 4 MM) 32G X 4 MM miscellaneous    Alcohol Swabs PADS    Continuous Blood Gluc Sensor (FREESTYLE AICHA 14 DAY SENSOR) MISC    Other Relevant Orders    AMB Adult Diabetes Educator Referral    Hemoglobin A1c (Completed)    Comprehensive metabolic panel (BMP + Alb, Alk Phos, ALT, AST, Total. Bili, TP) (Completed)    CBC with platelets and differential (Completed)    CHF (congestive heart failure) (H)     Appears euvolemic today, gained 7# since discharge but he reports he is eating much better since  being home. Lasix was increased to 20mg BID due to pulmonary edema seen on recent CT during hospitalization. Will keep him on this and repeat labs.         RESOLVED: Viral URI with cough - Primary           Ordering of each unique test  Prescription drug management         Return in about 1 month (around 12/17/2021) for Follow up, with me. to establish care fully    Narciso Michelle MD  [facility] NICOLETTE Edwards is a 51 year old who presents for the following health issues     HPI     Patient recently had CABG and was discharged on September 22. Then was hospitalized again in mid November for viral URI with reactive airway exacerbation.    Since leaving the hospital, he has been feeling much better. He is no longer feeling short of breath, no cough or hemoptysis. He just finished his course of prednisone.    He reports that his blood pressures at home have been good.    Diabetes, sugars depend on what he eats  This morning sugar was 200  Yesterday 142  100  Really depends on what he eats. He is only taking Lantus 30 units daily, though in the paperwork is redness 30 twice a day. He reports that he does not do that.        Post Discharge Outreach 11/15/2021   Admission Date 11/12/2021   Reason for Admission S/P CABG (coronary artery bypass graft)   Discharge Date 11/14/2021   Discharge Diagnosis S/P CABG (coronary artery bypass graft)   Do you feel your condition is stable enough to be safe at home until your provider visit? Yes   Does the patient have their discharge instructions?  Yes   Does the patient have questions regarding their discharge instructions?  No   Were you started on any new medications or were there changes to any of your previous medications?  Yes   Do you have questions regarding any of your medications?  No   Discharge follow-up appointment scheduled within 14 calendar days?  No     Hospital Follow-up Visit:    Hospital/Nursing Home/IP Rehab Facility: [facility]  [facility]  Date of Admission: 11/12/21  Date of Discharge: 11/15/21  Reason(s) for Admission: URI      Was your hospitalization related to COVID-19? No   Problems taking medications regularly:  None  Medication changes since discharge: None  Problems adhering to non-medication therapy:  None    Summary of hospitalization:  [facility] discharge summary reviewed  Diagnostic Tests/Treatments reviewed.  Follow up needed: repeat labs  Other Healthcare Providers Involved in Patient s Care:         Specialist appointment - pulmonology, ENT  Update since discharge: improved. Post Discharge Medication Reconciliation: discharge medications reconciled and changed, per note/orders.  Plan of care communicated with patient            Review of Systems   See above      Objective    /80 (BP Location: Right arm, Patient Position: Sitting, Cuff Size: Adult Regular)   Pulse 80   Wt 82.4 kg (181 lb 9.6 oz)   BMI 25.33 kg/m    Body mass index is 25.33 kg/m .  Physical Exam   GENERAL: hoarse, well appearing  RESP: lungs clear to auscultation - no rales, rhonchi or wheezes  CV: regular rate and rhythm, normal S1 S2, no S3 or S4, no murmur, click or rub, no peripheral edema and peripheral pulses strong  ABDOMEN: soft, nontender, no hepatosplenomegaly, no masses and bowel sounds normal  MS: no gross musculoskeletal defects noted, no edema

## 2021-11-17 NOTE — PATIENT INSTRUCTIONS
Go to the lab today. We will notify you with the results once those are available.    Check your fasting sugar (before breakfast) and then sugars before lunch, dinner, and bedtime.     Diabetes nurse referral placed.     If your fasting blood sugar is greater than 130, increase your lantus by 3 units.  You can do this once every 3 days.     If your fasting blood sugar is less than 70, decrease your lantus by 10%.     Give yourself the Novolog 5 units with breakfast, lunch, and dinner. The diabetes nurse can help you adjust this.      GENERAL INFORMATION AND HELPFUL NUMBERS:    If labs were ordered today, please go to the outpatient lab located in the same building. Once the results are back, we will notify you with results.    If imaging was ordered to be done today, please go to [facility] (located in the same building across our [facility]). Once the results are back, we will notify you with results.    If imaging was ordered to be done in the future at an [facility] facility, someone will call to schedule otherwise you may also call [phone] to schedule.    If a specialty referral was placed during today's visit, someone will call to schedule unless you were instructed to call yourself. If you are having issues with this, please message me through [employer] or call our clinic at [phone] (press option 2 to speak with someone from our [place] team).    If a mammogram was ordered during today's visit, someone will call to schedule otherwise you may also call [phone] to schedule     If a heart ultrasound or stress test was ordered today, someone will call to schedule otherwise you may also call the main Cardiology clinic at [phone] to schedule.    If a bone density scan was ordered today, someone will call to schedule otherwise you may also call [phone] to schedule.    If you have any questions or concerns after our visit today, please message me through [employer] or call our clinic at  [phone] (press option 2 to speak with someone from our [place] team).

## 2021-11-17 NOTE — Clinical Note
Kamran Hoffmann! You saw this patient recently s/p CABG. I think Cardiology wanted him on amlodipine x 3 months after CABG for radial artery spasm prevention. Just FYI'ing you that hospitalist stopped his amlodipine while in hospital recently due to hypotension. He is near the 3 month carlton anyways, but thought I'd keep you updated. He seems to be doing well. Let me know if you'd like me to do anything else. Thanks - Yuridia

## 2021-11-18 NOTE — RESULT ENCOUNTER NOTE
Kidney function is slightly decreased, but stable. We should repeat this in 1 month when you return for follow up.

## 2021-11-19 ENCOUNTER — HOSPITAL ENCOUNTER (OUTPATIENT)
Dept: CARDIAC REHAB | Facility: HOSPITAL | Age: 51
End: 2021-11-19
Attending: STUDENT IN AN ORGANIZED HEALTH CARE EDUCATION/TRAINING PROGRAM
Payer: COMMERCIAL

## 2021-11-19 PROCEDURE — 93798 PHYS/QHP OP CAR RHAB W/ECG: CPT

## 2021-11-22 NOTE — PROGRESS NOTES
"11/22/21 Msg recd from Janet Mccabe PA-C     1) What did COVID test 11/4 show?- Negative  2) Is his hoarseness/SOB improved with the Zyrtec??- pt was hospitalized 11/12-11/14 at [facility] ( see Epic) and is feeling \" much better\".  Pt has follow up w Dr Castro 12/14.  Update sent to Janet Massey 235 pm  "

## 2021-11-22 NOTE — PROGRESS NOTES
Thx for update. It looks like he required bronchodilators/inhalers and steroids for his RAD/virual URI. ?epiglottic inflammation noted on CT soft tissue of neck for which steroids rec'd.    Improved NTpBNP.    No changes needed before he sees Dr. Castro. Has echo to be done prior to his appt with Dr. Castro set up already    Janet

## 2021-11-23 PROBLEM — J06.9 VIRAL URI WITH COUGH: Status: RESOLVED | Noted: 2021-11-14 | Resolved: 2021-11-23

## 2021-11-23 PROBLEM — Z95.5 HISTORY OF CORONARY ANGIOPLASTY WITH INSERTION OF STENT: Status: ACTIVE | Noted: 2020-08-03

## 2021-11-23 PROBLEM — R06.2 WHEEZING: Status: RESOLVED | Noted: 2021-11-14 | Resolved: 2021-11-23

## 2021-11-23 PROBLEM — Z23 NEED FOR MMRV (MEASLES-MUMPS-RUBELLA-VARICELLA) VACCINE: Status: RESOLVED | Noted: 2018-01-05 | Resolved: 2021-11-23

## 2021-11-23 PROBLEM — E11.49 OTHER DIABETIC NEUROLOGICAL COMPLICATION ASSOCIATED WITH TYPE 2 DIABETES MELLITUS (H): Status: ACTIVE | Noted: 2021-11-23

## 2021-11-23 PROBLEM — I50.9 CHF (CONGESTIVE HEART FAILURE) (H): Status: ACTIVE | Noted: 2021-11-23

## 2021-11-23 PROBLEM — N52.8 OTHER MALE ERECTILE DYSFUNCTION: Status: ACTIVE | Noted: 2021-04-09

## 2021-11-23 PROBLEM — Z23 NEED FOR MMRV (MEASLES-MUMPS-RUBELLA-VARICELLA) VACCINE: Status: ACTIVE | Noted: 2018-01-05

## 2021-11-24 NOTE — ASSESSMENT & PLAN NOTE
"During last hospitalization there was epiglottic inflammation seen on CT soft tissue of the neck that was felt due to recent intubation. This was discussed with ENT on-call during his Nov hospitalization and they recommend empiric steroids and inhalers and outpatient ENT referral for laryngoscopy if needed.  \"It is noted that direct laryngoscopic visualization was performed by ED physician and there was no evidence of vocal cord paralysis or mass.\" He will follow up with ENT. Hoarseness today is at its baseline.  "

## 2021-11-24 NOTE — ASSESSMENT & PLAN NOTE
Followed by Cardiology. 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 Currently on lasix 20mg BID (increased during recent hospitalization), losartan 50mg BID, ASA 81mg and rosuvastatin 10mg daily. Originally was on amlodipine post-op for prevention for radial artery spasm (rec'd for 3 months), but stopped during recent hospitalization in 11/2021 due to hypotension. I will CC cardiology so they are aware. He is near the 3 month carlton anyways. He is seeing them next month.

## 2021-11-24 NOTE — ASSESSMENT & PLAN NOTE
Followed by Cardiology. Developed atrial flutter post CABG and require DCCV 9/13; had recurrence, left on amiodarone, xarelto, and metoprolol. Then converted wit IV metoprolol in ED, has elizabeth in SR since. No longer on amiodarone or xarelto. On aspirin and metoprolol 50mg BID.

## 2021-11-24 NOTE — ASSESSMENT & PLAN NOTE
Appears euvolemic today, gained 7# since discharge but he reports he is eating much better since being home. Lasix was increased to 20mg BID due to pulmonary edema seen on recent CT during hospitalization. Will keep him on this and repeat labs.

## 2021-11-24 NOTE — ASSESSMENT & PLAN NOTE
Currently is doing Lantus only 30 units every morning.  His fasting sugars are acceptable.  He is also on Metformin 1000 mg twice a day. He is eating again, so advised him to start his Novolog again with meals (5 units TID with meals) and gave him instructions to titrate up lantus given that he is eating much better now at home. Referral to diabetes education as well to help titrate his insulin.

## 2021-11-24 NOTE — ASSESSMENT & PLAN NOTE
Pressure at goal today.  He is on losartan 50 mg twice a day, metoprolol 50 mg twice a day.  Amlodipine was stopped during his last hospital stay in November 2021 due to hypotension.  I will his cardiologist know as they had wanted him to be on amlodipine for 3 months to prevent radial artery spasm post CABG. Due to see them again next month.

## 2021-11-26 ENCOUNTER — OFFICE VISIT (OUTPATIENT)
Dept: OTOLARYNGOLOGY | Facility: CLINIC | Age: 51
End: 2021-11-26
Attending: INTERNAL MEDICINE
Payer: COMMERCIAL

## 2021-11-26 ENCOUNTER — HOSPITAL ENCOUNTER (OUTPATIENT)
Dept: CARDIAC REHAB | Facility: HOSPITAL | Age: 51
End: 2021-11-26
Attending: STUDENT IN AN ORGANIZED HEALTH CARE EDUCATION/TRAINING PROGRAM
Payer: COMMERCIAL

## 2021-11-26 DIAGNOSIS — J45.909 ASTHMA: Primary | ICD-10-CM

## 2021-11-26 DIAGNOSIS — R49.0 HOARSENESS: ICD-10-CM

## 2021-11-26 PROCEDURE — 93798 PHYS/QHP OP CAR RHAB W/ECG: CPT

## 2021-11-26 PROCEDURE — 99243 OFF/OP CNSLTJ NEW/EST LOW 30: CPT | Mod: 24 | Performed by: OTOLARYNGOLOGY

## 2021-11-26 NOTE — LETTER
11/26/2021         RE: Jerry Bustamante  [address]  [place] [address]        Dear Colleague,    Thank you for referring your patient, Jerry Bustamante, to the [facility]. Please see a copy of my visit note below.    HPI: This patient is a 52yo M who presents for evaluation of the throat at the request of Dr. Tipton. He has been hoarse for a few months. It was noted immediately following a heart surgery he had. He does think it is improving. He is not having dysphagia, or aspiration symptoms. Denies fevers, otalgia, weight loss, odynophagia, dysphagia, hemoptysis, and shortness of breath.     Past medical history, surgical history, social history, family history, medications, and allergies have been reviewed with the patient and are documented above.    Review of Systems: a 10-system review was performed. Pertinent positives are noted in the HPI and on a separate scanned document in the chart.    PHYSICAL EXAMINATION:  GEN: no acute distress, normocephalic  EYES: extraocular movements are intact, pupils are equal and round. Sclera clear.   EARS: auricles are normally formed. The external auditory canals are clear with minimal to no cerumen. Tympanic membranes are intact bilaterally with no signs of infection, effusion, retractions, or perforations.  NOSE: anterior nares are patent. There are no masses or lesions. The septum is non-obstructing.  OC/OP: clear, dentition is in good repair. The tongue and palate are fully mobile and symmetric. No masses or lesions.   HP/L (scope): patient refused  NECK: soft and supple. No lymphadenopathy or masses. Airway is midline.  NEURO: CN VII and XII symmetric. alert and oriented. No spontaneous nystagmus. Gait is normal.  PULM: breathing comfortably on room air, normal chest expansion with respiration  CARDS: no cyanosis or clubbing, normal carotid pulses    FLEXIBLE LARYNGOSCOPY:  Patient refused    MEDICAL DECISION-MAKING: This patient is a  50yo M with hoarseness following heart surgery. Ideally, a vocal cord exam would have been performed as vocal cord paralysis, granulomas, etc. are all possible. The patient did not want to complete this exam today and states that if his voice is not continuing to improve in a month, he will come back for a scope.        Again, thank you for allowing me to participate in the care of your patient.        Sincerely,        Sarah Valentin MD

## 2021-11-26 NOTE — PROGRESS NOTES
HPI: This patient is a 52yo M who presents for evaluation of the throat at the request of Dr. Tipton. He has been hoarse for a few months. It was noted immediately following a heart surgery he had. He does think it is improving. He is not having dysphagia, or aspiration symptoms. Denies fevers, otalgia, weight loss, odynophagia, dysphagia, hemoptysis, and shortness of breath.     Past medical history, surgical history, social history, family history, medications, and allergies have been reviewed with the patient and are documented above.    Review of Systems: a 10-system review was performed. Pertinent positives are noted in the HPI and on a separate scanned document in the chart.    PHYSICAL EXAMINATION:  GEN: no acute distress, normocephalic  EYES: extraocular movements are intact, pupils are equal and round. Sclera clear.   EARS: auricles are normally formed. The external auditory canals are clear with minimal to no cerumen. Tympanic membranes are intact bilaterally with no signs of infection, effusion, retractions, or perforations.  NOSE: anterior nares are patent. There are no masses or lesions. The septum is non-obstructing.  OC/OP: clear, dentition is in good repair. The tongue and palate are fully mobile and symmetric. No masses or lesions.   HP/L (scope): patient refused  NECK: soft and supple. No lymphadenopathy or masses. Airway is midline.  NEURO: CN VII and XII symmetric. alert and oriented. No spontaneous nystagmus. Gait is normal.  PULM: breathing comfortably on room air, normal chest expansion with respiration  CARDS: no cyanosis or clubbing, normal carotid pulses    FLEXIBLE LARYNGOSCOPY:  Patient refused    MEDICAL DECISION-MAKING: This patient is a 52yo M with hoarseness following heart surgery. Ideally, a vocal cord exam would have been performed as vocal cord paralysis, granulomas, etc. are all possible. The patient did not want to complete this exam today and states that if his voice is not  continuing to improve in a month, he will come back for a scope.

## 2021-11-29 NOTE — PROGRESS NOTES
[facility]  Cardiac Electrophysiology  [address] [facility]  [place] [address]   Office: [phone]  Fax: [phone]     Cardiac Electrophysiology Consultation    Patient: Jerry Bustamante   : 1970     Referring Provider: No ref. provider found  Primary Care Provider: Medicine, [facility]    CHIEF COMPLAINT/REASON FOR CONSULTATION  Atrial fibrillation and atrial flutter    Assessment/Recommendations   Jerry Bustamante is a 51 year old male with post-operative atrial fibrillation and atrial flutter post CABG, systolic heart failure related to ischemic cardiomyopathy (LVEF 45-50%), CAD status post CABG (2021), HTN, T2DM, reactive airway disease presenting for evaluation of atrial fibrillation and flutter.    Atrial fibrillation and typical appearing atrial flutter - noted post-operatively, was on amiodarone from 2021 to 2021.  Symptomatic with palpitations.  He is at risk for future AF/AFl, though this cannot presently be quantified.  Note of co-existing ischemic cardiomyopathy and (at least) moderate mitral regurgitation  [id number]  We reviewed the pathophysiology of post-operative atrial fibrillation and flutter and management considerations including stroke risk and anticoagulation, expectant management, rate control, cardioversion, antiarrhythmic drug therapy, and catheter ablation.    He would prefer ongoing expectant management  - expectant management.  We reviewed the symptoms of atrial fibrillation and atrial flutter, and discussed the importance of starting anticoagulation in case of recurrence for stroke prevention.  He understands, and will contact us should he have recurrence  - continue metoprolol 50mg twice daily for now  - discussed the ongoing importance of lifestyle modification (maintaining a healthy weight, sleep apnea diagnosis and management, alcohol avoidance) as part of a long term strategy for atrial fibrillation  "management    Systolic heart failure related to ischemic cardiomyopathy - LVEF 45-50% by 9/14/2021 TTE  - ongoing evaluation and titration of medical therapy with his cardiology team.  He would like to establish with a cardiologist at [place] for ongoing follow-up  - consider re-assessment of LVEF after 3 months post revascularization and medical therapy    Follow up: as above         History of Present Illness   Jerry Bustamante is a 51 year old male with post-operative atrial fibrillation and atrial flutter post CABG, systolic heart failure related to ischemic cardiomyopathy (LVEF 45-50%), CAD status post CABG (9/8/2021), HTN, T2DM, reactive airway disease presenting for evaluation of atrial fibrillation and flutter.    Mr. Bustamante had an NSTEMI 9/2/2021 and underwent multivessel CABG 9/8/2021.  His developed atrial flutter post CABG requiring DCCV 9/13/2021 with AF recurrence 9/17/2021.  He was treated with amiodarone, metoprolol, rivaroxaban.  He had palpitations and an ER visit 9/28/2021 with AFl/RVR and converted to SR while in the ER.  Amiodarone and rivaroxaban were discontinued in follow-up 11/4/2021.  He continues on aspirin 81mg daily and metoprolol 50mg twice daily.  He has not had known AF/AFl recurrence thus far since amiodarone cessation.    He was admitted 11/12-14/2021 with viral URI.    He has had hoarseness after his bypass surgery.       Physical Examination  Review of Systems   VITALS: /72 (BP Location: Left arm, Patient Position: Sitting, Cuff Size: Adult Regular)   Pulse 62   Resp 16   Ht 1.803 m (5' 11\")   Wt 83.5 kg (184 lb)   BMI 25.66 kg/m    Wt Readings from Last 3 Encounters:   11/17/21 82.4 kg (181 lb 9.6 oz)   11/13/21 78.9 kg (174 lb)   11/04/21 80.3 kg (177 lb)     CONSTITUTIONAL: well nourished, comfortable, no distress  EYES:  Conjunctivae pink, sclerae clear.    E/N/T:  Oral mucosa pink, moist mucous membranes, hoarse voice  RESPIRATORY:  Respiratory effort is " normal  CARDIOVASCULAR:  normal S1 and S2  GASTROINTESTINAL:  Abdomen without masses or tenderness  EXTREMITIES:  No clubbing or cyanosis.    MUSCULOSKELETAL:  Overall grossly normal muscle strength  SKIN:  Overall, skin warm and dry, no lesions.  NEURO/PSYCH:  Oriented x 3 with normal affect.   Constitutional:  No weight loss or loss of appetite    Eyes:  No difficulty with vision, no double vision, no dry eyes  ENT:  Voice hoarseness  Cardiovascular: As detailed above  Respiratory:  No cough  Musculoskeletal  No joint pain, muscle aches  Neurologic:  No syncope, lightheadedness, fainting spells   Hematologic: No easy bruising, excessive bleeding tendency   Gastrointestinal:  No jaundice, abdominal pain or abdominal bloating  Genitourinary: No changes in urinary habits, no trouble urinating    Psychiatric: No anxiety or depression      Medical History  Surgical History   Past Medical History:   Diagnosis Date     Coronary artery disease      Diabetes mellitus, type II (H)      Gout      High cholesterol      History of cardioversion 11/1/2021     HLD (hyperlipidemia)      Hypertension      S/P CABG (coronary artery bypass graft) x4 11/1/2021    Past Surgical History:   Procedure Laterality Date     ANGIOPLASTY       BYPASS GRAFT ARTERY CORONARY N/A 9/8/2021    Procedure: CORONARY ARTERY BYPASS GRAFTING X 4 WITH ENDOSCOPIC VEIN HARVESTING LIMA-LAD PREET-DISTAL RCA LEFT RADIAL-OM LEFT SV-ANOMOLOUS RV BRANCH ON PUMP/HILLARY;  Surgeon: Antelmo Mccullough MD;  Location:  OR     CV HEART CATHETERIZATION WITH POSSIBLE INTERVENTION N/A 9/2/2021    Procedure: Heart Catheterization with Possible Intervention;  Surgeon: Paul Warner MD;  Location:  HEART CARDIAC CATH LAB     CV LEFT VENTRICULOGRAM N/A 9/2/2021    Procedure: Left Ventriculogram;  Surgeon: Paul Warner MD;  Location:  HEART CARDIAC CATH LAB         Family History Social History   Family History   Problem Relation Age of Onset     Hypertension  Mother      Diabetes Maternal Aunt      Diabetes Maternal Uncle      Cancer No family hx of      Cerebrovascular Disease No family hx of         Social History     Tobacco Use     Smoking status: Former Smoker     Packs/day: 1.50     Years: 13.00     Pack years: 19.50     Types: Cigarettes     Quit date: 2003     Years since quittin.0     Smokeless tobacco: Never Used   Substance Use Topics     Alcohol use: No     Drug use: No         Medications  Allergies     Current Outpatient Medications:      ACCU-CHEK GUIDE test strip, Use to test blood sugar 4 times daily or as directed., Disp: 600 strip, Rfl: 1     acetaminophen (TYLENOL) 325 MG tablet, Take 2 tablets (650 mg) by mouth every 4 hours as needed for other (For optimal non-opioid multimodal pain management to improve pain control.), Disp: 200 tablet, Rfl: 0     albuterol (PROAIR HFA/PROVENTIL HFA/VENTOLIN HFA) 108 (90 Base) MCG/ACT inhaler, Inhale 2 puffs into the lungs every 6 hours as needed for shortness of breath / dyspnea or wheezing, Disp: 18 g, Rfl: 0     alcohol swab prep pads, Use to swab area of injection/rupali as directed., Disp: 100 each, Rfl: 3     Alcohol Swabs PADS, Use 4 times a day as needed., Disp: 120 each, Rfl: 3     allopurinol (ZYLOPRIM) 100 MG tablet, Take 1 tablet (100 mg) by mouth daily, Disp: 90 tablet, Rfl: 1     aspirin (ASA) 81 MG chewable tablet, 1 tablet (81 mg) by Oral or NG Tube route daily, Disp: 100 tablet, Rfl: 1     blood glucose (NO BRAND SPECIFIED) test strip, Use to test blood sugar 4 times daily or as directed., Disp: 100 strip, Rfl: 6     blood glucose calibration (NO BRAND SPECIFIED) solution, Use to calibrate blood glucose monitor as needed as directed., Disp: 200 each, Rfl: 0     blood glucose monitoring (NO BRAND SPECIFIED) meter device kit, Use to test blood sugar 4 times daily or as directed., Disp: 1 kit, Rfl: 0     blood glucose monitoring (SOFTCLIX) lancets, Use to test blood sugar 4 times daily.,  Disp: 100 each, Rfl: 4     Continuous Blood Gluc Sensor (FREESTYLE AICHA 14 DAY SENSOR) MIS, 1 each every 14 days Change every 14 days., Disp: 2 each, Rfl: 5     furosemide (LASIX) 20 MG tablet, Take 1 tablet (20 mg) by mouth 2 times daily, Disp: 180 tablet, Rfl: 1     gabapentin (NEURONTIN) 300 MG capsule, Take 1 capsule (300 mg) by mouth 3 times daily as needed for neuropathic pain, Disp: 30 capsule, Rfl: 0     insulin aspart (NOVOLOG FLEXPEN) 100 UNIT/ML pen, Inject 5 Units Subcutaneous 3 times daily (with meals), Disp: 30 mL, Rfl: 1     insulin glargine (LANTUS PEN) 100 UNIT/ML pen, Inject 30 Units Subcutaneous every morning, Disp: 45 mL, Rfl: 0     insulin pen needle (32G X 4 MM) 32G X 4 MM miscellaneous, Use 4 pen needles daily or as directed., Disp: 200 each, Rfl: 1     losartan (COZAAR) 50 MG tablet, Take 1 tablet (50 mg) by mouth 2 times daily, Disp: 180 tablet, Rfl: 1     metFORMIN (GLUCOPHAGE) 500 MG tablet, Take 2 tablets (1,000 mg) by mouth 2 times daily (with meals), Disp: 120 tablet, Rfl: 3     metoprolol tartrate (LOPRESSOR) 50 MG tablet, Take 1 tablet (50 mg) by mouth 2 times daily, Disp: 180 tablet, Rfl: 0     nitroGLYcerin (NITROSTAT) 0.4 MG sublingual tablet, For chest pain place 1 tablet under the tongue every 5 minutes for 3 doses. If symptoms persist 5 minutes after 1st dose call 911., Disp: 30 tablet, Rfl: 1     rosuvastatin (CRESTOR) 10 MG tablet, Take 1 tablet (10 mg) by mouth daily, Disp: 90 tablet, Rfl: 1     thin (NO BRAND SPECIFIED) lancets, Use with lanceting device., Disp: 200 each, Rfl: 0   No Known Allergies       Lab Results    Chemistry CBC Cardiac Enzymes/BNP/TSH/INR   Recent Labs   Lab Test 11/17/21 1811      POTASSIUM 4.8   CHLORIDE 101   CO2 23   *   BUN 29*   CR 1.09   GFRESTIMATED 77   TORRES 10.0     Recent Labs   Lab Test 11/17/21  1811 11/14/21  0659 11/12/21  2303   CR 1.09 1.00 0.90          Recent Labs   Lab Test 11/17/21  1811   WBC 6.7   HGB 12.5*   HCT  40.1   MCV 75*        Recent Labs   Lab Test 11/17/21  1811 11/14/21  0659 11/12/21  2303   HGB 12.5* 12.0* 13.0*    Recent Labs   Lab Test 11/12/21  2303 10/21/21  0526 09/28/21  0628   TROPONINI <0.01 <0.01 <0.01     Recent Labs   Lab Test 11/12/21  2303 09/28/21  0628   BNP 93* 296*     Recent Labs   Lab Test 09/11/21  1534   TSH 0.74     Recent Labs   Lab Test 09/08/21  1545 09/08/21  1423   INR 1.23* 1.41*         Data Review    ECGs (tracings independently reviewed)  10/21/2021 - SR 78bpm, inferior Qw, NSST/T changes  9/28/2021 - typical appearing AFl, 2:1 AV conduction, ventricular rhythm 144bpm  9/16/2021 - AF, ventricular rate 104bpm, inferior Qw  9/12/2021 - typical appearing AFl, ventricular rate 155bpm    9/14/2021 TTE  1. The left ventricle is normal in size. Left ventricular systolic function is  mildly reduced. The visual ejection fraction is 45-50%. Biplane LVEF is 48%.  2. There is anterior, septal, and apical wall akinesis. There is also  hypokineiss of the inferior wall.  3. The right ventricle is not well visualized. Mildly decreased right  ventricular systolic function.  4. There is mild to moderate (1-2+) mitral regurgitation. MRis eccentric and  not well studied and may be underestimated.  Compared to the prior study, left ventricular systolic function and wall  motion are similar.         Cassius Singh MD  11/30/2021  9:24 AM

## 2021-11-30 ENCOUNTER — OFFICE VISIT (OUTPATIENT)
Dept: CARDIOLOGY | Facility: CLINIC | Age: 51
End: 2021-11-30
Payer: COMMERCIAL

## 2021-11-30 VITALS
BODY MASS INDEX: 25.76 KG/M2 | HEART RATE: 62 BPM | RESPIRATION RATE: 16 BRPM | DIASTOLIC BLOOD PRESSURE: 72 MMHG | SYSTOLIC BLOOD PRESSURE: 120 MMHG | WEIGHT: 184 LBS | HEIGHT: 71 IN

## 2021-11-30 DIAGNOSIS — Z86.79 HISTORY OF ATRIAL FLUTTER: Primary | ICD-10-CM

## 2021-11-30 DIAGNOSIS — I50.9 CONGESTIVE HEART FAILURE, UNSPECIFIED HF CHRONICITY, UNSPECIFIED HEART FAILURE TYPE (H): ICD-10-CM

## 2021-11-30 PROCEDURE — 99213 OFFICE O/P EST LOW 20 MIN: CPT | Mod: 24 | Performed by: INTERNAL MEDICINE

## 2021-11-30 ASSESSMENT — MIFFLIN-ST. JEOR: SCORE: 1701.75

## 2021-11-30 NOTE — LETTER
2021    [facility]  [address] [address]    RE: Jerry Bustamante       Dear Colleague,    I had the pleasure of seeing Jerry Bustamante in the [facility].     [facility]  Cardiac Electrophysiology  [address]  [place] [address]   Office: [phone]  Fax: [phone]     Cardiac Electrophysiology Consultation    Patient: Jerry Bustamante   : 1970     Referring Provider: No ref. provider found  Primary Care Provider: Medicine, [facility]    CHIEF COMPLAINT/REASON FOR CONSULTATION  Atrial fibrillation and atrial flutter    Assessment/Recommendations   Jerry Bustamante is a 51 year old male with post-operative atrial fibrillation and atrial flutter post CABG, systolic heart failure related to ischemic cardiomyopathy (LVEF 45-50%), CAD status post CABG (2021), HTN, T2DM, reactive airway disease presenting for evaluation of atrial fibrillation and flutter.    Atrial fibrillation and typical appearing atrial flutter - noted post-operatively, was on amiodarone from 2021 to 2021.  Symptomatic with palpitations.  He is at risk for future AF/AFl, though this cannot presently be quantified.  Note of co-existing ischemic cardiomyopathy and (at least) moderate mitral regurgitation  [id number]  We reviewed the pathophysiology of post-operative atrial fibrillation and flutter and management considerations including stroke risk and anticoagulation, expectant management, rate control, cardioversion, antiarrhythmic drug therapy, and catheter ablation.    He would prefer ongoing expectant management  - expectant management.  We reviewed the symptoms of atrial fibrillation and atrial flutter, and discussed the importance of starting anticoagulation in case of recurrence for stroke prevention.  He understands, and will contact us should he have recurrence  - continue metoprolol  "50mg twice daily for now  - discussed the ongoing importance of lifestyle modification (maintaining a healthy weight, sleep apnea diagnosis and management, alcohol avoidance) as part of a long term strategy for atrial fibrillation management    Systolic heart failure related to ischemic cardiomyopathy - LVEF 45-50% by 9/14/2021 TTE  - ongoing evaluation and titration of medical therapy with his cardiology team.  He would like to establish with a cardiologist at [place] for ongoing follow-up  - consider re-assessment of LVEF after 3 months post revascularization and medical therapy    Follow up: as above         History of Present Illness   Jerry Bustamante is a 51 year old male with post-operative atrial fibrillation and atrial flutter post CABG, systolic heart failure related to ischemic cardiomyopathy (LVEF 45-50%), CAD status post CABG (9/8/2021), HTN, T2DM, reactive airway disease presenting for evaluation of atrial fibrillation and flutter.    Mr. Bustamante had an NSTEMI 9/2/2021 and underwent multivessel CABG 9/8/2021.  His developed atrial flutter post CABG requiring DCCV 9/13/2021 with AF recurrence 9/17/2021.  He was treated with amiodarone, metoprolol, rivaroxaban.  He had palpitations and an ER visit 9/28/2021 with AFl/RVR and converted to SR while in the ER.  Amiodarone and rivaroxaban were discontinued in follow-up 11/4/2021.  He continues on aspirin 81mg daily and metoprolol 50mg twice daily.  He has not had known AF/AFl recurrence thus far since amiodarone cessation.    He was admitted 11/12-14/2021 with viral URI.    He has had hoarseness after his bypass surgery.       Physical Examination  Review of Systems   VITALS: /72 (BP Location: Left arm, Patient Position: Sitting, Cuff Size: Adult Regular)   Pulse 62   Resp 16   Ht 1.803 m (5' 11\")   Wt 83.5 kg (184 lb)   BMI 25.66 kg/m    Wt Readings from Last 3 Encounters:   11/17/21 82.4 kg (181 lb 9.6 oz)   11/13/21 78.9 kg (174 lb)   11/04/21 " 80.3 kg (177 lb)     CONSTITUTIONAL: well nourished, comfortable, no distress  EYES:  Conjunctivae pink, sclerae clear.    E/N/T:  Oral mucosa pink, moist mucous membranes, hoarse voice  RESPIRATORY:  Respiratory effort is normal  CARDIOVASCULAR:  normal S1 and S2  GASTROINTESTINAL:  Abdomen without masses or tenderness  EXTREMITIES:  No clubbing or cyanosis.    MUSCULOSKELETAL:  Overall grossly normal muscle strength  SKIN:  Overall, skin warm and dry, no lesions.  NEURO/PSYCH:  Oriented x 3 with normal affect.   Constitutional:  No weight loss or loss of appetite    Eyes:  No difficulty with vision, no double vision, no dry eyes  ENT:  Voice hoarseness  Cardiovascular: As detailed above  Respiratory:  No cough  Musculoskeletal  No joint pain, muscle aches  Neurologic:  No syncope, lightheadedness, fainting spells   Hematologic: No easy bruising, excessive bleeding tendency   Gastrointestinal:  No jaundice, abdominal pain or abdominal bloating  Genitourinary: No changes in urinary habits, no trouble urinating    Psychiatric: No anxiety or depression      Medical History  Surgical History   Past Medical History:   Diagnosis Date     Coronary artery disease      Diabetes mellitus, type II (H)      Gout      High cholesterol      History of cardioversion 11/1/2021     HLD (hyperlipidemia)      Hypertension      S/P CABG (coronary artery bypass graft) x4 11/1/2021    Past Surgical History:   Procedure Laterality Date     ANGIOPLASTY       BYPASS GRAFT ARTERY CORONARY N/A 9/8/2021    Procedure: CORONARY ARTERY BYPASS GRAFTING X 4 WITH ENDOSCOPIC VEIN HARVESTING LIMA-LAD PREET-DISTAL RCA LEFT RADIAL-OM LEFT SV-ANOMOLOUS RV BRANCH ON PUMP/HILLARY;  Surgeon: Antelmo Mccullough MD;  Location:  OR     CV HEART CATHETERIZATION WITH POSSIBLE INTERVENTION N/A 9/2/2021    Procedure: Heart Catheterization with Possible Intervention;  Surgeon: Paul Warner MD;  Location:  HEART CARDIAC CATH LAB     CV LEFT  VENTRICULOGRAM N/A 2021    Procedure: Left Ventriculogram;  Surgeon: Paul Warner MD;  Location:  HEART CARDIAC CATH LAB         Family History Social History   Family History   Problem Relation Age of Onset     Hypertension Mother      Diabetes Maternal Aunt      Diabetes Maternal Uncle      Cancer No family hx of      Cerebrovascular Disease No family hx of         Social History     Tobacco Use     Smoking status: Former Smoker     Packs/day: 1.50     Years: 13.00     Pack years: 19.50     Types: Cigarettes     Quit date: 2003     Years since quittin.0     Smokeless tobacco: Never Used   Substance Use Topics     Alcohol use: No     Drug use: No         Medications  Allergies     Current Outpatient Medications:      ACCU-CHEK GUIDE test strip, Use to test blood sugar 4 times daily or as directed., Disp: 600 strip, Rfl: 1     acetaminophen (TYLENOL) 325 MG tablet, Take 2 tablets (650 mg) by mouth every 4 hours as needed for other (For optimal non-opioid multimodal pain management to improve pain control.), Disp: 200 tablet, Rfl: 0     albuterol (PROAIR HFA/PROVENTIL HFA/VENTOLIN HFA) 108 (90 Base) MCG/ACT inhaler, Inhale 2 puffs into the lungs every 6 hours as needed for shortness of breath / dyspnea or wheezing, Disp: 18 g, Rfl: 0     alcohol swab prep pads, Use to swab area of injection/rupali as directed., Disp: 100 each, Rfl: 3     Alcohol Swabs PADS, Use 4 times a day as needed., Disp: 120 each, Rfl: 3     allopurinol (ZYLOPRIM) 100 MG tablet, Take 1 tablet (100 mg) by mouth daily, Disp: 90 tablet, Rfl: 1     aspirin (ASA) 81 MG chewable tablet, 1 tablet (81 mg) by Oral or NG Tube route daily, Disp: 100 tablet, Rfl: 1     blood glucose (NO BRAND SPECIFIED) test strip, Use to test blood sugar 4 times daily or as directed., Disp: 100 strip, Rfl: 6     blood glucose calibration (NO BRAND SPECIFIED) solution, Use to calibrate blood glucose monitor as needed as directed., Disp: 200 each, Rfl:  0     blood glucose monitoring (NO BRAND SPECIFIED) meter device kit, Use to test blood sugar 4 times daily or as directed., Disp: 1 kit, Rfl: 0     blood glucose monitoring (SOFTCLIX) lancets, Use to test blood sugar 4 times daily., Disp: 100 each, Rfl: 4     Continuous Blood Gluc Sensor (FREESTYLE AICHA 14 DAY SENSOR) MISC, 1 each every 14 days Change every 14 days., Disp: 2 each, Rfl: 5     furosemide (LASIX) 20 MG tablet, Take 1 tablet (20 mg) by mouth 2 times daily, Disp: 180 tablet, Rfl: 1     gabapentin (NEURONTIN) 300 MG capsule, Take 1 capsule (300 mg) by mouth 3 times daily as needed for neuropathic pain, Disp: 30 capsule, Rfl: 0     insulin aspart (NOVOLOG FLEXPEN) 100 UNIT/ML pen, Inject 5 Units Subcutaneous 3 times daily (with meals), Disp: 30 mL, Rfl: 1     insulin glargine (LANTUS PEN) 100 UNIT/ML pen, Inject 30 Units Subcutaneous every morning, Disp: 45 mL, Rfl: 0     insulin pen needle (32G X 4 MM) 32G X 4 MM miscellaneous, Use 4 pen needles daily or as directed., Disp: 200 each, Rfl: 1     losartan (COZAAR) 50 MG tablet, Take 1 tablet (50 mg) by mouth 2 times daily, Disp: 180 tablet, Rfl: 1     metFORMIN (GLUCOPHAGE) 500 MG tablet, Take 2 tablets (1,000 mg) by mouth 2 times daily (with meals), Disp: 120 tablet, Rfl: 3     metoprolol tartrate (LOPRESSOR) 50 MG tablet, Take 1 tablet (50 mg) by mouth 2 times daily, Disp: 180 tablet, Rfl: 0     nitroGLYcerin (NITROSTAT) 0.4 MG sublingual tablet, For chest pain place 1 tablet under the tongue every 5 minutes for 3 doses. If symptoms persist 5 minutes after 1st dose call 911., Disp: 30 tablet, Rfl: 1     rosuvastatin (CRESTOR) 10 MG tablet, Take 1 tablet (10 mg) by mouth daily, Disp: 90 tablet, Rfl: 1     thin (NO BRAND SPECIFIED) lancets, Use with lanceting device., Disp: 200 each, Rfl: 0   No Known Allergies       Lab Results    Chemistry CBC Cardiac Enzymes/BNP/TSH/INR   Recent Labs   Lab Test 11/17/21  1811      POTASSIUM 4.8   CHLORIDE 101    CO2 23   *   BUN 29*   CR 1.09   GFRESTIMATED 77   TORRES 10.0     Recent Labs   Lab Test 11/17/21  1811 11/14/21  0659 11/12/21  2303   CR 1.09 1.00 0.90          Recent Labs   Lab Test 11/17/21  1811   WBC 6.7   HGB 12.5*   HCT 40.1   MCV 75*        Recent Labs   Lab Test 11/17/21  1811 11/14/21  0659 11/12/21  2303   HGB 12.5* 12.0* 13.0*    Recent Labs   Lab Test 11/12/21  2303 10/21/21  0526 09/28/21  0628   TROPONINI <0.01 <0.01 <0.01     Recent Labs   Lab Test 11/12/21  2303 09/28/21  0628   BNP 93* 296*     Recent Labs   Lab Test 09/11/21  1534   TSH 0.74     Recent Labs   Lab Test 09/08/21  1545 09/08/21  1423   INR 1.23* 1.41*         Data Review    ECGs (tracings independently reviewed)  10/21/2021 - SR 78bpm, inferior Qw, NSST/T changes  9/28/2021 - typical appearing AFl, 2:1 AV conduction, ventricular rhythm 144bpm  9/16/2021 - AF, ventricular rate 104bpm, inferior Qw  9/12/2021 - typical appearing AFl, ventricular rate 155bpm    9/14/2021 TTE  1. The left ventricle is normal in size. Left ventricular systolic function is  mildly reduced. The visual ejection fraction is 45-50%. Biplane LVEF is 48%.  2. There is anterior, septal, and apical wall akinesis. There is also  hypokineiss of the inferior wall.  3. The right ventricle is not well visualized. Mildly decreased right  ventricular systolic function.  4. There is mild to moderate (1-2+) mitral regurgitation. MRis eccentric and  not well studied and may be underestimated.  Compared to the prior study, left ventricular systolic function and wall  motion are similar.         Cassius Singh MD  11/30/2021  9:24 AM

## 2021-11-30 NOTE — PATIENT INSTRUCTIONS
[facility]  Cardiac Electrophysiology  [address]  [place] [address]   Office: [phone]  Fax: [phone]       Thank you for seeing us in clinic today - it is a pleasure to be a part of your care team.  Below is a summary of our plan from today's visit.      You had post-operative atrial fibrillation and atrial flutter - this has been well suppressed thus far.  We will observe expectantly - in case of recurrent arrhythmia, it is important to start a blood thinner to prevent stroke, and we can meet again to discuss treatment options in depth.  The symptoms associated with atrial fibrillation and atrial flutter are usually palpitations, fatigue, shortness of breath, lightheadedness, nausea.  Please contact us should such symptoms develop.    Our goals are to help you understand your condition and available treatment options, and to work with you to develop a tailored treatment plan that best suits you.  Please do not hesitate to be in touch with our office at [phone] with any questions that may arise.      Sincerely,    Cassius Singh MD  Clinical Cardiac Electrophysiology  [facility]  [address]  [place] [address]   Office: [phone]  Fax: [phone]

## 2021-12-03 ENCOUNTER — HOSPITAL ENCOUNTER (OUTPATIENT)
Dept: CARDIAC REHAB | Facility: HOSPITAL | Age: 51
End: 2021-12-03
Attending: STUDENT IN AN ORGANIZED HEALTH CARE EDUCATION/TRAINING PROGRAM
Payer: COMMERCIAL

## 2021-12-03 PROCEDURE — 93797 PHYS/QHP OP CAR RHAB WO ECG: CPT | Mod: XU

## 2021-12-03 PROCEDURE — 93798 PHYS/QHP OP CAR RHAB W/ECG: CPT

## 2021-12-06 ENCOUNTER — TELEPHONE (OUTPATIENT)
Dept: INTERNAL MEDICINE | Facility: CLINIC | Age: 51
End: 2021-12-06
Payer: COMMERCIAL

## 2021-12-06 DIAGNOSIS — I10 BENIGN ESSENTIAL HYPERTENSION: Primary | ICD-10-CM

## 2021-12-06 NOTE — TELEPHONE ENCOUNTER
I don't see any medications I can pend for you, none for the ones pt Is requesting.    You only saw pt for Hospital F/u 11/17/2021

## 2021-12-06 NOTE — TELEPHONE ENCOUNTER
Reason for Call:  Medication or medication refill:    Do you use a [facility]?  Name of the pharmacy and phone number for the current request:      [facility] pharmacy on file    Name of the medication requested:     Glipizide 50 mg    Amlodipine (patient does not recall dosage. Writer cannot find on med list.)    Other request: N/A    Can we leave a detailed message on this number? YES    Phone number patient can be reached at: Home number on file [phone] (home)    Best Time: Any time    Call taken on 12/6/2021 at 1:43 PM by Antwon Holley

## 2021-12-07 NOTE — TELEPHONE ENCOUNTER
Please call and let the patient know amlodipine was stopped during hospital visit due to low BP's. He should not restart on this for now.    He is on insulin, so should not be on glipizide. He should follow up with diabetes education on 12/17.    I don't see he ever set up an establish care visit with me. Please help him schedule in next month.    Dr. Youngblood

## 2021-12-07 NOTE — TELEPHONE ENCOUNTER
Phoned pt and I did schedule a Est care for Chad 10 2022, I also relayed PCP message    Pt states BP was 150/95 and he is wanting medication for BP because he states it is high when he checks it home    He also states amlodipine was stopped because he was coughing not because of low BP    He wants medicine for BP

## 2021-12-08 RX ORDER — AMLODIPINE BESYLATE 2.5 MG/1
2.5 TABLET ORAL DAILY
Qty: 90 TABLET | Refills: 0 | Status: SHIPPED | OUTPATIENT
Start: 2021-12-08 | End: 2021-12-29

## 2021-12-08 NOTE — TELEPHONE ENCOUNTER
LMTCB to pt regarding provider message, please relay to pt , pt already has Est care scheduled just needs follow up in 2 weeks

## 2021-12-08 NOTE — TELEPHONE ENCOUNTER
Please let patient know I sent amlodipine. Start 2.5mg daily. I want him to see me in follow up. I don't see anything schedule for an establish care.    Please have him start BP med, check BP daily, and then see me in about 2 weeks.     Needs 40 min establish care slot. Check peds schedule. Can use a same day or approval req block (can extend 1/2 into personal pump break, but not all the way please).    Thanks,  Dr. Youngblood

## 2021-12-13 DIAGNOSIS — R94.39 CARDIOVASCULAR STRESS TEST ABNORMAL: Primary | ICD-10-CM

## 2021-12-14 ENCOUNTER — LAB (OUTPATIENT)
Dept: LAB | Facility: CLINIC | Age: 51
End: 2021-12-14
Payer: COMMERCIAL

## 2021-12-14 ENCOUNTER — HOSPITAL ENCOUNTER (OUTPATIENT)
Dept: CARDIOLOGY | Facility: CLINIC | Age: 51
Discharge: HOME | End: 2021-12-14
Attending: PHYSICIAN ASSISTANT | Admitting: PHYSICIAN ASSISTANT
Payer: COMMERCIAL

## 2021-12-14 ENCOUNTER — OFFICE VISIT (OUTPATIENT)
Dept: CARDIOLOGY | Facility: CLINIC | Age: 51
End: 2021-12-14
Payer: COMMERCIAL

## 2021-12-14 VITALS
HEART RATE: 69 BPM | SYSTOLIC BLOOD PRESSURE: 126 MMHG | BODY MASS INDEX: 25.62 KG/M2 | DIASTOLIC BLOOD PRESSURE: 81 MMHG | WEIGHT: 183 LBS | HEIGHT: 71 IN | OXYGEN SATURATION: 99 %

## 2021-12-14 DIAGNOSIS — Z95.1 S/P CABG (CORONARY ARTERY BYPASS GRAFT): ICD-10-CM

## 2021-12-14 DIAGNOSIS — I48.3 TYPICAL ATRIAL FLUTTER (H): ICD-10-CM

## 2021-12-14 DIAGNOSIS — R94.39 CARDIOVASCULAR STRESS TEST ABNORMAL: ICD-10-CM

## 2021-12-14 DIAGNOSIS — I25.10 CORONARY ARTERY DISEASE INVOLVING NATIVE CORONARY ARTERY OF NATIVE HEART WITHOUT ANGINA PECTORIS: Primary | ICD-10-CM

## 2021-12-14 LAB
ALT SERPL W P-5'-P-CCNC: 23 U/L (ref 0–70)
CHOLEST SERPL-MCNC: 141 MG/DL
FASTING STATUS PATIENT QL REPORTED: YES
HDLC SERPL-MCNC: 43 MG/DL
LDLC SERPL CALC-MCNC: 68 MG/DL
LVEF ECHO: NORMAL
NONHDLC SERPL-MCNC: 98 MG/DL
TRIGL SERPL-MCNC: 152 MG/DL

## 2021-12-14 PROCEDURE — 99214 OFFICE O/P EST MOD 30 MIN: CPT | Mod: 25 | Performed by: INTERNAL MEDICINE

## 2021-12-14 PROCEDURE — 255N000002 HC RX 255 OP 636: Performed by: PHYSICIAN ASSISTANT

## 2021-12-14 PROCEDURE — 36415 COLL VENOUS BLD VENIPUNCTURE: CPT

## 2021-12-14 PROCEDURE — 84460 ALANINE AMINO (ALT) (SGPT): CPT

## 2021-12-14 PROCEDURE — 93308 TTE F-UP OR LMTD: CPT | Mod: 26 | Performed by: INTERNAL MEDICINE

## 2021-12-14 PROCEDURE — 93321 DOPPLER ECHO F-UP/LMTD STD: CPT | Mod: 26 | Performed by: INTERNAL MEDICINE

## 2021-12-14 PROCEDURE — 80061 LIPID PANEL: CPT

## 2021-12-14 PROCEDURE — 93325 DOPPLER ECHO COLOR FLOW MAPG: CPT | Mod: 26 | Performed by: INTERNAL MEDICINE

## 2021-12-14 PROCEDURE — 93321 DOPPLER ECHO F-UP/LMTD STD: CPT

## 2021-12-14 RX ADMIN — HUMAN ALBUMIN MICROSPHERES AND PERFLUTREN 9 ML: 10; .22 INJECTION, SOLUTION INTRAVENOUS at 08:41

## 2021-12-14 ASSESSMENT — MIFFLIN-ST. JEOR: SCORE: 1697.21

## 2021-12-15 NOTE — PROGRESS NOTES
HPI and Plan:   Today I had the pleasure of seeing Jerry Bustamante at [facility]. He is a pleasant 51 year old patient with a past medical history of coronary disease status post CABG on 9/8/2021, type 2 diabetes, hyperlipidemia and hypertension who presents to the clinic for establishing care.    The patient presented to our hospital with NSTEMI.  He was found to have triple-vessel disease and and underwent four-vessel CABG (with left internal mammary artery to the distal left anterior descending, right internal mammary artery to the mid right coronary artery, left radial artery to the obtuse marginal artery, reverse saphenous vein graft to the anomalous RV branch off the LAD).  The hospital stay got complicated by an episode of atrial flutter for which he required cardioversion.  He was started on amiodarone and anticoagulation.  A few weeks after discharge he had another ER visit for atrial fibrillation but converted to sinus rhythm spontaneously.  Anticoagulation and amiodarone were continued for a total of 2 months and then stopped.  He has not had any recurrence ever since.  Transthoracic echocardiogram during that admission showed ejection fraction of 45% with wall motion abnormality in LAD territory.    Today, the patient tells me that he has been feeling well.  His only complaint today is poor healing of one of the scars from the surgery.  We had our cardiothoracic surgeon take a look at it and this was addressed.  Patient was given a number to call CT surgery in case the wound does not heal.  Otherwise, the patient does not have any complaints.  He reports being able to walk without any difficulty.  He participated in cardiac rehabilitation and did well.  [facility] is a 50 mile drive from his home and hence he would like to move his care to somewhere close by.  He also had an echocardiogram prior to today's visit which shows ejection fraction of around 40% with no change in wall  motion abnormalities with aneurysmal apex.  There is also mention of possible small clot in the distal anteroseptum.  Cardiac MRI has been recommended.    Assessment and plan:   1.  Coronary disease status post CABG x4 in 09/2021,  2.  Ischemic cardiomyopathy with ejection fraction of 40%, NYHA class II  3.  Hyperlipidemia  4.  Type 2 diabetes  5.  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  I do not plan on uptitrating the dose of his medications today.  His most recent LDL cholesterol is at goal.  As far as the question of small LV thrombus in the distal anteroseptum is concerned I will perform a cardiac MRI to evaluate this further.  This will also better evaluate his ejection fraction.  He would like to transfer his care close to home which is okay with me.  I told him to find out if there is a clinic would accept him and will be happy to transfer all the medical records to them.  Otherwise, I will reach out to him with results of the cardiac MRI and start him on anticoagulation if needed.    Thank you for allowing me to participate in the care of Jerry Bustamante    This note was completed in part using Dragon voice recognition software. Although reviewed after completion, some word and grammatical errors may occur.    Waylon Castro MD  Cardiology    Orders Placed This Encounter   Procedures     MRI Cardiac w/contrast       No orders of the defined types were placed in this encounter.      There are no discontinued medications.    Encounter Diagnosis   Name Primary?     Coronary artery disease involving native coronary artery of native heart without angina pectoris Yes       CURRENT MEDICATIONS:  Current Outpatient Medications   Medication Sig Dispense Refill     ACCU-CHEK GUIDE test strip Use to test blood sugar 4 times daily or as directed. 600 strip 1     acetaminophen (TYLENOL) 325 MG tablet Take 2 tablets (650 mg) by mouth every 4 hours as needed for  other (For optimal non-opioid multimodal pain management to improve pain control.) 200 tablet 0     alcohol swab prep pads Use to swab area of injection/rupali as directed. 100 each 3     Alcohol Swabs PADS Use 4 times a day as needed. 120 each 3     allopurinol (ZYLOPRIM) 100 MG tablet Take 1 tablet (100 mg) by mouth daily 90 tablet 1     amLODIPine (NORVASC) 2.5 MG tablet Take 1 tablet (2.5 mg) by mouth daily 90 tablet 0     aspirin (ASA) 81 MG chewable tablet 1 tablet (81 mg) by Oral or NG Tube route daily (Patient taking differently: Take 81 mg by mouth daily ) 100 tablet 1     blood glucose (NO BRAND SPECIFIED) test strip Use to test blood sugar 4 times daily or as directed. 100 strip 6     blood glucose calibration (NO BRAND SPECIFIED) solution Use to calibrate blood glucose monitor as needed as directed. 200 each 0     blood glucose monitoring (NO BRAND SPECIFIED) meter device kit Use to test blood sugar 4 times daily or as directed. 1 kit 0     blood glucose monitoring (SOFTCLIX) lancets Use to test blood sugar 4 times daily. 100 each 4     Continuous Blood Gluc Sensor (FREESTYLE AICHA 14 DAY SENSOR) MISC 1 each every 14 days Change every 14 days. 2 each 5     furosemide (LASIX) 20 MG tablet Take 1 tablet (20 mg) by mouth 2 times daily 180 tablet 1     gabapentin (NEURONTIN) 300 MG capsule Take 1 capsule (300 mg) by mouth 3 times daily as needed for neuropathic pain 30 capsule 0     insulin aspart (NOVOLOG FLEXPEN) 100 UNIT/ML pen Inject 5 Units Subcutaneous 3 times daily (with meals) (Patient taking differently: Inject 5 Units Subcutaneous daily with food ) 30 mL 1     insulin glargine (LANTUS PEN) 100 UNIT/ML pen Inject 30 Units Subcutaneous every morning (Patient taking differently: Inject 25 Units Subcutaneous every morning ) 45 mL 0     insulin pen needle (32G X 4 MM) 32G X 4 MM miscellaneous Use 4 pen needles daily or as directed. 200 each 1     losartan (COZAAR) 50 MG tablet Take 1 tablet (50 mg) by  mouth 2 times daily 180 tablet 1     metFORMIN (GLUCOPHAGE) 500 MG tablet Take 2 tablets (1,000 mg) by mouth 2 times daily (with meals) 120 tablet 3     metoprolol tartrate (LOPRESSOR) 50 MG tablet Take 1 tablet (50 mg) by mouth 2 times daily 180 tablet 0     nitroGLYcerin (NITROSTAT) 0.4 MG sublingual tablet For chest pain place 1 tablet under the tongue every 5 minutes for 3 doses. If symptoms persist 5 minutes after 1st dose call 911. 30 tablet 1     rosuvastatin (CRESTOR) 10 MG tablet Take 1 tablet (10 mg) by mouth daily 90 tablet 1     thin (NO BRAND SPECIFIED) lancets Use with lanceting device. 200 each 0     albuterol (PROAIR HFA/PROVENTIL HFA/VENTOLIN HFA) 108 (90 Base) MCG/ACT inhaler Inhale 2 puffs into the lungs every 6 hours as needed for shortness of breath / dyspnea or wheezing (Patient not taking: Reported on 12/14/2021) 18 g 0       ALLERGIES   No Known Allergies    PAST MEDICAL HISTORY:  Past Medical History:   Diagnosis Date     Coronary artery disease      Diabetes mellitus, type II (H)      Gout      High cholesterol      History of cardioversion 11/1/2021     HLD (hyperlipidemia)      Hypertension      S/P CABG (coronary artery bypass graft) x4 11/1/2021       PAST SURGICAL HISTORY:  Past Surgical History:   Procedure Laterality Date     ANGIOPLASTY       BYPASS GRAFT ARTERY CORONARY N/A 9/8/2021    Procedure: CORONARY ARTERY BYPASS GRAFTING X 4 WITH ENDOSCOPIC VEIN HARVESTING LIMA-LAD PREET-DISTAL RCA LEFT RADIAL-OM LEFT SV-ANOMOLOUS RV BRANCH ON PUMP/HILLARY;  Surgeon: Antelmo Mccullough MD;  Location:  OR     CV HEART CATHETERIZATION WITH POSSIBLE INTERVENTION N/A 9/2/2021    Procedure: Heart Catheterization with Possible Intervention;  Surgeon: Paul Warner MD;  Location:  HEART CARDIAC CATH LAB     CV LEFT VENTRICULOGRAM N/A 9/2/2021    Procedure: Left Ventriculogram;  Surgeon: Paul Warner MD;  Location:  HEART CARDIAC CATH LAB       FAMILY HISTORY:  Family History  "  Problem Relation Age of Onset     Hypertension Mother      Diabetes Maternal Aunt      Diabetes Maternal Uncle      Cancer No family hx of      Cerebrovascular Disease No family hx of        SOCIAL HISTORY:  Social History     Socioeconomic History     Marital status:      Spouse name: None     Number of children: None     Years of education: None     Highest education level: None   Occupational History     None   Tobacco Use     Smoking status: Former Smoker     Packs/day: 1.50     Years: 13.00     Pack years: 19.50     Types: Cigarettes     Quit date: 2003     Years since quittin.0     Smokeless tobacco: Never Used   Substance and Sexual Activity     Alcohol use: No     Drug use: No     Sexual activity: Yes     Partners: Female   Other Topics Concern     None   Social History Narrative     None     Social Determinants of Health     Financial Resource Strain: Not on file   Food Insecurity: Not on file   Transportation Needs: Not on file   Physical Activity: Not on file   Stress: Not on file   Social Connections: Not on file   Intimate Partner Violence: Not on file   Housing Stability: Not on file       Review of Systems:  Skin:  Positive for   two wounds  from procedure, on abdomen, not healing well   Eyes:  Negative      ENT:  Positive for sinus trouble;hoarseness    Respiratory:  Positive for wheezing;hemoptysis     Cardiovascular:  Negative for;palpitations;edema;syncope or near-syncope;lightheadedness;dizziness;fatigue      Gastroenterology: Negative for melena;hematochezia    Genitourinary:  Negative      Musculoskeletal:  Negative      Neurologic:  Negative      Psychiatric:  Negative      Heme/Lymph/Imm:  Negative      Endocrine:  Negative        Physical Exam:  Vitals: /81   Pulse 69   Ht 1.803 m (5' 11\")   Wt 83 kg (183 lb)   SpO2 99%   BMI 25.52 kg/m    Eyes: No icterus.  Pulmonary: Chest symmetric, lungs clear bilaterally and no crackles, wheezes or rales.  Cardiovascular: " RRR with normal S1 and S2, no murmur, JVP normal.  Musculoskeletal: Edema of the lower extremities: None.  Neurologic: Oriented and appropriate without obvious focal deficits.   Psychiatric: Normal affect.     Recent Lab Results:  LIPID RESULTS:  Lab Results   Component Value Date    CHOL 141 12/14/2021    HDL 43 12/14/2021    LDL 68 12/14/2021    TRIG 152 (H) 12/14/2021       LIVER ENZYME RESULTS:  Lab Results   Component Value Date    AST 7 11/17/2021    ALT 23 12/14/2021       CBC RESULTS:  Lab Results   Component Value Date    WBC 6.7 11/17/2021    RBC 5.32 11/17/2021    HGB 12.5 (L) 11/17/2021    HCT 40.1 11/17/2021    MCV 75 (L) 11/17/2021    MCH 23.5 (L) 11/17/2021    MCHC 31.2 (L) 11/17/2021    RDW 14.5 11/17/2021     11/17/2021       BMP RESULTS:  Lab Results   Component Value Date     11/17/2021    POTASSIUM 4.8 11/17/2021    CHLORIDE 101 11/17/2021    CO2 23 11/17/2021    ANIONGAP 16 11/17/2021     (H) 11/17/2021    BUN 29 (H) 11/17/2021    CR 1.09 11/17/2021    GFRESTIMATED 77 11/17/2021    TORRES 10.0 11/17/2021        A1C RESULTS:  Lab Results   Component Value Date    A1C 7.6 (H) 11/17/2021       INR RESULTS:  Lab Results   Component Value Date    INR 1.23 (H) 09/08/2021    INR 1.41 (H) 09/08/2021       CC  No referring provider defined for this encounter.    All medical records were reviewed in detail and discussed with the patient. Greater than 30 mins were spent with the patient, 50% of this time was spent on counseling and coordination of care.  After visit summary was printed and given to the patient.

## 2021-12-17 ENCOUNTER — ALLIED HEALTH/NURSE VISIT (OUTPATIENT)
Dept: EDUCATION SERVICES | Facility: CLINIC | Age: 51
End: 2021-12-17
Payer: COMMERCIAL

## 2021-12-17 DIAGNOSIS — E11.9 DIABETES MELLITUS, TYPE 2 (H): Primary | ICD-10-CM

## 2021-12-17 DIAGNOSIS — E11.49 OTHER DIABETIC NEUROLOGICAL COMPLICATION ASSOCIATED WITH TYPE 2 DIABETES MELLITUS (H): ICD-10-CM

## 2021-12-17 PROCEDURE — G0108 DIAB MANAGE TRN  PER INDIV: HCPCS | Mod: AE

## 2021-12-17 NOTE — PROGRESS NOTES
Diabetes Self-Management Education & Support    Presents for: Individual review    ASSESSMENT:  Pt seen today for DM education. He brings in BG meter, readings over the last 1 month:   FB, 128, 137, 122, 146, 133, 123, 152, 117, 124, 132, 103, 136, 95, 106, 107, 108, 117, 118, 128, 162, 118, 134, 117, 208, 124    1pm: 101, 151, 90  4pm: 143, 157  6pm: 137, 149  7pm: 128, 146, 289  9pm: 170, 105, 158    Pt notes he was feeling lower readings a couple weeks ago so he decreased his lantus to 25 units. This seems to be working fine as his FBG are well controlled. Pt reports lows were not severe.   Reviewed signs and symptoms of hypoglycemia and treatment.   Reviewed BG and A1c goals. BG readings now seem to be well controlled.   Pt has avni at home but does not really want to use it. He states if his management is out of control then he will use it. Right now he feels ok with finger pokes.     Pt reports he is using novolog 5 units. Pt reports he typically takes it once a day if/when he is eating a heavy carb meal like spaghetti.   He is eating 3 meals/day.   Breakfast: tea and PB bread  Lunch: chicken and veggies or pancake   Dinner: varies   Snacks: fruit, yogur    Pt had a bypass in September and reports he is now using the treadmill every day for 30-40 mins. Discussed the benefits of exercise and encouraged pt to continue.         PLAN  Continue to monitor BG daily. Continue daily exercise.   Watch portions of high carb food, take novolog w/ high carb food and meds a prescribed.   Will f/u with PCP in 1 month.   Will f/u with CDE PRN.       SUBJECTIVE/OBJECTIVE:  Presents for: Individual review  Accompanied by: Self  Diabetes education in the past 24mo: (P) Yes  Diabetes type: Type 2  Disease course: Improving  Diabetes management related comments/concerns: No  Cultural Influences/Ethnic Background:  Not  or       Diabetes Symptoms & Complications:  Fatigue: No  Neuropathy: No  Polydipsia:  "No  Polyphagia: No  Polyuria: No  Visual change: No  Slow healing wounds: Yes  Autonomic neuropathy: No  CVA: No  Heart disease: Yes  Nephropathy: No  Peripheral neuropathy: No  Peripheral Vascular Disease: No  Retinopathy: No  Sexual dysfunction: Yes    Patient Problem List and Family Medical History reviewed for relevant medical history, current medical status, and diabetes risk factors.    Vitals:  There were no vitals taken for this visit.  Estimated body mass index is 25.52 kg/m  as calculated from the following:    Height as of 12/14/21: 1.803 m (5' 11\").    Weight as of 12/14/21: 83 kg (183 lb).   Last 3 BP:   BP Readings from Last 3 Encounters:   12/14/21 126/81   11/30/21 120/72   11/17/21 122/80       History   Smoking Status     Former Smoker     Packs/day: 1.50     Years: 13.00     Types: Cigarettes     Quit date: 11/23/2003   Smokeless Tobacco     Never Used       Labs:  Lab Results   Component Value Date    A1C 7.6 11/17/2021     Lab Results   Component Value Date     11/17/2021     Lab Results   Component Value Date    LDL 68 12/14/2021     Direct Measure HDL   Date Value Ref Range Status   12/14/2021 43 >=40 mg/dL Final   ]  GFR Estimate   Date Value Ref Range Status   11/17/2021 77 >60 mL/min/1.73m2 Final     Comment:     As of July 11, 2021, eGFR is calculated by the CKD-EPI creatinine equation, without race adjustment. eGFR can be influenced by muscle mass, exercise, and diet. The reported eGFR is an estimation only and is only applicable if the renal function is stable.     No results found for: GFRESTBLACK  Lab Results   Component Value Date    CR 1.09 11/17/2021     No results found for: MICROALBUMIN    Healthy Eating:  Healthy Eating Assessed Today: Yes  Cultural/Jew diet restrictions?: Yes  Meal planning/habits: Calorie counting,Low carb,Low salt,Smaller portions,Frequent snacking  How many times a week on average do you eat food made away from home (restaurant/take-out)?: " 1  Meals include: Breakfast,Lunch,Dinner,Morning Snack  Beverages: Water,Tea,Milk    Being Active:  Being Active Assessed Today: Yes  Exercise:: Yes  Days per week of moderate to strenuous exercise (like a brisk walk): 7  On average, minutes per day of exercise at this level: 40  How intense was your typical exercise? : Light (like stretching or slow walking)  Exercise Minutes per Week: 280  Barrier to exercise: (P) None    Monitoring:  Monitoring Assessed Today: Yes  Did patient bring glucose meter to appointment? : Yes  Blood Glucose Meter: Accu-chek  Times checking blood sugar at home (number): 1-3  Times checking blood sugar at home (per): Day  Blood glucose trend: Decreasing      Taking Medications:  Diabetes Medication(s)     Biguanides       metFORMIN (GLUCOPHAGE) 500 MG tablet    Take 2 tablets (1,000 mg) by mouth 2 times daily (with meals)    Insulin       insulin aspart (NOVOLOG FLEXPEN) 100 UNIT/ML pen    Inject 5 Units Subcutaneous 3 times daily (with meals)     Patient taking differently: Inject 5 Units Subcutaneous daily with food      insulin glargine (LANTUS PEN) 100 UNIT/ML pen    Inject 30 Units Subcutaneous every morning     Patient taking differently: Inject 25 Units Subcutaneous every morning           Taking Medication Assessed Today: Yes  Current Treatments: Insulin Injections,Oral Medication (taken by mouth)  Dose schedule: (P) Pre-breakfast  Given by: Patient  Injection/Infusion sites: Abdomen    Problem Solving:  Problem Solving Assessed Today: Yes  Is the patient at risk for hypoglycemia?: Yes  Hypoglycemia Frequency: Rarely    Reducing Risks:  CAD Risks: Family history,Hypertension  Has dilated eye exam at least once a year?: Yes  Sees dentist every 6 months?: Yes  Feet checked by healthcare provider in the last year?: Yes    Healthy Coping:  Healthy Coping Assessed Today: Yes  Emotional response to diabetes: Ready to learn  Informal Support system:: Family  Stage of change: ACTION  (Actively working towards change)  Patient Activation Measure Survey Score:  No flowsheet data found.      INTERVENTIONS:    Education provided today on:  AADE Self-Care Behaviors:  Healthy Eating: carbohydrate counting, consistency in amount, composition, and timing of food intake and heart healthy diet  Being Active: relationship to blood glucose, describe appropriate activity program and precautions to take  Monitoring: purpose, individual blood glucose targets and frequency of monitoring  Taking Medication: action of prescribed medication and when to take medications  Problem Solving: high blood glucose - causes, signs/symptoms, treatment and prevention, low blood glucose - causes, signs/symptoms, treatment and prevention and carrying a carbohydrate source at all times  Reducing Risks: A1C - goals, relating to blood glucose levels, how often to check  Healthy Coping: benefits of making appropriate lifestyle changes    Opportunities for ongoing education and support in diabetes-self management were discussed.    Pt verbalized understanding of concepts discussed and recommendations provided today.         Patient's most recent   Lab Results   Component Value Date    A1C 7.6 11/17/2021    is not meeting goal of <7.0      Time Spent: 30 minutes  Encounter Type: Individual    Any diabetes medication dose changes were made via the CDE Protocol and Collaborative Practice Agreement with the patient's referring provider. A copy of this encounter was shared with the provider.

## 2021-12-17 NOTE — LETTER
2021         RE: Jerry Bustamante  [address] [place] [address]        Dear Colleague,    Thank you for referring your patient, Jerry Bustamante, to the [facility]. Please see a copy of my visit note below.    Diabetes Self-Management Education & Support    Presents for: Individual review    ASSESSMENT:  Pt seen today for DM education. He brings in BG meter, readings over the last 1 month:   FB, 128, 137, 122, 146, 133, 123, 152, 117, 124, 132, 103, 136, 95, 106, 107, 108, 117, 118, 128, 162, 118, 134, 117, 208, 124    1pm: 101, 151, 90  4pm: 143, 157  6pm: 137, 149  7pm: 128, 146, 289  9pm: 170, 105, 158    Pt notes he was feeling lower readings a couple weeks ago so he decreased his lantus to 25 units. This seems to be working fine as his FBG are well controlled. Pt reports lows were not severe.   Reviewed signs and symptoms of hypoglycemia and treatment.   Reviewed BG and A1c goals. BG readings now seem to be well controlled.   Pt has avni at home but does not really want to use it. He states if his management is out of control then he will use it. Right now he feels ok with finger pokes.     Pt reports he is using novolog 5 units. Pt reports he typically takes it once a day if/when he is eating a heavy carb meal like spaghetti.   He is eating 3 meals/day.   Breakfast: tea and PB bread  Lunch: chicken and veggies or pancake   Dinner: varies   Snacks: fruit, yogur    Pt had a bypass in September and reports he is now using the treadmill every day for 30-40 mins. Discussed the benefits of exercise and encouraged pt to continue.         PLAN  Continue to monitor BG daily. Continue daily exercise.   Watch portions of high carb food, take novolog w/ high carb food and meds a prescribed.   Will f/u with PCP in 1 month.   Will f/u with CDE PRN.       SUBJECTIVE/OBJECTIVE:  Presents for: Individual review  Accompanied by: Self  Diabetes education in the past 24mo: (P)  "Yes  Diabetes type: Type 2  Disease course: Improving  Diabetes management related comments/concerns: No  Cultural Influences/Ethnic Background:  Not  or       Diabetes Symptoms & Complications:  Fatigue: No  Neuropathy: No  Polydipsia: No  Polyphagia: No  Polyuria: No  Visual change: No  Slow healing wounds: Yes  Autonomic neuropathy: No  CVA: No  Heart disease: Yes  Nephropathy: No  Peripheral neuropathy: No  Peripheral Vascular Disease: No  Retinopathy: No  Sexual dysfunction: Yes    Patient Problem List and Family Medical History reviewed for relevant medical history, current medical status, and diabetes risk factors.    Vitals:  There were no vitals taken for this visit.  Estimated body mass index is 25.52 kg/m  as calculated from the following:    Height as of 12/14/21: 1.803 m (5' 11\").    Weight as of 12/14/21: 83 kg (183 lb).   Last 3 BP:   BP Readings from Last 3 Encounters:   12/14/21 126/81   11/30/21 120/72   11/17/21 122/80       History   Smoking Status     Former Smoker     Packs/day: 1.50     Years: 13.00     Types: Cigarettes     Quit date: 11/23/2003   Smokeless Tobacco     Never Used       Labs:  Lab Results   Component Value Date    A1C 7.6 11/17/2021     Lab Results   Component Value Date     11/17/2021     Lab Results   Component Value Date    LDL 68 12/14/2021     Direct Measure HDL   Date Value Ref Range Status   12/14/2021 43 >=40 mg/dL Final   ]  GFR Estimate   Date Value Ref Range Status   11/17/2021 77 >60 mL/min/1.73m2 Final     Comment:     As of July 11, 2021, eGFR is calculated by the CKD-EPI creatinine equation, without race adjustment. eGFR can be influenced by muscle mass, exercise, and diet. The reported eGFR is an estimation only and is only applicable if the renal function is stable.     No results found for: GFRESTBLACK  Lab Results   Component Value Date    CR 1.09 11/17/2021     No results found for: MICROALBUMIN    Healthy Eating:  Healthy Eating " Assessed Today: Yes  Cultural/Rastafarian diet restrictions?: Yes  Meal planning/habits: Calorie counting,Low carb,Low salt,Smaller portions,Frequent snacking  How many times a week on average do you eat food made away from home (restaurant/take-out)?: 1  Meals include: Breakfast,Lunch,Dinner,Morning Snack  Beverages: Water,Tea,Milk    Being Active:  Being Active Assessed Today: Yes  Exercise:: Yes  Days per week of moderate to strenuous exercise (like a brisk walk): 7  On average, minutes per day of exercise at this level: 40  How intense was your typical exercise? : Light (like stretching or slow walking)  Exercise Minutes per Week: 280  Barrier to exercise: (P) None    Monitoring:  Monitoring Assessed Today: Yes  Did patient bring glucose meter to appointment? : Yes  Blood Glucose Meter: Accu-chek  Times checking blood sugar at home (number): 1-3  Times checking blood sugar at home (per): Day  Blood glucose trend: Decreasing      Taking Medications:  Diabetes Medication(s)     Biguanides       metFORMIN (GLUCOPHAGE) 500 MG tablet    Take 2 tablets (1,000 mg) by mouth 2 times daily (with meals)    Insulin       insulin aspart (NOVOLOG FLEXPEN) 100 UNIT/ML pen    Inject 5 Units Subcutaneous 3 times daily (with meals)     Patient taking differently: Inject 5 Units Subcutaneous daily with food      insulin glargine (LANTUS PEN) 100 UNIT/ML pen    Inject 30 Units Subcutaneous every morning     Patient taking differently: Inject 25 Units Subcutaneous every morning           Taking Medication Assessed Today: Yes  Current Treatments: Insulin Injections,Oral Medication (taken by mouth)  Dose schedule: (P) Pre-breakfast  Given by: Patient  Injection/Infusion sites: Abdomen    Problem Solving:  Problem Solving Assessed Today: Yes  Is the patient at risk for hypoglycemia?: Yes  Hypoglycemia Frequency: Rarely    Reducing Risks:  CAD Risks: Family history,Hypertension  Has dilated eye exam at least once a year?: Yes  Sees  dentist every 6 months?: Yes  Feet checked by healthcare provider in the last year?: Yes    Healthy Coping:  Healthy Coping Assessed Today: Yes  Emotional response to diabetes: Ready to learn  Informal Support system:: Family  Stage of change: ACTION (Actively working towards change)  Patient Activation Measure Survey Score:  No flowsheet data found.      INTERVENTIONS:    Education provided today on:  AADE Self-Care Behaviors:  Healthy Eating: carbohydrate counting, consistency in amount, composition, and timing of food intake and heart healthy diet  Being Active: relationship to blood glucose, describe appropriate activity program and precautions to take  Monitoring: purpose, individual blood glucose targets and frequency of monitoring  Taking Medication: action of prescribed medication and when to take medications  Problem Solving: high blood glucose - causes, signs/symptoms, treatment and prevention, low blood glucose - causes, signs/symptoms, treatment and prevention and carrying a carbohydrate source at all times  Reducing Risks: A1C - goals, relating to blood glucose levels, how often to check  Healthy Coping: benefits of making appropriate lifestyle changes    Opportunities for ongoing education and support in diabetes-self management were discussed.    Pt verbalized understanding of concepts discussed and recommendations provided today.         Patient's most recent   Lab Results   Component Value Date    A1C 7.6 11/17/2021    is not meeting goal of <7.0      Time Spent: 30 minutes  Encounter Type: Individual    Any diabetes medication dose changes were made via the CDE Protocol and Collaborative Practice Agreement with the patient's referring provider. A copy of this encounter was shared with the provider.

## 2021-12-17 NOTE — TELEPHONE ENCOUNTER
Spoke with patient and message from Dr. Youngblood relayed. Patient will plan to follow up on 12/29 and has an establish care appt scheduled already for 40 minutes on 1/10/2022

## 2021-12-28 PROBLEM — N52.8 OTHER MALE ERECTILE DYSFUNCTION: Status: RESOLVED | Noted: 2021-04-09 | Resolved: 2021-12-28

## 2021-12-29 ENCOUNTER — OFFICE VISIT (OUTPATIENT)
Dept: PEDIATRICS | Facility: CLINIC | Age: 51
End: 2021-12-29
Payer: COMMERCIAL

## 2021-12-29 VITALS
HEART RATE: 77 BPM | SYSTOLIC BLOOD PRESSURE: 114 MMHG | DIASTOLIC BLOOD PRESSURE: 80 MMHG | WEIGHT: 190 LBS | BODY MASS INDEX: 26.5 KG/M2

## 2021-12-29 DIAGNOSIS — I10 BENIGN ESSENTIAL HYPERTENSION: Primary | ICD-10-CM

## 2021-12-29 DIAGNOSIS — Z23 HIGH PRIORITY FOR 2019-NCOV VACCINE: ICD-10-CM

## 2021-12-29 DIAGNOSIS — E11.49 OTHER DIABETIC NEUROLOGICAL COMPLICATION ASSOCIATED WITH TYPE 2 DIABETES MELLITUS (H): ICD-10-CM

## 2021-12-29 PROCEDURE — 99214 OFFICE O/P EST MOD 30 MIN: CPT | Performed by: PEDIATRICS

## 2021-12-29 PROCEDURE — 91300 COVID-19,PF,PFIZER (12+ YRS): CPT | Performed by: PEDIATRICS

## 2021-12-29 PROCEDURE — 0004A COVID-19,PF,PFIZER (12+ YRS): CPT | Performed by: PEDIATRICS

## 2021-12-29 RX ORDER — AMLODIPINE BESYLATE 2.5 MG/1
2.5 TABLET ORAL DAILY
Qty: 90 TABLET | Refills: 0 | COMMUNITY
Start: 2021-12-29 | End: 2022-02-16

## 2021-12-29 ASSESSMENT — ASTHMA QUESTIONNAIRES
QUESTION_4 LAST FOUR WEEKS HOW OFTEN HAVE YOU USED YOUR RESCUE INHALER OR NEBULIZER MEDICATION (SUCH AS ALBUTEROL): NOT AT ALL
ACT_TOTALSCORE: 25
QUESTION_2 LAST FOUR WEEKS HOW OFTEN HAVE YOU HAD SHORTNESS OF BREATH: NOT AT ALL
QUESTION_5 LAST FOUR WEEKS HOW WOULD YOU RATE YOUR ASTHMA CONTROL: COMPLETELY CONTROLLED
QUESTION_1 LAST FOUR WEEKS HOW MUCH OF THE TIME DID YOUR ASTHMA KEEP YOU FROM GETTING AS MUCH DONE AT WORK, SCHOOL OR AT HOME: NONE OF THE TIME
QUESTION_3 LAST FOUR WEEKS HOW OFTEN DID YOUR ASTHMA SYMPTOMS (WHEEZING, COUGHING, SHORTNESS OF BREATH, CHEST TIGHTNESS OR PAIN) WAKE YOU UP AT NIGHT OR EARLIER THAN USUAL IN THE MORNING: NOT AT ALL

## 2021-12-29 NOTE — PATIENT INSTRUCTIONS
Restart the amlodipine 2.5mg daily. If you really do still have a cough with it, then stop and let me know. Otherwise keep taking it and we will follow up in 1.5 months.

## 2021-12-29 NOTE — PROGRESS NOTES
"  Assessment & Plan   Problem List Items Addressed This Visit     Benign essential hypertension - Primary     He is on losartan 50 mg twice a day and metoprolol 50 mg twice a day.  He brings in a log book of blood pressure measurements at home and is concerned that he is occasionally over 140/90.  He is unsure of whether or not he has had a cough due to the amlodipine and so stopped it.  I discussed with him that that is a fairly uncommon side effect.  We will restart him at a very low dose and have him follow-up in a month and a half.  If he does have a cough with it, he should let me know and can stop         Relevant Medications    amLODIPine (NORVASC) 2.5 MG tablet    Other diabetic neurological complication associated with type 2 diabetes mellitus (H)    Relevant Medications    insulin glargine (LANTUS PEN) 100 UNIT/ML pen      Other Visit Diagnoses     High priority for 2019-nCoV vaccine        Relevant Orders    COVID-19,PF,PFIZER (12+ Yrs PURPLE LABEL) (Completed)           Prescription drug management         BMI:   Estimated body mass index is 26.5 kg/m  as calculated from the following:    Height as of 12/14/21: 5' 11\" (1.803 m).    Weight as of this encounter: 190 lb (86.2 kg).         Return in about 6 weeks (around 2/9/2022) for Follow up, with me.    Narciso Michelle MD  [facility]    David Edwards is a 51 year old who presents for the following health issues     HPI     Hypertension Follow-up      Do you check your blood pressure regularly outside of the clinic? Yes     Are you following a low salt diet? No    Are your blood pressures ever more than 140 on the top number (systolic) OR more   than 90 on the bottom number (diastolic), for example 140/90? Yes      How many servings of fruits and vegetables do you eat daily?  0-1    On average, how many sweetened beverages do you drink each day (Examples: soda, juice, sweet tea, etc.  Do NOT count diet or artificially " sweetened beverages)?   0    How many days per week do you exercise enough to make your heart beat faster? 7    How many minutes a day do you exercise enough to make your heart beat faster? 30 - 60    How many days per week do you miss taking your medication? 0    Got cough with amlodipine he thought, so wanted to see if that's related to cough. Seems like he has cough regardless of whether or not he takes the amlodipine    BP's at home occasionally > 140/90    Review of Systems   See above      Objective    /80 (BP Location: Right arm, Patient Position: Sitting, Cuff Size: Adult Regular)   Pulse 77   Wt 86.2 kg (190 lb)   BMI 26.50 kg/m    Body mass index is 26.5 kg/m .  Physical Exam   GENERAL: healthy, alert and no distress  RESP: lungs clear to auscultation - no rales, rhonchi or wheezes  CV: regular rate and rhythm, normal S1 S2, no S3 or S4, no murmur, click or rub, no peripheral edema and peripheral pulses strong

## 2021-12-30 ASSESSMENT — ASTHMA QUESTIONNAIRES: ACT_TOTALSCORE: 25

## 2022-01-03 NOTE — ASSESSMENT & PLAN NOTE
He is on losartan 50 mg twice a day and metoprolol 50 mg twice a day.  He brings in a log book of blood pressure measurements at home and is concerned that he is occasionally over 140/90.  He is unsure of whether or not he has had a cough due to the amlodipine and so stopped it.  I discussed with him that that is a fairly uncommon side effect.  We will restart him at a very low dose and have him follow-up in a month and a half.  If he does have a cough with it, he should let me know and can stop

## 2022-01-21 ENCOUNTER — ALLIED HEALTH/NURSE VISIT (OUTPATIENT)
Dept: PULMONOLOGY | Facility: OTHER | Age: 52
End: 2022-01-21
Payer: COMMERCIAL

## 2022-01-21 ENCOUNTER — OFFICE VISIT (OUTPATIENT)
Dept: PULMONOLOGY | Facility: OTHER | Age: 52
End: 2022-01-21
Payer: COMMERCIAL

## 2022-01-21 VITALS
HEIGHT: 71 IN | OXYGEN SATURATION: 99 % | HEART RATE: 68 BPM | RESPIRATION RATE: 14 BRPM | DIASTOLIC BLOOD PRESSURE: 70 MMHG | WEIGHT: 187 LBS | SYSTOLIC BLOOD PRESSURE: 132 MMHG | BODY MASS INDEX: 26.18 KG/M2

## 2022-01-21 DIAGNOSIS — J45.909 ASTHMA: ICD-10-CM

## 2022-01-21 DIAGNOSIS — R06.2 WHEEZING: ICD-10-CM

## 2022-01-21 DIAGNOSIS — R49.0 HOARSENESS: Primary | ICD-10-CM

## 2022-01-21 LAB — HGB BLD-MCNC: 14.5 G/DL

## 2022-01-21 PROCEDURE — 94060 EVALUATION OF WHEEZING: CPT | Performed by: INTERNAL MEDICINE

## 2022-01-21 PROCEDURE — 94726 PLETHYSMOGRAPHY LUNG VOLUMES: CPT | Performed by: INTERNAL MEDICINE

## 2022-01-21 PROCEDURE — 94729 DIFFUSING CAPACITY: CPT | Performed by: INTERNAL MEDICINE

## 2022-01-21 PROCEDURE — 85018 HEMOGLOBIN: CPT

## 2022-01-21 PROCEDURE — 99204 OFFICE O/P NEW MOD 45 MIN: CPT | Mod: 25 | Performed by: INTERNAL MEDICINE

## 2022-01-21 ASSESSMENT — MIFFLIN-ST. JEOR: SCORE: 1710.36

## 2022-01-21 NOTE — PROGRESS NOTES
Pulmonary Clinic Outpatient Consultation    Assessment and Plan:   52 year old man with a history of CAD s/p CABG in Sept 2021 , HLD, HTN, DM2, presents for hospital discharge follow up and wheezing evaluation. His PFTs showed nonspecific reductions in FEV, FVC with preserved ratio which could be consistent with mild obesity. TLC was mildly reduced as well. DLco was normal. There was no response to bronchodilators. He is asymptomatic from a respiratory standpoint. I don't see any clear evidence of asthma or any other intrinsic lung disease. It's possible he may have had a mild form of reactive airway disease triggered by a viral infection, but he appears to be asymptomatic at this point. His CT scan showed nonspecific pulmonary opacities which may have been due to a viral pneumonitis or due to pulmonary edema from CHF. He did have some bronchiectasis. He denies any history of TB in the past.     His major issue appears to be the voice hoareseness, which he's had since his surgery. He was only intubated for 6 days, so I am not sure if this explains his hoarseness completely. He did apparently have a direct fiberoptic laryngoscopy when he was in the ER last November and no obvious abnormalities were noted. He has not followed up with ENT. I recommended referral to the [facility] voice clinic at the [facility] for further evaluation. He would like to hold off until he feels he is fully recovered from his surgery.    He is UTD with pneumococcal and COVID-19 vaccination. I do not see that he got the flu shot this year. I will defer flu vaccination to his PMD.     He can follow up with me as needed.    Nicholas (Nuno Godoy MD  [facility]monary [facility]  Clinic [phone]  Fax [phone]      CCx: wheezing, hospital discharge follow up     HPI: 52 year old man with a history of CAD s/p CABG in Sept 2021 , HLD, HTN, DM2, presents for hospital discharge follow up and wheezing  evaluation.  He was hospitalized at [facility] in November with a viral respiratory illness and reactive airway disease exacerbation. He improved with nebs and steroids.  He was also noted to have voice hoarseness - he says this has been going on since his surgery last September, and he thinks it's due to the intubation and vocal cord damage.  He had a fairly complicated hospitalization at [facility] for the CABG. He was hospitalized from 9/2 - 9/22/2021. He was extubated after 6 days.   He was referred to speech therapy but has not seen them yet.  Of note he had direct laryngoscopy in the ER at [facility] and there was no evidence of vocal cord paralysis or mass. He did have epiglottic inflammation seen on the CT neck.   He did see ENT outpatient (Dr. Valentin) on 11/26 but declined vocal cord exam at the time. He has not followed up since.     He is a former smoker. Was not a heavy smoker.  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     He is originally from [place]. [place] the [place].     ROS:  A 12-system review was obtained and was negative with the exception of the symptoms endorsed in the history of present illness.    PMH:  Past Medical History:   Diagnosis Date     Coronary artery disease      Diabetes mellitus, type II (H)      Gout      High cholesterol      History of cardioversion 11/1/2021     HLD (hyperlipidemia)      Hypertension      S/P CABG (coronary artery bypass graft) x4 11/1/2021       PSH:  Past Surgical History:   Procedure Laterality Date     ANGIOPLASTY       BYPASS GRAFT ARTERY CORONARY N/A 9/8/2021    Procedure: CORONARY ARTERY BYPASS GRAFTING X 4 WITH ENDOSCOPIC VEIN HARVESTING LIMA-LAD PREET-DISTAL RCA LEFT RADIAL-OM LEFT SV-ANOMOLOUS RV BRANCH ON PUMP/HILLARY;  Surgeon: Antelmo Mccullough MD;  Location: [facility] OR     CV HEART CATHETERIZATION WITH POSSIBLE INTERVENTION N/A 9/2/2021    Procedure: Heart Catheterization  with Possible Intervention;  Surgeon: Paul Warner MD;  Location:  HEART CARDIAC CATH LAB     CV LEFT VENTRICULOGRAM N/A 2021    Procedure: Left Ventriculogram;  Surgeon: Paul Warner MD;  Location:  HEART CARDIAC CATH LAB       Allergies:  No Known Allergies    Family HX:  Family History   Problem Relation Age of Onset     Hypertension Mother      Diabetes Maternal Aunt      Diabetes Maternal Uncle      Cancer No family hx of      Cerebrovascular Disease No family hx of        Social Hx:  Social History     Socioeconomic History     Marital status:      Spouse name: Not on file     Number of children: Not on file     Years of education: Not on file     Highest education level: Not on file   Occupational History     Not on file   Tobacco Use     Smoking status: Former Smoker     Packs/day: 1.50     Years: 13.00     Pack years: 19.50     Types: Cigarettes     Quit date: 2003     Years since quittin.1     Smokeless tobacco: Never Used   Substance and Sexual Activity     Alcohol use: No     Drug use: No     Sexual activity: Yes     Partners: Female   Other Topics Concern     Not on file   Social History Narrative    He is from [place], came here in . , has 10 children.Oldest is 27 years old and youngest is 7 years. Was a Citizen of [place] .      Social Determinants of Health     Financial Resource Strain: Not on file   Food Insecurity: Not on file   Transportation Needs: Not on file   Physical Activity: Not on file   Stress: Not on file   Social Connections: Not on file   Intimate Partner Violence: Not on file   Housing Stability: Not on file       Current Meds:  Current Outpatient Medications   Medication Sig Dispense Refill     ACCU-CHEK GUIDE test strip Use to test blood sugar 4 times daily or as directed. 600 strip 1     acetaminophen (TYLENOL) 325 MG tablet Take 2 tablets (650 mg) by mouth every 4 hours as needed for other (For optimal non-opioid multimodal pain  management to improve pain control.) 200 tablet 0     alcohol swab prep pads Use to swab area of injection/rupali as directed. 100 each 3     Alcohol Swabs PADS Use 4 times a day as needed. 120 each 3     allopurinol (ZYLOPRIM) 100 MG tablet Take 1 tablet (100 mg) by mouth daily 90 tablet 1     amLODIPine (NORVASC) 2.5 MG tablet Take 1 tablet (2.5 mg) by mouth daily 90 tablet 0     aspirin (ASA) 81 MG chewable tablet 1 tablet (81 mg) by Oral or NG Tube route daily (Patient taking differently: Take 81 mg by mouth daily ) 100 tablet 1     blood glucose (NO BRAND SPECIFIED) test strip Use to test blood sugar 4 times daily or as directed. 100 strip 6     blood glucose calibration (NO BRAND SPECIFIED) solution Use to calibrate blood glucose monitor as needed as directed. 200 each 0     blood glucose monitoring (NO BRAND SPECIFIED) meter device kit Use to test blood sugar 4 times daily or as directed. 1 kit 0     blood glucose monitoring (SOFTCLIX) lancets Use to test blood sugar 4 times daily. 100 each 4     furosemide (LASIX) 20 MG tablet Take 1 tablet (20 mg) by mouth 2 times daily 180 tablet 1     insulin aspart (NOVOLOG FLEXPEN) 100 UNIT/ML pen Inject 5 Units Subcutaneous 3 times daily (with meals) 30 mL 1     insulin glargine (LANTUS PEN) 100 UNIT/ML pen Inject 25 Units Subcutaneous every morning       insulin pen needle (32G X 4 MM) 32G X 4 MM miscellaneous Use 4 pen needles daily or as directed. 200 each 1     losartan (COZAAR) 50 MG tablet Take 1 tablet (50 mg) by mouth 2 times daily 180 tablet 1     metFORMIN (GLUCOPHAGE) 500 MG tablet Take 2 tablets (1,000 mg) by mouth 2 times daily (with meals) 120 tablet 3     metoprolol tartrate (LOPRESSOR) 50 MG tablet Take 1 tablet (50 mg) by mouth 2 times daily 180 tablet 0     rosuvastatin (CRESTOR) 10 MG tablet Take 1 tablet (10 mg) by mouth daily 90 tablet 1     thin (NO BRAND SPECIFIED) lancets Use with lanceting device. 200 each 0     albuterol (PROAIR HFA/PROVENTIL  "HFA/VENTOLIN HFA) 108 (90 Base) MCG/ACT inhaler Inhale 2 puffs into the lungs every 6 hours as needed for shortness of breath / dyspnea or wheezing (Patient not taking: Reported on 1/21/2022) 18 g 0     Continuous Blood Gluc Sensor (FREESTYLE AICHA 14 DAY SENSOR) MISC 1 each every 14 days Change every 14 days. 2 each 5     nitroGLYcerin (NITROSTAT) 0.4 MG sublingual tablet For chest pain place 1 tablet under the tongue every 5 minutes for 3 doses. If symptoms persist 5 minutes after 1st dose call 911. (Patient not taking: Reported on 1/21/2022) 30 tablet 1       Physical Exam:  /70 (BP Location: Right arm, Patient Position: Chair, Cuff Size: Adult Large)   Pulse 68   Resp 14   Ht 1.803 m (5' 11\")   Wt 84.8 kg (187 lb)   SpO2 99%   BMI 26.08 kg/m    Gen: awake, alert, oriented, no distress  HEENT: nasal turbinates are unremarkable, no oropharyngeal lesions, no cervical or supraclavicular lymphadenopathy  CV: RRR, no M/G/R  Resp: CTAB, no focal crackles or wheezes  Skin: no apparent rashes  Ext: no cyanosis, clubbing or edema  Neuro: alert, nonfocal    Labs:  Reviewed  Chem panel OK  CBC no eos    covid testing negative.     Imaging studies:  CTA chest 11/13/2021  IMPRESSION:  1.  Negative for pulmonary embolism.     2.  Generalized bronchiectasis.     3.  Mild diffuse hazy opacities in the lungs bilaterally are nonspecific and may be due to a low-grade infiltrate versus edema with edema favored. Clinical correlation.     4.  Recent postoperative changes of sternotomy. No fluid collections or abscess formation.     5.  There appears to be some generalized soft tissue edema in the laryngeal region. Please see separate neck CT report for those details.    Pulmonary Function Testing  1/21/2022  FEV1 2.53L 69%  FVC 65%  Ratio 0.83  No BD response  TLC 73%  DLco 90% robby for hgb  "

## 2022-01-21 NOTE — LETTER
1/21/2022         RE: Jerry Bustamante  [address]  [place] [address]        Dear Colleague,    Thank you for referring your patient, Jerry Bustamante, to the [facility]. Please see a copy of my visit note below.    Pulmonary Clinic Outpatient Consultation    Assessment and Plan:   52 year old man with a history of CAD s/p CABG in Sept 2021 , HLD, HTN, DM2, presents for hospital discharge follow up and wheezing evaluation. His PFTs showed nonspecific reductions in FEV, FVC with preserved ratio which could be consistent with mild obesity. TLC was mildly reduced as well. DLco was normal. There was no response to bronchodilators. He is asymptomatic from a respiratory standpoint. I don't see any clear evidence of asthma or any other intrinsic lung disease. It's possible he may have had a mild form of reactive airway disease triggered by a viral infection, but he appears to be asymptomatic at this point. His CT scan showed nonspecific pulmonary opacities which may have been due to a viral pneumonitis or due to pulmonary edema from CHF. He did have some bronchiectasis. He denies any history of TB in the past.     His major issue appears to be the voice hoareseness, which he's had since his surgery. He was only intubated for 6 days, so I am not sure if this explains his hoarseness completely. He did apparently have a direct fiberoptic laryngoscopy when he was in the ER last November and no obvious abnormalities were noted. He has not followed up with ENT. I recommended referral to the [facility] voice clinic at the [facility] for further evaluation. He would like to hold off until he feels he is fully recovered from his surgery.    He is UTD with pneumococcal and COVID-19 vaccination. I do not see that he got the flu shot this year. I will defer flu vaccination to his PMD.     He can follow up with me as needed.    Nicholas (Nuno Godoy MD  [facility] Pulmonary & Critical  [facility] [phone]  Fax [phone]      CCx: wheezing, hospital discharge follow up     HPI: 52 year old man with a history of CAD s/p CABG in Sept 2021 , HLD, HTN, DM2, presents for hospital discharge follow up and wheezing evaluation.  He was hospitalized at [facility] in November with a viral respiratory illness and reactive airway disease exacerbation. He improved with nebs and steroids.  He was also noted to have voice hoarseness - he says this has been going on since his surgery last September, and he thinks it's due to the intubation and vocal cord damage.  He had a fairly complicated hospitalization at [facility] for the CABG. He was hospitalized from 9/2 - 9/22/2021. He was extubated after 6 days.   He was referred to speech therapy but has not seen them yet.  Of note he had direct laryngoscopy in the ER at [facility] and there was no evidence of vocal cord paralysis or mass. He did have epiglottic inflammation seen on the CT neck.   He did see ENT outpatient (Dr. Valentin) on 11/26 but declined vocal cord exam at the time. He has not followed up since.     He is a former smoker. Was not a heavy smoker.  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     [place] originally [place] the [place].     ROS:  A 12-system review was obtained and was negative with the exception of the symptoms endorsed in the history of present illness.    PMH:  Past Medical History:   Diagnosis Date     Coronary artery disease      Diabetes mellitus, type II (H)      Gout      High cholesterol      History of cardioversion 11/1/2021     HLD (hyperlipidemia)      Hypertension      S/P CABG (coronary artery bypass graft) x4 11/1/2021       PSH:  Past Surgical History:   Procedure Laterality Date     ANGIOPLASTY       BYPASS GRAFT ARTERY CORONARY N/A 9/8/2021    Procedure: CORONARY ARTERY BYPASS GRAFTING X 4 WITH ENDOSCOPIC VEIN HARVESTING  LIMA-LAD PREET-DISTAL RCA LEFT RADIAL-OM LEFT SV-ANOMOLOUS RV BRANCH ON PUMP/HILLARY;  Surgeon: Antelmo Mccullough MD;  Location:  OR     CV HEART CATHETERIZATION WITH POSSIBLE INTERVENTION N/A 2021    Procedure: Heart Catheterization with Possible Intervention;  Surgeon: Paul Warner MD;  Location:  HEART CARDIAC CATH LAB     CV LEFT VENTRICULOGRAM N/A 2021    Procedure: Left Ventriculogram;  Surgeon: Paul Warner MD;  Location:  HEART CARDIAC CATH LAB       Allergies:  No Known Allergies    Family HX:  Family History   Problem Relation Age of Onset     Hypertension Mother      Diabetes Maternal Aunt      Diabetes Maternal Uncle      Cancer No family hx of      Cerebrovascular Disease No family hx of        Social Hx:  Social History     Socioeconomic History     Marital status:      Spouse name: Not on file     Number of children: Not on file     Years of education: Not on file     Highest education level: Not on file   Occupational History     Not on file   Tobacco Use     Smoking status: Former Smoker     Packs/day: 1.50     Years: 13.00     Pack years: 19.50     Types: Cigarettes     Quit date: 2003     Years since quittin.1     Smokeless tobacco: Never Used   Substance and Sexual Activity     Alcohol use: No     Drug use: No     Sexual activity: Yes     Partners: Female   Other Topics Concern     Not on file   Social History Narrative    He is from [place], came here in . , has 10 children.Oldest is 27 years old and youngest is 7 years. Was a [place] .      Social Determinants of Health     Financial Resource Strain: Not on file   Food Insecurity: Not on file   Transportation Needs: Not on file   Physical Activity: Not on file   Stress: Not on file   Social Connections: Not on file   Intimate Partner Violence: Not on file   Housing Stability: Not on file       Current Meds:  Current Outpatient Medications   Medication Sig Dispense Refill      ACCU-CHEK GUIDE test strip Use to test blood sugar 4 times daily or as directed. 600 strip 1     acetaminophen (TYLENOL) 325 MG tablet Take 2 tablets (650 mg) by mouth every 4 hours as needed for other (For optimal non-opioid multimodal pain management to improve pain control.) 200 tablet 0     alcohol swab prep pads Use to swab area of injection/rupali as directed. 100 each 3     Alcohol Swabs PADS Use 4 times a day as needed. 120 each 3     allopurinol (ZYLOPRIM) 100 MG tablet Take 1 tablet (100 mg) by mouth daily 90 tablet 1     amLODIPine (NORVASC) 2.5 MG tablet Take 1 tablet (2.5 mg) by mouth daily 90 tablet 0     aspirin (ASA) 81 MG chewable tablet 1 tablet (81 mg) by Oral or NG Tube route daily (Patient taking differently: Take 81 mg by mouth daily ) 100 tablet 1     blood glucose (NO BRAND SPECIFIED) test strip Use to test blood sugar 4 times daily or as directed. 100 strip 6     blood glucose calibration (NO BRAND SPECIFIED) solution Use to calibrate blood glucose monitor as needed as directed. 200 each 0     blood glucose monitoring (NO BRAND SPECIFIED) meter device kit Use to test blood sugar 4 times daily or as directed. 1 kit 0     blood glucose monitoring (SOFTCLIX) lancets Use to test blood sugar 4 times daily. 100 each 4     furosemide (LASIX) 20 MG tablet Take 1 tablet (20 mg) by mouth 2 times daily 180 tablet 1     insulin aspart (NOVOLOG FLEXPEN) 100 UNIT/ML pen Inject 5 Units Subcutaneous 3 times daily (with meals) 30 mL 1     insulin glargine (LANTUS PEN) 100 UNIT/ML pen Inject 25 Units Subcutaneous every morning       insulin pen needle (32G X 4 MM) 32G X 4 MM miscellaneous Use 4 pen needles daily or as directed. 200 each 1     losartan (COZAAR) 50 MG tablet Take 1 tablet (50 mg) by mouth 2 times daily 180 tablet 1     metFORMIN (GLUCOPHAGE) 500 MG tablet Take 2 tablets (1,000 mg) by mouth 2 times daily (with meals) 120 tablet 3     metoprolol tartrate (LOPRESSOR) 50 MG tablet Take 1 tablet (50  "mg) by mouth 2 times daily 180 tablet 0     rosuvastatin (CRESTOR) 10 MG tablet Take 1 tablet (10 mg) by mouth daily 90 tablet 1     thin (NO BRAND SPECIFIED) lancets Use with lanceting device. 200 each 0     albuterol (PROAIR HFA/PROVENTIL HFA/VENTOLIN HFA) 108 (90 Base) MCG/ACT inhaler Inhale 2 puffs into the lungs every 6 hours as needed for shortness of breath / dyspnea or wheezing (Patient not taking: Reported on 1/21/2022) 18 g 0     Continuous Blood Gluc Sensor (FREESTYLE AICHA 14 DAY SENSOR) MISC 1 each every 14 days Change every 14 days. 2 each 5     nitroGLYcerin (NITROSTAT) 0.4 MG sublingual tablet For chest pain place 1 tablet under the tongue every 5 minutes for 3 doses. If symptoms persist 5 minutes after 1st dose call 911. (Patient not taking: Reported on 1/21/2022) 30 tablet 1       Physical Exam:  /70 (BP Location: Right arm, Patient Position: Chair, Cuff Size: Adult Large)   Pulse 68   Resp 14   Ht 1.803 m (5' 11\")   Wt 84.8 kg (187 lb)   SpO2 99%   BMI 26.08 kg/m    Gen: awake, alert, oriented, no distress  HEENT: nasal turbinates are unremarkable, no oropharyngeal lesions, no cervical or supraclavicular lymphadenopathy  CV: RRR, no M/G/R  Resp: CTAB, no focal crackles or wheezes  Skin: no apparent rashes  Ext: no cyanosis, clubbing or edema  Neuro: alert, nonfocal    Labs:  Reviewed  Chem panel OK  CBC no eos    covid testing negative.     Imaging studies:  CTA chest 11/13/2021  IMPRESSION:  1.  Negative for pulmonary embolism.     2.  Generalized bronchiectasis.     3.  Mild diffuse hazy opacities in the lungs bilaterally are nonspecific and may be due to a low-grade infiltrate versus edema with edema favored. Clinical correlation.     4.  Recent postoperative changes of sternotomy. No fluid collections or abscess formation.     5.  There appears to be some generalized soft tissue edema in the laryngeal region. Please see separate neck CT report for those details.    Pulmonary " Function Testing  1/21/2022  FEV1 2.53L 69%  FVC 65%  Ratio 0.83  No BD response  TLC 73%  DLco 90% robby for hgb      Again, thank you for allowing me to participate in the care of your patient.        Sincerely,        Nicholas Godoy MD

## 2022-01-27 NOTE — TELEPHONE ENCOUNTER
FUTURE VISIT INFORMATION      FUTURE VISIT INFORMATION:    Date: 2/1/22    Time: 10:00am    Location[place] [facility]  REFERRAL INFORMATION:    Referring provider:  Dr. Jayne Peterson    Referring providers clinic:  [facility] Pulmonology    Reason for visit/diagnosis  VCD    RECORDS REQUESTED FROM:       Clinic name Comments Records Status Imaging Status   [facility] Pulmonology OV/referral 1/21/22 [facility] ENT Ov 11/26/21 [facility]

## 2022-01-28 LAB
DLCOCOR-%PRED-PRE: 90 %
DLCOCOR-PRE: 26.68 ML/MIN/MMHG
DLCOUNC-%PRED-PRE: 89 %
DLCOUNC-PRE: 26.6 ML/MIN/MMHG
DLCOUNC-PRED: 29.57 ML/MIN/MMHG
ERV-%PRED-PRE: 19 %
ERV-PRE: 0.27 L
ERV-PRED: 1.41 L
EXPTIME-PRE: 6.98 SEC
FEF2575-%PRED-POST: 104 %
FEF2575-%PRED-PRE: 91 %
FEF2575-POST: 3.41 L/SEC
FEF2575-PRE: 2.99 L/SEC
FEF2575-PRED: 3.25 L/SEC
FEFMAX-%PRED-PRE: 84 %
FEFMAX-PRE: 8.31 L/SEC
FEFMAX-PRED: 9.84 L/SEC
FEV1-%PRED-PRE: 69 %
FEV1-PRE: 2.53 L
FEV1FEV6-PRE: 83 %
FEV1FEV6-PRED: 80 %
FEV1FVC-PRE: 83 %
FEV1FVC-PRED: 79 %
FEV1SVC-PRE: 81 %
FEV1SVC-PRED: 69 %
FIFMAX-PRE: 4.74 L/SEC
FRCPLETH-%PRED-PRE: 72 %
FRCPLETH-PRE: 2.61 L
FRCPLETH-PRED: 3.62 L
FVC-%PRED-PRE: 65 %
FVC-PRE: 3.04 L
FVC-PRED: 4.63 L
IC-%PRED-PRE: 72 %
IC-PRE: 2.81 L
IC-PRED: 3.89 L
RVPLETH-%PRED-PRE: 98 %
RVPLETH-PRE: 2.28 L
RVPLETH-PRED: 2.32 L
TLCPLETH-%PRED-PRE: 73 %
TLCPLETH-PRE: 5.42 L
TLCPLETH-PRED: 7.33 L
VA-%PRED-PRE: 73 %
VA-PRE: 4.99 L
VC-%PRED-PRE: 59 %
VC-PRE: 3.14 L
VC-PRED: 5.3 L

## 2022-02-01 ENCOUNTER — OFFICE VISIT (OUTPATIENT)
Dept: OTOLARYNGOLOGY | Facility: CLINIC | Age: 52
End: 2022-02-01
Attending: INTERNAL MEDICINE
Payer: COMMERCIAL

## 2022-02-01 ENCOUNTER — PRE VISIT (OUTPATIENT)
Dept: OTOLARYNGOLOGY | Facility: CLINIC | Age: 52
End: 2022-02-01

## 2022-02-01 ENCOUNTER — TRANSFERRED RECORDS (OUTPATIENT)
Dept: HEALTH INFORMATION MANAGEMENT | Facility: CLINIC | Age: 52
End: 2022-02-01

## 2022-02-01 DIAGNOSIS — R49.0 HOARSENESS: ICD-10-CM

## 2022-02-01 LAB — RETINOPATHY: NORMAL

## 2022-02-01 PROCEDURE — 92524 BEHAVRAL QUALIT ANALYS VOICE: CPT | Mod: GN | Performed by: SPEECH-LANGUAGE PATHOLOGIST

## 2022-02-01 PROCEDURE — 31579 LARYNGOSCOPY TELESCOPIC: CPT | Performed by: SPEECH-LANGUAGE PATHOLOGIST

## 2022-02-01 NOTE — LETTER
2/1/2022       RE: Jerry Bustamante  [address]  [place] [address]     Dear Colleague,    Thank you for referring your patient, Jerry Bustamante, to the [facility] VOICE CLIN[facility] [place] [facility]. Please see a copy of my visit note below.    [facility] VOICE CLINIC  CLINICAL EVALUATION REPORT    Patient: Jerry Bustamante  Date of Service: 2/1/2022  Referring physician: Nicholas Godoy    HISTORY  PATIENT INFORMATION  Simone Bustamante is a 52 year old male presenting today for initial evaluation of dysphonia. Salient details of his symptom history are as follows:    The patient presents today with a history of sudden onset of dysphonia following 6 days of intubation in September 2021 status post CABG. symptoms have been stable over time.  In the initial weeks following extubation, the patient also had moderate to severe oropharyngeal dysphagia, but this resolved prior to his discharge from the hospital.  The patient currently denies breathing difficulties.  The patient's voice problem is a substantial burden to him, as he is a .    Regarding his voice, the patient reports that he runs out of air very quickly when talking, his voice is quiet and it is difficult for people to hear him, and the quality is rough and raspy.  His voice quality is worse initially in the morning, improved slightly over the course of 1 to 2 hours, and then gradually worsens as he uses his voice more and more over the course of the day.  He denies having any voice difficulties prior to intubation for cardiac surgery.  He has not found anything that seems to improve his voice quality, although she does feel that his voice is slightly stronger if he turns his head over his left shoulder.  He did have a laryngeal exam while in the ER in November 2021, which reportedly was negative for obvious vocal fold lesions or obvious vocal fold paralysis.  He was referred to  otolaryngology for follow-up with Dr. Valentin on 11/26/21, but refused endoscopy at that time.    Because his symptoms have continued to persist, he is willing to participate in endoscopy today.  He is immediately if referred to us by Dr. Peterson from pulmonary medicine due to ongoing voice concerns.    The patient currently denies breathing difficulties, denies swallowing difficulties.  He reports occasional heartburn if he eats spicy foods, but is generally able to avoid heartburn with diet modification.  He does cough intermittently when talking, and this is almost always a dry cough.  If not talking, he does not have difficulties with frequent cough.      OBJECTIVE FINDINGS  PATIENT REPORTED MEASURES  [facility] Supp[facility]swers To [facility]  [facility] Intake - Voice Review 1/30/2022   How long have you had this voice problem? 5 monthes   In regards to your voice problem, was there anything unusual about the time the problem was first noticed (such as illness, accident, surgery, etc.)?  If yes, please describe.  You have 200 characters to respond.  We will ask for more detail at your visit. surgery   What do you think caused this voice problem? intubation   How quickly did the voice problem develop? Gradually   For your voice problem, how do the symptoms vary? Worse in the PM, Worse after heavy voice use, Always the same   Over time, how has the voice problem changed? Same   How would you rate your typical vocal demand? Extensive: Voice needs go beyond everyday speech; voice demands are high but can be met even if voice is not perfect   Please list activities that involve your voice (such as singing, coaching, etc.): interpreting   Effort for speaking 10   Overall vocal quality 7   Is your voice ever normal, even briefly? No   What health care professionals have you seen for this voice problem?  Who and when? [facility] ER. dr. its onfile   For your voice problem, what testing/studies have  "you done (such as imaging/reflux testing)? what ever they did is onfile, i dont remember the Names of those testing   Did you receive any therapy or treatment for your voice problem?  Please describe briefly. no   Prior to this episode, have you ever had a voice problem before?  If yes, at that time did you have therapy or treatment for the voice problem?  Please provide a brief description of that treatment. no   For your voice problem, is there anything else you'd like to tell us? no   There isn't much I can do to help myself feel better about this problem. Strongly Disagree   How I deal with the voice problem now is under my control. Strongly Disagree   I don't have much control over my emotional reactions to this problem. Disagree Somewhat   When I am upset about the voice problem, I can find a way to feel better. Agree Somewhat   I have control over my day-to-day reactions to the voice problem. Disagree somewhat   There isn't much I can do to keep the voice problem from affecting me. Agree somewhat   I have control over how I think about the voice problem. Strongly Disagree   My reaction to the voice problem is not under my control. Strongly Agree   VPCMEAN 2.25        Patient Supplied Answers To VHI Questionnaire  Voice Handicap Index (VHI-10) 1/26/2022   My voice makes it difficult for people to hear me 3   People have difficulty understanding me in a noisy room 3   My voice difficulties restrict my personal and social life.  3   I feel left out of conversations because of my voice 3   My voice problem causes me to lose income 4   I feel as though I have to strain to produce voice 3   The clarity of my voice is unpredictable 2   My voice problem upsets me 4   My voice makes me feel handicapped 2   People ask, \"What's wrong with your voice?\" 4   VHI-10 31        Patient Supplied Answers To CSI Questionnaire  No flowsheet data found.     Patient Supplied Answers To EAT Questionnaire  No flowsheet data " found.        Self-Ratings as discussed with patient:  No flowsheet data found.     PERCEPTUAL EVALUATION (60593)  VOICE/ SPEECH/ NON-COMMUNICATIVE LARYNGEAL BEHAVIORS EVALUATION    Palpation of the laryngeal area shows:    supple musculature    Breathing pattern:     Decreased breath group size    Cough/ Throat clear:    cough is primary and non productive     Simone states today is a typical voice day, with clinician observing voice quality characterized by:    Roughness: Severe Consistent with frequent diplophonia    Breathiness: Moderate to severe Consistent    Strain: Moderate Consistent    Habitual pitch is perceptually mildly high for age and gender    Loudness is mildly reduced for the setting     Maximum Phonation Time: 3 seconds    GRADE, ROUGHNESS, BREATHINESS, ASTHENIA, STRAIN  (GRBAS) scale:  G(3)R(3)B(2)A(0)S(2)    LARYNGEAL EXAMINATION (00888)  Procedure: Flexible endoscopy with chip-tip technology with stroboscopy, left nostril; topical anesthesia with 3% Lidocaine and 0.25% phenylephrine was applied.   Performed by: Eric Stallings, Ph.D., CCC-SLP  Verbal consent was obtained and witnessed prior to this procedure.   A time-out was performed, verifying patient, procedure, and site.   This exam shows:    Velar Function: Not assessed    Secretions:  Within normal limits    Laryngeal Mucosa: essentially healthy laryngeal mucosa    Vocal fold mucosa:    o RTVF - within normal limits, no visible lesions  o LTVF - Mildly edematous    Vocal fold mobility:   o Movement is mitchell and symmetric  o Adduction appears to be very subtly limited with a slight glottic gap in halogen light    Narrow Band Imaging (NBI) demonstrated: No additional information     Right vocal fold sometimes appears to be at a lower vertical level than left during phonatory tasks, particularly at higher pitches    Airway is adequate    Elongation of the vocal folds for pitch increase is inadequate with reduced elongation on the  right    moderate hyperfunction observed    Therapy probes show improvement in voice quality when avoiding upper pitch range    The addition of stroboscopy provided the following information: Due to recruitment of supraglottic musculature full visualization was partially obscured. Limited view showed:  o Vibratory Behavior: Present bilaterally   o Amplitude Right: Moderately increased  o Amplitude Left: Within normal limits  o Mucosal Wave Right: Mildly increased  o Mucosal Wave Left: Within normal limits  o Glottic closure:  on phonation glottic closure is Frequently incomplete due to frequent and substantial asymmetry   o Symmetry: Nearly constant chasing wave  o Periodicity: Frequently aperiodic particularly at high pitches     STIMULABILITY: results of therapy probes during perceptual and laryngeal evaluation demonstrate improvement with cues to avoid higher pitches, as well as with head turn to the left    ASSESSMENT / PLAN  IMPRESSIONS: Simone Bustamante is a 52 year old  with severe Dysphonia (R49.0) characterized by roughness breathiness and strain that began following a 6-day extubation after CABG.  Laryngeal exam demonstrates Asymmetrical elongation of the vocal folds during pitch increase; in particular, elongation on the right seems to be substantially inhibited.  This results in attention and possible height mismatch between the left and right vocal fold, leading to aperiodic closure and frequent diplophonia.     A course of speech therapy is recommended to optimize vocal technique and improve voice quality.    In addition, it is recommended that the patient be seen by a laryngologist (Dr. Ella Lau, Dr. Addie Seo, or Dr. Boston Mendes) for further evaluation.    He demonstrates a Guarded prognosis for improvement given adherence to therapeutic recommendations.     Positive indicators: positive response to therapy probes    Negative indicators: none    DURATION / FREQUENCY: 2  bi-weekly therapy sessions    Goals:  Patient goal:    To understand the problem and fix it as much as possible     Short-term goal(s): Within the first 4 sessions, Simone will:  -- accurately self-identify target vs. habitual voice quality in >80% of utterances, with demonstrated ability to volitionally alter voice quality with 90% accy when prompted.  -- coordinate appropriate air flow levels with forward resonance during phonation in order to minimize laryngeal compensation and effort with 90% accy.    Long-term goal(s): In 3 months, Simone will:  -- report a 90% resolution of voice symptoms during a week of performing typical personal, social, and professional activities.    This treatment plan was developed with the patient who agreed with the recommendations.    TOTAL SERVICE TIME: 60 minutes  EVALUATION OF VOICE AND RESONANCE (93979)  ENDOSCOPIC LARYNGEAL EXAMINATION WITH STROBOSCOPY (07547)  NO CHARGE FACILITY FEE (27318)    Speech recognition software may have been used in this documentation; input is reviewed before signature to the best of my ability.     Eric Stallings, Ph.D., [facility]-SLP  Speech-Language Pathologist-[facility] Voice [facility]  [phone]  he/him/his

## 2022-02-01 NOTE — PROGRESS NOTES
[facility] VOICE CLINIC  CLINICAL EVALUATION REPORT    Patient: Jerry Bustamante  Date of Service: 2/1/2022  Referring physician: Nicholas Godoy    HISTORY  PATIENT INFORMATION  Simone Bustamante is a 52 year old male presenting today for initial evaluation of dysphonia. Salient details of his symptom history are as follows:    The patient presents today with a history of sudden onset of dysphonia following 6 days of intubation in September 2021 status post CABG. symptoms have been stable over time.  In the initial weeks following extubation, the patient also had moderate to severe oropharyngeal dysphagia, but this resolved prior to his discharge from the hospital.  The patient currently denies breathing difficulties.  The patient's voice problem is a substantial burden to him, as he is a .    Regarding his voice, the patient reports that he runs out of air very quickly when talking, his voice is quiet and it is difficult for people to hear him, and the quality is rough and raspy.  His voice quality is worse initially in the morning, improved slightly over the course of 1 to 2 hours, and then gradually worsens as he uses his voice more and more over the course of the day.  He denies having any voice difficulties prior to intubation for cardiac surgery.  He has not found anything that seems to improve his voice quality, although she does feel that his voice is slightly stronger if he turns his head over his left shoulder.  He did have a laryngeal exam while in the ER in November 2021, which reportedly was negative for obvious vocal fold lesions or obvious vocal fold paralysis.  He was referred to otolaryngology for follow-up with Dr. Valentin on 11/26/21, but refused endoscopy at that time.    Because his symptoms have continued to persist, he is willing to participate in endoscopy today.  He is immediately if referred to us by Dr. Peterson from pulmonary medicine due to ongoing voice concerns.    The patient  currently denies breathing difficulties, denies swallowing difficulties.  He reports occasional heartburn if he eats spicy foods, but is generally able to avoid heartburn with diet modification.  He does cough intermittently when talking, and this is almost always a dry cough.  If not talking, he does not have difficulties with frequent cough.      OBJECTIVE FINDINGS  PATIENT REPORTED MEASURES  [facility]  [facility] Intake - Voice Review 1/30/2022   How long have you had this voice problem? 5 monthes   In regards to your voice problem, was there anything unusual about the time the problem was first noticed (such as illness, accident, surgery, etc.)?  If yes, please describe.  You have 200 characters to respond.  We will ask for more detail at your visit. surgery   What do you think caused this voice problem? intubation   How quickly did the voice problem develop? Gradually   For your voice problem, how do the symptoms vary? Worse in the PM, Worse after heavy voice use, Always the same   Over time, how has the voice problem changed? Same   How would you rate your typical vocal demand? Extensive: Voice needs go beyond everyday speech; voice demands are high but can be met even if voice is not perfect   Please list activities that involve your voice (such as singing, coaching, etc.): interpreting   Effort for speaking 10   Overall vocal quality 7   Is your voice ever normal, even briefly? No   What health care professionals have you seen for this voice problem?  Who and when? [facility] ER. dr. its onfile   For your voice problem, what testing/studies have you done (such as imaging/reflux testing)? what ever they did is onfile, i dont remember the Names of those testing   Did you receive any therapy or treatment for your voice problem?  Please describe briefly. no   Prior to this episode, have you ever had a voice problem before?  If yes, at that time did you have  "therapy or treatment for the voice problem?  Please provide a brief description of that treatment. no   For your voice problem, is there anything else you'd like to tell us? no   There isn't much I can do to help myself feel better about this problem. Strongly Disagree   How I deal with the voice problem now is under my control. Strongly Disagree   I don't have much control over my emotional reactions to this problem. Disagree Somewhat   When I am upset about the voice problem, I can find a way to feel better. Agree Somewhat   I have control over my day-to-day reactions to the voice problem. Disagree somewhat   There isn't much I can do to keep the voice problem from affecting me. Agree somewhat   I have control over how I think about the voice problem. Strongly Disagree   My reaction to the voice problem is not under my control. Strongly Agree   VPCMEAN 2.25        Patient Supplied Answers To VHI Questionnaire  Voice Handicap Index (VHI-10) 1/26/2022   My voice makes it difficult for people to hear me 3   People have difficulty understanding me in a noisy room 3   My voice difficulties restrict my personal and social life.  3   I feel left out of conversations because of my voice 3   My voice problem causes me to lose income 4   I feel as though I have to strain to produce voice 3   The clarity of my voice is unpredictable 2   My voice problem upsets me 4   My voice makes me feel handicapped 2   People ask, \"What's wrong with your voice?\" 4   VHI-10 31        Patient Supplied Answers To CSI Questionnaire  No flowsheet data found.     Patient Supplied Answers To EAT Questionnaire  No flowsheet data found.        Self-Ratings as discussed with patient:  No flowsheet data found.     PERCEPTUAL EVALUATION (20680)  VOICE/ SPEECH/ NON-COMMUNICATIVE LARYNGEAL BEHAVIORS EVALUATION    Palpation of the laryngeal area shows:    supple musculature    Breathing pattern:     Decreased breath group size    Cough/ Throat " clear:    cough is primary and non productive     Simone states today is a typical voice day, with clinician observing voice quality characterized by:    Roughness: Severe Consistent with frequent diplophonia    Breathiness: Moderate to severe Consistent    Strain: Moderate Consistent    Habitual pitch is perceptually mildly high for age and gender    Loudness is mildly reduced for the setting     Maximum Phonation Time: 3 seconds    GRADE, ROUGHNESS, BREATHINESS, ASTHENIA, STRAIN  (GRBAS) scale:  G(3)R(3)B(2)A(0)S(2)    LARYNGEAL EXAMINATION (84525)  Procedure: Flexible endoscopy with chip-tip technology with stroboscopy, left nostril; topical anesthesia with 3% Lidocaine and 0.25% phenylephrine was applied.   Performed by: Eric Stallings, Ph.D., CCC-SLP  Verbal consent was obtained and witnessed prior to this procedure.   A time-out was performed, verifying patient, procedure, and site.   This exam shows:    Velar Function: Not assessed    Secretions:  Within normal limits    Laryngeal Mucosa: essentially healthy laryngeal mucosa    Vocal fold mucosa:    o RTVF - within normal limits, no visible lesions  o LTVF - Mildly edematous    Vocal fold mobility:   o Movement is mitchell and symmetric  o Adduction appears to be very subtly limited with a slight glottic gap in halogen light    Narrow Band Imaging (NBI) demonstrated: No additional information     Right vocal fold sometimes appears to be at a lower vertical level than left during phonatory tasks, particularly at higher pitches    Airway is adequate    Elongation of the vocal folds for pitch increase is inadequate with reduced elongation on the right    moderate hyperfunction observed    Therapy probes show improvement in voice quality when avoiding upper pitch range    The addition of stroboscopy provided the following information: Due to recruitment of supraglottic musculature full visualization was partially obscured. Limited view showed:  o Vibratory  Behavior: Present bilaterally   o Amplitude Right: Moderately increased  o Amplitude Left: Within normal limits  o Mucosal Wave Right: Mildly increased  o Mucosal Wave Left: Within normal limits  o Glottic closure:  on phonation glottic closure is Frequently incomplete due to frequent and substantial asymmetry   o Symmetry: Nearly constant chasing wave  o Periodicity: Frequently aperiodic particularly at high pitches     STIMULABILITY: results of therapy probes during perceptual and laryngeal evaluation demonstrate improvement with cues to avoid higher pitches, as well as with head turn to the left    ASSESSMENT / PLAN  IMPRESSIONS: Simone Bustamante is a 52 year old  with severe Dysphonia (R49.0) characterized by roughness breathiness and strain that began following a 6-day extubation after CABG.  Laryngeal exam demonstrates Asymmetrical elongation of the vocal folds during pitch increase; in particular, elongation on the right seems to be substantially inhibited.  This results in attention and possible height mismatch between the left and right vocal fold, leading to aperiodic closure and frequent diplophonia.     A course of speech therapy is recommended to optimize vocal technique and improve voice quality.    In addition, it is recommended that the patient be seen by a laryngologist (Dr. Ella Lau, Dr. Addie Seo, or Dr. Boston Mendes) for further evaluation.    He demonstrates a Guarded prognosis for improvement given adherence to therapeutic recommendations.     Positive indicators: positive response to therapy probes    Negative indicators: none    DURATION / FREQUENCY: 2 bi-weekly therapy sessions    Goals:  Patient goal:    To understand the problem and fix it as much as possible     Short-term goal(s): Within the first 4 sessions, Simone will:  -- accurately self-identify target vs. habitual voice quality in >80% of utterances, with demonstrated ability to volitionally alter voice  quality with 90% accy when prompted.  -- coordinate appropriate air flow levels with forward resonance during phonation in order to minimize laryngeal compensation and effort with 90% accy.    Long-term goal(s): In 3 months, Simone will:  -- report a 90% resolution of voice symptoms during a week of performing typical personal, social, and professional activities.    This treatment plan was developed with the patient who agreed with the recommendations.    TOTAL SERVICE TIME: 60 minutes  EVALUATION OF VOICE AND RESONANCE (95376)  ENDOSCOPIC LARYNGEAL EXAMINATION WITH STROBOSCOPY (77688)  NO CHARGE FACILITY FEE (49048)    Speech recognition software may have been used in this documentation; input is reviewed before signature to the best of my ability.     Eric Stallings, Ph.D., [facility]-SLP  Speech-Language Pathologist-[facility]  [phone]  he/him/his

## 2022-02-04 ENCOUNTER — TELEPHONE (OUTPATIENT)
Dept: CARDIOLOGY | Facility: CLINIC | Age: 52
End: 2022-02-04

## 2022-02-04 ENCOUNTER — HOSPITAL ENCOUNTER (OUTPATIENT)
Dept: MRI IMAGING | Facility: CLINIC | Age: 52
Discharge: HOME | End: 2022-02-04
Attending: INTERNAL MEDICINE | Admitting: INTERNAL MEDICINE
Payer: COMMERCIAL

## 2022-02-04 DIAGNOSIS — I25.10 CORONARY ARTERY DISEASE INVOLVING NATIVE CORONARY ARTERY OF NATIVE HEART WITHOUT ANGINA PECTORIS: ICD-10-CM

## 2022-02-04 PROCEDURE — 75561 CARDIAC MRI FOR MORPH W/DYE: CPT | Mod: 26 | Performed by: STUDENT IN AN ORGANIZED HEALTH CARE EDUCATION/TRAINING PROGRAM

## 2022-02-04 PROCEDURE — 75561 CARDIAC MRI FOR MORPH W/DYE: CPT

## 2022-02-04 PROCEDURE — A9585 GADOBUTROL INJECTION: HCPCS | Performed by: INTERNAL MEDICINE

## 2022-02-04 PROCEDURE — 255N000002 HC RX 255 OP 636: Performed by: INTERNAL MEDICINE

## 2022-02-04 RX ORDER — GADOBUTROL 604.72 MG/ML
10 INJECTION INTRAVENOUS ONCE
Status: COMPLETED | OUTPATIENT
Start: 2022-02-04 | End: 2022-02-04

## 2022-02-04 RX ADMIN — GADOBUTROL 10 ML: 604.72 INJECTION INTRAVENOUS at 08:44

## 2022-02-04 NOTE — TELEPHONE ENCOUNTER
Clinical history: 52-year old male with a history of ischemic cardiomyopathy (status post CABG on  09/08/2021 with LIMA-LAD, PREET-dRCA, left radial-OM, SVG-anomalous RV branch), type II diabetes mellitus,  and dyslipidemia who was thought to have an LV thrombus on a recent TTE. CMR to evaluate for LV apical  thrombus.  Comparison CMR: None.     1. The LV is normal in cavity size. The global systolic function is moderately reduced. The LVEF is 39%.  There is mild hypokinesis of the mid inferoseptum with thinning and akinesis of the mid anteroseptum,  apical septum, apical anterior, apical inferior (with visible fatty metaplasia), and true apical segments.   There is a second, separate area of akinesis and thinning in the basal inferolateral and mid inferolateral  segments.     2. The RV is normal in cavity size. The global systolic function is normal. The RVEF is 65%.      3. Both atria are normal in size.     4. There is no significant valvular disease.      5. Late gadolinium enhancement imaging shows two distinct patterns of fibrosis. The first pattern is a  largely subendocardial pattern in the mid anteroseptal, mid inferoseptal, apical anterior, apical septal,  apical inferior, and true apical segments. This is due to a prior LAD culprit infarction. The second  pattern is a predominantly transmural pattern involving the basal inferolateral and mid inferolateral  segments. This pattern also involves the posteromedial papillary muscle. This is due to a RCA culprit  infarction.      6. There is no pericardial effusion or thickening.     7. There is no intracardiac thrombus.     CONCLUSIONS: Ischemic cardiomyopathy. There is moderately reduced LV function with preserved RV function.  There is a pattern of fibrosis that is consistent with prior LAD and RCA culprit infarctions. No  intracardiac thrombi were visualized on this examination.                   I called patient to inform him that his MRI confirmed an EF of  39% which is similar to what he had on a recent. I also told him that the MRI did NOR reveal a clot which is good news.     I asked patient where he has decided to get his cardiac care closer to home and he thinks he will go to [facility].Has not made an appointment.I told him that we share the same EMR but will send to him Dr. Castro's clinic note and MRI report.    I told him that I will update Dr. Castro and if he has additional comments about his test result we will get back to him.    All questions and concerns addressed to his satisfaction and he expressed appreciation for the call

## 2022-02-07 NOTE — TELEPHONE ENCOUNTER
FUTURE VISIT INFORMATION      FUTURE VISIT INFORMATION:    Date: 5/10/22    Time: 8AM    Location: [facility]  REFERRAL INFORMATION:    Referring provider:      Referring providers clinic:      Reason for visit/diagnosis  He has a history of severe dysphonia s/p 6 days of intubation for CABG in September, 2021. Exam today shows good airway and no vocal fold lesions    RECORDS REQUESTED FROM:       Clinic name Comments Records Status Imaging Status   [facility] Voice clinic [place] 2/1/22 note from Eric Stallings, SLP   [facility] Pulmonary [place] 1/21/22 note from Nicholas Godoy MD  1/21/22 PFT [facility] ENT 11/26/21 note from Sarah Valentin MD   Epic    Imaging 11/13/21 CT NEck and CT Pulmonary   10/21/21 XR Chest   9/12/21 MR Brain   9/10/21 CT Head [facility] PACS

## 2022-02-07 NOTE — TELEPHONE ENCOUNTER
Waylon Castro MD Crawford, William W., RN 3 days ago     RR    Thank you for letting me know.  I do not think we have to do anything more at this time.      RETA MottN, RN, PHN, HNB-BC   2/7/2022 at 2:51 PM

## 2022-02-14 DIAGNOSIS — E11.49 OTHER DIABETIC NEUROLOGICAL COMPLICATION ASSOCIATED WITH TYPE 2 DIABETES MELLITUS (H): ICD-10-CM

## 2022-02-16 ENCOUNTER — OFFICE VISIT (OUTPATIENT)
Dept: FAMILY MEDICINE | Facility: CLINIC | Age: 52
End: 2022-02-16
Payer: COMMERCIAL

## 2022-02-16 ENCOUNTER — TELEPHONE (OUTPATIENT)
Dept: INTERNAL MEDICINE | Facility: CLINIC | Age: 52
End: 2022-02-16

## 2022-02-16 VITALS
WEIGHT: 189 LBS | HEIGHT: 70 IN | HEART RATE: 55 BPM | OXYGEN SATURATION: 98 % | SYSTOLIC BLOOD PRESSURE: 120 MMHG | BODY MASS INDEX: 27.06 KG/M2 | DIASTOLIC BLOOD PRESSURE: 84 MMHG

## 2022-02-16 DIAGNOSIS — Z11.59 NEED FOR HEPATITIS C SCREENING TEST: ICD-10-CM

## 2022-02-16 DIAGNOSIS — I21.4 NSTEMI (NON-ST ELEVATED MYOCARDIAL INFARCTION) (H): ICD-10-CM

## 2022-02-16 DIAGNOSIS — Z11.4 SCREENING FOR HIV (HUMAN IMMUNODEFICIENCY VIRUS): ICD-10-CM

## 2022-02-16 DIAGNOSIS — Z12.11 SCREEN FOR COLON CANCER: ICD-10-CM

## 2022-02-16 DIAGNOSIS — R80.9 TYPE 2 DIABETES MELLITUS WITH MICROALBUMINURIA, WITH LONG-TERM CURRENT USE OF INSULIN (H): Primary | ICD-10-CM

## 2022-02-16 DIAGNOSIS — E11.49 OTHER DIABETIC NEUROLOGICAL COMPLICATION ASSOCIATED WITH TYPE 2 DIABETES MELLITUS (H): ICD-10-CM

## 2022-02-16 DIAGNOSIS — Z95.1 S/P CORONARY ARTERY BYPASS GRAFT X 3: ICD-10-CM

## 2022-02-16 DIAGNOSIS — M10.9 GOUT, UNSPECIFIED CAUSE, UNSPECIFIED CHRONICITY, UNSPECIFIED SITE: ICD-10-CM

## 2022-02-16 DIAGNOSIS — E11.29 TYPE 2 DIABETES MELLITUS WITH MICROALBUMINURIA, WITH LONG-TERM CURRENT USE OF INSULIN (H): Primary | ICD-10-CM

## 2022-02-16 DIAGNOSIS — L08.9 CHRONIC WOUND INFECTION OF ABDOMEN, INITIAL ENCOUNTER: ICD-10-CM

## 2022-02-16 DIAGNOSIS — Z79.4 TYPE 2 DIABETES MELLITUS WITH MICROALBUMINURIA, WITH LONG-TERM CURRENT USE OF INSULIN (H): Primary | ICD-10-CM

## 2022-02-16 DIAGNOSIS — Z00.00 ROUTINE GENERAL MEDICAL EXAMINATION AT A HEALTH CARE FACILITY: ICD-10-CM

## 2022-02-16 DIAGNOSIS — I10 BENIGN ESSENTIAL HYPERTENSION: ICD-10-CM

## 2022-02-16 DIAGNOSIS — S31.109A CHRONIC WOUND INFECTION OF ABDOMEN, INITIAL ENCOUNTER: ICD-10-CM

## 2022-02-16 DIAGNOSIS — Z12.5 SCREENING FOR PROSTATE CANCER: ICD-10-CM

## 2022-02-16 DIAGNOSIS — Z95.1 S/P CABG (CORONARY ARTERY BYPASS GRAFT): ICD-10-CM

## 2022-02-16 LAB
ALBUMIN SERPL-MCNC: 4.1 G/DL (ref 3.5–5)
ALP SERPL-CCNC: 131 U/L (ref 45–120)
ALT SERPL W P-5'-P-CCNC: 29 U/L (ref 0–45)
ANION GAP SERPL CALCULATED.3IONS-SCNC: 10 MMOL/L (ref 5–18)
AST SERPL W P-5'-P-CCNC: 25 U/L (ref 0–40)
BASOPHILS # BLD AUTO: 0.1 10E3/UL (ref 0–0.2)
BASOPHILS NFR BLD AUTO: 1 %
BILIRUB SERPL-MCNC: 0.4 MG/DL (ref 0–1)
BUN SERPL-MCNC: 11 MG/DL (ref 8–22)
CALCIUM SERPL-MCNC: 9.9 MG/DL (ref 8.5–10.5)
CHLORIDE BLD-SCNC: 102 MMOL/L (ref 98–107)
CO2 SERPL-SCNC: 28 MMOL/L (ref 22–31)
CREAT SERPL-MCNC: 0.85 MG/DL (ref 0.7–1.3)
CREAT UR-MCNC: 32 MG/DL
EOSINOPHIL # BLD AUTO: 0.7 10E3/UL (ref 0–0.7)
EOSINOPHIL NFR BLD AUTO: 8 %
ERYTHROCYTE [DISTWIDTH] IN BLOOD BY AUTOMATED COUNT: 17.8 % (ref 10–15)
GFR SERPL CREATININE-BSD FRML MDRD: >90 ML/MIN/1.73M2
GLUCOSE BLD-MCNC: 144 MG/DL (ref 70–125)
HBA1C MFR BLD: 7.4 % (ref 0–5.6)
HCT VFR BLD AUTO: 41.6 % (ref 40–53)
HCV AB SERPL QL IA: NONREACTIVE
HGB BLD-MCNC: 13.5 G/DL (ref 13.3–17.7)
HIV 1+2 AB+HIV1 P24 AG SERPL QL IA: NEGATIVE
IMM GRANULOCYTES # BLD: 0 10E3/UL
IMM GRANULOCYTES NFR BLD: 0 %
LYMPHOCYTES # BLD AUTO: 2.5 10E3/UL (ref 0.8–5.3)
LYMPHOCYTES NFR BLD AUTO: 28 %
MCH RBC QN AUTO: 24.2 PG (ref 26.5–33)
MCHC RBC AUTO-ENTMCNC: 32.5 G/DL (ref 31.5–36.5)
MCV RBC AUTO: 75 FL (ref 78–100)
MICROALBUMIN UR-MCNC: 25.23 MG/DL (ref 0–1.99)
MICROALBUMIN/CREAT UR: 788.4 MG/G CR
MONOCYTES # BLD AUTO: 0.6 10E3/UL (ref 0–1.3)
MONOCYTES NFR BLD AUTO: 7 %
NEUTROPHILS # BLD AUTO: 5 10E3/UL (ref 1.6–8.3)
NEUTROPHILS NFR BLD AUTO: 56 %
NRBC # BLD AUTO: 0 10E3/UL
NRBC BLD AUTO-RTO: 0 /100
PLATELET # BLD AUTO: 201 10E3/UL (ref 150–450)
POTASSIUM BLD-SCNC: 3.9 MMOL/L (ref 3.5–5)
PROT SERPL-MCNC: 7.8 G/DL (ref 6–8)
PSA SERPL-MCNC: 0.89 UG/L (ref 0–3.5)
RBC # BLD AUTO: 5.57 10E6/UL (ref 4.4–5.9)
SODIUM SERPL-SCNC: 140 MMOL/L (ref 136–145)
WBC # BLD AUTO: 8.9 10E3/UL (ref 4–11)

## 2022-02-16 PROCEDURE — 99396 PREV VISIT EST AGE 40-64: CPT | Performed by: FAMILY MEDICINE

## 2022-02-16 PROCEDURE — 82043 UR ALBUMIN QUANTITATIVE: CPT | Performed by: FAMILY MEDICINE

## 2022-02-16 PROCEDURE — 86803 HEPATITIS C AB TEST: CPT | Performed by: FAMILY MEDICINE

## 2022-02-16 PROCEDURE — 99214 OFFICE O/P EST MOD 30 MIN: CPT | Mod: 25 | Performed by: FAMILY MEDICINE

## 2022-02-16 PROCEDURE — 83036 HEMOGLOBIN GLYCOSYLATED A1C: CPT | Performed by: FAMILY MEDICINE

## 2022-02-16 PROCEDURE — G0103 PSA SCREENING: HCPCS | Performed by: FAMILY MEDICINE

## 2022-02-16 PROCEDURE — 80053 COMPREHEN METABOLIC PANEL: CPT | Performed by: FAMILY MEDICINE

## 2022-02-16 PROCEDURE — 87389 HIV-1 AG W/HIV-1&-2 AB AG IA: CPT | Performed by: FAMILY MEDICINE

## 2022-02-16 PROCEDURE — 85025 COMPLETE CBC W/AUTO DIFF WBC: CPT | Performed by: FAMILY MEDICINE

## 2022-02-16 PROCEDURE — 36415 COLL VENOUS BLD VENIPUNCTURE: CPT | Performed by: FAMILY MEDICINE

## 2022-02-16 RX ORDER — LOSARTAN POTASSIUM 50 MG/1
50 TABLET ORAL 2 TIMES DAILY
Qty: 180 TABLET | Refills: 1 | Status: SHIPPED | OUTPATIENT
Start: 2022-02-16 | End: 2022-06-23

## 2022-02-16 RX ORDER — FUROSEMIDE 20 MG
20 TABLET ORAL
Qty: 180 TABLET | Refills: 1 | Status: SHIPPED | OUTPATIENT
Start: 2022-02-16 | End: 2022-06-23

## 2022-02-16 RX ORDER — ALLOPURINOL 100 MG/1
100 TABLET ORAL DAILY
Qty: 90 TABLET | Refills: 1 | Status: SHIPPED | OUTPATIENT
Start: 2022-02-16 | End: 2022-06-23

## 2022-02-16 RX ORDER — AMLODIPINE BESYLATE 2.5 MG/1
2.5 TABLET ORAL DAILY
Qty: 90 TABLET | Refills: 0 | Status: SHIPPED | OUTPATIENT
Start: 2022-02-16 | End: 2022-06-27

## 2022-02-16 RX ORDER — ROSUVASTATIN CALCIUM 10 MG/1
10 TABLET, COATED ORAL DAILY
Qty: 90 TABLET | Refills: 1 | Status: SHIPPED | OUTPATIENT
Start: 2022-02-16 | End: 2022-06-23

## 2022-02-16 RX ORDER — FLASH GLUCOSE SENSOR
1 KIT MISCELLANEOUS
Qty: 2 EACH | Refills: 5 | Status: SHIPPED | OUTPATIENT
Start: 2022-02-16

## 2022-02-16 RX ORDER — GLUCOSAMINE HCL/CHONDROITIN SU 500-400 MG
CAPSULE ORAL
Qty: 100 EACH | Refills: 3 | Status: SHIPPED | OUTPATIENT
Start: 2022-02-16 | End: 2022-06-27

## 2022-02-16 RX ORDER — BLOOD SUGAR DIAGNOSTIC
STRIP MISCELLANEOUS
Qty: 600 STRIP | Refills: 1 | Status: SHIPPED | OUTPATIENT
Start: 2022-02-16 | End: 2022-06-27

## 2022-02-16 RX ORDER — LANCETS
EACH MISCELLANEOUS
Qty: 100 EACH | Refills: 4 | Status: SHIPPED | OUTPATIENT
Start: 2022-02-16 | End: 2023-07-05

## 2022-02-16 RX ORDER — METOPROLOL TARTRATE 50 MG
50 TABLET ORAL 2 TIMES DAILY
Qty: 180 TABLET | Refills: 0 | Status: SHIPPED | OUTPATIENT
Start: 2022-02-16 | End: 2022-03-22

## 2022-02-16 RX ORDER — BACITRACIN ZINC 500 [USP'U]/G
OINTMENT TOPICAL 2 TIMES DAILY
Qty: 30 G | Refills: 1 | Status: SHIPPED | OUTPATIENT
Start: 2022-02-16 | End: 2022-10-25

## 2022-02-16 RX ORDER — INSULIN ASPART 100 [IU]/ML
5 INJECTION, SOLUTION INTRAVENOUS; SUBCUTANEOUS
Qty: 30 ML | Refills: 1 | Status: SHIPPED | OUTPATIENT
Start: 2022-02-16 | End: 2022-06-27

## 2022-02-16 NOTE — TELEPHONE ENCOUNTER
[facility] calling regarding RX for bacitracin ointment.  For patient's insurance need to know the application site.    Requesting call back.

## 2022-02-16 NOTE — Clinical Note
Please abstract the following data from this visit with this patient into the appropriate field in Epic:    Tests that can be patient reported without a hard copy:    Colonoscopy done on this date: 2019 (approximately), by this group: [facility], results were normal.  and Eye exam with ophthalmology on this date: 1/21    Other Tests found in the patient's chart through Chart Review/Care Everywhere:    {Abstract Quality List (Optional):159168}    Note to Abstraction: If this section is blank, no results were found via Chart Review/Care Everywhere.

## 2022-02-16 NOTE — PROGRESS NOTES
SUBJECTIVE:   CC: Jerry Bustamante is an 52 year old male who presents for preventative health visit.       Patient has been advised of split billing requirements and indicates understanding: Yes  Healthy Habits:     Getting at least 3 servings of Calcium per day:  Yes    Bi-annual eye exam:  NO    Dental care twice a year:  Yes    Sleep apnea or symptoms of sleep apnea:  None    Diet:  Regular (no restrictions) and Diabetic    Frequency of exercise:  6-7 days/week    Duration of exercise:  30-45 minutes    Taking medications regularly:  Yes    PHQ-2 Total Score: 0    Additional concerns today:  No      Hx of blood pressure issues - noticed more issues with eating salty foods.  Exercise daily.  Seeing cardiology.  Hx of DM - on insulin.  In morning running 120-150  No numbness and tingling in the feet.    Eye exam done last year , colonoscopy done 2 years ago      Today's PHQ-2 Score:   PHQ-2 (  [employer]) 2/15/2022   Q1: Little interest or pleasure in doing things 0   Q2: Feeling down, depressed or hopeless 0   PHQ-2 Score 0   Q1: Little interest or pleasure in doing things Not at all   Q2: Feeling down, depressed or hopeless Not at all   PHQ-2 Score 0       Abuse: Current or Past(Physical, Sexual or Emotional)- No  Do you feel safe in your environment? Yes    Have you ever done Advance Care Planning? (For example, a Health Directive, POLST, or a discussion with a medical provider or your loved ones about your wishes): No, advance care planning information given to patient to review.  Patient plans to discuss their wishes with loved ones or provider.      Social History     Tobacco Use     Smoking status: Former Smoker     Packs/day: 1.50     Years: 13.00     Pack years: 19.50     Types: Cigarettes     Quit date: 2003     Years since quittin.2     Smokeless tobacco: Never Used   Substance Use Topics     Alcohol use: No     If you drink alcohol do you typically have >3 drinks per day or >7 drinks  per week? No    Alcohol Use 2/16/2022   Prescreen: >3 drinks/day or >7 drinks/week? -   Prescreen: >3 drinks/day or >7 drinks/week? No       Last PSA: No results found for: PSA    Reviewed orders with patient. Reviewed health maintenance and updated orders accordingly - Yes  Lab work is in process    Reviewed and updated as needed this visit by clinical staff   Tobacco  Allergies  Meds   Med Hx  Surg Hx  Fam Hx  Soc Hx        Reviewed and updated as needed this visit by Provider                 Past Medical History:   Diagnosis Date     Coronary artery disease      Diabetes mellitus, type II (H)      Gout      High cholesterol      History of cardioversion 11/1/2021     HLD (hyperlipidemia)      Hypertension      S/P CABG (coronary artery bypass graft) x4 11/1/2021      Past Surgical History:   Procedure Laterality Date     ANGIOPLASTY       BYPASS GRAFT ARTERY CORONARY N/A 9/8/2021    Procedure: CORONARY ARTERY BYPASS GRAFTING X 4 WITH ENDOSCOPIC VEIN HARVESTING LIMA-LAD PREET-DISTAL RCA LEFT RADIAL-OM LEFT SV-ANOMOLOUS RV BRANCH ON PUMP/HILLARY;  Surgeon: Antelmo Mccullough MD;  Location:  OR     CV HEART CATHETERIZATION WITH POSSIBLE INTERVENTION N/A 9/2/2021    Procedure: Heart Catheterization with Possible Intervention;  Surgeon: Paul Warner MD;  Location:  HEART CARDIAC CATH LAB     CV LEFT VENTRICULOGRAM N/A 9/2/2021    Procedure: Left Ventriculogram;  Surgeon: Paul Warner MD;  Location:  HEART CARDIAC CATH LAB     OB History   No obstetric history on file.       Review of Systems  CONSTITUTIONAL: NEGATIVE for fever, chills, change in weight  INTEGUMENTARY/SKIN: NEGATIVE for worrisome rashes, moles or lesions  EYES: NEGATIVE for vision changes or irritation  ENT: NEGATIVE for ear, mouth and throat problems  RESP: NEGATIVE for significant cough or SOB  CV: NEGATIVE for chest pain, palpitations or peripheral edema  GI: NEGATIVE for nausea, abdominal pain, heartburn, or change in bowel  "habits   male: negative for dysuria, hematuria, decreased urinary stream, erectile dysfunction, urethral discharge  MUSCULOSKELETAL: NEGATIVE for significant arthralgias or myalgia  NEURO: NEGATIVE for weakness, dizziness or paresthesias  PSYCHIATRIC: NEGATIVE for changes in mood or affect    OBJECTIVE:   /84 (BP Location: Right arm, Patient Position: Sitting, Cuff Size: Adult Regular)   Pulse 55   Ht 1.778 m (5' 10\")   Wt 85.7 kg (189 lb)   SpO2 98%   BMI 27.12 kg/m      Physical Exam  GENERAL: healthy, alert and no distress  EYES: Eyes grossly normal to inspection, PERRL and conjunctivae and sclerae normal  HENT: ear canals and TM's normal, nose and mouth without ulcers or lesions  NECK: no adenopathy, no asymmetry, masses, or scars and thyroid normal to palpation  RESP: lungs clear to auscultation - no rales, rhonchi or wheezes  CV: regular rate and rhythm, normal S1 S2, no S3 or S4, no murmur, click or rub, no peripheral edema and peripheral pulses strong  ABDOMEN: soft, nontender, no hepatosplenomegaly, no masses and bowel sounds normal  MS: no gross musculoskeletal defects noted, no edema  SKIN: no suspicious lesions or rashes  NEURO: Normal strength and tone, mentation intact and speech normal  PSYCH: mentation appears normal, affect normal/bright    Diagnostic Test Results:  Labs reviewed in Epic    ASSESSMENT/PLAN:   Jerry was seen today for physical.    Diagnoses and all orders for this visit:    Type 2 diabetes mellitus with microalbuminuria, with long-term current use of insulin (H)  -     Albumin Random Urine Quantitative with Creat Ratio; Future  -     HEMOGLOBIN A1C; Future  -     Comprehensive metabolic panel (BMP + Alb, Alk Phos, ALT, AST, Total. Bili, TP); Future  -     CBC with platelets and differential; Future  -     HEMOGLOBIN A1C  -     Comprehensive metabolic panel (BMP + Alb, Alk Phos, ALT, AST, Total. Bili, TP)  -     CBC with platelets and differential  -     Albumin " Random Urine Quantitative with Creat Ratio    Other diabetic neurological complication associated with type 2 diabetes mellitus (H)  -     losartan (COZAAR) 50 MG tablet; Take 1 tablet (50 mg) by mouth 2 times daily  -     insulin pen needle (32G X 4 MM) 32G X 4 MM miscellaneous; Use 4 pen needles daily or as directed.  -     metFORMIN (GLUCOPHAGE) 500 MG tablet; Take 2 tablets (1,000 mg) by mouth 2 times daily (with meals)  -     metoprolol tartrate (LOPRESSOR) 50 MG tablet; Take 1 tablet (50 mg) by mouth 2 times daily  -     blood glucose monitoring (SOFTCLIX) lancets; Use to test blood sugar 4 times daily.  -     ACCU-CHEK GUIDE test strip; Use to test blood sugar 4 times daily or as directed.  -     insulin glargine (LANTUS PEN) 100 UNIT/ML pen; Inject 25 Units Subcutaneous every morning  -     Continuous Blood Gluc Sensor (FREESTYLE AICHA 14 DAY SENSOR) MISC; 1 each every 14 days Change every 14 days.  -     insulin aspart (NOVOLOG FLEXPEN) 100 UNIT/ML pen; Inject 5 Units Subcutaneous 3 times daily (with meals)    S/P coronary artery bypass graft x 3    S/P CABG (coronary artery bypass graft) x4  -     rosuvastatin (CRESTOR) 10 MG tablet; Take 1 tablet (10 mg) by mouth daily  -     furosemide (LASIX) 20 MG tablet; Take 1 tablet (20 mg) by mouth 2 times daily    Benign essential hypertension  -     amLODIPine (NORVASC) 2.5 MG tablet; Take 1 tablet (2.5 mg) by mouth daily    Gout, unspecified cause, unspecified chronicity, unspecified site  -     allopurinol (ZYLOPRIM) 100 MG tablet; Take 1 tablet (100 mg) by mouth daily    NSTEMI (non-ST elevated myocardial infarction) (H)  -     alcohol swab prep pads; Use to swab area of injection/rupali as directed.    Screen for colon cancer    Screening for HIV (human immunodeficiency virus)  -     HIV Antigen Antibody Combo; Future  -     HIV Antigen Antibody Combo    Need for hepatitis C screening test  -     Hepatitis C Screen Reflex to HCV RNA Quant and Genotype;  "Future  -     Hepatitis C Screen Reflex to HCV RNA Quant and Genotype    Screening for prostate cancer  -     PSA, screen; Future  -     PSA, screen    Chronic wound infection of abdomen, initial encounter  -     bacitracin 500 UNIT/GM external ointment; Apply topically 2 times daily    Other orders  -     Cancel: ZOSTER VACCINE RECOMBINANT (Shingrix)        Patient has been advised of split billing requirements and indicates understanding: Yes    COUNSELING:   Reviewed preventive health counseling, as reflected in patient instructions       Consider AAA screening for ages 65-75 and smoking history       Vision screening    Estimated body mass index is 27.12 kg/m  as calculated from the following:    Height as of this encounter: 1.778 m (5' 10\").    Weight as of this encounter: 85.7 kg (189 lb).         He reports that he quit smoking about 18 years ago. His smoking use included cigarettes. He has a 19.50 pack-year smoking history. He has never used smokeless tobacco.      Counseling Resources:  ATP IV Guidelines  Pooled Cohorts Equation Calculator  FRAX Risk Assessment  ICSI Preventive Guidelines  Dietary Guidelines for Americans, 2010  USDA's MyPlate  ASA Prophylaxis  Lung CA Screening    Sadaf Lopez MD  [facility]  "

## 2022-02-16 NOTE — TELEPHONE ENCOUNTER
Called pharmacy to provide clarification for recent rx. Insurance needed to know where rx will be applied.      Chronic wound infection of abdomen, initial encounter  -     bacitracin 500 UNIT/GM external ointment; Apply topically 2 times daily

## 2022-02-17 RX ORDER — METOPROLOL TARTRATE 50 MG
TABLET ORAL
Qty: 180 TABLET | Refills: 0 | OUTPATIENT
Start: 2022-02-17

## 2022-02-17 NOTE — TELEPHONE ENCOUNTER
Refused refill request as duplicate. Filled 2/16/2022.  Maricruz Mayberry, NEY   02/17/22 7:32 AM  [facility] Nurse Advisor

## 2022-02-23 ENCOUNTER — OFFICE VISIT (OUTPATIENT)
Dept: CARDIOLOGY | Facility: CLINIC | Age: 52
End: 2022-02-23
Payer: COMMERCIAL

## 2022-02-23 VITALS
HEART RATE: 72 BPM | DIASTOLIC BLOOD PRESSURE: 70 MMHG | SYSTOLIC BLOOD PRESSURE: 122 MMHG | BODY MASS INDEX: 27.69 KG/M2 | RESPIRATION RATE: 14 BRPM | WEIGHT: 193 LBS

## 2022-02-23 DIAGNOSIS — I25.10 CORONARY ARTERY DISEASE INVOLVING NATIVE CORONARY ARTERY OF NATIVE HEART WITHOUT ANGINA PECTORIS: Primary | ICD-10-CM

## 2022-02-23 PROCEDURE — 99214 OFFICE O/P EST MOD 30 MIN: CPT | Performed by: INTERNAL MEDICINE

## 2022-02-23 RX ORDER — GINSENG 100 MG
CAPSULE ORAL
COMMUNITY
Start: 2022-02-17 | End: 2022-10-25

## 2022-02-23 NOTE — PROGRESS NOTES
Thank you,  No ref. provider found, for asking [facility] to evaluate Mr. Jerry Bustamante.      Assessment/Recommendations   Assessment:    Coronary artery disease with history of remote stenting and non-ST segment myocardial infarction in September 2021 followed by CABG(LIMA-LAD, PREET-dRCA, left radial-OM, SVG-anomalous RV branch) at [facility], chest pain-free  Chronic systolic heart failure, volume neutral  Ischemic cardiomyopathy with moderately depressed LV systolic function  Postop atrial fibrillation and flutter, resolved, no recurrence  Diabetes mellitus  Hypertension, good control    Plan:  He appears to be very stable from cardiac standpoint.  He has no fluid overload on exam today.  We will continue current medication without changes.  LVEF is about 35% threshold for prophylactic AICD implantation.  That was discussed with the patient.  I encouraged him to stay physically active    I will plan to see him in 6 to 7 months       History of Present Illness    Mr. Jerry Bustamante is a 52 year old male who comes in to establish general cardiology follow-up.  In September of last year he had non-ST segment elevation myocardial infarction.  He was admitted to [facility].  He underwent coronary angiogram and was found to have left main stenosis.  He had coronary artery bypass graft surgery.  In postoperative period he developed atrial fibrillation and he was cardioverted.  Today he reports no new episode of heart palpitations.  His weight has been stable.  He has no PND and orthopnea.  He denies swelling of lower extremities.  He is compliant with all cardiac medications.  He states that he  takes nitro when his blood pressure becomes elevated and he starts feeling mild chest discomfort.  He denies any exertional chest pain.  He reports his systolic blood pressure is around 120 on average.  Diastolic is less than 85.      ECG: Personally reviewed.   October 2021 normal sinus rhythm inferior infarct nonspecific ST-T abnormalities    Echocardiogram: December 2021  1. Left ventricular systolic function is mild to moderately reduced. The  visual ejection fraction is 35-40%.  2. Mid to distal anterior, anteroseptal and apical wall akinesis. 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  3. Basal to mid inferior wall hypokinesis is also present.  4. The right ventricle is normal in structure, function and size.  5. There is mild (1+) mitral regurgitation.  6. Cannot exclude a small thrombus adherent to the distal anteroseptal wall.  Consider cardiac MRI for further evaluation.    Cardiac MR: February 2022  Clinical history: 52-year old male with a history of ischemic cardiomyopathy (status post CABG on  09/08/2021 with LIMA-LAD, PREET-dRCA, left radial-OM, SVG-anomalous RV branch), type II diabetes mellitus,  and dyslipidemia who was thought to have an LV thrombus on a recent TTE. CMR to evaluate for LV apical  thrombus.  Comparison CMR: None.     1. The LV is normal in cavity size. The global systolic function is moderately reduced. The LVEF is 39%.  There is mild hypokinesis of the mid inferoseptum with thinning and akinesis of the mid anteroseptum,  apical septum, apical anterior, apical inferior (with visible fatty metaplasia), and true apical segments.   There is a second, separate area of akinesis and thinning in the basal inferolateral and mid inferolateral  segments.     2. The RV is normal in cavity size. The global systolic function is normal. The RVEF is 65%.      3. Both atria are normal in size.     4. There is no significant valvular disease.      5. Late gadolinium enhancement imaging shows two distinct patterns of fibrosis. The first pattern is a  largely subendocardial pattern in the mid anteroseptal, mid inferoseptal, apical anterior, apical septal,  apical inferior, and true apical segments. This is due to a prior LAD culprit  infarction. The second  pattern is a predominantly transmural pattern involving the basal inferolateral and mid inferolateral  segments. This pattern also involves the posteromedial papillary muscle. This is due to a RCA culprit  infarction.      6. There is no pericardial effusion or thickening.     7. There is no intracardiac thrombus.     CONCLUSIONS: Ischemic cardiomyopathy. There is moderately reduced LV function with preserved RV function.  There is a pattern of fibrosis that is consistent with prior LAD and RCA culprit infarctions. No  intracardiac thrombi were visualized on this examination.      Coronary Angiogram: September 2021    The ejection fraction is 30-40% by visual estimate.    Left ventricular filling pressures are moderately elevated.    Prox RCA lesion is 85% stenosed.    2nd Mrg lesion is 85% stenosed.    3rd Diag lesion is 70% stenosed.    Ost LAD to Mid LAD lesion is 80% stenosed.    Dist Cx lesion is 80% stenosed.           Physical Examination Review of Systems   /70 (BP Location: Left arm, Patient Position: Sitting, Cuff Size: Adult Large)   Pulse 72   Resp 14   Wt 87.5 kg (193 lb)   BMI 27.69 kg/m    Body mass index is 27.69 kg/m .  Wt Readings from Last 3 Encounters:   02/23/22 87.5 kg (193 lb)   02/16/22 85.7 kg (189 lb)   01/21/22 84.8 kg (187 lb)     General Appearance:   Alert, cooperative, no distress, appears stated age   Head/ENT: Normocephalic, without obvious abnormality. Membranes moist      EYES:  no scleral icterus, normal conjunctivae   Neck: Supple, symmetrical, trachea midline, no adenopathy, thyroid: not enlarged, symmetric, no carotid bruit or JVD   Chest/Lungs:   Lungs are clear to auscultation, respirations unlabored. No tenderness or deformity    Cardiovascular:   Regular rhythm, S1, S2 normal, no murmur, rub or gallop.   Abdomen:  Soft, non-tender, bowel sounds active all four quadrants,  no masses, no organomegaly   Extremities: no cyanosis or clubbing. No  edema   Skin: Skin color, texture, turgor normal, no rashes or lesions.    Psychiatric: Normal affect, calm   Neurologic: Alert and oriented x 3, moving all four extremities.     Enc Vitals  BP: 122/70  Pulse: 72  Resp: 14  Weight: 87.5 kg (193 lb)                                          Lab Results    Chemistry/lipid CBC Cardiac Enzymes/BNP/TSH/INR   Recent Labs   Lab Test 12/14/21  0843   CHOL 141   HDL 43   LDL 68   TRIG 152*     Recent Labs   Lab Test 12/14/21  0843 11/17/21  1811 09/03/21  0818   LDL 68 78 46     Recent Labs   Lab Test 02/16/22  0952      POTASSIUM 3.9   CHLORIDE 102   CO2 28   *   BUN 11   CR 0.85   GFRESTIMATED >90   TORRES 9.9     Recent Labs   Lab Test 02/16/22  0952 11/17/21  1811 11/14/21  0659   CR 0.85 1.09 1.00     Recent Labs   Lab Test 02/16/22  0952 11/17/21  1811 09/02/21  1000   A1C 7.4* 7.6* 10.2*          Recent Labs   Lab Test 02/16/22  0952   WBC 8.9   HGB 13.5   HCT 41.6   MCV 75*        Recent Labs   Lab Test 02/16/22  0952 01/21/22  1529 11/17/21  1811   HGB 13.5 14.5 12.5*    Recent Labs   Lab Test 11/12/21  2303 10/21/21  0526 09/28/21  0628   TROPONINI <0.01 <0.01 <0.01     Recent Labs   Lab Test 11/12/21  2303 09/28/21  0628   BNP 93* 296*     Recent Labs   Lab Test 09/11/21  1534   TSH 0.74     Recent Labs   Lab Test 09/08/21  1545 09/08/21  1423   INR 1.23* 1.41*        Medical History  Surgical History Family History Social History   Past Medical History:   Diagnosis Date     Coronary artery disease      Diabetes mellitus, type II (H)      Gout      High cholesterol      History of cardioversion 11/1/2021     HLD (hyperlipidemia)      Hypertension      S/P CABG (coronary artery bypass graft) x4 11/1/2021     Past Surgical History:   Procedure Laterality Date     ANGIOPLASTY       BYPASS GRAFT ARTERY CORONARY N/A 9/8/2021    Procedure: CORONARY ARTERY BYPASS GRAFTING X 4 WITH ENDOSCOPIC VEIN HARVESTING LIMA-LAD PREET-DISTAL RCA LEFT RADIAL-OM LEFT  SV-ANOMOLOUS RV BRANCH ON PUMP/HILLARY;  Surgeon: Antelmo Mccullough MD;  Location:  OR     CV HEART CATHETERIZATION WITH POSSIBLE INTERVENTION N/A 2021    Procedure: Heart Catheterization with Possible Intervention;  Surgeon: Paul Warner MD;  Location:  HEART CARDIAC CATH LAB     CV LEFT VENTRICULOGRAM N/A 2021    Procedure: Left Ventriculogram;  Surgeon: Paul Warner MD;  Location:  HEART CARDIAC CATH LAB     No premature CAD, SCD,cardiomyopathy   Social History     Socioeconomic History     Marital status:      Spouse name: Not on file     Number of children: Not on file     Years of education: Not on file     Highest education level: Not on file   Occupational History     Not on file   Tobacco Use     Smoking status: Former Smoker     Packs/day: 1.50     Years: 13.00     Pack years: 19.50     Types: Cigarettes     Quit date: 2003     Years since quittin.2     Smokeless tobacco: Never Used   Vaping Use     Vaping Use: Never used   Substance and Sexual Activity     Alcohol use: No     Drug use: No     Sexual activity: Yes     Partners: Female   Other Topics Concern     Not on file   Social History Narrative    He is from [place], came here in . , has 10 children.Oldest is 27 years old and youngest is 7 years. Was a [place] .      Social Determinants of Health     Financial Resource Strain: Not on file   Food Insecurity: Not on file   Transportation Needs: Not on file   Physical Activity: Not on file   Stress: Not on file   Social Connections: Not on file   Intimate Partner Violence: Not on file   Housing Stability: Not on file         Medications  Allergies   Current Outpatient Medications   Medication Sig Dispense Refill     ACCU-CHEK GUIDE test strip Use to test blood sugar 4 times daily or as directed. 600 strip 1     acetaminophen (TYLENOL) 325 MG tablet Take 2 tablets (650 mg) by mouth every 4 hours as needed for other (For optimal non-opioid  multimodal pain management to improve pain control.) 200 tablet 0     albuterol (PROAIR HFA/PROVENTIL HFA/VENTOLIN HFA) 108 (90 Base) MCG/ACT inhaler Inhale 2 puffs into the lungs every 6 hours as needed for shortness of breath / dyspnea or wheezing 18 g 0     alcohol swab prep pads Use to swab area of injection/rupali as directed. 100 each 3     Alcohol Swabs PADS Use 4 times a day as needed. 120 each 3     allopurinol (ZYLOPRIM) 100 MG tablet Take 1 tablet (100 mg) by mouth daily 90 tablet 1     amLODIPine (NORVASC) 2.5 MG tablet Take 1 tablet (2.5 mg) by mouth daily 90 tablet 0     aspirin (ASA) 81 MG chewable tablet 1 tablet (81 mg) by Oral or NG Tube route daily (Patient taking differently: Take 81 mg by mouth daily ) 100 tablet 1     bacitracin 500 UNIT/GM external ointment Apply topically 2 times daily 30 g 1     blood glucose (NO BRAND SPECIFIED) test strip Use to test blood sugar 4 times daily or as directed. 100 strip 6     blood glucose calibration (NO BRAND SPECIFIED) solution Use to calibrate blood glucose monitor as needed as directed. 200 each 0     blood glucose monitoring (NO BRAND SPECIFIED) meter device kit Use to test blood sugar 4 times daily or as directed. 1 kit 0     blood glucose monitoring (SOFTCLIX) lancets Use to test blood sugar 4 times daily. 100 each 4     Continuous Blood Gluc Sensor (FREESTYLE AICHA 14 DAY SENSOR) MISC 1 each every 14 days Change every 14 days. 2 each 5     furosemide (LASIX) 20 MG tablet Take 1 tablet (20 mg) by mouth 2 times daily 180 tablet 1     insulin aspart (NOVOLOG FLEXPEN) 100 UNIT/ML pen Inject 5 Units Subcutaneous 3 times daily (with meals) 30 mL 1     insulin glargine (LANTUS PEN) 100 UNIT/ML pen Inject 25 Units Subcutaneous every morning 15 mL 4     insulin pen needle (32G X 4 MM) 32G X 4 MM miscellaneous Use 4 pen needles daily or as directed. 200 each 1     losartan (COZAAR) 50 MG tablet Take 1 tablet (50 mg) by mouth 2 times daily 180 tablet 1      metFORMIN (GLUCOPHAGE) 500 MG tablet Take 2 tablets (1,000 mg) by mouth 2 times daily (with meals) 120 tablet 3     metoprolol tartrate (LOPRESSOR) 50 MG tablet Take 1 tablet (50 mg) by mouth 2 times daily 180 tablet 0     nitroGLYcerin (NITROSTAT) 0.4 MG sublingual tablet For chest pain place 1 tablet under the tongue every 5 minutes for 3 doses. If symptoms persist 5 minutes after 1st dose call 911. 30 tablet 1     rosuvastatin (CRESTOR) 10 MG tablet Take 1 tablet (10 mg) by mouth daily 90 tablet 1     thin (NO BRAND SPECIFIED) lancets Use with lanceting device. 200 each 0     bacitracin 500 UNIT/GM OINT Apply topically 2 times daily        No Known Allergies

## 2022-02-23 NOTE — PATIENT INSTRUCTIONS
Jerry Bustamante,    It was a pleasure to see you today at the [facility].     My recommendations after this visit include:    Continue current medications  Stay physically active    CARMEN Roberto MD, FACC, LACHELLE

## 2022-03-20 DIAGNOSIS — E11.49 OTHER DIABETIC NEUROLOGICAL COMPLICATION ASSOCIATED WITH TYPE 2 DIABETES MELLITUS (H): ICD-10-CM

## 2022-03-22 RX ORDER — METOPROLOL TARTRATE 50 MG
50 TABLET ORAL 2 TIMES DAILY
Qty: 180 TABLET | Refills: 0 | Status: SHIPPED | OUTPATIENT
Start: 2022-03-22 | End: 2022-06-22

## 2022-03-22 NOTE — TELEPHONE ENCOUNTER
"Routing refill request to provider for review/approval because:  Early refill request.     Last Written Prescription Date:  2/16/2022  Last Fill Quantity: 90,  # refills: 0   Last office visit provider:  2/16/2022     Requested Prescriptions   Pending Prescriptions Disp Refills     metoprolol tartrate (LOPRESSOR) 50 MG tablet 180 tablet 0     Sig: Take 1 tablet (50 mg) by mouth 2 times daily       Beta-Blockers Protocol Passed - 3/22/2022  6:22 AM        Passed - Blood pressure under 140/90 in past 12 months     BP Readings from Last 3 Encounters:   02/23/22 122/70   02/16/22 120/84   01/21/22 132/70                 Passed - Patient is age 6 or older        Passed - Recent (12 mo) or future (30 days) visit within the authorizing provider's specialty     Patient has had an office visit with the authorizing provider or a provider within the authorizing providers department within the previous 12 mos or has a future within next 30 days. See \"Patient Info\" tab in inbasket, or \"Choose Columns\" in Meds & Orders section of the refill encounter.              Passed - Medication is active on med list             Maricruz Mayberry RN 03/22/22 6:23 AM  "

## 2022-04-01 ENCOUNTER — VIRTUAL VISIT (OUTPATIENT)
Dept: OTOLARYNGOLOGY | Facility: CLINIC | Age: 52
End: 2022-04-01
Payer: COMMERCIAL

## 2022-04-01 DIAGNOSIS — R49.0 DYSPHONIA: Primary | ICD-10-CM

## 2022-04-01 PROCEDURE — 92507 TX SP LANG VOICE COMM INDIV: CPT | Mod: GN | Performed by: SPEECH-LANGUAGE PATHOLOGIST

## 2022-04-01 NOTE — PROGRESS NOTES
Smione Bustamante is a 52 year old male who is being evaluated via a billable video visit.      Simone has been notified and verbally consented to the following:     This video visit will be conducted between you and your provider.    Patient has opted to conduct today's video visit vs an in-person appointment.     Video visits are billed at different rates depending on your insurance coverage. Please reach out to your insurance provider with any questions.     If during the course of the call the provider feels the appointment is not appropriate, you will not be charged for this service.  Provider has received verbal consent for billable virtual visit from the patient? Yes  Will anyone else be joining your video visit? No    Call initiated at: 8:30 AM   Type of Visit Platform Used: Dinero[employer] Video  Location of provider: Home  Location of patient: [facility] VOICE [facility]  Boston Mendes Jr., M.D., F.A.C.S.  Ella Lau M.D., M.P.H.  Ashely Seo M.D.  Kalani York, Ph.D., CCC-SLP  Eric Stallings, Ph.D., CCC-SLP  Beverly Rangel M.M. (voice), M.A., CCC-SLP  Ammon Jon M.M. (voice), M.A., CCC-SLP  JOHN Lin (voice), M.S., CCC-SLP    [facility]  VOICE/SPEECH/BREATHING THERAPY PROGRESS REPORT    Patient: Jerry Bustamante  Date of Service: 4/1/2022    Date of Last Service: 2/1/22  Referring provider: Eric Stallings, Ph.D., CCC-SLP  Initial evaluation: 2/1/22    I had the pleasure of seeing Mr. Bustamante today, for speech therapy to address a diagnosis of:  Dysphonia (R49.0)       PROGRESS SINCE LAST SESSION  Mr. Bustamante was seen for evaluation on 2/1/22 by my associate, Dr. Eric Stallings.  At that time, it was determined that he would benefit from a course of speech therapy to address the above diagnosis.  A few therapeutic suggestions were made at the time.  He is scheduled to see Dr. Seo in the clinic on May 10.      Mr. Bustamante states that:    His voice has improved; although he is still  struggling, but people can understand, and he is gasping less    His effort level is 4 out of 10    His voice still wears out by the end of the day    He is not sure if it is continuing to get better    He feels a mucus build-up and has to clear his throat; this is variable from day to day, but always worse in the evening    His voice is better if he tips his chin down and turns his head to the right     Mr. Bustamante presents today with the following:  Voice quality:    Moderate roughness and breathiness    Forced, effortful production    Backward resonance    Pitch is within normal limits, at around E3  Cough/ Throat clear:    Occasional throat clear      THERAPEUTIC ACTIVITIES  Today Mr. Bustamante participated in the following therapeutic activities:    Started working on exercises for improved glottic closure.  o Glottic coup is messy, with too much pressure, and therefore I determined we should work on some other kind of exercise    Mr. Simmons got into his car and stated he had to drive to work; he believes he will be able to do the therapy session while he was driving  o This is not safe or efficacious, and I requested that he schedule some more sessions  o He states he will call for more appointments    IMPRESSIONS/GOALS/PLAN  Mr. Bustamante started speech therapy today, but we were not able to make progress, as Mr. Bustamante assumed he would be able to do his therapy while driving:   Therapy is to remediate:  Dysphonia (R49.0)   Speech therapy for him is medically necessary to allow  him to meet personal and professional demands and fully engage in activities of daily living.      Goals for this practice period:     We were not able to establish any goals as this requires appointment was cut short    Plan: I will see Mr. Bustamante for speech therapy when he is able to schedule appointments.  Otherwise, I will see him when he sees Dr. Seo in clinic in May.    TOTAL SERVICE TIME: 28 minutes  TREATMENT (97029)  NO CHARGE FACILITY  TUSHAR York, Ph.D., [facility]SLP  Speech-Language Pathologist  Director, [facility]  [phone]

## 2022-04-01 NOTE — LETTER
4/1/2022       RE: Jerry Bustamante  [address]  [place] [address]     Dear Colleague,    Thank you for referring your patient, Jerry Bustamante, to the [facility] VOICE CLINIC [place] at [facility]. Please see a copy of my visit note below.    Simone Bustamante is a 52 year old male who is being evaluated via a billable video visit.      Simone has been notified and verbally consented to the following:     This video visit will be conducted between you and your provider.    Patient has opted to conduct today's video visit vs an in-person appointment.     Video visits are billed at different rates depending on your insurance coverage. Please reach out to your insurance provider with any questions.     If during the course of the call the provider feels the appointment is not appropriate, you will not be charged for this service.  Provider has received verbal consent for billable virtual visit from the patient? Yes  Will anyone else be joining your video visit? No    Call initiated at: 8:30 AM   Type of Visit Platform Used: [employer] Video  Location of provider: Home  Location of patient: [facility] VOICE [facility]  Boston Mendes Jr., M.D., F.A.C.S.  Ella Lau M.D., M.P.H.  Ashely Seo M.D.  Kalani York, Ph.D., CCC-SLP  Eric Stallings, Ph.D., CCC-SLP  Beverly Rangel M.M. (voice), M.A., CCC-SLP  Ammon Jon M.M. (voice), M.A., CCC-SLP  JOHN Lin (voice), M.S., CCC-SLP    [facility]  VOICE/SPEECH/BREATHING THERAPY PROGRESS REPORT    Patient: Jerry Bustamante  Date of Service: 4/1/2022    Date of Last Service: 2/1/22  Referring provider: Eric Stallings, Ph.D., CCC-SLP  Initial evaluation: 2/1/22    I had the pleasure of seeing Mr. Bustamante today, for speech therapy to address a diagnosis of:  Dysphonia (R49.0)       PROGRESS SINCE LAST SESSION  Mr. Bustamante was seen for evaluation on 2/1/22 by my associate, Dr. Reyes  Chandrakant.  At that time, it was determined that he would benefit from a course of speech therapy to address the above diagnosis.  A few therapeutic suggestions were made at the time.  He is scheduled to see Dr. Seo in the clinic on May 10.      Mr. Bustamante states that:    His voice has improved; although he is still struggling, but people can understand, and he is gasping less    His effort level is 4 out of 10    His voice still wears out by the end of the day    He is not sure if it is continuing to get better    He feels a mucus build-up and has to clear his throat; this is variable from day to day, but always worse in the evening    His voice is better if he tips his chin down and turns his head to the right     Mr. Bustamante presents today with the following:  Voice quality:    Moderate roughness and breathiness    Forced, effortful production    Backward resonance    Pitch is within normal limits, at around E3  Cough/ Throat clear:    Occasional throat clear      THERAPEUTIC ACTIVITIES  Today Mr. Bustamante participated in the following therapeutic activities:    Started working on exercises for improved glottic closure.  o Glottic coup is messy, with too much pressure, and therefore I determined we should work on some other kind of exercise    Mr. Simmons got into his car and stated he had to drive to work; he believes he will be able to do the therapy session while he was driving  o This is not safe or efficacious, and I requested that he schedule some more sessions  o He states he will call for more appointments    IMPRESSIONS/GOALS/PLAN  Mr. Bustamante started speech therapy today, but we were not able to make progress, as Mr. Bustamante assumed he would be able to do his therapy while driving:   Therapy is to remediate:  Dysphonia (R49.0)   Speech therapy for him is medically necessary to allow  him to meet personal and professional demands and fully engage in activities of daily living.      Goals for this practice period:      We were not able to establish any goals as this requires appointment was cut short    Plan: I will see Mr. Bustamante for speech therapy when he is able to schedule appointments.  Otherwise, I will see him when he sees Dr. eSo in clinic in May.    TOTAL SERVICE TIME: 28 minutes  TREATMENT ([id number])  NO CHARGE FACILITY FEE    Kalani York, Ph.D., [facility]-SLP  Speech-Language Pathologist  Director, [facility]  [phone]

## 2022-05-10 ENCOUNTER — PRE VISIT (OUTPATIENT)
Dept: OTOLARYNGOLOGY | Facility: CLINIC | Age: 52
End: 2022-05-10

## 2022-05-17 ENCOUNTER — NURSE TRIAGE (OUTPATIENT)
Dept: NURSING | Facility: CLINIC | Age: 52
End: 2022-05-17
Payer: COMMERCIAL

## 2022-05-17 DIAGNOSIS — M10.9 ACUTE GOUT, UNSPECIFIED CAUSE, UNSPECIFIED SITE: Primary | ICD-10-CM

## 2022-05-17 RX ORDER — COLCHICINE 0.6 MG/1
TABLET ORAL
Qty: 30 TABLET | Refills: 0 | Status: SHIPPED | OUTPATIENT
Start: 2022-05-17 | End: 2022-10-25

## 2022-05-17 NOTE — TELEPHONE ENCOUNTER
Patient has a flare up of gout in right knee.  Symptoms started yesterday.  Patient states that it is very hard for patient to walk.  Knee is also swollen.  Patient is taking tylenol.  This is not a new condition.  Patient states that he has a history of gout.  Patient was told that clinic has no openings.  Patient is requesting a medication be sent to his pharmacy which is [address] In [place], [place].  Please phone patient back within two hours.      COVID 19 Nurse Triage Plan/Patient Instructions    Please be aware that novel coronavirus (COVID-19) may be circulating in the community. If you develop symptoms such as fever, cough, or SOB or if you have concerns about the presence of another infection including coronavirus (COVID-19), please contact your health care provider or visit [email or web address].     Disposition/Instructions    In-Person Visit with provider recommended. Reference Visit Selection Guide.    Thank you for taking steps to prevent the spread of this virus.  o Limit your contact with others.  o Wear a simple mask to cover your cough.  o Wash your hands well and often.    Resources    [facility]: About COVID-19: [email or web address]    CDC: What to Do If You're Sick: www.cdc.gov/coronavirus/2019-ncov/about/steps-when-sick.html    CDC: Ending Home Isolation: www.cdc.gov/coronavirus/2019-ncov/hcp/disposition-in-home-patients.html     CDC: Caring for Someone: www.cdc.gov/coronavirus/2019-ncov/if-you-are-sick/care-for-someone.html     [facility]: Interim Guidance for Hospital Discharge to Home: www.health.[facility]/diseases/coronavirus/hcp/hospdischarge.pdf    [facility] clinical trials (COVID-19 research studies): [email or web address]     Below are the COVID-19 hotlines at the [facility] of Health ([facility]). Interpreters are available.   o For health questions: Call 024-600-5583 or 1-481.995.1089 (7 a.m. to 7  p.m.)  o For questions about schools and childcare: Call 573-090-6111 or 1-541.161.6685 (7 a.m. to 7 p.m.)                       Reason for Disposition    [1] SEVERE pain (e.g., excruciating, unable to walk) AND [2] not improved after 2 hours of pain medicine    Additional Information    Negative: Sounds like a life-threatening emergency to the triager    Negative: [1] Swollen joint AND [2] fever    Negative: [1] Red area or streak AND [2] fever    Negative: Patient sounds very sick or weak to the triager    Protocols used: KNEE PAIN-A-AH

## 2022-05-17 NOTE — TELEPHONE ENCOUNTER
Writer contacted patient and relayed message with recommendations below per PCP.  Writer assisted in scheduling patient for a follow-up with PCP for 5/20 at 9am.  Patient verbalized understanding and had no further questions.

## 2022-05-17 NOTE — TELEPHONE ENCOUNTER
Provider Response to 2nd Level Triage Request    I have reviewed the RN documentation. My recommendation is:  If he is 100% confident this feels like usual gout, then ok- I sent colchicine for acute gout, but please schedule him for an office visit in 2-3 days for follow up just in case this is something other than gout. Ok to override any slot or use peds to fit him in. Otherwise if he declines that, advise he go to [facility] if he continues to have symptoms despite this. And if he has other symptoms (fever, chills, etc), advise [facility] today    Dr. Youngblood

## 2022-05-20 ENCOUNTER — VIRTUAL VISIT (OUTPATIENT)
Dept: PEDIATRICS | Facility: CLINIC | Age: 52
End: 2022-05-20
Payer: COMMERCIAL

## 2022-05-20 DIAGNOSIS — M10.9 ACUTE GOUTY ARTHRITIS: Primary | ICD-10-CM

## 2022-05-20 PROCEDURE — 99213 OFFICE O/P EST LOW 20 MIN: CPT | Mod: 95 | Performed by: PEDIATRICS

## 2022-05-20 NOTE — PROGRESS NOTES
Simone is a 52 year old who is being evaluated via a billable video visit.      How would you like to obtain your AVS? MyChart  If the video visit is dropped, the invitation should be resent by: Text to cell phone: [phone]  Will anyone else be joining your video visit? No    Video Start Time: 8:56 AM    Assessment & Plan     Acute gouty arthritis  Called in 3 days ago for gout flare up medication. Sent in colchicine and has noticed significant improvement in R knee since then. He has a longstanding hx of gout and frequency much more frequent now. On allopurinol 100 mg daily, likely will need dose increase. Overdue for follow up for chronic conditions as well. At this time, d/w patient follow up in 2 weeks given he is currently in an acute flareup and uric acid levels/going up on allopurinol is not idea; can obtain repeat labs and establish care as I am leaving [place] end of June. At that visit, obtain labs and can increase allopurinol dose.      Return in about 2 weeks (around 6/3/2022) for Follow up.     I will be leaving [place] June 29, 2022, so I did advise patient to establish with a new PCP     Narciso Michelle MD  [facility]   Simone is a 52 year old who presents for the following health issues     HPI       Gout/ Single Inflamed Joint  Onset/Duration: 1 week  Description:   Location: leg - left  Joint Swelling: no  Redness: no  Pain: no  Intensity: moderate  Progression of Symptoms: improving  Accompanying Signs & Symptoms:  Fevers: no  History:   Trauma to the area: no  Previous history of gout: no  Recent illness: no  Alcohol use: no  Diuretic use: no  Precipitating or alleviating factors: None  Therapies tried and outcome: none    Has had gout for 10-11 years  Flares up during certain weather changes  Used to be twice a year  In last 3 years has been more severe and more frequent  This past episode ate a lot of fish and had a feeling gout would come on  Started  developing pain shortly after    Has been on allopurinol    Called clinic 3 days ago and sent colchicine in right away  Started taking the colchicine  Pain got better immediately  Last night was the best  Can't put a lot of weight on the knee but significantly better      Review of Systems   See above      Objective           Vitals:  No vitals were obtained today due to virtual visit.    Physical Exam   Breathing comfortably  Alert, oriented              PHONE VISIT DURATION: 7 MINUTES

## 2022-05-20 NOTE — PATIENT INSTRUCTIONS
Keep taking colchicine until gout flare resolves  See another doctor in 2 weeks to follow up on gout and repeat uric acid levels and discuss increasing allopurinol dose

## 2022-05-21 ENCOUNTER — HEALTH MAINTENANCE LETTER (OUTPATIENT)
Age: 52
End: 2022-05-21

## 2022-05-26 ENCOUNTER — MYC MEDICAL ADVICE (OUTPATIENT)
Dept: PEDIATRICS | Facility: CLINIC | Age: 52
End: 2022-05-26
Payer: COMMERCIAL

## 2022-05-26 DIAGNOSIS — I10 BENIGN ESSENTIAL HYPERTENSION: Primary | ICD-10-CM

## 2022-06-21 DIAGNOSIS — E11.49 OTHER DIABETIC NEUROLOGICAL COMPLICATION ASSOCIATED WITH TYPE 2 DIABETES MELLITUS (H): ICD-10-CM

## 2022-06-22 DIAGNOSIS — E11.49 OTHER DIABETIC NEUROLOGICAL COMPLICATION ASSOCIATED WITH TYPE 2 DIABETES MELLITUS (H): ICD-10-CM

## 2022-06-22 DIAGNOSIS — Z95.1 S/P CABG (CORONARY ARTERY BYPASS GRAFT): ICD-10-CM

## 2022-06-22 DIAGNOSIS — M10.9 GOUT, UNSPECIFIED CAUSE, UNSPECIFIED CHRONICITY, UNSPECIFIED SITE: ICD-10-CM

## 2022-06-22 RX ORDER — METOPROLOL TARTRATE 50 MG
TABLET ORAL
Qty: 180 TABLET | Refills: 1 | Status: SHIPPED | OUTPATIENT
Start: 2022-06-22 | End: 2022-10-25

## 2022-06-23 NOTE — TELEPHONE ENCOUNTER
"Routing refill request to provider for review/approval because:  Labs not current:  Uric acid  Early refill requested.    Last Written Prescription Date:  2/16/22  Last Fill Quantity: 90/180,  # refills: 1   Last office visit provider:  5/20/22    Last Written Prescription Date:  2/16/22  Last Fill Quantity: 120,  # refills: 3   Last office visit provider:  5/20/22     Requested Prescriptions   Pending Prescriptions Disp Refills     losartan (COZAAR) 50 MG tablet 180 tablet 1     Sig: Take 1 tablet (50 mg) by mouth 2 times daily       Angiotensin-II Receptors Passed - 6/22/2022 12:34 PM        Passed - Last blood pressure under 140/90 in past 12 months     BP Readings from Last 3 Encounters:   02/23/22 122/70   02/16/22 120/84   01/21/22 132/70                 Passed - Recent (12 mo) or future (30 days) visit within the authorizing provider's specialty     Patient has had an office visit with the authorizing provider or a provider within the authorizing providers department within the previous 12 mos or has a future within next 30 days. See \"Patient Info\" tab in inbasket, or \"Choose Columns\" in Meds & Orders section of the refill encounter.              Passed - Medication is active on med list        Passed - Patient is age 18 or older        Passed - Normal serum creatinine on file in past 12 months     Recent Labs   Lab Test 02/16/22  0952   CR 0.85       Ok to refill medication if creatinine is low          Passed - Normal serum potassium on file in past 12 months     Recent Labs   Lab Test 02/16/22  0952   POTASSIUM 3.9                       allopurinol (ZYLOPRIM) 100 MG tablet 90 tablet 1     Sig: Take 1 tablet (100 mg) by mouth daily       Gout Agents Protocol Failed - 6/22/2022 12:34 PM        Failed - Has Uric Acid on file in past 12 months and value is less than 6     No lab results found.  If level is 6mg/dL or greater, ok to refill one time and refer to provider.           Passed - CBC on file in past 12 " "months     Recent Labs   Lab Test 02/16/22  0952   WBC 8.9   RBC 5.57   HGB 13.5   HCT 41.6                    Passed - ALT on file in past 12 months     Recent Labs   Lab Test 02/16/22  0952   ALT 29             Passed - Recent (12 mo) or future (30 days) visit within the authorizing provider's specialty     Patient has had an office visit with the authorizing provider or a provider within the authorizing providers department within the previous 12 mos or has a future within next 30 days. See \"Patient Info\" tab in inbasket, or \"Choose Columns\" in Meds & Orders section of the refill encounter.              Passed - Medication is active on med list        Passed - Patient is age 18 or older        Passed - Normal serum creatinine on file in the past 12 months     Recent Labs   Lab Test 02/16/22  0952   CR 0.85       Ok to refill medication if creatinine is low             rosuvastatin (CRESTOR) 10 MG tablet 90 tablet 1     Sig: Take 1 tablet (10 mg) by mouth daily       Statins Protocol Passed - 6/22/2022 12:34 PM        Passed - LDL on file in past 12 months     Recent Labs   Lab Test 12/14/21  0843   LDL 68             Passed - No abnormal creatine kinase in past 12 months     No lab results found.             Passed - Recent (12 mo) or future (30 days) visit within the authorizing provider's specialty     Patient has had an office visit with the authorizing provider or a provider within the authorizing providers department within the previous 12 mos or has a future within next 30 days. See \"Patient Info\" tab in inbasket, or \"Choose Columns\" in Meds & Orders section of the refill encounter.              Passed - Medication is active on med list        Passed - Patient is age 18 or older           furosemide (LASIX) 20 MG tablet 180 tablet 1     Sig: Take 1 tablet (20 mg) by mouth 2 times daily       Diuretics (Including Combos) Protocol Passed - 6/22/2022 12:34 PM        Passed - Blood pressure under 140/90 " "in past 12 months     BP Readings from Last 3 Encounters:   02/23/22 122/70   02/16/22 120/84   01/21/22 132/70                 Passed - Recent (12 mo) or future (30 days) visit within the authorizing provider's specialty     Patient has had an office visit with the authorizing provider or a provider within the authorizing providers department within the previous 12 mos or has a future within next 30 days. See \"Patient Info\" tab in inbasket, or \"Choose Columns\" in Meds & Orders section of the refill encounter.              Passed - Medication is active on med list        Passed - Patient is age 18 or older        Passed - Normal serum creatinine on file in past 12 months     Recent Labs   Lab Test 02/16/22  0952   CR 0.85              Passed - Normal serum potassium on file in past 12 months     Recent Labs   Lab Test 02/16/22  0952   POTASSIUM 3.9                    Passed - Normal serum sodium on file in past 12 months     Recent Labs   Lab Test 02/16/22  0952                    metFORMIN (GLUCOPHAGE) 500 MG tablet 120 tablet 3     Sig: Take 2 tablets (1,000 mg) by mouth 2 times daily (with meals)       Biguanide Agents Failed - 6/22/2022 12:34 PM        Failed - Recent (6 mo) or future (30 days) visit within the authorizing provider's specialty     Patient had office visit in the last 6 months or has a visit in the next 30 days with authorizing provider or within the authorizing provider's specialty.  See \"Patient Info\" tab in inbasket, or \"Choose Columns\" in Meds & Orders section of the refill encounter.            Passed - Patient is age 10 or older        Passed - Patient has documented A1c within the specified period of time.     If HgbA1C is 8 or greater, it needs to be on file within the past 3 months.  If less than 8, must be on file within the past 6 months.     Recent Labs   Lab Test 02/16/22  0952   A1C 7.4*             Passed - Patient's CR is NOT>1.4 OR Patient's EGFR is NOT<45 within past 12 " mos.     Recent Labs   Lab Test 02/16/22  0952   GFRESTIMATED >90       Recent Labs   Lab Test 02/16/22  0952   CR 0.85             Passed - Patient does NOT have a diagnosis of CHF.        Passed - Medication is active on med list             Rolando Harris RN 06/23/22 10:22 AM

## 2022-06-27 ENCOUNTER — OFFICE VISIT (OUTPATIENT)
Dept: FAMILY MEDICINE | Facility: CLINIC | Age: 52
End: 2022-06-27
Payer: COMMERCIAL

## 2022-06-27 VITALS
BODY MASS INDEX: 27.77 KG/M2 | TEMPERATURE: 98.2 F | WEIGHT: 194 LBS | DIASTOLIC BLOOD PRESSURE: 75 MMHG | SYSTOLIC BLOOD PRESSURE: 116 MMHG | HEART RATE: 70 BPM | HEIGHT: 70 IN | OXYGEN SATURATION: 96 % | RESPIRATION RATE: 18 BRPM

## 2022-06-27 DIAGNOSIS — I21.4 NSTEMI (NON-ST ELEVATED MYOCARDIAL INFARCTION) (H): ICD-10-CM

## 2022-06-27 DIAGNOSIS — M10.9 GOUT, UNSPECIFIED CAUSE, UNSPECIFIED CHRONICITY, UNSPECIFIED SITE: ICD-10-CM

## 2022-06-27 DIAGNOSIS — I10 BENIGN ESSENTIAL HYPERTENSION: ICD-10-CM

## 2022-06-27 DIAGNOSIS — Z76.89 ENCOUNTER TO ESTABLISH CARE: Primary | ICD-10-CM

## 2022-06-27 DIAGNOSIS — J98.01 BRONCHOSPASM: ICD-10-CM

## 2022-06-27 DIAGNOSIS — E11.49 OTHER DIABETIC NEUROLOGICAL COMPLICATION ASSOCIATED WITH TYPE 2 DIABETES MELLITUS (H): ICD-10-CM

## 2022-06-27 PROCEDURE — 99215 OFFICE O/P EST HI 40 MIN: CPT | Performed by: FAMILY MEDICINE

## 2022-06-27 RX ORDER — BLOOD PRESSURE TEST KIT
KIT MISCELLANEOUS
Qty: 1 KIT | Refills: 0 | Status: SHIPPED | OUTPATIENT
Start: 2022-06-27

## 2022-06-27 RX ORDER — ALLOPURINOL 100 MG/1
100 TABLET ORAL DAILY
Qty: 90 TABLET | Refills: 0 | Status: SHIPPED | OUTPATIENT
Start: 2022-06-27 | End: 2023-02-24

## 2022-06-27 RX ORDER — LANCETS
EACH MISCELLANEOUS
Qty: 200 EACH | Refills: 3 | Status: SHIPPED | OUTPATIENT
Start: 2022-06-27 | End: 2023-07-05

## 2022-06-27 RX ORDER — AMLODIPINE BESYLATE 2.5 MG/1
2.5 TABLET ORAL DAILY
Qty: 90 UNITS | Refills: 3 | Status: SHIPPED | OUTPATIENT
Start: 2022-06-27 | End: 2022-12-23

## 2022-06-27 RX ORDER — INSULIN ASPART 100 [IU]/ML
5 INJECTION, SOLUTION INTRAVENOUS; SUBCUTANEOUS
Qty: 6 ML | Refills: 2 | Status: SHIPPED | OUTPATIENT
Start: 2022-06-27 | End: 2022-10-25

## 2022-06-27 RX ORDER — FUROSEMIDE 20 MG
20 TABLET ORAL
Qty: 180 TABLET | Refills: 1 | Status: SHIPPED | OUTPATIENT
Start: 2022-06-27 | End: 2022-12-15

## 2022-06-27 RX ORDER — BLOOD SUGAR DIAGNOSTIC
STRIP MISCELLANEOUS
Qty: 200 STRIP | Refills: 3 | Status: SHIPPED | OUTPATIENT
Start: 2022-06-27 | End: 2023-07-05

## 2022-06-27 RX ORDER — ALBUTEROL SULFATE 90 UG/1
2 AEROSOL, METERED RESPIRATORY (INHALATION) EVERY 6 HOURS PRN
Qty: 18 G | Refills: 3 | Status: SHIPPED | OUTPATIENT
Start: 2022-06-27 | End: 2023-02-24

## 2022-06-27 RX ORDER — ALLOPURINOL 100 MG/1
100 TABLET ORAL DAILY
Qty: 90 TABLET | Refills: 2 | Status: SHIPPED | OUTPATIENT
Start: 2022-06-27 | End: 2022-06-27

## 2022-06-27 RX ORDER — LOSARTAN POTASSIUM 50 MG/1
50 TABLET ORAL 2 TIMES DAILY
Qty: 180 TABLET | Refills: 2 | Status: SHIPPED | OUTPATIENT
Start: 2022-06-27 | End: 2022-10-25

## 2022-06-27 RX ORDER — ROSUVASTATIN CALCIUM 10 MG/1
10 TABLET, COATED ORAL DAILY
Qty: 90 TABLET | Refills: 2 | Status: SHIPPED | OUTPATIENT
Start: 2022-06-27 | End: 2022-10-25

## 2022-06-27 RX ORDER — GLUCOSAMINE HCL/CHONDROITIN SU 500-400 MG
CAPSULE ORAL
Qty: 100 EACH | Refills: 11 | Status: SHIPPED | OUTPATIENT
Start: 2022-06-27 | End: 2023-07-06

## 2022-06-27 ASSESSMENT — ASTHMA QUESTIONNAIRES
QUESTION_1 LAST FOUR WEEKS HOW MUCH OF THE TIME DID YOUR ASTHMA KEEP YOU FROM GETTING AS MUCH DONE AT WORK, SCHOOL OR AT HOME: NONE OF THE TIME
QUESTION_2 LAST FOUR WEEKS HOW OFTEN HAVE YOU HAD SHORTNESS OF BREATH: NOT AT ALL
ACT_TOTALSCORE: 25
QUESTION_3 LAST FOUR WEEKS HOW OFTEN DID YOUR ASTHMA SYMPTOMS (WHEEZING, COUGHING, SHORTNESS OF BREATH, CHEST TIGHTNESS OR PAIN) WAKE YOU UP AT NIGHT OR EARLIER THAN USUAL IN THE MORNING: NOT AT ALL
QUESTION_5 LAST FOUR WEEKS HOW WOULD YOU RATE YOUR ASTHMA CONTROL: COMPLETELY CONTROLLED
QUESTION_4 LAST FOUR WEEKS HOW OFTEN HAVE YOU USED YOUR RESCUE INHALER OR NEBULIZER MEDICATION (SUCH AS ALBUTEROL): NOT AT ALL
ACT_TOTALSCORE: 25

## 2022-06-27 ASSESSMENT — PAIN SCALES - GENERAL: PAINLEVEL: NO PAIN (0)

## 2022-06-27 NOTE — PROGRESS NOTES
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 Bili, TP), insulin glargine (LANTUS PEN)        100 UNIT/ML pen, insulin aspart (NOVOLOG         FLEXPEN) 100 UNIT/ML pen, ACCU-CHEK GUIDE test         strip, alcohol swab prep pads, thin (NO BRAND         SPECIFIED) lancets            (M10.9) Gout, unspecified cause, unspecified chronicity, unspecified site  Comment:   Plan: allopurinol (ZYLOPRIM) 100 MG tablet, Uric acid        Continue current management     (I10) Benign essential hypertension  Comment: Normotensive  Continue current management  Plan: Blood Pressure KIT, amLODIPine (NORVASC) 2.5 MG        tablet            (I21.4) NSTEMI (non-ST elevated myocardial infarction) (H)  Comment: On rosuvastatin  Plan: Lipid panel reflex to direct LDL Fasting        Elevated triglycerides  Stable cholesterol level.  Continue rosuvastatin as prescribed    (J98.01) Bronchospasm  Comment: Refill  Plan: albuterol (PROAIR HFA/PROVENTIL HFA/VENTOLIN         HFA) 108 (90 Base) MCG/ACT inhaler                I spent a total of 50 minutes on the day of the visit.   Time spent doing chart review, history and exam, documentation and further activities per the note           No follow-ups on file.    Dawood Oshea MD  [facility]    David Nichols is a 52 year old here for healthcare establishment.     Diabetes :   Not following Diabetes diet   Blood sugar levels are generally>200, and trying to exercise    Wants to check the HgA1 the diabetic labs    BP / history of MI  On cardiovascular meds, tolerated well. No  "additional concerns.                        No follow-ups on file.    Dawood Oshea MD  [facility]              History of Present Illness       Diabetes:   He presents for follow up of diabetes.  He is checking home blood glucose two times daily. He checks blood glucose before meals.  Blood glucose is sometimes over 200 and never under 70. When his blood glucose is low, the patient is asymptomatic for confusion, blurred vision, lethargy and reports not feeling dizzy, shaky, or weak.  He is concerned about blood sugar frequently over 200.  He is not experiencing numbness or burning in feet, excessive thirst, blurry vision, weight changes or redness, sores or blisters on feet. The patient has not had a diabetic eye exam in the last 12 months.         Heart Failure:  He presents for follow up of heart failure. He is experiencing shortness of breath No and with activity only, He is experiencing lower extremity edema which is better than usual. He denies orthopenea and coughs at night. Patient is not checking weight daily. He has recently had a None. He has no side effects from medications.  He has had no other medical visits for heart failure since the last visit.    Hypertension: He presents for follow up of hypertension.  He does check blood pressure  regularly outside of the clinic. Outside blood pressures have been over 140/90. He follows a low salt diet.         Review of Systems         Objective    /75   Pulse 70   Temp 98.2  F (36.8  C)   Resp 18   Ht 1.778 m (5' 10\")   Wt 88 kg (194 lb)   SpO2 96%   BMI 27.84 kg/m    Body mass index is 27.84 kg/m .  Physical Exam   GENERAL: healthy, alert and no distress  NECK: no adenopathy, no asymmetry, masses, or scars and thyroid normal to palpation  RESP: lungs clear to auscultation - no rales, rhonchi or wheezes  CV: regular rate and rhythm, normal S1 S2, no S3 or S4, no murmur, click or rub, no peripheral edema and peripheral " pulses strong  ABDOMEN: soft, nontender, no hepatosplenomegaly, no masses and bowel sounds normal  MS: no gross musculoskeletal defects noted, no edema                    .  ..

## 2022-06-29 ENCOUNTER — LAB (OUTPATIENT)
Dept: LAB | Facility: CLINIC | Age: 52
End: 2022-06-29
Payer: COMMERCIAL

## 2022-06-29 DIAGNOSIS — I21.4 NSTEMI (NON-ST ELEVATED MYOCARDIAL INFARCTION) (H): ICD-10-CM

## 2022-06-29 DIAGNOSIS — M10.9 GOUT, UNSPECIFIED CAUSE, UNSPECIFIED CHRONICITY, UNSPECIFIED SITE: ICD-10-CM

## 2022-06-29 DIAGNOSIS — E11.49 OTHER DIABETIC NEUROLOGICAL COMPLICATION ASSOCIATED WITH TYPE 2 DIABETES MELLITUS (H): ICD-10-CM

## 2022-06-29 LAB
ALBUMIN SERPL BCG-MCNC: 4 G/DL (ref 3.5–5.2)
ALP SERPL-CCNC: 111 U/L (ref 40–129)
ALT SERPL W P-5'-P-CCNC: 15 U/L (ref 10–50)
ANION GAP SERPL CALCULATED.3IONS-SCNC: 14 MMOL/L (ref 7–15)
AST SERPL W P-5'-P-CCNC: 19 U/L (ref 10–50)
BILIRUB SERPL-MCNC: 0.2 MG/DL
BUN SERPL-MCNC: 12.8 MG/DL (ref 6–20)
CALCIUM SERPL-MCNC: 9 MG/DL (ref 8.6–10)
CHLORIDE SERPL-SCNC: 100 MMOL/L (ref 98–107)
CHOLEST SERPL-MCNC: 149 MG/DL
CREAT SERPL-MCNC: 0.72 MG/DL (ref 0.67–1.17)
DEPRECATED HCO3 PLAS-SCNC: 24 MMOL/L (ref 22–29)
GFR SERPL CREATININE-BSD FRML MDRD: >90 ML/MIN/1.73M2
GLUCOSE SERPL-MCNC: 209 MG/DL (ref 70–99)
HBA1C MFR BLD: 8.7 % (ref 0–5.6)
HDLC SERPL-MCNC: 31 MG/DL
LDLC SERPL CALC-MCNC: 47 MG/DL
NONHDLC SERPL-MCNC: 118 MG/DL
POTASSIUM SERPL-SCNC: 4.2 MMOL/L (ref 3.4–5.3)
PROT SERPL-MCNC: 7.6 G/DL (ref 6.4–8.3)
SODIUM SERPL-SCNC: 138 MMOL/L (ref 136–145)
TRIGL SERPL-MCNC: 357 MG/DL
URATE SERPL-MCNC: 5.7 MG/DL (ref 3.4–7)

## 2022-06-29 PROCEDURE — 83036 HEMOGLOBIN GLYCOSYLATED A1C: CPT

## 2022-06-29 PROCEDURE — 80061 LIPID PANEL: CPT

## 2022-06-29 PROCEDURE — 36415 COLL VENOUS BLD VENIPUNCTURE: CPT

## 2022-06-29 PROCEDURE — 80053 COMPREHEN METABOLIC PANEL: CPT

## 2022-06-29 PROCEDURE — 84550 ASSAY OF BLOOD/URIC ACID: CPT

## 2022-07-06 DIAGNOSIS — E11.49 OTHER DIABETIC NEUROLOGICAL COMPLICATION ASSOCIATED WITH TYPE 2 DIABETES MELLITUS (H): ICD-10-CM

## 2022-07-07 DIAGNOSIS — E11.49 OTHER DIABETIC NEUROLOGICAL COMPLICATION ASSOCIATED WITH TYPE 2 DIABETES MELLITUS (H): ICD-10-CM

## 2022-07-08 NOTE — TELEPHONE ENCOUNTER
"    Last Written Prescription Date:  2/16/22  Last Fill Quantity: 200,  # refills: 1   Last office visit provider:  6/27/22     Requested Prescriptions   Pending Prescriptions Disp Refills     insulin pen needle (32G X 4 MM) 32G X 4 MM miscellaneous 200 each 1     Sig: Use 4 pen needles daily or as directed.       Diabetic Supplies Protocol Failed - 7/7/2022  3:37 PM        Failed - Recent (6 mo) or future (30 days) visit within the authorizing provider's specialty     Patient had office visit in the last 6 months or has a visit in the next 30 days with authorizing provider.  See \"Patient Info\" tab in inbasket, or \"Choose Columns\" in Meds & Orders section of the refill encounter.            Passed - Medication is active on med list        Passed - Patient is 18 years of age or older             Brooklynn Wells RN 07/08/22 2:15 PM  "

## 2022-09-17 ENCOUNTER — HEALTH MAINTENANCE LETTER (OUTPATIENT)
Age: 52
End: 2022-09-17

## 2022-09-29 DIAGNOSIS — E11.49 OTHER DIABETIC NEUROLOGICAL COMPLICATION ASSOCIATED WITH TYPE 2 DIABETES MELLITUS (H): ICD-10-CM

## 2022-09-30 ENCOUNTER — NURSE TRIAGE (OUTPATIENT)
Dept: NURSING | Facility: CLINIC | Age: 52
End: 2022-09-30

## 2022-09-30 DIAGNOSIS — E11.49 OTHER DIABETIC NEUROLOGICAL COMPLICATION ASSOCIATED WITH TYPE 2 DIABETES MELLITUS (H): ICD-10-CM

## 2022-09-30 RX ORDER — INSULIN ASPART 100 [IU]/ML
5 INJECTION, SOLUTION INTRAVENOUS; SUBCUTANEOUS
Qty: 6 ML | Refills: 2 | Status: CANCELLED | OUTPATIENT
Start: 2022-09-30

## 2022-09-30 RX ORDER — GLUCOSAMINE HCL/CHONDROITIN SU 500-400 MG
CAPSULE ORAL
Qty: 100 EACH | Refills: 11 | Status: CANCELLED | OUTPATIENT
Start: 2022-09-30

## 2022-09-30 NOTE — TELEPHONE ENCOUNTER
Routing refill request to provider for review/approval because:  No current PCP.  Labs not current:  A1C  (Pt has upcoming appt for diabetic check.)    Last Written INSULIN GLARGINE Date:  6/27/2022  Last Fill Quantity: 30 mL,  # refills: 0   Last office visit provider:  6/27/2022       Anay Burris RN 09/30/22 12:56 PM

## 2022-09-30 NOTE — TELEPHONE ENCOUNTER
"Pt requests refills of:  - insulin glargine (Lantus)  - insulin aspart (Novolog)  - alcohol swab prep pads  - test strips    Per checking with [facility], pt still has refills on Novolog and PharmD states \"He also just picked up alcohol swabs and test strips (not needed again now).\"    Pt has upcoming appt 10/25/22 for diabetic check.  Requests bridging supply of above meds until then.  Would like Dr Oshea as his PCP in the future please.    See separate med refill encounter created and processed for insulin glargine which is the only refill needed at this time (per clarification with [facility]).    Ameena MARTINEZ Health Nurse Advisor     Reason for Disposition    Prescription refill request for ESSENTIAL medicine (i.e., likelihood of harm to patient if not taken) and triager unable to refill per department policy    Protocols used: MEDICATION REFILL AND RENEWAL CALL-A-OH      "

## 2022-09-30 NOTE — TELEPHONE ENCOUNTER
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 Bili, TP), insulin glargine (LANTUS PEN)        100 UNIT/ML pen, insulin aspart (NOVOLOG         FLEXPEN) 100 UNIT/ML pen, ACCU-CHEK GUIDE test         strip, alcohol swab prep pads, thin (NO BRAND         SPECIFIED) lancets    Next appointment is 10/25/22    According to last note primary reason for appointment was est care.

## 2022-10-25 ENCOUNTER — MYC MEDICAL ADVICE (OUTPATIENT)
Dept: FAMILY MEDICINE | Facility: CLINIC | Age: 52
End: 2022-10-25

## 2022-10-25 ENCOUNTER — OFFICE VISIT (OUTPATIENT)
Dept: INTERNAL MEDICINE | Facility: CLINIC | Age: 52
End: 2022-10-25
Payer: COMMERCIAL

## 2022-10-25 VITALS
TEMPERATURE: 97.6 F | HEART RATE: 62 BPM | BODY MASS INDEX: 27.77 KG/M2 | OXYGEN SATURATION: 98 % | WEIGHT: 194 LBS | HEIGHT: 70 IN | RESPIRATION RATE: 18 BRPM | DIASTOLIC BLOOD PRESSURE: 86 MMHG | SYSTOLIC BLOOD PRESSURE: 130 MMHG

## 2022-10-25 DIAGNOSIS — E11.29 TYPE 2 DIABETES MELLITUS WITH MICROALBUMINURIA, WITH LONG-TERM CURRENT USE OF INSULIN (H): Primary | ICD-10-CM

## 2022-10-25 DIAGNOSIS — Z95.1 S/P CABG (CORONARY ARTERY BYPASS GRAFT): ICD-10-CM

## 2022-10-25 DIAGNOSIS — Z86.79 HISTORY OF ATRIAL FLUTTER: ICD-10-CM

## 2022-10-25 DIAGNOSIS — I10 PRIMARY HYPERTENSION: ICD-10-CM

## 2022-10-25 DIAGNOSIS — I50.9 CONGESTIVE HEART FAILURE, UNSPECIFIED HF CHRONICITY, UNSPECIFIED HEART FAILURE TYPE (H): ICD-10-CM

## 2022-10-25 DIAGNOSIS — I25.10 CORONARY ARTERY DISEASE INVOLVING NATIVE CORONARY ARTERY OF NATIVE HEART WITHOUT ANGINA PECTORIS: ICD-10-CM

## 2022-10-25 DIAGNOSIS — Z79.4 TYPE 2 DIABETES MELLITUS WITH MICROALBUMINURIA, WITH LONG-TERM CURRENT USE OF INSULIN (H): Primary | ICD-10-CM

## 2022-10-25 DIAGNOSIS — R80.9 TYPE 2 DIABETES MELLITUS WITH MICROALBUMINURIA, WITH LONG-TERM CURRENT USE OF INSULIN (H): Primary | ICD-10-CM

## 2022-10-25 DIAGNOSIS — M10.9 ACUTE GOUT, UNSPECIFIED CAUSE, UNSPECIFIED SITE: ICD-10-CM

## 2022-10-25 LAB — HBA1C MFR BLD: 7 % (ref 0–5.6)

## 2022-10-25 PROCEDURE — 36415 COLL VENOUS BLD VENIPUNCTURE: CPT | Performed by: NURSE PRACTITIONER

## 2022-10-25 PROCEDURE — 0124A COVID-19,PF,PFIZER BOOSTER BIVALENT: CPT | Performed by: NURSE PRACTITIONER

## 2022-10-25 PROCEDURE — 99214 OFFICE O/P EST MOD 30 MIN: CPT | Mod: 25 | Performed by: NURSE PRACTITIONER

## 2022-10-25 PROCEDURE — 90682 RIV4 VACC RECOMBINANT DNA IM: CPT | Performed by: NURSE PRACTITIONER

## 2022-10-25 PROCEDURE — 83036 HEMOGLOBIN GLYCOSYLATED A1C: CPT | Performed by: NURSE PRACTITIONER

## 2022-10-25 PROCEDURE — 90471 IMMUNIZATION ADMIN: CPT | Performed by: NURSE PRACTITIONER

## 2022-10-25 PROCEDURE — 91312 COVID-19,PF,PFIZER BOOSTER BIVALENT: CPT | Performed by: NURSE PRACTITIONER

## 2022-10-25 RX ORDER — COLCHICINE 0.6 MG/1
TABLET ORAL
Qty: 30 TABLET | Refills: 0 | Status: SHIPPED | OUTPATIENT
Start: 2022-10-25 | End: 2023-07-14

## 2022-10-25 RX ORDER — NITROGLYCERIN 0.4 MG/1
TABLET SUBLINGUAL
Qty: 30 TABLET | Refills: 1 | Status: SHIPPED | OUTPATIENT
Start: 2022-10-25 | End: 2024-01-31

## 2022-10-25 RX ORDER — ROSUVASTATIN CALCIUM 10 MG/1
10 TABLET, COATED ORAL DAILY
Qty: 90 TABLET | Refills: 2 | Status: SHIPPED | OUTPATIENT
Start: 2022-10-25 | End: 2023-07-05

## 2022-10-25 RX ORDER — METOPROLOL TARTRATE 50 MG
50 TABLET ORAL 2 TIMES DAILY
Qty: 180 TABLET | Refills: 1 | Status: SHIPPED | OUTPATIENT
Start: 2022-10-25 | End: 2023-05-14

## 2022-10-25 RX ORDER — LOSARTAN POTASSIUM 50 MG/1
50 TABLET ORAL 2 TIMES DAILY
Qty: 180 TABLET | Refills: 1 | Status: SHIPPED | OUTPATIENT
Start: 2022-10-25 | End: 2023-07-05

## 2022-10-25 RX ORDER — INSULIN ASPART 100 [IU]/ML
7 INJECTION, SOLUTION INTRAVENOUS; SUBCUTANEOUS
Qty: 15 ML | Refills: 1 | Status: SHIPPED | OUTPATIENT
Start: 2022-10-25 | End: 2023-03-01

## 2022-10-25 RX ORDER — INSULIN ASPART 100 [IU]/ML
5 INJECTION, SOLUTION INTRAVENOUS; SUBCUTANEOUS
Qty: 15 ML | Refills: 1 | Status: SHIPPED | OUTPATIENT
Start: 2022-10-25 | End: 2022-10-25

## 2022-10-25 ASSESSMENT — PAIN SCALES - GENERAL: PAINLEVEL: NO PAIN (0)

## 2022-10-25 NOTE — PROGRESS NOTES
"  Assessment & Plan   Problem List Items Addressed This Visit        Endocrine    Type 2 diabetes mellitus, with long-term current use of insulin (H) - Primary     Fasting readings are mostly in range. We discussed that if his fasting readings are consistently above 130 (sometimes going up to 135 at this time), he could increase Lantus to 32 units nightly  - Follow up with PCP in 3 months if A1C is >8.0%           Relevant Medications    insulin glargine (LANTUS PEN) 100 UNIT/ML pen    insulin aspart (NOVOLOG FLEXPEN) 100 UNIT/ML pen    Other Relevant Orders    HEMOGLOBIN A1C (Completed)    Gout     Stable, no recent exacerbations          Relevant Medications    colchicine (COLCYRS) 0.6 MG tablet       Circulatory    Coronary artery disease involving native coronary artery without angina pectoris     Continue high intensity statin          Primary hypertension     stable         Relevant Medications    losartan (COZAAR) 50 MG tablet    CHF (congestive heart failure) (H)     Appears euvolemic and weight stable             Other    S/P CABG (coronary artery bypass graft)    Relevant Medications    rosuvastatin (CRESTOR) 10 MG tablet    nitroGLYcerin (NITROSTAT) 0.4 MG sublingual tablet    History of atrial flutter     No symptoms. Current management expectant           He declines a pneumonia vaccine today as he will already be receiving the flu and COVID booster today          BMI:   Estimated body mass index is 27.84 kg/m  as calculated from the following:    Height as of this encounter: 1.778 m (5' 10\").    Weight as of this encounter: 88 kg (194 lb).       Return in about 3 months (around 1/25/2023) for Follow up.    Charissa Perez NP  [facility] NICOLETTE Nichols is a 52 year old, new to my practice and a patient of Dr. Oshea, presenting for the following health issues:  Follow Up (DM, pt states that I changed the dosage by myself do I have to tell the doctor? The short acting I " "take 2 times each 5 units, and lantus I take 30) and knee discomfort (Pt states last night ate whole amelia and middle of last night start feeling pain and yesterday I ran too much and when I do that I have some pain so it could that, left knee, not pain but discomfort.)    His DM readings have been between 115-135. He increased his basal insulin and is using prandial insulin just twice per day with 2 bigger meals. He sometimes eats a small snack in the evening so he may take a small dose of insulin with this. He exercises regularly.     Last night he ate amelia and the left knee started to hurt. It feels a little better today, just uncomfortable.     Gout- no gout flares or maybe just one per year.     CAD- no chest pains.     History of Present Illness       Diabetes:   He presents for follow up of diabetes.  He is checking home blood glucose one time daily. He checks blood glucose before meals.  Blood glucose is never over 200 and never under 70. When his blood glucose is low, the patient is asymptomatic for confusion, blurred vision, lethargy and reports not feeling dizzy, shaky, or weak.  He is concerned about other.  He is having burning in feet. The patient has had a diabetic eye exam in the last 12 months. Eye exam performed on begining of this year.  i think somewhere [facility]. Location of last eye exam [address] [facility].        Hypertension: He presents for follow up of hypertension.  He does check blood pressure  regularly outside of the clinic. Outpatient blood pressures have not been over 140/90. He follows a low salt diet.             Objective    /86   Pulse 62   Temp 97.6  F (36.4  C) (Oral)   Resp 18   Ht 1.778 m (5' 10\")   Wt 88 kg (194 lb)   SpO2 98%   BMI 27.84 kg/m    Body mass index is 27.84 kg/m .     Wt Readings from Last 5 Encounters:   10/25/22 88 kg (194 lb)   06/27/22 88 kg (194 lb)   02/23/22 87.5 kg (193 lb)   02/16/22 85.7 kg (189 lb)   01/21/22 84.8 kg (187 lb)       Physical " Exam   GENERAL: alert and no distress  RESP: lungs clear to auscultation - no rales, rhonchi or wheezes  CV: regular rate and rhythm, normal S1 S2, no S3 or S4, no murmur, click or rub, no peripheral edema  MSK: normal gait. Knees appear atraumatic.

## 2022-10-25 NOTE — Clinical Note
Please abstract the following data from this visit with this patient into the appropriate field in Epic:  Tests that can be patient reported without a hard copy:  Eye exam with ophthalmology on this date: 2/1/2022  Other Tests found in the patient's chart through Chart Review/Care Everywhere:  {Abstract Quality List (Optional):[id number]}  Note to Abstraction: If this section is blank, no results were found via Chart Review/Care Everywhere.

## 2022-11-19 DIAGNOSIS — E11.49 OTHER DIABETIC NEUROLOGICAL COMPLICATION ASSOCIATED WITH TYPE 2 DIABETES MELLITUS (H): ICD-10-CM

## 2022-11-20 RX ORDER — PEN NEEDLE, DIABETIC 32GX 5/32"
NEEDLE, DISPOSABLE MISCELLANEOUS
Qty: 400 EACH | Refills: 1 | Status: SHIPPED | OUTPATIENT
Start: 2022-11-20 | End: 2023-07-03

## 2022-11-21 NOTE — TELEPHONE ENCOUNTER
"Last Written Prescription Date:  7/8/22  Last Fill Quantity: 200,  # refills: 1   Last office visit provider:  10/25/22     Requested Prescriptions   Pending Prescriptions Disp Refills     insulin pen needle (ULTICARE MICRO) 32G X 4 MM miscellaneous [Pharmacy Med Name: UltiCare Micro Pen Needles Miscellaneous 32G X 4 MM] 200 each 0     Sig: Use 4 pen needles daily or as directed.       Diabetic Supplies Protocol Passed - 11/19/2022 11:38 AM        Passed - Medication is active on med list        Passed - Patient is 18 years of age or older        Passed - Recent (6 mo) or future (30 days) visit within the authorizing provider's specialty     Patient had office visit in the last 6 months or has a visit in the next 30 days with authorizing provider.  See \"Patient Info\" tab in inbasket, or \"Choose Columns\" in Meds & Orders section of the refill encounter.                 Brooklynn Wells RN 11/20/22 6:52 PM  "

## 2022-12-15 DIAGNOSIS — Z95.1 S/P CABG (CORONARY ARTERY BYPASS GRAFT): ICD-10-CM

## 2022-12-15 RX ORDER — FUROSEMIDE 20 MG
20 TABLET ORAL
Qty: 180 TABLET | Refills: 3 | Status: ON HOLD | OUTPATIENT
Start: 2022-12-15 | End: 2023-07-15

## 2022-12-16 NOTE — TELEPHONE ENCOUNTER
"Last Written Prescription Date: 6/27/2022  Last Fill Quantity: 180,  # refills: 1   Last office visit provider: 10/25/2022 with JUAN Perez NP     Requested Prescriptions   Pending Prescriptions Disp Refills     furosemide (LASIX) 20 MG tablet [Pharmacy Med Name: Furosemide Oral Tablet 20 MG] 180 tablet 0     Sig: Take 1 tablet (20 mg) by mouth 2 times daily       Diuretics (Including Combos) Protocol Passed - 12/15/2022 10:44 AM        Passed - Blood pressure under 140/90 in past 12 months     BP Readings from Last 3 Encounters:   10/25/22 130/86   06/27/22 116/75   02/23/22 122/70                 Passed - Recent (12 mo) or future (30 days) visit within the authorizing provider's specialty     Patient has had an office visit with the authorizing provider or a provider within the authorizing providers department within the previous 12 mos or has a future within next 30 days. See \"Patient Info\" tab in inbasket, or \"Choose Columns\" in Meds & Orders section of the refill encounter.              Passed - Medication is active on med list        Passed - Patient is age 18 or older        Passed - Normal serum creatinine on file in past 12 months     Recent Labs   Lab Test 06/29/22  1419   CR 0.72              Passed - Normal serum potassium on file in past 12 months     Recent Labs   Lab Test 06/29/22  1419   POTASSIUM 4.2                    Passed - Normal serum sodium on file in past 12 months     Recent Labs   Lab Test 06/29/22  1419                      Savannah Bobo RN 12/15/22 9:05 PM  "

## 2022-12-20 DIAGNOSIS — E11.49 OTHER DIABETIC NEUROLOGICAL COMPLICATION ASSOCIATED WITH TYPE 2 DIABETES MELLITUS (H): ICD-10-CM

## 2022-12-23 DIAGNOSIS — I10 BENIGN ESSENTIAL HYPERTENSION: ICD-10-CM

## 2022-12-23 NOTE — TELEPHONE ENCOUNTER
"Patient calling to request refill of attached medication as he has been out for several months now.  Writer notes that 1 year supply was approved in June of 2022, however also notes that \"transmission to pharmacy failed\".  Please resend Rx to [facility] pharmacy on file per patient request.  "

## 2022-12-25 NOTE — TELEPHONE ENCOUNTER
"Routing refill request to provider for review/approval because:  A break in medication  E-Prescribing Status: Transmission to pharmacy failed (6/27/2022  3:50 PM CDT) Patient reports being out for months now. Please advise.     Last Written Prescription Date:  6/27/2022  Last Fill Quantity: 90,  # refills: 3   Last office visit provider:  10/25/2022     Requested Prescriptions   Pending Prescriptions Disp Refills     amLODIPine (NORVASC) 2.5 MG tablet       Sig: Take 1 tablet (2.5 mg) by mouth daily       Calcium Channel Blockers Protocol  Passed - 12/25/2022  1:52 PM        Passed - Blood pressure under 140/90 in past 12 months     BP Readings from Last 3 Encounters:   10/25/22 130/86   06/27/22 116/75   02/23/22 122/70                 Passed - Recent (12 mo) or future (30 days) visit within the authorizing provider's specialty     Patient has had an office visit with the authorizing provider or a provider within the authorizing providers department within the previous 12 mos or has a future within next 30 days. See \"Patient Info\" tab in inbasket, or \"Choose Columns\" in Meds & Orders section of the refill encounter.              Passed - Medication is active on med list        Passed - Patient is age 18 or older        Passed - Normal serum creatinine on file in past 12 months     Recent Labs   Lab Test 06/29/22  1419   CR 0.72       Ok to refill medication if creatinine is low               Maricruz Mayberry RN 12/25/22 1:54 PM  "

## 2022-12-26 RX ORDER — AMLODIPINE BESYLATE 2.5 MG/1
2.5 TABLET ORAL DAILY
Qty: 90 TABLET | Refills: 3 | Status: SHIPPED | OUTPATIENT
Start: 2022-12-26 | End: 2023-02-24

## 2023-02-14 ENCOUNTER — MYC MEDICAL ADVICE (OUTPATIENT)
Dept: INTERNAL MEDICINE | Facility: CLINIC | Age: 53
End: 2023-02-14
Payer: COMMERCIAL

## 2023-02-14 ENCOUNTER — TELEPHONE (OUTPATIENT)
Dept: FAMILY MEDICINE | Facility: CLINIC | Age: 53
End: 2023-02-14
Payer: COMMERCIAL

## 2023-02-14 DIAGNOSIS — Z22.7 TB LUNG, LATENT: Primary | ICD-10-CM

## 2023-02-17 ASSESSMENT — ENCOUNTER SYMPTOMS
DYSURIA: 0
FEVER: 0
FREQUENCY: 0
EYE PAIN: 1
DIZZINESS: 0
COUGH: 0
SHORTNESS OF BREATH: 0
DIARRHEA: 0
JOINT SWELLING: 0
NERVOUS/ANXIOUS: 1
CONSTIPATION: 0
ABDOMINAL PAIN: 0
NAUSEA: 0
MYALGIAS: 0
HEMATURIA: 0
WEAKNESS: 0
HEADACHES: 0
HEMATOCHEZIA: 0
SORE THROAT: 0
PALPITATIONS: 0
HEARTBURN: 0
ARTHRALGIAS: 1
PARESTHESIAS: 0
CHILLS: 0

## 2023-02-23 ASSESSMENT — ASTHMA QUESTIONNAIRES
ACT_TOTALSCORE: 24
QUESTION_3 LAST FOUR WEEKS HOW OFTEN DID YOUR ASTHMA SYMPTOMS (WHEEZING, COUGHING, SHORTNESS OF BREATH, CHEST TIGHTNESS OR PAIN) WAKE YOU UP AT NIGHT OR EARLIER THAN USUAL IN THE MORNING: NOT AT ALL
ACT_TOTALSCORE: 24
QUESTION_2 LAST FOUR WEEKS HOW OFTEN HAVE YOU HAD SHORTNESS OF BREATH: NOT AT ALL
QUESTION_4 LAST FOUR WEEKS HOW OFTEN HAVE YOU USED YOUR RESCUE INHALER OR NEBULIZER MEDICATION (SUCH AS ALBUTEROL): NOT AT ALL
QUESTION_5 LAST FOUR WEEKS HOW WOULD YOU RATE YOUR ASTHMA CONTROL: WELL CONTROLLED
QUESTION_1 LAST FOUR WEEKS HOW MUCH OF THE TIME DID YOUR ASTHMA KEEP YOU FROM GETTING AS MUCH DONE AT WORK, SCHOOL OR AT HOME: NONE OF THE TIME

## 2023-02-24 ENCOUNTER — HOSPITAL ENCOUNTER (OUTPATIENT)
Dept: GENERAL RADIOLOGY | Facility: HOSPITAL | Age: 53
Discharge: HOME | End: 2023-02-24
Payer: COMMERCIAL

## 2023-02-24 ENCOUNTER — OFFICE VISIT (OUTPATIENT)
Dept: FAMILY MEDICINE | Facility: CLINIC | Age: 53
End: 2023-02-24
Payer: COMMERCIAL

## 2023-02-24 VITALS
BODY MASS INDEX: 28.92 KG/M2 | HEART RATE: 63 BPM | DIASTOLIC BLOOD PRESSURE: 92 MMHG | HEIGHT: 70 IN | SYSTOLIC BLOOD PRESSURE: 140 MMHG | OXYGEN SATURATION: 100 % | WEIGHT: 202 LBS

## 2023-02-24 DIAGNOSIS — I50.22 CHRONIC HFREF (HEART FAILURE WITH REDUCED EJECTION FRACTION) (H): ICD-10-CM

## 2023-02-24 DIAGNOSIS — I10 PRIMARY HYPERTENSION: ICD-10-CM

## 2023-02-24 DIAGNOSIS — I25.2 HISTORY OF ST ELEVATION MYOCARDIAL INFARCTION (STEMI): ICD-10-CM

## 2023-02-24 DIAGNOSIS — Z86.15 HISTORY OF LATENT TUBERCULOSIS: ICD-10-CM

## 2023-02-24 DIAGNOSIS — Z23 ENCOUNTER FOR IMMUNIZATION: ICD-10-CM

## 2023-02-24 DIAGNOSIS — I25.10 CORONARY ARTERY DISEASE INVOLVING NATIVE CORONARY ARTERY OF NATIVE HEART WITHOUT ANGINA PECTORIS: ICD-10-CM

## 2023-02-24 DIAGNOSIS — I10 BENIGN ESSENTIAL HYPERTENSION: ICD-10-CM

## 2023-02-24 DIAGNOSIS — M10.9 GOUT, UNSPECIFIED CAUSE, UNSPECIFIED CHRONICITY, UNSPECIFIED SITE: ICD-10-CM

## 2023-02-24 DIAGNOSIS — E11.65 TYPE 2 DIABETES MELLITUS WITH HYPERGLYCEMIA, WITH LONG-TERM CURRENT USE OF INSULIN (H): ICD-10-CM

## 2023-02-24 DIAGNOSIS — Z79.4 TYPE 2 DIABETES MELLITUS WITH HYPERGLYCEMIA, WITH LONG-TERM CURRENT USE OF INSULIN (H): ICD-10-CM

## 2023-02-24 DIAGNOSIS — Z00.00 VISIT FOR ANNUAL HEALTH EXAMINATION: Primary | ICD-10-CM

## 2023-02-24 DIAGNOSIS — Z12.5 SCREENING FOR PROSTATE CANCER: ICD-10-CM

## 2023-02-24 DIAGNOSIS — Z95.1 S/P CABG (CORONARY ARTERY BYPASS GRAFT): ICD-10-CM

## 2023-02-24 LAB
ANION GAP SERPL CALCULATED.3IONS-SCNC: 12 MMOL/L (ref 7–15)
BUN SERPL-MCNC: 14.9 MG/DL (ref 6–20)
CALCIUM SERPL-MCNC: 9.9 MG/DL (ref 8.6–10)
CHLORIDE SERPL-SCNC: 100 MMOL/L (ref 98–107)
CHOLEST SERPL-MCNC: 150 MG/DL
CREAT SERPL-MCNC: 0.93 MG/DL (ref 0.67–1.17)
CREAT UR-MCNC: 149 MG/DL
DEPRECATED HCO3 PLAS-SCNC: 27 MMOL/L (ref 22–29)
ERYTHROCYTE [DISTWIDTH] IN BLOOD BY AUTOMATED COUNT: 13.5 % (ref 10–15)
GFR SERPL CREATININE-BSD FRML MDRD: >90 ML/MIN/1.73M2
GLUCOSE SERPL-MCNC: 287 MG/DL (ref 70–99)
HBA1C MFR BLD: 10.4 % (ref 0–5.6)
HCT VFR BLD AUTO: 40.2 % (ref 40–53)
HDLC SERPL-MCNC: 32 MG/DL
HGB BLD-MCNC: 14 G/DL (ref 13.3–17.7)
LDLC SERPL CALC-MCNC: 65 MG/DL
MCH RBC QN AUTO: 27.1 PG (ref 26.5–33)
MCHC RBC AUTO-ENTMCNC: 34.8 G/DL (ref 31.5–36.5)
MCV RBC AUTO: 78 FL (ref 78–100)
MICROALBUMIN UR-MCNC: 2669 MG/L
MICROALBUMIN/CREAT UR: 1791.28 MG/G CR (ref 0–17)
NONHDLC SERPL-MCNC: 118 MG/DL
PLATELET # BLD AUTO: 172 10E3/UL (ref 150–450)
POTASSIUM SERPL-SCNC: 4.9 MMOL/L (ref 3.4–5.3)
PSA SERPL-MCNC: 0.82 NG/ML (ref 0–3.5)
RBC # BLD AUTO: 5.17 10E6/UL (ref 4.4–5.9)
SODIUM SERPL-SCNC: 139 MMOL/L (ref 136–145)
TRIGL SERPL-MCNC: 264 MG/DL
WBC # BLD AUTO: 7.8 10E3/UL (ref 4–11)

## 2023-02-24 PROCEDURE — 80048 BASIC METABOLIC PNL TOTAL CA: CPT

## 2023-02-24 PROCEDURE — 82043 UR ALBUMIN QUANTITATIVE: CPT

## 2023-02-24 PROCEDURE — 86481 TB AG RESPONSE T-CELL SUSP: CPT

## 2023-02-24 PROCEDURE — 83036 HEMOGLOBIN GLYCOSYLATED A1C: CPT

## 2023-02-24 PROCEDURE — 71046 X-RAY EXAM CHEST 2 VIEWS: CPT

## 2023-02-24 PROCEDURE — 80061 LIPID PANEL: CPT

## 2023-02-24 PROCEDURE — 90750 HZV VACC RECOMBINANT IM: CPT

## 2023-02-24 PROCEDURE — 82570 ASSAY OF URINE CREATININE: CPT

## 2023-02-24 PROCEDURE — 99396 PREV VISIT EST AGE 40-64: CPT | Mod: 25

## 2023-02-24 PROCEDURE — 90472 IMMUNIZATION ADMIN EACH ADD: CPT

## 2023-02-24 PROCEDURE — 90471 IMMUNIZATION ADMIN: CPT

## 2023-02-24 PROCEDURE — G0103 PSA SCREENING: HCPCS

## 2023-02-24 PROCEDURE — 85027 COMPLETE CBC AUTOMATED: CPT

## 2023-02-24 PROCEDURE — 36415 COLL VENOUS BLD VENIPUNCTURE: CPT

## 2023-02-24 PROCEDURE — 90677 PCV20 VACCINE IM: CPT

## 2023-02-24 PROCEDURE — 99214 OFFICE O/P EST MOD 30 MIN: CPT | Mod: 25

## 2023-02-24 RX ORDER — FLASH GLUCOSE SENSOR
1 KIT MISCELLANEOUS
Qty: 2 EACH | Refills: 5 | Status: CANCELLED | OUTPATIENT
Start: 2023-02-24

## 2023-02-24 RX ORDER — ALLOPURINOL 100 MG/1
100 TABLET ORAL DAILY
Qty: 90 TABLET | Refills: 1 | Status: SHIPPED | OUTPATIENT
Start: 2023-02-24 | End: 2023-08-17

## 2023-02-24 RX ORDER — AMLODIPINE BESYLATE 5 MG/1
5 TABLET ORAL DAILY
Qty: 30 TABLET | Refills: 1 | Status: SHIPPED | OUTPATIENT
Start: 2023-02-24 | End: 2023-07-05

## 2023-02-24 RX ORDER — FLASH GLUCOSE SENSOR
KIT MISCELLANEOUS
Qty: 2 EACH | Refills: 5 | Status: SHIPPED | OUTPATIENT
Start: 2023-02-24

## 2023-02-24 ASSESSMENT — ENCOUNTER SYMPTOMS
CHILLS: 0
HEADACHES: 0
HEARTBURN: 0
JOINT SWELLING: 0
ABDOMINAL PAIN: 0
PALPITATIONS: 0
NERVOUS/ANXIOUS: 1
PARESTHESIAS: 0
DIARRHEA: 0
NAUSEA: 0
FREQUENCY: 0
SORE THROAT: 0
EYE PAIN: 1
WEAKNESS: 0
MYALGIAS: 0
ARTHRALGIAS: 1
SHORTNESS OF BREATH: 0
DIZZINESS: 0
CONSTIPATION: 0
HEMATURIA: 0
HEMATOCHEZIA: 0
DYSURIA: 0
FEVER: 0
COUGH: 0

## 2023-02-24 NOTE — PROGRESS NOTES
SUBJECTIVE:   CC: Simone is an 53 year old who presents for preventative health visit.     Patient has been advised of split billing requirements and indicates understanding: Yes  Healthy Habits:     Getting at least 3 servings of Calcium per day:  Yes    Bi-annual eye exam:  Yes    Dental care twice a year:  Yes    Sleep apnea or symptoms of sleep apnea:  None and Excessive snoring    Diet:  Regular (no restrictions), Diabetic and Carbohydrate counting    Frequency of exercise:  1 day/week    Duration of exercise:  15-30 minutes    Taking medications regularly:  Yes    Medication side effects:  None and No significant flushing    PHQ-2 Total Score: 0    Additional concerns today:  Yes    History of gout. He has been trying to watch what he eats. He has noticed that mangos cause him to have a flare. He takes allopurinol daily and colchicine as needed.     He has a history of high blood pressure. He takes amlodipine, lasix, losartan, and metorpolol daily. No chest pain, no SOB, no vision changes, no headaches, no swelling in his legs. He exercises throughout the week. He avoids salt and try's to eat what is good for his diabetes. He has a history of a heart attack with stenting and CABG. No chest pain. He has nitroglycerin but has not had to use it.     He has diabetes. He takes 35 units of lantus in the morning and 8 units of novolog with each meal. He also takes metformin daily. He has noticed his blood sugars have gone up over the last three months. They have been 250 in the mornings and in the 200 or 300s in the afternoon. He has never had any episodes that were less than 70. He does not have excessive thirst, hunger, or urination.     He takes rosuvastatin daily. No muscle aches or pains.     Today's PHQ-2 Score:   PHQ-2 ( 1999 Pfizer) 2/17/2023   Q1: Little interest or pleasure in doing things 0   Q2: Feeling down, depressed or hopeless 0   PHQ-2 Score 0   Q1: Little interest or pleasure in doing things Not at  "all   Q2: Feeling down, depressed or hopeless Not at all   PHQ-2 Score 0     Social History     Tobacco Use     Smoking status: Former     Packs/day: 1.50     Years: 13.00     Pack years: 19.50     Types: Cigarettes     Quit date: 2003     Years since quittin.2     Smokeless tobacco: Never   Substance Use Topics     Alcohol use: No     If you drink alcohol do you typically have >3 drinks per day or >7 drinks per week? No    Alcohol Use 2023   Prescreen: >3 drinks/day or >7 drinks/week? No   Prescreen: >3 drinks/day or >7 drinks/week? -   No flowsheet data found.    Last PSA:   Prostate Specific Antigen Screen   Date Value Ref Range Status   2022 0.89 0.00 - 3.50 ug/L Final     Reviewed orders with patient. Reviewed health maintenance and updated orders accordingly - Yes    Review of Systems   Constitutional: Negative for chills and fever.   HENT: Negative for congestion, ear pain, hearing loss and sore throat.    Eyes: Positive for pain. Negative for visual disturbance.   Respiratory: Negative for cough and shortness of breath.    Cardiovascular: Negative for chest pain, palpitations and peripheral edema.   Gastrointestinal: Negative for abdominal pain, constipation, diarrhea, heartburn, hematochezia and nausea.   Genitourinary: Negative for dysuria, frequency, genital sores, hematuria, impotence, penile discharge and urgency.   Musculoskeletal: Positive for arthralgias. Negative for joint swelling and myalgias.   Skin: Negative for rash.   Neurological: Negative for dizziness, weakness, headaches and paresthesias.   Psychiatric/Behavioral: Negative for mood changes. The patient is nervous/anxious.      OBJECTIVE:   BP (!) 140/92 (BP Location: Right arm, Patient Position: Sitting, Cuff Size: Adult Regular)   Pulse 63   Ht 1.778 m (5' 10\")   Wt 91.6 kg (202 lb)   SpO2 100%   BMI 28.98 kg/m      Physical Exam  Vitals reviewed.   Constitutional:       General: He is not in acute " distress.  HENT:      Right Ear: Tympanic membrane, ear canal and external ear normal.      Left Ear: Tympanic membrane, ear canal and external ear normal.   Eyes:      Extraocular Movements: Extraocular movements intact.      Pupils: Pupils are equal, round, and reactive to light.   Cardiovascular:      Rate and Rhythm: Normal rate and regular rhythm.      Pulses: Normal pulses.      Heart sounds: Normal heart sounds. No murmur heard.  Pulmonary:      Effort: Pulmonary effort is normal. No respiratory distress.      Breath sounds: No wheezing.   Abdominal:      General: Abdomen is flat. Bowel sounds are normal. There is no distension.   Musculoskeletal:         General: Normal range of motion.      Cervical back: Normal range of motion. No rigidity.      Right lower leg: No edema.      Left lower leg: No edema.   Lymphadenopathy:      Cervical: No cervical adenopathy.   Neurological:      General: No focal deficit present.      Mental Status: He is alert.      Cranial Nerves: No cranial nerve deficit.      Sensory: No sensory deficit.      Motor: No weakness.      Gait: Gait normal.   Psychiatric:         Mood and Affect: Mood normal.         Thought Content: Thought content normal.       ASSESSMENT/PLAN:   Visit for annual health examination  Here for annual visit. Chronic conditions and concerns discussed below. No acute findings on exam today.   - REVIEW OF HEALTH MAINTENANCE PROTOCOL ORDERS  - CBC with Platelets  - Lipid Profile (Chol, Trig, HDL, LDL calc)    Gout, unspecified cause, unspecified chronicity, unspecified site  Controlled. Continue with uric acid-lowering agent. No changes in management. Uric acid check last 6/2022, was 5.7. Recommended weight loss. Avoid food with high purine content.  - allopurinol (ZYLOPRIM) 100 MG tablet; Take 1 tablet (100 mg) by mouth daily  Dispense: 90 tablet; Refill: 1    Benign essential hypertension  Chronic. BP elevated today. Will add amlodipine to current  medications. No chest pain, peripheral edema, no headaches, no vision changes. Discussed DASH diet and dietary sodium restrictions. Continue/Increase dietary efforts and physical activity.  - amLODIPine (NORVASC) 5 MG tablet; Take 1 tablet (5 mg) by mouth daily  Dispense: 30 tablet; Refill: 1  - Lipid Profile (Chol, Trig, HDL, LDL calc)    History of latent tuberculosis  History of. Needs an x-ray for work. X-ray ordered. Patient does report   - XR Chest 2 Views; Future  - Quantiferon TB Gold Plus    Screening for prostate cancer  Discussed risks and benefits of screening. Patient agrees. Ordered.   - PSA, screen    Type 2 diabetes mellitus with hyperglycemia, with long-term current use of insulin (H)  Controlled with last A1C check, but suspect higher A1C today. Last was 7.0 a few months ago. Continue current management. May increase lantus if needed. Patient is on aspirin, ACEI, statin. No episodes of hypoglycemia.   Lipid panel and Microalbumin checked today.   Continue/Increase dietary efforts and physical activity.  Routine diabetic retinopathy screening: Due. Patient declines referral.   - Continuous Blood Gluc Sensor ([employer] AICHA 14 DAY SENSOR) MISC; Change every 14 days.  Dispense: 2 each; Refill: 5  - Lipid Profile (Chol, Trig, HDL, LDL calc); Future  - BASIC METABOLIC PANEL  - HEMOGLOBIN A1C  - Albumin Random Urine Quantitative with Creat Ratio  - Lipid Profile (Chol, Trig, HDL, LDL calc)    Recent Labs   Lab Test 02/24/23  0753 06/29/22  1419   CHOL 150 149   HDL 32* 31*   LDL 65 47   TRIG 264* 357*     Encounter for immunization  Due for zoster and pneumococcal vaccine. Discussed and patient would like to get these today. Ordered.   - ZOSTER VACCINE RECOMBINANT ADJUVANTED (SHINGRIX)  - PNEUMOCOCCAL 20 VALENT CONJUGATE (PREVNAR 20)    Coronary artery disease involving native coronary artery of native heart without angina pectoris  S/P CABG (coronary artery bypass graft) x4  History of ST elevation  "myocardial infarction (STEMI)  Chronic HFrEF (heart failure with reduced ejection fraction) (H)  CABG in 2021. No use of nitroglycerin. No chest pain. Stable. No changes in medical management. Encouraged continued healthy lifestyle modifications. Euvolemic on exam today. Per cardiology, LVEF was at about 35% threshold for prophylactic AICD implantation. He was supposed to follow-up in 6-7 months. This was in 2022.     Patient has been advised of split billing requirements and indicates understanding: Yes    COUNSELING:   Reviewed preventive health counseling, as reflected in patient instructions       Regular exercise       Healthy diet/nutrition       Vision screening       Aspirin prophylaxis        Consider Hep C screening for all patients one time for ages 18-79 years       HIV screeninx in teen years, 1x in adult years, and at intervals if high risk       Colorectal cancer screening       Prostate cancer screening      BMI:   Estimated body mass index is 28.98 kg/m  as calculated from the following:    Height as of this encounter: 1.778 m (5' 10\").    Weight as of this encounter: 91.6 kg (202 lb).   Weight management plan: Discussed healthy diet and exercise guidelines      He reports that he quit smoking about 19 years ago. His smoking use included cigarettes. He has a 19.50 pack-year smoking history. He has never used smokeless tobacco.            Brissa Malcolm, SC[facility]  "

## 2023-02-24 NOTE — PATIENT INSTRUCTIONS
Make sure you get in to Cardiology, as they wanted to see you a few months ago.   Preventive Health Recommendations  Male Ages 50 - 64    Yearly exam:             See your health care provider every year in order to  o   Review health changes.   o   Discuss preventive care.    o   Review your medicines if your doctor has prescribed any.     Have a cholesterol test every 5 years, or more frequently if you are at risk for high cholesterol/heart disease.     Have a diabetes test (fasting glucose) every three years. If you are at risk for diabetes, you should have this test more often.     Have a colonoscopy at age 50, or have a yearly FIT test (stool test). These exams will check for colon cancer.      Talk with your health care provider about whether or not a prostate cancer screening test (PSA) is right for you.    You should be tested each year for STDs (sexually transmitted diseases), if you re at risk.     Shots: Get a flu shot each year. Get a tetanus shot every 10 years.     Nutrition:    Eat at least 5 servings of fruits and vegetables daily.     Eat whole-grain bread, whole-wheat pasta and brown rice instead of white grains and rice.     Get adequate Calcium and Vitamin D.     Lifestyle    Exercise for at least 150 minutes a week (30 minutes a day, 5 days a week). This will help you control your weight and prevent disease.     Limit alcohol to one drink per day.     No smoking.     Wear sunscreen to prevent skin cancer.     See your dentist every six months for an exam and cleaning.     See your eye doctor every 1 to 2 years.

## 2023-02-25 LAB
GAMMA INTERFERON BACKGROUND BLD IA-ACNC: 0.11 IU/ML
M TB IFN-G BLD-IMP: POSITIVE
M TB IFN-G CD4+ BCKGRND COR BLD-ACNC: 9.89 IU/ML
MITOGEN IGNF BCKGRD COR BLD-ACNC: 3.05 IU/ML
MITOGEN IGNF BCKGRD COR BLD-ACNC: 3.18 IU/ML
QUANTIFERON MITOGEN: 10 IU/ML
QUANTIFERON NIL TUBE: 0.11 IU/ML
QUANTIFERON TB1 TUBE: 3.16 IU/ML
QUANTIFERON TB2 TUBE: 3.29

## 2023-02-28 ENCOUNTER — NURSE TRIAGE (OUTPATIENT)
Dept: NURSING | Facility: CLINIC | Age: 53
End: 2023-02-28
Payer: COMMERCIAL

## 2023-02-28 NOTE — TELEPHONE ENCOUNTER
Pt calling because his blood sugars have been high despite taking his insulin    220 (lowest)-330 (midday) over the last month.    Has been increasing exercise to 1 hr in the morning and 1 hour in the evening to attempt to lower blood sugar, as well as cutting carbs, but nothing seems to work.    This morning BS was 249. Pt denies any s/sx of hyperglycemia at this time. Of note, he was seen in [facility] on 2/24 and his A1C was 10.4.    Protocol recommends see in office today. Pt's PCP doesn't have VV available until tomorrow morning, so will schedule then.     Smita Montano, RN, BSN  [facility]   Triage Nurse Advisor      Reason for Disposition    Patient wants to be seen    Additional Information    Negative: Unconscious or difficult to awaken    Negative: Acting confused (e.g., disoriented, slurred speech)    Negative: Very weak (can't stand)    Negative: Sounds like a life-threatening emergency to the triager    Negative: Vomiting and signs of dehydration (e.g., very dry mouth, lightheaded, dark urine)    Negative: Blood glucose > 240 mg/dL (13.3 mmol/L) and rapid breathing    Negative: Blood glucose > 500 mg/dL (27.8 mmol/L)    Negative: Blood glucose > 240 mg/dL (13.3 mmol/L) AND urine ketones moderate-large (or more than 1+)    Negative: Blood glucose > 240 mg/dL (13.3 mmol/L) and blood ketones > 1.4 mmol/L    Negative: Blood glucose > 240 mg/dL (13.3 mmol/L) AND vomiting AND unable to check for ketones (in blood or urine)    Negative: Vomiting lasting > 4 hours    Negative: Patient sounds very sick or weak to the triager    Negative: Fever > 100.4 F (38.0 C)    Negative: Caller has URGENT medication or insulin pump question and triager unable to answer question    Negative: Blood glucose > 400 mg/dL (22.2 mmol/L)    Negative: Blood glucose > 300 mg/dL (16.7 mmol/L) AND two or more times in a row    Negative: Urine ketones moderate - large (or blood ketones > 1.4 mmol/L)    Negative: New-onset diabetes  mellitus suspected (e.g., frequent urination, weak, weight loss)    Negative: Symptoms of high blood sugar (e.g., frequent urination, weak, weight loss) and not able to test blood glucose    Protocols used: DIABETES - HIGH BLOOD SUGAR-A-OH

## 2023-03-01 ENCOUNTER — VIRTUAL VISIT (OUTPATIENT)
Dept: FAMILY MEDICINE | Facility: CLINIC | Age: 53
End: 2023-03-01
Payer: COMMERCIAL

## 2023-03-01 DIAGNOSIS — E11.65 TYPE 2 DIABETES MELLITUS WITH HYPERGLYCEMIA, WITH LONG-TERM CURRENT USE OF INSULIN (H): Primary | ICD-10-CM

## 2023-03-01 DIAGNOSIS — R80.9 TYPE 2 DIABETES MELLITUS WITH MICROALBUMINURIA, WITH LONG-TERM CURRENT USE OF INSULIN (H): ICD-10-CM

## 2023-03-01 DIAGNOSIS — Z79.4 TYPE 2 DIABETES MELLITUS WITH HYPERGLYCEMIA, WITH LONG-TERM CURRENT USE OF INSULIN (H): Primary | ICD-10-CM

## 2023-03-01 DIAGNOSIS — E11.29 TYPE 2 DIABETES MELLITUS WITH MICROALBUMINURIA, WITH LONG-TERM CURRENT USE OF INSULIN (H): ICD-10-CM

## 2023-03-01 DIAGNOSIS — Z79.4 TYPE 2 DIABETES MELLITUS WITH MICROALBUMINURIA, WITH LONG-TERM CURRENT USE OF INSULIN (H): ICD-10-CM

## 2023-03-01 PROCEDURE — 99442 PR PHYSICIAN TELEPHONE EVALUATION 11-20 MIN: CPT | Mod: 93

## 2023-03-01 RX ORDER — INSULIN ASPART 100 [IU]/ML
10 INJECTION, SOLUTION INTRAVENOUS; SUBCUTANEOUS
Qty: 15 ML | Refills: 1
Start: 2023-03-01 | End: 2023-05-14

## 2023-03-01 ASSESSMENT — ENCOUNTER SYMPTOMS
PARESTHESIAS: 0
FATIGUE: 0
LIGHT-HEADEDNESS: 0
CHILLS: 0
NUMBNESS: 0
WEAKNESS: 0
TREMORS: 0
POLYDIPSIA: 0
POLYPHAGIA: 0
HEADACHES: 0
DIAPHORESIS: 0
DIZZINESS: 0

## 2023-03-01 NOTE — PROGRESS NOTES
Simone is a 53 year old who is being evaluated via a billable telephone visit.      What phone number would you like to be contacted at? [phone]  How would you like to obtain your AVS? Philip    Distant Location (provider location):  On-site    Assessment & Plan     1. Type 2 diabetes mellitus with hyperglycemia, with long-term current use of insulin (H)  I do think we need to increase both Lantus and NovoLog.  He is not having any symptoms of low blood sugars nor is he having any readings under 80.  It sounds like every time he checks his blood sugar they are at least 200.  We discussed checking fasting blood sugar in the morning and that goal that this should be at  as well as monitoring what his blood sugars after meals, but he should wait 90 to 120 minutes to check this.  Informed him that the goal would be less than 180.  I emphasized the importance of eating after taking fast acting insulin.  We will increase Lantus from 35 units to 37 units.  And we will increase NovoLog from 8 to 10 units.  We will follow-up in a week, and see how sugars have been doing, if appropriate response but not quite at goal we will discuss that the patient can increase every 3 to 4 days until at goal and follow-up as appropriate.  We will want to recheck the A1C 3 months after we get his blood sugars to goal. He is on statin therapy daily and daily aspirin.  Blood pressure per his report is well controlled today.  He is due for an eye exam.    Return in about 1 week (around 3/8/2023) for using a phone visit.    Subjective   Simone is a 53 year old, presenting for the following health issues:  Follow Up (Lab work)    HPI     Patient has telephone visit today for follow-up on lab work.  Specifically his A1c has gone up to 10.4 from 7 and he has noticed high blood sugars at home.  He does check his blood sugar at home.    He checks it in the morning when he wakes up and it is at a minimum of 200 and sometimes over this.  He takes  35 units of Lantus in the morning.  He also checks his blood sugars after meals, he reports about an hour, and they are always over 300.  He notes he has been eating a lot of vegetables and other things without carbs or sugars and his sugars are always above 300 after eating.  He has never had a reading less than 80.  He has had no symptoms of dizziness, shakiness, feeling like he is going to pass out, or confusion.  He always make sure if he gives himself the fast acting insulin before meals that he eats.  He is still taking his metformin twice a day.  He does confirm that there has not been a lapse in his medications and he has been taking them consistently.  He watches his diet and is exercising daily. He has not had any excessive thirst, urination, or hunger. He is not feeling more fatigued than his usual. No numbness or tingling in his feet, no vision changes.     We discussed his albumin urine test, and that this was quite elevated.  I mentioned to him that this is likely from the uncontrolled blood sugars and we will need to continue to monitor it to see how his kidneys are responding to appropriate treatment.    We also discussed that his TB test was still positive, he was treated for latent TB in 2017 for a few months.  He does not want to be treated for this again.  He reports he will just get a chest x-ray when needed.    Informed him that prostate screening was normal, blood counts were good. His kidney function looks good and electrolytes are normal ranges.       Review of Systems   Constitutional: Negative for chills, diaphoresis and fatigue.   Endocrine: Negative for polydipsia, polyphagia and polyuria.   Neurological: Negative for dizziness, tremors, syncope, weakness, light-headedness, numbness, headaches and paresthesias.          Objective    Vitals - Patient Reported  Systolic (Patient Reported): 130  Diastolic (Patient Reported): 85  Weight (Patient Reported): 88.9 kg (196 lb)  Height (Patient  "Reported): 177.8 cm (5' 10\")  BMI (Based on Pt Reported Ht/Wt): 28.12  Pain Score: No Pain (0)    Physical Exam   PSYCH: Alert and oriented times 3; coherent speech, normal   rate and volume, able to articulate logical thoughts, able   to abstract reason, no tangential thoughts, no hallucinations   or delusions  His affect is normal  RESP: No cough, no audible wheezing, able to talk in full sentences  Remainder of exam unable to be completed due to telephone visits    Phone call duration: 11 minutes    At the end of the visit, I confirmed understanding of what was discussed. Patient has not further questions or concerns that were brought up at this time.     Elier Malcolm, KAMARI, APRN, FNP-C    "

## 2023-03-08 ENCOUNTER — VIRTUAL VISIT (OUTPATIENT)
Dept: FAMILY MEDICINE | Facility: CLINIC | Age: 53
End: 2023-03-08
Payer: COMMERCIAL

## 2023-03-08 DIAGNOSIS — E11.65 TYPE 2 DIABETES MELLITUS WITH HYPERGLYCEMIA, WITH LONG-TERM CURRENT USE OF INSULIN (H): Primary | ICD-10-CM

## 2023-03-08 DIAGNOSIS — Z79.4 TYPE 2 DIABETES MELLITUS WITH HYPERGLYCEMIA, WITH LONG-TERM CURRENT USE OF INSULIN (H): Primary | ICD-10-CM

## 2023-03-08 PROCEDURE — 99441 PR PHYSICIAN TELEPHONE EVALUATION 5-10 MIN: CPT | Mod: 93

## 2023-03-08 ASSESSMENT — ENCOUNTER SYMPTOMS
POLYPHAGIA: 0
FATIGUE: 0
HEADACHES: 0
DIZZINESS: 0
POLYDIPSIA: 0
LIGHT-HEADEDNESS: 0

## 2023-03-08 NOTE — PROGRESS NOTES
Simone is a 53 year old who is being evaluated via a billable telephone visit.      What phone number would you like to be contacted at? [phone]  How would you like to obtain your AVS? Philip    Distant Location (provider location):  On-site    Assessment & Plan     Type 2 diabetes mellitus with hyperglycemia, with long-term current use of insulin (H)  Patient presents for phone call today following changes to insulin.  We did increase the Lantus as well as the NovoLog.  Patient was having symptoms of hypoglycemia with increase NovoLog though he was not having low blood sugars.  For this reason we will go back down to the 8 units with meals he was tolerating well previously and continue to go up on the Lantus. Recommend increasing Lantus to 40 units daily for a week. If sugars in the morning, fasting, remain above 130 would want him to go up to 43 units. We will visit in person in a month to see how things are going on this dose. I offered diabetes education referral again, patient still declines.     Return in about 4 weeks (around 4/5/2023) for with me.    Subjective   Simone is a 53 year old, presenting for the following health issues:  follow up  (Diabetes follow up- no new concerns. )    Patient presents for follow-up after increasing insulin.  He has been checking his blood sugars twice a day. His sugars have been above 150, but he does note they are lower than last week before we increased the insulin.  He reports that he did 1 dose of the 10 units of NovoLog and felt like he was hypoglycemic.  He checked his blood sugar and it was over 200, but he did not like the way he felt so he has been doing the 8 units since.  His fasting blood sugars have been 150-170.  He does feel better than he did last week.  He has not had any blood sugars below 70.  He would like to try and increase the Lantus rather than increase the NovoLog but would increase the NovoLog if I think would be beneficial.  He is not having any  "excessive thirst, hunger, or urination.    HPI     Diabetes Follow-up    How often are you checking your blood sugar? Two times daily  Blood sugar testing frequency justification:   What time of day are you checking your blood sugars (select all that apply)?  morning and evenings   Have you had any blood sugars above 200?  Yes   Have you had any blood sugars below 70?  No    What symptoms do you notice when your blood sugar is low?  Shaky     What concerns do you have today about your diabetes? None     Do you have any of these symptoms? (Select all that apply)  No numbness or tingling in feet.  No redness, sores or blisters on feet.  No complaints of excessive thirst.  No reports of blurry vision.  No significant changes to weight.    Have you had a diabetic eye exam in the last 12 months? No      BP Readings from Last 2 Encounters:   02/24/23 (!) 140/92   10/25/22 130/86     Hemoglobin A1C (%)   Date Value   02/24/2023 10.4 (H)   10/25/2022 7.0 (H)     LDL Cholesterol Calculated (mg/dL)   Date Value   02/24/2023 65   06/29/2022 47       How many servings of fruits and vegetables do you eat daily?  0-1    On average, how many sweetened beverages do you drink each day (Examples: soda, juice, sweet tea, etc.  Do NOT count diet or artificially sweetened beverages)?   0    How many days per week do you exercise enough to make your heart beat faster? 3 or less    How many minutes a day do you exercise enough to make your heart beat faster? 9 or less    How many days per week do you miss taking your medication? 0    Review of Systems   Constitutional: Negative for fatigue.   Endocrine: Negative for polydipsia, polyphagia and polyuria.   Neurological: Negative for dizziness, syncope, light-headedness and headaches.          Objective    Vitals - Patient Reported  Weight (Patient Reported): 195 lb (88.5 kg)  Height (Patient Reported): 5' 10\" (177.8 cm)  BMI (Based on Pt Reported Ht/Wt): 27.98    Vitals:  No vitals were " obtained today due to virtual visit.    Physical Exam   PSYCH: Alert and oriented times 3; coherent speech, normal   rate and volume, able to articulate logical thoughts, able   to abstract reason, no tangential thoughts, no hallucinations   or delusions  His affect is normal  RESP: No cough, no audible wheezing, able to talk in full sentences  Remainder of exam unable to be completed due to telephone visits    Phone call duration: 8 minutes    At the end of the visit, I confirmed understanding of what was discussed. Patient has no further questions or concerns that were brought up at this time.     Elier Malcolm, DNP, APRN, FNP-C

## 2023-04-05 ENCOUNTER — TELEPHONE (OUTPATIENT)
Dept: FAMILY MEDICINE | Facility: CLINIC | Age: 53
End: 2023-04-05
Payer: COMMERCIAL

## 2023-04-05 NOTE — TELEPHONE ENCOUNTER
Order/Referral Request    Who is requesting: Patient    Orders being requested: vaticella vaccine    Reason service is needed/diagnosis: Employer advised patient that he needs chicken pox varicella vaccine.     Could we send this information to you in [employer] or would you prefer to receive a phone call?:   Patient would prefer a phone call   Okay to leave a detailed message?: Yes at Cell number on file:    Telephone Information:   Mobile [phone]

## 2023-04-06 NOTE — TELEPHONE ENCOUNTER
Patient already received Zoster (Shingrix) vaccine in 2021 and 2023.    LM on identified voicemail relaying this.

## 2023-05-13 DIAGNOSIS — Z95.1 S/P CABG (CORONARY ARTERY BYPASS GRAFT): Primary | ICD-10-CM

## 2023-05-13 DIAGNOSIS — R80.9 TYPE 2 DIABETES MELLITUS WITH MICROALBUMINURIA, WITH LONG-TERM CURRENT USE OF INSULIN (H): ICD-10-CM

## 2023-05-13 DIAGNOSIS — Z79.4 TYPE 2 DIABETES MELLITUS WITH MICROALBUMINURIA, WITH LONG-TERM CURRENT USE OF INSULIN (H): ICD-10-CM

## 2023-05-13 DIAGNOSIS — E11.29 TYPE 2 DIABETES MELLITUS WITH MICROALBUMINURIA, WITH LONG-TERM CURRENT USE OF INSULIN (H): ICD-10-CM

## 2023-05-14 RX ORDER — INSULIN ASPART 100 [IU]/ML
INJECTION, SOLUTION INTRAVENOUS; SUBCUTANEOUS
Qty: 3 ML | Refills: 0 | Status: SHIPPED | OUTPATIENT
Start: 2023-05-14 | End: 2023-07-14

## 2023-05-14 RX ORDER — METOPROLOL TARTRATE 50 MG
50 TABLET ORAL 2 TIMES DAILY
Qty: 60 TABLET | Refills: 0 | Status: SHIPPED | OUTPATIENT
Start: 2023-05-14 | End: 2023-06-19

## 2023-05-14 NOTE — TELEPHONE ENCOUNTER
"Routing refill request to provider for review/approval because:  bp out of range      Last Written Prescription Date:  10/25/22  Last Fill Quantity: 180,  # refills: 1   Last office visit provider:  3/8/23     Requested Prescriptions   Pending Prescriptions Disp Refills     NOVOLOG FLEXPEN 100 UNIT/ML soln [Pharmacy Med Name: NovoLOG FlexPen Subcutaneous Solution Pen-injector 100 UNIT/ML] 15 mL 0     Sig: Inject 7 Units Subcutaneous 3 times daily (with meals)       Short Acting Insulin Protocol Failed - 5/13/2023  2:30 PM        Failed - Recent (6 mo) or future (30 days) visit within the authorizing provider's specialty     Patient had office visit in the last 6 months or has a visit in the next 30 days with authorizing provider or within the authorizing provider's specialty.  See \"Patient Info\" tab in inbasket, or \"Choose Columns\" in Meds & Orders section of the refill encounter.            Passed - Serum creatinine on file in past 12 months     Recent Labs   Lab Test 02/24/23  0753   CR 0.93       Ok to refill medication if creatinine is low          Passed - HgbA1C in past 3 or 6 months     If HgbA1C is 8 or greater, it needs to be on file within the past 3 months.  If less than 8, must be on file within the past 6 months.     Recent Labs   Lab Test 02/24/23 0753   A1C 10.4*             Passed - Medication is active on med list        Passed - Patient is age 18 or older           metoprolol tartrate (LOPRESSOR) 50 MG tablet [Pharmacy Med Name: Metoprolol Tartrate Oral Tablet 50 MG] 180 tablet 0     Sig: TAKE ONE TABLET BY MOUTH TWICE DAILY       Beta-Blockers Protocol Failed - 5/13/2023  2:30 PM        Failed - Blood pressure under 140/90 in past 12 months     BP Readings from Last 3 Encounters:   02/24/23 (!) 140/92   10/25/22 130/86   06/27/22 116/75                 Passed - Patient is age 6 or older        Passed - Recent (12 mo) or future (30 days) visit within the authorizing provider's specialty     " "Patient has had an office visit with the authorizing provider or a provider within the authorizing providers department within the previous 12 mos or has a future within next 30 days. See \"Patient Info\" tab in inbasket, or \"Choose Columns\" in Meds & Orders section of the refill encounter.              Passed - Medication is active on med list             Brooklynn Wells RN 05/14/23 12:03 PM  "

## 2023-05-19 DIAGNOSIS — E11.49 OTHER DIABETIC NEUROLOGICAL COMPLICATION ASSOCIATED WITH TYPE 2 DIABETES MELLITUS (H): ICD-10-CM

## 2023-05-20 NOTE — TELEPHONE ENCOUNTER
Requesting refill too soon    Last Written Prescription Date:  12/21/22  Last Fill Quantity: 360,  # refills: 1     Danielle Hackett RN 05/20/23 2:07 AM

## 2023-05-25 NOTE — TELEPHONE ENCOUNTER
2nd Request  Incoming FAX Prescription Refill  Request    Forwarding to Care Team - if patient is taking 6 tablets per day would be out of medication.    If taking 4 tablets per day refill would not be necessary.     METFORMIN  mg tablet

## 2023-06-04 ENCOUNTER — HEALTH MAINTENANCE LETTER (OUTPATIENT)
Age: 53
End: 2023-06-04

## 2023-06-17 DIAGNOSIS — Z95.1 S/P CABG (CORONARY ARTERY BYPASS GRAFT): ICD-10-CM

## 2023-06-17 RX ORDER — METOPROLOL TARTRATE 50 MG
50 TABLET ORAL 2 TIMES DAILY
Qty: 60 TABLET | Refills: 0 | OUTPATIENT
Start: 2023-06-17

## 2023-06-18 NOTE — TELEPHONE ENCOUNTER
Duplicate. Requested 2 days ago and sent to PCP for review.     Danielle Hackett RN on 6/17/2023 at 10:25 PM

## 2023-07-03 ENCOUNTER — MYC MEDICAL ADVICE (OUTPATIENT)
Dept: INTERNAL MEDICINE | Facility: CLINIC | Age: 53
End: 2023-07-03
Payer: COMMERCIAL

## 2023-07-03 DIAGNOSIS — E11.49 OTHER DIABETIC NEUROLOGICAL COMPLICATION ASSOCIATED WITH TYPE 2 DIABETES MELLITUS (H): ICD-10-CM

## 2023-07-03 RX ORDER — PEN NEEDLE, DIABETIC 32GX 5/32"
NEEDLE, DISPOSABLE MISCELLANEOUS
Qty: 400 EACH | Refills: 0 | Status: SHIPPED | OUTPATIENT
Start: 2023-07-03 | End: 2023-12-04

## 2023-07-03 ASSESSMENT — ENCOUNTER SYMPTOMS
DYSURIA: 0
DIZZINESS: 0
FEVER: 0
ARTHRALGIAS: 0
DIARRHEA: 0
HEADACHES: 0
WEAKNESS: 0
CONSTIPATION: 0
HEARTBURN: 0
MYALGIAS: 0
SHORTNESS OF BREATH: 0
NAUSEA: 0
EYE PAIN: 0
FREQUENCY: 0
COUGH: 0
HEMATURIA: 0
NERVOUS/ANXIOUS: 0
CHILLS: 0
JOINT SWELLING: 0
SORE THROAT: 0
PARESTHESIAS: 0
PALPITATIONS: 0
HEMATOCHEZIA: 0
ABDOMINAL PAIN: 0

## 2023-07-03 NOTE — TELEPHONE ENCOUNTER
"Last Written Prescription Date:  11/20/2022  Last Fill Quantity: 400,  # refills: 1   Last office visit provider:  3/8/2023     Requested Prescriptions   Pending Prescriptions Disp Refills     insulin pen needle (BD MICHELE U/F) 32G X 4 MM miscellaneous [Pharmacy Med Name: BD Pen Needle Michele U/F Miscellaneous 32G X 4 MM] 400 each 0     Sig: Use 4 pen needles daily or as directed.       Diabetic Supplies Protocol Failed - 7/3/2023  7:34 AM        Failed - Recent (6 mo) or future (30 days) visit within the authorizing provider's specialty     Patient had office visit in the last 6 months or has a visit in the next 30 days with authorizing provider.  See \"Patient Info\" tab in inbasket, or \"Choose Columns\" in Meds & Orders section of the refill encounter.            Passed - Medication is active on med list        Passed - Patient is 18 years of age or older             SELENE BENJAMIN RN 07/03/23 1:08 PM  "

## 2023-07-05 ENCOUNTER — OFFICE VISIT (OUTPATIENT)
Dept: FAMILY MEDICINE | Facility: CLINIC | Age: 53
End: 2023-07-05
Payer: COMMERCIAL

## 2023-07-05 VITALS
WEIGHT: 202.3 LBS | TEMPERATURE: 97.8 F | BODY MASS INDEX: 28.96 KG/M2 | HEART RATE: 60 BPM | SYSTOLIC BLOOD PRESSURE: 130 MMHG | OXYGEN SATURATION: 97 % | RESPIRATION RATE: 20 BRPM | HEIGHT: 70 IN | DIASTOLIC BLOOD PRESSURE: 82 MMHG

## 2023-07-05 DIAGNOSIS — I10 BENIGN ESSENTIAL HYPERTENSION: ICD-10-CM

## 2023-07-05 DIAGNOSIS — E11.29 TYPE 2 DIABETES MELLITUS WITH MICROALBUMINURIA, WITH LONG-TERM CURRENT USE OF INSULIN (H): ICD-10-CM

## 2023-07-05 DIAGNOSIS — E11.49 OTHER DIABETIC NEUROLOGICAL COMPLICATION ASSOCIATED WITH TYPE 2 DIABETES MELLITUS (H): ICD-10-CM

## 2023-07-05 DIAGNOSIS — Z95.1 S/P CABG (CORONARY ARTERY BYPASS GRAFT): ICD-10-CM

## 2023-07-05 DIAGNOSIS — R80.9 TYPE 2 DIABETES MELLITUS WITH MICROALBUMINURIA, WITH LONG-TERM CURRENT USE OF INSULIN (H): ICD-10-CM

## 2023-07-05 DIAGNOSIS — Z79.4 TYPE 2 DIABETES MELLITUS WITH MICROALBUMINURIA, WITH LONG-TERM CURRENT USE OF INSULIN (H): ICD-10-CM

## 2023-07-05 LAB
ALBUMIN SERPL BCG-MCNC: 4.6 G/DL (ref 3.5–5.2)
ALP SERPL-CCNC: 111 U/L (ref 40–129)
ALT SERPL W P-5'-P-CCNC: 23 U/L (ref 0–70)
ANION GAP SERPL CALCULATED.3IONS-SCNC: 13 MMOL/L (ref 7–15)
AST SERPL W P-5'-P-CCNC: 27 U/L (ref 0–45)
BILIRUB SERPL-MCNC: 0.2 MG/DL
BUN SERPL-MCNC: 15.1 MG/DL (ref 6–20)
CALCIUM SERPL-MCNC: 10.1 MG/DL (ref 8.6–10)
CHLORIDE SERPL-SCNC: 98 MMOL/L (ref 98–107)
CREAT SERPL-MCNC: 0.87 MG/DL (ref 0.67–1.17)
DEPRECATED HCO3 PLAS-SCNC: 27 MMOL/L (ref 22–29)
GFR SERPL CREATININE-BSD FRML MDRD: >90 ML/MIN/1.73M2
GLUCOSE SERPL-MCNC: 196 MG/DL (ref 70–99)
HBA1C MFR BLD: 8.1 % (ref 0–5.6)
POTASSIUM SERPL-SCNC: 4.3 MMOL/L (ref 3.4–5.3)
PROT SERPL-MCNC: 8.4 G/DL (ref 6.4–8.3)
SODIUM SERPL-SCNC: 138 MMOL/L (ref 136–145)

## 2023-07-05 PROCEDURE — 99214 OFFICE O/P EST MOD 30 MIN: CPT | Performed by: FAMILY MEDICINE

## 2023-07-05 PROCEDURE — 83036 HEMOGLOBIN GLYCOSYLATED A1C: CPT | Performed by: FAMILY MEDICINE

## 2023-07-05 PROCEDURE — 36415 COLL VENOUS BLD VENIPUNCTURE: CPT | Performed by: FAMILY MEDICINE

## 2023-07-05 PROCEDURE — 80053 COMPREHEN METABOLIC PANEL: CPT | Performed by: FAMILY MEDICINE

## 2023-07-05 RX ORDER — INSULIN ASPART 100 [IU]/ML
7 INJECTION, SOLUTION INTRAVENOUS; SUBCUTANEOUS
Qty: 3 ML | Refills: 0 | Status: CANCELLED | OUTPATIENT
Start: 2023-07-05

## 2023-07-05 RX ORDER — AMLODIPINE BESYLATE 5 MG/1
5 TABLET ORAL DAILY
Qty: 90 TABLET | Refills: 3 | Status: SHIPPED | OUTPATIENT
Start: 2023-07-05 | End: 2024-07-19

## 2023-07-05 RX ORDER — LOSARTAN POTASSIUM 50 MG/1
50 TABLET ORAL 2 TIMES DAILY
Qty: 180 TABLET | Refills: 3 | Status: SHIPPED | OUTPATIENT
Start: 2023-07-05 | End: 2024-07-23

## 2023-07-05 RX ORDER — LANCETS
EACH MISCELLANEOUS
Qty: 200 EACH | Refills: 3 | Status: SHIPPED | OUTPATIENT
Start: 2023-07-05 | End: 2024-06-03

## 2023-07-05 RX ORDER — LANCETS
EACH MISCELLANEOUS
Qty: 100 EACH | Refills: 4 | Status: SHIPPED | OUTPATIENT
Start: 2023-07-05

## 2023-07-05 RX ORDER — METOPROLOL TARTRATE 50 MG
50 TABLET ORAL 2 TIMES DAILY
Qty: 180 TABLET | Refills: 3 | Status: SHIPPED | OUTPATIENT
Start: 2023-07-05 | End: 2024-07-22

## 2023-07-05 RX ORDER — ROSUVASTATIN CALCIUM 10 MG/1
10 TABLET, COATED ORAL DAILY
Qty: 90 TABLET | Refills: 3 | Status: SHIPPED | OUTPATIENT
Start: 2023-07-05 | End: 2024-07-22

## 2023-07-05 RX ORDER — BLOOD SUGAR DIAGNOSTIC
STRIP MISCELLANEOUS
Qty: 200 STRIP | Refills: 3 | Status: SHIPPED | OUTPATIENT
Start: 2023-07-05 | End: 2024-08-20

## 2023-07-05 ASSESSMENT — ENCOUNTER SYMPTOMS
PARESTHESIAS: 0
DIARRHEA: 0
HEADACHES: 0
SHORTNESS OF BREATH: 0
COUGH: 0
HEMATURIA: 0
EYE PAIN: 0
FREQUENCY: 0
PALPITATIONS: 0
NERVOUS/ANXIOUS: 0
HEARTBURN: 0
DIZZINESS: 0
CONSTIPATION: 0
JOINT SWELLING: 0
ARTHRALGIAS: 0
DYSURIA: 0
SORE THROAT: 0
MYALGIAS: 0
CHILLS: 0
ABDOMINAL PAIN: 0
HEMATOCHEZIA: 0
NAUSEA: 0
FEVER: 0
WEAKNESS: 0

## 2023-07-05 ASSESSMENT — PAIN SCALES - GENERAL: PAINLEVEL: NO PAIN (0)

## 2023-07-05 NOTE — PROGRESS NOTES
Assessment & Plan   Problem List Items Addressed This Visit        Endocrine    Type 2 diabetes mellitus, with long-term current use of insulin (H)     He's been having readings in the upper 100's and into 200's range.     Plan:   Follow Diabetic diet.   Add Trulicity   Continue on insulins and metformin as is.   Blood glucose goal of <150  Follow up in 4-8 weeks to reassess.          Relevant Medications    ACCU-CHEK GUIDE test strip    blood glucose monitoring (SOFTCLIX) lancets    losartan (COZAAR) 50 MG tablet    metFORMIN (GLUCOPHAGE) 500 MG tablet    thin (NO BRAND SPECIFIED) lancets    dulaglutide (TRULICITY) 0.75 MG/0.5ML pen    insulin aspart (NOVOLOG PEN) 100 UNIT/ML pen    insulin glargine (LANTUS PEN) 100 UNIT/ML pen    Other Relevant Orders    Hemoglobin A1c    Comprehensive metabolic panel (BMP + Alb, Alk Phos, ALT, AST, Total. Bili, TP)       Other    S/P CABG (coronary artery bypass graft)    Relevant Medications    metoprolol tartrate (LOPRESSOR) 50 MG tablet    rosuvastatin (CRESTOR) 10 MG tablet   Other Visit Diagnoses     Benign essential hypertension        stable and normotensive   continue current plan     Relevant Medications    amLODIPine (NORVASC) 5 MG tablet    losartan (COZAAR) 50 MG tablet                        Dawood Oshea MD  [facility]    David Nichols is a 53 year old, presenting for the following health issues:  Diabetes (Would like to discuss weight increase. )        7/5/2023     8:38 AM   Additional Questions   Roomed by JEROME Kruger   Accompanied by n/a     Healthy Habits:     Getting at least 3 servings of Calcium per day:  Yes    Bi-annual eye exam:  NO    Dental care twice a year:  Yes    Sleep apnea or symptoms of sleep apnea:  None    Diet:  Low salt and Diabetic    Frequency of exercise:  2-3 days/week    Duration of exercise:  15-30 minutes    Taking medications regularly:  Yes    Medication side effects:  Not applicable    Additional  concerns today:  No       Diabetes Follow-up    How often are you checking your blood sugar? Two times daily  Blood sugar testing frequency justification:  Uncontrolled diabetes  What time of day are you checking your blood sugars (select all that apply)?  Before meals  Have you had any blood sugars above 200?  No  Have you had any blood sugars below 70?  No    What symptoms do you notice when your blood sugar is low?  None and Not applicable    What concerns do you have today about your diabetes? None and Blood sugar is often over 200 in the -20 before meals      Do you have any of these symptoms? (Select all that apply)  No numbness or tingling in feet.  No redness, sores or blisters on feet.  No complaints of excessive thirst.  No reports of blurry vision.  No significant changes to weight.    Have you had a diabetic eye exam in the last 12 months? No            Hyperlipidemia Follow-Up      Are you regularly taking any medication or supplement to lower your cholesterol?   Yes- Crestor     Are you having muscle aches or other side effects that you think could be caused by your cholesterol lowering medication?  No    Hypertension Follow-up      Do you check your blood pressure regularly outside of the clinic? Yes     Are you following a low salt diet? Yes    Are your blood pressures ever more than 140 on the top number (systolic) OR more   than 90 on the bottom number (diastolic), for example 140/90? Yes    BP Readings from Last 2 Encounters:   07/05/23 130/82   02/24/23 (!) 140/92     Hemoglobin A1C (%)   Date Value   02/24/2023 10.4 (H)   10/25/2022 7.0 (H)     LDL Cholesterol Calculated (mg/dL)   Date Value   02/24/2023 65   06/29/2022 47         How many servings of fruits and vegetables do you eat daily?  4 or more    On average, how many sweetened beverages do you drink each day (Examples: soda, juice, sweet tea, etc.  Do NOT count diet or artificially sweetened beverages)?   0    How many days per  "week do you exercise enough to make your heart beat faster? 4    How many minutes a day do you exercise enough to make your heart beat faster? 30 - 60    How many days per week do you miss taking your medication? 0          Review of Systems   Constitutional: Negative for chills and fever.   HENT: Negative for congestion, ear pain, hearing loss and sore throat.    Eyes: Negative for pain and visual disturbance.   Respiratory: Negative for cough and shortness of breath.    Cardiovascular: Negative for chest pain, palpitations and peripheral edema.   Gastrointestinal: Negative for abdominal pain, constipation, diarrhea, heartburn, hematochezia and nausea.   Genitourinary: Negative for dysuria, frequency, genital sores, hematuria, impotence, penile discharge and urgency.   Musculoskeletal: Negative for arthralgias, joint swelling and myalgias.   Skin: Negative for rash.   Neurological: Negative for dizziness, weakness, headaches and paresthesias.   Psychiatric/Behavioral: Negative for mood changes. The patient is not nervous/anxious.             Objective    /82 (BP Location: Right arm, Patient Position: Sitting, Cuff Size: Adult Regular)   Pulse 60   Temp 97.8  F (36.6  C) (Oral)   Resp 20   Ht 1.778 m (5' 10\")   Wt 91.8 kg (202 lb 4.8 oz)   SpO2 97%   BMI 29.03 kg/m    Body mass index is 29.03 kg/m .     Physical Exam                       "

## 2023-07-06 ENCOUNTER — TELEPHONE (OUTPATIENT)
Dept: FAMILY MEDICINE | Facility: CLINIC | Age: 53
End: 2023-07-06
Payer: COMMERCIAL

## 2023-07-06 DIAGNOSIS — R80.9 TYPE 2 DIABETES MELLITUS WITH MICROALBUMINURIA, WITH LONG-TERM CURRENT USE OF INSULIN (H): Primary | ICD-10-CM

## 2023-07-06 DIAGNOSIS — E11.29 TYPE 2 DIABETES MELLITUS WITH MICROALBUMINURIA, WITH LONG-TERM CURRENT USE OF INSULIN (H): Primary | ICD-10-CM

## 2023-07-06 DIAGNOSIS — Z79.4 TYPE 2 DIABETES MELLITUS WITH MICROALBUMINURIA, WITH LONG-TERM CURRENT USE OF INSULIN (H): Primary | ICD-10-CM

## 2023-07-06 RX ORDER — GLUCOSAMINE HCL/CHONDROITIN SU 500-400 MG
CAPSULE ORAL
Qty: 100 EACH | Refills: 11 | Status: SHIPPED | OUTPATIENT
Start: 2023-07-06 | End: 2024-07-22

## 2023-07-06 NOTE — TELEPHONE ENCOUNTER
FYI - Status Update    Who is Calling: patient    Update: Pt stated that his insulin he is prescribed is not is not covered by insurance. He would like to know what he should do.    Does caller want a call/response back: Yes     Could we send this information to you in Smart GPS Backpack or would you prefer to receive a phone call?:   Patient would prefer a phone call   Okay to leave a detailed message?: Yes at Home number on file [phone] (home)

## 2023-07-07 NOTE — TELEPHONE ENCOUNTER
Spoke with patient regarding insulin. He stated the trulicity it not covered. Start PA or send in alternative?

## 2023-07-07 NOTE — TELEPHONE ENCOUNTER
PA Initiation    Medication: TRULICITY 0.75 MG/0.5ML SC SOPN  Insurance Company: [employer] - Phone [phone] Fax [phone]  Pharmacy Filling the Rx: [id number] - [place], [place] - [address]  Filling Pharmacy Phone: [phone]  Filling Pharmacy Fax:    Start Date: 7/7/2023

## 2023-07-07 NOTE — TELEPHONE ENCOUNTER
Call placed to pharmacy regarding mediction. This is not covered by insurance. Insurance does not give an alternative. Would you like to start PA

## 2023-07-11 NOTE — TELEPHONE ENCOUNTER
PRIOR AUTHORIZATION DENIED    Medication: TRULICITY 0.75 MG/0.5ML SC SOPN  Insurance Company: [employer] - Phone [phone] Fax [phone]  Denial Date: 7/11/2023  Denial Rational: Need to try/fail all formulary alternatives: Victoza, Bydureon BCise, Anmol      Appeal Information:   Patient Notified: No

## 2023-07-14 ENCOUNTER — APPOINTMENT (OUTPATIENT)
Dept: RADIOLOGY | Facility: HOSPITAL | Age: 53
End: 2023-07-14
Attending: EMERGENCY MEDICINE
Payer: COMMERCIAL

## 2023-07-14 ENCOUNTER — HOSPITAL ENCOUNTER (OUTPATIENT)
Facility: HOSPITAL | Age: 53
Setting detail: OBSERVATION
Discharge: HOME | End: 2023-07-15
Attending: EMERGENCY MEDICINE | Admitting: INTERNAL MEDICINE
Payer: COMMERCIAL

## 2023-07-14 DIAGNOSIS — I50.23 ACUTE ON CHRONIC SYSTOLIC CONGESTIVE HEART FAILURE (H): Primary | ICD-10-CM

## 2023-07-14 DIAGNOSIS — R07.9 CHEST PAIN, UNSPECIFIED TYPE: ICD-10-CM

## 2023-07-14 DIAGNOSIS — I15.9 SECONDARY HYPERTENSION: ICD-10-CM

## 2023-07-14 LAB
ALBUMIN SERPL BCG-MCNC: 4.5 G/DL (ref 3.5–5.2)
ALP SERPL-CCNC: 104 U/L (ref 40–129)
ALT SERPL W P-5'-P-CCNC: 22 U/L (ref 0–70)
ANION GAP SERPL CALCULATED.3IONS-SCNC: 16 MMOL/L (ref 7–15)
APTT PPP: 35 SECONDS (ref 22–38)
AST SERPL W P-5'-P-CCNC: 24 U/L (ref 0–45)
BASOPHILS # BLD AUTO: 0 10E3/UL (ref 0–0.2)
BASOPHILS NFR BLD AUTO: 1 %
BILIRUB SERPL-MCNC: 0.3 MG/DL
BUN SERPL-MCNC: 12.7 MG/DL (ref 6–20)
CALCIUM SERPL-MCNC: 10.6 MG/DL (ref 8.6–10)
CHLORIDE SERPL-SCNC: 98 MMOL/L (ref 98–107)
CREAT SERPL-MCNC: 0.82 MG/DL (ref 0.67–1.17)
DEPRECATED HCO3 PLAS-SCNC: 25 MMOL/L (ref 22–29)
EOSINOPHIL # BLD AUTO: 0.5 10E3/UL (ref 0–0.7)
EOSINOPHIL NFR BLD AUTO: 6 %
ERYTHROCYTE [DISTWIDTH] IN BLOOD BY AUTOMATED COUNT: 13.7 % (ref 10–15)
GFR SERPL CREATININE-BSD FRML MDRD: >90 ML/MIN/1.73M2
GLUCOSE BLDC GLUCOMTR-MCNC: 268 MG/DL (ref 70–99)
GLUCOSE SERPL-MCNC: 174 MG/DL (ref 70–99)
HCT VFR BLD AUTO: 43.2 % (ref 40–53)
HGB BLD-MCNC: 14.9 G/DL (ref 13.3–17.7)
HOLD SPECIMEN: NORMAL
HOLD SPECIMEN: NORMAL
IMM GRANULOCYTES # BLD: 0 10E3/UL
IMM GRANULOCYTES NFR BLD: 0 %
INR PPP: 0.95 (ref 0.85–1.15)
LYMPHOCYTES # BLD AUTO: 2.9 10E3/UL (ref 0.8–5.3)
LYMPHOCYTES NFR BLD AUTO: 35 %
MAGNESIUM SERPL-MCNC: 1.6 MG/DL (ref 1.7–2.3)
MCH RBC QN AUTO: 27.1 PG (ref 26.5–33)
MCHC RBC AUTO-ENTMCNC: 34.5 G/DL (ref 31.5–36.5)
MCV RBC AUTO: 79 FL (ref 78–100)
MONOCYTES # BLD AUTO: 0.5 10E3/UL (ref 0–1.3)
MONOCYTES NFR BLD AUTO: 6 %
NEUTROPHILS # BLD AUTO: 4.4 10E3/UL (ref 1.6–8.3)
NEUTROPHILS NFR BLD AUTO: 52 %
NRBC # BLD AUTO: 0 10E3/UL
NRBC BLD AUTO-RTO: 0 /100
NT-PROBNP SERPL-MCNC: 100 PG/ML (ref 0–900)
PLATELET # BLD AUTO: 198 10E3/UL (ref 150–450)
POTASSIUM SERPL-SCNC: 3.9 MMOL/L (ref 3.4–5.3)
PROT SERPL-MCNC: 8.2 G/DL (ref 6.4–8.3)
RBC # BLD AUTO: 5.5 10E6/UL (ref 4.4–5.9)
SODIUM SERPL-SCNC: 139 MMOL/L (ref 136–145)
TROPONIN T SERPL HS-MCNC: 13 NG/L
TROPONIN T SERPL HS-MCNC: 14 NG/L
WBC # BLD AUTO: 8.3 10E3/UL (ref 4–11)

## 2023-07-14 PROCEDURE — 80053 COMPREHEN METABOLIC PANEL: CPT | Performed by: EMERGENCY MEDICINE

## 2023-07-14 PROCEDURE — 83735 ASSAY OF MAGNESIUM: CPT | Performed by: EMERGENCY MEDICINE

## 2023-07-14 PROCEDURE — 83880 ASSAY OF NATRIURETIC PEPTIDE: CPT | Performed by: EMERGENCY MEDICINE

## 2023-07-14 PROCEDURE — 250N000013 HC RX MED GY IP 250 OP 250 PS 637: Performed by: EMERGENCY MEDICINE

## 2023-07-14 PROCEDURE — 99223 1ST HOSP IP/OBS HIGH 75: CPT | Performed by: INTERNAL MEDICINE

## 2023-07-14 PROCEDURE — 85730 THROMBOPLASTIN TIME PARTIAL: CPT | Performed by: EMERGENCY MEDICINE

## 2023-07-14 PROCEDURE — 85610 PROTHROMBIN TIME: CPT | Performed by: EMERGENCY MEDICINE

## 2023-07-14 PROCEDURE — G0378 HOSPITAL OBSERVATION PER HR: HCPCS

## 2023-07-14 PROCEDURE — 36415 COLL VENOUS BLD VENIPUNCTURE: CPT | Performed by: INTERNAL MEDICINE

## 2023-07-14 PROCEDURE — 250N000013 HC RX MED GY IP 250 OP 250 PS 637: Performed by: INTERNAL MEDICINE

## 2023-07-14 PROCEDURE — 71046 X-RAY EXAM CHEST 2 VIEWS: CPT

## 2023-07-14 PROCEDURE — 93005 ELECTROCARDIOGRAM TRACING: CPT | Performed by: EMERGENCY MEDICINE

## 2023-07-14 PROCEDURE — 84484 ASSAY OF TROPONIN QUANT: CPT | Performed by: EMERGENCY MEDICINE

## 2023-07-14 PROCEDURE — 85025 COMPLETE CBC W/AUTO DIFF WBC: CPT | Performed by: EMERGENCY MEDICINE

## 2023-07-14 PROCEDURE — 36415 COLL VENOUS BLD VENIPUNCTURE: CPT | Performed by: EMERGENCY MEDICINE

## 2023-07-14 PROCEDURE — 250N000012 HC RX MED GY IP 250 OP 636 PS 637: Performed by: INTERNAL MEDICINE

## 2023-07-14 PROCEDURE — 99285 EMERGENCY DEPT VISIT HI MDM: CPT | Mod: 25

## 2023-07-14 PROCEDURE — 84484 ASSAY OF TROPONIN QUANT: CPT | Performed by: INTERNAL MEDICINE

## 2023-07-14 PROCEDURE — 82962 GLUCOSE BLOOD TEST: CPT

## 2023-07-14 RX ORDER — ONDANSETRON 2 MG/ML
4 INJECTION INTRAMUSCULAR; INTRAVENOUS EVERY 6 HOURS PRN
Status: DISCONTINUED | OUTPATIENT
Start: 2023-07-14 | End: 2023-07-15 | Stop reason: HOSPADM

## 2023-07-14 RX ORDER — LOSARTAN POTASSIUM 50 MG/1
50 TABLET ORAL 2 TIMES DAILY
Status: DISCONTINUED | OUTPATIENT
Start: 2023-07-14 | End: 2023-07-15 | Stop reason: HOSPADM

## 2023-07-14 RX ORDER — NALOXONE HYDROCHLORIDE 0.4 MG/ML
0.2 INJECTION, SOLUTION INTRAMUSCULAR; INTRAVENOUS; SUBCUTANEOUS
Status: DISCONTINUED | OUTPATIENT
Start: 2023-07-14 | End: 2023-07-15 | Stop reason: HOSPADM

## 2023-07-14 RX ORDER — AMLODIPINE BESYLATE 5 MG/1
5 TABLET ORAL DAILY
Status: DISCONTINUED | OUTPATIENT
Start: 2023-07-14 | End: 2023-07-15 | Stop reason: HOSPADM

## 2023-07-14 RX ORDER — FUROSEMIDE 20 MG
40 TABLET ORAL DAILY
Status: DISCONTINUED | OUTPATIENT
Start: 2023-07-15 | End: 2023-07-15 | Stop reason: HOSPADM

## 2023-07-14 RX ORDER — ROSUVASTATIN CALCIUM 10 MG/1
10 TABLET, COATED ORAL DAILY
Status: DISCONTINUED | OUTPATIENT
Start: 2023-07-14 | End: 2023-07-15 | Stop reason: HOSPADM

## 2023-07-14 RX ORDER — NITROGLYCERIN 0.4 MG/1
0.4 TABLET SUBLINGUAL EVERY 5 MIN PRN
Status: DISCONTINUED | OUTPATIENT
Start: 2023-07-14 | End: 2023-07-15 | Stop reason: HOSPADM

## 2023-07-14 RX ORDER — ASPIRIN 81 MG/1
81 TABLET, CHEWABLE ORAL DAILY
Status: DISCONTINUED | OUTPATIENT
Start: 2023-07-15 | End: 2023-07-15 | Stop reason: HOSPADM

## 2023-07-14 RX ORDER — METOPROLOL TARTRATE 25 MG/1
50 TABLET, FILM COATED ORAL 2 TIMES DAILY
Status: DISCONTINUED | OUTPATIENT
Start: 2023-07-14 | End: 2023-07-15 | Stop reason: HOSPADM

## 2023-07-14 RX ORDER — NICOTINE POLACRILEX 4 MG
15-30 LOZENGE BUCCAL
Status: DISCONTINUED | OUTPATIENT
Start: 2023-07-14 | End: 2023-07-15 | Stop reason: HOSPADM

## 2023-07-14 RX ORDER — ONDANSETRON 4 MG/1
4 TABLET, ORALLY DISINTEGRATING ORAL EVERY 6 HOURS PRN
Status: DISCONTINUED | OUTPATIENT
Start: 2023-07-14 | End: 2023-07-15 | Stop reason: HOSPADM

## 2023-07-14 RX ORDER — LIDOCAINE 40 MG/G
CREAM TOPICAL
Status: DISCONTINUED | OUTPATIENT
Start: 2023-07-14 | End: 2023-07-15 | Stop reason: HOSPADM

## 2023-07-14 RX ORDER — ALLOPURINOL 100 MG/1
100 TABLET ORAL DAILY
Status: DISCONTINUED | OUTPATIENT
Start: 2023-07-14 | End: 2023-07-15 | Stop reason: HOSPADM

## 2023-07-14 RX ORDER — NALOXONE HYDROCHLORIDE 0.4 MG/ML
0.4 INJECTION, SOLUTION INTRAMUSCULAR; INTRAVENOUS; SUBCUTANEOUS
Status: DISCONTINUED | OUTPATIENT
Start: 2023-07-14 | End: 2023-07-15 | Stop reason: HOSPADM

## 2023-07-14 RX ORDER — DEXTROSE MONOHYDRATE 25 G/50ML
25-50 INJECTION, SOLUTION INTRAVENOUS
Status: DISCONTINUED | OUTPATIENT
Start: 2023-07-14 | End: 2023-07-15 | Stop reason: HOSPADM

## 2023-07-14 RX ORDER — HYDROMORPHONE HCL IN WATER/PF 6 MG/30 ML
0.2 PATIENT CONTROLLED ANALGESIA SYRINGE INTRAVENOUS
Status: DISCONTINUED | OUTPATIENT
Start: 2023-07-14 | End: 2023-07-15 | Stop reason: HOSPADM

## 2023-07-14 RX ADMIN — METOPROLOL TARTRATE 50 MG: 25 TABLET, FILM COATED ORAL at 20:54

## 2023-07-14 RX ADMIN — LOSARTAN POTASSIUM 50 MG: 50 TABLET, FILM COATED ORAL at 20:54

## 2023-07-14 RX ADMIN — ROSUVASTATIN CALCIUM 10 MG: 10 TABLET, FILM COATED ORAL at 20:54

## 2023-07-14 RX ADMIN — NITROGLYCERIN 15 MG: 20 OINTMENT TOPICAL at 14:28

## 2023-07-14 RX ADMIN — AMLODIPINE BESYLATE 5 MG: 5 TABLET ORAL at 20:54

## 2023-07-14 RX ADMIN — ALLOPURINOL 100 MG: 100 TABLET ORAL at 20:54

## 2023-07-14 RX ADMIN — INSULIN ASPART 1 UNITS: 100 INJECTION, SOLUTION INTRAVENOUS; SUBCUTANEOUS at 20:56

## 2023-07-14 ASSESSMENT — COLUMBIA-SUICIDE SEVERITY RATING SCALE - C-SSRS
1. IN THE PAST MONTH, HAVE YOU WISHED YOU WERE DEAD OR WISHED YOU COULD GO TO SLEEP AND NOT WAKE UP?: NO
2. HAVE YOU ACTUALLY HAD ANY THOUGHTS OF KILLING YOURSELF IN THE PAST MONTH?: NO
3. HAVE YOU BEEN THINKING ABOUT HOW YOU MIGHT KILL YOURSELF?: NO
6. HAVE YOU EVER DONE ANYTHING, STARTED TO DO ANYTHING, OR PREPARED TO DO ANYTHING TO END YOUR LIFE?: NO
5. HAVE YOU STARTED TO WORK OUT OR WORKED OUT THE DETAILS OF HOW TO KILL YOURSELF? DO YOU INTEND TO CARRY OUT THIS PLAN?: NO
4. HAVE YOU HAD THESE THOUGHTS AND HAD SOME INTENTION OF ACTING ON THEM?: NO

## 2023-07-14 ASSESSMENT — ACTIVITIES OF DAILY LIVING (ADL)
ADLS_ACUITY_SCORE: 31
ADLS_ACUITY_SCORE: 35
DEPENDENT_IADLS:: INDEPENDENT
ADLS_ACUITY_SCORE: 35

## 2023-07-14 NOTE — MEDICATION SCRIBE - ADMISSION MEDICATION HISTORY
Medication Scribe Admission Medication History    Admission medication history is complete. The information provided in this note is only as accurate as the sources available at the time of the update.    Medication reconciliation/reorder completed by provider prior to medication history? No    Information Source(s): Patient via in-person    Pertinent Information:     Changes made to PTA medication list:    Added: None    Deleted: (Amlodipine Novolog) Duplicates, Colchicine, Trulicty (insureance issue),     Changed: None    Medication Affordability:  Not including over the counter (OTC) medications, was there a time in the past 3 months when you did not take your medications as prescribed because of cost?: No    Allergies reviewed with patient and updates made in EHR: yes    Medication History Completed By: Gerber Dillon 7/14/2023 4:35 PM    Prior to Admission medications    Medication Sig Last Dose Taking? Auth Provider Long Term End Date   allopurinol (ZYLOPRIM) 100 MG tablet Take 1 tablet (100 mg) by mouth daily 7/13/2023 at pm Yes Brissa Malcolm APRN CNP     amLODIPine (NORVASC) 5 MG tablet Take 1 tablet (5 mg) by mouth daily for 360 days 7/13/2023 at pm Yes Dawood Oshea MD Yes 6/29/24   aspirin (ASA) 81 MG chewable tablet 1 tablet (81 mg) by Oral or NG Tube route daily 7/14/2023 at am Yes Cristo Larson PA-C     exenatide ER (BYDUREON BCISE) 2 MG/0.85ML auto-injector Inject 2 mg Subcutaneous every 7 days 7/13/2023 Yes Dawood Oshea MD Yes    insulin aspart (NOVOLOG PEN) 100 UNIT/ML pen Inject 10 Units Subcutaneous 2 times daily (with meals) 7/14/2023 at am Yes Dawood Oshea MD Yes    insulin glargine (LANTUS PEN) 100 UNIT/ML pen Inject 37 Units Subcutaneous every morning 7/14/2023 Yes Dawood Oshea MD Yes    losartan (COZAAR) 50 MG tablet Take 1 tablet (50 mg) by mouth 2 times daily 7/14/2023 at am Yes Dawood Oshea MD Yes    metFORMIN (GLUCOPHAGE) 500 MG tablet Take 2 tablets (1,000  mg) by mouth 2 times daily (with meals) for 180 days 7/14/2023 at am Yes Dawood Oshea MD Yes 1/1/24   metoprolol tartrate (LOPRESSOR) 50 MG tablet Take 1 tablet (50 mg) by mouth 2 times daily for 360 days 7/14/2023 at am Yes Dawood Oshea MD Yes 6/29/24   nitroGLYcerin (NITROSTAT) 0.4 MG sublingual tablet For chest pain place 1 tablet under the tongue every 5 minutes for 3 doses. If symptoms persist 5 minutes after 1st dose call 911. More than a month Yes Charissa Perez, NP Yes    rosuvastatin (CRESTOR) 10 MG tablet Take 1 tablet (10 mg) by mouth daily 7/13/2023 at pm Yes Dawood Oshea MD Yes    ACCU-CHEK GUIDE test strip Use to test blood sugar 2 times daily or as directed.   Dawood Oshea MD     alcohol swab prep pads Use to swab area of injection/rupali as directed.   Dawood Oshea MD     blood glucose (NO BRAND SPECIFIED) test strip Use to test blood sugar 4 times daily or as directed.   Narciso Michelle MD     blood glucose calibration (NO BRAND SPECIFIED) solution Use to calibrate blood glucose monitor as needed as directed.   Jaquan Power, DO     blood glucose monitoring (NO BRAND SPECIFIED) meter device kit Use to test blood sugar 4 times daily or as directed.   Jaquan Power, DO     blood glucose monitoring (SOFTCLIX) lancets Use to test blood sugar 2 times daily.   Dawood Oshea MD     Blood Pressure KIT Check Blood pressure daily as needed   Dawood Oshea MD     Continuous Blood Gluc Sensor (FREESTYLE AICHA 14 DAY SENSOR) MISC Change every 14 days.   Brissa Malcolm APRN CNP     Continuous Blood Gluc Sensor (FREESTYLE AICHA 14 DAY SENSOR) MISC 1 each every 14 days Change every 14 days.   Sadaf Lopez MD     furosemide (LASIX) 20 MG tablet Take 1 tablet (20 mg) by mouth 2 times daily  Patient taking differently: Take 40 mg by mouth daily   Dawood Oshea MD Yes    insulin pen needle (BD MICHELE U/F) 32G X 4 MM miscellaneous Use 4 pen needles  daily or as directed.   Narciso Michelle MD     thin (NO BRAND SPECIFIED) lancets Use with lanceting device.   Dawood Oshea MD

## 2023-07-14 NOTE — ED TRIAGE NOTES
Pt to ED by private vehicle for chest pain    Pt reports chest pain that started yesterday. Pt states the pain went away with nitroglycerin SL at home. Today pt states at 1300 the pain came back. Again pt took nitroglycerin SL with improvement in pain. Pt states he is pain free while in triage  Reports pain is pinpoint on left side of sternum   Denies SOB, diff breathing, n/v    Hx quad heart bypass  Sept 2021     Triage Assessment     Row Name 07/14/23 1410       Triage Assessment (Adult)    Airway WDL WDL       Respiratory WDL    Respiratory WDL WDL       Skin Circulation/Temperature WDL    Skin Circulation/Temperature WDL WDL       Cardiac WDL    Cardiac WDL X;chest pain  Chest pain on and off since yesterday. Improved with SL nitro       Peripheral/Neurovascular WDL    Peripheral Neurovascular WDL WDL       Cognitive/Neuro/Behavioral WDL    Cognitive/Neuro/Behavioral WDL WDL

## 2023-07-14 NOTE — H&P
Admission History and Physical   Jerry Bustamante,    1970,   [id number]    [facility]   Secondary hypertension [I15.9]  Chest pain, unspecified type [R07.9]    PCP: Dawood Oshea,    Code status:  Prior       Extended Emergency Contact Information  Primary Emergency Contact: aHrry Landry  Mobile Phone: [phone]  Relation: Son  Secondary Emergency Contact: Arlen Seay   [place]  Mobile Phone: [phone]  Relation: Spouse       Principal Problem:    Secondary hypertension  Active Problems:    Chest pain, unspecified type       ASSESSMENT AND PLAN:  Chest pain, serial troponin unremarkable.  EKG with no acute ischemia.  ED discussed case with cardiology who recommended admission and evaluation  CAD post CABG in   History of non-STEMI in the past  Ischemic cardiomyopathy LVEF 35 to 40%  Diabetes on insulin  Hyperlipidemia statin check in a.m.    Plan  Admit to cardiac telemetry  Echo in a.m., cardiology consult  Continue Nitropatch given in the ED  Home medications resumed  Diabetic diet started, sliding scale insulin ordered  If pain recurs will transfer to ICU and begin IV nitro    Full code    DVT PPX:  Heparin subacute    Barriers to discharge    Anticipated Discharge date observation less than 2 midnight          Chief Complaint:  Chest pain few days intermittent    HPI:  Jerry Bustamante is a 53 year old old male presenting with intermittent chest pain lasting a few seconds.  Some episodes preceded by exertion, relieved by nitroglycerin.  Denies shortness of breath, no diaphoresis no dizziness or syncope.  Patient has had no further episodes since admission to the ED.  Denies any other systemic symptoms.  ED discussed case with cardiology who recommend admission due to his complex cardiac history.  Patient is currently chest pain-free      Medical History  Past Medical History:   Diagnosis Date     Coronary artery disease      Diabetes mellitus, type II (H)      Gout       High cholesterol      History of cardioversion 2021     HLD (hyperlipidemia)      Hypertension      S/P CABG (coronary artery bypass graft) x4 2021          Surgical History  He  has a past surgical history that includes Angioplasty; Heart Catheterization with Possible Intervention (N/A, 2021); Left Ventriculogram (N/A, 2021); and Bypass graft artery coronary (N/A, 2021).      SOCIAL HISTORY:  Social History     Socioeconomic History     Marital status:      Spouse name: Not on file     Number of children: Not on file     Years of education: Not on file     Highest education level: Not on file   Occupational History     Not on file   Tobacco Use     Smoking status: Former     Packs/day: 1.50     Years: 13.00     Pack years: 19.50     Types: Cigarettes     Quit date: 2003     Years since quittin.6     Smokeless tobacco: Never   Vaping Use     Vaping Use: Never used   Substance and Sexual Activity     Alcohol use: No     Drug use: No     Sexual activity: Yes     Partners: Female     Birth control/protection: None   Other Topics Concern     Not on file   Social History Narrative    He is from [facility], came here in . , has 10 children.Oldest is 27 years old and youngest is 7 years. Was a [place] .      Social Determinants of Health     Financial Resource Strain: Not on file   Food Insecurity: Not on file   Transportation Needs: Not on file   Physical Activity: Not on file   Stress: Not on file   Social Connections: Not on file   Intimate Partner Violence: Not on file   Housing Stability: Not on file       FAMILY HISTORY:  Family History   Problem Relation Age of Onset     Hypertension Mother      Diabetes Maternal Aunt      Diabetes Maternal Uncle      Cancer No family hx of      Cerebrovascular Disease No family hx of          ALLERGIES:  No Known Allergies    MEDICATIONS:  Medications Prior to Admission   Medication Sig Dispense Refill Last Dose      allopurinol (ZYLOPRIM) 100 MG tablet Take 1 tablet (100 mg) by mouth daily 90 tablet 1 7/13/2023 at pm     amLODIPine (NORVASC) 5 MG tablet Take 1 tablet (5 mg) by mouth daily for 360 days 90 tablet 3 7/13/2023 at pm     aspirin (ASA) 81 MG chewable tablet 1 tablet (81 mg) by Oral or NG Tube route daily 100 tablet 1 7/14/2023 at am     exenatide ER (BYDUREON BCISE) 2 MG/0.85ML auto-injector Inject 2 mg Subcutaneous every 7 days 4 mL 0 7/13/2023     insulin aspart (NOVOLOG PEN) 100 UNIT/ML pen Inject 10 Units Subcutaneous 2 times daily (with meals) 15 mL 3 7/14/2023 at am     insulin glargine (LANTUS PEN) 100 UNIT/ML pen Inject 37 Units Subcutaneous every morning 30 mL 1 7/14/2023     losartan (COZAAR) 50 MG tablet Take 1 tablet (50 mg) by mouth 2 times daily 180 tablet 3 7/14/2023 at am     metFORMIN (GLUCOPHAGE) 500 MG tablet Take 2 tablets (1,000 mg) by mouth 2 times daily (with meals) for 180 days 360 tablet 1 7/14/2023 at am     metoprolol tartrate (LOPRESSOR) 50 MG tablet Take 1 tablet (50 mg) by mouth 2 times daily for 360 days 180 tablet 3 7/14/2023 at am     nitroGLYcerin (NITROSTAT) 0.4 MG sublingual tablet For chest pain place 1 tablet under the tongue every 5 minutes for 3 doses. If symptoms persist 5 minutes after 1st dose call 911. 30 tablet 1 More than a month     rosuvastatin (CRESTOR) 10 MG tablet Take 1 tablet (10 mg) by mouth daily 90 tablet 3 7/13/2023 at pm     ACCU-CHEK GUIDE test strip Use to test blood sugar 2 times daily or as directed. 200 strip 3      alcohol swab prep pads Use to swab area of injection/rupali as directed. 100 each 11      blood glucose (NO BRAND SPECIFIED) test strip Use to test blood sugar 4 times daily or as directed. 100 strip 6      blood glucose calibration (NO BRAND SPECIFIED) solution Use to calibrate blood glucose monitor as needed as directed. 200 each 0      blood glucose monitoring (NO BRAND SPECIFIED) meter device kit Use to test blood sugar 4 times daily or as  directed. 1 kit 0      blood glucose monitoring (SOFTCLIX) lancets Use to test blood sugar 2 times daily. 100 each 4      Blood Pressure KIT Check Blood pressure daily as needed 1 kit 0      Continuous Blood Gluc Sensor (FREESTYLE AICHA 14 DAY SENSOR) MISC Change every 14 days. 2 each 5      Continuous Blood Gluc Sensor (FREESTYLE AICHA 14 DAY SENSOR) MISC 1 each every 14 days Change every 14 days. 2 each 5      furosemide (LASIX) 20 MG tablet Take 1 tablet (20 mg) by mouth 2 times daily (Patient taking differently: Take 40 mg by mouth daily) 180 tablet 3      insulin pen needle (BD MICHELE U/F) 32G X 4 MM miscellaneous Use 4 pen needles daily or as directed. 400 each 0      thin (NO BRAND SPECIFIED) lancets Use with lanceting device. 200 each 3          ROS:  12 point review of systems reviewed and is negative except as stated above      PHYSICAL EXAM:  /73 (BP Location: Right arm, Patient Position: Semi-Porras's, Cuff Size: Adult Regular)   Pulse 62   Temp 98.4  F (36.9  C) (Oral)   Resp (!) 34   Wt 90.9 kg (200 lb 4.8 oz)   SpO2 95%   BMI 28.74 kg/m      General: Alert and oriented x 3. Not in obvious distress.  HEENT: Pupils equal and reactive,ENT WNL   Neck- supple, No JVP elevation, lymphadenopathy or thyromegaly. Trachea-central.  Chest: Clear to auscultation bilaterally.  Heart: S1S2 regular. No M/R/G.  Abdomen: Soft. NT, ND. No organomegaly. Bowel sounds- active.  Back: No spine tenderness. No CVA tenderness.  Extremities: No leg swelling. Peripheral pulses 2+ bilaterally.  Neuro: No focal neurological deficit  Skin: no skin rashes     DIAGNOSTIC DATA:  Case discussed with ED physician extensively    EKG Results: Personally reviewed, nonspecific T wave changes        Advanced Care Planning       Zeus Johnson MD

## 2023-07-14 NOTE — ED NOTES
D: Pt had an onset of c/p rate 1-2/10.  A: went to get nitroglycerin and returned to pt room.   R: Pt states I have no chest pain now, holding nitroglycerin at this time he does not meet criteria.

## 2023-07-14 NOTE — ED NOTES
[facility] ED Handoff Report    ED Chief Complaint: The pt presents with chest pain that he noted on 7/13/2023; reported 7 episodes that did resolve after rest. Pt has a hx of NSTEMI with CABG 2021. The pt had one episode of c/p rated 2-10 in the ER that resolved on its own. Denies c/p since.    ED Diagnosis:  (R07.9) Chest pain, unspecified type  Comment: Notes worse with activity  Plan: Serial troponin    (I15.9) Secondary hypertension  Comment:   Plan: Pt repeat BP improved from the first couple, pt admitted to telemetry.       PMH:    Past Medical History:   Diagnosis Date    Coronary artery disease     Diabetes mellitus, type II (H)     Gout     High cholesterol     History of cardioversion 11/1/2021    HLD (hyperlipidemia)     Hypertension     S/P CABG (coronary artery bypass graft) x4 11/1/2021        Code Status:  Prior     Falls Risk: No Band: Not applicable    Current Living Situation/Residence: lives in a house     Elimination Status: Continent: Yes     Activity Level: Independent    Patients Preferred Language:  English     Needed: No    Vital Signs:  BP (!) 156/82   Pulse 64   Temp 98  F (36.7  C) (Oral)   Resp 23   Wt 91.7 kg (202 lb 1.6 oz)   SpO2 95%   BMI 29.00 kg/m       Cardiac Rhythm: NSR    Pain Score: 0/10    Is the Patient Confused:  No    Last Food or Drink: 7/7/23 at 1815, pt ate crackers, fruit and water    Focused Assessment:  Alert and oriented x 4, up Independently, pleasant and cooperative LS clear, no edema noted,voids Indpenedently.    Tests Performed: Done: Labs and Imaging    Treatments Provided:  Labs and monitoring    Family Dynamics/Concerns: No    Family Updated On Visitor Policy: Yes    Plan of Care Communicated to Family: Yes    Who Was Updated about Plan of Care: pt wife and son    Pt has a plastic bag with clothing, glasses, cell phone.    Medications sent with patient: none    Covid: asymptomatic , nA    Additional Information: none    RN:  Ella Molina RN 7/14/2023 6:11 PM

## 2023-07-14 NOTE — ED NOTES
PT ambulates to the room; states that he had chest pain that started at 1300 an lasted to 1500 on 7/13/2023. Pt notes pain is worse with activity. The pt declines chest pain at this time. Pt has hx CABG in 2021 and c/p feels similar to his previous chest pain that led to the CABG. Pt notes the c/p he had on 7/13 occurred 7 times throughout the day.

## 2023-07-14 NOTE — ED PROVIDER NOTES
EMERGENCY DEPARTMENT ENCOUNTER      NAME: Jerry Bustamante  AGE: 53 year old male  YOB: 1970  MRN: [id number]  EVALUATION DATE & TIME: No admission date for patient encounter.    PCP: Dawood Oshea    ED PROVIDER: Blue Hatfield M.D.      No chief complaint on file.        FINAL IMPRESSION:  Apical chest pain  Hypertension    ED COURSE & MEDICAL DECISION MAKING:    Pertinent Labs & Imaging studies reviewed. (See chart for details)  53 year old male presents to the Emergency Department for evaluation of chest pain.  Patient reports since yesterday 7 recurrent episodes of sharp discomfort.  Localized along the left pectoralis region.  Denies any shortness of breath or nausea.  Has been taking nitroglycerin for his first time since bypass surgery in 2021.  States nitroglycerin has been helping.  Reports has been taking all of his medications as directed other than recent interruption of his aspirin.  Stopped that for some dental work but restarted it a week ago.  Exam is unremarkable.  He is a moderately obese male in no distress.  Heart and lungs normal.  No chest wall tenderness.  Patient with history of coronary artery disease, dyslipidemia, hypertension.  We will proceed with baseline laboratory evaluation to assess for elevated troponin.  Chest x-ray being obtained to assess for infiltrative disease.  Blood pressure markedly elevated in triage.  Nitroglycerin sublingual and nitroglycerin paste ordered.  Patient may require hospitalization pending laboratory outcome.  Presently patient reports no symptoms.. Patient appears non toxic with stable vitals signs. Overall exam is benign.  Review of records indicate patient with coronary artery disease.  Cardiology note of 2/23/2022 indicates patient underwent four-vessel CABG in September 2021 after presenting with a non-STEMI.  Patient with prior history of stenting.  Recent echo of 12/14/2021 with reduced ejection fraction of 35 to 40%.      2:17 PM I  met with the patient for the initial interview and physical examination. Discussed plan for treatment and workup in the ED.    3:16 PM.  Troponin and BNP normal.  Magnesium slightly reduced at 1.6.  Electrolytes remarkable for slight elevation in calcium at 10.6 with glucose elevated at 174.  CBC unremarkable.  Chest x-ray pending.  3:50 PM.  Chest x-ray reviewed.  Some evidence of cardiomegaly.  Official allergy report similar.  No acute findings.  Patient with recurrent chest pain with seeming response to nitroglycerin.  Moderately hypertensive on arrival.  Given extensive coronary artery disease and history of bypass grafting we will consult cardiology for further input.   3:59 PM.  Patient discussed with Dr. Moffett cardiologist.  Given recurrent chest pain responsive to nitroglycerin and moderate blood pressure elevation she is agreeable plan for hospitalization for serial troponins.  Patient is agreeable with plans.  4:05 PM.  Patient discussed with Dr. Johnson.  He is agreeable with plan.       At the conclusion of the encounter I discussed the results of all of the tests and the disposition. The questions were answered and return precautions provided. The patient or family acknowledged understanding and was agreeable with the care plan.       Medical Decision Making    History:    Supplemental history from: Documented in chart, if applicable and N/A    External Record(s) reviewed: Documented in chart, if applicable.    Work Up:    Chart documentation includes differential considered and any EKGs or imaging independently interpreted by provider, where specified.    In additional to work up documented, I considered the following work up: Documented in chart, if applicable.    External consultation:    Discussion of management with another provider: Documented in chart, if applicable    Complicating factors:    Care impacted by chronic illness: Hypertension, dyslipidemia, coronary artery disease    Care affected  by social determinants of health: N/A    Disposition considerations: Admit.        MEDICATIONS GIVEN IN THE EMERGENCY:  Medications   nitroGLYcerin (NITROSTAT) sublingual tablet 0.4 mg (has no administration in time range)   nitroGLYcerin (NITRO-BID) 2 % ointment 15 mg (has no administration in time range)       NEW PRESCRIPTIONS STARTED AT TODAY'S ER VISIT  New Prescriptions    No medications on file          =================================================================    HPI          Jerry Bustamante is a 53 year old male with a pertient medical history of Santos disease, bypass grafting, diabetes, dyslipidemia who presents to the ED for evaluation of sharp left lateral chest pain.  Describes a significant achiness started yesterday and happening with increased frequency.  Did take nitroglycerin for the first time since his bypass with some relief.  Denies any symptoms presently.  Reports no shortness of breath.  No unusual exertions or potential injuries that may be contributory.      REVIEW OF SYSTEMS   Constitutional:  Denies fever, chills  Respiratory:  Denies productive cough or increased work of breathing  Cardiovascular:  Denies chest pain, palpitations  GI:  Denies abdominal pain, nausea, vomiting, or change in bowel or bladder habits   Musculoskeletal:  Denies any new muscle/joint swelling  Skin:  Denies rash   Neurologic:  Denies focal weakness  All systems negative except as marked.     PAST MEDICAL HISTORY:  Past Medical History:   Diagnosis Date     Coronary artery disease      Diabetes mellitus, type II (H)      Gout      High cholesterol      History of cardioversion 11/1/2021     HLD (hyperlipidemia)      Hypertension      S/P CABG (coronary artery bypass graft) x4 11/1/2021       PAST SURGICAL HISTORY:  Past Surgical History:   Procedure Laterality Date     ANGIOPLASTY       BYPASS GRAFT ARTERY CORONARY N/A 9/8/2021    Procedure: CORONARY ARTERY BYPASS GRAFTING X 4 WITH ENDOSCOPIC VEIN  HARVESTING [place]A-L[place] PREET-DISTAL RCA LEFT RADIAL-OM LEFT SV-ANOMOLOUS RV BRANCH ON PUMP/HILLARY;  Surgeon: Antelmo Mccullough MD;  Location:  OR     CV HEART CATHETERIZATION WITH POSSIBLE INTERVENTION N/A 9/2/2021    Procedure: Heart Catheterization with Possible Intervention;  Surgeon: Paul Warner MD;  Location:  HEART CARDIAC CATH LAB     CV LEFT VENTRICULOGRAM N/A 9/2/2021    Procedure: Left Ventriculogram;  Surgeon: Paul Warner MD;  Location:  HEART CARDIAC CATH LAB         CURRENT MEDICATIONS:      Current Facility-Administered Medications:      nitroGLYcerin (NITRO-BID) 2 % ointment 15 mg, 1 inch, Transdermal, Q24h, Blue Hatfield MD     nitroGLYcerin (NITROSTAT) sublingual tablet 0.4 mg, 0.4 mg, Sublingual, Q5 Min PRN, Blue Hatfield MD    Current Outpatient Medications:      ACCU-CHEK GUIDE test strip, Use to test blood sugar 2 times daily or as directed., Disp: 200 strip, Rfl: 3     alcohol swab prep pads, Use to swab area of injection/rupali as directed., Disp: 100 each, Rfl: 11     allopurinol (ZYLOPRIM) 100 MG tablet, Take 1 tablet (100 mg) by mouth daily, Disp: 90 tablet, Rfl: 1     amLODIPine (NORVASC) 5 MG tablet, Take 1 tablet (5 mg) by mouth daily for 360 days, Disp: 90 tablet, Rfl: 3     amLODIPine (NORVASC) 5 MG tablet, Take 1 tablet (5 mg) by mouth daily (Patient not taking: Reported on 7/5/2023), Disp: 30 tablet, Rfl: 0     aspirin (ASA) 81 MG chewable tablet, 1 tablet (81 mg) by Oral or NG Tube route daily (Patient not taking: Reported on 7/5/2023), Disp: 100 tablet, Rfl: 1     blood glucose (NO BRAND SPECIFIED) test strip, Use to test blood sugar 4 times daily or as directed., Disp: 100 strip, Rfl: 6     blood glucose calibration (NO BRAND SPECIFIED) solution, Use to calibrate blood glucose monitor as needed as directed., Disp: 200 each, Rfl: 0     blood glucose monitoring (NO BRAND SPECIFIED) meter device kit, Use to test blood sugar 4 times daily or as directed.,  Disp: 1 kit, Rfl: 0     blood glucose monitoring (SOFTCLIX) lancets, Use to test blood sugar 2 times daily., Disp: 100 each, Rfl: 4     Blood Pressure KIT, Check Blood pressure daily as needed, Disp: 1 kit, Rfl: 0     colchicine (COLCYRS) 0.6 MG tablet, Take 2 tablets (1.2 mg) at the first sign of flare, followed by 0.6 mg after 1 hour. Then take 0.6mg daily until flare resolves., Disp: 30 tablet, Rfl: 0     Continuous Blood Gluc Sensor (FREESTYLE AICHA 14 DAY SENSOR) MISC, Change every 14 days., Disp: 2 each, Rfl: 5     Continuous Blood Gluc Sensor (FREESTYLE AICHA 14 DAY SENSOR) MISC, 1 each every 14 days Change every 14 days., Disp: 2 each, Rfl: 5     dulaglutide (TRULICITY) 0.75 MG/0.5ML pen, Inject 0.75 mg Subcutaneous every 7 days, Disp: 4 mL, Rfl: 0     exenatide ER (BYDUREON BCISE) 2 MG/0.85ML auto-injector, Inject 2 mg Subcutaneous every 7 days, Disp: 4 mL, Rfl: 0     furosemide (LASIX) 20 MG tablet, Take 1 tablet (20 mg) by mouth 2 times daily, Disp: 180 tablet, Rfl: 3     insulin aspart (NOVOLOG PEN) 100 UNIT/ML pen, Inject 10 Units Subcutaneous 2 times daily (with meals), Disp: 15 mL, Rfl: 3     insulin glargine (LANTUS PEN) 100 UNIT/ML pen, Inject 37 Units Subcutaneous every morning, Disp: 30 mL, Rfl: 1     insulin pen needle (BD MICHELE U/F) 32G X 4 MM miscellaneous, Use 4 pen needles daily or as directed., Disp: 400 each, Rfl: 0     losartan (COZAAR) 50 MG tablet, Take 1 tablet (50 mg) by mouth 2 times daily, Disp: 180 tablet, Rfl: 3     metFORMIN (GLUCOPHAGE) 500 MG tablet, Take 2 tablets (1,000 mg) by mouth 2 times daily (with meals) for 180 days, Disp: 360 tablet, Rfl: 1     metoprolol tartrate (LOPRESSOR) 50 MG tablet, Take 1 tablet (50 mg) by mouth 2 times daily for 360 days, Disp: 180 tablet, Rfl: 3     nitroGLYcerin (NITROSTAT) 0.4 MG sublingual tablet, For chest pain place 1 tablet under the tongue every 5 minutes for 3 doses. If symptoms persist 5 minutes after 1st dose call 911., Disp: 30  tablet, Rfl: 1     NOVOLOG FLEXPEN 100 UNIT/ML soln, Inject 7 Units Subcutaneous 3 times daily (with meals), Disp: 3 mL, Rfl: 0     rosuvastatin (CRESTOR) 10 MG tablet, Take 1 tablet (10 mg) by mouth daily, Disp: 90 tablet, Rfl: 3     thin (NO BRAND SPECIFIED) lancets, Use with lanceting device., Disp: 200 each, Rfl: 3    ALLERGIES:  No Known Allergies    FAMILY HISTORY:  Family History   Problem Relation Age of Onset     Hypertension Mother      Diabetes Maternal Aunt      Diabetes Maternal Uncle      Cancer No family hx of      Cerebrovascular Disease No family hx of        SOCIAL HISTORY:   Social History     Socioeconomic History     Marital status:      Spouse name: None     Number of children: None     Years of education: None     Highest education level: None   Tobacco Use     Smoking status: Former     Packs/day: 1.50     Years: 13.00     Pack years: 19.50     Types: Cigarettes     Quit date: 2003     Years since quittin.6     Smokeless tobacco: Never   Vaping Use     Vaping Use: Never used   Substance and Sexual Activity     Alcohol use: No     Drug use: No     Sexual activity: Yes     Partners: Female     Birth control/protection: None   Social History Narrative    He is from [facility], came here in . , has 10 children.Oldest is 27 years old and youngest is 7 years. Was a [place] .        VITALS:  Patient Vitals for the past 24 hrs:   BP Temp Temp src Pulse Resp SpO2 Weight   23 1409 (!) 194/102 98  F (36.7  C) Oral 73 16 97 % 91.7 kg (202 lb 1.6 oz)        PHYSICAL EXAM    Constitutional:  Awake, alert, in no apparent distress  HENT:  Normocephalic, Atraumatic. Bilateral external ears normal. Oropharynx moist. Nose normal. Neck- Normal range of motion with no guarding, Supple, No stridor.   Eyes:  PERRL, EOMI with no signs of entrapment, Conjunctiva normal, No discharge.   Respiratory:  Normal breath sounds, No respiratory distress, No wheezing.    Cardiovascular:   Normal heart rate, Normal rhythm, No appreciable rubs or gallops.   GI:  Soft, No tenderness, No distension, No palpable masses  Musculoskeletal:  No edema. Good range of motion in all major joints. No tenderness to palpation or major deformities noted.  Integument:  Warm, Dry, No erythema, No rash.   Neurologic:  Alert & oriented, Normal motor function, Normal sensory function, No focal deficits noted.   Psychiatric:  Affect normal, Judgment normal, Mood normal.     LAB:  All pertinent labs reviewed and interpreted.     Results for orders placed or performed during the hospital encounter of 07/14/23   Chest XR,  PA & LAT     Status: None    Narrative    EXAM: XR CHEST 2 VIEWS  LOCATION: [facility] [place]  DATE: 7/14/2023    INDICATION: chest pain  COMPARISON: 02/24/2023      Impression    IMPRESSION: Negative chest. Stable borderline cardiomegaly with sternotomy wires and mediastinal surgical clips.   Troponin T, High Sensitivity (now)     Status: Normal   Result Value Ref Range    Troponin T, High Sensitivity 13 <=22 ng/L   PTT     Status: Normal   Result Value Ref Range    aPTT 35 22 - 38 Seconds   INR     Status: Normal   Result Value Ref Range    INR 0.95 0.85 - 1.15   Comprehensive metabolic panel     Status: Abnormal   Result Value Ref Range    Sodium 139 136 - 145 mmol/L    Potassium 3.9 3.4 - 5.3 mmol/L    Chloride 98 98 - 107 mmol/L    Carbon Dioxide (CO2) 25 22 - 29 mmol/L    Anion Gap 16 (H) 7 - 15 mmol/L    Urea Nitrogen 12.7 6.0 - 20.0 mg/dL    Creatinine 0.82 0.67 - 1.17 mg/dL    Calcium 10.6 (H) 8.6 - 10.0 mg/dL    Glucose 174 (H) 70 - 99 mg/dL    Alkaline Phosphatase 104 40 - 129 U/L    AST 24 0 - 45 U/L    ALT 22 0 - 70 U/L    Protein Total 8.2 6.4 - 8.3 g/dL    Albumin 4.5 3.5 - 5.2 g/dL    Bilirubin Total 0.3 <=1.2 mg/dL    GFR Estimate >90 >60 mL/min/1.73m2   Nt probnp inpatient (BNP)     Status: Normal   Result Value Ref Range    N terminal Pro BNP Inpatient 100 0 - 900 pg/mL    Magnesium     Status: Abnormal   Result Value Ref Range    Magnesium 1.6 (L) 1.7 - 2.3 mg/dL   CBC with platelets and differential     Status: None   Result Value Ref Range    WBC Count 8.3 4.0 - 11.0 10e3/uL    RBC Count 5.50 4.40 - 5.90 10e6/uL    Hemoglobin 14.9 13.3 - 17.7 g/dL    Hematocrit 43.2 40.0 - 53.0 %    MCV 79 78 - 100 fL    MCH 27.1 26.5 - 33.0 pg    MCHC 34.5 31.5 - 36.5 g/dL    RDW 13.7 10.0 - 15.0 %    Platelet Count 198 150 - 450 10e3/uL    % Neutrophils 52 %    % Lymphocytes 35 %    % Monocytes 6 %    % Eosinophils 6 %    % Basophils 1 %    % Immature Granulocytes 0 %    NRBCs per 100 WBC 0 <1 /100    Absolute Neutrophils 4.4 1.6 - 8.3 10e3/uL    Absolute Lymphocytes 2.9 0.8 - 5.3 10e3/uL    Absolute Monocytes 0.5 0.0 - 1.3 10e3/uL    Absolute Eosinophils 0.5 0.0 - 0.7 10e3/uL    Absolute Basophils 0.0 0.0 - 0.2 10e3/uL    Absolute Immature Granulocytes 0.0 <=0.4 10e3/uL    Absolute NRBCs 0.0 10e3/uL   [place] Draw     Status: None    Narrative    The following orders were created for panel order [place] Draw.  Procedure                               Abnormality         Status                     ---------                               -----------         ------                     Extra Red Top Tube[[id number]]                               Final result               Extra Green Top (Lithium...[[id number]]                      Final result                 Please view results for these tests on the individual orders.   Extra Red Top Tube     Status: None   Result Value Ref Range    Hold Specimen JIC    Extra Green Top (Lithium Heparin) Tube     Status: None   Result Value Ref Range    Hold Specimen JIC    CBC with platelets + differential     Status: None    Narrative    The following orders were created for panel order CBC with platelets + differential.  Procedure                               Abnormality         Status                     ---------                               -----------          ------                     CBC with platelets and d...[[id number]]                      Final result                 Please view results for these tests on the individual orders.     RADIOLOGY:  Reviewed all pertinent imaging. Please see official radiology report.  Chest XR,  PA & LAT    (Results Pending)   XR Chest Port 1 View    (Results Pending)       EKG:    Normal sinus rhythm.  Rate of 75.  Q waves inferiorly.  Inverted T waves in lateral leads.  Unchanged compared to July 14, 2023  I have independently reviewed and interpreted the EKG(s) documented above.      I, CHANDRIKA HERNANDEZ, am serving as a scribe to document services personally performed by Blue Hatfield MD, based on my observation and the provider's statements to me. I, Blue Hatfield MD attest that CHANDRIKA HERNANDEZ is acting in a scribe capacity, has observed my performance of the services and has documented them in accordance with my direction.    Blue Hatfield M.D.  Emergency Medicine  [facility] EMERGENCY DEPARTMENT     Blue Hatfield MD  07/14/23 [id number]

## 2023-07-14 NOTE — CONSULTS
Care Management Initial Consult    General Information  Assessment completed with: Patient,    Type of CM/SW Visit: Initial Assessment    Primary Care Provider verified and updated as needed: Yes   Readmission within the last 30 days: no previous admission in last 30 days      Reason for Consult: discharge planning  Advance Care Planning: Advance Care Planning Reviewed: no concerns identified          Communication Assessment  Patient's communication style: spoken language (English or Bilingual)             Cognitive  Cognitive/Neuro/Behavioral: WDL                      Living Environment:   People in home: spouse, child(pankaj), dependent     Current living Arrangements: apartment      Able to return to prior arrangements: yes       Family/Social Support:  Care provided by: self  Provides care for: child(pankaj)  Marital Status:   Wife, Children  Arlen       Description of Support System: Supportive, Involved    Support Assessment: Patient communicates needs well met    Current Resources:   Patient receiving home care services: No     Community Resources: None  Equipment currently used at home:    Supplies currently used at home: Diabetic Supplies    Employment/Financial:  Employment Status: employed full-time, self-employed        Financial Concerns:             Does the patient's insurance plan have a 3 day qualifying hospital stay waiver?  No    Lifestyle & Psychosocial Needs:  Social Determinants of Health     Tobacco Use: Medium Risk (7/14/2023)    Patient History      Smoking Tobacco Use: Former      Smokeless Tobacco Use: Never      Passive Exposure: Not on file   Alcohol Use: Not on file   Financial Resource Strain: Not on file   Food Insecurity: Not on file   Transportation Needs: Not on file   Physical Activity: Not on file   Stress: Not on file   Social Connections: Not on file   Intimate Partner Violence: Not on file   Depression: Not at risk (7/5/2023)    PHQ-2      PHQ-2 Score: 0   Housing  Stability: Not on file       Functional Status:  Prior to admission patient needed assistance:   Dependent ADLs:: Independent  Dependent IADLs:: Independent  Assesssment of Functional Status: At functional baseline    Mental Health Status:          Chemical Dependency Status:                Values/Beliefs:  Spiritual, Cultural Beliefs, Shinto Practices, Values that affect care:                 Additional Information:  Lives in an apartment with spouse and 6 dependent children. He is independent, self employed. Anticipate no discharge needs. Drove himself here, will drive himself home.     Heather Mabry RN

## 2023-07-15 ENCOUNTER — APPOINTMENT (OUTPATIENT)
Dept: CARDIOLOGY | Facility: HOSPITAL | Age: 53
End: 2023-07-15
Attending: INTERNAL MEDICINE
Payer: COMMERCIAL

## 2023-07-15 VITALS
DIASTOLIC BLOOD PRESSURE: 99 MMHG | WEIGHT: 199 LBS | RESPIRATION RATE: 18 BRPM | OXYGEN SATURATION: 95 % | HEART RATE: 66 BPM | SYSTOLIC BLOOD PRESSURE: 153 MMHG | BODY MASS INDEX: 28.55 KG/M2 | TEMPERATURE: 97.8 F

## 2023-07-15 PROBLEM — R07.9 CHEST PAIN, UNSPECIFIED TYPE: Status: RESOLVED | Noted: 2023-07-14 | Resolved: 2023-07-15

## 2023-07-15 LAB
ANION GAP SERPL CALCULATED.3IONS-SCNC: 13 MMOL/L (ref 7–15)
BI-PLANE LVEF ECHO: NORMAL
BUN SERPL-MCNC: 13 MG/DL (ref 6–20)
CALCIUM SERPL-MCNC: 9.6 MG/DL (ref 8.6–10)
CHLORIDE SERPL-SCNC: 101 MMOL/L (ref 98–107)
CREAT SERPL-MCNC: 0.82 MG/DL (ref 0.67–1.17)
DEPRECATED HCO3 PLAS-SCNC: 23 MMOL/L (ref 22–29)
ERYTHROCYTE [DISTWIDTH] IN BLOOD BY AUTOMATED COUNT: 13.8 % (ref 10–15)
GFR SERPL CREATININE-BSD FRML MDRD: >90 ML/MIN/1.73M2
GLUCOSE BLDC GLUCOMTR-MCNC: 129 MG/DL (ref 70–99)
GLUCOSE BLDC GLUCOMTR-MCNC: 259 MG/DL (ref 70–99)
GLUCOSE SERPL-MCNC: 177 MG/DL (ref 70–99)
HCT VFR BLD AUTO: 43.1 % (ref 40–53)
HGB BLD-MCNC: 14.5 G/DL (ref 13.3–17.7)
MAGNESIUM SERPL-MCNC: 1.7 MG/DL (ref 1.7–2.3)
MCH RBC QN AUTO: 26.7 PG (ref 26.5–33)
MCHC RBC AUTO-ENTMCNC: 33.6 G/DL (ref 31.5–36.5)
MCV RBC AUTO: 79 FL (ref 78–100)
PLATELET # BLD AUTO: 181 10E3/UL (ref 150–450)
POTASSIUM SERPL-SCNC: 3.8 MMOL/L (ref 3.4–5.3)
RBC # BLD AUTO: 5.43 10E6/UL (ref 4.4–5.9)
SODIUM SERPL-SCNC: 137 MMOL/L (ref 136–145)
WBC # BLD AUTO: 8 10E3/UL (ref 4–11)

## 2023-07-15 PROCEDURE — 250N000012 HC RX MED GY IP 250 OP 636 PS 637: Performed by: INTERNAL MEDICINE

## 2023-07-15 PROCEDURE — G0378 HOSPITAL OBSERVATION PER HR: HCPCS

## 2023-07-15 PROCEDURE — 85014 HEMATOCRIT: CPT | Performed by: INTERNAL MEDICINE

## 2023-07-15 PROCEDURE — 250N000013 HC RX MED GY IP 250 OP 250 PS 637: Performed by: INTERNAL MEDICINE

## 2023-07-15 PROCEDURE — 82962 GLUCOSE BLOOD TEST: CPT

## 2023-07-15 PROCEDURE — 80048 BASIC METABOLIC PNL TOTAL CA: CPT | Performed by: INTERNAL MEDICINE

## 2023-07-15 PROCEDURE — 93306 TTE W/DOPPLER COMPLETE: CPT

## 2023-07-15 PROCEDURE — 99222 1ST HOSP IP/OBS MODERATE 55: CPT | Performed by: INTERNAL MEDICINE

## 2023-07-15 PROCEDURE — 36415 COLL VENOUS BLD VENIPUNCTURE: CPT | Performed by: INTERNAL MEDICINE

## 2023-07-15 PROCEDURE — 83735 ASSAY OF MAGNESIUM: CPT | Performed by: INTERNAL MEDICINE

## 2023-07-15 PROCEDURE — 99238 HOSP IP/OBS DSCHRG MGMT 30/<: CPT | Performed by: INTERNAL MEDICINE

## 2023-07-15 PROCEDURE — 93306 TTE W/DOPPLER COMPLETE: CPT | Mod: 26 | Performed by: GENERAL ACUTE CARE HOSPITAL

## 2023-07-15 RX ORDER — FUROSEMIDE 40 MG
40 TABLET ORAL DAILY
Qty: 30 TABLET | Refills: 0 | Status: SHIPPED | OUTPATIENT
Start: 2023-07-15 | End: 2023-08-14

## 2023-07-15 RX ADMIN — METOPROLOL TARTRATE 50 MG: 25 TABLET, FILM COATED ORAL at 07:45

## 2023-07-15 RX ADMIN — LOSARTAN POTASSIUM 50 MG: 50 TABLET, FILM COATED ORAL at 07:45

## 2023-07-15 RX ADMIN — INSULIN ASPART 10 UNITS: 100 INJECTION, SOLUTION INTRAVENOUS; SUBCUTANEOUS at 07:42

## 2023-07-15 RX ADMIN — ASPIRIN 81 MG 81 MG: 81 TABLET ORAL at 07:46

## 2023-07-15 RX ADMIN — INSULIN GLARGINE 37 UNITS: 100 INJECTION, SOLUTION SUBCUTANEOUS at 07:43

## 2023-07-15 RX ADMIN — FUROSEMIDE 40 MG: 20 TABLET ORAL at 07:45

## 2023-07-15 ASSESSMENT — ACTIVITIES OF DAILY LIVING (ADL)
ADLS_ACUITY_SCORE: 31

## 2023-07-15 NOTE — PLAN OF CARE
Problem: Hypertension Acute  Goal: Blood Pressure Within Desired Range  Outcome: Progressing     Problem: Pain Acute  Goal: Optimal Pain Control and Function  Outcome: Progressing   Goal Outcome Evaluation:    Denies any chest pain or discomfort.  SBP- 120 to 140's  Cards to see patient this am.

## 2023-07-15 NOTE — CONSULTS
[facility]  Cardiac Electrophysiology  [address]  [place] [address]   Office: [phone]  Fax: [phone]     Cardiology Consultation    Patient: Jerry Bustamante   : 1970     Referring Provider: No ref. provider found    CHIEF COMPLAINT/REASON FOR CONSULTATION  Chest pain    Assessment/Recommendations   Jerry Bustamante is a 53 year old male with coronary artery disease with prior PCIs and CABG 2021 (L-Lad, R-dRCA, LRA-OM, V-RV branch), chronic systolic heart failure related to ischemic cardiomyopathy (LVEF 39%% by 2022 cardiac MRI), post-operative atrial fibrillation and atrial flutter post CABG, HTN, T2DM, reactive airway diseas admitted 2023 with chest pain.    Chest pain - focal left sided, sharp, lasting a few seconds, noted after lifting heavy items - likely musculoskeletal. No ischemic ECG changes, serial hs-Shanta normal.  - if TTE normal, okay to discharge from cardiology perspective on prior medical therapy, ongoing follow-up with Dr. Roberto     Coronary artery disease - prior PCIs and CABG 2021 (L-Lad, R-dRCA, LRA-OM, V-RV branch)  - continue aspirin 81mg daily  - continue metoprolol 50mg twice daily  - continue rosuvastatin 10mg daily    Chronic systolic heart failure related to ischemic cardiomyopathy - LVEF 39%% by 2022 cardiac MRI  - continue metoprolol, losartan    HTN  - continue amlodipine 5mg daily           History of Present Illness   Jerry Bustamante is a 53 year old male with coronary artery disease with prior PCIs and CABG 2021 (L-Lad, R-dRCA, LRA-OM, V-RV branch), chronic systolic heart failure related to ischemic cardiomyopathy (LVEF 39%% by 2022 cardiac MRI), post-operative atrial fibrillation and atrial flutter post CABG, HTN, T2DM, reactive airway diseas admitted 2023 with chest pain.    Mr. Bustamante notes 5-6 brief episodes of focal left sided, sharp, lasting a few seconds yesterday, noted after  lifting heavy items.  He denies associated dyspnea, nausea, diaphoresis, lightheadedness.  He presented to the ER for evaluation - ECG showed no acute ischemic changes cf prior ECG (stable inferior and anterior infarction, stable lateral Tw inversion), serial hs-Shanta normal.         Physical Examination  Review of Systems   VITALS: BP (!) 145/81 (BP Location: Right arm)   Pulse 60   Temp 97.8  F (36.6  C) (Oral)   Resp 18   Wt 90.3 kg (199 lb)   SpO2 96%   BMI 28.55 kg/m    Wt Readings from Last 3 Encounters:   07/15/23 90.3 kg (199 lb)   07/05/23 91.8 kg (202 lb 4.8 oz)   02/24/23 91.6 kg (202 lb)       Intake/Output Summary (Last 24 hours) at 7/15/2023 0740  Last data filed at 7/15/2023 0500  Gross per 24 hour   Intake 200 ml   Output --   Net 200 ml     CONSTITUTIONAL: no distress  EYES:  Conjunctivae pink, sclerae clear.    E/N/T:  Oral mucosa pink, moist mucous membranes  RESPIRATORY:  Respiratory effort is normal  CARDIOVASCULAR: normal S1 and S2  GASTROINTESTINAL:  Abdomen without masses or tenderness  EXTREMITIES:  No clubbing or cyanosis.    MUSCULOSKELETAL:  Overall grossly normal muscle strength  SKIN:  Overall, skin warm and dry, no lesions.  NEURO/PSYCH:  Oriented x 3 with normal affect.   Constitutional:  No weight loss or loss of appetite    Eyes:  No difficulty with vision, no double vision, no dry eyes  ENT:  No sore throat, difficulty swallowing; changes in hearing or tinnitus  Cardiovascular: As detailed above  Respiratory:  No cough  Musculoskeletal  No joint pain, muscle aches  Neurologic:  No syncope, lightheadedness, fainting spells   Hematologic: No easy bruising, excessive bleeding tendency   Gastrointestinal:  No jaundice, abdominal pain or abdominal bloating  Genitourinary: No changes in urinary habits, no trouble urinating    Psychiatric: No anxiety or depression      Medical History  Surgical History   Past Medical History:   Diagnosis Date     Coronary artery disease      Diabetes  mellitus, type II (H)      Gout      High cholesterol      History of cardioversion 2021     HLD (hyperlipidemia)      Hypertension      S/P CABG (coronary artery bypass graft) x4 2021    Past Surgical History:   Procedure Laterality Date     ANGIOPLASTY       BYPASS GRAFT ARTERY CORONARY N/A 2021    Procedure: CORONARY ARTERY BYPASS GRAFTING X 4 WITH ENDOSCOPIC VEIN HARVESTING LIMA-LAD PREET-DISTAL RCA LEFT RADIAL-OM LEFT SV-ANOMOLOUS RV BRANCH ON PUMP/HILLARY;  Surgeon: Antelmo Mccullough MD;  Location:  OR     CV HEART CATHETERIZATION WITH POSSIBLE INTERVENTION N/A 2021    Procedure: Heart Catheterization with Possible Intervention;  Surgeon: Paul Warner MD;  Location:  HEART CARDIAC CATH LAB     CV LEFT VENTRICULOGRAM N/A 2021    Procedure: Left Ventriculogram;  Surgeon: Paul Warner MD;  Location:  HEART CARDIAC CATH LAB         Family History Social History   Family History   Problem Relation Age of Onset     Hypertension Mother      Diabetes Maternal Aunt      Diabetes Maternal Uncle      Cancer No family hx of      Cerebrovascular Disease No family hx of         Social History     Tobacco Use     Smoking status: Former     Packs/day: 1.50     Years: 13.00     Pack years: 19.50     Types: Cigarettes     Quit date: 2003     Years since quittin.6     Smokeless tobacco: Never   Vaping Use     Vaping Use: Never used   Substance Use Topics     Alcohol use: No     Drug use: No         Medications  Allergies     Current Facility-Administered Medications:      allopurinol (ZYLOPRIM) tablet 100 mg, 100 mg, Oral, Daily, Zeus Johnson MD, 100 mg at 23     amLODIPine (NORVASC) tablet 5 mg, 5 mg, Oral, Daily, Zeus Johnson MD, 5 mg at 23     aspirin (ASA) chewable tablet 81 mg, 81 mg, Oral or NG Tube, Daily, Zeus Johnson MD     glucose gel 15-30 g, 15-30 g, Oral, Q15 Min PRN **OR** dextrose 50 % injection 25-50 mL, 25-50 mL,  Intravenous, Q15 Min PRN **OR** glucagon injection 1 mg, 1 mg, Subcutaneous, Q15 Min PRN, Zeus Johnson MD     furosemide (LASIX) tablet 40 mg, 40 mg, Oral, Daily, Zeus Johnson MD     HYDROmorphone (DILAUDID) injection 0.2 mg, 0.2 mg, Intravenous, Q2H PRN, Zeus Johnson MD     insulin aspart (NovoLOG) injection (RAPID ACTING), 10 Units, Subcutaneous, BID w/meals, Zeus Johnson MD     insulin aspart (NovoLOG) injection (RAPID ACTING), 1-3 Units, Subcutaneous, TID AC, Zeus Johnson MD     insulin aspart (NovoLOG) injection (RAPID ACTING), 1-3 Units, Subcutaneous, At Bedtime, Zeus Johnson MD, 1 Units at 07/14/23 2056     insulin glargine (LANTUS PEN) injection 37 Units, 37 Units, Subcutaneous, QAM, Zeus Johnson MD     lidocaine (LMX4) cream, , Topical, Q1H PRN, Zeus Johnson MD     lidocaine 1 % 0.1-1 mL, 0.1-1 mL, Other, Q1H PRN, Zeus Johnson MD     losartan (COZAAR) tablet 50 mg, 50 mg, Oral, BID, Zeus Johnson MD, 50 mg at 07/14/23 2054     melatonin tablet 1 mg, 1 mg, Oral, At Bedtime PRN, Zeus Johnson MD     metoprolol tartrate (LOPRESSOR) tablet 50 mg, 50 mg, Oral, BID, Zeus Johnson MD, 50 mg at 07/14/23 2054     naloxone (NARCAN) injection 0.2 mg, 0.2 mg, Intravenous, Q2 Min PRN **OR** naloxone (NARCAN) injection 0.4 mg, 0.4 mg, Intravenous, Q2 Min PRN **OR** naloxone (NARCAN) injection 0.2 mg, 0.2 mg, Intramuscular, Q2 Min PRN **OR** naloxone (NARCAN) injection 0.4 mg, 0.4 mg, Intramuscular, Q2 Min PRN, Zeus Johnson MD     nitroGLYcerin (NITROSTAT) sublingual tablet 0.4 mg, 0.4 mg, Sublingual, Q5 Min PRN, Blue Hatfield MD     nitroGLYcerin (NITROSTAT) sublingual tablet 0.4 mg, 0.4 mg, Sublingual, Q5 Min PRN, Zeus Johnson MD     ondansetron (ZOFRAN ODT) ODT tab 4 mg, 4 mg, Oral, Q6H PRN **OR** ondansetron (ZOFRAN) injection 4 mg, 4 mg, Intravenous, Q6H PRN, Zeus Johnson MD     rosuvastatin (CRESTOR) tablet 10 mg, 10 mg, Oral, Daily,  Zeus Johnson MD, 10 mg at 07/14/23 2054     sodium chloride (PF) 0.9% PF flush 3 mL, 3 mL, Intracatheter, Q8H, Zeus Johnson MD, 3 mL at 07/15/23 0523     sodium chloride (PF) 0.9% PF flush 3 mL, 3 mL, Intracatheter, q1 min prn, Zeus Johnson MD   No Known Allergies       Lab Results    Chemistry CBC Cardiac Enzymes/BNP/TSH/INR   Recent Labs   Lab Test 07/15/23  0716 07/15/23  0553   NA  --  137   POTASSIUM  --  3.8   CHLORIDE  --  101   CO2  --  23   * 177*   BUN  --  13.0   CR  --  0.82   GFRESTIMATED  --  >90   TORRES  --  9.6     Recent Labs   Lab Test 07/15/23  0553 07/14/23  1429 07/05/23  0928   CR 0.82 0.82 0.87          Recent Labs   Lab Test 07/15/23  0553   WBC 8.0   HGB 14.5   HCT 43.1   MCV 79        Recent Labs   Lab Test 07/15/23  0553 07/14/23  1429 02/24/23  0753   HGB 14.5 14.9 14.0    Recent Labs     Component      Latest Ref Rng 7/14/2023  2:29 PM 7/14/2023  7:14 PM   Troponin T, High Sensitivity      <=22 ng/L 13  14          Recent Labs   Lab Test 07/14/23  1429 11/12/21  2303 09/28/21  0628   BNP  --  93* 296*   NTBNPI 100  --   --      Recent Labs   Lab Test 09/11/21  1534   TSH 0.74     Recent Labs   Lab Test 07/14/23  1429 09/08/21  1545 09/08/21  1423   INR 0.95 1.23* 1.41*         Data Review    ECGs (all tracings independently reviewed)  7/14/2023 - SR 75bpm, inferior Qw, anterior infarct, lateral Tw inversion - no significant change from prior  10/21/2021 - SR 78bpm, inferior Qw, anterior infarct, lateral Tw inversion    12/14/2021 TTE  1. Left ventricular systolic function is mild to moderately reduced. The  visual ejection fraction is 35-40%.  2. Mid to distal anterior, anteroseptal and apical wall akinesis. 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  3. Basal to mid inferior wall hypokinesis is also present.  4. The right ventricle is normal in structure, function and size.  5. There is mild (1+) mitral regurgitation.  6. Cannot exclude  a small thrombus adherent to the distal anteroseptal wall.  Consider cardiac MRI for further evaluation.    2/4/2022 cardiac MRI  Ischemic cardiomyopathy. There is moderately reduced LV function with preserved RV function.  There is a pattern of fibrosis that is consistent with prior LAD and RCA culprit infarctions. No  intracardiac thrombi were visualized on this examination.  LVEF 39%           Cassius Singh MD  7/15/2023  7:40 AM

## 2023-07-15 NOTE — PLAN OF CARE
Goal Outcome Evaluation:     PRIMARY DIAGNOSIS: CHEST PAIN  OUTPATIENT/OBSERVATION GOALS TO BE MET BEFORE DISCHARGE:  1. Ruled out acute coronary syndrome (negative or stable Troponin):  No  2. Pain Status: Pain free.  3. Appropriate provocative testing performed: Yes  - Stress Test Procedure: Echo  - Interpretation of cardiac rhythm per telemetry tech: NSR    4. Cleared by Consultants (if applicable):No  5. Return to near baseline physical activity: Yes  Discharge Planner Nurse   Safe discharge environment identified: Yes  Barriers to discharge: Yes need to finish test       Entered by: Romi Dodd RN 07/15/2023 11:46 AM     Please review provider order for any additional goals.   Nurse to notify provider when observation goals have been met and patient is ready for discharge.

## 2023-07-15 NOTE — PROGRESS NOTES
Patient arrives to room 325; appears in no distresOriented to room and routine of unit. Reviewed signs and symptoms to report to nurse/staff. Patient says pain he has had recently has been over L breast and is very brief but sharp. Denies pain at present. SR and inverted T's per telemetry.Son present and supportive.

## 2023-07-15 NOTE — DISCHARGE SUMMARY
"[facility]  Hospitalist Discharge Summary      Date of Admission:  7/14/2023  Date of Discharge:  7/15/2023  Discharging Provider: Liz Lopez MD  Discharge Service: Hospitalist Service    Discharge Diagnoses   Chest pain, likely noncardiac, possibly musculoskeletal  Coronary artery disease status post PCIs and CABG 9/8/2021 (L-Lad, R-dRCA, LRA-OM, V-RV branch)  HFrEF L the EF 37% 7/15/2023  Essential hypertension    Clinically Significant Risk Factors     # DMII: A1C = 8.1 % (Ref range: 0.0 - 5.6 %) within past 6 months  # Overweight: Estimated body mass index is 28.55 kg/m  as calculated from the following:    Height as of 7/5/23: 1.778 m (5' 10\").    Weight as of this encounter: 90.3 kg (199 lb).       Follow-ups Needed After Discharge   Follow-up Appointments     Follow-up and recommended labs and tests       Follow up with primary care provider, Dawood Oshea, within 7 days for   hospital follow- up.  No follow up labs or test are needed.            Unresulted Labs Ordered in the Past 30 Days of this Admission     No orders found for last 31 day(s).      These results will be followed up by Liz Lopez    Discharge Disposition   Discharged to home  Condition at discharge: Stable    Hospital Course   Mr. Bustamante is a 53-year-old male with past medical history significant for ischemic cardiomyopathy with HFrEF, coronary artery disease status post multiple PCI and CABG in 2021, diabetes mellitus type 2 on long-term insulin, hypertension, and hyperlipidemia who was admitted on 7/14/2023 due to chest pain.  He was having intermittent sharp chest pain that lasted less than a second and would go away.  This began after he was doing heavy lifting at his home.  He had no additional associated symptoms.  Serial troponins were within normal limits.  His EKG was negative for acute ischemic changes.  He had a chest x-ray that was negative for acute changes.  He had an echo done on " 7/15/2023 which showed EF 37%, global hypokinesis and akinesis of the distal septal and apical wall segments of the LV, normal RV size and function, borderline enlargement of the ascending aorta at 4 cm, no significant change from prior study.  He was seen by cardiology and patient will follow-up with Dr. Roberto outpatient.     Consultations This Hospital Stay   CARE MANAGEMENT / SOCIAL WORK IP CONSULT  CARDIOLOGY IP CONSULT    Code Status   Full Code    Time Spent on this Encounter   I, Liz Lopez MD, personally saw the patient today and spent less than or equal to 30 minutes discharging this patient.       Liz Lopez MD  [facility]  [address] [place] [id number]  Phone: [phone]  Fax: [phone]  ______________________________________________________________________    Physical Exam   Vital Signs: Temp: 97.8  F (36.6  C) Temp src: Oral BP: (!) 153/99 Pulse: 66   Resp: 18 SpO2: 95 % O2 Device: None (Room air)    Weight: 199 lbs 0 oz  General Appearance: Awake, alert, in no acute distress  Respiratory: CTAB, no wheeze  Cardiovascular: Regular rate, no murmur noted  GI: soft, nontender, non distended, normal bowel sounds  Skin: no jaundice, no rash         Primary Care Physician   Dawood Oshea    Discharge Orders      Follow-Up with Cardiology DONTA Heart Failure Discharge      Reason for your hospital stay    Chest pain likely non-cardiac     Activity    Your activity upon discharge: activity as tolerated     Follow-up and recommended labs and tests     Follow up with primary care provider, Dawood Oshea, within 7 days for hospital follow- up.  No follow up labs or test are needed.     Diet    Follow this diet upon discharge: Combination Diet Low Saturated Fat Na <2400mg Diet, No Caffeine Diet       Significant Results and Procedures   Most Recent 3 CBC's:Recent Labs   Lab Test 07/15/23  0553 07/14/23  1429 02/24/23  0753   WBC 8.0 8.3  7.8   HGB 14.5 14.9 14.0   MCV 79 79 78    198 172     Most Recent 3 BMP's:Recent Labs   Lab Test 07/15/23  1244 07/15/23  0716 07/15/23  0553 23  2050 23  1429 23  0928   NA  --   --  137  --  139 138   POTASSIUM  --   --  3.8  --  3.9 4.3   CHLORIDE  --   --  101  --  98 98   CO2  --   --  23  --  25 27   BUN  --   --  13.0  --  12.7 15.1   CR  --   --  0.82  --  0.82 0.87   ANIONGAP  --   --  13  --  16* 13   TORRES  --   --  9.6  --  10.6* 10.1*   * 259* 177*   < > 174* 196*    < > = values in this interval not displayed.     Most Recent 3 Troponin's:Recent Labs   Lab Test 21  1803 21  1408 21  0959   TROPONIN 12.292* 12.451* 10.300*     Most Recent 3 BNP's:Recent Labs   Lab Test 23  1429   NTBNPI 100   ,   Results for orders placed or performed during the hospital encounter of 23   Chest XR,  PA & LAT    Narrative    EXAM: XR CHEST 2 VIEWS  LOCATION: [facility]  DATE: 2023    INDICATION: chest pain  COMPARISON: 2023      Impression    IMPRESSION: Negative chest. Stable borderline cardiomegaly with sternotomy wires and mediastinal surgical clips.   Echocardiogram Complete     Value    Biplane LVEF 37%    Narrative    [id number]  [id number]  [id number]  [id number]^DIANE^DONNIE^[place] [address]     Name: ALLY IQBAL  MRN: [id number]  : 1970  Study Date: 07/15/2023 09:36 AM  Age: 53 yrs  Gender: Male  Patient Location: [facility]  Reason For Study: Syncope  Ordering Physician: DONNIE CONTRERAS  Performed By: CM     BSA: 2.1 m2  Height: 70 in  Weight: 199 lb  HR: 61  ______________________________________________________________________________  Procedure  Complete Echo Adult. Adequate quality two-dimensional was performed and  interpreted. Adequate quality color and spectral Doppler were performed and  interpreted. Compared to the prior study dated 2021, there have  been no  changes.  ______________________________________________________________________________  Interpretation Summary     1. Left ventricular size is normal. There is thinning of the distal septal and  apical walls. Systolic function is moderately reduced with overall global  hypokinesis and akinesis of the distal septal and apical wall segments. The  calculated left ventricular ejection fraction is 37%.  2. No obvious left ventricular thrombus although the ability to detect  thrombus on this study is limited as a contrast agent was not used.  3. Right ventricular size and systolic function are normal.  4. No hemodynamically significant valvular abnormalities.  5. Borderline enlargement of the ascending aorta measuring 4.0 cm.  6. Compared to the prior study dated 12/14/2021, there has been no significant  change.  ______________________________________________________________________________  I      WMSI = 2.38     % Normal = 0     X - Cannot   0 -                      (2) - Mildly 2 -          Segments  Size  Interpret    Hyperkinetic 1 - Normal  Hypokinetic  Hypokinetic  1-2     small                                                     7 -          3-5      moderate  3 - Akinetic 4 -          5 -         6 - Akinetic Dyskinetic   6-14    large               Dyskinetic   Aneurysmal  w/scar       w/scar       15-16   diffuse     Left Ventricle  The left ventricle is normal in size. There is normal left ventricular wall  thickness. Biplane LVEF is 37%. Left ventricular diastolic function is  abnormal. Diastolic Doppler findings (E/E' ratio and/or other parameters)  suggest left ventricular filling pressures are normal. There is anterior,  septal, and apical wall akinesis. There is no thrombus seen in the left  ventricle.     Right Ventricle  Normal right ventricle size and systolic function.     Atria  Normal left atrial size. Right atrial size is normal.     Mitral Valve  Mitral valve leaflets appear normal.  There is trace mitral regurgitation.  There is no mitral valve stenosis.     Tricuspid Valve  Tricuspid valve leaflets appear normal. There is trace tricuspid  regurgitation. Right ventricular systolic pressure could not be approximated  due to inadequate tricuspid regurgitation. There is no tricuspid stenosis.     Aortic Valve  Aortic valve leaflets appear normal. The aortic valve is trileaflet. No aortic  regurgitation is present. No aortic stenosis is present.     Pulmonic Valve  The pulmonic valve is not well seen, but is grossly normal. There is trace  pulmonic valvular regurgitation. There is no pulmonic valvular stenosis.     Vessels  The aorta root is normal. The ascending aorta is Borderline dilated. IVC  diameter <2.1 cm collapsing >50% with sniff suggests a normal RA pressure of 3  mmHg.     Pericardium  There is no pericardial effusion.     Rhythm  Sinus rhythm was noted.     ______________________________________________________________________________  MMode/2D Measurements & Calculations  IVSd: 1.4 cm  LVIDd: 5.4 cm  LVIDs: 4.4 cm  LVPWd: 1.00 cm     FS: 18.9 %  LV mass(C)d: 265.3 grams  LV mass(C)dI: 127.4 grams/m2  Ao root diam: 3.1 cm  LA dimension: 4.2 cm  asc Aorta Diam: 4.0 cm  LA/Ao: 1.4  LVOT diam: 2.0 cm  LVOT area: 3.1 cm2  LA Volume Indexed (AL/bp): 26.2 ml/m2  RV Base: 3.9 cm  RWT: 0.37     Time Measurements  MM HR: 61.0 BPM     Doppler Measurements & Calculations  MV E max jose ramon: 91.7 cm/sec  MV A max jose ramon: 59.7 cm/sec  MV E/A: 1.5  MV max P.0 mmHg  MV mean P.0 mmHg  MV V2 VTI: 35.1 cm  MVA(VTI): 1.9 cm2  MV dec slope: 566.0 cm/sec2  MV dec time: 0.16 sec  Ao V2 max: 129.0 cm/sec  Ao max P.0 mmHg  Ao V2 mean: 89.8 cm/sec  Ao mean P.0 mmHg  Ao V2 VTI: 26.7 cm  ANTONIA(I,D): 2.5 cm2  ANTONIA(V,D): 2.7 cm2  LV V1 max P.8 mmHg  LV V1 max: 109.0 cm/sec  LV V1 VTI: 21.1 cm  SV(LVOT): 66.3 ml  SI(LVOT): 31.8 ml/m2  AV Jose Ramon Ratio (DI): 0.84  ANTONIA Index (cm2/m2): 1.2  E/E' avg:  10.1  Lateral E/e': 7.6  Medial E/e': 12.6     ______________________________________________________________________________  Report approved by: Kenyetta Karimi 07/15/2023 12:17 PM               Discharge Medications   Current Discharge Medication List      CONTINUE these medications which have CHANGED    Details   furosemide (LASIX) 40 MG tablet Take 1 tablet (40 mg) by mouth daily for 30 days  Qty: 30 tablet, Refills: 0    Associated Diagnoses: Acute on chronic systolic congestive heart failure (H)         CONTINUE these medications which have NOT CHANGED    Details   allopurinol (ZYLOPRIM) 100 MG tablet Take 1 tablet (100 mg) by mouth daily  Qty: 90 tablet, Refills: 1    Associated Diagnoses: Gout, unspecified cause, unspecified chronicity, unspecified site      amLODIPine (NORVASC) 5 MG tablet Take 1 tablet (5 mg) by mouth daily for 360 days  Qty: 90 tablet, Refills: 3    Associated Diagnoses: Benign essential hypertension      aspirin (ASA) 81 MG chewable tablet 1 tablet (81 mg) by Oral or NG Tube route daily  Qty: 100 tablet, Refills: 1    Associated Diagnoses: S/P coronary artery bypass graft x 3      exenatide ER (BYDUREON BCISE) 2 MG/0.85ML auto-injector Inject 2 mg Subcutaneous every 7 days  Qty: 4 mL, Refills: 0    Comments: Please Dispense with applicable Pen Needles  Associated Diagnoses: Type 2 diabetes mellitus with microalbuminuria, with long-term current use of insulin (H)      insulin aspart (NOVOLOG PEN) 100 UNIT/ML pen Inject 10 Units Subcutaneous 2 times daily (with meals)  Qty: 15 mL, Refills: 3    Associated Diagnoses: Type 2 diabetes mellitus with microalbuminuria, with long-term current use of insulin (H)      insulin glargine (LANTUS PEN) 100 UNIT/ML pen Inject 37 Units Subcutaneous every morning  Qty: 30 mL, Refills: 1    Comments: If Lantus is not covered by insurance, may substitute Basaglar or Semglee or other insulin glargine product per insurance preference at same dose and  frequency.  Please Dispense with applicable Pen Needles  Associated Diagnoses: Type 2 diabetes mellitus with microalbuminuria, with long-term current use of insulin (H)      losartan (COZAAR) 50 MG tablet Take 1 tablet (50 mg) by mouth 2 times daily  Qty: 180 tablet, Refills: 3    Associated Diagnoses: Benign essential hypertension; Type 2 diabetes mellitus with microalbuminuria, with long-term current use of insulin (H)      metFORMIN (GLUCOPHAGE) 500 MG tablet Take 2 tablets (1,000 mg) by mouth 2 times daily (with meals) for 180 days  Qty: 360 tablet, Refills: 1    Associated Diagnoses: Type 2 diabetes mellitus with microalbuminuria, with long-term current use of insulin (H)      metoprolol tartrate (LOPRESSOR) 50 MG tablet Take 1 tablet (50 mg) by mouth 2 times daily for 360 days  Qty: 180 tablet, Refills: 3    Associated Diagnoses: S/P CABG (coronary artery bypass graft)      nitroGLYcerin (NITROSTAT) 0.4 MG sublingual tablet For chest pain place 1 tablet under the tongue every 5 minutes for 3 doses. If symptoms persist 5 minutes after 1st dose call 911.  Qty: 30 tablet, Refills: 1    Associated Diagnoses: S/P CABG (coronary artery bypass graft)      rosuvastatin (CRESTOR) 10 MG tablet Take 1 tablet (10 mg) by mouth daily  Qty: 90 tablet, Refills: 3    Associated Diagnoses: S/P CABG (coronary artery bypass graft)      !! ACCU-CHEK GUIDE test strip Use to test blood sugar 2 times daily or as directed.  Qty: 200 strip, Refills: 3    Associated Diagnoses: Type 2 diabetes mellitus with microalbuminuria, with long-term current use of insulin (H)      alcohol swab prep pads Use to swab area of injection/rupali as directed.  Qty: 100 each, Refills: 11    Associated Diagnoses: Other diabetic neurological complication associated with type 2 diabetes mellitus (H)      !! blood glucose (NO BRAND SPECIFIED) test strip Use to test blood sugar 4 times daily or as directed.  Qty: 100 strip, Refills: 6    Associated Diagnoses:  Other diabetic neurological complication associated with type 2 diabetes mellitus (H)      blood glucose calibration (NO BRAND SPECIFIED) solution Use to calibrate blood glucose monitor as needed as directed.  Qty: 200 each, Refills: 0    Comments: To accompany: Glucometer per insurance.  Associated Diagnoses: NSTEMI (non-ST elevated myocardial infarction) (H)      blood glucose monitoring (NO BRAND SPECIFIED) meter device kit Use to test blood sugar 4 times daily or as directed.  Qty: 1 kit, Refills: 0    Comments: Preferred blood glucose meter OR supplies to accompany: glucometer per insurance  Associated Diagnoses: NSTEMI (non-ST elevated myocardial infarction) (H)      !! blood glucose monitoring (SOFTCLIX) lancets Use to test blood sugar 2 times daily.  Qty: 100 each, Refills: 4    Associated Diagnoses: Type 2 diabetes mellitus with microalbuminuria, with long-term current use of insulin (H)      Blood Pressure KIT Check Blood pressure daily as needed  Qty: 1 kit, Refills: 0    Comments: Hi Wrist  Associated Diagnoses: Benign essential hypertension      !! Continuous Blood Gluc Sensor (FREESTYLE AICHA 14 DAY SENSOR) MISC Change every 14 days.  Qty: 2 each, Refills: 5    Associated Diagnoses: Type 2 diabetes mellitus with hyperglycemia, with long-term current use of insulin (H)      !! Continuous Blood Gluc Sensor (FREESTYLE AICHA 14 DAY SENSOR) MISC 1 each every 14 days Change every 14 days.  Qty: 2 each, Refills: 5    Associated Diagnoses: Other diabetic neurological complication associated with type 2 diabetes mellitus (H)      insulin pen needle (BD MICHELE U/F) 32G X 4 MM miscellaneous Use 4 pen needles daily or as directed.  Qty: 400 each, Refills: 0    Associated Diagnoses: Other diabetic neurological complication associated with type 2 diabetes mellitus (H)      !! thin (NO BRAND SPECIFIED) lancets Use with lanceting device.  Qty: 200 each, Refills: 3    Associated Diagnoses: Type 2 diabetes mellitus with  microalbuminuria, with long-term current use of insulin (H)       !! - Potential duplicate medications found. Please discuss with provider.        Allergies   No Known Allergies

## 2023-07-17 ENCOUNTER — PATIENT OUTREACH (OUTPATIENT)
Dept: CARE COORDINATION | Facility: CLINIC | Age: 53
End: 2023-07-17
Payer: COMMERCIAL

## 2023-07-17 LAB
ATRIAL RATE - MUSE: 75 BPM
DIASTOLIC BLOOD PRESSURE - MUSE: NORMAL MMHG
INTERPRETATION ECG - MUSE: NORMAL
P AXIS - MUSE: 51 DEGREES
PR INTERVAL - MUSE: 180 MS
QRS DURATION - MUSE: 82 MS
QT - MUSE: 362 MS
QTC - MUSE: 404 MS
R AXIS - MUSE: -16 DEGREES
SYSTOLIC BLOOD PRESSURE - MUSE: NORMAL MMHG
T AXIS - MUSE: 134 DEGREES
VENTRICULAR RATE- MUSE: 75 BPM

## 2023-07-17 NOTE — PROGRESS NOTES
"[facility] offered Clinic Care Coordination to an established [facility] eligible patient and patient declined CCC at this time.    Clinic Care Coordination Contact  [facility]: Post-Discharge Note  SITUATION                                                      Admission:    Admission Date: 07/14/23   Reason for Admission: Chest pain  Discharge:   Discharge Date: 07/15/23  Discharge Diagnosis: Chest pain, likely noncardiac, possibly musculoskeletal, Coronary artery disease status post PCIs and CABG 9/8/2021 (L-Lad, R-dRCA, LRA-OM, V-RV branch), HFrEF L the EF 37% 7/15/2023, Essential hypertension    BACKGROUND                                                      Per hospital discharge summary and inpatient provider notes:    Jerry Bustamante is a 53 year old male with a pertient medical history of Santos disease, bypass grafting, diabetes, dyslipidemia who presents to the ED for evaluation of sharp left lateral chest pain.  Describes a significant achiness started yesterday and happening with increased frequency.  Did take nitroglycerin for the first time since his bypass with some relief.  Denies any symptoms presently.  Reports no shortness of breath.  No unusual exertions or potential injuries that may be contributory.    ASSESSMENT      Discharge Assessment  How are you doing now that you are home?: \"Oh I'm doing very good. I'm feeling good.\"  How are your symptoms? (Red Flag symptoms escalate to triage hotline per guidelines): Improved  Do you feel your condition is stable enough to be safe at home until your provider visit?: Yes  Does the patient have their discharge instructions? : Yes  Does the patient have questions regarding their discharge instructions? : No  Were you started on any new medications or were there changes to any of your previous medications? : Yes (Changes to an existing medication)  Does the patient have all of their medications?: Yes  Do you have questions regarding any of your medications? : " No  Do you have all of your needed medical supplies or equipment (DME)?  (i.e. oxygen tank, CPAP, cane, etc.): Yes  Discharge follow-up appointment scheduled within 14 calendar days? : Yes  Discharge Follow Up Appointment Date: 08/24/23  Discharge Follow Up Appointment Scheduled with?: Primary Care Provider    Post-op ([facility] CTA Only)  If the patient had a surgery or procedure, do they have any questions for a nurse?: No    PLAN                                                      Outpatient Plan:      Follow-ups Needed After Discharge  Follow-up Appointments     Follow-up and recommended labs and tests       Follow up with primary care provider, Dawood Oshea, within 7 days for   hospital follow- up.  No follow up labs or test are needed.         Future Appointments   Date Time Provider Department Center   8/24/2023  9:00 AM [facility]         For any urgent concerns, please contact our 24 hour nurse triage line: [phone] ([phone])         GWYN Garcia  [phone]  [facility]

## 2023-07-24 ENCOUNTER — TELEPHONE (OUTPATIENT)
Dept: FAMILY MEDICINE | Facility: CLINIC | Age: 53
End: 2023-07-24
Payer: COMMERCIAL

## 2023-07-24 NOTE — TELEPHONE ENCOUNTER
Order/Referral Request    Who is requesting: Patient    Orders being requested: RX/Order    Reason service is needed/diagnosis: diabetes    When are orders needed by: as soon as feasible    Has this been discussed with Provider: No    Does patient have a preference on a Group/Provider/Facility? [facility] [address] [facility] [address] in [place]    Does patient have an appointment scheduled?: No    Where to send orders: Place orders within Epic    Could we send this information to you in [employer] or would you prefer to receive a phone call?:   Patient would like to be contacted via [employer]

## 2023-08-05 ENCOUNTER — TRANSFERRED RECORDS (OUTPATIENT)
Dept: MULTI SPECIALTY CLINIC | Facility: CLINIC | Age: 53
End: 2023-08-05
Payer: COMMERCIAL

## 2023-08-05 LAB — RETINOPATHY: NORMAL

## 2023-08-14 DIAGNOSIS — I50.23 ACUTE ON CHRONIC SYSTOLIC CONGESTIVE HEART FAILURE (H): ICD-10-CM

## 2023-08-17 DIAGNOSIS — M10.9 GOUT, UNSPECIFIED CAUSE, UNSPECIFIED CHRONICITY, UNSPECIFIED SITE: ICD-10-CM

## 2023-08-18 RX ORDER — FUROSEMIDE 40 MG
40 TABLET ORAL DAILY
Qty: 30 TABLET | Refills: 0 | Status: SHIPPED | OUTPATIENT
Start: 2023-08-18 | End: 2023-09-21

## 2023-08-21 RX ORDER — ALLOPURINOL 100 MG/1
100 TABLET ORAL DAILY
Qty: 90 TABLET | Refills: 1 | Status: SHIPPED | OUTPATIENT
Start: 2023-08-21 | End: 2024-01-19

## 2023-09-21 DIAGNOSIS — I50.23 ACUTE ON CHRONIC SYSTOLIC CONGESTIVE HEART FAILURE (H): ICD-10-CM

## 2023-09-21 NOTE — TELEPHONE ENCOUNTER
Pharmacy request form for furosemide 40mg     Last fill was 8/18/23- med is pended.  Sign and send if appropriate or if you need to see pt again.

## 2023-09-28 RX ORDER — FUROSEMIDE 40 MG
40 TABLET ORAL DAILY
Qty: 90 TABLET | Refills: 0 | Status: SHIPPED | OUTPATIENT
Start: 2023-09-28 | End: 2023-12-28

## 2023-10-07 ENCOUNTER — HEALTH MAINTENANCE LETTER (OUTPATIENT)
Age: 53
End: 2023-10-07

## 2023-10-13 ENCOUNTER — DOCUMENTATION ONLY (OUTPATIENT)
Dept: FAMILY MEDICINE | Facility: CLINIC | Age: 53
End: 2023-10-13
Payer: COMMERCIAL

## 2023-10-13 ENCOUNTER — TELEPHONE (OUTPATIENT)
Dept: FAMILY MEDICINE | Facility: CLINIC | Age: 53
End: 2023-10-13
Payer: COMMERCIAL

## 2023-10-13 DIAGNOSIS — Z79.4 TYPE 2 DIABETES MELLITUS WITH MICROALBUMINURIA, WITH LONG-TERM CURRENT USE OF INSULIN (H): Primary | ICD-10-CM

## 2023-10-13 DIAGNOSIS — R80.9 TYPE 2 DIABETES MELLITUS WITH MICROALBUMINURIA, WITH LONG-TERM CURRENT USE OF INSULIN (H): Primary | ICD-10-CM

## 2023-10-13 DIAGNOSIS — E11.29 TYPE 2 DIABETES MELLITUS WITH MICROALBUMINURIA, WITH LONG-TERM CURRENT USE OF INSULIN (H): Primary | ICD-10-CM

## 2023-10-18 ENCOUNTER — LAB (OUTPATIENT)
Dept: LAB | Facility: CLINIC | Age: 53
End: 2023-10-18
Payer: COMMERCIAL

## 2023-10-18 DIAGNOSIS — R80.9 TYPE 2 DIABETES MELLITUS WITH MICROALBUMINURIA, WITH LONG-TERM CURRENT USE OF INSULIN (H): ICD-10-CM

## 2023-10-18 DIAGNOSIS — E11.29 TYPE 2 DIABETES MELLITUS WITH MICROALBUMINURIA, WITH LONG-TERM CURRENT USE OF INSULIN (H): ICD-10-CM

## 2023-10-18 DIAGNOSIS — Z79.4 TYPE 2 DIABETES MELLITUS WITH MICROALBUMINURIA, WITH LONG-TERM CURRENT USE OF INSULIN (H): ICD-10-CM

## 2023-10-18 LAB — HBA1C MFR BLD: 9.3 % (ref 0–5.6)

## 2023-10-18 PROCEDURE — 36415 COLL VENOUS BLD VENIPUNCTURE: CPT

## 2023-10-18 PROCEDURE — 83036 HEMOGLOBIN GLYCOSYLATED A1C: CPT

## 2023-12-04 DIAGNOSIS — E11.49 OTHER DIABETIC NEUROLOGICAL COMPLICATION ASSOCIATED WITH TYPE 2 DIABETES MELLITUS (H): ICD-10-CM

## 2023-12-04 RX ORDER — PEN NEEDLE, DIABETIC 32GX 5/32"
NEEDLE, DISPOSABLE MISCELLANEOUS
Qty: 400 EACH | Refills: 0 | Status: SHIPPED | OUTPATIENT
Start: 2023-12-04 | End: 2024-04-11

## 2023-12-04 NOTE — TELEPHONE ENCOUNTER
Patient calling to get a medication refill on medication(s) attached      Looking for today If possible

## 2023-12-16 ENCOUNTER — TRANSFERRED RECORDS (OUTPATIENT)
Dept: MULTI SPECIALTY CLINIC | Facility: CLINIC | Age: 53
End: 2023-12-16

## 2023-12-16 LAB — RETINOPATHY: NORMAL

## 2023-12-19 DIAGNOSIS — Z79.4 TYPE 2 DIABETES MELLITUS WITH MICROALBUMINURIA, WITH LONG-TERM CURRENT USE OF INSULIN (H): ICD-10-CM

## 2023-12-19 DIAGNOSIS — R80.9 TYPE 2 DIABETES MELLITUS WITH MICROALBUMINURIA, WITH LONG-TERM CURRENT USE OF INSULIN (H): ICD-10-CM

## 2023-12-19 DIAGNOSIS — E11.29 TYPE 2 DIABETES MELLITUS WITH MICROALBUMINURIA, WITH LONG-TERM CURRENT USE OF INSULIN (H): ICD-10-CM

## 2023-12-23 ENCOUNTER — TELEPHONE (OUTPATIENT)
Dept: FAMILY MEDICINE | Facility: CLINIC | Age: 53
End: 2023-12-23
Payer: COMMERCIAL

## 2023-12-23 NOTE — TELEPHONE ENCOUNTER
Medication Question or Refill    Contacts         Type Contact Phone/Fax    12/23/2023 03:37 PM CST Phone (Incoming) DiannaSimone (Self) [phone] (M)            What medication are you calling about (include dose and sig)?: Lantus    Preferred Pharmacy:    [facility] - [place], [place] - [address] [place] [address]  Phone: [phone] Fax: [phone]      Controlled Substance Agreement on file:   CSA -- Patient Level:    CSA: None found at the patient level.       Who prescribed the medication?: PCP    Do you need a refill? Yes    When did you use the medication last? Patient is completely out of it    Patient offered an appointment? No    Do you have any questions or concerns?  No      Could we send this information to you in [facility] or would you prefer to receive a phone call?:   No preference   Okay to leave a detailed message?: Yes at Cell number on file:    Telephone Information:   Mobile [phone]

## 2023-12-28 DIAGNOSIS — R80.9 TYPE 2 DIABETES MELLITUS WITH MICROALBUMINURIA, WITH LONG-TERM CURRENT USE OF INSULIN (H): ICD-10-CM

## 2023-12-28 DIAGNOSIS — I50.23 ACUTE ON CHRONIC SYSTOLIC CONGESTIVE HEART FAILURE (H): ICD-10-CM

## 2023-12-28 DIAGNOSIS — E11.29 TYPE 2 DIABETES MELLITUS WITH MICROALBUMINURIA, WITH LONG-TERM CURRENT USE OF INSULIN (H): ICD-10-CM

## 2023-12-28 DIAGNOSIS — Z79.4 TYPE 2 DIABETES MELLITUS WITH MICROALBUMINURIA, WITH LONG-TERM CURRENT USE OF INSULIN (H): ICD-10-CM

## 2023-12-28 RX ORDER — FUROSEMIDE 40 MG
40 TABLET ORAL DAILY
Qty: 30 TABLET | Refills: 0 | Status: SHIPPED | OUTPATIENT
Start: 2023-12-28 | End: 2024-01-18

## 2023-12-28 NOTE — TELEPHONE ENCOUNTER
Please inform and assist in scheduling a follow up appointment.  Patient is Due for a follow up in Primary care. See bridge fill.
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
## 2023-12-28 NOTE — TELEPHONE ENCOUNTER
Patient calling to get a medication refill on medication(s) attached      Patient is currently OUT

## 2023-12-29 NOTE — TELEPHONE ENCOUNTER
Spoke with patient regarding scheduling appointment. [facility]

## 2024-01-10 ENCOUNTER — TELEPHONE (OUTPATIENT)
Dept: FAMILY MEDICINE | Facility: CLINIC | Age: 54
End: 2024-01-10
Payer: COMMERCIAL

## 2024-01-10 DIAGNOSIS — R80.9 TYPE 2 DIABETES MELLITUS WITH MICROALBUMINURIA, WITH LONG-TERM CURRENT USE OF INSULIN (H): ICD-10-CM

## 2024-01-10 DIAGNOSIS — E11.29 TYPE 2 DIABETES MELLITUS WITH MICROALBUMINURIA, WITH LONG-TERM CURRENT USE OF INSULIN (H): ICD-10-CM

## 2024-01-10 DIAGNOSIS — Z79.4 TYPE 2 DIABETES MELLITUS WITH MICROALBUMINURIA, WITH LONG-TERM CURRENT USE OF INSULIN (H): ICD-10-CM

## 2024-01-11 NOTE — TELEPHONE ENCOUNTER
Medication Question or Refill        What medication are you calling about (include dose and sig)?:METFORMIN    Preferred Pharmacy:   [place] [id number] [address] [facility]  [address] [place] [address] ([place]) [place] [address]  Phone: [phone] Fax: [phone]    [facility] - [place], [place] - [address] [place] [address]  Phone: [phone] Fax: [phone]      Controlled Substance Agreement on file:   CSA -- Patient Level:    CSA: None found at the patient level.       Who prescribed the medication?: PCP    Do you need a refill? Yes    When did you use the medication last? 01/10/24     Patient offered an appointment? No    Do you have any questions or concerns?  No      Could we send this information to you in [facility] or would you prefer to receive a phone call?:   Patient would prefer a phone call   Okay to leave a detailed message?: Yes at Cell number on file:    Telephone Information:   Mobile [phone]

## 2024-01-18 DIAGNOSIS — I50.23 ACUTE ON CHRONIC SYSTOLIC CONGESTIVE HEART FAILURE (H): ICD-10-CM

## 2024-01-19 ENCOUNTER — TELEPHONE (OUTPATIENT)
Dept: FAMILY MEDICINE | Facility: CLINIC | Age: 54
End: 2024-01-19
Payer: COMMERCIAL

## 2024-01-19 DIAGNOSIS — M10.9 GOUT, UNSPECIFIED CAUSE, UNSPECIFIED CHRONICITY, UNSPECIFIED SITE: ICD-10-CM

## 2024-01-19 RX ORDER — ALLOPURINOL 100 MG/1
100 TABLET ORAL DAILY
Qty: 30 TABLET | Refills: 0 | Status: SHIPPED | OUTPATIENT
Start: 2024-01-19 | End: 2024-01-31

## 2024-01-19 RX ORDER — FUROSEMIDE 40 MG
40 TABLET ORAL DAILY
Qty: 30 TABLET | Refills: 0 | Status: SHIPPED | OUTPATIENT
Start: 2024-01-19 | End: 2024-01-31

## 2024-01-19 NOTE — TELEPHONE ENCOUNTER
Reason for Call:  Medication     Do you use a [facility]? No  Name of the pharmacy and phone number for the current request:    [facility] - [place], [place] - [address]       Name of the medication requested:      Disp Refills Start End MAITE   allopurinol (ZYLOPRIM) 100 MG tablet 90 tablet 1 8/21/2023 -- No   Sig - Route: Take 1 tablet (100 mg) by mouth daily - Oral   Sent to pharmacy as: Allopurinol 100 MG Oral Tablet (ZYLOPRIM)       Other request: Pharmacy advised patient to call clinic.  Per Patient he has misplaced his RX.  Does not know if his kids took but he can not locate it. Would like bridge fill until his appt with PCP.  Appointment with PCP = 01/31/24    Can we leave a detailed message on this number? YES    Phone number patient can be reached at: Home number on file [phone] (home)    Best Time: any    Call taken on 1/19/2024 at 10:20 AM by Brooklynn Carpenter

## 2024-01-28 ASSESSMENT — ASTHMA QUESTIONNAIRES
QUESTION_5 LAST FOUR WEEKS HOW WOULD YOU RATE YOUR ASTHMA CONTROL: COMPLETELY CONTROLLED
ACT_TOTALSCORE: 25
QUESTION_2 LAST FOUR WEEKS HOW OFTEN HAVE YOU HAD SHORTNESS OF BREATH: NOT AT ALL
QUESTION_4 LAST FOUR WEEKS HOW OFTEN HAVE YOU USED YOUR RESCUE INHALER OR NEBULIZER MEDICATION (SUCH AS ALBUTEROL): NOT AT ALL
QUESTION_3 LAST FOUR WEEKS HOW OFTEN DID YOUR ASTHMA SYMPTOMS (WHEEZING, COUGHING, SHORTNESS OF BREATH, CHEST TIGHTNESS OR PAIN) WAKE YOU UP AT NIGHT OR EARLIER THAN USUAL IN THE MORNING: NOT AT ALL
QUESTION_1 LAST FOUR WEEKS HOW MUCH OF THE TIME DID YOUR ASTHMA KEEP YOU FROM GETTING AS MUCH DONE AT WORK, SCHOOL OR AT HOME: NONE OF THE TIME

## 2024-01-31 ENCOUNTER — VIRTUAL VISIT (OUTPATIENT)
Dept: FAMILY MEDICINE | Facility: CLINIC | Age: 54
End: 2024-01-31
Payer: COMMERCIAL

## 2024-01-31 DIAGNOSIS — I50.23 ACUTE ON CHRONIC SYSTOLIC CONGESTIVE HEART FAILURE (H): ICD-10-CM

## 2024-01-31 DIAGNOSIS — M10.9 GOUT, UNSPECIFIED CAUSE, UNSPECIFIED CHRONICITY, UNSPECIFIED SITE: ICD-10-CM

## 2024-01-31 DIAGNOSIS — E11.49 OTHER DIABETIC NEUROLOGICAL COMPLICATION ASSOCIATED WITH TYPE 2 DIABETES MELLITUS (H): ICD-10-CM

## 2024-01-31 DIAGNOSIS — E11.29 TYPE 2 DIABETES MELLITUS WITH MICROALBUMINURIA, WITH LONG-TERM CURRENT USE OF INSULIN (H): ICD-10-CM

## 2024-01-31 DIAGNOSIS — Z79.4 TYPE 2 DIABETES MELLITUS WITH MICROALBUMINURIA, WITH LONG-TERM CURRENT USE OF INSULIN (H): ICD-10-CM

## 2024-01-31 DIAGNOSIS — I50.9 CONGESTIVE HEART FAILURE, UNSPECIFIED HF CHRONICITY, UNSPECIFIED HEART FAILURE TYPE (H): ICD-10-CM

## 2024-01-31 DIAGNOSIS — Z95.1 S/P CABG (CORONARY ARTERY BYPASS GRAFT): ICD-10-CM

## 2024-01-31 DIAGNOSIS — R80.9 TYPE 2 DIABETES MELLITUS WITH MICROALBUMINURIA, WITH LONG-TERM CURRENT USE OF INSULIN (H): ICD-10-CM

## 2024-01-31 PROCEDURE — 99214 OFFICE O/P EST MOD 30 MIN: CPT | Mod: 95 | Performed by: FAMILY MEDICINE

## 2024-01-31 RX ORDER — PEN NEEDLE, DIABETIC 32GX 5/32"
NEEDLE, DISPOSABLE MISCELLANEOUS
Qty: 400 EACH | Refills: 0 | Status: CANCELLED | OUTPATIENT
Start: 2024-01-31

## 2024-01-31 RX ORDER — FUROSEMIDE 40 MG
40 TABLET ORAL DAILY
Qty: 30 TABLET | Refills: 0 | Status: SHIPPED | OUTPATIENT
Start: 2024-01-31 | End: 2024-02-26

## 2024-01-31 RX ORDER — NITROGLYCERIN 0.4 MG/1
TABLET SUBLINGUAL
Qty: 30 TABLET | Refills: 0 | Status: CANCELLED | OUTPATIENT
Start: 2024-01-31

## 2024-01-31 RX ORDER — FUROSEMIDE 40 MG
40 TABLET ORAL DAILY
Qty: 30 TABLET | Refills: 0 | Status: CANCELLED | OUTPATIENT
Start: 2024-01-31

## 2024-01-31 RX ORDER — NITROGLYCERIN 0.4 MG/1
TABLET SUBLINGUAL
Qty: 30 TABLET | Refills: 0 | Status: SHIPPED | OUTPATIENT
Start: 2024-01-31

## 2024-01-31 RX ORDER — ALLOPURINOL 100 MG/1
100 TABLET ORAL DAILY
Qty: 30 TABLET | Refills: 0 | Status: SHIPPED | OUTPATIENT
Start: 2024-01-31 | End: 2024-02-26

## 2024-01-31 RX ORDER — ALLOPURINOL 100 MG/1
100 TABLET ORAL DAILY
Qty: 30 TABLET | Refills: 0 | Status: CANCELLED | OUTPATIENT
Start: 2024-01-31

## 2024-01-31 ASSESSMENT — ASTHMA QUESTIONNAIRES: ACT_TOTALSCORE: 25

## 2024-01-31 NOTE — PROGRESS NOTES
"Simone is a 54 year old who is being evaluated via a billable video visit.      How would you like to obtain your AVS? MyChart  If the video visit is dropped, the invitation should be resent by: Text to cell phone: [phone]  Will anyone else be joining your video visit? No          Assessment & Plan   Problem List Items Addressed This Visit       Type 2 diabetes mellitus, with long-term current use of insulin (H)     BG's averaging > 150     Plan:   Follow Diabetic diet.   Intolerance of GLP-1    Lantus: Dosage increased to 40 units  Novolog: keep as is   Continue on metformin as is.            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            BMI  Estimated body mass index is 28.55 kg/m  as calculated from the following:    Height as of 7/5/23: 1.778 m (5' 10\").    Weight as of 7/15/23: 90.3 kg (199 lb).             Subjective   Simone is a 54 year old, presenting for med check and follow up , requesting med refills.   He is endorsing to be doing well. His BG is averaging > 150 on current diabetic meds and Lantus 37 units.         1/31/2024     9:21 AM   Additional Questions   Roomed by Gregg RANDOLPH   Accompanied by N/A     History of Present Illness       Reason for visit:  Medication management    He eats 0-1 servings of fruits and vegetables daily.He consumes 2 sweetened beverage(s) daily.He exercises with enough effort to increase his heart rate 30 to 60 " "minutes per day.  He exercises with enough effort to increase his heart rate 5 days per week.   He is taking medications regularly.               Objective    Vitals - Patient Reported  Weight (Patient Reported): 90.3 kg (199 lb)  Height (Patient Reported): 177.8 cm (5' 10\")  BMI (Based on Pt Reported Ht/Wt): 28.55      Vitals:  No vitals were obtained today due to virtual visit.    Physical Exam   GENERAL: alert and no distress          Video-Visit Details    Type of service:  Video Visit   Video Start Time:  9 AM  Video End Time:9:41 AM    Originating Location (pt. Location): Home    Distant Location (provider location):  On-site  Platform used for Video Visit: Tiffany  Signed Electronically by: Dawood Oshea MD    "

## 2024-01-31 NOTE — ASSESSMENT & PLAN NOTE
BG's averaging > 150     Plan:   Follow Diabetic diet.   Intolerance of GLP-1    Lantus: Dosage increased to 40 units  Novolog: keep as is   Continue on metformin as is.

## 2024-02-09 DIAGNOSIS — R80.9 TYPE 2 DIABETES MELLITUS WITH MICROALBUMINURIA, WITH LONG-TERM CURRENT USE OF INSULIN (H): ICD-10-CM

## 2024-02-09 DIAGNOSIS — E11.29 TYPE 2 DIABETES MELLITUS WITH MICROALBUMINURIA, WITH LONG-TERM CURRENT USE OF INSULIN (H): ICD-10-CM

## 2024-02-09 DIAGNOSIS — Z79.4 TYPE 2 DIABETES MELLITUS WITH MICROALBUMINURIA, WITH LONG-TERM CURRENT USE OF INSULIN (H): ICD-10-CM

## 2024-02-12 ENCOUNTER — TELEPHONE (OUTPATIENT)
Dept: FAMILY MEDICINE | Facility: CLINIC | Age: 54
End: 2024-02-12
Payer: COMMERCIAL

## 2024-02-12 DIAGNOSIS — Z79.4 TYPE 2 DIABETES MELLITUS WITH MICROALBUMINURIA, WITH LONG-TERM CURRENT USE OF INSULIN (H): ICD-10-CM

## 2024-02-12 DIAGNOSIS — E11.29 TYPE 2 DIABETES MELLITUS WITH MICROALBUMINURIA, WITH LONG-TERM CURRENT USE OF INSULIN (H): ICD-10-CM

## 2024-02-12 DIAGNOSIS — R80.9 TYPE 2 DIABETES MELLITUS WITH MICROALBUMINURIA, WITH LONG-TERM CURRENT USE OF INSULIN (H): ICD-10-CM

## 2024-02-13 NOTE — TELEPHONE ENCOUNTER
Contacted pharmacist Jensen at [facility] who did confirm the metformin is there, it just wasn't ready for .  Pharmacy will notify patient when ready.

## 2024-02-13 NOTE — TELEPHONE ENCOUNTER
Contacted patient and informed that pharmacy should have Rx ready by noon today.  Patient very thankful for call back.

## 2024-02-13 NOTE — TELEPHONE ENCOUNTER
Patient Returning Call    Reason for call:  Med refill    Information relayed to patient:  N/A    Patient has additional questions:  No    What are your questions/concerns:  pt who spoke to his care team 2 hrs ago and they told him his prescription was sent over but the pharmacy didnt fill it and is saying they didn't receive the request. Pt mention this is a reoccurring issue that has happened 3 times within 1 week. Pt spoke to a RN by the name of Walter     Could we send this information to you in [facility] or would you prefer to receive a phone call?:   Patient would prefer a phone call   Okay to leave a detailed message?: Yes at Cell number on file:    Telephone Information:   Mobile [phone]

## 2024-02-23 SDOH — HEALTH STABILITY: PHYSICAL HEALTH: ON AVERAGE, HOW MANY DAYS PER WEEK DO YOU ENGAGE IN MODERATE TO STRENUOUS EXERCISE (LIKE A BRISK WALK)?: 5 DAYS

## 2024-02-23 SDOH — HEALTH STABILITY: PHYSICAL HEALTH: ON AVERAGE, HOW MANY MINUTES DO YOU ENGAGE IN EXERCISE AT THIS LEVEL?: 40 MIN

## 2024-02-23 ASSESSMENT — SOCIAL DETERMINANTS OF HEALTH (SDOH): HOW OFTEN DO YOU GET TOGETHER WITH FRIENDS OR RELATIVES?: PATIENT DECLINED

## 2024-02-24 ENCOUNTER — HEALTH MAINTENANCE LETTER (OUTPATIENT)
Age: 54
End: 2024-02-24

## 2024-02-26 ENCOUNTER — OFFICE VISIT (OUTPATIENT)
Dept: FAMILY MEDICINE | Facility: CLINIC | Age: 54
End: 2024-02-26
Payer: COMMERCIAL

## 2024-02-26 VITALS
DIASTOLIC BLOOD PRESSURE: 82 MMHG | RESPIRATION RATE: 18 BRPM | WEIGHT: 202.2 LBS | BODY MASS INDEX: 28.95 KG/M2 | TEMPERATURE: 98.1 F | HEIGHT: 70 IN | SYSTOLIC BLOOD PRESSURE: 128 MMHG | OXYGEN SATURATION: 98 % | HEART RATE: 65 BPM

## 2024-02-26 DIAGNOSIS — Z87.891 FORMER SMOKER: ICD-10-CM

## 2024-02-26 DIAGNOSIS — I50.22 CHRONIC SYSTOLIC CONGESTIVE HEART FAILURE (H): ICD-10-CM

## 2024-02-26 DIAGNOSIS — E11.29 TYPE 2 DIABETES MELLITUS WITH MICROALBUMINURIA, WITH LONG-TERM CURRENT USE OF INSULIN (H): ICD-10-CM

## 2024-02-26 DIAGNOSIS — Z95.1 S/P CABG (CORONARY ARTERY BYPASS GRAFT): ICD-10-CM

## 2024-02-26 DIAGNOSIS — E78.5 DYSLIPIDEMIA: ICD-10-CM

## 2024-02-26 DIAGNOSIS — R80.9 TYPE 2 DIABETES MELLITUS WITH MICROALBUMINURIA, WITH LONG-TERM CURRENT USE OF INSULIN (H): ICD-10-CM

## 2024-02-26 DIAGNOSIS — I10 PRIMARY HYPERTENSION: ICD-10-CM

## 2024-02-26 DIAGNOSIS — I25.10 CORONARY ARTERY DISEASE INVOLVING NATIVE CORONARY ARTERY OF NATIVE HEART WITHOUT ANGINA PECTORIS: ICD-10-CM

## 2024-02-26 DIAGNOSIS — M10.9 GOUT, UNSPECIFIED CAUSE, UNSPECIFIED CHRONICITY, UNSPECIFIED SITE: ICD-10-CM

## 2024-02-26 DIAGNOSIS — Z12.5 SCREENING FOR PROSTATE CANCER: ICD-10-CM

## 2024-02-26 DIAGNOSIS — Z79.4 TYPE 2 DIABETES MELLITUS WITH MICROALBUMINURIA, WITH LONG-TERM CURRENT USE OF INSULIN (H): ICD-10-CM

## 2024-02-26 DIAGNOSIS — Z00.00 ROUTINE GENERAL MEDICAL EXAMINATION AT A HEALTH CARE FACILITY: Primary | ICD-10-CM

## 2024-02-26 PROBLEM — I15.9 SECONDARY HYPERTENSION: Status: RESOLVED | Noted: 2023-07-14 | Resolved: 2024-02-26

## 2024-02-26 LAB
ALBUMIN SERPL BCG-MCNC: 4.5 G/DL (ref 3.5–5.2)
ALP SERPL-CCNC: 117 U/L (ref 40–150)
ALT SERPL W P-5'-P-CCNC: <5 U/L (ref 0–70)
ANION GAP SERPL CALCULATED.3IONS-SCNC: 11 MMOL/L (ref 7–15)
AST SERPL W P-5'-P-CCNC: 15 U/L (ref 0–45)
BILIRUB SERPL-MCNC: 0.3 MG/DL
BUN SERPL-MCNC: 15.2 MG/DL (ref 6–20)
CALCIUM SERPL-MCNC: 9.7 MG/DL (ref 8.6–10)
CHLORIDE SERPL-SCNC: 102 MMOL/L (ref 98–107)
CHOLEST SERPL-MCNC: 151 MG/DL
CREAT SERPL-MCNC: 0.89 MG/DL (ref 0.67–1.17)
CREAT UR-MCNC: 207 MG/DL
DEPRECATED HCO3 PLAS-SCNC: 26 MMOL/L (ref 22–29)
EGFRCR SERPLBLD CKD-EPI 2021: >90 ML/MIN/1.73M2
FASTING STATUS PATIENT QL REPORTED: YES
GLUCOSE SERPL-MCNC: 177 MG/DL (ref 70–99)
HBA1C MFR BLD: 9.2 % (ref 0–5.6)
HDLC SERPL-MCNC: 33 MG/DL
LDLC SERPL CALC-MCNC: 79 MG/DL
MICROALBUMIN UR-MCNC: 1735 MG/L
MICROALBUMIN/CREAT UR: 838.16 MG/G CR (ref 0–17)
NONHDLC SERPL-MCNC: 118 MG/DL
POTASSIUM SERPL-SCNC: 4.2 MMOL/L (ref 3.4–5.3)
PROT SERPL-MCNC: 8.1 G/DL (ref 6.4–8.3)
PSA SERPL DL<=0.01 NG/ML-MCNC: 0.88 NG/ML (ref 0–3.5)
SODIUM SERPL-SCNC: 139 MMOL/L (ref 135–145)
TRIGL SERPL-MCNC: 197 MG/DL
URATE SERPL-MCNC: 7.6 MG/DL (ref 3.4–7)

## 2024-02-26 PROCEDURE — 80061 LIPID PANEL: CPT | Performed by: FAMILY MEDICINE

## 2024-02-26 PROCEDURE — 99396 PREV VISIT EST AGE 40-64: CPT | Mod: 25 | Performed by: FAMILY MEDICINE

## 2024-02-26 PROCEDURE — 36415 COLL VENOUS BLD VENIPUNCTURE: CPT | Performed by: FAMILY MEDICINE

## 2024-02-26 PROCEDURE — 99215 OFFICE O/P EST HI 40 MIN: CPT | Mod: 25 | Performed by: FAMILY MEDICINE

## 2024-02-26 PROCEDURE — G0103 PSA SCREENING: HCPCS | Performed by: FAMILY MEDICINE

## 2024-02-26 PROCEDURE — 82043 UR ALBUMIN QUANTITATIVE: CPT | Performed by: FAMILY MEDICINE

## 2024-02-26 PROCEDURE — 90480 ADMN SARSCOV2 VAC 1/ONLY CMP: CPT | Performed by: FAMILY MEDICINE

## 2024-02-26 PROCEDURE — 83036 HEMOGLOBIN GLYCOSYLATED A1C: CPT | Performed by: FAMILY MEDICINE

## 2024-02-26 PROCEDURE — 91320 SARSCV2 VAC 30MCG TRS-SUC IM: CPT | Performed by: FAMILY MEDICINE

## 2024-02-26 PROCEDURE — 84550 ASSAY OF BLOOD/URIC ACID: CPT | Performed by: FAMILY MEDICINE

## 2024-02-26 PROCEDURE — 82570 ASSAY OF URINE CREATININE: CPT | Performed by: FAMILY MEDICINE

## 2024-02-26 PROCEDURE — 80053 COMPREHEN METABOLIC PANEL: CPT | Performed by: FAMILY MEDICINE

## 2024-02-26 RX ORDER — NITROGLYCERIN 0.4 MG/1
TABLET SUBLINGUAL
Qty: 30 TABLET | Refills: 0 | Status: CANCELLED | OUTPATIENT
Start: 2024-02-26

## 2024-02-26 NOTE — Clinical Note
Please abstract the following data from this visit with this patient into the appropriate field in Epic:  Tests that can be patient reported without a hard copy:  Colonoscopy done on this date: 2019 (approximately), by this group: [facility], results were unknown.   Other Tests found in the patient's chart through Chart Review/Care Everywhere:  {Abstract Quality List (Optional):794123}  Note to Abstraction: If this section is blank, no results were found via Chart Review/Care Everywhere.

## 2024-02-26 NOTE — ASSESSMENT & PLAN NOTE
BG's averaging > 200    Plan:   Start   -novolog with all the meals ( or 3x/ day )   -Farxiga 10 mg daily  -Continue Lantus and metformin as is    Monitor BG's and keep under 180  Follow-up through [facility] for status update in a month

## 2024-02-26 NOTE — PROGRESS NOTES
Preventive Care Visit  [facility]  Dawood Oshea MD, Family Medicine  Feb 26, 2024    Assessment & Plan   Problem List Items Addressed This Visit       Coronary artery disease involving native coronary artery without angina pectoris    Relevant Orders    Lipid panel reflex to direct LDL Fasting (Completed)    Lipid panel reflex to direct LDL Fasting    Type 2 diabetes mellitus, with long-term current use of insulin (H)     BG's averaging > 200    Plan:   Start   -novolog with all the meals ( or 3x/ day )   -Farxiga 10 mg daily  -Continue Lantus and metformin as is    Monitor BG's and keep under 180  Follow-up through [facility] for status update in a month          Relevant Medications    insulin glargine (LANTUS PEN) 100 UNIT/ML pen    insulin aspart (NOVOLOG PEN) 100 UNIT/ML pen    dapagliflozin (FARXIGA) 10 MG TABS tablet    metFORMIN (GLUCOPHAGE) 500 MG tablet    Other Relevant Orders    Hemoglobin A1c (Completed)    S/P CABG (coronary artery bypass graft)    Relevant Orders    Lipid panel reflex to direct LDL Fasting    Dyslipidemia     On Rosuvastatin, no side effects or intolerance     Lipids are satisfactory   Continue current management          Relevant Orders    Lipid panel reflex to direct LDL Fasting    Gout     On Allopurinol 100 mg daily     Intermittent flareups.     Uric acid elevated     Plan   Start allopuorinol 200 mg   Recheck level in 6 months          Relevant Medications    allopurinol 200 MG TABS    Other Relevant Orders    Uric acid (Completed)    Cane Order for DME - ONLY FOR DME    Primary hypertension    Relevant Orders    Home Blood Pressure Monitor Order for DME - ONLY FOR DME    Comprehensive metabolic panel (Completed)    CHF (congestive heart failure) (H)    Relevant Medications    furosemide (LASIX) 40 MG tablet     Other Visit Diagnoses       Routine general medical examination at a health care facility    -  Primary    Former smoker        declining CT  "lung CA screening    Screening for prostate cancer        Relevant Orders    Prostate Specific Antigen Screen (Completed)             Patient has been advised of split billing requirements and indicates understanding: Yes         BMI  Estimated body mass index is 29.01 kg/m  as calculated from the following:    Height as of this encounter: 1.778 m (5' 10\").    Weight as of this encounter: 91.7 kg (202 lb 3.2 oz).       Counseling  Appropriate preventive services were discussed with this patient, including applicable screening as appropriate for fall prevention, nutrition, physical activity, Tobacco-use cessation, weight loss and cognition.  Checklist reviewing preventive services available has been given to the patient.  Reviewed patient's diet, addressing concerns and/or questions.           David Nichols is a 54 year old, presenting for the following:  Physical,   Med check and follow up             2/26/2024     8:44 AM   Additional Questions   Roomed by Maryuri WRIGHT CMA   Accompanied by N/A          HPI  Med check and follow-up          2/23/2024   General Health   How would you rate your overall physical health? Good   Feel stress (tense, anxious, or unable to sleep) Patient declined         2/23/2024   Nutrition   Three or more servings of calcium each day? Yes   Diet: Low salt    Diabetic   How many servings of fruit and vegetables per day? (!) 0-1   How many sweetened beverages each day? 0-1         2/23/2024   Exercise   Days per week of moderate/strenous exercise 5 days   Average minutes spent exercising at this level 40 min         2/23/2024   Social Factors   Frequency of gathering with friends or relatives Patient declined   Worry food won't last until get money to buy more No   Food not last or not have enough money for food? No   Do you have housing?  Yes   Are you worried about losing your housing? No   Lack of transportation? No   Unable to get utilities (heat,electricity)? No         2/23/2024 " "  Fall Risk   Fallen 2 or more times in the past year? No   Trouble with walking or balance? No          2024   Dental   Dentist two times every year? Yes            Today's PHQ-2 Score:       2024     8:29 AM   PHQ-2 (  [employer])   Q1: Little interest or pleasure in doing things 0   Q2: Feeling down, depressed or hopeless 0   PHQ-2 Score 0   Q1: Little interest or pleasure in doing things Not at all   Q2: Feeling down, depressed or hopeless Not at all   PHQ-2 Score 0           2024   Substance Use   Alcohol more than 3/day or more than 7/wk Not Applicable   Do you use any other substances recreationally? No     Social History     Tobacco Use    Smoking status: Former     Packs/day: 1.50     Years: 13.00     Additional pack years: 0.00     Total pack years: 19.50     Types: Cigarettes     Start date: 1991     Quit date: 2003     Years since quittin.2    Smokeless tobacco: Never   Vaping Use    Vaping Use: Never used   Substance Use Topics    Alcohol use: No    Drug use: No           2024   STI Screening   New sexual partner(s) since last STI/HIV test? No   Last PSA:   Prostate Specific Antigen Screen   Date Value Ref Range Status   2024 0.88 0.00 - 3.50 ng/mL Final   2022 0.89 0.00 - 3.50 ug/L Final     ASCVD Risk   The ASCVD Risk score (Mattie LOPEZ, et al., 2019) failed to calculate for the following reasons:    The patient has a prior MI or stroke diagnosis           Reviewed and updated as needed this visit by Provider   Tobacco  Allergies  Meds  Problems  Med Hx  Surg Hx  Fam Hx                     Objective    Exam  /82 (BP Location: Right arm, Patient Position: Sitting, Cuff Size: Adult Regular)   Pulse 65   Temp 98.1  F (36.7  C) (Oral)   Resp 18   Ht 1.778 m (5' 10\")   Wt 91.7 kg (202 lb 3.2 oz)   SpO2 98%   BMI 29.01 kg/m     Estimated body mass index is 29.01 kg/m  as calculated from the following:    Height as of this encounter: " "1.778 m (5' 10\").    Weight as of this encounter: 91.7 kg (202 lb 3.2 oz).    Physical Exam  GENERAL: alert and no distress  EYES: Eyes grossly normal to inspection, PERRL and conjunctivae and sclerae normal  HENT: oropharynx clear and oral mucous membranes moist  NECK: no adenopathy, no asymmetry, masses, or scars  RESP: lungs clear to auscultation - no rales, rhonchi or wheezes  CV: regular rate and rhythm, normal S1 S2, no S3 or S4, no murmur, click or rub, no peripheral edema  ABDOMEN: soft, nontender, no hepatosplenomegaly, no masses and bowel sounds normal  RECTAL: normal sphincter tone, no rectal masses, prostate normal size, smooth, nontender without nodules or masses  MS: no gross musculoskeletal defects noted, no edema  SKIN: no suspicious lesions or rashes  NEURO: Normal strength and tone, mentation intact and speech normal  PSYCH: mentation appears normal, affect normal/bright  Diabetic foot exam: no trophic changes or ulcerative lesions and normal sensory exam    41 minutes spent on the date of the encounter outside of physicals doing chart review, review of test results, patient visit, and documentation    Signed Electronically by: Dawood Oshea MD    "

## 2024-02-26 NOTE — PATIENT INSTRUCTIONS
Preventive Care Advice   This is general advice given by our system to help you stay healthy. However, your care team may have specific advice just for you. Please talk to your care team about your preventive care needs.  Nutrition  Eat 5 or more servings of fruits and vegetables each day.  Try wheat bread, brown rice and whole grain pasta (instead of white bread, rice, and pasta).  Get enough calcium and vitamin D. Check the label on foods and aim for 100% of the RDA (recommended daily allowance).  Lifestyle  Exercise at least 150 minutes each week  (30 minutes a day, 5 days a week).  Do muscle strengthening activities 2 days a week. These help control your weight and prevent disease.  No smoking.  Wear sunscreen to prevent skin cancer.  Have a dental exam and cleaning every 6 months.  Yearly exams  See your health care team every year to talk about:  Any changes in your health.  Any medicines your care team has prescribed.  Preventive care, family planning, and ways to prevent chronic diseases.  Shots (vaccines)   HPV shots (up to age 26), if you've never had them before.  Hepatitis B shots (up to age 59), if you've never had them before.  COVID-19 shot: Get this shot when it's due.  Flu shot: Get a flu shot every year.  Tetanus shot: Get a tetanus shot every 10 years.  Pneumococcal, hepatitis A, and RSV shots: Ask your care team if you need these based on your risk.  Shingles shot (for age 50 and up)  General health tests  Diabetes screening:  Starting at age 35, Get screened for diabetes at least every 3 years.  If you are younger than age 35, ask your care team if you should be screened for diabetes.  Cholesterol test: At age 39, start having a cholesterol test every 5 years, or more often if advised.  Bone density scan (DEXA): At age 50, ask your care team if you should have this scan for osteoporosis (brittle bones).  Hepatitis C: Get tested at least once in your life.  STIs (sexually transmitted  infections)  Before age 24: Ask your care team if you should be screened for STIs.  After age 24: Get screened for STIs if you're at risk. You are at risk for STIs (including HIV) if:  You are sexually active with more than one person.  You don't use condoms every time.  You or a partner was diagnosed with a sexually transmitted infection.  If you are at risk for HIV, ask about PrEP medicine to prevent HIV.  Get tested for HIV at least once in your life, whether you are at risk for HIV or not.  Cancer screening tests  Cervical cancer screening: If you have a cervix, begin getting regular cervical cancer screening tests starting at age 21.  Breast cancer scan (mammogram): If you've ever had breasts, begin having regular mammograms starting at age 40. This is a scan to check for breast cancer.  Colon cancer screening: It is important to start screening for colon cancer at age 45.  Have a colonoscopy test every 10 years (or more often if you're at risk) Or, ask your provider about stool tests like a FIT test every year or Cologuard test every 3 years.  To learn more about your testing options, visit:   https://www.[employer]/549112.pdf.  For help making a decision, visit:   https://bit.ly/dp34385.  Prostate cancer screening test: If you have a prostate, ask your care team if a prostate cancer screening test (PSA) at age 55 is right for you.  Lung cancer screening: If you are a current or former smoker ages 50 to 80, ask your care team if ongoing lung cancer screenings are right for you.  For informational purposes only. Not to replace the advice of your health care provider. Copyright   2023 [employer]. All rights reserved. Clinically reviewed by the [facility] Transitions Program. [employer] 320336 - REV 01/24.

## 2024-02-27 RX ORDER — FUROSEMIDE 40 MG
40 TABLET ORAL DAILY
Qty: 90 TABLET | Refills: 1 | Status: SHIPPED | OUTPATIENT
Start: 2024-02-27

## 2024-02-27 RX ORDER — DAPAGLIFLOZIN 10 MG/1
10 TABLET, FILM COATED ORAL DAILY
Qty: 30 TABLET | Refills: 0 | Status: SHIPPED | OUTPATIENT
Start: 2024-02-27 | End: 2024-07-19

## 2024-02-27 RX ORDER — ALLOPURINOL 200 MG/1
200 TABLET ORAL DAILY
Qty: 90 TABLET | Refills: 1 | Status: SHIPPED | OUTPATIENT
Start: 2024-02-27 | End: 2024-03-27

## 2024-02-27 NOTE — ASSESSMENT & PLAN NOTE
On Rosuvastatin, no side effects or intolerance     Lipids are satisfactory   Continue current management

## 2024-02-27 NOTE — ASSESSMENT & PLAN NOTE
On Allopurinol 100 mg daily     Intermittent flareups.     Uric acid elevated     Plan   Start allopuorinol 200 mg   Recheck level in 6 months

## 2024-03-27 ENCOUNTER — MYC REFILL (OUTPATIENT)
Dept: FAMILY MEDICINE | Facility: CLINIC | Age: 54
End: 2024-03-27
Payer: COMMERCIAL

## 2024-03-27 DIAGNOSIS — M10.9 GOUT, UNSPECIFIED CAUSE, UNSPECIFIED CHRONICITY, UNSPECIFIED SITE: ICD-10-CM

## 2024-03-27 RX ORDER — ALLOPURINOL 200 MG/1
200 TABLET ORAL DAILY
Qty: 90 TABLET | Refills: 1 | Status: SHIPPED | OUTPATIENT
Start: 2024-03-27

## 2024-03-27 RX ORDER — ALLOPURINOL 200 MG/1
200 TABLET ORAL DAILY
Qty: 90 TABLET | Refills: 1 | OUTPATIENT
Start: 2024-03-27

## 2024-03-27 NOTE — TELEPHONE ENCOUNTER
This refill request is a duplicate request, previously received or sent.   Sent denial notification to pharmacy.  NEY Jacobo  [facility]

## 2024-03-31 ENCOUNTER — MYC REFILL (OUTPATIENT)
Dept: FAMILY MEDICINE | Facility: CLINIC | Age: 54
End: 2024-03-31
Payer: COMMERCIAL

## 2024-03-31 DIAGNOSIS — R80.9 TYPE 2 DIABETES MELLITUS WITH MICROALBUMINURIA, WITH LONG-TERM CURRENT USE OF INSULIN (H): ICD-10-CM

## 2024-03-31 DIAGNOSIS — M10.9 GOUT, UNSPECIFIED CAUSE, UNSPECIFIED CHRONICITY, UNSPECIFIED SITE: ICD-10-CM

## 2024-03-31 DIAGNOSIS — Z79.4 TYPE 2 DIABETES MELLITUS WITH MICROALBUMINURIA, WITH LONG-TERM CURRENT USE OF INSULIN (H): ICD-10-CM

## 2024-03-31 DIAGNOSIS — E11.29 TYPE 2 DIABETES MELLITUS WITH MICROALBUMINURIA, WITH LONG-TERM CURRENT USE OF INSULIN (H): ICD-10-CM

## 2024-03-31 RX ORDER — ALLOPURINOL 200 MG/1
200 TABLET ORAL DAILY
Qty: 90 TABLET | Refills: 1 | Status: CANCELLED | OUTPATIENT
Start: 2024-03-31

## 2024-04-01 RX ORDER — ALLOPURINOL 100 MG/1
200 TABLET ORAL DAILY
Qty: 180 TABLET | Refills: 3 | Status: SHIPPED | OUTPATIENT
Start: 2024-04-01 | End: 2025-04-01

## 2024-04-11 DIAGNOSIS — E11.49 OTHER DIABETIC NEUROLOGICAL COMPLICATION ASSOCIATED WITH TYPE 2 DIABETES MELLITUS (H): ICD-10-CM

## 2024-04-11 RX ORDER — PEN NEEDLE, DIABETIC 32GX 5/32"
NEEDLE, DISPOSABLE MISCELLANEOUS
Qty: 400 EACH | Refills: 0 | Status: SHIPPED | OUTPATIENT
Start: 2024-04-11 | End: 2024-07-19

## 2024-04-11 NOTE — TELEPHONE ENCOUNTER
Patient calling to get a medication refill on medication(s) attached      Patient has today and tonight available

## 2024-04-15 DIAGNOSIS — Z79.4 TYPE 2 DIABETES MELLITUS WITH MICROALBUMINURIA, WITH LONG-TERM CURRENT USE OF INSULIN (H): ICD-10-CM

## 2024-04-15 DIAGNOSIS — E11.29 TYPE 2 DIABETES MELLITUS WITH MICROALBUMINURIA, WITH LONG-TERM CURRENT USE OF INSULIN (H): ICD-10-CM

## 2024-04-15 DIAGNOSIS — R80.9 TYPE 2 DIABETES MELLITUS WITH MICROALBUMINURIA, WITH LONG-TERM CURRENT USE OF INSULIN (H): ICD-10-CM

## 2024-04-15 NOTE — TELEPHONE ENCOUNTER
Patient calling to get a medication refill on medication(s) attached    insulin glargine (LANTUS PEN) 100 UNIT/ML pen 36 mL 1 2/27/2024 8/25/2024 No   Sig - Route: Inject 40 Units Subcutaneous every morning for 180 days - Subcutaneous   Sent to pharmacy as: Insulin Glargine 100 UNIT/ML Subcutaneous Solution Pen-injector (LANTUS PEN)   Class: E-Prescribe   Notes to Pharmacy: If Lantus is not covered by insurance, may substitute Basaglar or Semglee or other insulin glargine product per insurance preference at same dose and frequency.  Please Dispense with applicable Pen Needles   Order: [id number]   E-Prescribing Status: Transmission to pharmacy failed (2/27/2024  2:58 PM CST)   Message completed by Rolando Harris RN (2/28/2024 12:43 PM).

## 2024-05-31 ENCOUNTER — MYC REFILL (OUTPATIENT)
Dept: FAMILY MEDICINE | Facility: CLINIC | Age: 54
End: 2024-05-31
Payer: COMMERCIAL

## 2024-05-31 DIAGNOSIS — E11.29 TYPE 2 DIABETES MELLITUS WITH MICROALBUMINURIA, WITH LONG-TERM CURRENT USE OF INSULIN (H): ICD-10-CM

## 2024-05-31 DIAGNOSIS — Z79.4 TYPE 2 DIABETES MELLITUS WITH MICROALBUMINURIA, WITH LONG-TERM CURRENT USE OF INSULIN (H): ICD-10-CM

## 2024-05-31 DIAGNOSIS — R80.9 TYPE 2 DIABETES MELLITUS WITH MICROALBUMINURIA, WITH LONG-TERM CURRENT USE OF INSULIN (H): ICD-10-CM

## 2024-06-02 ENCOUNTER — MYC REFILL (OUTPATIENT)
Dept: FAMILY MEDICINE | Facility: CLINIC | Age: 54
End: 2024-06-02
Payer: COMMERCIAL

## 2024-06-02 DIAGNOSIS — E11.49 OTHER DIABETIC NEUROLOGICAL COMPLICATION ASSOCIATED WITH TYPE 2 DIABETES MELLITUS (H): ICD-10-CM

## 2024-06-02 DIAGNOSIS — Z79.4 TYPE 2 DIABETES MELLITUS WITH MICROALBUMINURIA, WITH LONG-TERM CURRENT USE OF INSULIN (H): ICD-10-CM

## 2024-06-02 DIAGNOSIS — E11.29 TYPE 2 DIABETES MELLITUS WITH MICROALBUMINURIA, WITH LONG-TERM CURRENT USE OF INSULIN (H): ICD-10-CM

## 2024-06-02 DIAGNOSIS — R80.9 TYPE 2 DIABETES MELLITUS WITH MICROALBUMINURIA, WITH LONG-TERM CURRENT USE OF INSULIN (H): ICD-10-CM

## 2024-06-02 RX ORDER — PEN NEEDLE, DIABETIC 32GX 5/32"
NEEDLE, DISPOSABLE MISCELLANEOUS
Qty: 400 EACH | Refills: 0 | Status: CANCELLED | OUTPATIENT
Start: 2024-06-02

## 2024-06-03 RX ORDER — LANCETS
EACH MISCELLANEOUS
Qty: 200 EACH | Refills: 3 | Status: SHIPPED | OUTPATIENT
Start: 2024-06-03 | End: 2024-06-05

## 2024-06-05 RX ORDER — LANCETS
EACH MISCELLANEOUS
Qty: 200 EACH | Refills: 3 | Status: SHIPPED | OUTPATIENT
Start: 2024-06-05

## 2024-06-29 ENCOUNTER — APPOINTMENT (OUTPATIENT)
Dept: ULTRASOUND IMAGING | Facility: HOSPITAL | Age: 54
End: 2024-06-29
Attending: EMERGENCY MEDICINE
Payer: COMMERCIAL

## 2024-06-29 ENCOUNTER — APPOINTMENT (OUTPATIENT)
Dept: CT IMAGING | Facility: HOSPITAL | Age: 54
End: 2024-06-29
Attending: EMERGENCY MEDICINE
Payer: COMMERCIAL

## 2024-06-29 ENCOUNTER — HOSPITAL ENCOUNTER (EMERGENCY)
Facility: HOSPITAL | Age: 54
Discharge: HOME | End: 2024-06-29
Attending: EMERGENCY MEDICINE | Admitting: EMERGENCY MEDICINE
Payer: COMMERCIAL

## 2024-06-29 VITALS
HEART RATE: 63 BPM | SYSTOLIC BLOOD PRESSURE: 159 MMHG | OXYGEN SATURATION: 97 % | DIASTOLIC BLOOD PRESSURE: 90 MMHG | RESPIRATION RATE: 19 BRPM | TEMPERATURE: 97.8 F

## 2024-06-29 DIAGNOSIS — R10.9 RIGHT FLANK PAIN: ICD-10-CM

## 2024-06-29 LAB
ALBUMIN SERPL BCG-MCNC: 4.7 G/DL (ref 3.5–5.2)
ALBUMIN UR-MCNC: 30 MG/DL
ALP SERPL-CCNC: 131 U/L (ref 40–150)
ALT SERPL W P-5'-P-CCNC: 18 U/L (ref 0–70)
ANION GAP SERPL CALCULATED.3IONS-SCNC: 13 MMOL/L (ref 7–15)
APPEARANCE UR: CLEAR
AST SERPL W P-5'-P-CCNC: 23 U/L (ref 0–45)
BILIRUB SERPL-MCNC: 0.3 MG/DL
BILIRUB UR QL STRIP: NEGATIVE
BUN SERPL-MCNC: 19.2 MG/DL (ref 6–20)
CALCIUM SERPL-MCNC: 10.4 MG/DL (ref 8.6–10)
CHLORIDE SERPL-SCNC: 98 MMOL/L (ref 98–107)
COLOR UR AUTO: COLORLESS
CREAT SERPL-MCNC: 0.97 MG/DL (ref 0.67–1.17)
CRP SERPL-MCNC: 14.2 MG/L
D DIMER PPP FEU-MCNC: 0.37 UG/ML FEU (ref 0–0.5)
DEPRECATED HCO3 PLAS-SCNC: 30 MMOL/L (ref 22–29)
EGFRCR SERPLBLD CKD-EPI 2021: >90 ML/MIN/1.73M2
ERYTHROCYTE [DISTWIDTH] IN BLOOD BY AUTOMATED COUNT: 13.8 % (ref 10–15)
GLUCOSE SERPL-MCNC: 194 MG/DL (ref 70–99)
GLUCOSE UR STRIP-MCNC: NEGATIVE MG/DL
HCT VFR BLD AUTO: 45.5 % (ref 40–53)
HGB BLD-MCNC: 15.3 G/DL (ref 13.3–17.7)
HGB UR QL STRIP: NEGATIVE
KETONES UR STRIP-MCNC: NEGATIVE MG/DL
LEUKOCYTE ESTERASE UR QL STRIP: NEGATIVE
LIPASE SERPL-CCNC: 63 U/L (ref 13–60)
MCH RBC QN AUTO: 26.2 PG (ref 26.5–33)
MCHC RBC AUTO-ENTMCNC: 33.6 G/DL (ref 31.5–36.5)
MCV RBC AUTO: 78 FL (ref 78–100)
MUCOUS THREADS #/AREA URNS LPF: PRESENT /LPF
NITRATE UR QL: NEGATIVE
PH UR STRIP: 7 [PH] (ref 5–7)
PLATELET # BLD AUTO: 215 10E3/UL (ref 150–450)
POTASSIUM SERPL-SCNC: 4 MMOL/L (ref 3.4–5.3)
PROT SERPL-MCNC: 8.7 G/DL (ref 6.4–8.3)
RBC # BLD AUTO: 5.83 10E6/UL (ref 4.4–5.9)
RBC URINE: <1 /HPF
SODIUM SERPL-SCNC: 141 MMOL/L (ref 135–145)
SP GR UR STRIP: 1.01 (ref 1–1.03)
SQUAMOUS EPITHELIAL: <1 /HPF
TROPONIN T SERPL HS-MCNC: 18 NG/L
UROBILINOGEN UR STRIP-MCNC: <2 MG/DL
WBC # BLD AUTO: 9.5 10E3/UL (ref 4–11)
WBC URINE: 0 /HPF

## 2024-06-29 PROCEDURE — 83690 ASSAY OF LIPASE: CPT | Performed by: EMERGENCY MEDICINE

## 2024-06-29 PROCEDURE — 36415 COLL VENOUS BLD VENIPUNCTURE: CPT | Performed by: EMERGENCY MEDICINE

## 2024-06-29 PROCEDURE — 93005 ELECTROCARDIOGRAM TRACING: CPT | Performed by: EMERGENCY MEDICINE

## 2024-06-29 PROCEDURE — 99285 EMERGENCY DEPT VISIT HI MDM: CPT | Mod: 25

## 2024-06-29 PROCEDURE — 250N000011 HC RX IP 250 OP 636: Performed by: EMERGENCY MEDICINE

## 2024-06-29 PROCEDURE — 86140 C-REACTIVE PROTEIN: CPT | Performed by: EMERGENCY MEDICINE

## 2024-06-29 PROCEDURE — 85027 COMPLETE CBC AUTOMATED: CPT | Performed by: EMERGENCY MEDICINE

## 2024-06-29 PROCEDURE — 76705 ECHO EXAM OF ABDOMEN: CPT

## 2024-06-29 PROCEDURE — 258N000003 HC RX IP 258 OP 636: Performed by: EMERGENCY MEDICINE

## 2024-06-29 PROCEDURE — 96361 HYDRATE IV INFUSION ADD-ON: CPT

## 2024-06-29 PROCEDURE — 74176 CT ABD & PELVIS W/O CONTRAST: CPT

## 2024-06-29 PROCEDURE — 81003 URINALYSIS AUTO W/O SCOPE: CPT | Performed by: EMERGENCY MEDICINE

## 2024-06-29 PROCEDURE — 85379 FIBRIN DEGRADATION QUANT: CPT | Performed by: EMERGENCY MEDICINE

## 2024-06-29 PROCEDURE — 96374 THER/PROPH/DIAG INJ IV PUSH: CPT

## 2024-06-29 PROCEDURE — 84484 ASSAY OF TROPONIN QUANT: CPT | Performed by: EMERGENCY MEDICINE

## 2024-06-29 PROCEDURE — 80053 COMPREHEN METABOLIC PANEL: CPT | Performed by: EMERGENCY MEDICINE

## 2024-06-29 RX ORDER — KETOROLAC TROMETHAMINE 15 MG/ML
15 INJECTION, SOLUTION INTRAMUSCULAR; INTRAVENOUS ONCE
Status: COMPLETED | OUTPATIENT
Start: 2024-06-29 | End: 2024-06-29

## 2024-06-29 RX ORDER — POLYETHYLENE GLYCOL 3350 17 G/17G
1 POWDER, FOR SOLUTION ORAL DAILY
Qty: 527 G | Refills: 0 | Status: SHIPPED | OUTPATIENT
Start: 2024-06-29 | End: 2024-07-29

## 2024-06-29 RX ORDER — KETOROLAC TROMETHAMINE 10 MG/1
10 TABLET, FILM COATED ORAL EVERY 6 HOURS PRN
Qty: 20 TABLET | Refills: 0 | Status: SHIPPED | OUTPATIENT
Start: 2024-06-29

## 2024-06-29 RX ADMIN — SODIUM CHLORIDE 1000 ML: 9 INJECTION, SOLUTION INTRAVENOUS at 01:29

## 2024-06-29 RX ADMIN — KETOROLAC TROMETHAMINE 15 MG: 15 INJECTION, SOLUTION INTRAMUSCULAR; INTRAVENOUS at 01:27

## 2024-06-29 ASSESSMENT — COLUMBIA-SUICIDE SEVERITY RATING SCALE - C-SSRS
1. IN THE PAST MONTH, HAVE YOU WISHED YOU WERE DEAD OR WISHED YOU COULD GO TO SLEEP AND NOT WAKE UP?: NO
6. HAVE YOU EVER DONE ANYTHING, STARTED TO DO ANYTHING, OR PREPARED TO DO ANYTHING TO END YOUR LIFE?: NO
2. HAVE YOU ACTUALLY HAD ANY THOUGHTS OF KILLING YOURSELF IN THE PAST MONTH?: NO

## 2024-06-29 ASSESSMENT — ACTIVITIES OF DAILY LIVING (ADL)
ADLS_ACUITY_SCORE: 35
ADLS_ACUITY_SCORE: 37

## 2024-06-29 NOTE — ED TRIAGE NOTES
The patient reports that he has had pain under the RLQ for 3 days. The pain has increased and now radiates to just below his rib cage. He denies recent trauma. This has never happened to him before.

## 2024-06-29 NOTE — DISCHARGE INSTRUCTIONS
You were seen in the Emergency Department today for abdominal pain.     Over the next few days, rest and drink plenty of water and other fluids.  Fizzy liquids like ginger ale might feel better inyour stomach.  Eat foods that are gentle on your stomach, like the BRAT diet (Banana, Rice, Apple Sauce, Toast).      You can continue to take the Tylenol like you have been and I also wrote you a prescription for the Toradol which is a medicine that you got here. Otherwise, please continue to take your other medications as prescribed.       Please return to the ER if you experience fever, inability to keep food/fluids down, worsening pain, and/or for any other new or concerning symptoms, otherwise please follow up with your primary doctor in 1-2 days for recheck.     Below is some information you might find useful.     Thank you for choosing [facility]. It was a pleasure taking care of you today!  - Dr. Smita Graf

## 2024-06-29 NOTE — ED TRIAGE NOTES
Triage Assessment (Adult)       Row Name 06/29/24 0010          Triage Assessment    Airway WDL WDL        Cognitive/Neuro/Behavioral WDL    Cognitive/Neuro/Behavioral WDL WDL

## 2024-06-29 NOTE — ED PROVIDER NOTES
EMERGENCY DEPARTMENT ENCOUNTER      NAME: Jerry Bustamante  AGE: 54 year old male  YOB: 1970  MRN: [id number]  EVALUATION DATE & TIME: 6/29/2024 12:10 AM    ED PROVIDER: Smita Graf MD    Chief Complaint   Patient presents with    Abdominal Pain       FINAL IMPRESSION  1. Right flank pain        MEDICAL DECISION MAKING   Pertinent Labs & Imaging studies reviewed. (See chart for details)    Jerry Bustamante is a 54 year old male who presents for evaluation of right flank/abdominal pain.  Outside records reviewed.  Patient has a history of ischemic cardiomyopathy with reduced ejection fraction, coronary artery disease status post PCI and CABG, type 2 diabetes, hypertension, hyperlipidemia.  She follows with cardiology but has not been seen by their team recently.  He presents today with complaints of right-sided flank/abdominal pain.  Additionally, he reports that he has had some increasing discomfort with deep inspiration, and urinary symptoms.  He also notes that he has had at least a month of cough and shortness of breath but this has not changed or worsen with the abdominal discomfort.  He has not any fever, chills, nausea, vomiting, diarrhea, or constipation.  He does note that he has been working out recently and is not sure if he might of strained something in his chest/abdomen.  He has been taking all of his medications as prescribed.  He does not have a history of kidney stones, kidney infections, or abdominal surgeries.    I considered a broad differential including but not limited to nephrolithiasis/ureterolithiasis, renal colic, pyelonephritis, UTI, hydronephrosis, thoracic or abdominal wall strain, KARLI, electrolyte derangement, hepatobiliary disease, muscular strain/sprain, PE, PNA, CHF, atypical ACS/ischemia. I have low suspicion for AAA/dissection or other cardiovascular process given history and exam. Discussed options for workup and management. We agreed on plan for labs, UA, CT  abdomen/pelvis, US RUQ, and ECG. Will manage symptoms with IV fluids and IV analgesic/antiemetic.     ECG reassuring without evidence of acute ischemia.      ED Course as of 06/29/24 0738   Sat Jun 29, 2024   0150 UA with Microscopic reflex to Culture(!)  UA without evidence of infection. No hematuria to suggest nephrolithiasis/ureterolithiasis.    0150 CBC (+ platelets, no diff)(!)  CBC reassuring. No evidence of leukocytosis to suggest systemic infectious/inflammatory process. No acute anemia. PLTs wnl.    0230 Comprehensive metabolic panel(!)  CMP reassuring. No evidence of KARLI, acidosis, or significant electrolyte derangement. No acute elevation of bilirubin or transaminates to suggest acute hepatobiliary process.    0231 Lipase(!): 63  Patient minimally elevated, would expect higher if symptoms related to acute pancreatitis.   0231 D-Dimer Quantitative: 0.37  D-dimer negative. With this, I have low suspicion for PE and do not believe further workup with CT PE study necessary.    0232 Troponin T, High Sensitivity: 18  High sensitivity troponin below age adjusted cutoff. ACS less likely in the setting of ECG without acute ischemic changes and I do not feel delta troponin necessary given timing of symptoms.     0232 CRP Inflammation(!): 14.20  CRP elevated and concerning for infection/inflammation.  Unclear etiology, as WBC is within normal limits.  No fever.   0323 US Abdomen Limited  Ultrasound with hepatic steatosis but no evidence of cholelithiasis, cholecystitis, or other acute process to explain symptoms.   0347 CT Abdomen Pelvis w/o Contrast  No acute intra-abdominal process to explain symptoms.  Patient was noted to have thickening of urinary bladder but UA showed no evidence of cystitis/UTI.  I suspect this may be related to BPH.     I rechecked the patient multiple times.  He had complete resolution of symptoms after initial interventions.  He has remained hemodynamically stable and tolerating p.o.  without difficulty.  Patient was very eager to go home today and comfortable with plan for continued conservative management of symptoms and close follow-up with primary care provider.    At the end of the encounter, we reviewed the results, potential diagnoses, as well as return precautions and recommendations for follow up. I instructed Mr. Bustamante to return to the emergency department immediately if he develops any new or worsening symptoms and provided additional verbal discharge instructions. Mr. Bustamante expressed understanding and agreement with this plan of care, his questions were answered, and he was discharged in stable condition.      Considerations in Medical Decision Making  History:  Supplemental history from: N/A  External Record(s) reviewed: Documented in chart    Work Up:  Chart documentation includes differential considered and any EKGs or imaging independently interpreted by provider, where specified.  In additional to work up documented, I considered the following work up: Documented in chart, if applicable.    External consultation:  Discussion of management with another provider: Documented in chart, if applicable    Complicating factors:  Care impacted by chronic illness: Cerebrovascular Disease, Chronic Kidney Disease, Diabetes, Hyperlipidemia, and Hypertension  Care affected by social determinants of health: Access to care; Access to Medical Care    Disposition considerations: Discharge. I prescribed additional prescription strength medication(s) as charted. I considered admission, but discharged patient after significant clinical improvement.      ED COURSE  12:34 AM I met with the patient to gather history and perform my exam. ED course and treatment discussed.   3:04 AM I updated the patient with ultrasound and lab results   3:49 AM I updated the patient with CT scan results     MEDICATIONS GIVEN IN THE ED  Medications   ketorolac (TORADOL) injection 15 mg (15 mg Intravenous $Given 6/29/24  "0127)   sodium chloride 0.9% BOLUS 1,000 mL (0 mLs Intravenous Stopped 6/29/24 0246)       NEW PRESCRIPTIONS STARTED AT TODAY'S VISIT  Discharge Medication List as of 6/29/2024  3:53 AM        START taking these medications    Details   ketorolac (TORADOL) 10 MG tablet Take 1 tablet (10 mg) by mouth every 6 hours as needed for moderate pain, Disp-20 tablet, R-0, Local Print      polyethylene glycol (MIRALAX) 17 GM/Dose powder Take 17 g (1 Capful) by mouth daily for 30 days, Disp-527 g, R-0, Local Print                =================================================================    HPI:    Patient information was obtained from: Patient     Use of : N/A      Jerry Bustamante is a 54 year old male who presents with abdominal pain and cough     Patient reports right upper quadrant abdominal pain located just under his ribs for the past 3 days.  Patient reports no pain to the area with palpation, but experiences pain with deep breathing.  Patient also reports a cough for the last month as well as \"sometimes\" experiencing shortness of breath.  Patient reports history of diabetes and notes that when his blood sugar is high he feels short of breath.  The patient also states he feels \"a little\" dysuria.  The patient has been taking Tylenol and massaging his abdomen without relief.  The patient denies any history of abdominal surgeries.  Patient denies any fever, nausea, vomiting, diarrhea, hematuria, or any other symptoms or complaints.    RELEVANT HISTORY, MEDICATIONS, & ALLERGIES   Past medical history, surgical history, family history, medications, and allergies reviewed and pertinent noted in HPI.    REVIEW OF SYSTEMS:  A complete review of systems was performed with pertinent positives and negatives noted in the HPI. All other systems negative.     PHYSICAL EXAM:    Vitals: BP (!) 159/90   Pulse 63   Temp 97.8  F (36.6  C) (Oral)   Resp 19   SpO2 97%    General: Alert and interactive, comfortable " appearing.  HENT: Atraumatic. Full AROM of neck. MMM.  Cardiovascular: Regular rate and rhythm.   Chest/Pulmonary: Normal work of breathing. Speaking in complete sentences. Lungs CTAB. No chest wall tenderness or deformities.  Abdomen: Soft, nondistended. Nontender without guarding or rebound.  Extremities: Normal AROM of all major joints.  Skin: Warm and dry. Normal skin color.   Neuro: Speech clear. CNs grossly intact. Moves all extremities spontaneously.   Psych: Normal affect/mood, cooperative, memory appropriate.      LAB  Labs Ordered and Resulted from Time of ED Arrival to Time of ED Departure   COMPREHENSIVE METABOLIC PANEL - Abnormal       Result Value    Sodium 141      Potassium 4.0      Carbon Dioxide (CO2) 30 (*)     Anion Gap 13      Urea Nitrogen 19.2      Creatinine 0.97      GFR Estimate >90      Calcium 10.4 (*)     Chloride 98      Glucose 194 (*)     Alkaline Phosphatase 131      AST 23      ALT 18      Protein Total 8.7 (*)     Albumin 4.7      Bilirubin Total 0.3     LIPASE - Abnormal    Lipase 63 (*)    CBC WITH PLATELETS - Abnormal    WBC Count 9.5      RBC Count 5.83      Hemoglobin 15.3      Hematocrit 45.5      MCV 78      MCH 26.2 (*)     MCHC 33.6      RDW 13.8      Platelet Count 215     CRP INFLAMMATION - Abnormal    CRP Inflammation 14.20 (*)    ROUTINE UA WITH MICROSCOPIC REFLEX TO CULTURE - Abnormal    Color Urine Colorless      Appearance Urine Clear      Glucose Urine Negative      Bilirubin Urine Negative      Ketones Urine Negative      Specific Gravity Urine 1.008      Blood Urine Negative      pH Urine 7.0      Protein Albumin Urine 30 (*)     Urobilinogen Urine <2.0      Nitrite Urine Negative      Leukocyte Esterase Urine Negative      Mucus Urine Present (*)     RBC Urine <1      WBC Urine 0      Squamous Epithelials Urine <1     D DIMER QUANTITATIVE - Normal    D-Dimer Quantitative 0.37     TROPONIN T, HIGH SENSITIVITY - Normal    Troponin T, High Sensitivity 18          RADIOLOGY  CT Abdomen Pelvis w/o Contrast   Final Result   IMPRESSION:    1.  Diffusely thickened urinary bladder, suspicious for cystitis. Recommend correlation with urinalysis.   2.  No ureterolithiasis or hydronephrosis.   3.  Probable mild hepatic steatosis. Tiny layering gallstones and/or dense sludge within the dependent gallbladder. Gallbladder is mildly distended which may be related to fasting state, otherwise unremarkable.         US Abdomen Limited   Final Result   IMPRESSION:   1.  Hepatic steatosis.                EKG  Performed at: 06/29/24 at 12:58 AM  Impression: NSR. No acute ischemic changes. TWI lateral leads. No significant change from previous.   Rate: 68   Rhythm: Sinus rhythm   QRS Interval: 86  QTc Interval: 397  Comparison: 7/14/23    All laboratory and imaging results and EKG's were personally reviewed and interpreted by myself prior to disposition decision.       I, Denise Rodriguez, am serving as a scribe to document services personally performed by Dr. Smita Graf based on my observation and the provider's statements to me. I, Smita Graf MD attest that Denise Rodriguez is acting in a scribe capacity, has observed my performance of the services and has documented them in accordance with my direction.    Smita Graf M.D.  Emergency Medicine  [facility] EMERGENCY DEPARTMENT  [facility] [facility] [id number] [phone]  Dept: [phone]     Smita Graf MD  06/29/24 0701

## 2024-07-06 LAB
ATRIAL RATE - MUSE: 68 BPM
DIASTOLIC BLOOD PRESSURE - MUSE: NORMAL MMHG
INTERPRETATION ECG - MUSE: NORMAL
P AXIS - MUSE: 43 DEGREES
PR INTERVAL - MUSE: 178 MS
QRS DURATION - MUSE: 86 MS
QT - MUSE: 374 MS
QTC - MUSE: 397 MS
R AXIS - MUSE: -5 DEGREES
SYSTOLIC BLOOD PRESSURE - MUSE: NORMAL MMHG
T AXIS - MUSE: 148 DEGREES
VENTRICULAR RATE- MUSE: 68 BPM

## 2024-07-13 ENCOUNTER — HEALTH MAINTENANCE LETTER (OUTPATIENT)
Age: 54
End: 2024-07-13

## 2024-07-19 ENCOUNTER — MYC REFILL (OUTPATIENT)
Dept: FAMILY MEDICINE | Facility: CLINIC | Age: 54
End: 2024-07-19
Payer: COMMERCIAL

## 2024-07-19 DIAGNOSIS — R80.9 TYPE 2 DIABETES MELLITUS WITH MICROALBUMINURIA, WITH LONG-TERM CURRENT USE OF INSULIN (H): ICD-10-CM

## 2024-07-19 DIAGNOSIS — E11.29 TYPE 2 DIABETES MELLITUS WITH MICROALBUMINURIA, WITH LONG-TERM CURRENT USE OF INSULIN (H): ICD-10-CM

## 2024-07-19 DIAGNOSIS — I10 BENIGN ESSENTIAL HYPERTENSION: ICD-10-CM

## 2024-07-19 DIAGNOSIS — Z79.4 TYPE 2 DIABETES MELLITUS WITH MICROALBUMINURIA, WITH LONG-TERM CURRENT USE OF INSULIN (H): ICD-10-CM

## 2024-07-19 DIAGNOSIS — E11.49 OTHER DIABETIC NEUROLOGICAL COMPLICATION ASSOCIATED WITH TYPE 2 DIABETES MELLITUS (H): ICD-10-CM

## 2024-07-19 RX ORDER — PEN NEEDLE, DIABETIC 32GX 5/32"
NEEDLE, DISPOSABLE MISCELLANEOUS
Qty: 400 EACH | Refills: 0 | Status: SHIPPED | OUTPATIENT
Start: 2024-07-19

## 2024-07-19 RX ORDER — DAPAGLIFLOZIN 10 MG/1
10 TABLET, FILM COATED ORAL DAILY
Qty: 30 TABLET | Refills: 0 | Status: SHIPPED | OUTPATIENT
Start: 2024-07-19 | End: 2024-08-14

## 2024-07-19 RX ORDER — AMLODIPINE BESYLATE 5 MG/1
5 TABLET ORAL DAILY
Qty: 90 TABLET | Refills: 0 | Status: SHIPPED | OUTPATIENT
Start: 2024-07-19

## 2024-07-19 NOTE — TELEPHONE ENCOUNTER
Sent pt [employer] message to let him know that dapagliflozin and amlodipine have been refilled, but he will need a follow up/ med check per Dr. Youssef (not due for physical).

## 2024-07-19 NOTE — TELEPHONE ENCOUNTER
Sent pt [employer] message to let him know that dapagliflozin and amlodipine have been refilled, but he will need a follow up/ med check per Dr. Youssef (not due for physical).

## 2024-07-22 ENCOUNTER — MYC REFILL (OUTPATIENT)
Dept: FAMILY MEDICINE | Facility: CLINIC | Age: 54
End: 2024-07-22
Payer: COMMERCIAL

## 2024-07-22 DIAGNOSIS — E11.29 TYPE 2 DIABETES MELLITUS WITH MICROALBUMINURIA, WITH LONG-TERM CURRENT USE OF INSULIN (H): ICD-10-CM

## 2024-07-22 DIAGNOSIS — R80.9 TYPE 2 DIABETES MELLITUS WITH MICROALBUMINURIA, WITH LONG-TERM CURRENT USE OF INSULIN (H): ICD-10-CM

## 2024-07-22 DIAGNOSIS — Z95.1 S/P CABG (CORONARY ARTERY BYPASS GRAFT): ICD-10-CM

## 2024-07-22 DIAGNOSIS — I10 BENIGN ESSENTIAL HYPERTENSION: ICD-10-CM

## 2024-07-22 DIAGNOSIS — E11.49 OTHER DIABETIC NEUROLOGICAL COMPLICATION ASSOCIATED WITH TYPE 2 DIABETES MELLITUS (H): ICD-10-CM

## 2024-07-22 DIAGNOSIS — Z79.4 TYPE 2 DIABETES MELLITUS WITH MICROALBUMINURIA, WITH LONG-TERM CURRENT USE OF INSULIN (H): ICD-10-CM

## 2024-07-22 RX ORDER — METOPROLOL TARTRATE 50 MG
50 TABLET ORAL 2 TIMES DAILY
Qty: 180 TABLET | Refills: 0 | Status: SHIPPED | OUTPATIENT
Start: 2024-07-22 | End: 2024-10-20

## 2024-07-23 RX ORDER — ROSUVASTATIN CALCIUM 10 MG/1
10 TABLET, COATED ORAL DAILY
Qty: 90 TABLET | Refills: 1 | Status: SHIPPED | OUTPATIENT
Start: 2024-07-23

## 2024-07-23 RX ORDER — DAPAGLIFLOZIN 10 MG/1
10 TABLET, FILM COATED ORAL DAILY
Qty: 30 TABLET | Refills: 0 | OUTPATIENT
Start: 2024-07-23

## 2024-07-23 RX ORDER — LOSARTAN POTASSIUM 50 MG/1
50 TABLET ORAL 2 TIMES DAILY
Qty: 180 TABLET | Refills: 3 | OUTPATIENT
Start: 2024-07-23

## 2024-07-23 RX ORDER — GLUCOSAMINE HCL/CHONDROITIN SU 500-400 MG
CAPSULE ORAL
Qty: 100 EACH | Refills: 0 | Status: SHIPPED | OUTPATIENT
Start: 2024-07-23 | End: 2024-08-07

## 2024-07-23 NOTE — TELEPHONE ENCOUNTER
Call to let patient know:     Been more than 1 year since last seen.     Needs to be seen by PCP for ongoing refills.

## 2024-07-24 RX ORDER — LOSARTAN POTASSIUM 50 MG/1
50 TABLET ORAL 2 TIMES DAILY
Qty: 180 TABLET | Refills: 1 | Status: SHIPPED | OUTPATIENT
Start: 2024-07-24 | End: 2024-08-14

## 2024-07-31 ENCOUNTER — TELEPHONE (OUTPATIENT)
Dept: FAMILY MEDICINE | Facility: CLINIC | Age: 54
End: 2024-07-31
Payer: COMMERCIAL

## 2024-07-31 ENCOUNTER — MYC REFILL (OUTPATIENT)
Dept: FAMILY MEDICINE | Facility: CLINIC | Age: 54
End: 2024-07-31
Payer: COMMERCIAL

## 2024-07-31 DIAGNOSIS — R80.9 TYPE 2 DIABETES MELLITUS WITH MICROALBUMINURIA, WITH LONG-TERM CURRENT USE OF INSULIN (H): ICD-10-CM

## 2024-07-31 DIAGNOSIS — E11.29 TYPE 2 DIABETES MELLITUS WITH MICROALBUMINURIA, WITH LONG-TERM CURRENT USE OF INSULIN (H): ICD-10-CM

## 2024-07-31 DIAGNOSIS — Z79.4 TYPE 2 DIABETES MELLITUS WITH MICROALBUMINURIA, WITH LONG-TERM CURRENT USE OF INSULIN (H): ICD-10-CM

## 2024-07-31 RX ORDER — DAPAGLIFLOZIN 10 MG/1
10 TABLET, FILM COATED ORAL DAILY
Qty: 30 TABLET | Refills: 0 | OUTPATIENT
Start: 2024-07-31

## 2024-08-07 ENCOUNTER — MYC REFILL (OUTPATIENT)
Dept: FAMILY MEDICINE | Facility: CLINIC | Age: 54
End: 2024-08-07
Payer: COMMERCIAL

## 2024-08-07 DIAGNOSIS — E11.49 OTHER DIABETIC NEUROLOGICAL COMPLICATION ASSOCIATED WITH TYPE 2 DIABETES MELLITUS (H): ICD-10-CM

## 2024-08-07 RX ORDER — GLUCOSAMINE HCL/CHONDROITIN SU 500-400 MG
CAPSULE ORAL
Qty: 100 EACH | Refills: 0 | Status: SHIPPED | OUTPATIENT
Start: 2024-08-07 | End: 2024-08-16

## 2024-08-14 ENCOUNTER — OFFICE VISIT (OUTPATIENT)
Dept: FAMILY MEDICINE | Facility: CLINIC | Age: 54
End: 2024-08-14
Attending: INTERNAL MEDICINE
Payer: COMMERCIAL

## 2024-08-14 VITALS
OXYGEN SATURATION: 97 % | HEIGHT: 70 IN | BODY MASS INDEX: 28.63 KG/M2 | DIASTOLIC BLOOD PRESSURE: 80 MMHG | RESPIRATION RATE: 18 BRPM | WEIGHT: 200 LBS | SYSTOLIC BLOOD PRESSURE: 142 MMHG | TEMPERATURE: 98.1 F | HEART RATE: 67 BPM

## 2024-08-14 DIAGNOSIS — Z79.4 TYPE 2 DIABETES MELLITUS WITH MICROALBUMINURIA, WITH LONG-TERM CURRENT USE OF INSULIN (H): Primary | ICD-10-CM

## 2024-08-14 DIAGNOSIS — I10 BENIGN ESSENTIAL HYPERTENSION: ICD-10-CM

## 2024-08-14 DIAGNOSIS — E11.29 TYPE 2 DIABETES MELLITUS WITH MICROALBUMINURIA, WITH LONG-TERM CURRENT USE OF INSULIN (H): Primary | ICD-10-CM

## 2024-08-14 DIAGNOSIS — R80.9 TYPE 2 DIABETES MELLITUS WITH MICROALBUMINURIA, WITH LONG-TERM CURRENT USE OF INSULIN (H): Primary | ICD-10-CM

## 2024-08-14 DIAGNOSIS — E78.5 DYSLIPIDEMIA: ICD-10-CM

## 2024-08-14 DIAGNOSIS — E11.49 OTHER DIABETIC NEUROLOGICAL COMPLICATION ASSOCIATED WITH TYPE 2 DIABETES MELLITUS (H): ICD-10-CM

## 2024-08-14 LAB — HBA1C MFR BLD: 7.8 % (ref 0–5.6)

## 2024-08-14 PROCEDURE — 99214 OFFICE O/P EST MOD 30 MIN: CPT | Performed by: FAMILY MEDICINE

## 2024-08-14 PROCEDURE — 82043 UR ALBUMIN QUANTITATIVE: CPT | Performed by: FAMILY MEDICINE

## 2024-08-14 PROCEDURE — 82570 ASSAY OF URINE CREATININE: CPT | Performed by: FAMILY MEDICINE

## 2024-08-14 PROCEDURE — 80053 COMPREHEN METABOLIC PANEL: CPT | Performed by: FAMILY MEDICINE

## 2024-08-14 PROCEDURE — 80061 LIPID PANEL: CPT | Performed by: FAMILY MEDICINE

## 2024-08-14 PROCEDURE — 83036 HEMOGLOBIN GLYCOSYLATED A1C: CPT | Performed by: FAMILY MEDICINE

## 2024-08-14 PROCEDURE — 36415 COLL VENOUS BLD VENIPUNCTURE: CPT | Performed by: FAMILY MEDICINE

## 2024-08-14 PROCEDURE — G2211 COMPLEX E/M VISIT ADD ON: HCPCS | Performed by: FAMILY MEDICINE

## 2024-08-14 RX ORDER — LOSARTAN POTASSIUM 50 MG/1
50 TABLET ORAL DAILY
Status: SHIPPED
Start: 2024-08-14

## 2024-08-14 RX ORDER — DAPAGLIFLOZIN 10 MG/1
10 TABLET, FILM COATED ORAL DAILY
Qty: 90 TABLET | Refills: 1 | Status: SHIPPED | OUTPATIENT
Start: 2024-08-14

## 2024-08-14 ASSESSMENT — ASTHMA QUESTIONNAIRES
QUESTION_4 LAST FOUR WEEKS HOW OFTEN HAVE YOU USED YOUR RESCUE INHALER OR NEBULIZER MEDICATION (SUCH AS ALBUTEROL): NOT AT ALL
QUESTION_5 LAST FOUR WEEKS HOW WOULD YOU RATE YOUR ASTHMA CONTROL: COMPLETELY CONTROLLED
QUESTION_2 LAST FOUR WEEKS HOW OFTEN HAVE YOU HAD SHORTNESS OF BREATH: NOT AT ALL
QUESTION_1 LAST FOUR WEEKS HOW MUCH OF THE TIME DID YOUR ASTHMA KEEP YOU FROM GETTING AS MUCH DONE AT WORK, SCHOOL OR AT HOME: NONE OF THE TIME
ACT_TOTALSCORE: 25
QUESTION_3 LAST FOUR WEEKS HOW OFTEN DID YOUR ASTHMA SYMPTOMS (WHEEZING, COUGHING, SHORTNESS OF BREATH, CHEST TIGHTNESS OR PAIN) WAKE YOU UP AT NIGHT OR EARLIER THAN USUAL IN THE MORNING: NOT AT ALL
ACT_TOTALSCORE: 25

## 2024-08-14 NOTE — ASSESSMENT & PLAN NOTE
Normotensive     -Satisfactory metabolic panel    Continue current management   Follow up in 6 months

## 2024-08-14 NOTE — PROGRESS NOTES
"  Assessment & Plan   Problem List Items Addressed This Visit       Dyslipidemia     On Rosuvastatin 10 mg, no side effects or intolerance     Lipids are satisfactory   Continue current management          Benign essential hypertension     Normotensive     -Satisfactory metabolic panel    Continue current management   Follow up in 6 months          Relevant Medications    losartan (COZAAR) 50 MG tablet    Other diabetic neurological complication associated with type 2 diabetes mellitus (H)     Diabetic neuropathy well-controlled on gabapentin.  BG averaging in the mid 100's.  Significantly better since starting Farxiga 10 mg,  No longer on insulins     -Positive and stable microalbumin, elevated blood glucose and improving A1c to 7.8 from 9.2 from 5 months ago    Monitor BG's, keep average < 150   Continue current management   Follow up in 6 months          Relevant Medications    dapagliflozin (FARXIGA) 10 MG TABS tablet    metFORMIN (GLUCOPHAGE) 500 MG tablet     Other Visit Diagnoses       Type 2 diabetes mellitus with microalbuminuria, with long-term current use of insulin (H)    -  Primary    Relevant Medications    dapagliflozin (FARXIGA) 10 MG TABS tablet    metFORMIN (GLUCOPHAGE) 500 MG tablet    losartan (COZAAR) 50 MG tablet    Other Relevant Orders    Hemoglobin A1c (Completed)    Comprehensive metabolic panel (Completed)    Lipid panel reflex to direct LDL Fasting (Completed)    Albumin Random Urine Quantitative with Creat Ratio (Completed)                  BMI  Estimated body mass index is 28.7 kg/m  as calculated from the following:    Height as of this encounter: 1.778 m (5' 10\").    Weight as of this encounter: 90.7 kg (200 lb).   Weight management plan: Discussed healthy diet and exercise guidelines    The longitudinal plan of care for the diagnosis(es)/condition(s) as documented were addressed during this visit. Due to the added complexity in care, I will continue to support Simone in the subsequent " management and with ongoing continuity of care.      David Nichols is a 54 year old, presenting for the med check and follow-up of hypertension and diabetes      Type 2 diabetes mellitus with microalbuminuria,   His BG averaging in the mid 100's.  Significantly better since starting Farxiga 10 mg, currently no side effects or intolerance.  He is no longer needed to take the insulins due to lower BG, exercising, following diabetic diet , and has managed to lose some weight.   He continues to take metformin without any concerns.       Benign essential hypertension  His blood pressure has been keeping in the normotensive range.  He is now taking losartan 50 mg daily (previously 100 mg), and continuing on amlodipine 5 mg daily, and metoprolol 50 mg twice daily.    All meds reviewed and having no additional concerns or needing refill at this time.           8/14/2024     4:14 PM   Additional Questions   Roomed by Talia Avalos   Accompanied by N/A     Via the Health Maintenance questionnaire, the patient has reported the following services have been completed , this information has been sent to the abstraction team.  History of Present Illness       Diabetes:   He presents for follow up of diabetes.  He is checking home blood glucose two times daily.   He checks blood glucose before meals.  Blood glucose is never over 200 and never under 70.  When his blood glucose is low, the patient is asymptomatic for confusion, blurred vision, lethargy and reports not feeling dizzy, shaky, or weak.   He has no concerns regarding his diabetes at this time.   He is not experiencing numbness or burning in feet, excessive thirst, blurry vision, weight changes or redness, sores or blisters on feet. The patient has had a diabetic eye exam in the last 12 months. Eye exam performed on 2023. Location of last eye exam [facility].        Hypertension: He presents for follow up of hypertension.  He does check blood pressure  regularly  "outside of the clinic. Outside blood pressures have been over 140/90. He follows a low salt diet. He exercises with enough effort to increase his heart rate 30 to 60 minutes per day.  He exercises with enough effort to increase his heart rate 6 days per week.   He is taking medications regularly.            Objective    BP (!) 142/80 (BP Location: Right arm, Patient Position: Sitting, Cuff Size: Adult Regular)   Pulse 67   Temp 98.1  F (36.7  C) (Oral)   Resp 18   Ht 1.778 m (5' 10\")   Wt 90.7 kg (200 lb)   SpO2 97%   BMI 28.70 kg/m    Body mass index is 28.7 kg/m .    Physical Exam   GENERAL: alert and no distress  RESP: lungs clear to auscultation - no rales, rhonchi or wheezes  CV: regular rate and rhythm, normal S1 S2, no S3 or S4, no murmur, click or rub, no peripheral edema  MS: no gross musculoskeletal defects noted, no edema            Signed Electronically by: Dawood Oshea MD    "

## 2024-08-15 LAB
ALBUMIN SERPL BCG-MCNC: 4.6 G/DL (ref 3.5–5.2)
ALP SERPL-CCNC: 110 U/L (ref 40–150)
ALT SERPL W P-5'-P-CCNC: 15 U/L (ref 0–70)
ANION GAP SERPL CALCULATED.3IONS-SCNC: 13 MMOL/L (ref 7–15)
AST SERPL W P-5'-P-CCNC: 15 U/L (ref 0–45)
BILIRUB SERPL-MCNC: 0.2 MG/DL
BUN SERPL-MCNC: 18.9 MG/DL (ref 6–20)
CALCIUM SERPL-MCNC: 9.8 MG/DL (ref 8.8–10.4)
CHLORIDE SERPL-SCNC: 105 MMOL/L (ref 98–107)
CHOLEST SERPL-MCNC: 136 MG/DL
CREAT SERPL-MCNC: 0.99 MG/DL (ref 0.67–1.17)
CREAT UR-MCNC: 73.7 MG/DL
EGFRCR SERPLBLD CKD-EPI 2021: 89 ML/MIN/1.73M2
FASTING STATUS PATIENT QL REPORTED: ABNORMAL
FASTING STATUS PATIENT QL REPORTED: ABNORMAL
GLUCOSE SERPL-MCNC: 153 MG/DL (ref 70–99)
HCO3 SERPL-SCNC: 23 MMOL/L (ref 22–29)
HDLC SERPL-MCNC: 30 MG/DL
LDLC SERPL CALC-MCNC: 58 MG/DL
MICROALBUMIN UR-MCNC: 583 MG/L
MICROALBUMIN/CREAT UR: 791.04 MG/G CR (ref 0–17)
NONHDLC SERPL-MCNC: 106 MG/DL
POTASSIUM SERPL-SCNC: 4.3 MMOL/L (ref 3.4–5.3)
PROT SERPL-MCNC: 8.2 G/DL (ref 6.4–8.3)
SODIUM SERPL-SCNC: 141 MMOL/L (ref 135–145)
TRIGL SERPL-MCNC: 239 MG/DL

## 2024-08-16 DIAGNOSIS — E11.49 OTHER DIABETIC NEUROLOGICAL COMPLICATION ASSOCIATED WITH TYPE 2 DIABETES MELLITUS (H): ICD-10-CM

## 2024-08-16 RX ORDER — GLUCOSAMINE HCL/CHONDROITIN SU 500-400 MG
CAPSULE ORAL
Qty: 100 EACH | Refills: 5 | Status: SHIPPED | OUTPATIENT
Start: 2024-08-16

## 2024-08-16 NOTE — TELEPHONE ENCOUNTER
Pharmacy calling to get a medication refill on medication(s) attached       Disp Refills Start End MAITE   alcohol swab prep pads 100 each 0 8/7/2024 -- No   Sig: Use to swab area of injection/rupali as directed.   Sent to pharmacy as: Alcohol Swabs 70 % Pad   Class: E-Prescribe   Order: [id number]

## 2024-08-19 DIAGNOSIS — E11.49 OTHER DIABETIC NEUROLOGICAL COMPLICATION ASSOCIATED WITH TYPE 2 DIABETES MELLITUS (H): ICD-10-CM

## 2024-08-19 NOTE — ASSESSMENT & PLAN NOTE
On Rosuvastatin 10 mg, no side effects or intolerance     Lipids are satisfactory   Continue current management

## 2024-08-20 DIAGNOSIS — Z79.4 TYPE 2 DIABETES MELLITUS WITH MICROALBUMINURIA, WITH LONG-TERM CURRENT USE OF INSULIN (H): ICD-10-CM

## 2024-08-20 DIAGNOSIS — E11.29 TYPE 2 DIABETES MELLITUS WITH MICROALBUMINURIA, WITH LONG-TERM CURRENT USE OF INSULIN (H): ICD-10-CM

## 2024-08-20 DIAGNOSIS — R80.9 TYPE 2 DIABETES MELLITUS WITH MICROALBUMINURIA, WITH LONG-TERM CURRENT USE OF INSULIN (H): ICD-10-CM

## 2024-08-20 RX ORDER — BLOOD SUGAR DIAGNOSTIC
STRIP MISCELLANEOUS
Qty: 200 STRIP | Refills: 1 | Status: SHIPPED | OUTPATIENT
Start: 2024-08-20

## 2024-08-20 NOTE — TELEPHONE ENCOUNTER
FAX [facility] Prescription Refill Request         ACCU-CHEK GUIDE test strip

## 2024-09-19 ENCOUNTER — HOSPITAL ENCOUNTER (OUTPATIENT)
Facility: CLINIC | Age: 54
Discharge: HOME | End: 2024-09-20
Attending: EMERGENCY MEDICINE | Admitting: EMERGENCY MEDICINE
Payer: COMMERCIAL
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
## 2024-09-19 DIAGNOSIS — T18.128A ESOPHAGEAL OBSTRUCTION DUE TO FOOD IMPACTION: ICD-10-CM

## 2024-09-19 PROCEDURE — 250N000011 HC RX IP 250 OP 636: Performed by: EMERGENCY MEDICINE

## 2024-09-19 PROCEDURE — 99285 EMERGENCY DEPT VISIT HI MDM: CPT | Mod: 25

## 2024-09-19 PROCEDURE — 250N000013 HC RX MED GY IP 250 OP 250 PS 637: Performed by: EMERGENCY MEDICINE

## 2024-09-19 PROCEDURE — 96361 HYDRATE IV INFUSION ADD-ON: CPT

## 2024-09-19 PROCEDURE — 258N000003 HC RX IP 258 OP 636: Performed by: EMERGENCY MEDICINE

## 2024-09-19 PROCEDURE — 96374 THER/PROPH/DIAG INJ IV PUSH: CPT

## 2024-09-19 RX ORDER — ONDANSETRON 2 MG/ML
4 INJECTION INTRAMUSCULAR; INTRAVENOUS ONCE
Status: COMPLETED | OUTPATIENT
Start: 2024-09-19 | End: 2024-09-19

## 2024-09-19 RX ORDER — NITROGLYCERIN 0.4 MG/1
0.4 TABLET SUBLINGUAL EVERY 5 MIN PRN
Status: COMPLETED | OUTPATIENT
Start: 2024-09-19 | End: 2024-09-19

## 2024-09-19 RX ADMIN — GLUCAGON 1 MG: KIT at 21:46

## 2024-09-19 RX ADMIN — SODIUM CHLORIDE 500 ML: 9 INJECTION, SOLUTION INTRAVENOUS at 21:45

## 2024-09-19 RX ADMIN — ONDANSETRON 4 MG: 2 INJECTION INTRAMUSCULAR; INTRAVENOUS at 22:09

## 2024-09-19 RX ADMIN — NITROGLYCERIN 0.4 MG: 0.4 TABLET SUBLINGUAL at 23:58

## 2024-09-19 RX ADMIN — NITROGLYCERIN 0.4 MG: 0.4 TABLET SUBLINGUAL at 23:53

## 2024-09-19 RX ADMIN — NITROGLYCERIN 0.4 MG: 0.4 TABLET SUBLINGUAL at 23:43

## 2024-09-19 RX ADMIN — ANTACID/ANTIFLATULENT 4 G: 380; 550; 10; 10 GRANULE, EFFERVESCENT ORAL at 22:39

## 2024-09-19 ASSESSMENT — ACTIVITIES OF DAILY LIVING (ADL)
ADLS_ACUITY_SCORE: 37
ADLS_ACUITY_SCORE: 35
ADLS_ACUITY_SCORE: 37

## 2024-09-19 ASSESSMENT — COLUMBIA-SUICIDE SEVERITY RATING SCALE - C-SSRS
1. IN THE PAST MONTH, HAVE YOU WISHED YOU WERE DEAD OR WISHED YOU COULD GO TO SLEEP AND NOT WAKE UP?: NO
2. HAVE YOU ACTUALLY HAD ANY THOUGHTS OF KILLING YOURSELF IN THE PAST MONTH?: NO
6. HAVE YOU EVER DONE ANYTHING, STARTED TO DO ANYTHING, OR PREPARED TO DO ANYTHING TO END YOUR LIFE?: NO

## 2024-09-20 ENCOUNTER — ANESTHESIA EVENT (OUTPATIENT)
Dept: SURGERY | Facility: CLINIC | Age: 54
End: 2024-09-20
Payer: COMMERCIAL

## 2024-09-20 ENCOUNTER — ANESTHESIA (OUTPATIENT)
Dept: SURGERY | Facility: CLINIC | Age: 54
End: 2024-09-20
Payer: COMMERCIAL

## 2024-09-20 ENCOUNTER — HOSPITAL ENCOUNTER (OUTPATIENT)
Facility: CLINIC | Age: 54
End: 2024-09-20
Attending: INTERNAL MEDICINE | Admitting: INTERNAL MEDICINE
Payer: COMMERCIAL

## 2024-09-20 VITALS
WEIGHT: 195 LBS | TEMPERATURE: 97.7 F | HEIGHT: 70 IN | BODY MASS INDEX: 27.92 KG/M2 | OXYGEN SATURATION: 97 % | HEART RATE: 66 BPM | SYSTOLIC BLOOD PRESSURE: 128 MMHG | RESPIRATION RATE: 16 BRPM | DIASTOLIC BLOOD PRESSURE: 72 MMHG

## 2024-09-20 LAB — UPPER GI ENDOSCOPY: NORMAL

## 2024-09-20 PROCEDURE — 370N000017 HC ANESTHESIA TECHNICAL FEE, PER MIN: Performed by: INTERNAL MEDICINE

## 2024-09-20 PROCEDURE — 258N000003 HC RX IP 258 OP 636: Performed by: NURSE ANESTHETIST, CERTIFIED REGISTERED

## 2024-09-20 PROCEDURE — 88305 TISSUE EXAM BY PATHOLOGIST: CPT | Mod: TC | Performed by: INTERNAL MEDICINE

## 2024-09-20 PROCEDURE — 250N000011 HC RX IP 250 OP 636: Performed by: NURSE ANESTHETIST, CERTIFIED REGISTERED

## 2024-09-20 PROCEDURE — 88305 TISSUE EXAM BY PATHOLOGIST: CPT | Mod: 26 | Performed by: PATHOLOGY

## 2024-09-20 PROCEDURE — 360N000075 HC SURGERY LEVEL 2, PER MIN: Performed by: INTERNAL MEDICINE

## 2024-09-20 PROCEDURE — 250N000025 HC SEVOFLURANE, PER MIN: Performed by: INTERNAL MEDICINE

## 2024-09-20 PROCEDURE — 272N000001 HC OR GENERAL SUPPLY STERILE: Performed by: INTERNAL MEDICINE

## 2024-09-20 PROCEDURE — 710N000012 HC RECOVERY PHASE 2, PER MINUTE: Performed by: INTERNAL MEDICINE

## 2024-09-20 PROCEDURE — 250N000009 HC RX 250: Performed by: NURSE ANESTHETIST, CERTIFIED REGISTERED

## 2024-09-20 PROCEDURE — 710N000010 HC RECOVERY PHASE 1, LEVEL 2, PER MIN: Performed by: INTERNAL MEDICINE

## 2024-09-20 RX ORDER — ONDANSETRON 2 MG/ML
INJECTION INTRAMUSCULAR; INTRAVENOUS PRN
Status: DISCONTINUED | OUTPATIENT
Start: 2024-09-20 | End: 2024-09-20

## 2024-09-20 RX ORDER — DEXAMETHASONE SODIUM PHOSPHATE 4 MG/ML
4 INJECTION, SOLUTION INTRA-ARTICULAR; INTRALESIONAL; INTRAMUSCULAR; INTRAVENOUS; SOFT TISSUE
Status: DISCONTINUED | OUTPATIENT
Start: 2024-09-20 | End: 2024-09-20 | Stop reason: HOSPADM

## 2024-09-20 RX ORDER — ONDANSETRON 2 MG/ML
4 INJECTION INTRAMUSCULAR; INTRAVENOUS
Status: DISCONTINUED | OUTPATIENT
Start: 2024-09-20 | End: 2024-09-20 | Stop reason: HOSPADM

## 2024-09-20 RX ORDER — ONDANSETRON 2 MG/ML
4 INJECTION INTRAMUSCULAR; INTRAVENOUS EVERY 6 HOURS PRN
Status: DISCONTINUED | OUTPATIENT
Start: 2024-09-20 | End: 2024-09-20 | Stop reason: HOSPADM

## 2024-09-20 RX ORDER — SODIUM CHLORIDE, SODIUM LACTATE, POTASSIUM CHLORIDE, CALCIUM CHLORIDE 600; 310; 30; 20 MG/100ML; MG/100ML; MG/100ML; MG/100ML
INJECTION, SOLUTION INTRAVENOUS CONTINUOUS
Status: DISCONTINUED | OUTPATIENT
Start: 2024-09-20 | End: 2024-09-20 | Stop reason: HOSPADM

## 2024-09-20 RX ORDER — FLUMAZENIL 0.1 MG/ML
0.2 INJECTION, SOLUTION INTRAVENOUS
Status: DISCONTINUED | OUTPATIENT
Start: 2024-09-20 | End: 2024-09-20 | Stop reason: HOSPADM

## 2024-09-20 RX ORDER — FENTANYL CITRATE 50 UG/ML
25 INJECTION, SOLUTION INTRAMUSCULAR; INTRAVENOUS EVERY 5 MIN PRN
Status: DISCONTINUED | OUTPATIENT
Start: 2024-09-20 | End: 2024-09-20 | Stop reason: HOSPADM

## 2024-09-20 RX ORDER — HYDROMORPHONE HCL IN WATER/PF 6 MG/30 ML
0.2 PATIENT CONTROLLED ANALGESIA SYRINGE INTRAVENOUS EVERY 5 MIN PRN
Status: DISCONTINUED | OUTPATIENT
Start: 2024-09-20 | End: 2024-09-20 | Stop reason: HOSPADM

## 2024-09-20 RX ORDER — SODIUM CHLORIDE, SODIUM LACTATE, POTASSIUM CHLORIDE, CALCIUM CHLORIDE 600; 310; 30; 20 MG/100ML; MG/100ML; MG/100ML; MG/100ML
INJECTION, SOLUTION INTRAVENOUS CONTINUOUS PRN
Status: DISCONTINUED | OUTPATIENT
Start: 2024-09-20 | End: 2024-09-20

## 2024-09-20 RX ORDER — NALOXONE HYDROCHLORIDE 0.4 MG/ML
0.2 INJECTION, SOLUTION INTRAMUSCULAR; INTRAVENOUS; SUBCUTANEOUS
Status: DISCONTINUED | OUTPATIENT
Start: 2024-09-20 | End: 2024-09-20 | Stop reason: HOSPADM

## 2024-09-20 RX ORDER — ONDANSETRON 2 MG/ML
4 INJECTION INTRAMUSCULAR; INTRAVENOUS EVERY 30 MIN PRN
Status: DISCONTINUED | OUTPATIENT
Start: 2024-09-20 | End: 2024-09-20 | Stop reason: HOSPADM

## 2024-09-20 RX ORDER — NALOXONE HYDROCHLORIDE 0.4 MG/ML
0.1 INJECTION, SOLUTION INTRAMUSCULAR; INTRAVENOUS; SUBCUTANEOUS
Status: DISCONTINUED | OUTPATIENT
Start: 2024-09-20 | End: 2024-09-20 | Stop reason: HOSPADM

## 2024-09-20 RX ORDER — NALOXONE HYDROCHLORIDE 0.4 MG/ML
0.4 INJECTION, SOLUTION INTRAMUSCULAR; INTRAVENOUS; SUBCUTANEOUS
Status: DISCONTINUED | OUTPATIENT
Start: 2024-09-20 | End: 2024-09-20 | Stop reason: HOSPADM

## 2024-09-20 RX ORDER — FENTANYL CITRATE 50 UG/ML
50 INJECTION, SOLUTION INTRAMUSCULAR; INTRAVENOUS EVERY 5 MIN PRN
Status: DISCONTINUED | OUTPATIENT
Start: 2024-09-20 | End: 2024-09-20 | Stop reason: HOSPADM

## 2024-09-20 RX ORDER — LIDOCAINE HYDROCHLORIDE 10 MG/ML
INJECTION, SOLUTION INFILTRATION; PERINEURAL PRN
Status: DISCONTINUED | OUTPATIENT
Start: 2024-09-20 | End: 2024-09-20

## 2024-09-20 RX ORDER — LIDOCAINE 40 MG/G
CREAM TOPICAL
Status: DISCONTINUED | OUTPATIENT
Start: 2024-09-20 | End: 2024-09-20 | Stop reason: HOSPADM

## 2024-09-20 RX ORDER — DEXAMETHASONE SODIUM PHOSPHATE 4 MG/ML
INJECTION, SOLUTION INTRA-ARTICULAR; INTRALESIONAL; INTRAMUSCULAR; INTRAVENOUS; SOFT TISSUE PRN
Status: DISCONTINUED | OUTPATIENT
Start: 2024-09-20 | End: 2024-09-20

## 2024-09-20 RX ORDER — PROPOFOL 10 MG/ML
INJECTION, EMULSION INTRAVENOUS PRN
Status: DISCONTINUED | OUTPATIENT
Start: 2024-09-20 | End: 2024-09-20

## 2024-09-20 RX ORDER — ONDANSETRON 4 MG/1
4 TABLET, ORALLY DISINTEGRATING ORAL EVERY 6 HOURS PRN
Status: DISCONTINUED | OUTPATIENT
Start: 2024-09-20 | End: 2024-09-20 | Stop reason: HOSPADM

## 2024-09-20 RX ORDER — PROCHLORPERAZINE MALEATE 10 MG
10 TABLET ORAL EVERY 6 HOURS PRN
Status: DISCONTINUED | OUTPATIENT
Start: 2024-09-20 | End: 2024-09-20 | Stop reason: HOSPADM

## 2024-09-20 RX ORDER — ONDANSETRON 4 MG/1
4 TABLET, ORALLY DISINTEGRATING ORAL EVERY 30 MIN PRN
Status: DISCONTINUED | OUTPATIENT
Start: 2024-09-20 | End: 2024-09-20 | Stop reason: HOSPADM

## 2024-09-20 RX ORDER — HYDROMORPHONE HCL IN WATER/PF 6 MG/30 ML
0.4 PATIENT CONTROLLED ANALGESIA SYRINGE INTRAVENOUS EVERY 5 MIN PRN
Status: DISCONTINUED | OUTPATIENT
Start: 2024-09-20 | End: 2024-09-20 | Stop reason: HOSPADM

## 2024-09-20 RX ADMIN — ONDANSETRON 4 MG: 2 INJECTION INTRAMUSCULAR; INTRAVENOUS at 01:25

## 2024-09-20 RX ADMIN — LIDOCAINE HYDROCHLORIDE 20 MG: 10 INJECTION, SOLUTION INFILTRATION; PERINEURAL at 01:15

## 2024-09-20 RX ADMIN — DEXAMETHASONE SODIUM PHOSPHATE 4 MG: 4 INJECTION, SOLUTION INTRA-ARTICULAR; INTRALESIONAL; INTRAMUSCULAR; INTRAVENOUS; SOFT TISSUE at 01:15

## 2024-09-20 RX ADMIN — Medication 100 MG: at 01:15

## 2024-09-20 RX ADMIN — SODIUM CHLORIDE, POTASSIUM CHLORIDE, SODIUM LACTATE AND CALCIUM CHLORIDE: 600; 310; 30; 20 INJECTION, SOLUTION INTRAVENOUS at 01:11

## 2024-09-20 RX ADMIN — PROPOFOL 200 MG: 10 INJECTION, EMULSION INTRAVENOUS at 01:15

## 2024-09-20 ASSESSMENT — ACTIVITIES OF DAILY LIVING (ADL)
ADLS_ACUITY_SCORE: 37
ADLS_ACUITY_SCORE: 37

## 2024-09-20 NOTE — ANESTHESIA PREPROCEDURE EVALUATION
Anesthesia Pre-Procedure Evaluation    Patient: Jerry Bustamante   MRN: [id number] : 1970        Procedure : Procedure(s):  ESOPHAGOGASTRODUODENOSCOPY          Past Medical History:   Diagnosis Date    Coronary artery disease     Diabetes mellitus, type II (H)     Gout     High cholesterol     History of cardioversion 2021    HLD (hyperlipidemia)     Hypertension     S/P CABG (coronary artery bypass graft) x4 2021      Past Surgical History:   Procedure Laterality Date    ANGIOPLASTY      BYPASS GRAFT ARTERY CORONARY N/A 2021    Procedure: CORONARY ARTERY BYPASS GRAFTING X 4 WITH ENDOSCOPIC VEIN HARVESTING LIMA-LAD PRETE-DISTAL RCA LEFT RADIAL-OM LEFT SV-ANOMOLOUS RV BRANCH ON PUMP/HILLARY;  Surgeon: Antelmo Mccullough MD;  Location:  OR    CV HEART CATHETERIZATION WITH POSSIBLE INTERVENTION N/A 2021    Procedure: Heart Catheterization with Possible Intervention;  Surgeon: Paul Warner MD;  Location:  HEART CARDIAC CATH LAB    CV LEFT VENTRICULOGRAM N/A 2021    Procedure: Left Ventriculogram;  Surgeon: Paul Warner MD;  Location:  HEART CARDIAC CATH LAB      No Known Allergies   Social History     Tobacco Use    Smoking status: Former     Current packs/day: 0.00     Average packs/day: 1.5 packs/day for 13.0 years (19.5 ttl pk-yrs)     Types: Cigarettes     Start date: 1991     Quit date: 2003     Years since quittin.8    Smokeless tobacco: Never   Substance Use Topics    Alcohol use: No      Wt Readings from Last 1 Encounters:   24 88.5 kg (195 lb)        Anesthesia Evaluation            ROS/MED HX  ENT/Pulmonary:  - neg pulmonary ROS     Neurologic:  - neg neurologic ROS     Cardiovascular:     (+)  hypertension- -  CAD -  CABG- -                                      METS/Exercise Tolerance:     Hematologic:  - neg hematologic  ROS     Musculoskeletal:       GI/Hepatic:  - neg GI/hepatic ROS     Renal/Genitourinary:       Endo:     (+)  type II  "DM,                    Psychiatric/Substance Use:  - neg psychiatric ROS     Infectious Disease:       Malignancy:       Other:            Physical Exam    Airway  airway exam normal           Respiratory Devices and Support         Dental    unable to assess        Cardiovascular   cardiovascular exam normal          Pulmonary   pulmonary exam normal                OUTSIDE LABS:  CBC:   Lab Results   Component Value Date    WBC 9.5 06/29/2024    WBC 8.0 07/15/2023    HGB 15.3 06/29/2024    HGB 14.5 07/15/2023    HCT 45.5 06/29/2024    HCT 43.1 07/15/2023     06/29/2024     07/15/2023     BMP:   Lab Results   Component Value Date     08/14/2024     06/29/2024    POTASSIUM 4.3 08/14/2024    POTASSIUM 4.0 06/29/2024    CHLORIDE 105 08/14/2024    CHLORIDE 98 06/29/2024    CO2 23 08/14/2024    CO2 30 (H) 06/29/2024    BUN 18.9 08/14/2024    BUN 19.2 06/29/2024    CR 0.99 08/14/2024    CR 0.97 06/29/2024     (H) 08/14/2024     (H) 06/29/2024     COAGS:   Lab Results   Component Value Date    PTT 35 07/14/2023    INR 0.95 07/14/2023    FIBR 516 (H) 09/08/2021     POC: No results found for: \"BGM\", \"HCG\", \"HCGS\"  HEPATIC:   Lab Results   Component Value Date    ALBUMIN 4.6 08/14/2024    PROTTOTAL 8.2 08/14/2024    ALT 15 08/14/2024    AST 15 08/14/2024    ALKPHOS 110 08/14/2024    BILITOTAL 0.2 08/14/2024    DEYSI 28 09/11/2021     OTHER:   Lab Results   Component Value Date    PH 7.42 09/11/2021    LACT 1.5 09/09/2021    A1C 7.8 (H) 08/14/2024    TORRES 9.8 08/14/2024    PHOS 3.3 09/20/2021    MAG 1.7 07/15/2023    LIPASE 63 (H) 06/29/2024    TSH 0.74 09/11/2021       Anesthesia Plan    ASA Status:  3, emergent       Anesthesia Type: General.     - Airway: ETT   Induction: RSI, Intravenous.   Maintenance: Balanced.        Consents    Anesthesia Plan(s) and associated risks, benefits, and realistic alternatives discussed. Questions answered and patient/representative(s) expressed " "understanding.     - Discussed: Risks, Benefits and Alternatives for the PROCEDURE were discussed     - Discussed with:  Patient      - Extended Intubation/Ventilatory Support Discussed: No.      - Patient is DNR/DNI Status: No     Use of blood products discussed: No .     Postoperative Care    Pain management: IV analgesics.   PONV prophylaxis: Ondansetron (or other 5HT-3), Dexamethasone or Solumedrol     Comments:               ANGELY SOLIS MD    I have reviewed the pertinent notes and labs in the chart from the past 30 days and (re)examined the patient.  Any updates or changes from those notes are reflected in this note.             # Drug Induced Platelet Defect: home medication list includes an antiplatelet medication  # DMII: A1C = 7.8 % (Ref range: 0.0 - 5.6 %) within past 6 months  # Overweight: Estimated body mass index is 27.98 kg/m  as calculated from the following:    Height as of this encounter: 1.778 m (5' 10\").    Weight as of this encounter: 88.5 kg (195 lb).      "

## 2024-09-20 NOTE — ANESTHESIA POSTPROCEDURE EVALUATION
Patient: Jerry Bustamante    Procedure: Procedure(s):  ESOPHAGOGASTRODUODENOSCOPY       Anesthesia Type:  General    Note:  Disposition: Outpatient   Postop Pain Control: Uneventful            Sign Out: Well controlled pain   PONV: No   Neuro/Psych: Uneventful            Sign Out: Acceptable/Baseline neuro status   Airway/Respiratory: Uneventful            Sign Out: Acceptable/Baseline resp. status   CV/Hemodynamics: Uneventful            Sign Out: Acceptable CV status; No obvious hypovolemia; No obvious fluid overload   Other NRE: NONE   DID A NON-ROUTINE EVENT OCCUR? No           Last vitals:  Vitals Value Taken Time   /74 09/20/24 0140   Temp 36.4  C (97.6  F) 09/20/24 0133   Pulse 76 09/20/24 0147   Resp 21 09/20/24 0147   SpO2 95 % 09/20/24 0147   Vitals shown include unfiled device data.    Electronically Signed By: ANGELY SOLIS MD  September 20, 2024  6:11 AM

## 2024-09-20 NOTE — ED PROVIDER NOTES
Emergency Department Encounter     Evaluation Date & Time:   9/19/2024  9:00 PM    CHIEF COMPLAINT:  Swallowed Foreign Body      Triage Note:Pt presents to ED with reports of eating some meat about 1.5 hours ago.  Pt notes trying to vomit multiple times, reports just saliva coming up.  Pt notes some relief after vomiting for a little while.  Has had this happen before, but can usually get it to pass on its own.           ED COURSE & MEDICAL DECISION MAKING:     Food bolus impaction starting around 1700 tonight while eating goat meat and spaghetti.  Pt reports he's had similar episodes that resolved with drinking milk, but this hasn't not gone away.  Pt has saliva pooling, unable to keep any liquid down, consistent with food bolus stuck. No respiratory difficulty or aspiration.  Will try IV glucagon, EZ Gas.  If unsuccessful, will call GI for intervention/endoscopy.    ED Course as of 09/20/24 0222   Thu Sep 19, 2024   2231 Pt vomited up first attempt with EZ Gas. Will try once more.  If unsuccessful, plan for GI consultation.   2309 I spoke with Dr. Wells, GI.  Requests we try nitroglycerin 0.4mg mixed in 10mL of tap water and have pt drink rapidly. Can try up to 3 times every 5 minutes if SBP >100.  Will have pt jump up and down as well.  If unsuccessful, will call him back.     2323 Pt updated on plan.   Fri Sep 20, 2024   0001 Unsuccessful with 3 attempts with nitroglycerin water mix. Will call [facility] back.   0009 I spoke again with Dr. Wells as we were unsuccessful with medication.  He will call team in to perform endoscopy and food bolus removal.  Pt updated, agreeable.     0105 Pt going to lab with [facility] for endoscopy.         Medical Decision Making    History:  Supplemental history from: Documented in chart  External Record(s) reviewed: Documented in chart    Work Up:  Chart documentation includes differential considered and any EKGs or imaging independently interpreted by provider, where  specified.  In additional to work up documented, I considered the following work up: Documented in chart, if applicable.    External consultation:  Discussion of management with another provider: Documented in chart, if applicable    Complicating factors:  Care impacted by chronic illness: N/A  Care affected by social determinants of health: N/A    Disposition considerations:  Send to lab with GI for endoscopy    Not Applicable     At the conclusion of the encounter I discussed the results of all the tests and the disposition. The questions were answered. The patient or family acknowledged understanding and was agreeable with the care plan.      MEDICATIONS GIVEN IN THE EMERGENCY DEPARTMENT:  Medications   lactated ringers infusion (has no administration in time range)   fentaNYL (PF) (SUBLIMAZE) injection 25 mcg (has no administration in time range)   fentaNYL (PF) (SUBLIMAZE) injection 50 mcg (has no administration in time range)   HYDROmorphone (DILAUDID) injection 0.2 mg (has no administration in time range)   HYDROmorphone (DILAUDID) injection 0.4 mg (has no administration in time range)   ondansetron (ZOFRAN ODT) ODT tab 4 mg (has no administration in time range)     Or   ondansetron (ZOFRAN) injection 4 mg (has no administration in time range)   dexAMETHasone (DECADRON) injection 4 mg (has no administration in time range)   prochlorperazine (COMPAZINE) injection 5 mg (has no administration in time range)   naloxone (NARCAN) injection 0.1 mg (has no administration in time range)   naloxone (NARCAN) injection 0.2 mg (has no administration in time range)   naloxone (NARCAN) injection 0.4 mg (has no administration in time range)   naloxone (NARCAN) injection 0.2 mg (has no administration in time range)   naloxone (NARCAN) injection 0.4 mg (has no administration in time range)   flumazenil (ROMAZICON) injection 0.2 mg (has no administration in time range)   ondansetron (ZOFRAN ODT) ODT tab 4 mg (has no  administration in time range)     Or   ondansetron (ZOFRAN) injection 4 mg (has no administration in time range)   prochlorperazine (COMPAZINE) injection 10 mg (has no administration in time range)     Or   prochlorperazine (COMPAZINE) tablet 10 mg (has no administration in time range)   glucagon injection 1 mg (1 mg Intravenous $Given 9/19/24 2146)   sodium chloride 0.9% BOLUS 500 mL (0 mLs Intravenous Stopped 9/19/24 2250)   sod bicarbonate-citric acid-simethicone (EZ GAS) 2.21-1.53-0.04 g packet 4 g (4 g Oral Not Given 9/19/24 2146)   ondansetron (ZOFRAN) injection 4 mg (4 mg Intravenous $Given 9/19/24 2209)   sod bicarbonate-citric acid-simethicone (EZ GAS) 2.21-1.53-0.04 g packet 4 g (4 g Oral $Given 9/19/24 2239)   nitroGLYcerin (NITROSTAT) sublingual tablet 0.4 mg (0.4 mg Sublingual $Given 9/19/24 2353)       NEW PRESCRIPTIONS STARTED AT TODAY'S ED VISIT:  Discharge Medication List as of 9/20/2024  1:53 AM          HPI   HPI     Jerry Bustamante is a 54 year old male with a pertinent history of DM, HTN, CAD who presents to this ED via walk-in for evaluation of food stuck in throat.   Pt reports he was eating goat meat and spaghetti around 1700 tonight, felt food get stuck. Pt reports similar, lesser episodes in the past that resolved with drinking milk. This has not resolved, so he came in now.  Pt reports inability to keep any liquid down and saliva pools resulting in vomiting it up.  No breathing difficulty and was well prior to this.  No other interventions at home.  Pt denies being on PPI or H2 blocker and has never seen GI for this issue.    REVIEW OF SYSTEMS:  See HPI      Medical History     Past Medical History:   Diagnosis Date    Coronary artery disease     Diabetes mellitus, type II (H)     Gout     High cholesterol     History of cardioversion 11/01/2021    HLD (hyperlipidemia)     Hypertension     S/P CABG (coronary artery bypass graft) x4 11/01/2021       Past Surgical History:   Procedure  Laterality Date    ANGIOPLASTY      BYPASS GRAFT ARTERY CORONARY N/A 2021    Procedure: CORONARY ARTERY BYPASS GRAFTING X 4 WITH ENDOSCOPIC VEIN HARVESTING LIMA-LAD PREET-DISTAL RCA LEFT RADIAL-OM LEFT SV-ANOMOLOUS RV BRANCH ON PUMP/HILLARY;  Surgeon: Antelmo Mccullough MD;  Location:  OR    CV HEART CATHETERIZATION WITH POSSIBLE INTERVENTION N/A 2021    Procedure: Heart Catheterization with Possible Intervention;  Surgeon: Paul Warner MD;  Location:  HEART CARDIAC CATH LAB    CV LEFT VENTRICULOGRAM N/A 2021    Procedure: Left Ventriculogram;  Surgeon: Paul Warner MD;  Location:  HEART CARDIAC CATH LAB       Family History   Problem Relation Age of Onset    Hypertension Mother         she  for it    Diabetes Maternal Aunt     Diabetes Maternal Uncle     Cancer No family hx of     Cerebrovascular Disease No family hx of        Social History     Tobacco Use    Smoking status: Former     Current packs/day: 0.00     Average packs/day: 1.5 packs/day for 13.0 years (19.5 ttl pk-yrs)     Types: Cigarettes     Start date: 1991     Quit date: 2003     Years since quittin.8    Smokeless tobacco: Never   Vaping Use    Vaping status: Never Used   Substance Use Topics    Alcohol use: No    Drug use: No       ACCU-CHEK GUIDE test strip  alcohol swab prep pads  allopurinol (ZYLOPRIM) 100 MG tablet  Allopurinol 200 MG TABS  amLODIPine (NORVASC) 5 MG tablet  aspirin (ASA) 81 MG chewable tablet  blood glucose (NO BRAND SPECIFIED) test strip  blood glucose calibration (NO BRAND SPECIFIED) solution  blood glucose monitoring (NO BRAND SPECIFIED) meter device kit  blood glucose monitoring (SOFTCLIX) lancets  Blood Pressure KIT  Continuous Blood Gluc Sensor (FREESTYLE AICHA 14 DAY SENSOR) MISC  Continuous Blood Gluc Sensor (FREESTYLE AICHA 14 DAY SENSOR) MISC  dapagliflozin (FARXIGA) 10 MG TABS tablet  furosemide (LASIX) 40 MG tablet  insulin aspart (NOVOLOG PEN) 100 UNIT/ML  "pen  insulin glargine (LANTUS PEN) 100 UNIT/ML pen  insulin pen needle (BD MICHELE U/F) 32G X 4 MM miscellaneous  ketorolac (TORADOL) 10 MG tablet  losartan (COZAAR) 50 MG tablet  metFORMIN (GLUCOPHAGE) 500 MG tablet  metoprolol tartrate (LOPRESSOR) 50 MG tablet  nitroGLYcerin (NITROSTAT) 0.4 MG sublingual tablet  rosuvastatin (CRESTOR) 10 MG tablet  thin (NO BRAND SPECIFIED) lancets        Physical Exam     Vitals:  /72   Pulse 66   Temp 97.7  F (36.5  C) (Temporal)   Resp 16   Ht 1.778 m (5' 10\")   Wt 88.5 kg (195 lb)   SpO2 97%   BMI 27.98 kg/m      PHYSICAL EXAM:   Physical Exam  Vitals and nursing note reviewed.   Constitutional:       General: He is not in acute distress.     Appearance: Normal appearance.   HENT:      Head: Normocephalic and atraumatic.      Nose: Nose normal.      Mouth/Throat:      Mouth: Mucous membranes are moist.      Pharynx: Oropharynx is clear. Uvula midline. No pharyngeal swelling or posterior oropharyngeal erythema.      Tonsils: No tonsillar abscesses.      Comments: No trimus  Eyes:      Pupils: Pupils are equal, round, and reactive to light.   Cardiovascular:      Rate and Rhythm: Normal rate and regular rhythm.      Pulses: Normal pulses.           Radial pulses are 2+ on the right side and 2+ on the left side.        Dorsalis pedis pulses are 2+ on the right side and 2+ on the left side.   Pulmonary:      Effort: Pulmonary effort is normal. No respiratory distress.      Breath sounds: Normal breath sounds.   Abdominal:      Palpations: Abdomen is soft.      Tenderness: There is no abdominal tenderness.   Musculoskeletal:      Cervical back: Full passive range of motion without pain and neck supple.      Comments: No calf tenderness or swelling b/l   Skin:     General: Skin is warm.      Findings: No rash.   Neurological:      General: No focal deficit present.      Mental Status: He is alert. Mental status is at baseline.      Comments: Fluent speech, no acute " lateralizing deficits   Psychiatric:         Mood and Affect: Mood normal.         Behavior: Behavior normal.           Results     LAB:  All pertinent labs reviewed and interpreted  Labs Ordered and Resulted from Time of ED Arrival to Time of ED Departure - No data to display    RADIOLOGY:  No orders to display                ECG:  none    PROCEDURES:  Procedures:  none      FINAL IMPRESSION:    ICD-10-CM    1. Esophageal obstruction due to food impaction  T18.128A Case Request: ESOPHAGOGASTRODUODENOSCOPY    W44.F3XA Case Request: ESOPHAGOGASTRODUODENOSCOPY          0 minutes of critical care time        Juan Manuel Vo DO  Emergency Medicine  [facility] EMERGENCY ROOM  9/19/2024  9:12 PM          Juan Manuel Vo MD  09/20/24 0222

## 2024-09-20 NOTE — H&P
GENERAL PRE-PROCEDURE:   Procedure:  EGD  Date/Time:  9/20/2024 12:57 AM    Verbal consent obtained?: Yes    Written consent obtained?: Yes    Risks and benefits: Risks, benefits and alternatives were discussed    Consent given by:  Patient  Patient states understanding of procedure being performed: Yes    Patient's understanding of procedure matches consent: Yes    Procedure consent matches procedure scheduled: Yes    Expected level of sedation:  Moderate  Appropriately NPO:  Yes  ASA Class:  3  Mallampati  :  Grade 2- soft palate, base of uvula, tonsillar pillars, and portion of posterior pharyngeal wall visible  Lungs:  Lungs clear with good breath sounds bilaterally  Heart:  Normal heart sounds and rate  History & Physical reviewed:  History and physical reviewed and no updates needed  Statement of review:  I have reviewed the lab findings, diagnostic data, medications, and the plan for sedation

## 2024-09-20 NOTE — ANESTHESIA CARE TRANSFER NOTE
Patient: Jerry Bustamante    Procedure: Procedure(s):  ESOPHAGOGASTRODUODENOSCOPY       Diagnosis: Esophageal obstruction due to food impaction [T18.128A, W44.F3XA]  Diagnosis Additional Information: No value filed.    Anesthesia Type:   General     Note:    Oropharynx: oropharynx clear of all foreign objects  Level of Consciousness: awake  Oxygen Supplementation: face mask  Level of Supplemental Oxygen (L/min / FiO2): 6  Independent Airway: airway patency satisfactory and stable  Dentition: dentition unchanged  Vital Signs Stable: post-procedure vital signs reviewed and stable  Report to RN Given: handoff report given  Patient transferred to: PACU    Handoff Report: Identifed the Patient, Identified the Reponsible Provider, Reviewed the pertinent medical history, Discussed the surgical course, Reviewed Intra-OP anesthesia mangement and issues during anesthesia, Set expectations for post-procedure period and Allowed opportunity for questions and acknowledgement of understanding      Vitals:  Vitals Value Taken Time   /70 09/20/24 0133   Temp 36.4  C (97.6  F) 09/20/24 0133   Pulse 80 09/20/24 0134   Resp 23 09/20/24 0134   SpO2 100 % 09/20/24 0134   Vitals shown include unfiled device data.    Electronically Signed By: SCOTT Irving CRNA  September 20, 2024  1:36 AM

## 2024-09-20 NOTE — ED NOTES
Checked in with pt; answered questions, discussed plan for stay, ensured needs were met     Pt c/o nausea and vomiting, actively vomiting MD aware

## 2024-09-20 NOTE — CONSULTS
[facility] DIGESTIVE HE[facility] CONSULTATION    Jerry Bustamante   [address]   [place] [address]  54 year old male  Admission Date/Time: 9/19/2024  9:00 PM    Primary Care Provider:  Dawood Oshea    Requesting Physician: Juan Manuel Vo MD      CHIEF COMPLAINT:   Food impaction    REASON FOR THE CONSULT: Food impaction    HPI:     Simone is a very pleasant 54-year-old male who came to the [facility] ER with report of having meat stuck in his throat.  He was eating goat meat and noodles around 5 PM tonight when he felt to get stuck.  He has been unable to clear his secretions since that time.  He was given EZ gas x 2, glucagon and sublingual nitro mixed with 10 mL of water x 3 in the ER without significant relief.  When seen by me, he felt as though there has been a recent change in his swallowing and did take some sips of water.  Unfortunately, he was still unable to swallow at and needed to spit the water back out.      He has had issues with swallowing in the past however he has never required an endoscopy.    REVIEW OF SYSTEMS: 13 point review of systems is negative except that noted above.    PAST MEDICAL HISTORY:   Past Medical History:   Diagnosis Date    Coronary artery disease     Diabetes mellitus, type II (H)     Gout     High cholesterol     History of cardioversion 11/01/2021    HLD (hyperlipidemia)     Hypertension     S/P CABG (coronary artery bypass graft) x4 11/01/2021       PAST SURGICAL HISTORY:   Past Surgical History:   Procedure Laterality Date    ANGIOPLASTY      BYPASS GRAFT ARTERY CORONARY N/A 9/8/2021    Procedure: CORONARY ARTERY BYPASS GRAFTING X 4 WITH ENDOSCOPIC VEIN HARVESTING LIMA-LAD PREET-DISTAL RCA LEFT RADIAL-OM LEFT SV-ANOMOLOUS RV BRANCH ON PUMP/HILLARY;  Surgeon: Antelmo Mccullough MD;  Location: [facility] OR    CV HEART CATHETERIZATION WITH POSSIBLE INTERVENTION N/A 9/2/2021    Procedure: Heart Catheterization with Possible Intervention;  Surgeon: Paul Warner MD;   Location:  HEART CARDIAC CATH LAB    CV LEFT VENTRICULOGRAM N/A 2021    Procedure: Left Ventriculogram;  Surgeon: Paul Warner MD;  Location:  HEART CARDIAC CATH LAB       FAMILY HISTORY:   Family History   Problem Relation Age of Onset    Hypertension Mother         she  for it    Diabetes Maternal Aunt     Diabetes Maternal Uncle     Cancer No family hx of     Cerebrovascular Disease No family hx of        SOCIAL HISTORY:   Social History     Tobacco Use    Smoking status: Former     Current packs/day: 0.00     Average packs/day: 1.5 packs/day for 13.0 years (19.5 ttl pk-yrs)     Types: Cigarettes     Start date: 1991     Quit date: 2003     Years since quittin.8    Smokeless tobacco: Never   Substance Use Topics    Alcohol use: No        MEDS:  (Not in a hospital admission)      ALLERGIES/SENSITIVITIES: Patient has no known allergies.    MEDICATIONS:  Current Outpatient Medications   Medication Sig Dispense Refill    ACCU-CHEK GUIDE test strip Use to test blood sugar 2 times daily or as directed. 200 strip 1    alcohol swab prep pads Use to swab area of injection/rupali as directed. 100 each 5    allopurinol (ZYLOPRIM) 100 MG tablet Take 2 tablets (200 mg) by mouth daily 180 tablet 3    Allopurinol 200 MG TABS Take 200 mg by mouth daily 90 tablet 1    amLODIPine (NORVASC) 5 MG tablet Take 1 tablet (5 mg) by mouth daily 90 tablet 0    aspirin (ASA) 81 MG chewable tablet 1 tablet (81 mg) by Oral or NG Tube route daily 100 tablet 1    blood glucose (NO BRAND SPECIFIED) test strip Use to test blood sugar 4 times daily or as directed. 100 strip 6    blood glucose calibration (NO BRAND SPECIFIED) solution Use to calibrate blood glucose monitor as needed as directed. 200 each 0    blood glucose monitoring (NO BRAND SPECIFIED) meter device kit Use to test blood sugar 4 times daily or as directed. 1 kit 0    blood glucose monitoring (SOFTCLIX) lancets Use to test blood sugar 2 times daily.  100 each 4    Blood Pressure KIT Check Blood pressure daily as needed 1 kit 0    Continuous Blood Gluc Sensor (FREESTYLE AICHA 14 DAY SENSOR) MISC Change every 14 days. 2 each 5    Continuous Blood Gluc Sensor (FREESTYLE AICHA 14 DAY SENSOR) MISC 1 each every 14 days Change every 14 days. 2 each 5    dapagliflozin (FARXIGA) 10 MG TABS tablet Take 1 tablet (10 mg) by mouth daily 90 tablet 1    furosemide (LASIX) 40 MG tablet Take 1 tablet (40 mg) by mouth daily (Patient not taking: Reported on 8/14/2024) 90 tablet 1    insulin aspart (NOVOLOG PEN) 100 UNIT/ML pen Inject 10 Units Subcutaneous 3 times daily (with meals) (Patient not taking: Reported on 8/14/2024) 15 mL 6    insulin glargine (LANTUS PEN) 100 UNIT/ML pen Inject 40 Units Subcutaneous every morning (Patient not taking: Reported on 8/14/2024) 36 mL 1    insulin pen needle (BD MICHELE U/F) 32G X 4 MM miscellaneous Use 4 pen needles daily or as directed. (Patient not taking: Reported on 8/14/2024) 400 each 0    ketorolac (TORADOL) 10 MG tablet Take 1 tablet (10 mg) by mouth every 6 hours as needed for moderate pain 20 tablet 0    losartan (COZAAR) 50 MG tablet Take 1 tablet (50 mg) by mouth daily      metFORMIN (GLUCOPHAGE) 500 MG tablet Take 2 tablets (1,000 mg) by mouth 2 times daily (with meals) 360 tablet 1    metoprolol tartrate (LOPRESSOR) 50 MG tablet Take 1 tablet (50 mg) by mouth 2 times daily for 90 days 180 tablet 0    nitroGLYcerin (NITROSTAT) 0.4 MG sublingual tablet For chest pain place 1 tablet under the tongue every 5 minutes for 3 doses. If symptoms persist 5 minutes after 1st dose call 911. 30 tablet 0    rosuvastatin (CRESTOR) 10 MG tablet Take 1 tablet (10 mg) by mouth daily 90 tablet 1    thin (NO BRAND SPECIFIED) lancets Use with lanceting device four times per day. Per Insurance coverage 200 each 3       PHYSICAL EXAM:  Temp:  [97.5  F (36.4  C)] 97.5  F (36.4  C)  Pulse:  [65-83] 83  Resp:  [18] 18  BP: (131-187)/(70-91) 187/87  SpO2:   [95 %-100 %] 98 %  Body mass index is 27.98 kg/m .  GEN: Well developed, well nourished 54 year old in mild distress.  HEENT: sclera anicteric, moist mucous membranes.   LYMPH: No cervical lymphadenopathy  PULM: Nonlabored breathing. Breath sounds equal.   CARDIO: Regular rate  GI: Non-distended. Soft.    EXT: warm, no lower extremity edema  NEURO: Alert. No focal defects.   PSYCH: Mental status appropriate, mood and affect normal.    SKIN: No rashes  MSK: No joint abnormalities    LABORATORY DATA:  CBC RESULTS:   Recent Labs   Lab Test 06/29/24  0126   WBC 9.5   RBC 5.83   HGB 15.3   HCT 45.5   MCV 78   MCH 26.2*   MCHC 33.6   RDW 13.8           CMP Results:   Recent Labs   Lab Test 08/14/24  1734      POTASSIUM 4.3   CHLORIDE 105   CO2 23   ANIONGAP 13   *   BUN 18.9   CR 0.99   BILITOTAL 0.2   ALKPHOS 110   ALT 15   AST 15        INR Results:   Recent Labs   Lab Test 07/14/23  1429   INR 0.95          RELEVANT IMAGING:      No abdominal imaging.    ASSESSMENT:     Food impaction  Was eating goat meat around 5 PM tonight.  He has been unable to clear his secretions since then.    PLAN:    Will proceed with emergent endoscopy tonight.    50 minutes of total time was spent providing patient care, including patient evaluation, reviewing documentation/test results and .           Steve Wells DO  Thank you for the opportunity to participate in the care of this patient.   Please feel free to call me with any questions or concerns.  Phone number [phone].            CC: [facility]

## 2024-09-20 NOTE — ANESTHESIA PROCEDURE NOTES
Airway       Patient location during procedure: OR       Procedure Start/Stop Times: 9/20/2024 1:16 AM  Staff -        CRNA: Elicia Iqbal APRN CRNA       Performed By: CRNAIndications and Patient Condition       Indications for airway management: bernadine-procedural         Mask difficulty assessment: 0 - not attempted    Final Airway Details       Final airway type: endotracheal airway       Successful airway: ETT - single  Endotracheal Airway Details        ETT size (mm): 7.5       Cuffed: yes       Cuff volume (mL): 7       Successful intubation technique: direct laryngoscopy       DL Blade Type: Montano 2       Grade View of Cords: 1       Adjucts: stylet       Position: Right       Measured from: gums/teeth       Secured at (cm): 23       Bite Block used: endo.    Post intubation assessment        Placement verified by: capnometry, equal breath sounds and chest rise        Number of attempts at approach: 1       Number of other approaches attempted: 0       Secured with: tape       Ease of procedure: easy       Dentition: Intact       Dental guard used and removed. Dental Guard Type: Standard White.    Medication(s) Administered   Medication Administration Time: 9/20/2024 1:16 AM

## 2024-09-20 NOTE — ED TRIAGE NOTES
Pt presents to ED with reports of eating some meat about 1.5 hours ago.  Pt notes trying to vomit multiple times, reports just saliva coming up.  Pt notes some relief after vomiting for a little while.  Has had this happen before, but can usually get it to pass on its own.      Triage Assessment (Adult)       Row Name 09/19/24 2011          Triage Assessment    Airway WDL WDL        Respiratory WDL    Respiratory WDL WDL        Skin Circulation/Temperature WDL    Skin Circulation/Temperature WDL WDL        Cardiac WDL    Cardiac WDL WDL        Peripheral/Neurovascular WDL    Peripheral Neurovascular WDL WDL        Cognitive/Neuro/Behavioral WDL    Cognitive/Neuro/Behavioral WDL WDL

## 2024-09-23 LAB
PATH REPORT.COMMENTS IMP SPEC: NORMAL
PATH REPORT.COMMENTS IMP SPEC: NORMAL
PATH REPORT.FINAL DX SPEC: NORMAL
PATH REPORT.GROSS SPEC: NORMAL
PATH REPORT.MICROSCOPIC SPEC OTHER STN: NORMAL
PATH REPORT.RELEVANT HX SPEC: NORMAL
PHOTO IMAGE: NORMAL

## 2024-10-20 ENCOUNTER — MYC REFILL (OUTPATIENT)
Dept: FAMILY MEDICINE | Facility: CLINIC | Age: 54
End: 2024-10-20
Payer: COMMERCIAL

## 2024-10-20 DIAGNOSIS — Z95.1 S/P CABG (CORONARY ARTERY BYPASS GRAFT): ICD-10-CM

## 2024-10-20 DIAGNOSIS — I10 BENIGN ESSENTIAL HYPERTENSION: ICD-10-CM

## 2024-10-21 RX ORDER — METOPROLOL TARTRATE 50 MG
50 TABLET ORAL 2 TIMES DAILY
Qty: 180 TABLET | Refills: 0 | Status: SHIPPED | OUTPATIENT
Start: 2024-10-21

## 2024-10-22 RX ORDER — AMLODIPINE BESYLATE 5 MG/1
5 TABLET ORAL DAILY
Qty: 90 TABLET | Refills: 2 | Status: SHIPPED | OUTPATIENT
Start: 2024-10-22

## 2024-10-22 NOTE — TELEPHONE ENCOUNTER
Patient Returning Call    Reason for call:  Patient stated he hasn't heard from pharmacy if this has been sent in     Could we send this information to you in [place] or would you prefer to receive a phone call?:   Patient would prefer a phone call   Okay to leave a detailed message?: Yes at Cell number on file:    Telephone Information:   Mobile [phone]

## 2024-11-16 ENCOUNTER — TRANSFERRED RECORDS (OUTPATIENT)
Dept: MULTI SPECIALTY CLINIC | Facility: CLINIC | Age: 54
End: 2024-11-16
Payer: COMMERCIAL

## 2024-11-16 LAB — RETINOPATHY: NORMAL

## 2024-11-30 ENCOUNTER — HEALTH MAINTENANCE LETTER (OUTPATIENT)
Age: 54
End: 2024-11-30

## 2024-12-08 ENCOUNTER — MYC REFILL (OUTPATIENT)
Dept: FAMILY MEDICINE | Facility: CLINIC | Age: 54
End: 2024-12-08
Payer: COMMERCIAL

## 2024-12-08 DIAGNOSIS — E11.29 TYPE 2 DIABETES MELLITUS WITH MICROALBUMINURIA, WITH LONG-TERM CURRENT USE OF INSULIN (H): ICD-10-CM

## 2024-12-08 DIAGNOSIS — Z79.4 TYPE 2 DIABETES MELLITUS WITH MICROALBUMINURIA, WITH LONG-TERM CURRENT USE OF INSULIN (H): ICD-10-CM

## 2024-12-08 DIAGNOSIS — R80.9 TYPE 2 DIABETES MELLITUS WITH MICROALBUMINURIA, WITH LONG-TERM CURRENT USE OF INSULIN (H): ICD-10-CM

## 2024-12-08 DIAGNOSIS — I10 BENIGN ESSENTIAL HYPERTENSION: ICD-10-CM

## 2024-12-09 RX ORDER — LOSARTAN POTASSIUM 50 MG/1
50 TABLET ORAL DAILY
Qty: 90 TABLET | Refills: 0 | Status: SHIPPED | OUTPATIENT
Start: 2024-12-09

## 2025-01-18 ENCOUNTER — MYC REFILL (OUTPATIENT)
Dept: FAMILY MEDICINE | Facility: CLINIC | Age: 55
End: 2025-01-18
Payer: COMMERCIAL

## 2025-01-18 DIAGNOSIS — R80.9 TYPE 2 DIABETES MELLITUS WITH MICROALBUMINURIA, WITH LONG-TERM CURRENT USE OF INSULIN (H): ICD-10-CM

## 2025-01-18 DIAGNOSIS — Z79.4 TYPE 2 DIABETES MELLITUS WITH MICROALBUMINURIA, WITH LONG-TERM CURRENT USE OF INSULIN (H): ICD-10-CM

## 2025-01-18 DIAGNOSIS — E11.29 TYPE 2 DIABETES MELLITUS WITH MICROALBUMINURIA, WITH LONG-TERM CURRENT USE OF INSULIN (H): ICD-10-CM

## 2025-01-18 DIAGNOSIS — I10 BENIGN ESSENTIAL HYPERTENSION: ICD-10-CM

## 2025-01-18 DIAGNOSIS — Z95.1 S/P CABG (CORONARY ARTERY BYPASS GRAFT): ICD-10-CM

## 2025-01-20 RX ORDER — METOPROLOL TARTRATE 50 MG
50 TABLET ORAL 2 TIMES DAILY
Qty: 180 TABLET | Refills: 1 | Status: SHIPPED | OUTPATIENT
Start: 2025-01-20

## 2025-01-20 RX ORDER — LOSARTAN POTASSIUM 50 MG/1
50 TABLET ORAL DAILY
Qty: 90 TABLET | Refills: 0 | OUTPATIENT
Start: 2025-01-20

## 2025-01-20 RX ORDER — ROSUVASTATIN CALCIUM 10 MG/1
10 TABLET, COATED ORAL DAILY
Qty: 90 TABLET | Refills: 1 | Status: SHIPPED | OUTPATIENT
Start: 2025-01-20

## 2025-02-16 ENCOUNTER — MYC REFILL (OUTPATIENT)
Dept: FAMILY MEDICINE | Facility: CLINIC | Age: 55
End: 2025-02-16
Payer: COMMERCIAL

## 2025-02-16 DIAGNOSIS — Z79.4 TYPE 2 DIABETES MELLITUS WITH MICROALBUMINURIA, WITH LONG-TERM CURRENT USE OF INSULIN (H): ICD-10-CM

## 2025-02-16 DIAGNOSIS — E11.29 TYPE 2 DIABETES MELLITUS WITH MICROALBUMINURIA, WITH LONG-TERM CURRENT USE OF INSULIN (H): ICD-10-CM

## 2025-02-16 DIAGNOSIS — R80.9 TYPE 2 DIABETES MELLITUS WITH MICROALBUMINURIA, WITH LONG-TERM CURRENT USE OF INSULIN (H): ICD-10-CM

## 2025-02-17 RX ORDER — DAPAGLIFLOZIN 10 MG/1
10 TABLET, FILM COATED ORAL DAILY
Qty: 30 TABLET | Refills: 0 | Status: SHIPPED | OUTPATIENT
Start: 2025-02-17

## 2025-02-18 ENCOUNTER — MYC REFILL (OUTPATIENT)
Dept: FAMILY MEDICINE | Facility: CLINIC | Age: 55
End: 2025-02-18
Payer: COMMERCIAL

## 2025-02-18 DIAGNOSIS — M10.9 GOUT, UNSPECIFIED CAUSE, UNSPECIFIED CHRONICITY, UNSPECIFIED SITE: ICD-10-CM

## 2025-02-18 RX ORDER — ALLOPURINOL 100 MG/1
200 TABLET ORAL DAILY
Qty: 180 TABLET | Refills: 0 | Status: SHIPPED | OUTPATIENT
Start: 2025-02-18 | End: 2025-05-19

## 2025-02-21 SDOH — HEALTH STABILITY: PHYSICAL HEALTH: ON AVERAGE, HOW MANY MINUTES DO YOU ENGAGE IN EXERCISE AT THIS LEVEL?: 40 MIN

## 2025-02-21 SDOH — HEALTH STABILITY: PHYSICAL HEALTH: ON AVERAGE, HOW MANY DAYS PER WEEK DO YOU ENGAGE IN MODERATE TO STRENUOUS EXERCISE (LIKE A BRISK WALK)?: 5 DAYS

## 2025-02-21 ASSESSMENT — SOCIAL DETERMINANTS OF HEALTH (SDOH): HOW OFTEN DO YOU GET TOGETHER WITH FRIENDS OR RELATIVES?: MORE THAN THREE TIMES A WEEK

## 2025-02-23 ASSESSMENT — ASTHMA QUESTIONNAIRES
QUESTION_2 LAST FOUR WEEKS HOW OFTEN HAVE YOU HAD SHORTNESS OF BREATH: NOT AT ALL
ACT_TOTALSCORE: 25
QUESTION_4 LAST FOUR WEEKS HOW OFTEN HAVE YOU USED YOUR RESCUE INHALER OR NEBULIZER MEDICATION (SUCH AS ALBUTEROL): NOT AT ALL
QUESTION_3 LAST FOUR WEEKS HOW OFTEN DID YOUR ASTHMA SYMPTOMS (WHEEZING, COUGHING, SHORTNESS OF BREATH, CHEST TIGHTNESS OR PAIN) WAKE YOU UP AT NIGHT OR EARLIER THAN USUAL IN THE MORNING: NOT AT ALL
QUESTION_5 LAST FOUR WEEKS HOW WOULD YOU RATE YOUR ASTHMA CONTROL: COMPLETELY CONTROLLED
QUESTION_1 LAST FOUR WEEKS HOW MUCH OF THE TIME DID YOUR ASTHMA KEEP YOU FROM GETTING AS MUCH DONE AT WORK, SCHOOL OR AT HOME: NONE OF THE TIME
ACT_TOTALSCORE: 25

## 2025-02-26 ENCOUNTER — OFFICE VISIT (OUTPATIENT)
Dept: FAMILY MEDICINE | Facility: CLINIC | Age: 55
End: 2025-02-26
Attending: FAMILY MEDICINE
Payer: COMMERCIAL

## 2025-02-26 VITALS
WEIGHT: 189.4 LBS | OXYGEN SATURATION: 97 % | HEART RATE: 60 BPM | RESPIRATION RATE: 12 BRPM | SYSTOLIC BLOOD PRESSURE: 116 MMHG | BODY MASS INDEX: 27.11 KG/M2 | TEMPERATURE: 97.9 F | HEIGHT: 70 IN | DIASTOLIC BLOOD PRESSURE: 76 MMHG

## 2025-02-26 DIAGNOSIS — E11.29 TYPE 2 DIABETES MELLITUS WITH MICROALBUMINURIA, WITH LONG-TERM CURRENT USE OF INSULIN (H): ICD-10-CM

## 2025-02-26 DIAGNOSIS — Z12.5 SCREENING FOR PROSTATE CANCER: ICD-10-CM

## 2025-02-26 DIAGNOSIS — Z12.11 SPECIAL SCREENING FOR MALIGNANT NEOPLASMS, COLON: ICD-10-CM

## 2025-02-26 DIAGNOSIS — I10 BENIGN ESSENTIAL HYPERTENSION: ICD-10-CM

## 2025-02-26 DIAGNOSIS — I50.22 CHRONIC SYSTOLIC CONGESTIVE HEART FAILURE (H): ICD-10-CM

## 2025-02-26 DIAGNOSIS — E78.5 DYSLIPIDEMIA: ICD-10-CM

## 2025-02-26 DIAGNOSIS — R80.9 TYPE 2 DIABETES MELLITUS WITH MICROALBUMINURIA, WITH LONG-TERM CURRENT USE OF INSULIN (H): ICD-10-CM

## 2025-02-26 DIAGNOSIS — E11.49 OTHER DIABETIC NEUROLOGICAL COMPLICATION ASSOCIATED WITH TYPE 2 DIABETES MELLITUS (H): ICD-10-CM

## 2025-02-26 DIAGNOSIS — Z95.1 S/P CABG (CORONARY ARTERY BYPASS GRAFT): ICD-10-CM

## 2025-02-26 DIAGNOSIS — Z87.891 FORMER SMOKER: ICD-10-CM

## 2025-02-26 DIAGNOSIS — Z79.4 TYPE 2 DIABETES MELLITUS WITH MICROALBUMINURIA, WITH LONG-TERM CURRENT USE OF INSULIN (H): ICD-10-CM

## 2025-02-26 DIAGNOSIS — M10.9 GOUT, UNSPECIFIED CAUSE, UNSPECIFIED CHRONICITY, UNSPECIFIED SITE: ICD-10-CM

## 2025-02-26 DIAGNOSIS — Z00.00 ROUTINE GENERAL MEDICAL EXAMINATION AT A HEALTH CARE FACILITY: Primary | ICD-10-CM

## 2025-02-26 DIAGNOSIS — I50.9 CONGESTIVE HEART FAILURE, UNSPECIFIED HF CHRONICITY, UNSPECIFIED HEART FAILURE TYPE (H): ICD-10-CM

## 2025-02-26 LAB
ALBUMIN SERPL BCG-MCNC: 4.5 G/DL (ref 3.5–5.2)
ALP SERPL-CCNC: 120 U/L (ref 40–150)
ALT SERPL W P-5'-P-CCNC: 13 U/L (ref 0–70)
ANION GAP SERPL CALCULATED.3IONS-SCNC: 15 MMOL/L (ref 7–15)
AST SERPL W P-5'-P-CCNC: 15 U/L (ref 0–45)
BILIRUB SERPL-MCNC: 0.2 MG/DL
BUN SERPL-MCNC: 24.4 MG/DL (ref 6–20)
CALCIUM SERPL-MCNC: 10.2 MG/DL (ref 8.8–10.4)
CHLORIDE SERPL-SCNC: 102 MMOL/L (ref 98–107)
CHOLEST SERPL-MCNC: 175 MG/DL
CREAT SERPL-MCNC: 1.14 MG/DL (ref 0.67–1.17)
CREAT UR-MCNC: 99.8 MG/DL
EGFRCR SERPLBLD CKD-EPI 2021: 75 ML/MIN/1.73M2
EST. AVERAGE GLUCOSE BLD GHB EST-MCNC: 192 MG/DL
FASTING STATUS PATIENT QL REPORTED: ABNORMAL
FASTING STATUS PATIENT QL REPORTED: ABNORMAL
GLUCOSE SERPL-MCNC: 168 MG/DL (ref 70–99)
HBA1C MFR BLD: 8.3 % (ref 0–5.6)
HCO3 SERPL-SCNC: 24 MMOL/L (ref 22–29)
HDLC SERPL-MCNC: 33 MG/DL
LDLC SERPL CALC-MCNC: 89 MG/DL
MICROALBUMIN UR-MCNC: 592 MG/L
MICROALBUMIN/CREAT UR: 593.19 MG/G CR (ref 0–17)
NONHDLC SERPL-MCNC: 142 MG/DL
POTASSIUM SERPL-SCNC: 4.5 MMOL/L (ref 3.4–5.3)
PROT SERPL-MCNC: 8.4 G/DL (ref 6.4–8.3)
SODIUM SERPL-SCNC: 141 MMOL/L (ref 135–145)
TRIGL SERPL-MCNC: 267 MG/DL
URATE SERPL-MCNC: 5.2 MG/DL (ref 3.4–7)

## 2025-02-26 PROCEDURE — 3074F SYST BP LT 130 MM HG: CPT | Performed by: FAMILY MEDICINE

## 2025-02-26 PROCEDURE — 82043 UR ALBUMIN QUANTITATIVE: CPT | Performed by: FAMILY MEDICINE

## 2025-02-26 PROCEDURE — 36415 COLL VENOUS BLD VENIPUNCTURE: CPT | Performed by: FAMILY MEDICINE

## 2025-02-26 PROCEDURE — 83036 HEMOGLOBIN GLYCOSYLATED A1C: CPT | Performed by: FAMILY MEDICINE

## 2025-02-26 PROCEDURE — 82570 ASSAY OF URINE CREATININE: CPT | Performed by: FAMILY MEDICINE

## 2025-02-26 PROCEDURE — 1126F AMNT PAIN NOTED NONE PRSNT: CPT | Performed by: FAMILY MEDICINE

## 2025-02-26 PROCEDURE — 80061 LIPID PANEL: CPT | Performed by: FAMILY MEDICINE

## 2025-02-26 PROCEDURE — 3078F DIAST BP <80 MM HG: CPT | Performed by: FAMILY MEDICINE

## 2025-02-26 PROCEDURE — G2211 COMPLEX E/M VISIT ADD ON: HCPCS | Performed by: FAMILY MEDICINE

## 2025-02-26 PROCEDURE — 99214 OFFICE O/P EST MOD 30 MIN: CPT | Mod: 25 | Performed by: FAMILY MEDICINE

## 2025-02-26 PROCEDURE — 84550 ASSAY OF BLOOD/URIC ACID: CPT | Performed by: FAMILY MEDICINE

## 2025-02-26 PROCEDURE — 80053 COMPREHEN METABOLIC PANEL: CPT | Performed by: FAMILY MEDICINE

## 2025-02-26 PROCEDURE — 99396 PREV VISIT EST AGE 40-64: CPT | Performed by: FAMILY MEDICINE

## 2025-02-26 RX ORDER — OMEPRAZOLE 40 MG/1
40 CAPSULE, DELAYED RELEASE ORAL DAILY
COMMUNITY
Start: 2025-01-09

## 2025-02-26 ASSESSMENT — PAIN SCALES - GENERAL: PAINLEVEL_OUTOF10: NO PAIN (0)

## 2025-02-26 NOTE — ASSESSMENT & PLAN NOTE
Doing well on the orals, tries to keep BG average < 150.   Embracing for coming Ramadan.     - positive for microalbumin but stable in comparison, elevated blood glucose and A1c.    Plan:   Continue current management, keep blood glucose average < 150 and follow tighter diabetic diet.   Follow up in 3 months.        -Farxiga 10 mg daily, metformin   --novolog with all the meals ( or 3x/ day ) prn     Monitor BG's and keep under 150

## 2025-02-26 NOTE — PATIENT INSTRUCTIONS
Patient Education   Preventive Care Advice   This is general advice given by our system to help you stay healthy. However, your care team may have specific advice just for you. Please talk to your care team about your preventive care needs.  Nutrition  Eat 5 or more servings of fruits and vegetables each day.  Try wheat bread, brown rice and whole grain pasta (instead of white bread, rice, and pasta).  Get enough calcium and vitamin D. Check the label on foods and aim for 100% of the RDA (recommended daily allowance).  Lifestyle  Exercise at least 150 minutes each week  (30 minutes a day, 5 days a week).  Do muscle strengthening activities 2 days a week. These help control your weight and prevent disease.  No smoking.  Wear sunscreen to prevent skin cancer.  Have a dental exam and cleaning every 6 months.  Yearly exams  See your health care team every year to talk about:  Any changes in your health.  Any medicines your care team has prescribed.  Preventive care, family planning, and ways to prevent chronic diseases.  Shots (vaccines)   HPV shots (up to age 26), if you've never had them before.  Hepatitis B shots (up to age 59), if you've never had them before.  COVID-19 shot: Get this shot when it's due.  Flu shot: Get a flu shot every year.  Tetanus shot: Get a tetanus shot every 10 years.  Pneumococcal, hepatitis A, and RSV shots: Ask your care team if you need these based on your risk.  Shingles shot (for age 50 and up)  General health tests  Diabetes screening:  Starting at age 35, Get screened for diabetes at least every 3 years.  If you are younger than age 35, ask your care team if you should be screened for diabetes.  Cholesterol test: At age 39, start having a cholesterol test every 5 years, or more often if advised.  Bone density scan (DEXA): At age 50, ask your care team if you should have this scan for osteoporosis (brittle bones).  Hepatitis C: Get tested at least once in your life.  STIs (sexually  transmitted infections)  Before age 24: Ask your care team if you should be screened for STIs.  After age 24: Get screened for STIs if you're at risk. You are at risk for STIs (including HIV) if:  You are sexually active with more than one person.  You don't use condoms every time.  You or a partner was diagnosed with a sexually transmitted infection.  If you are at risk for HIV, ask about PrEP medicine to prevent HIV.  Get tested for HIV at least once in your life, whether you are at risk for HIV or not.  Cancer screening tests  Cervical cancer screening: If you have a cervix, begin getting regular cervical cancer screening tests starting at age 21.  Breast cancer scan (mammogram): If you've ever had breasts, begin having regular mammograms starting at age 40. This is a scan to check for breast cancer.  Colon cancer screening: It is important to start screening for colon cancer at age 45.  Have a colonoscopy test every 10 years (or more often if you're at risk) Or, ask your provider about stool tests like a FIT test every year or Cologuard test every 3 years.  To learn more about your testing options, visit:   .  For help making a decision, visit:   https://bit.ly/wo34374.  Prostate cancer screening test: If you have a prostate, ask your care team if a prostate cancer screening test (PSA) at age 55 is right for you.  Lung cancer screening: If you are a current or former smoker ages 50 to 80, ask your care team if ongoing lung cancer screenings are right for you.  For informational purposes only. Not to replace the advice of your health care provider. Copyright   2023 [employer]. All rights reserved. Clinically reviewed by the [facility]. Digital Guardian 629010 - REV 01/24.

## 2025-02-26 NOTE — ASSESSMENT & PLAN NOTE
On Rosuvastatin 10 mg, no side effects or intolerance     - Elevated triglycerides but otherwise satisfactory total cholesterol and LDL.    Plan:   Continue current management

## 2025-02-26 NOTE — ASSESSMENT & PLAN NOTE
Stable and Euvolemic   Taking furosemide as needed         nitroGLYcerin (NITROSTAT) 0.4 MG sublingual tablet     For chest pain place 1 tablet under the tongue every 5 minutes for 3 doses. If symptoms persist 5 minutes after 1st dose call 911      metoprolol tartrate (LOPRESSOR) 50 MG tablet     Take 1 tablet (50 mg) by mouth 2 times daily      rosuvastatin (CRESTOR) 10 MG tablet     Take 1 tablet (10 mg) by mouth daily        amLODIPine (NORVASC) 5 MG tablet     Take 1 tablet (5 mg) by mouth daily      losartan (COZAAR) 50 MG tablet     Take 1 tablet (50 mg) by mouth daily     Continue current management

## 2025-02-26 NOTE — ASSESSMENT & PLAN NOTE
Normotensive     Rx:      amLODIPine (NORVASC) 5 MG tablet     Take 1 tablet (5 mg) by mouth daily      losartan (COZAAR) 50 MG tablet     Take 1 tablet (50 mg) by mouth daily       -normal bmp     Plan  - Continue current management   Follow up in 6 months    Ongoing SW/CM Assessment/Plan of Care Note     See SW/CM flowsheets for goals and other objective data. Progress note:  [facility] order received and referral sent to [facility]- accepted     Discharge plan:  Home with home PT    CM/SW team to continue to follow for discharge needs.

## 2025-02-26 NOTE — PROGRESS NOTES
Preventive Care Visit  [facility]  Dawood Oshea MD, Family Medicine  Feb 26, 2025      Assessment & Plan   Problem List Items Addressed This Visit       Type 2 diabetes mellitus with microalbuminuria, with long-term current use of insulin (H)     Doing well on the orals, tries to keep BG average < 150.   Embracing for coming Ramadan.     - positive for microalbumin but stable in comparison, elevated blood glucose and A1c.    Plan:   Continue current management, keep blood glucose average < 150 and follow tighter diabetic diet.   Follow up in 3 months.        -Farxiga 10 mg daily, metformin   --novolog with all the meals ( or 3x/ day ) prn     Monitor BG's and keep under 150           Relevant Orders    Comprehensive metabolic panel (Completed)    Hemoglobin A1c (Completed)    Albumin Random Urine Quantitative with Creat Ratio (Completed)    S/P CABG (coronary artery bypass graft)    Relevant Orders    Lipid panel reflex to direct LDL Fasting (Completed)    Dyslipidemia     On Rosuvastatin 10 mg, no side effects or intolerance     - Elevated triglycerides but otherwise satisfactory total cholesterol and LDL.    Plan:   Continue current management          Gout     On Allopurinol 100 mg daily     Intermittent flareups.       Normal uric acid level    Plan   Continue on allopuorinol 200 mg   Recheck level in a yr          Relevant Orders    Uric acid (Completed)    Benign essential hypertension     Normotensive     Rx:      amLODIPine (NORVASC) 5 MG tablet     Take 1 tablet (5 mg) by mouth daily      losartan (COZAAR) 50 MG tablet     Take 1 tablet (50 mg) by mouth daily       -normal bmp     Plan  - Continue current management   Follow up in 6 months          Other diabetic neurological complication associated with type 2 diabetes mellitus (H)    CHF (congestive heart failure) (H)     Stable and Euvolemic   Taking furosemide as needed         nitroGLYcerin (NITROSTAT) 0.4 MG sublingual  "tablet     For chest pain place 1 tablet under the tongue every 5 minutes for 3 doses. If symptoms persist 5 minutes after 1st dose call 911      metoprolol tartrate (LOPRESSOR) 50 MG tablet     Take 1 tablet (50 mg) by mouth 2 times daily      rosuvastatin (CRESTOR) 10 MG tablet     Take 1 tablet (10 mg) by mouth daily        amLODIPine (NORVASC) 5 MG tablet     Take 1 tablet (5 mg) by mouth daily      losartan (COZAAR) 50 MG tablet     Take 1 tablet (50 mg) by mouth daily     Continue current management          Relevant Orders    Lipid panel reflex to direct LDL Fasting (Completed)    Lipid panel reflex to direct LDL Fasting (Completed)    Former smoker     Other Visit Diagnoses       Routine general medical examination at a health care facility    -  Primary    Screening for prostate cancer        defered to the following yr per patient request    Special screening for malignant neoplasms, colon        2019- nl , Next due on 3/14/2029             Patient has been advised of split billing requirements and indicates understanding: Yes       BMI  Estimated body mass index is 27.18 kg/m  as calculated from the following:    Height as of this encounter: 1.778 m (5' 10\").    Weight as of this encounter: 85.9 kg (189 lb 6.4 oz).   Weight management plan: Discussed healthy diet and exercise guidelines    Counseling  Appropriate preventive services were addressed with this patient via screening, questionnaire, or discussion as appropriate for fall prevention, nutrition, physical activity, Tobacco-use cessation, social engagement, weight loss and cognition.  Checklist reviewing preventive services available has been given to the patient.  Reviewed patient's diet, addressing concerns and/or questions.     The longitudinal plan of care for the diagnosis(es)/condition(s) as documented were addressed during this visit. Due to the added complexity in care, I will continue to support Simone in the subsequent management and with " ongoing continuity of care.      Subjective   Simone is a 55 year old, presenting for the following:  Physical        2/26/2025     8:41 AM   Additional Questions   Roomed by Ramiro Flores   Accompanied by Self          HPI  Med check and refills         Health Care Directive  Patient does not have a Health Care Directive: Discussed advance care planning with patient; however, patient declined at this time.      2/21/2025   General Health   How would you rate your overall physical health? Good   Feel stress (tense, anxious, or unable to sleep) Not at all         2/21/2025   Nutrition   Three or more servings of calcium each day? Yes   Diet: Regular (no restrictions)    Diabetic   How many servings of fruit and vegetables per day? (!) 2-3   How many sweetened beverages each day? 0-1       Multiple values from one day are sorted in reverse-chronological order         2/21/2025   Exercise   Days per week of moderate/strenous exercise 5 days   Average minutes spent exercising at this level 40 min         2/21/2025   Social Factors   Frequency of gathering with friends or relatives More than three times a week   Worry food won't last until get money to buy more No   Food not last or not have enough money for food? No   Do you have housing? (Housing is defined as stable permanent housing and does not include staying ouside in a car, in a tent, in an abandoned building, in an overnight shelter, or couch-surfing.) Yes   Are you worried about losing your housing? No   Lack of transportation? No   Unable to get utilities (heat,electricity)? No         2/21/2025   Fall Risk   Fallen 2 or more times in the past year? No   Trouble with walking or balance? No          2/21/2025   Dental   Dentist two times every year? Yes            Today's PHQ-2 Score:       2/26/2025     8:30 AM   PHQ-2 ( 1999 [employer])   Q1: Little interest or pleasure in doing things 0   Q2: Feeling down, depressed or hopeless 0   PHQ-2 Score 0    Q1: Little  "interest or pleasure in doing things Not at all   Q2: Feeling down, depressed or hopeless Not at all   PHQ-2 Score 0       Patient-reported           2025   Substance Use   Alcohol more than 3/day or more than 7/wk No   Do you use any other substances recreationally? No     Social History     Tobacco Use    Smoking status: Former     Current packs/day: 0.00     Average packs/day: 1.5 packs/day for 13.0 years (19.5 ttl pk-yrs)     Types: Cigarettes     Start date: 1991     Quit date: 2003     Years since quittin.2    Smokeless tobacco: Never   Vaping Use    Vaping status: Never Used   Substance Use Topics    Alcohol use: No    Drug use: No           2025   STI Screening   New sexual partner(s) since last STI/HIV test? No   Last PSA:   Prostate Specific Antigen Screen   Date Value Ref Range Status   2024 0.88 0.00 - 3.50 ng/mL Final   2022 0.89 0.00 - 3.50 ug/L Final     ASCVD Risk   The ASCVD Risk score (Mattie LOPEZ, et al., 2019) failed to calculate for the following reasons:    Risk score cannot be calculated because patient has a medical history suggesting prior/existing ASCVD           Reviewed and updated as needed this visit by Provider                             Objective    Exam  /76   Pulse 60   Temp 97.9  F (36.6  C) (Oral)   Resp 12   Ht 1.778 m (5' 10\")   Wt 85.9 kg (189 lb 6.4 oz)   SpO2 97%   BMI 27.18 kg/m     Estimated body mass index is 27.18 kg/m  as calculated from the following:    Height as of this encounter: 1.778 m (5' 10\").    Weight as of this encounter: 85.9 kg (189 lb 6.4 oz).    Physical Exam  GENERAL: alert and no distress  HENT: oropharynx clear and oral mucous membranes moist  NECK: no adenopathy, no asymmetry, masses, or scars  RESP: lungs clear to auscultation - no rales, rhonchi or wheezes  CV: regular rate and rhythm, normal S1 S2, no S3 or S4, no murmur, click or rub, no peripheral edema  ABDOMEN: soft, nontender, no " hepatosplenomegaly, no masses and bowel sounds normal  MS: no gross musculoskeletal defects noted, no edema  SKIN: no suspicious lesions or rashes  PSYCH: mentation appears normal, affect normal/bright        Signed Electronically by: Dawood Oshea MD

## 2025-02-27 ENCOUNTER — MYC REFILL (OUTPATIENT)
Dept: FAMILY MEDICINE | Facility: CLINIC | Age: 55
End: 2025-02-27
Payer: COMMERCIAL

## 2025-02-27 DIAGNOSIS — E11.49 OTHER DIABETIC NEUROLOGICAL COMPLICATION ASSOCIATED WITH TYPE 2 DIABETES MELLITUS (H): ICD-10-CM

## 2025-02-27 RX ORDER — DAPAGLIFLOZIN 10 MG/1
10 TABLET, FILM COATED ORAL DAILY
Qty: 90 TABLET | Refills: 0 | Status: SHIPPED | OUTPATIENT
Start: 2025-02-27

## 2025-02-27 RX ORDER — NITROGLYCERIN 0.4 MG/1
TABLET SUBLINGUAL
Qty: 30 TABLET | Refills: 0 | Status: SHIPPED | OUTPATIENT
Start: 2025-02-27

## 2025-02-27 RX ORDER — LOSARTAN POTASSIUM 50 MG/1
50 TABLET ORAL DAILY
Qty: 90 TABLET | Refills: 0 | Status: SHIPPED | OUTPATIENT
Start: 2025-02-27

## 2025-02-27 RX ORDER — GLUCOSAMINE HCL/CHONDROITIN SU 500-400 MG
CAPSULE ORAL
Qty: 100 EACH | Refills: 5 | Status: SHIPPED | OUTPATIENT
Start: 2025-02-27

## 2025-02-27 NOTE — ASSESSMENT & PLAN NOTE
On Allopurinol 100 mg daily     Intermittent flareups.       Normal uric acid level    Plan   Continue on allopuorinol 200 mg   Recheck level in a yr

## 2025-02-28 NOTE — ASSESSMENT & PLAN NOTE
He's been having readings in the upper 100's and into 200's range.     Plan:   Follow Diabetic diet.   Add Trulicity   Continue on insulins and metformin as is.   Blood glucose goal of <150  Follow up in 4-8 weeks to reassess.    No

## 2025-03-07 PROBLEM — K20.0 EOSINOPHILIC ESOPHAGITIS: Status: ACTIVE | Noted: 2025-03-07

## 2025-03-10 ENCOUNTER — ANESTHESIA EVENT (OUTPATIENT)
Dept: SURGERY | Facility: CLINIC | Age: 55
End: 2025-03-10
Payer: COMMERCIAL

## 2025-03-11 ENCOUNTER — HOSPITAL ENCOUNTER (OUTPATIENT)
Facility: CLINIC | Age: 55
Discharge: HOME | End: 2025-03-11
Attending: INTERNAL MEDICINE | Admitting: INTERNAL MEDICINE
Payer: COMMERCIAL

## 2025-03-11 ENCOUNTER — ANESTHESIA (OUTPATIENT)
Dept: SURGERY | Facility: CLINIC | Age: 55
End: 2025-03-11
Payer: COMMERCIAL

## 2025-03-11 VITALS
TEMPERATURE: 97.2 F | BODY MASS INDEX: 28.2 KG/M2 | OXYGEN SATURATION: 95 % | HEART RATE: 57 BPM | HEIGHT: 70 IN | WEIGHT: 197 LBS | RESPIRATION RATE: 16 BRPM | DIASTOLIC BLOOD PRESSURE: 82 MMHG | SYSTOLIC BLOOD PRESSURE: 138 MMHG

## 2025-03-11 LAB
GLUCOSE BLDC GLUCOMTR-MCNC: 220 MG/DL (ref 70–99)
UPPER GI ENDOSCOPY: NORMAL

## 2025-03-11 PROCEDURE — 258N000003 HC RX IP 258 OP 636: Performed by: ANESTHESIOLOGY

## 2025-03-11 PROCEDURE — 250N000011 HC RX IP 250 OP 636: Performed by: ANESTHESIOLOGY

## 2025-03-11 PROCEDURE — 999N000141 HC STATISTIC PRE-PROCEDURE NURSING ASSESSMENT: Performed by: INTERNAL MEDICINE

## 2025-03-11 PROCEDURE — 370N000017 HC ANESTHESIA TECHNICAL FEE, PER MIN: Performed by: INTERNAL MEDICINE

## 2025-03-11 PROCEDURE — 250N000009 HC RX 250: Performed by: ANESTHESIOLOGY

## 2025-03-11 PROCEDURE — 88305 TISSUE EXAM BY PATHOLOGIST: CPT | Mod: 26 | Performed by: PATHOLOGY

## 2025-03-11 PROCEDURE — 272N000001 HC OR GENERAL SUPPLY STERILE: Performed by: INTERNAL MEDICINE

## 2025-03-11 PROCEDURE — 82962 GLUCOSE BLOOD TEST: CPT

## 2025-03-11 PROCEDURE — 88305 TISSUE EXAM BY PATHOLOGIST: CPT | Mod: TC | Performed by: INTERNAL MEDICINE

## 2025-03-11 PROCEDURE — 710N000012 HC RECOVERY PHASE 2, PER MINUTE: Performed by: INTERNAL MEDICINE

## 2025-03-11 PROCEDURE — 360N000075 HC SURGERY LEVEL 2, PER MIN: Performed by: INTERNAL MEDICINE

## 2025-03-11 RX ORDER — NALOXONE HYDROCHLORIDE 0.4 MG/ML
0.4 INJECTION, SOLUTION INTRAMUSCULAR; INTRAVENOUS; SUBCUTANEOUS
Status: DISCONTINUED | OUTPATIENT
Start: 2025-03-11 | End: 2025-03-11 | Stop reason: HOSPADM

## 2025-03-11 RX ORDER — GLYCOPYRROLATE 0.2 MG/ML
INJECTION, SOLUTION INTRAMUSCULAR; INTRAVENOUS PRN
Status: DISCONTINUED | OUTPATIENT
Start: 2025-03-11 | End: 2025-03-11

## 2025-03-11 RX ORDER — NALOXONE HYDROCHLORIDE 0.4 MG/ML
0.2 INJECTION, SOLUTION INTRAMUSCULAR; INTRAVENOUS; SUBCUTANEOUS
Status: DISCONTINUED | OUTPATIENT
Start: 2025-03-11 | End: 2025-03-11 | Stop reason: HOSPADM

## 2025-03-11 RX ORDER — FLUMAZENIL 0.1 MG/ML
0.2 INJECTION, SOLUTION INTRAVENOUS
Status: DISCONTINUED | OUTPATIENT
Start: 2025-03-11 | End: 2025-03-11 | Stop reason: HOSPADM

## 2025-03-11 RX ORDER — PROPOFOL 10 MG/ML
INJECTION, EMULSION INTRAVENOUS PRN
Status: DISCONTINUED | OUTPATIENT
Start: 2025-03-11 | End: 2025-03-11

## 2025-03-11 RX ORDER — NALOXONE HYDROCHLORIDE 0.4 MG/ML
0.1 INJECTION, SOLUTION INTRAMUSCULAR; INTRAVENOUS; SUBCUTANEOUS
Status: DISCONTINUED | OUTPATIENT
Start: 2025-03-11 | End: 2025-03-11 | Stop reason: HOSPADM

## 2025-03-11 RX ORDER — SODIUM CHLORIDE, SODIUM LACTATE, POTASSIUM CHLORIDE, CALCIUM CHLORIDE 600; 310; 30; 20 MG/100ML; MG/100ML; MG/100ML; MG/100ML
INJECTION, SOLUTION INTRAVENOUS CONTINUOUS
Status: DISCONTINUED | OUTPATIENT
Start: 2025-03-11 | End: 2025-03-11 | Stop reason: HOSPADM

## 2025-03-11 RX ORDER — LIDOCAINE 40 MG/G
CREAM TOPICAL
Status: DISCONTINUED | OUTPATIENT
Start: 2025-03-11 | End: 2025-03-11 | Stop reason: HOSPADM

## 2025-03-11 RX ORDER — LIDOCAINE HYDROCHLORIDE 10 MG/ML
INJECTION, SOLUTION INFILTRATION; PERINEURAL PRN
Status: DISCONTINUED | OUTPATIENT
Start: 2025-03-11 | End: 2025-03-11

## 2025-03-11 RX ORDER — PROCHLORPERAZINE MALEATE 10 MG
10 TABLET ORAL EVERY 6 HOURS PRN
Status: DISCONTINUED | OUTPATIENT
Start: 2025-03-11 | End: 2025-03-11 | Stop reason: HOSPADM

## 2025-03-11 RX ORDER — DIPHENHYDRAMINE HYDROCHLORIDE 50 MG/ML
25-50 INJECTION, SOLUTION INTRAMUSCULAR; INTRAVENOUS
Status: DISCONTINUED | OUTPATIENT
Start: 2025-03-11 | End: 2025-03-11 | Stop reason: HOSPADM

## 2025-03-11 RX ORDER — ONDANSETRON 4 MG/1
4 TABLET, ORALLY DISINTEGRATING ORAL EVERY 30 MIN PRN
Status: DISCONTINUED | OUTPATIENT
Start: 2025-03-11 | End: 2025-03-11 | Stop reason: HOSPADM

## 2025-03-11 RX ORDER — ONDANSETRON 4 MG/1
4 TABLET, ORALLY DISINTEGRATING ORAL EVERY 6 HOURS PRN
Status: DISCONTINUED | OUTPATIENT
Start: 2025-03-11 | End: 2025-03-11 | Stop reason: HOSPADM

## 2025-03-11 RX ORDER — ATROPINE SULFATE 0.1 MG/ML
1 INJECTION INTRAVENOUS
Status: DISCONTINUED | OUTPATIENT
Start: 2025-03-11 | End: 2025-03-11 | Stop reason: HOSPADM

## 2025-03-11 RX ORDER — DEXAMETHASONE SODIUM PHOSPHATE 10 MG/ML
4 INJECTION, SOLUTION INTRAMUSCULAR; INTRAVENOUS
Status: DISCONTINUED | OUTPATIENT
Start: 2025-03-11 | End: 2025-03-11 | Stop reason: HOSPADM

## 2025-03-11 RX ORDER — ONDANSETRON 2 MG/ML
INJECTION INTRAMUSCULAR; INTRAVENOUS PRN
Status: DISCONTINUED | OUTPATIENT
Start: 2025-03-11 | End: 2025-03-11

## 2025-03-11 RX ORDER — ONDANSETRON 2 MG/ML
4 INJECTION INTRAMUSCULAR; INTRAVENOUS
Status: DISCONTINUED | OUTPATIENT
Start: 2025-03-11 | End: 2025-03-11 | Stop reason: HOSPADM

## 2025-03-11 RX ORDER — FENTANYL CITRATE 50 UG/ML
25-100 INJECTION, SOLUTION INTRAMUSCULAR; INTRAVENOUS EVERY 5 MIN PRN
Status: DISCONTINUED | OUTPATIENT
Start: 2025-03-11 | End: 2025-03-11 | Stop reason: HOSPADM

## 2025-03-11 RX ORDER — ONDANSETRON 2 MG/ML
4 INJECTION INTRAMUSCULAR; INTRAVENOUS EVERY 30 MIN PRN
Status: DISCONTINUED | OUTPATIENT
Start: 2025-03-11 | End: 2025-03-11 | Stop reason: HOSPADM

## 2025-03-11 RX ORDER — PROPOFOL 10 MG/ML
INJECTION, EMULSION INTRAVENOUS CONTINUOUS PRN
Status: DISCONTINUED | OUTPATIENT
Start: 2025-03-11 | End: 2025-03-11

## 2025-03-11 RX ORDER — ONDANSETRON 2 MG/ML
4 INJECTION INTRAMUSCULAR; INTRAVENOUS EVERY 6 HOURS PRN
Status: DISCONTINUED | OUTPATIENT
Start: 2025-03-11 | End: 2025-03-11 | Stop reason: HOSPADM

## 2025-03-11 RX ORDER — SIMETHICONE 40MG/0.6ML
133 SUSPENSION, DROPS(FINAL DOSAGE FORM)(ML) ORAL
Status: DISCONTINUED | OUTPATIENT
Start: 2025-03-11 | End: 2025-03-11 | Stop reason: HOSPADM

## 2025-03-11 RX ORDER — EPINEPHRINE 1 MG/ML
0.1 INJECTION, SOLUTION INTRAMUSCULAR; SUBCUTANEOUS
Status: DISCONTINUED | OUTPATIENT
Start: 2025-03-11 | End: 2025-03-11 | Stop reason: HOSPADM

## 2025-03-11 RX ADMIN — LIDOCAINE HYDROCHLORIDE 5 ML: 10 INJECTION, SOLUTION INFILTRATION; PERINEURAL at 10:52

## 2025-03-11 RX ADMIN — SODIUM CHLORIDE, SODIUM LACTATE, POTASSIUM CHLORIDE, AND CALCIUM CHLORIDE: .6; .31; .03; .02 INJECTION, SOLUTION INTRAVENOUS at 09:35

## 2025-03-11 RX ADMIN — ONDANSETRON 4 MG: 2 INJECTION INTRAMUSCULAR; INTRAVENOUS at 10:52

## 2025-03-11 RX ADMIN — PROPOFOL 20 MG: 10 INJECTION, EMULSION INTRAVENOUS at 10:56

## 2025-03-11 RX ADMIN — GLYCOPYRROLATE 0.1 MG: 0.2 INJECTION INTRAMUSCULAR; INTRAVENOUS at 10:52

## 2025-03-11 RX ADMIN — PROPOFOL 175 MCG/KG/MIN: 10 INJECTION, EMULSION INTRAVENOUS at 10:52

## 2025-03-11 RX ADMIN — PROPOFOL 50 MG: 10 INJECTION, EMULSION INTRAVENOUS at 10:52

## 2025-03-11 ASSESSMENT — ACTIVITIES OF DAILY LIVING (ADL)
ADLS_ACUITY_SCORE: 52

## 2025-03-11 NOTE — PROVIDER NOTIFICATION
Notified Dr. Vasquez [facility] that pt  upon arrival to [facility]. Pt takes Insulin Aspart, Lantus, and Metformin. Last took medications yesterday. Per [facility] no interventions needed at this time.

## 2025-03-11 NOTE — H&P
PRE-PROCEDURE NOTE     Reason for procedure: EOE    History and Physical Reviewed: Reviewed, no changes.    Pre-sedation assessment:    General: alert, appears stated age, and cooperative  Airway: normal  Heart: regular rate and rhythm  Lungs: clear to auscultation bilaterally    Sedation Plan based on assessment: Monitored anesthesia care    Mallampati score: Class II (visualization of the soft palate, fauces, and uvula)    ASA Classification: ASA 2 - Patient with mild systemic disease with no functional limitations    Impression: Patient deemed adequate candidate for MAC sedation    Risks, benefits and alternatives were discussed with the patient and informed consent was obtained.    Risks/benefits/limitations/alternatives of endoscopy were explained and patient was made aware of the associated complications e.g. bleeding, infection, perforation requiring emergent surgery, splenic rupture, arrhythmia, aspiration/respiratory failure requiring intubation, ventilator dependence and remote possibility of death. As 100% visualization of mucosa may not be possible with endoscopy, chance of missing lesions was discussed with the patient as well. All questions were answered. Patient consented for the procedure and was willing to proceed.    Plan: esophagogastroduodenoscopy with biopsies and possible dilation      Sincere Torrez MD 3/11/2025 10:39 AM      Josh Torrez MD, FACP   [facility]  [email or web address]       Thank you for the opportunity to participate in the care of this patient.   Please feel free to call with any questions or concerns.  [phone].       CC: [facility], Dawood Oshea

## 2025-03-11 NOTE — ANESTHESIA PREPROCEDURE EVALUATION
Anesthesia Pre-Procedure Evaluation    Patient: Jerry Bustamante   MRN: [id number] : 1970        Procedure : Procedure(s):  ESOPHAGOGASTRODUODENOSCOPY WITH DILATION          Past Medical History:   Diagnosis Date    Coronary artery disease     Diabetes mellitus, type II (H)     Gout     High cholesterol     History of cardioversion 2021    HLD (hyperlipidemia)     Hypertension     S/P CABG (coronary artery bypass graft) x4 2021      Past Surgical History:   Procedure Laterality Date    ANGIOPLASTY      BYPASS GRAFT ARTERY CORONARY N/A 2021    Procedure: CORONARY ARTERY BYPASS GRAFTING X 4 WITH ENDOSCOPIC VEIN HARVESTING [place] PREET-DISTAL RCA LEFT RADIAL-OM LEFT SV-ANOMOLOUS RV BRANCH ON PUMP/HILLARY;  Surgeon: Antelmo Mccullough MD;  Location:  OR    CV HEART CATHETERIZATION WITH POSSIBLE INTERVENTION N/A 2021    Procedure: Heart Catheterization with Possible Intervention;  Surgeon: Paul Warner MD;  Location:  HEART CARDIAC CATH LAB    CV LEFT VENTRICULOGRAM N/A 2021    Procedure: Left Ventriculogram;  Surgeon: Paul Warner MD;  Location:  HEART CARDIAC CATH LAB    ESOPHAGOSCOPY, GASTROSCOPY, DUODENOSCOPY (EGD), COMBINED N/A 2024    Procedure: ESOPHAGOGASTRODUODENOSCOPY;  Surgeon: Steve Wells DO;  Location: [facility] Main OR      No Known Allergies   Social History     Tobacco Use    Smoking status: Former     Current packs/day: 0.00     Average packs/day: 1.5 packs/day for 13.0 years (19.5 ttl pk-yrs)     Types: Cigarettes     Start date: 1991     Quit date: 2003     Years since quittin.3    Smokeless tobacco: Never   Substance Use Topics    Alcohol use: No      Wt Readings from Last 1 Encounters:   25 89.4 kg (197 lb)        Anesthesia Evaluation   Pt has had prior anesthetic.         ROS/MED HX  ENT/Pulmonary:  - neg pulmonary ROS     Neurologic:  - neg neurologic ROS     Cardiovascular:     (+)  hypertension- -  CAD -  - -      " CHF                                METS/Exercise Tolerance:     Hematologic:  - neg hematologic  ROS     Musculoskeletal:  - neg musculoskeletal ROS     GI/Hepatic:     (+)   esophageal disease, Stricture,                Renal/Genitourinary:  - neg Renal ROS     Endo:     (+)  type II DM,                    Psychiatric/Substance Use:  - neg psychiatric ROS     Infectious Disease:  - neg infectious disease ROS     Malignancy:  - neg malignancy ROS     Other:  - neg other ROS          Physical Exam    Airway  airway exam normal      Mallampati: II   TM distance: > 3 FB   Neck ROM: full   Mouth opening: > 3 cm    Respiratory Devices and Support         Dental  no notable dental history     (+) Multiple crowns, permanant bridges      Cardiovascular   cardiovascular exam normal       Rhythm and rate: regular and normal     Pulmonary   pulmonary exam normal        breath sounds clear to auscultation           OUTSIDE LABS:  CBC:   Lab Results   Component Value Date    WBC 9.5 06/29/2024    WBC 8.0 07/15/2023    HGB 15.3 06/29/2024    HGB 14.5 07/15/2023    HCT 45.5 06/29/2024    HCT 43.1 07/15/2023     06/29/2024     07/15/2023     BMP:   Lab Results   Component Value Date     02/26/2025     08/14/2024    POTASSIUM 4.5 02/26/2025    POTASSIUM 4.3 08/14/2024    CHLORIDE 102 02/26/2025    CHLORIDE 105 08/14/2024    CO2 24 02/26/2025    CO2 23 08/14/2024    BUN 24.4 (H) 02/26/2025    BUN 18.9 08/14/2024    CR 1.14 02/26/2025    CR 0.99 08/14/2024     (H) 03/11/2025     (H) 02/26/2025     COAGS:   Lab Results   Component Value Date    PTT 35 07/14/2023    INR 0.95 07/14/2023    FIBR 516 (H) 09/08/2021     POC: No results found for: \"BGM\", \"HCG\", \"HCGS\"  HEPATIC:   Lab Results   Component Value Date    ALBUMIN 4.5 02/26/2025    PROTTOTAL 8.4 (H) 02/26/2025    ALT 13 02/26/2025    AST 15 02/26/2025    ALKPHOS 120 02/26/2025    BILITOTAL 0.2 02/26/2025    DEYSI 28 09/11/2021     OTHER: " "  Lab Results   Component Value Date    PH 7.42 09/11/2021    LACT 1.5 09/09/2021    A1C 8.3 (H) 02/26/2025    TORRES 10.2 02/26/2025    PHOS 3.3 09/20/2021    MAG 1.7 07/15/2023    LIPASE 63 (H) 06/29/2024    TSH 0.74 09/11/2021       Anesthesia Plan    ASA Status:  3    NPO Status:  NPO Appropriate    Anesthesia Type: MAC.     - Reason for MAC: straight local not clinically adequate, chronic cardiopulmonary disease              Consents    Anesthesia Plan(s) and associated risks, benefits, and realistic alternatives discussed. Questions answered and patient/representative(s) expressed understanding.     - Discussed:     - Discussed with:  Patient       - Patient is DNR/DNI Status: No          Postoperative Care    Pain management: Multi-modal analgesia.   PONV prophylaxis: Ondansetron (or other 5HT-3)     Comments:               Aries Vasquez MD    I have reviewed the pertinent notes and labs in the chart from the past 30 days and (re)examined the patient.  Any updates or changes from those notes are reflected in this note.    Clinically Significant Risk Factors Present on Admission                   # Hypertension: Noted on problem list          # DMII: A1C = 8.3 % (Ref range: 0.0 - 5.6 %) within past 6 months    # Overweight: Estimated body mass index is 28.27 kg/m  as calculated from the following:    Height as of this encounter: 1.778 m (5' 10\").    Weight as of this encounter: 89.4 kg (197 lb).        # History of CABG: noted on surgical history         "

## 2025-03-11 NOTE — ANESTHESIA CARE TRANSFER NOTE
Patient: Jerry Bustamante    Procedure: Procedure(s):  ESOPHAGOGASTRODUODENOSCOPY WITH BIOPSIES       Diagnosis: Eosinophilic esophagitis [K20.0]  Diagnosis Additional Information: No value filed.    Anesthesia Type:   MAC     Note:    Oropharynx: oropharynx clear of all foreign objects and spontaneously breathing  Level of Consciousness: drowsy  Oxygen Supplementation: face mask  Level of Supplemental Oxygen (L/min / FiO2): 6  Independent Airway: airway patency satisfactory and stable  Dentition: dentition unchanged  Vital Signs Stable: post-procedure vital signs reviewed and stable  Report to RN Given: handoff report given  Patient transferred to: Phase II    Handoff Report: Identifed the Patient, Identified the Reponsible Provider, Reviewed the pertinent medical history, Discussed the surgical course, Reviewed Intra-OP anesthesia mangement and issues during anesthesia, Set expectations for post-procedure period and Allowed opportunity for questions and acknowledgement of understanding      Vitals:  Vitals Value Taken Time   /87 03/11/25 1112   Temp 36.9  C (98.5  F) 03/11/25 1112   Pulse 67 03/11/25 1112   Resp 15 03/11/25 1112   SpO2 100 % 03/11/25 1112       Electronically Signed By: SCOTT Mendoza CRNA  March 11, 2025  11:14 AM

## 2025-03-11 NOTE — ANESTHESIA POSTPROCEDURE EVALUATION
Patient: Jerry Bustamante    Procedure: Procedure(s):  ESOPHAGOGASTRODUODENOSCOPY WITH BIOPSIES       Anesthesia Type:  MAC    Note:  Disposition: Outpatient   Postop Pain Control: Uneventful            Sign Out: Well controlled pain   PONV: No   Neuro/Psych: Uneventful            Sign Out: Acceptable/Baseline neuro status   Airway/Respiratory: Uneventful            Sign Out: Acceptable/Baseline resp. status   CV/Hemodynamics: Uneventful            Sign Out: Acceptable CV status; No obvious hypovolemia; No obvious fluid overload   Other NRE: NONE   DID A NON-ROUTINE EVENT OCCUR? No       Last vitals:  Vitals Value Taken Time   /84 03/11/25 1120   Temp 36.9  C (98.5  F) 03/11/25 1112   Pulse 58 03/11/25 1129   Resp 15 03/11/25 1112   SpO2 95 % 03/11/25 1129   Vitals shown include unfiled device data.    Electronically Signed By: Aries Vasquez MD  March 11, 2025  11:31 AM

## 2025-03-24 ENCOUNTER — TELEPHONE (OUTPATIENT)
Dept: FAMILY MEDICINE | Facility: CLINIC | Age: 55
End: 2025-03-24
Payer: COMMERCIAL

## 2025-03-24 DIAGNOSIS — Z11.1 SCREENING EXAMINATION FOR PULMONARY TUBERCULOSIS: Primary | ICD-10-CM

## 2025-03-24 NOTE — TELEPHONE ENCOUNTER
Patient is calling, stating he needs an order in to get a TB test done. Per patient, he stated he needs to get his results in within 3 days.

## 2025-03-25 NOTE — TELEPHONE ENCOUNTER
Spoke with patient regarding TB gold has been ordered. Patient is scheduled tomorrow at 4:15. He also asked for a COVID vaccine. Patient is scheduled at 4pm for this tomorrow prior to his lab

## 2025-03-26 ENCOUNTER — ALLIED HEALTH/NURSE VISIT (OUTPATIENT)
Dept: FAMILY MEDICINE | Facility: CLINIC | Age: 55
End: 2025-03-26
Payer: COMMERCIAL

## 2025-03-26 ENCOUNTER — LAB (OUTPATIENT)
Dept: LAB | Facility: CLINIC | Age: 55
End: 2025-03-26
Payer: COMMERCIAL

## 2025-03-26 ENCOUNTER — OFFICE VISIT (OUTPATIENT)
Dept: URGENT CARE | Facility: URGENT CARE | Age: 55
End: 2025-03-26
Payer: COMMERCIAL

## 2025-03-26 VITALS
SYSTOLIC BLOOD PRESSURE: 122 MMHG | DIASTOLIC BLOOD PRESSURE: 76 MMHG | RESPIRATION RATE: 20 BRPM | TEMPERATURE: 98 F | HEART RATE: 78 BPM | OXYGEN SATURATION: 98 %

## 2025-03-26 DIAGNOSIS — Z23 ENCOUNTER FOR IMMUNIZATION: Primary | ICD-10-CM

## 2025-03-26 DIAGNOSIS — Z11.1 SCREENING EXAMINATION FOR PULMONARY TUBERCULOSIS: ICD-10-CM

## 2025-03-26 DIAGNOSIS — K05.30 PERIODONTITIS: Primary | ICD-10-CM

## 2025-03-26 PROCEDURE — 91320 SARSCV2 VAC 30MCG TRS-SUC IM: CPT

## 2025-03-26 PROCEDURE — 99213 OFFICE O/P EST LOW 20 MIN: CPT | Performed by: PHYSICIAN ASSISTANT

## 2025-03-26 PROCEDURE — 3074F SYST BP LT 130 MM HG: CPT | Performed by: PHYSICIAN ASSISTANT

## 2025-03-26 PROCEDURE — 36415 COLL VENOUS BLD VENIPUNCTURE: CPT

## 2025-03-26 PROCEDURE — 99207 PR NO CHARGE NURSE ONLY: CPT

## 2025-03-26 PROCEDURE — 3078F DIAST BP <80 MM HG: CPT | Performed by: PHYSICIAN ASSISTANT

## 2025-03-26 PROCEDURE — 90480 ADMN SARSCOV2 VAC 1/ONLY CMP: CPT

## 2025-03-26 NOTE — PROGRESS NOTES
Assessment & Plan     Periodontitis  Treat infection with Augmentin and follow up with dentist for further assessment and definitive treatment. Bilateral lymph nodes likely 2/2 recent esophageal biopsies, but follow up with primary in 2-4 weeks to verify. Patient understands and is amenable to plan.        David Nichols is a 55 year old, presenting for the following health issues: Patient has swollen gums on the bilateral sides, upper. Causing pain as well, has been there off and on for a year. Getting worse now, having trouble chewing - worse with meat. He has been going to the dentist, but has not had this checked, and the dentist has not mentioned it. He does brush his teeth, but is very careful around those areas or it will bleed. No other symptoms.    Also has had itchy ears - wondering if this is related to the gum swelling.     PMHx - Patient had biopsies done about 2 weeks ago around the esophagus for his eosinophilic esophagitis          2/26/2025     8:41 AM   Additional Questions   Roomed by  Chanel Flores   Accompanied by Self     HPI        Review of Systems  Constitutional, HEENT, cardiovascular, pulmonary, gi and gu systems are negative, except as otherwise noted.      Objective    /76   Pulse 78   Temp 98  F (36.7  C)   Resp 20   SpO2 98%   There is no height or weight on file to calculate BMI.  Physical Exam   GENERAL: alert and no distress  HENT: normal cephalic/atraumatic, ear canals and TM's normal, nose and mouth without ulcers or lesions, oral mucous membranes moist, and on the medial side of bilateral posterior molars, there is a slightly erythematous swelling that extends about the length of the tooth, fluctuant when palpated with tongue depressor, and appears to have purulent discharge with pressure that comes out around the tooth; painful to palpation  NECK: bilateral anterior cervical adenopathy  RESP: lungs clear to auscultation - no rales, rhonchi or wheezes  CV: regular rate  and rhythm, normal S1 S2, no S3 or S4, no murmur, click or rub, no peripheral edema  ABDOMEN: soft, nontender, no hepatosplenomegaly, no masses and bowel sounds normal  MS: no gross musculoskeletal defects noted, no edema    Results for orders placed or performed in visit on 03/26/25 (from the past 24 hours)   Quantiferon TB Gold Plus    Specimen: Peripheral Blood    Narrative    The following orders were created for panel order Quantiferon TB Gold Plus.  Procedure                               Abnormality         Status                     ---------                               -----------         ------                     Quantiferon TB Gold Plu...[[id number]]                      In process                 Quantiferon TB Gold Plu...[[id number]]                      In process                 Quantiferon TB Gold Plu...[[id number]]                      In process                 Quantiferon TB Gold Plu...[[id number]]                      In process                   Please view results for these tests on the individual orders.           Signed Electronically by: Marilee Arroyo PA-C

## 2025-03-28 ENCOUNTER — TELEPHONE (OUTPATIENT)
Dept: FAMILY MEDICINE | Facility: CLINIC | Age: 55
End: 2025-03-28
Payer: COMMERCIAL

## 2025-03-28 PROBLEM — Z22.7 LATENT TUBERCULOSIS INFECTION: Status: ACTIVE | Noted: 2025-03-28

## 2025-03-28 NOTE — TELEPHONE ENCOUNTER
Patient calling for PCP comments on his TB test from 3/26. He needs this test for work and wants to know if this will be a problem. He needs the test and a note form provider to allow him to work. He states the deadline is this weekend. Routing to pcp    Santos Campos RN

## 2025-03-31 ENCOUNTER — ANCILLARY PROCEDURE (OUTPATIENT)
Dept: GENERAL RADIOLOGY | Facility: CLINIC | Age: 55
End: 2025-03-31
Attending: FAMILY MEDICINE
Payer: COMMERCIAL

## 2025-03-31 ENCOUNTER — TELEPHONE (OUTPATIENT)
Dept: FAMILY MEDICINE | Facility: CLINIC | Age: 55
End: 2025-03-31

## 2025-03-31 DIAGNOSIS — Z22.7 LATENT TUBERCULOSIS INFECTION: ICD-10-CM

## 2025-03-31 PROCEDURE — 71046 X-RAY EXAM CHEST 2 VIEWS: CPT | Mod: TC | Performed by: RADIOLOGY

## 2025-03-31 NOTE — TELEPHONE ENCOUNTER
Spoke with patient regarding needing a xray. Patient completed xray today 3/31/25. Please advise on results of xray

## 2025-03-31 NOTE — TELEPHONE ENCOUNTER
Test Results    Contacts       Contact Date/Time Type Contact Phone/Fax    03/31/2025 03:14 PM CDT Phone (Incoming) Simone Bustamante (Self) [phone] (M)            Who ordered the test:  Dr. Dawood Oshea    Type of test: X-ray    Date of test:  03/31/2025    Where was the test performed:  [facility]    What are your questions/concerns?:  Already received phone call with results.  Would like to get X Ray Results in letter mailed to address of record.

## 2025-04-08 ENCOUNTER — MYC REFILL (OUTPATIENT)
Dept: FAMILY MEDICINE | Facility: CLINIC | Age: 55
End: 2025-04-08
Payer: COMMERCIAL

## 2025-04-08 DIAGNOSIS — E11.49 OTHER DIABETIC NEUROLOGICAL COMPLICATION ASSOCIATED WITH TYPE 2 DIABETES MELLITUS (H): ICD-10-CM

## 2025-04-08 DIAGNOSIS — E11.29 TYPE 2 DIABETES MELLITUS WITH MICROALBUMINURIA, WITH LONG-TERM CURRENT USE OF INSULIN (H): ICD-10-CM

## 2025-04-08 DIAGNOSIS — Z79.4 TYPE 2 DIABETES MELLITUS WITH MICROALBUMINURIA, WITH LONG-TERM CURRENT USE OF INSULIN (H): ICD-10-CM

## 2025-04-08 DIAGNOSIS — R80.9 TYPE 2 DIABETES MELLITUS WITH MICROALBUMINURIA, WITH LONG-TERM CURRENT USE OF INSULIN (H): ICD-10-CM

## 2025-04-08 RX ORDER — PEN NEEDLE, DIABETIC 32GX 5/32"
NEEDLE, DISPOSABLE MISCELLANEOUS
Qty: 400 EACH | Refills: 0 | Status: SHIPPED | OUTPATIENT
Start: 2025-04-08

## 2025-04-08 RX ORDER — LANCETS
EACH MISCELLANEOUS
Qty: 200 EACH | Refills: 3 | Status: SHIPPED | OUTPATIENT
Start: 2025-04-08

## 2025-04-08 RX ORDER — GLUCOSAMINE HCL/CHONDROITIN SU 500-400 MG
CAPSULE ORAL
Qty: 100 EACH | Refills: 5 | Status: CANCELLED | OUTPATIENT
Start: 2025-04-08

## 2025-04-08 RX ORDER — GLUCOSAMINE HCL/CHONDROITIN SU 500-400 MG
CAPSULE ORAL
Qty: 100 EACH | Refills: 5 | Status: SHIPPED | OUTPATIENT
Start: 2025-04-08

## 2025-04-08 RX ORDER — LANCETS
EACH MISCELLANEOUS
Qty: 200 EACH | Refills: 3 | Status: SHIPPED | OUTPATIENT
Start: 2025-04-08 | End: 2025-04-08

## 2025-05-17 ENCOUNTER — MYC REFILL (OUTPATIENT)
Dept: FAMILY MEDICINE | Facility: CLINIC | Age: 55
End: 2025-05-17
Payer: COMMERCIAL

## 2025-05-17 DIAGNOSIS — M10.9 GOUT, UNSPECIFIED CAUSE, UNSPECIFIED CHRONICITY, UNSPECIFIED SITE: ICD-10-CM

## 2025-05-19 RX ORDER — ALLOPURINOL 100 MG/1
200 TABLET ORAL DAILY
Qty: 180 TABLET | Refills: 2 | Status: SHIPPED | OUTPATIENT
Start: 2025-05-19

## 2025-06-01 ENCOUNTER — MYC REFILL (OUTPATIENT)
Dept: FAMILY MEDICINE | Facility: CLINIC | Age: 55
End: 2025-06-01
Payer: COMMERCIAL

## 2025-06-01 DIAGNOSIS — I10 BENIGN ESSENTIAL HYPERTENSION: ICD-10-CM

## 2025-06-01 DIAGNOSIS — R80.9 TYPE 2 DIABETES MELLITUS WITH MICROALBUMINURIA, WITH LONG-TERM CURRENT USE OF INSULIN (H): ICD-10-CM

## 2025-06-01 DIAGNOSIS — E11.29 TYPE 2 DIABETES MELLITUS WITH MICROALBUMINURIA, WITH LONG-TERM CURRENT USE OF INSULIN (H): ICD-10-CM

## 2025-06-01 DIAGNOSIS — Z79.4 TYPE 2 DIABETES MELLITUS WITH MICROALBUMINURIA, WITH LONG-TERM CURRENT USE OF INSULIN (H): ICD-10-CM

## 2025-06-02 RX ORDER — LOSARTAN POTASSIUM 50 MG/1
50 TABLET ORAL DAILY
Qty: 90 TABLET | Refills: 1 | Status: SHIPPED | OUTPATIENT
Start: 2025-06-02

## 2025-06-07 ENCOUNTER — HEALTH MAINTENANCE LETTER (OUTPATIENT)
Age: 55
End: 2025-06-07

## 2025-06-25 ENCOUNTER — MYC REFILL (OUTPATIENT)
Dept: FAMILY MEDICINE | Facility: CLINIC | Age: 55
End: 2025-06-25
Payer: COMMERCIAL

## 2025-06-25 DIAGNOSIS — E11.29 TYPE 2 DIABETES MELLITUS WITH MICROALBUMINURIA, WITH LONG-TERM CURRENT USE OF INSULIN (H): ICD-10-CM

## 2025-06-25 DIAGNOSIS — R80.9 TYPE 2 DIABETES MELLITUS WITH MICROALBUMINURIA, WITH LONG-TERM CURRENT USE OF INSULIN (H): ICD-10-CM

## 2025-06-25 DIAGNOSIS — Z79.4 TYPE 2 DIABETES MELLITUS WITH MICROALBUMINURIA, WITH LONG-TERM CURRENT USE OF INSULIN (H): ICD-10-CM

## 2025-06-25 RX ORDER — DAPAGLIFLOZIN 10 MG/1
10 TABLET, FILM COATED ORAL DAILY
Qty: 90 TABLET | Refills: 0 | Status: SHIPPED | OUTPATIENT
Start: 2025-06-25

## 2025-06-26 ENCOUNTER — TELEPHONE (OUTPATIENT)
Dept: FAMILY MEDICINE | Facility: CLINIC | Age: 55
End: 2025-06-26
Payer: COMMERCIAL

## 2025-06-26 NOTE — TELEPHONE ENCOUNTER
Prior Authorization Not Needed per Insurance -FARXIGA GO THRU TO INSURANCE    Medication: FARXIGA 10 MG PO TABS  Insurance Company: [employer] - Phone [phone] Fax [phone]  Expected CoPay: $    Pharmacy Filling the Rx: [facility] [address] [place] [address]  Pharmacy Notified: CALLED PHARMACY TO CHECK THAT THEY GOT THIS TO GO THRU AND WAS TOLD THAT THEY DID  Patient Notified: YES, Instructed pharmacy to notify patient when script is ready to pick-up/ship

## 2025-07-11 ENCOUNTER — MYC REFILL (OUTPATIENT)
Dept: FAMILY MEDICINE | Facility: CLINIC | Age: 55
End: 2025-07-11
Payer: COMMERCIAL

## 2025-07-11 DIAGNOSIS — E11.29 TYPE 2 DIABETES MELLITUS WITH MICROALBUMINURIA, WITH LONG-TERM CURRENT USE OF INSULIN (H): ICD-10-CM

## 2025-07-11 DIAGNOSIS — Z79.4 TYPE 2 DIABETES MELLITUS WITH MICROALBUMINURIA, WITH LONG-TERM CURRENT USE OF INSULIN (H): ICD-10-CM

## 2025-07-11 DIAGNOSIS — R80.9 TYPE 2 DIABETES MELLITUS WITH MICROALBUMINURIA, WITH LONG-TERM CURRENT USE OF INSULIN (H): ICD-10-CM

## 2025-07-14 DIAGNOSIS — E11.29 TYPE 2 DIABETES MELLITUS WITH MICROALBUMINURIA, WITH LONG-TERM CURRENT USE OF INSULIN (H): ICD-10-CM

## 2025-07-14 DIAGNOSIS — Z79.4 TYPE 2 DIABETES MELLITUS WITH MICROALBUMINURIA, WITH LONG-TERM CURRENT USE OF INSULIN (H): ICD-10-CM

## 2025-07-14 DIAGNOSIS — R80.9 TYPE 2 DIABETES MELLITUS WITH MICROALBUMINURIA, WITH LONG-TERM CURRENT USE OF INSULIN (H): ICD-10-CM

## 2025-07-14 RX ORDER — DAPAGLIFLOZIN 10 MG/1
10 TABLET, FILM COATED ORAL DAILY
Qty: 90 TABLET | Refills: 0 | OUTPATIENT
Start: 2025-07-14

## 2025-07-14 RX ORDER — BLOOD SUGAR DIAGNOSTIC
STRIP MISCELLANEOUS
Qty: 200 STRIP | Refills: 5 | Status: SHIPPED | OUTPATIENT
Start: 2025-07-14

## 2025-07-14 NOTE — TELEPHONE ENCOUNTER
Medication Question or Refill        What medication are you calling about (include dose and sig)?:      Disp Refills Start End [facility]  ACCU-CHEK GUIDE test strip 200 strip 1 8/20/2024 -- Yes  Sig: Use to test blood sugar 2 times daily or as directed.  Sent to pharmacy as: Accu-Chek Guide In Vitro Strip  Class: E-Prescribe  Order: [id number]  E-Prescribing Status: Receipt confirmed by pharmacy (8/20/2024  3:52 PM CDT)      Preferred Pharmacy:   [facility] [address] [place] [id number]  Phone: [phone] Fax: [phone]      Controlled Substance Agreement on file:   CSA -- Patient Level:    CSA: None found at the patient level.       Who prescribed the medication?: PCP    Do you need a refill? Yes    Could we send this information to you in [facility] or would you prefer to receive a phone call?:   Patient would prefer a phone call   Okay to leave a detailed message?: Yes at Cell number on file:    Telephone Information:   Mobile [phone]

## 2025-07-15 ENCOUNTER — MYC REFILL (OUTPATIENT)
Dept: FAMILY MEDICINE | Facility: CLINIC | Age: 55
End: 2025-07-15
Payer: COMMERCIAL

## 2025-07-15 DIAGNOSIS — I50.22 CHRONIC SYSTOLIC CONGESTIVE HEART FAILURE (H): ICD-10-CM

## 2025-07-15 DIAGNOSIS — Z95.1 S/P CABG (CORONARY ARTERY BYPASS GRAFT): ICD-10-CM

## 2025-07-15 RX ORDER — ROSUVASTATIN CALCIUM 10 MG/1
10 TABLET, COATED ORAL DAILY
Qty: 90 TABLET | Refills: 1 | Status: SHIPPED | OUTPATIENT
Start: 2025-07-15

## 2025-07-15 NOTE — TELEPHONE ENCOUNTER
Spoke with patient and let him know rx bridged for 1 month, Pt scheduled 8/14/25 at 8am for OV med check/refill with Dr.E Dina BEARDEN MA  [facility]

## 2025-07-15 NOTE — TELEPHONE ENCOUNTER
Clinic RN: Please investigate patient's chart or contact patient if the information cannot be found because patient should have run out of this medication on 8/27/24. Confirm patient is taking this medication as prescribed. Document findings and route refill encounter to provider for approval or denial.    Liz Corral RN on 7/15/2025 at 12:03 PM

## 2025-07-16 DIAGNOSIS — I10 BENIGN ESSENTIAL HYPERTENSION: ICD-10-CM

## 2025-07-16 RX ORDER — AMLODIPINE BESYLATE 5 MG/1
5 TABLET ORAL DAILY
Qty: 90 TABLET | Refills: 1 | Status: SHIPPED | OUTPATIENT
Start: 2025-07-16

## 2025-07-16 NOTE — TELEPHONE ENCOUNTER
Medication Question or Refill        What medication are you calling about (include dose and sig)?:    Disp Refills Start End [facility]   amLODIPine (NORVASC) 5 MG tablet 90 tablet 2 10/22/2024 -- No   Sig - Route: Take 1 tablet (5 mg) by mouth daily. - Oral   Sent to pharmacy as: amLODIPine Besylate 5 MG Oral Tablet (NORVASC)   Class: E-Prescribe   Order: [id number]   E-Prescribing Status: Receipt confirmed by pharmacy (10/22/2024  3:00 PM CDT)       Preferred Pharmacy:   [facility] [address] [place] [address]  Phone: [phone] Fax: [phone]      Controlled Substance Agreement on file:   CSA -- Patient Level:    CSA: None found at the patient level.       Who prescribed the medication?: PCP    Do you need a refill? Yes    Could we send this information to you in [facility] or would you prefer to receive a phone call?:   Patient would prefer a phone call   Okay to leave a detailed message?: Yes at Cell number on file:    Telephone Information:   Mobile [phone]

## 2025-07-17 ENCOUNTER — MYC REFILL (OUTPATIENT)
Dept: FAMILY MEDICINE | Facility: CLINIC | Age: 55
End: 2025-07-17
Payer: COMMERCIAL

## 2025-07-17 DIAGNOSIS — Z95.1 S/P CABG (CORONARY ARTERY BYPASS GRAFT): ICD-10-CM

## 2025-07-17 RX ORDER — METOPROLOL TARTRATE 50 MG
50 TABLET ORAL 2 TIMES DAILY
Qty: 180 TABLET | Refills: 1 | Status: SHIPPED | OUTPATIENT
Start: 2025-07-17

## 2025-07-17 RX ORDER — METOPROLOL TARTRATE 50 MG
50 TABLET ORAL 2 TIMES DAILY
Qty: 180 TABLET | Refills: 1 | Status: CANCELLED | OUTPATIENT
Start: 2025-07-17

## 2025-07-17 RX ORDER — METOPROLOL TARTRATE 50 MG
50 TABLET ORAL 2 TIMES DAILY
Qty: 180 TABLET | Refills: 1 | Status: SHIPPED | OUTPATIENT
Start: 2025-07-17 | End: 2025-07-17

## 2025-07-17 RX ORDER — FUROSEMIDE 40 MG/1
40 TABLET ORAL DAILY PRN
Qty: 90 TABLET | Refills: 0 | Status: SHIPPED | OUTPATIENT
Start: 2025-07-17

## 2025-07-22 ENCOUNTER — MYC REFILL (OUTPATIENT)
Dept: FAMILY MEDICINE | Facility: CLINIC | Age: 55
End: 2025-07-22
Payer: COMMERCIAL

## 2025-07-22 DIAGNOSIS — Z95.1 S/P CABG (CORONARY ARTERY BYPASS GRAFT): ICD-10-CM

## 2025-07-23 RX ORDER — ROSUVASTATIN CALCIUM 10 MG/1
10 TABLET, COATED ORAL DAILY
Qty: 90 TABLET | Refills: 1 | OUTPATIENT
Start: 2025-07-23

## 2025-08-14 ENCOUNTER — OFFICE VISIT (OUTPATIENT)
Dept: FAMILY MEDICINE | Facility: CLINIC | Age: 55
End: 2025-08-14
Payer: COMMERCIAL

## 2025-08-14 VITALS
TEMPERATURE: 98 F | OXYGEN SATURATION: 100 % | HEIGHT: 70 IN | SYSTOLIC BLOOD PRESSURE: 122 MMHG | WEIGHT: 192 LBS | DIASTOLIC BLOOD PRESSURE: 78 MMHG | BODY MASS INDEX: 27.49 KG/M2 | RESPIRATION RATE: 18 BRPM | HEART RATE: 63 BPM

## 2025-08-14 DIAGNOSIS — E11.49 OTHER DIABETIC NEUROLOGICAL COMPLICATION ASSOCIATED WITH TYPE 2 DIABETES MELLITUS (H): Primary | ICD-10-CM

## 2025-08-14 DIAGNOSIS — R80.9 TYPE 2 DIABETES MELLITUS WITH MICROALBUMINURIA, WITH LONG-TERM CURRENT USE OF INSULIN (H): ICD-10-CM

## 2025-08-14 DIAGNOSIS — E78.5 DYSLIPIDEMIA: ICD-10-CM

## 2025-08-14 DIAGNOSIS — I50.9 CONGESTIVE HEART FAILURE, UNSPECIFIED HF CHRONICITY, UNSPECIFIED HEART FAILURE TYPE (H): ICD-10-CM

## 2025-08-14 DIAGNOSIS — I10 BENIGN ESSENTIAL HYPERTENSION: ICD-10-CM

## 2025-08-14 DIAGNOSIS — Z79.4 TYPE 2 DIABETES MELLITUS WITH MICROALBUMINURIA, WITH LONG-TERM CURRENT USE OF INSULIN (H): ICD-10-CM

## 2025-08-14 DIAGNOSIS — E11.29 TYPE 2 DIABETES MELLITUS WITH MICROALBUMINURIA, WITH LONG-TERM CURRENT USE OF INSULIN (H): ICD-10-CM

## 2025-08-14 LAB
ALBUMIN SERPL BCG-MCNC: 4.4 G/DL (ref 3.5–5.2)
ALP SERPL-CCNC: 111 U/L (ref 40–150)
ALT SERPL W P-5'-P-CCNC: 17 U/L (ref 0–70)
ANION GAP SERPL CALCULATED.3IONS-SCNC: 11 MMOL/L (ref 7–15)
AST SERPL W P-5'-P-CCNC: 19 U/L (ref 0–45)
BILIRUB SERPL-MCNC: 0.3 MG/DL
BUN SERPL-MCNC: 21.7 MG/DL (ref 6–20)
CALCIUM SERPL-MCNC: 10.2 MG/DL (ref 8.8–10.4)
CHLORIDE SERPL-SCNC: 102 MMOL/L (ref 98–107)
CHOLEST SERPL-MCNC: 144 MG/DL
CREAT SERPL-MCNC: 1.09 MG/DL (ref 0.67–1.17)
EGFRCR SERPLBLD CKD-EPI 2021: 79 ML/MIN/1.73M2
EST. AVERAGE GLUCOSE BLD GHB EST-MCNC: 192 MG/DL
FASTING STATUS PATIENT QL REPORTED: ABNORMAL
FASTING STATUS PATIENT QL REPORTED: ABNORMAL
GLUCOSE SERPL-MCNC: 203 MG/DL (ref 70–99)
HBA1C MFR BLD: 8.3 % (ref 0–5.6)
HCO3 SERPL-SCNC: 27 MMOL/L (ref 22–29)
HDLC SERPL-MCNC: 37 MG/DL
LDLC SERPL CALC-MCNC: 63 MG/DL
NONHDLC SERPL-MCNC: 107 MG/DL
POTASSIUM SERPL-SCNC: 4.3 MMOL/L (ref 3.4–5.3)
PROT SERPL-MCNC: 8.1 G/DL (ref 6.4–8.3)
SODIUM SERPL-SCNC: 140 MMOL/L (ref 135–145)
TRIGL SERPL-MCNC: 221 MG/DL

## 2025-08-14 PROCEDURE — 3048F LDL-C <100 MG/DL: CPT | Performed by: FAMILY MEDICINE

## 2025-08-14 PROCEDURE — 36415 COLL VENOUS BLD VENIPUNCTURE: CPT | Performed by: FAMILY MEDICINE

## 2025-08-14 PROCEDURE — 3052F HG A1C>EQUAL 8.0%<EQUAL 9.0%: CPT | Performed by: FAMILY MEDICINE

## 2025-08-14 PROCEDURE — G2211 COMPLEX E/M VISIT ADD ON: HCPCS | Performed by: FAMILY MEDICINE

## 2025-08-14 PROCEDURE — 99214 OFFICE O/P EST MOD 30 MIN: CPT | Performed by: FAMILY MEDICINE

## 2025-08-14 PROCEDURE — 80053 COMPREHEN METABOLIC PANEL: CPT | Performed by: FAMILY MEDICINE

## 2025-08-14 PROCEDURE — 3078F DIAST BP <80 MM HG: CPT | Performed by: FAMILY MEDICINE

## 2025-08-14 PROCEDURE — 3074F SYST BP LT 130 MM HG: CPT | Performed by: FAMILY MEDICINE

## 2025-08-14 PROCEDURE — 83036 HEMOGLOBIN GLYCOSYLATED A1C: CPT | Performed by: FAMILY MEDICINE

## 2025-08-14 PROCEDURE — 80061 LIPID PANEL: CPT | Performed by: FAMILY MEDICINE

## 2025-08-14 RX ORDER — ORAL DOSING DEVICES
EACH MISCELLANEOUS
Qty: 1 EACH | Refills: 0 | Status: SHIPPED | OUTPATIENT
Start: 2025-08-14

## 2025-09-03 ENCOUNTER — MYC REFILL (OUTPATIENT)
Dept: FAMILY MEDICINE | Facility: CLINIC | Age: 55
End: 2025-09-03
Payer: COMMERCIAL

## 2025-09-03 DIAGNOSIS — Z79.4 TYPE 2 DIABETES MELLITUS WITH MICROALBUMINURIA, WITH LONG-TERM CURRENT USE OF INSULIN (H): ICD-10-CM

## 2025-09-03 DIAGNOSIS — E11.29 TYPE 2 DIABETES MELLITUS WITH MICROALBUMINURIA, WITH LONG-TERM CURRENT USE OF INSULIN (H): ICD-10-CM

## 2025-09-03 DIAGNOSIS — R80.9 TYPE 2 DIABETES MELLITUS WITH MICROALBUMINURIA, WITH LONG-TERM CURRENT USE OF INSULIN (H): ICD-10-CM

## (undated) DEVICE — DRSG KERLIX 4 1/2"X4YDS ROLL 6715

## (undated) DEVICE — TUBING SUCTION MEDI-VAC 1/4"X20' N620A

## (undated) DEVICE — SOMASENSOR CEREBRAL OXIMETER ADULT SAFB-SM

## (undated) DEVICE — SMART CAPNOLINE H PLUS, ADULT/INTERMEDIATE O2, LONG

## (undated) DEVICE — SU ETHIBOND 3-0 BBDA 36" X588H

## (undated) DEVICE — SPONGE LAP 18X18" X8435

## (undated) DEVICE — COVER ULTRASOUND PROBE 6X48" PC1290

## (undated) DEVICE — SU STEEL 6 CCS 4X18" M654G

## (undated) DEVICE — CANNULA CORONARY IMA 31001

## (undated) DEVICE — FORCEP BIOPSY 2.3MM DISP COATED 000388

## (undated) DEVICE — Device

## (undated) DEVICE — BLADE KNIFE SURG 11 371111

## (undated) DEVICE — INSERT FOGARTY 86MM TRACTION DBL SAFEJAW DSAFE86

## (undated) DEVICE — SU VICRYL 3-0 FS-1 27" J442H

## (undated) DEVICE — BONE WAX OSTENE 3.5GM CT410

## (undated) DEVICE — MANIFOLD KIT ANGIO AUTOMATED 014613

## (undated) DEVICE — SOL WATER IRRIG 1000ML BOTTLE 2F7114

## (undated) DEVICE — LINEN TOWEL PACK X6 WHITE 5487

## (undated) DEVICE — CONNECTOR CARDIO BLAKE DRAIN BCC2

## (undated) DEVICE — DRAIN CHEST TUBE 32FR STR 8032

## (undated) DEVICE — SU PROLENE 7-0 BV175-6 24' M8737

## (undated) DEVICE — SUCTION CATH AIRLIFE TRI-FLO W/CONTROL PORT 14FR  T60C

## (undated) DEVICE — PUNCH AORTIC 4.0MMX8" RCB40

## (undated) DEVICE — CLIP HORIZON MULTI SM YELLOW 001204

## (undated) DEVICE — DRAPE STERI TOWEL LG 1010

## (undated) DEVICE — SUCTION DRY CHEST DRAIN OASIS 3600-100

## (undated) DEVICE — SUCTION MANIFOLD NEPTUNE 2 SYS 1 PORT 702-025-000

## (undated) DEVICE — SOL RINGERS LACTATED 1000ML BAG 2B2324X

## (undated) DEVICE — PREP CHLORAPREP W/ORANGE TINT 10.5ML 930715

## (undated) DEVICE — CATH DIAG 4FR JL 4.5 538417

## (undated) DEVICE — SYR 50ML LL W/O NDL 309653

## (undated) DEVICE — SU ETHIBOND 0 CT-1 CR 8X18" CX21D

## (undated) DEVICE — KIT ENDO VASOVIEW ACCESSORY PACK VH-2004

## (undated) DEVICE — POSITIONER ASSIST ESSTECH 3S T401210S

## (undated) DEVICE — KIT HAND CONTROL ANGIOTOUCH ACIST 65CM AT-P65

## (undated) DEVICE — CANNULA PERFUSION AORTIC ROOT 22FR 20012

## (undated) DEVICE — RESERVOIR CELL SAVING BLOOD COLLECTION EL2120

## (undated) DEVICE — SURGICEL HEMOSTAT 2X14" 1951

## (undated) DEVICE — DECANTER BAG 2002S

## (undated) DEVICE — DRSG WOUND VAC SPONGE MED BLACK M8275052/5

## (undated) DEVICE — GLOVE PROTEXIS W/NEU-THERA 7.5  2D73TE75

## (undated) DEVICE — SU PROLENE 4-0 SHDA 36" 8521H

## (undated) DEVICE — CATH ANGIO INFINITI 3DRC 4FRX100CM 538476

## (undated) DEVICE — VESSEL LOOPS RED MAXI

## (undated) DEVICE — PACK TUBING MINI VAC CUSTOM 1/2X3/8T BB9J78R4

## (undated) DEVICE — PACK OPEN HEART PV12OH524

## (undated) DEVICE — CANISTER WOUND VAC W/GEL 500ML M8275063/5

## (undated) DEVICE — SU PROLENE 7-0 BV-1DA 4X30" M8703

## (undated) DEVICE — DRAIN CHEST TUBE RIGHT ANGLED 32FR 8132

## (undated) DEVICE — DRSG TELFA ISLAND 4X14" 7544

## (undated) DEVICE — TOURNIQUET VASCULAR

## (undated) DEVICE — SU ETHIBOND 2-0 SHDA 30" X563H

## (undated) DEVICE — DRSG ADAPTIC 3X8" 6113

## (undated) DEVICE — GOWN XLG DISP 9545

## (undated) DEVICE — SU VICRYL 0 CTX 36" J370H

## (undated) DEVICE — NDL PERC ENTRY THINWALL 18GA 7.0" G00166

## (undated) DEVICE — SYR 10ML LL W/O NDL 302995

## (undated) DEVICE — LINEN TOWEL PACK X5 5464

## (undated) DEVICE — SU PROLENE 3-0 SHDA 36" 8522H

## (undated) DEVICE — BLOWER/MISTER CLEARVIEW 22150

## (undated) DEVICE — SU PLEDGET SOFT TFE 3/8"X3/26"X1/16" PCP40

## (undated) DEVICE — TOTE ANGIO CORP PC15AT SAN32CC83O

## (undated) DEVICE — PACK MINI VAC CUSTOM CARDOPULMONARY BB5Z97R15

## (undated) DEVICE — DRAPE MAYO STAND 23X54 8337

## (undated) DEVICE — INTRO SHEATH 4FRX10CM PINNACLE RSS402

## (undated) DEVICE — CABLE MYO/LEAD PACING WHITE DISP 019-530

## (undated) DEVICE — SU STEEL MYO/WIRE II STERNOTOMY 8 BE-1 3X14" 048-217

## (undated) DEVICE — LIGHT HANDLE X2

## (undated) DEVICE — RX SURGIFLO HEMOSTATIC MATRIX W/THROMBIN 8ML 2994

## (undated) DEVICE — SU SILK 1 TIE 10X30" SA87G

## (undated) DEVICE — SPONGE RAY-TEC 4X8" 7318

## (undated) DEVICE — SU VICRYL 2-0 CT-1 27" UND J259H

## (undated) DEVICE — LINEN LEG ROLL 5489

## (undated) DEVICE — SU PROLENE 6-0 C-1DA 30" M8706

## (undated) DEVICE — KIT ENDO VASOVIEW HEMOPRO 2 VH-4000

## (undated) DEVICE — CLIP SPRING FOGARTY SOFTJAW CSOFT6

## (undated) DEVICE — BLADE KNIFE BEAVER 6900

## (undated) DEVICE — DEVICE TISSUE STABILIZATION OCTOBASE 28707

## (undated) DEVICE — SUCTION CANISTER MEDIVAC LINER 3000ML W/LID 65651-530

## (undated) DEVICE — KIT WASH CELL SAVING ATL2001

## (undated) DEVICE — LEAD PACER MYOCARDIAL BIPOLAR TEMPORARY 53CM 6495F

## (undated) DEVICE — BNDG ESMARK 4" STERILE 836-3412

## (undated) DEVICE — PROTECTOR ARM ONE-STEP TRENDELENBURG 40418

## (undated) DEVICE — BNDG ESMARK 4" STERILE LF 820-3412

## (undated) DEVICE — BNDG ELASTIC 4"X15YDS STERILE

## (undated) DEVICE — DEFIB PRO-PADZ LVP LQD GEL ADULT 8900-2105-01

## (undated) DEVICE — SU PROLENE 8-0 BV130-5 24" M8732

## (undated) DEVICE — SOL NACL 0.9% IRRIG 1000ML BOTTLE 2F7124

## (undated) DEVICE — CANNULA PERFUSION VENOUS 3 STAGE 29FR 15" 91429

## (undated) DEVICE — NDL COUNTER 40CT  31142311

## (undated) DEVICE — SYR EAR BULB 3OZ 0035830

## (undated) DEVICE — SUCTION TIP YANKAUER STR K87

## (undated) DEVICE — LINEN TOWEL PACK X30 5481

## (undated) DEVICE — COVER SHOE STERILE

## (undated) DEVICE — DEVICE ASSEMBLY SUCTION/ANTI COAG BTC93

## (undated) DEVICE — DRAPE IOBAN INCISE 36X23" 6651EZ

## (undated) DEVICE — SU PROLENE 4-0 RB-1DA 36" 8557H

## (undated) DEVICE — CONNECTOR DRAIN CHEST Y EXTENSION SET 19909

## (undated) DEVICE — CATH DIAG 4FR ANG PIG 538453S

## (undated) DEVICE — BNDG ELASTIC 4"X5YDS STERILE 6611-4S

## (undated) DEVICE — CLIP HORIZON MULTI MED BLUE 002204

## (undated) DEVICE — BLADE KNIFE SURG 15 371115

## (undated) DEVICE — COVER TABLE POLY 65X90" 8186

## (undated) RX ORDER — PAPAVERINE HYDROCHLORIDE 30 MG/ML
INJECTION INTRAMUSCULAR; INTRAVENOUS
Status: DISPENSED
Start: 2021-09-08

## (undated) RX ORDER — PROPOFOL 10 MG/ML
INJECTION, EMULSION INTRAVENOUS
Status: DISPENSED
Start: 2021-09-08

## (undated) RX ORDER — LIDOCAINE HYDROCHLORIDE 10 MG/ML
INJECTION, SOLUTION EPIDURAL; INFILTRATION; INTRACAUDAL; PERINEURAL
Status: DISPENSED
Start: 2025-03-11

## (undated) RX ORDER — HEPARIN SODIUM 1000 [USP'U]/ML
INJECTION, SOLUTION INTRAVENOUS; SUBCUTANEOUS
Status: DISPENSED
Start: 2021-09-02

## (undated) RX ORDER — CEFAZOLIN SODIUM 1 G/3ML
INJECTION, POWDER, FOR SOLUTION INTRAMUSCULAR; INTRAVENOUS
Status: DISPENSED
Start: 2021-09-08

## (undated) RX ORDER — NICARDIPINE HYDROCHLORIDE 2.5 MG/ML
INJECTION INTRAVENOUS
Status: DISPENSED
Start: 2021-09-08

## (undated) RX ORDER — PROTAMINE SULFATE 10 MG/ML
INJECTION, SOLUTION INTRAVENOUS
Status: DISPENSED
Start: 2021-09-08

## (undated) RX ORDER — LIDOCAINE HYDROCHLORIDE 10 MG/ML
INJECTION, SOLUTION INFILTRATION; PERINEURAL
Status: DISPENSED
Start: 2021-09-08

## (undated) RX ORDER — LIDOCAINE HYDROCHLORIDE 10 MG/ML
INJECTION, SOLUTION EPIDURAL; INFILTRATION; INTRACAUDAL; PERINEURAL
Status: DISPENSED
Start: 2021-09-08

## (undated) RX ORDER — LIDOCAINE HYDROCHLORIDE 10 MG/ML
INJECTION, SOLUTION EPIDURAL; INFILTRATION; INTRACAUDAL; PERINEURAL
Status: DISPENSED
Start: 2021-09-02

## (undated) RX ORDER — VERAPAMIL HYDROCHLORIDE 2.5 MG/ML
INJECTION, SOLUTION INTRAVENOUS
Status: DISPENSED
Start: 2021-09-08

## (undated) RX ORDER — LIDOCAINE HYDROCHLORIDE 20 MG/ML
JELLY TOPICAL
Status: DISPENSED
Start: 2021-09-13

## (undated) RX ORDER — FENTANYL CITRATE 0.05 MG/ML
INJECTION, SOLUTION INTRAMUSCULAR; INTRAVENOUS
Status: DISPENSED
Start: 2021-09-08

## (undated) RX ORDER — ONDANSETRON 2 MG/ML
INJECTION INTRAMUSCULAR; INTRAVENOUS
Status: DISPENSED
Start: 2025-03-11

## (undated) RX ORDER — HEPARIN SODIUM 1000 [USP'U]/ML
INJECTION, SOLUTION INTRAVENOUS; SUBCUTANEOUS
Status: DISPENSED
Start: 2021-09-08

## (undated) RX ORDER — HEPARIN SODIUM 200 [USP'U]/100ML
INJECTION, SOLUTION INTRAVENOUS
Status: DISPENSED
Start: 2021-09-02

## (undated) RX ORDER — DEXAMETHASONE SODIUM PHOSPHATE 4 MG/ML
INJECTION, SOLUTION INTRA-ARTICULAR; INTRALESIONAL; INTRAMUSCULAR; INTRAVENOUS; SOFT TISSUE
Status: DISPENSED
Start: 2024-09-20

## (undated) RX ORDER — NITROGLYCERIN 5 MG/ML
VIAL (ML) INTRAVENOUS
Status: DISPENSED
Start: 2021-09-02

## (undated) RX ORDER — FENTANYL CITRATE 50 UG/ML
INJECTION, SOLUTION INTRAMUSCULAR; INTRAVENOUS
Status: DISPENSED
Start: 2021-09-02

## (undated) RX ORDER — FENTANYL CITRATE 50 UG/ML
INJECTION, SOLUTION INTRAMUSCULAR; INTRAVENOUS
Status: DISPENSED
Start: 2021-09-08

## (undated) RX ORDER — ACETAMINOPHEN 500 MG
TABLET ORAL
Status: DISPENSED
Start: 2021-09-08

## (undated) RX ORDER — CEFAZOLIN SODIUM 2 G/100ML
INJECTION, SOLUTION INTRAVENOUS
Status: DISPENSED
Start: 2021-09-08

## (undated) RX ORDER — PROPOFOL 10 MG/ML
INJECTION, EMULSION INTRAVENOUS
Status: DISPENSED
Start: 2025-03-11

## (undated) RX ORDER — LIDOCAINE HYDROCHLORIDE 20 MG/ML
INJECTION, SOLUTION EPIDURAL; INFILTRATION; INTRACAUDAL; PERINEURAL
Status: DISPENSED
Start: 2021-09-08

## (undated) RX ORDER — VANCOMYCIN HYDROCHLORIDE 500 MG/10ML
INJECTION, POWDER, LYOPHILIZED, FOR SOLUTION INTRAVENOUS
Status: DISPENSED
Start: 2021-09-08